# Patient Record
Sex: FEMALE | Race: WHITE | Employment: OTHER | URBAN - METROPOLITAN AREA
[De-identification: names, ages, dates, MRNs, and addresses within clinical notes are randomized per-mention and may not be internally consistent; named-entity substitution may affect disease eponyms.]

---

## 2017-02-03 ENCOUNTER — HOSPITAL ENCOUNTER (OUTPATIENT)
Dept: LAB | Age: 77
Discharge: HOME | End: 2017-02-03
Payer: MEDICARE

## 2017-02-03 DIAGNOSIS — D62 POSTOPERATIVE ANEMIA DUE TO ACUTE BLOOD LOSS: ICD-10-CM

## 2017-02-03 LAB
ALBUMIN SERPL BCP-MCNC: 3.9 G/DL (ref 3.2–4.6)
ALBUMIN/GLOB SERPL: 1 {RATIO} (ref 1.2–3.5)
ALP SERPL-CCNC: 197 U/L (ref 50–136)
ALT SERPL-CCNC: 18 U/L (ref 12–65)
ANION GAP BLD CALC-SCNC: 8 MMOL/L (ref 7–16)
AST SERPL W P-5'-P-CCNC: 9 U/L (ref 15–37)
BASOPHILS # BLD AUTO: 0.1 K/UL (ref 0–0.2)
BASOPHILS # BLD: 0 % (ref 0–2)
BILIRUB SERPL-MCNC: 0.6 MG/DL (ref 0.2–1.1)
BUN SERPL-MCNC: 63 MG/DL (ref 8–23)
CALCIUM SERPL-MCNC: 9.1 MG/DL (ref 8.3–10.4)
CHLORIDE SERPL-SCNC: 100 MMOL/L (ref 98–107)
CO2 SERPL-SCNC: 25 MMOL/L (ref 23–32)
CREAT SERPL-MCNC: 2.86 MG/DL (ref 0.6–1)
DIFFERENTIAL METHOD BLD: ABNORMAL
EOSINOPHIL # BLD: 0 K/UL (ref 0–0.8)
EOSINOPHIL NFR BLD: 0 % (ref 0.5–7.8)
ERYTHROCYTE [DISTWIDTH] IN BLOOD BY AUTOMATED COUNT: 12.9 % (ref 11.9–14.6)
GLOBULIN SER CALC-MCNC: 4.1 G/DL (ref 2.3–3.5)
GLUCOSE SERPL-MCNC: 284 MG/DL (ref 65–100)
HCT VFR BLD AUTO: 35.2 % (ref 35.8–46.3)
HGB BLD-MCNC: 11.9 G/DL (ref 11.7–15.4)
LYMPHOCYTES # BLD AUTO: 10 % (ref 13–44)
LYMPHOCYTES # BLD: 1.8 K/UL (ref 0.5–4.6)
MCH RBC QN AUTO: 30.7 PG (ref 26.1–32.9)
MCHC RBC AUTO-ENTMCNC: 33.8 G/DL (ref 31.4–35)
MCV RBC AUTO: 90.7 FL (ref 79.6–97.8)
MONOCYTES # BLD: 0.9 K/UL (ref 0.1–1.3)
MONOCYTES NFR BLD AUTO: 5 % (ref 4–12)
NEUTS SEG # BLD: 15.2 K/UL (ref 1.7–8.2)
NEUTS SEG NFR BLD AUTO: 85 % (ref 43–78)
NRBC # BLD: 0.01 K/UL (ref 0–0.2)
PLATELET # BLD AUTO: 388 K/UL (ref 150–450)
PMV BLD AUTO: 11.2 FL (ref 10.8–14.1)
POTASSIUM SERPL-SCNC: 4.2 MMOL/L (ref 3.5–5.1)
PROT SERPL-MCNC: 8 G/DL (ref 6.3–8.2)
RBC # BLD AUTO: 3.88 M/UL (ref 4.05–5.25)
SODIUM SERPL-SCNC: 133 MMOL/L (ref 136–145)
WBC # BLD AUTO: 18 K/UL (ref 4.3–11.1)

## 2017-02-03 PROCEDURE — 84165 PROTEIN E-PHORESIS SERUM: CPT | Performed by: INTERNAL MEDICINE

## 2017-02-03 PROCEDURE — 86334 IMMUNOFIX E-PHORESIS SERUM: CPT | Performed by: INTERNAL MEDICINE

## 2017-02-03 PROCEDURE — 80053 COMPREHEN METABOLIC PANEL: CPT | Performed by: INTERNAL MEDICINE

## 2017-02-03 PROCEDURE — 36415 COLL VENOUS BLD VENIPUNCTURE: CPT | Performed by: INTERNAL MEDICINE

## 2017-02-03 PROCEDURE — 85025 COMPLETE CBC W/AUTO DIFF WBC: CPT | Performed by: INTERNAL MEDICINE

## 2017-02-06 LAB
ALBUMIN SERPL ELPH-MCNC: 4.06 G/DL (ref 3.2–5.6)
ALBUMIN/GLOB SERPL: 1.1 {RATIO}
ALPHA1 GLOB SERPL ELPH-MCNC: 0.22 G/DL (ref 0.1–0.4)
ALPHA2 GLOB SERPL ELPH-MCNC: 1.22 G/DL (ref 0.4–1.2)
B-GLOBULIN SERPL QL ELPH: 1.13 G/DL (ref 0.6–1.3)
GAMMA GLOB MFR SERPL ELPH: 1.17 G/DL (ref 0.5–1.6)
IGA SERPL-MCNC: 93 MG/DL (ref 85–499)
IGG SERPL-MCNC: 1126 MG/DL (ref 610–1616)
IGM SERPL-MCNC: 59 MG/DL (ref 35–242)
M PROTEIN SERPL ELPH-MCNC: 0.53 G/DL
PROT PATTERN SERPL ELPH-IMP: ABNORMAL
PROT PATTERN SPEC IFE-IMP: ABNORMAL
PROT SERPL-MCNC: 7.8 G/DL (ref 6.3–8.2)

## 2017-02-07 PROCEDURE — 84156 ASSAY OF PROTEIN URINE: CPT | Performed by: INTERNAL MEDICINE

## 2017-02-07 PROCEDURE — 86335 IMMUNFIX E-PHORSIS/URINE/CSF: CPT | Performed by: INTERNAL MEDICINE

## 2017-02-08 ENCOUNTER — HOSPITAL ENCOUNTER (OUTPATIENT)
Dept: LAB | Age: 77
Discharge: HOME | End: 2017-02-08
Payer: MEDICARE

## 2017-02-08 DIAGNOSIS — D62 POSTOPERATIVE ANEMIA DUE TO ACUTE BLOOD LOSS: ICD-10-CM

## 2017-02-08 DIAGNOSIS — N28.89 RENAL MASS: ICD-10-CM

## 2017-02-10 LAB
ALBUMIN UR ELPH-MCNC: 3.4 MG/DL
ALBUMIN/GLOB SERPL: 0.4 {RATIO}
ALPHA1 GLOB 24H UR ELPH-MCNC: 0.4 MG/DL
ALPHA2 GLOB SERPL ELPH-MCNC: 1.2 MG/DL
B-GLOBULIN UR QL ELPH: 4.1 MG/DL
COLLECT DURATION TIME UR: 24 HR
GAMMA GLOB MFR UR ELPH: 3.9 MG/DL
M PROTEIN UR-MCNC: ABNORMAL MG/DL
PROT 24H UR-MRATE: 153 MG/24HR
PROT PATTERN SPEC IFE-IMP: ABNORMAL
PROT PATTERN UR ELPH-IMP: ABNORMAL
PROT UR-MCNC: 13 MG/DL
SPECIMEN VOL ?TM UR: 1175 ML

## 2017-02-13 ENCOUNTER — HOSPITAL ENCOUNTER (OUTPATIENT)
Dept: GENERAL RADIOLOGY | Age: 77
Discharge: HOME | End: 2017-02-13
Attending: INTERNAL MEDICINE
Payer: MEDICARE

## 2017-02-13 DIAGNOSIS — D62 POSTOPERATIVE ANEMIA DUE TO ACUTE BLOOD LOSS: ICD-10-CM

## 2017-02-13 DIAGNOSIS — N28.89 RENAL MASS: ICD-10-CM

## 2017-02-13 PROCEDURE — 77075 RADEX OSSEOUS SURVEY COMPL: CPT

## 2017-02-23 ENCOUNTER — HOSPITAL ENCOUNTER (OUTPATIENT)
Dept: LAB | Age: 77
Discharge: HOME | End: 2017-02-23
Payer: MEDICARE

## 2017-02-23 DIAGNOSIS — D47.2 MGUS (MONOCLONAL GAMMOPATHY OF UNKNOWN SIGNIFICANCE): ICD-10-CM

## 2017-02-23 LAB
ALBUMIN SERPL BCP-MCNC: 3.5 G/DL (ref 3.2–4.6)
ALBUMIN/GLOB SERPL: 0.9 {RATIO} (ref 1.2–3.5)
ALP SERPL-CCNC: 167 U/L (ref 50–136)
ALT SERPL-CCNC: 25 U/L (ref 12–65)
ANION GAP BLD CALC-SCNC: 12 MMOL/L (ref 7–16)
AST SERPL W P-5'-P-CCNC: 14 U/L (ref 15–37)
BASOPHILS # BLD AUTO: 0.1 K/UL (ref 0–0.2)
BASOPHILS # BLD: 1 % (ref 0–2)
BILIRUB SERPL-MCNC: 0.7 MG/DL (ref 0.2–1.1)
BUN SERPL-MCNC: 54 MG/DL (ref 8–23)
CALCIUM SERPL-MCNC: 9.2 MG/DL (ref 8.3–10.4)
CHLORIDE SERPL-SCNC: 101 MMOL/L (ref 98–107)
CO2 SERPL-SCNC: 24 MMOL/L (ref 23–32)
CREAT SERPL-MCNC: 2.84 MG/DL (ref 0.6–1)
DIFFERENTIAL METHOD BLD: ABNORMAL
EOSINOPHIL # BLD: 0.3 K/UL (ref 0–0.8)
EOSINOPHIL NFR BLD: 2 % (ref 0.5–7.8)
ERYTHROCYTE [DISTWIDTH] IN BLOOD BY AUTOMATED COUNT: 13.2 % (ref 11.9–14.6)
GLOBULIN SER CALC-MCNC: 4.1 G/DL (ref 2.3–3.5)
GLUCOSE SERPL-MCNC: 134 MG/DL (ref 65–100)
HCT VFR BLD AUTO: 37 % (ref 35.8–46.3)
HGB BLD-MCNC: 12.2 G/DL (ref 11.7–15.4)
LYMPHOCYTES # BLD AUTO: 16 % (ref 13–44)
LYMPHOCYTES # BLD: 2.3 K/UL (ref 0.5–4.6)
MCH RBC QN AUTO: 30.6 PG (ref 26.1–32.9)
MCHC RBC AUTO-ENTMCNC: 33 G/DL (ref 31.4–35)
MCV RBC AUTO: 92.7 FL (ref 79.6–97.8)
MONOCYTES # BLD: 0.6 K/UL (ref 0.1–1.3)
MONOCYTES NFR BLD AUTO: 4 % (ref 4–12)
NEUTS SEG # BLD: 10.6 K/UL (ref 1.7–8.2)
NEUTS SEG NFR BLD AUTO: 76 % (ref 43–78)
NRBC # BLD: 0 K/UL (ref 0–0.2)
PLATELET # BLD AUTO: 331 K/UL (ref 150–450)
PMV BLD AUTO: 11.1 FL (ref 10.8–14.1)
POTASSIUM SERPL-SCNC: 4.3 MMOL/L (ref 3.5–5.1)
PROT SERPL-MCNC: 7.6 G/DL (ref 6.3–8.2)
RBC # BLD AUTO: 3.99 M/UL (ref 4.05–5.25)
SODIUM SERPL-SCNC: 137 MMOL/L (ref 136–145)
WBC # BLD AUTO: 13.9 K/UL (ref 4.3–11.1)

## 2017-02-23 PROCEDURE — 36415 COLL VENOUS BLD VENIPUNCTURE: CPT | Performed by: INTERNAL MEDICINE

## 2017-02-23 PROCEDURE — 80053 COMPREHEN METABOLIC PANEL: CPT | Performed by: INTERNAL MEDICINE

## 2017-02-23 PROCEDURE — 85025 COMPLETE CBC W/AUTO DIFF WBC: CPT | Performed by: INTERNAL MEDICINE

## 2017-03-24 ENCOUNTER — PATIENT OUTREACH (OUTPATIENT)
Dept: CASE MANAGEMENT | Age: 77
End: 2017-03-24

## 2017-03-24 NOTE — PROGRESS NOTES
This note will not be viewable in [address] Care Needs: RNCM spoke with pt regarding CCM services, pt is agreeable to services. She and  live with her daughter Diana Oswald. She does not drive, but relies on her daughter Diana Oswald for transportation. Pt believes she will be starting dialysis in the near future. Medications reviewed with pt. Pt has Medicare Part D through [facility] but still pays $191 for Tradjenta. Pt has appt at [facility] Dr. Ken Vigil nephrologist Wednesday 3/29/17. She will learn more about beginning dialysis at this appt. Pt deals with pain that impacts sleep  lyrica helps pain and trembling. Next Steps: Referral to  for assistance with Dana Barker. RNCM will follow for A1c of 8.0. Pt has RNCM contact information for questions or concerns.

## 2017-03-29 ENCOUNTER — PATIENT OUTREACH (OUTPATIENT)
Dept: CASE MANAGEMENT | Age: 77
End: 2017-03-29

## 2017-03-29 NOTE — Clinical Note
FYI-please see last box on template in progress note. I plan to stop in office briefly on Thursday at 11am.  Let me know if this won't work. I emailed Malia Godinez to advise as well. Thank you!

## 2017-03-29 NOTE — ACP (ADVANCE CARE PLANNING)
Pt reports she and her spouse completed an updated will, living will, and POA a few years ago with a . CM encouraged to take to next appt so can scan into chart.

## 2017-03-29 NOTE — PROGRESS NOTES
Ambulatory Care Coordination  Social Work Assessment   Referral from which RN CM: Artice    Previously referred? If so, reason and brief outcome No   Reason for current referral: Tradjenta cost   Income information (if needed): Pt and spouse make jointly around $1,400/month   Sources of Support: Daughter, some neighbors   Medication Cost assistance needed? Yes, Tradjenta   Referral to CLTC/Medicaid needed? No   Referral to Medicare Extra Help/LIS program needed? CM will take this to home visit and share with pt and spouse. Most likely will qualify due to income and limited assets   Small home repair needed? No   MOW referral? Pt declined at this time as has daughter to assist with meals but may change mind if daughter moves or circumstances change   Any other concerns/questions? None other at this time when CM assessed. Next steps: CM will visit pt in her home at 10am on 3/30 to complete Tradjent assistance application and obtain SSI income letters. CM will then go to PCP office to obtain signature and signed rx for Wes Hodge from PCP. Cm will fax from PCP office. CM to f/u with assistance program end of next week to learn if pt is approved. If approved, medications will be shipped to pts home. Pt does have enough samples to last her a couple of weeks. This note will not be viewable in [address]

## 2017-03-30 ENCOUNTER — PATIENT OUTREACH (OUTPATIENT)
Dept: CASE MANAGEMENT | Age: 77
End: 2017-03-30

## 2017-03-30 NOTE — PROGRESS NOTES
Social Work CM completed home visit as planned. CM obtained pt's income letters and her signature on the [address] assistance application. CM advised PCP is out of office until Monday. CM spoke with , Laura Jesus, to make aware of plans to stop by on Monday to obtain PCP signature, signed rx, and fax. Cm will contact pt once application submitted. This note will not be viewable in [address]

## 2017-03-31 ENCOUNTER — PATIENT OUTREACH (OUTPATIENT)
Dept: CASE MANAGEMENT | Age: 77
End: 2017-03-31

## 2017-03-31 NOTE — PROGRESS NOTES
This note will not be viewable in [address] Care Needs: RNCM spoke with pt regarding CCM services. Pt reports she likes 3 points from needing dialysis. She is anticipating receiving a call from surgeon soon regarding fixing the veins to begin dialysis. Pt will then f/u with Dr. Mckay Mccollum again and has chosen dialysis center in [facility] when needed. Pt reports swelling and mild SOB on exertion. Pt had increase in Lasix to 80mg/day now by Dr. Mckay Mccollum. Pt reports she weighs herself daily at the same time, and has been doing this for 2 months. Pt is aware to report weight gain of 3lbs, resp signs and symptoms to physician early to prevent emergency situations. Pt verbalizes understanding. Next Steps:  RNCM scheduled call to f/u DM education in 2wks. Pt has RNCM contact information for questions or concerns.

## 2017-04-03 ENCOUNTER — PATIENT OUTREACH (OUTPATIENT)
Dept: CASE MANAGEMENT | Age: 77
End: 2017-04-03

## 2017-04-03 NOTE — PROGRESS NOTES
Social Work CM took [facility]  assistance application to pt's PCP office this morning for completion. CM obtained signed rx, Dr. Zeeshan Forrester signature on application, faxed in all items, and received a confirmation page. CM called pt to advise will outreach to assistance program in one week to see if they have made a decision. CM also inquired if pt had received her SSI letters in the mail yet and pt confirmed this. Cm to contact pt once learns outcome of Tradjenta assistance decision    This note will not be viewable in [employer].

## 2017-04-10 ENCOUNTER — PATIENT OUTREACH (OUTPATIENT)
Dept: CASE MANAGEMENT | Age: 77
End: 2017-04-10

## 2017-04-10 NOTE — PROGRESS NOTES
Social Work CM outreached to [employer] with [employer], spoke with rep Brown. Rep advised pt's info not showing in system; should be posted within 3-5 business days of the fax being sent, today being day 5. Rep suggested calling back on Wednesday to ensure still not in as they have been receiving a large amount of faxes. CM will also resubmit fax today. CM did receive confirmation page when faxed on 4/3. Rep advised if not faxed from PCP office it will still be processed however they may contact PCP office to verify info. CM will notify PCP RN/MA [place] and RN MARECLLA. CM will contact pt to give status update as well. CM to contact assistance program Wednesday morning. This note will not be viewable in [address]

## 2017-04-12 ENCOUNTER — PATIENT OUTREACH (OUTPATIENT)
Dept: CASE MANAGEMENT | Age: 77
End: 2017-04-12

## 2017-04-12 NOTE — PROGRESS NOTES
Social Work CM contacted  to get status on Tradjenta status. Rep advised still not showing in system. Cm had faxed on 4/3 an d4/10 and got confirmation pages. Verified fax number and rep confirmed. CM mailing in application so not to delay. CM will advise pt and check back next week to ensure received. This note will not be viewable in [address]

## 2017-04-14 ENCOUNTER — PATIENT OUTREACH (OUTPATIENT)
Dept: CASE MANAGEMENT | Age: 77
End: 2017-04-14

## 2017-04-14 NOTE — Clinical Note
Stephanie Sawant, Ms. Monster Castaneda requests a new wheelchair. She wants a regular one, not lightweight.     Thank you, Patricia Elliott, RN, BSN Ambulatory Care Manager [phone]

## 2017-04-14 NOTE — PROGRESS NOTES
This note will not be viewable in [address] Care Needs: RNCM spoke with pt regarding CCM services. Pt reports she went last wk to nephrologist, and is expecting call from surgeon (pt is unaware which surgeon) to evaluate what vein to use for dialysis. If she doesn't hear from surgeon soon, she is going to call nephrologist to alert them. She states she makes notes of things she wants to discuss with nephrologist before she goes. RNCM discussed DM and CKD diagnosis, impact on health and wellbeing. Pt reports she doesn't have any specific complaints but \"just feels bad\". She reports she feels best when her runs on the higher side close to 200. Her BS ranges 100-110 fasting, and 200s during the day. Pt states she gets sleepy when it runs too high, and lightheaded when it is getting low. She denies recent hypoglycemic episodes. RNCM discussed A1c in 1/2017   8.0 vs target A1c of <7.0. Pt again states she feels best when her BS is higher. Pt states she would like to have a new wheelchair. Next Steps: Lurdes Mark will notify PCP office of request for wheelchair. RNCM scheduled call to f/u anticipated dialysis cath placement. Pt has RNCM contact information for questions or concerns.

## 2017-04-20 ENCOUNTER — PATIENT OUTREACH (OUTPATIENT)
Dept: CASE MANAGEMENT | Age: 77
End: 2017-04-20

## 2017-04-20 NOTE — PROGRESS NOTES
Social work CM followed up with [place] assistance [facility] to ensure charity was received after mailed last week. Rep Kiki Hartman advised it had been received but was missing one check box re: type of insurance coverage. CM advised only medicare/medicare supplement, nothing private or Fernando Chavez advised she will update this question and send to processing. Could take up to two weeks for decision of approval.    CM off at exact 2 week camila but will attempt on 5/2 to see if decision made. CM will call pt to update on status. This note will not be viewable in [address]

## 2017-05-02 ENCOUNTER — PATIENT OUTREACH (OUTPATIENT)
Dept: CASE MANAGEMENT | Age: 77
End: 2017-05-02

## 2017-05-02 NOTE — PROGRESS NOTES
Social Work CM followed up with [employer].    CM spoke with rep Zoe Leonard who advised pt is \"temporarily enrolled\" and will be contacted via phone or letter to advise. Pt is only temporarily enrolled, and will receive a 90 day supply, due to pt's eligibility for the low-income subsidy program (aka Extra Help) with Medicare. CM did provide pt an application for this on home visit but unsure if pt has followed through with submission. Will encourage to do so when contact today to advise of temporary approval.    CM will contact pt shortly to update and encourage to contact SW if can assist.    Rep did not need anything from office at this time; said they are waiting for prescription to process and then will mail medication to pt's home. This note will not be viewable in [address]

## 2017-05-22 ENCOUNTER — PATIENT OUTREACH (OUTPATIENT)
Dept: CASE MANAGEMENT | Age: 77
End: 2017-05-22

## 2017-05-22 NOTE — PROGRESS NOTES
[facility] outreached to pt re: receiving Tradjenta and Extra Help application submission. Pt reports she did receive a 90 day supply of the Tradjenta and is thankful for this. Pt shares she has not filled out or submitted the Extra Help application (that would help pay for ALL medications) due to feeling poorly. Pt advised she had a great deal of swelling in her legs and had to be put on an antibiotic after getting an infection from one of the blisters popping. Mrs. Shady Leung reports she is feeling better now. CM encouraged pt to send in that application when feeling up to it and to call if has any questions about completing this. Pt was agreeable and thanked CM for the follow up. CM not scheduling any further outreaches at this time. Updating goal and removing from caseload. This note will not be viewable in [address]

## 2017-05-24 ENCOUNTER — PATIENT OUTREACH (OUTPATIENT)
Dept: CASE MANAGEMENT | Age: 77
End: 2017-05-24

## 2017-05-24 NOTE — PROGRESS NOTES
This note will not be viewable in [address] RNCM attempted to reach pt for f/u CCM services, no answer. RNCM left vm message with contact information requesting call back. RNCM will continue attempts to reach pt.

## 2017-06-02 ENCOUNTER — HOSPITAL ENCOUNTER (OUTPATIENT)
Dept: SURGERY | Age: 77
Discharge: HOME | End: 2017-06-02
Payer: MEDICARE

## 2017-06-02 VITALS
HEART RATE: 62 BPM | SYSTOLIC BLOOD PRESSURE: 121 MMHG | TEMPERATURE: 97.4 F | HEIGHT: 68 IN | BODY MASS INDEX: 37.33 KG/M2 | DIASTOLIC BLOOD PRESSURE: 50 MMHG | WEIGHT: 246.31 LBS | RESPIRATION RATE: 16 BRPM | OXYGEN SATURATION: 95 %

## 2017-06-02 LAB
ANION GAP BLD CALC-SCNC: 10 MMOL/L (ref 7–16)
BUN SERPL-MCNC: 53 MG/DL (ref 8–23)
CALCIUM SERPL-MCNC: 9.1 MG/DL (ref 8.3–10.4)
CHLORIDE SERPL-SCNC: 107 MMOL/L (ref 98–107)
CO2 SERPL-SCNC: 24 MMOL/L (ref 21–32)
CREAT SERPL-MCNC: 2.69 MG/DL (ref 0.6–1)
ERYTHROCYTE [DISTWIDTH] IN BLOOD BY AUTOMATED COUNT: 14.1 % (ref 11.9–14.6)
GLUCOSE BLD STRIP.AUTO-MCNC: 132 MG/DL (ref 65–100)
GLUCOSE SERPL-MCNC: 142 MG/DL (ref 65–100)
HCT VFR BLD AUTO: 34.6 % (ref 35.8–46.3)
HGB BLD-MCNC: 11.3 G/DL (ref 11.7–15.4)
MCH RBC QN AUTO: 29.4 PG (ref 26.1–32.9)
MCHC RBC AUTO-ENTMCNC: 32.7 G/DL (ref 31.4–35)
MCV RBC AUTO: 90.1 FL (ref 79.6–97.8)
PLATELET # BLD AUTO: 293 K/UL (ref 150–450)
PMV BLD AUTO: 11.9 FL (ref 10.8–14.1)
POTASSIUM SERPL-SCNC: 4.5 MMOL/L (ref 3.5–5.1)
RBC # BLD AUTO: 3.84 M/UL (ref 4.05–5.25)
SODIUM SERPL-SCNC: 141 MMOL/L (ref 136–145)
WBC # BLD AUTO: 6.2 K/UL (ref 4.3–11.1)

## 2017-06-02 PROCEDURE — 80048 BASIC METABOLIC PNL TOTAL CA: CPT | Performed by: SURGERY

## 2017-06-02 PROCEDURE — 85027 COMPLETE CBC AUTOMATED: CPT | Performed by: SURGERY

## 2017-06-02 PROCEDURE — 82962 GLUCOSE BLOOD TEST: CPT

## 2017-06-02 RX ORDER — BENAZEPRIL HYDROCHLORIDE 20 MG/1
20 TABLET ORAL
COMMUNITY
End: 2019-06-14 | Stop reason: SDUPTHER

## 2017-06-02 RX ORDER — FUROSEMIDE 40 MG/1
40 TABLET ORAL
COMMUNITY
End: 2018-03-13 | Stop reason: DRUGHIGH

## 2017-06-02 NOTE — PERIOP NOTES
POC glucose 132 . Instructed  Patient that if blood sugar 300 or > , surgery may be cancelled. Advised to take 1/2 normal dose of 70/30 on DOS = 25 units. Patient verbalized understanding.

## 2017-06-02 NOTE — PERIOP NOTES
Patient verified name, , and surgery as listed in [facility]. TYPE  CASE:1b  Orders per surgeon: were Received  Labs per surgeon:CBC,BMP. IStat K+ on DOS  Labs per anesthesia protocol: poc glucoce  EKG  :  EKG 16, Cath 11/10/11, Echo 16. Also added last office note from Primary Internal Medicine Physician. Patient provided with handouts including guide to surgery , transfusions, pain management and hand hygiene for the family and community. Pt verbalizes understanding of all pre-op instructions . Instructed that family must be present in building at all times. Nothing to eat or drink after midnight the night prior to surgery. Hibiclens and instructions given per hospital policy. Instructed patient to continue  previous medications as prescribed prior to surgery and  to take the following medications the day of surgery according to anesthesia guidelines : Aspirin, Atorvastatin, Carvedilol, Uloric, Levothyroxine. Original medication prescription bottles were not visualized during patient appointment. Continue all previous medications unless otherwise directed. Instructed patient to hold  the following medications prior to surgery: none. Patient verbalized understanding of all instructions and provided all medical/health information to the best of their ability.

## 2017-06-06 ENCOUNTER — ANESTHESIA EVENT (OUTPATIENT)
Dept: SURGERY | Age: 77
End: 2017-06-06
Payer: MEDICARE

## 2017-06-07 ENCOUNTER — HOSPITAL ENCOUNTER (OUTPATIENT)
Age: 77
Setting detail: OUTPATIENT SURGERY
Discharge: HOME | End: 2017-06-07
Attending: SURGERY | Admitting: SURGERY
Payer: MEDICARE

## 2017-06-07 ENCOUNTER — ANESTHESIA (OUTPATIENT)
Dept: SURGERY | Age: 77
End: 2017-06-07
Payer: MEDICARE

## 2017-06-07 VITALS
OXYGEN SATURATION: 96 % | HEIGHT: 69 IN | DIASTOLIC BLOOD PRESSURE: 64 MMHG | SYSTOLIC BLOOD PRESSURE: 134 MMHG | TEMPERATURE: 98 F | WEIGHT: 246.13 LBS | RESPIRATION RATE: 14 BRPM | HEART RATE: 58 BPM | BODY MASS INDEX: 36.45 KG/M2

## 2017-06-07 LAB
GLUCOSE BLD STRIP.AUTO-MCNC: 114 MG/DL (ref 65–100)
GLUCOSE BLD STRIP.AUTO-MCNC: 73 MG/DL (ref 65–100)
GLUCOSE BLD STRIP.AUTO-MCNC: 85 MG/DL (ref 65–100)
POTASSIUM BLD-SCNC: 4.4 MMOL/L (ref 3.5–5.1)

## 2017-06-07 PROCEDURE — 77030020143 HC AIRWY LARYN INTUB CGAS -A: Performed by: ANESTHESIOLOGY

## 2017-06-07 PROCEDURE — 77030011640 HC PAD GRND REM COVD -A: Performed by: SURGERY

## 2017-06-07 PROCEDURE — 76210000006 HC OR PH I REC 0.5 TO 1 HR: Performed by: SURGERY

## 2017-06-07 PROCEDURE — 74011250637 HC RX REV CODE- 250/637: Performed by: ANESTHESIOLOGY

## 2017-06-07 PROCEDURE — 76210000020 HC REC RM PH II FIRST 0.5 HR: Performed by: SURGERY

## 2017-06-07 PROCEDURE — 77030034888 HC SUT PROL 2 J&J -B: Performed by: SURGERY

## 2017-06-07 PROCEDURE — 74011250636 HC RX REV CODE- 250/636

## 2017-06-07 PROCEDURE — 74011000250 HC RX REV CODE- 250: Performed by: SURGERY

## 2017-06-07 PROCEDURE — 77030010507 HC ADH SKN DERMBND J&J -B: Performed by: SURGERY

## 2017-06-07 PROCEDURE — 77030010514 HC APPL CLP LIG COVD -B: Performed by: SURGERY

## 2017-06-07 PROCEDURE — 77030014008 HC SPNG HEMSTAT J&J -C: Performed by: SURGERY

## 2017-06-07 PROCEDURE — 76060000034 HC ANESTHESIA 1.5 TO 2 HR: Performed by: SURGERY

## 2017-06-07 PROCEDURE — 76010000162 HC OR TIME 1.5 TO 2 HR INTENSV-TIER 1: Performed by: SURGERY

## 2017-06-07 PROCEDURE — 77030002996 HC SUT SLK J&J -A: Performed by: SURGERY

## 2017-06-07 PROCEDURE — 77030032490 HC SLV COMPR SCD KNE COVD -B: Performed by: SURGERY

## 2017-06-07 PROCEDURE — 77030031139 HC SUT VCRL2 J&J -A: Performed by: SURGERY

## 2017-06-07 PROCEDURE — 77030020782 HC GWN BAIR PAWS FLX 3M -B: Performed by: ANESTHESIOLOGY

## 2017-06-07 PROCEDURE — 74011000250 HC RX REV CODE- 250

## 2017-06-07 PROCEDURE — 84132 ASSAY OF SERUM POTASSIUM: CPT

## 2017-06-07 PROCEDURE — 74011250636 HC RX REV CODE- 250/636: Performed by: SURGERY

## 2017-06-07 PROCEDURE — 82962 GLUCOSE BLOOD TEST: CPT

## 2017-06-07 RX ORDER — FENTANYL CITRATE 50 UG/ML
100 INJECTION, SOLUTION INTRAMUSCULAR; INTRAVENOUS AS NEEDED
Status: DISCONTINUED | OUTPATIENT
Start: 2017-06-07 | End: 2017-06-07 | Stop reason: HOSPADM

## 2017-06-07 RX ORDER — CEFAZOLIN SODIUM IN 0.9 % NACL 2 G/50 ML
2 INTRAVENOUS SOLUTION, PIGGYBACK (ML) INTRAVENOUS ONCE
Status: COMPLETED | OUTPATIENT
Start: 2017-06-07 | End: 2017-06-07

## 2017-06-07 RX ORDER — LIDOCAINE HYDROCHLORIDE 10 MG/ML
0.1 INJECTION INFILTRATION; PERINEURAL AS NEEDED
Status: DISCONTINUED | OUTPATIENT
Start: 2017-06-07 | End: 2017-06-07 | Stop reason: HOSPADM

## 2017-06-07 RX ORDER — SODIUM CHLORIDE, SODIUM LACTATE, POTASSIUM CHLORIDE, CALCIUM CHLORIDE 600; 310; 30; 20 MG/100ML; MG/100ML; MG/100ML; MG/100ML
75 INJECTION, SOLUTION INTRAVENOUS CONTINUOUS
Status: DISCONTINUED | OUTPATIENT
Start: 2017-06-07 | End: 2017-06-07 | Stop reason: HOSPADM

## 2017-06-07 RX ORDER — SODIUM CHLORIDE, SODIUM LACTATE, POTASSIUM CHLORIDE, CALCIUM CHLORIDE 600; 310; 30; 20 MG/100ML; MG/100ML; MG/100ML; MG/100ML
1000 INJECTION, SOLUTION INTRAVENOUS CONTINUOUS
Status: DISCONTINUED | OUTPATIENT
Start: 2017-06-07 | End: 2017-06-07

## 2017-06-07 RX ORDER — ACETAMINOPHEN 500 MG
500 TABLET ORAL ONCE
Status: DISCONTINUED | OUTPATIENT
Start: 2017-06-07 | End: 2017-06-07 | Stop reason: HOSPADM

## 2017-06-07 RX ORDER — FENTANYL CITRATE 50 UG/ML
INJECTION, SOLUTION INTRAMUSCULAR; INTRAVENOUS AS NEEDED
Status: DISCONTINUED | OUTPATIENT
Start: 2017-06-07 | End: 2017-06-07 | Stop reason: HOSPADM

## 2017-06-07 RX ORDER — PROPOFOL 10 MG/ML
INJECTION, EMULSION INTRAVENOUS AS NEEDED
Status: DISCONTINUED | OUTPATIENT
Start: 2017-06-07 | End: 2017-06-07 | Stop reason: HOSPADM

## 2017-06-07 RX ORDER — LIDOCAINE HYDROCHLORIDE 20 MG/ML
INJECTION, SOLUTION EPIDURAL; INFILTRATION; INTRACAUDAL; PERINEURAL AS NEEDED
Status: DISCONTINUED | OUTPATIENT
Start: 2017-06-07 | End: 2017-06-07 | Stop reason: HOSPADM

## 2017-06-07 RX ORDER — SODIUM CHLORIDE 0.9 % (FLUSH) 0.9 %
5-10 SYRINGE (ML) INJECTION EVERY 8 HOURS
Status: DISCONTINUED | OUTPATIENT
Start: 2017-06-07 | End: 2017-06-07 | Stop reason: HOSPADM

## 2017-06-07 RX ORDER — ONDANSETRON 2 MG/ML
INJECTION INTRAMUSCULAR; INTRAVENOUS AS NEEDED
Status: DISCONTINUED | OUTPATIENT
Start: 2017-06-07 | End: 2017-06-07 | Stop reason: HOSPADM

## 2017-06-07 RX ORDER — OXYCODONE HYDROCHLORIDE 5 MG/1
10 TABLET ORAL
Status: DISCONTINUED | OUTPATIENT
Start: 2017-06-07 | End: 2017-06-07 | Stop reason: HOSPADM

## 2017-06-07 RX ORDER — OXYCODONE HYDROCHLORIDE 5 MG/1
5 TABLET ORAL
Status: DISCONTINUED | OUTPATIENT
Start: 2017-06-07 | End: 2017-06-07 | Stop reason: HOSPADM

## 2017-06-07 RX ORDER — ONDANSETRON 2 MG/ML
4 INJECTION INTRAMUSCULAR; INTRAVENOUS ONCE
Status: DISCONTINUED | OUTPATIENT
Start: 2017-06-07 | End: 2017-06-07 | Stop reason: HOSPADM

## 2017-06-07 RX ORDER — SODIUM CHLORIDE 0.9 % (FLUSH) 0.9 %
5-10 SYRINGE (ML) INJECTION AS NEEDED
Status: DISCONTINUED | OUTPATIENT
Start: 2017-06-07 | End: 2017-06-07 | Stop reason: HOSPADM

## 2017-06-07 RX ORDER — LIDOCAINE HYDROCHLORIDE 10 MG/ML
INJECTION INFILTRATION; PERINEURAL AS NEEDED
Status: DISCONTINUED | OUTPATIENT
Start: 2017-06-07 | End: 2017-06-07 | Stop reason: HOSPADM

## 2017-06-07 RX ORDER — HYDROMORPHONE HYDROCHLORIDE 2 MG/ML
0.5 INJECTION, SOLUTION INTRAMUSCULAR; INTRAVENOUS; SUBCUTANEOUS
Status: DISCONTINUED | OUTPATIENT
Start: 2017-06-07 | End: 2017-06-07 | Stop reason: HOSPADM

## 2017-06-07 RX ORDER — BUPIVACAINE HYDROCHLORIDE 2.5 MG/ML
INJECTION, SOLUTION EPIDURAL; INFILTRATION; INTRACAUDAL AS NEEDED
Status: DISCONTINUED | OUTPATIENT
Start: 2017-06-07 | End: 2017-06-07 | Stop reason: HOSPADM

## 2017-06-07 RX ORDER — DIPHENHYDRAMINE HYDROCHLORIDE 50 MG/ML
12.5 INJECTION, SOLUTION INTRAMUSCULAR; INTRAVENOUS ONCE
Status: DISCONTINUED | OUTPATIENT
Start: 2017-06-07 | End: 2017-06-07 | Stop reason: HOSPADM

## 2017-06-07 RX ORDER — HEPARIN SODIUM 1000 [USP'U]/ML
INJECTION, SOLUTION INTRAVENOUS; SUBCUTANEOUS AS NEEDED
Status: DISCONTINUED | OUTPATIENT
Start: 2017-06-07 | End: 2017-06-07 | Stop reason: HOSPADM

## 2017-06-07 RX ORDER — FAMOTIDINE 20 MG/1
20 TABLET, FILM COATED ORAL ONCE
Status: COMPLETED | OUTPATIENT
Start: 2017-06-07 | End: 2017-06-07

## 2017-06-07 RX ORDER — NALOXONE HYDROCHLORIDE 0.4 MG/ML
0.1 INJECTION, SOLUTION INTRAMUSCULAR; INTRAVENOUS; SUBCUTANEOUS AS NEEDED
Status: DISCONTINUED | OUTPATIENT
Start: 2017-06-07 | End: 2017-06-07 | Stop reason: HOSPADM

## 2017-06-07 RX ORDER — SODIUM CHLORIDE 9 MG/ML
INJECTION, SOLUTION INTRAVENOUS
Status: DISCONTINUED | OUTPATIENT
Start: 2017-06-07 | End: 2017-06-07 | Stop reason: HOSPADM

## 2017-06-07 RX ORDER — MIDAZOLAM HYDROCHLORIDE 1 MG/ML
2 INJECTION, SOLUTION INTRAMUSCULAR; INTRAVENOUS
Status: DISCONTINUED | OUTPATIENT
Start: 2017-06-07 | End: 2017-06-07 | Stop reason: HOSPADM

## 2017-06-07 RX ORDER — SODIUM CHLORIDE, SODIUM LACTATE, POTASSIUM CHLORIDE, CALCIUM CHLORIDE 600; 310; 30; 20 MG/100ML; MG/100ML; MG/100ML; MG/100ML
25 INJECTION, SOLUTION INTRAVENOUS CONTINUOUS
Status: DISCONTINUED | OUTPATIENT
Start: 2017-06-07 | End: 2017-06-07 | Stop reason: HOSPADM

## 2017-06-07 RX ORDER — HEPARIN SODIUM 5000 [USP'U]/ML
INJECTION, SOLUTION INTRAVENOUS; SUBCUTANEOUS AS NEEDED
Status: DISCONTINUED | OUTPATIENT
Start: 2017-06-07 | End: 2017-06-07 | Stop reason: HOSPADM

## 2017-06-07 RX ORDER — PROTAMINE SULFATE 10 MG/ML
INJECTION, SOLUTION INTRAVENOUS AS NEEDED
Status: DISCONTINUED | OUTPATIENT
Start: 2017-06-07 | End: 2017-06-07 | Stop reason: HOSPADM

## 2017-06-07 RX ADMIN — PROTAMINE SULFATE 30 MG: 10 INJECTION, SOLUTION INTRAVENOUS at 14:55

## 2017-06-07 RX ADMIN — FAMOTIDINE 20 MG: 20 TABLET, FILM COATED ORAL at 09:38

## 2017-06-07 RX ADMIN — SODIUM CHLORIDE: 9 INJECTION, SOLUTION INTRAVENOUS at 13:41

## 2017-06-07 RX ADMIN — PROPOFOL 170 MG: 10 INJECTION, EMULSION INTRAVENOUS at 13:55

## 2017-06-07 RX ADMIN — FENTANYL CITRATE 25 MCG: 50 INJECTION, SOLUTION INTRAMUSCULAR; INTRAVENOUS at 14:24

## 2017-06-07 RX ADMIN — CEFAZOLIN 2 G: 1 INJECTION, POWDER, FOR SOLUTION INTRAMUSCULAR; INTRAVENOUS; PARENTERAL at 13:46

## 2017-06-07 RX ADMIN — LIDOCAINE HYDROCHLORIDE 100 MG: 20 INJECTION, SOLUTION EPIDURAL; INFILTRATION; INTRACAUDAL; PERINEURAL at 13:55

## 2017-06-07 RX ADMIN — FENTANYL CITRATE 50 MCG: 50 INJECTION, SOLUTION INTRAMUSCULAR; INTRAVENOUS at 13:55

## 2017-06-07 RX ADMIN — ONDANSETRON 4 MG: 2 INJECTION INTRAMUSCULAR; INTRAVENOUS at 15:06

## 2017-06-07 RX ADMIN — HEPARIN SODIUM 5000 UNITS: 1000 INJECTION, SOLUTION INTRAVENOUS; SUBCUTANEOUS at 14:36

## 2017-06-07 RX ADMIN — FENTANYL CITRATE 25 MCG: 50 INJECTION, SOLUTION INTRAMUSCULAR; INTRAVENOUS at 14:45

## 2017-06-07 NOTE — BRIEF OP NOTE
BRIEF OPERATIVE NOTE    Date of Procedure: 6/7/2017   Pre[facility] Diagnosis[facility] [N18.6]  Postoperative Diagnosis: CHRONIC KIDNEY DISEASE    Procedure(s):  LEFT ARM BRACHIOCEPHALIC ARTERIO VENOUS FISTULA/ CREATION  Surgeon(s) and Role:     * Priyanka Cotton MD - Primary       Assistant Staff:   Surgical Staff:  Circ-1: Colin Collier RN  Circ-Relief: Vance Gonzalez RN  Scrub Tech-1: Arlene Urbina  Scrub Tech-2: Fay Dorantes  Scrub Tech-Relief: Guilherme Patel CNA  Event Time In   Incision Start 1410   Incision Close 1515     Anesthesia: General   Estimated Blood Loss: 30 mL  Specimens: * No specimens in log *   Findings: Good quality left cephalic vein, good thrill and completion Doppler signals.    Complications: none  Implants: * No implants in log *

## 2017-06-07 NOTE — IP AVS SNAPSHOT
Current Discharge Medication List  
  
CONTINUE these medications which have CHANGED Dose & Instructions Dispensing Information Comments Morning Noon Evening Bedtime  
 febuxostat 80 mg Tab tablet Commonly known as:  Neal Saucedo What changed:  when to take this Your last dose was: Your next dose is:    
   
   
 Dose:  80 mg Take 1 Tab by mouth daily. Quantity:  30 Tab Refills:  11 CONTINUE these medications which have NOT CHANGED Dose & Instructions Dispensing Information Comments Morning Noon Evening Bedtime  
 aspirin 325 mg tablet Commonly known as:  ASPIRIN Your last dose was: Your next dose is:    
   
   
 Dose:  325 mg Take 325 mg by mouth every morning. Indications: Myocardial Reinfarction Prevention Refills:  0  
     
   
   
   
  
 atorvastatin 40 mg tablet Commonly known as:  LIPITOR Your last dose was: Your next dose is:    
   
   
 Dose:  40 mg Take 40 mg by mouth every morning. Refills:  0  
     
   
   
   
  
 carvedilol 6.25 mg tablet Commonly known as:  Cayla Concepcion Your last dose was: Your next dose is:    
   
   
 take 1 tablet by mouth twice a day Refills:  1  
     
   
   
   
  
 insulin NPH/insulin regular 100 unit/mL (70-30) injection Commonly known as:  HumuLIN 70/30 Your last dose was: Your next dose is:    
   
   
 inject 50 units subcutaneously every morning then 40 units every evening BEFORE MEALS Quantity:  3 Vial  
Refills:  5 LASIX 40 mg tablet Generic drug:  furosemide Your last dose was: Your next dose is:    
   
   
 Dose:  40 mg Take 40 mg by mouth every morning. Refills:  0  
     
   
   
   
  
 levothyroxine 25 mcg tablet Commonly known as:  SYNTHROID Your last dose was: Your next dose is:    
   
   
 Dose:  25 mcg Take 1 Tab by mouth Daily (before breakfast). Quantity:  90 Tab Refills:  3 LOTENSIN 20 mg tablet Generic drug:  benazepril Your last dose was: Your next dose is:    
   
   
 Dose:  20 mg Take 20 mg by mouth every morning. Refills:  0  
     
   
   
   
  
 nitroglycerin 400 mcg/spray spray Commonly known as:  Jose Luis Sermons Your last dose was: Your next dose is:    
   
   
 Dose:  1 Spray 1 Spray by SubLINGual route every five (5) minutes as needed (Pt states \"I've never had to use it\"). Quantity:  1 Bottle Refills:  4  
     
   
   
   
  
 TRADJENTA 5 mg tablet Generic drug:  linagliptin Your last dose was: Your next dose is:    
   
   
 Dose:  5 mg Take 5 mg by mouth every morning. Refills:  0  
     
   
   
   
  
 traMADol 300 mg tablet Commonly known as:  ULTRAM-ER Your last dose was: Your next dose is:    
   
   
 Dose:  300 mg Take 300 mg by mouth nightly. Refills:  0  
     
   
   
   
  
 triamcinolone acetonide 0.1 % topical cream  
Commonly known as:  KENALOG Your last dose was: Your next dose is:    
   
   
  Refills:  0

## 2017-06-07 NOTE — ANESTHESIA POSTPROCEDURE EVALUATION
Post-Anesthesia Evaluation and Assessment    Patient: Alexus Lobato MRN: [id number]  SSN: [id number]    YOB: 1940  Age: 68 y.o. Sex: female       Cardiovascular Function/Vital Signs  Visit Vitals    /64    Pulse (!) 58    Temp 36.7 °C (98 °F)    Resp 14    Ht 5' 8.5\" (1.74 m)    Wt 111.6 kg (246 lb 2 oz)    SpO2 96%    BMI 36.88 kg/m2       Patient is status post general anesthesia for Procedure(s):  LEFT ARM BRACHIOCEPHALIC ARTERIO VENOUS FISTULA/ CREATION. Nausea/Vomiting: None    Postoperative hydration reviewed and adequate. Pain:  Pain Scale 1: Numeric (0 - 10) (06/07/17 1616)  Pain Intensity 1: 0 (06/07/17 1616)   Managed    Neurological Status:   Neuro (WDL): Within Defined Limits (06/07/17 1600)  Neuro  Neurologic State: Drowsy (06/07/17 1523)  Orientation Level: Appropriate for age (06/07/17 1523)  Cognition: Appropriate decision making; Follows commands (06/07/17 1523)  Speech: Clear (06/07/17 1523)  LUE Motor Response: Purposeful (06/07/17 1523)  LLE Motor Response: Purposeful (06/07/17 1523)  RUE Motor Response: Purposeful (06/07/17 1523)  RLE Motor Response: Purposeful (06/07/17 1523)   At baseline    Mental Status and Level of Consciousness: Arousable    Pulmonary Status:   O2 Device: Room air (06/07/17 1616)   Adequate oxygenation and airway patent    Complications related to anesthesia: None    Post-anesthesia assessment completed.  No concerns    Signed By: Layla Copeland MD     June 7, 2017

## 2017-06-07 NOTE — DISCHARGE INSTRUCTIONS
INSTRUCTIONS FOR A-V FISTULA, GRAFT ACCESS, REVISION OR DECLOT    ACTIVITY  · As tolerated and as directed by your doctor. · Keep arm straight and raised above heart level for the next 24 hours. DIET  · Clear liquids until no nausea or vomiting; then light diet for the first day. · Advance to regular diet on second day, unless your doctor orders otherwise. · If nausea and vomiting continues, call your doctor. PAIN  · Take pain medication as directed by your doctor. · Call your doctor if pain is NOT relieved by the medication. · DO NOT take aspirin or blood thinners until directed by your doctor. DRESSING CARE  · Keep your dressing clean and dry. · Leave the dressing on until the dialysis nurse or your doctor takes it off. CARE OF YOUR ACCESS  DO:  · Keep access area clean with soap and water daily after dressing has been removed. · Feel for the thrill daily. (by placing your fingers over the graft you will be able to feel a vibration (thrill) which means blood is flowing and the graft is working.)  DO NOT:  · Wear tight sleeves, watches, belts or bracelets over graft. · Carry heavy bags across the graft. · Sleep on graft side. · Let your blood pressure be taken on graft side. · Let blood be drawn from your graft side. CALL YOUR DOCTOR IF  · Excessive bleeding that does not stop after holding mild pressure over area. · Temperature of 101 degrees F or above. · Redness, excessive swelling or bruising, and/or green or yellow, smelly discharge from incision   · Loss of sensation-cold, white, or blue fingers or toes. AFTER ANESTHESIA  · For the first 24 hours: DO NOT Drive, Drink alcoholic beverages, or Make important decisions. · Be aware of dizziness following anesthesia and while taking pain medication.      DISCHARGE SUMMARY from Nurse    PATIENT INSTRUCTIONS:    After general anesthesia or intravenous sedation, for 24 hours or while taking prescription Narcotics:  · Limit your activities  · Do not drive and operate hazardous machinery  · Do not make important personal or business decisions  · Do  not drink alcoholic beverages  · If you have not urinated within 8 hours after discharge, please contact your surgeon on call. *  Please give a list of your current medications to your Primary Care Provider. *  Please update this list whenever your medications are discontinued, doses are      changed, or new medications (including over-the-counter products) are added. *  Please carry medication information at all times in case of emergency situations. These are general instructions for a healthy lifestyle:    No smoking/ No tobacco products/ Avoid exposure to second hand smoke    Surgeon General's Warning:  Quitting smoking now greatly reduces serious risk to your health. Obesity, smoking, and sedentary lifestyle greatly increases your risk for illness    A healthy diet, regular physical exercise & weight monitoring are important for maintaining a healthy lifestyle    You may be retaining fluid if you have a history of heart failure or if you experience any of the following symptoms:  Weight gain of 3 pounds or more overnight or 5 pounds in a week, increased swelling in our hands or feet or shortness of breath while lying flat in bed. Please call your doctor as soon as you notice any of these symptoms; do not wait until your next office visit. Recognize signs and symptoms of STROKE:    F-face looks uneven    A-arms unable to move or move unevenly    S-speech slurred or non-existent    T-time-call 911 as soon as signs and symptoms begin-DO NOT go       Back to bed or wait to see if you get better-TIME IS BRAIN.

## 2017-06-07 NOTE — OP NOTES
[facility] 65   OPERATIVE REPORT       Name:  Erica Beach   MR#:  [id number]   :  1940   Account #:  [de-identified]   Date of Adm:  2017       DATE OF SURGERY: 2017    PREOPERATIVE DIAGNOSIS: Stage IV chronic kidney disease. POSTOPERATIVE DIAGNOSIS: Stage IV chronic kidney disease. PROCEDURE: Creation of left brachiocephalic arteriovenous   fistula. SURGEON: Milton Barba MD    ANESTHESIA: General with local supplement. ESTIMATED BLOOD LOSS: 30 mL. SPECIMEN: None. STATEMENT OF MEDICAL NECESSITY: The patient is a 71-year-old   woman with chronic kidney disease who is anticipated to require   hemodialysis in the near future. Vein mapping indicates a good   quality left upper arm cephalic vein for a fistula. DESCRIPTION OF PROCEDURE: The patient was taken to the operating   room and general anesthetic was administered. The left upper   extremity was prepped and draped in the usual sterile fashion. The antecubital fossa was anesthetized with 1% Xylocaine and   0.25% Sensorcaine local and a standard transverse incision was   made. The cubital branch of the cephalic vein was dissected out   to the basilic confluence and the basilic vein was noted to be   very small and was divided between ligatures of 2-0 silk. The   cephalic vein, on the other hand, was a good quality vein with a   diameter of about 5 to 6 mm. There were minimal phlebitic wall   changes and the vein was soft and easily compressible. The cephalic vein was dissected out at all of its branching   points and then marked on its anterior surface to ensure proper   orientation for the anastomosis. The aponeurosis was divided and the underlying brachial artery   was dissected out and found to be of good quality, with a   diameter of about 3.5 to 4 mm, and a soft arterial wall with a   strong pulse. The artery was dissected out proximally and   distally and vessel loops were applied.     The vein was divided. The cephalic vein was divided distally and   the distal stump oversewn with 2-0 silk suture ligature. The   vein was then opened up at a common branching point and the   excess venous material was trimmed away. There were no phlebitic   changes or valve segments in the distal end of the cephalic   vein. The cephalic vein was then distended gently with   heparinized saline which flowed easily and without resistance   and the vein dilated up very nicely. A small plastic vascular   bulldog was then placed on the cephalic vein. Heparin 5000 units was given intravenously and then 2 minutes   later the brachial artery was occluded proximally and distally   with [facility] vascular clamps. A longitudinal arteriotomy was made   on the left brachial artery, oriented slightly laterally to   facilitate the arteriovenous anastomosis. The lumen of the   artery was inspected and found to be normal. The left cephalic   vein was then anastomosed to the left brachial artery in an end-  to-side fashion using a single continuous 6-0 Prolene suture. The anastomosis was flushed and backbled and then flow was   restored into the fistula and then to the hand. There was a   strong thrill and strong Doppler signals were present as well,   all along the cephalic vein outflow tract well up into the upper   arm. The perianastomotic area of the brachial artery also had   normal arterial signals proximal and distal to the anastomosis   and there were strong Doppler signals at the left wrist over the   radial and ulnar arteries. Protamine was then given to reverse the remaining heparin. The   incision was thoroughly irrigated. Hemostasis was achieved and a   small amount of topical fibrillar was used as well. Protamine 30   mg was given to reverse the remaining heparin. The skin was   reanesthetized and then the incision was closed in layers with   running 3-0 Vicryl in the subcutaneous fascia.  Care was taken   not to compress or entrap the fistula. The skin was closed with   running 4-0 subcuticular Monocryl. Dermabond was applied to the   skin incision. The patient tolerated the procedure well and went   to recovery in stable condition.         MD MICHELLE Dunham / GEMA   D:  06/07/2017   15:40   T:  06/07/2017   15:58   Job #:  [id number]

## 2017-06-07 NOTE — IP AVS SNAPSHOT
[facility] 
 
 
 [address] [facility] [address] 
[phone] Patient: Aura Goel MRN: [id number] UPR:6/94/6840 You are allergic to the following Allergen Reactions Lipitor (Atorvastatin) Myalgia Recent Documentation Height Weight BMI OB Status Smoking Status 1.74 m 111.6 kg 36.88 kg/m2 Hysterectomy Never Smoker Emergency Contacts Name Discharge Info Relation Home Work Mobile [place] [3] [phone] [address] CAREGIVER [3] Daughter [21] [phone] About your hospitalization You were admitted on:  June 7, 2017 You last received care in the:  [facility] PACU You were discharged on:  June 7, 2017 Unit phone number:  [phone] Why you were hospitalized Your primary diagnosis was:  Not on File Providers Seen During Your Hospitalizations Provider Role Specialty Primary office phone Rex Powell MD Attending Provider Vascular Surgery [phone] Your Primary Care Physician (PCP) Primary Care Physician Office Phone Office Fax Deirdre Casanova [phone] Follow-up Information Follow up With Details Comments Contact Info Rex Powell MD Follow up on 6/28/2017 10:15 am [facility] [address] [facility] [address] 
[phone] Your Appointments Wednesday June 28, 2017 10:15 AM EDT [facility] Op with Rex Powell MD  
[facility] [address] [place] [id number] [phone] Current Discharge Medication List  
  
CONTINUE these medications which have CHANGED Dose & Instructions Dispensing Information Comments Morning Noon Evening Bedtime  
 febuxostat 80 mg Tab tablet Commonly known as:  Nathanael Burton What changed:  when to take this Your last dose was: Your next dose is:    
   
   
 Dose:  80 mg Take 1 Tab by mouth daily. Quantity:  30 Tab Refills:  11 CONTINUE these medications which have NOT CHANGED Dose & Instructions Dispensing Information Comments Morning Noon Evening Bedtime  
 aspirin 325 mg tablet Commonly known as:  ASPIRIN Your last dose was: Your next dose is:    
   
   
 Dose:  325 mg Take 325 mg by mouth every morning. Indications: Myocardial Reinfarction Prevention Refills:  0  
     
   
   
   
  
 atorvastatin 40 mg tablet Commonly known as:  LIPITOR Your last dose was: Your next dose is:    
   
   
 Dose:  40 mg Take 40 mg by mouth every morning. Refills:  0  
     
   
   
   
  
 carvedilol 6.25 mg tablet Commonly known as:  Jeanella Meline Your last dose was: Your next dose is:    
   
   
 take 1 tablet by mouth twice a day Refills:  1  
     
   
   
   
  
 insulin NPH/insulin regular 100 unit/mL (70-30) injection Commonly known as:  HumuLIN 70/30 Your last dose was: Your next dose is:    
   
   
 inject 50 units subcutaneously every morning then 40 units every evening BEFORE MEALS Quantity:  3 Vial  
Refills:  5 LASIX 40 mg tablet Generic drug:  furosemide Your last dose was: Your next dose is:    
   
   
 Dose:  40 mg Take 40 mg by mouth every morning. Refills:  0  
     
   
   
   
  
 levothyroxine 25 mcg tablet Commonly known as:  SYNTHROID Your last dose was: Your next dose is:    
   
   
 Dose:  25 mcg Take 1 Tab by mouth Daily (before breakfast). Quantity:  90 Tab Refills:  3 LOTENSIN 20 mg tablet Generic drug:  benazepril Your last dose was: Your next dose is:    
   
   
 Dose:  20 mg Take 20 mg by mouth every morning. Refills:  0 nitroglycerin 400 mcg/spray spray Commonly known as:  De Paz Rape Your last dose was: Your next dose is:    
   
   
 Dose:  1 Spray 1 Spray by SubLINGual route every five (5) minutes as needed (Pt states \"I've never had to use it\"). Quantity:  1 Bottle Refills:  4  
     
   
   
   
  
 TRADJENTA 5 mg tablet Generic drug:  linagliptin Your last dose was: Your next dose is:    
   
   
 Dose:  5 mg Take 5 mg by mouth every morning. Refills:  0  
     
   
   
   
  
 traMADol 300 mg tablet Commonly known as:  ULTRAM-ER Your last dose was: Your next dose is:    
   
   
 Dose:  300 mg Take 300 mg by mouth nightly. Refills:  0  
     
   
   
   
  
 triamcinolone acetonide 0.1 % topical cream  
Commonly known as:  KENALOG Your last dose was: Your next dose is:    
   
   
  Refills:  0 Discharge Instructions INSTRUCTIONS FOR A-V FISTULA, GRAFT ACCESS, REVISION OR DECLOT 
 
ACTIVITY · As tolerated and as directed by your doctor. · Keep arm straight and raised above heart level for the next 24 hours. DIET · Clear liquids until no nausea or vomiting; then light diet for the first day. · Advance to regular diet on second day, unless your doctor orders otherwise. · If nausea and vomiting continues, call your doctor. PAIN 
· Take pain medication as directed by your doctor. · Call your doctor if pain is NOT relieved by the medication. · DO NOT take aspirin or blood thinners until directed by your doctor. DRESSING CARE 
· Keep your dressing clean and dry. · Leave the dressing on until the dialysis nurse or your doctor takes it off. CARE OF YOUR ACCESS 
DO: 
· Keep access area clean with soap and water daily after dressing has been removed. · Feel for the thrill daily.  (by placing your fingers over the graft you will be able to feel a vibration (thrill) which means blood is flowing and the graft is working.) DO NOT: 
· Wear tight sleeves, watches, belts or bracelets over graft. · Carry heavy bags across the graft. · Sleep on graft side. · Let your blood pressure be taken on graft side. · Let blood be drawn from your graft side. CALL YOUR DOCTOR IF 
· Excessive bleeding that does not stop after holding mild pressure over area. · Temperature of 101 degrees F or above. · Redness, excessive swelling or bruising, and/or green or yellow, smelly discharge from incision · Loss of sensation-cold, white, or blue fingers or toes. AFTER ANESTHESIA · For the first 24 hours: DO NOT Drive, Drink alcoholic beverages, or Make important decisions. · Be aware of dizziness following anesthesia and while taking pain medication. DISCHARGE SUMMARY from Nurse PATIENT INSTRUCTIONS: 
 
After general anesthesia or intravenous sedation, for 24 hours or while taking prescription Narcotics: · Limit your activities · Do not drive and operate hazardous machinery · Do not make important personal or business decisions · Do  not drink alcoholic beverages · If you have not urinated within 8 hours after discharge, please contact your surgeon on call. *  Please give a list of your current medications to your Primary Care Provider. *  Please update this list whenever your medications are discontinued, doses are 
    changed, or new medications (including over-the-counter products) are added. *  Please carry medication information at all times in case of emergency situations. These are general instructions for a healthy lifestyle: No smoking/ No tobacco products/ Avoid exposure to second hand smoke Surgeon General's Warning:  Quitting smoking now greatly reduces serious risk to your health. Obesity, smoking, and sedentary lifestyle greatly increases your risk for illness A healthy diet, regular physical exercise & weight monitoring are important for maintaining a healthy lifestyle You may be retaining fluid if you have a history of heart failure or if you experience any of the following symptoms:  Weight gain of 3 pounds or more overnight or 5 pounds in a week, increased swelling in our hands or feet or shortness of breath while lying flat in bed. Please call your doctor as soon as you notice any of these symptoms; do not wait until your next office visit. Recognize signs and symptoms of STROKE: 
 
F-face looks uneven A-arms unable to move or move unevenly S-speech slurred or non-existent T-time-call 911 as soon as signs and symptoms begin-DO NOT go Back to bed or wait to see if you get better-TIME IS BRAIN. Discharge Orders None ACO Transitions of Care Introducing [employer] [address]es offers a voluntary care coordination program to provide high quality service and care to [facility] fee-for-service beneficiaries. Jimenez Duglas was designed to help you enhance your health and well-being through the following services: ? Transitions of Care  support for individuals who are transitioning from one care setting to another (example: Hospital to home). ? Chronic and Complex Care Coordination  support for individuals and caregivers of those with serious or chronic illnesses or with more than one chronic (ongoing) condition and those who take a number of different medications. If you meet specific medical criteria, a Count includes the [facility] [address] may call you directly to coordinate your care with your primary care physician and your other care providers. For questions about the [facility] programs, please, contact your physicians office. For general questions or additional information about Accountable Care Organizations: Please visit www.medicare.gov/acos. html or call 1-800-MEDICARE (1-746.155.2331) TTY users should call 6-540.685.7001. Introducing [facility] SERVICES! Lio Jackson introduces [employer] patient portal. Now you can access parts of your medical record, email your doctor's office, and request medication refills online. 1. In your internet browser, go to [email or web address] 2. Click on the First Time User? Click Here link in the Sign In box. You will see the New Member Sign Up page. 3. Enter your [employer] Access Code exactly as it appears below. You will not need to use this code after youve completed the sign-up process. If you do not sign up before the expiration date, you must request a new code. · [employer] Access Code: [email or web address] Expires: 8/29/2017  8:56 AM 
 
4. Enter the last four digits of your Social Security Number (xxxx) and Date of Birth (mm/dd/yyyy) as indicated and click Submit. You will be taken to the next sign-up page. 5. Create a Go[employer]Van ID. This will be your Go[employer]Van login ID and cannot be changed, so think of one that is secure and easy to remember. 6. Create a Go[employer] password. You can change your password at any time. 7. Enter your Password Reset Question and Answer. This can be used at a later time if you forget your password. 8. Enter your e-mail address. You will receive e-mail notification when new information is available in [address] 9. Click Sign Up. You can now view and download portions of your medical record. 10. Click the Download Summary menu link to download a portable copy of your medical information. If you have questions, please visit the Frequently Asked Questions section of the [employer] website. Remember, [employer] is NOT to be used for urgent needs. For medical emergencies, dial 911. Now available from your iPhone and Android! General Information Please provide this summary of care documentation to your next provider. Patient Signature:  ____________________________________________________________ Date:  ____________________________________________________________  
  
Neal Cons Provider Signature:  ____________________________________________________________ Date:  ____________________________________________________________

## 2017-06-07 NOTE — ANESTHESIA PREPROCEDURE EVALUATION
Anesthetic History   No history of anesthetic complications            Review of Systems / Medical History  Patient summary reviewed, nursing notes reviewed and pertinent labs reviewed    Pulmonary  Within defined limits                 Neuro/Psych         TIA    Comments: Peripheral neuroathy Cardiovascular    Hypertension: well controlled        Dysrhythmias (h/o A fib)   CAD and CABG (s/p CABG 2011)    Exercise tolerance: <4 METS  Comments: EF 50% at time of CABG   GI/Hepatic/Renal         Renal disease (ARF on CRI (Cr 3.2)): ESRD       Endo/Other    Diabetes: well controlled, type 2, using insulin  Hypothyroidism  Obesity and arthritis     Other Findings              Physical Exam    Airway  Mallampati: II  TM Distance: > 6 cm  Neck ROM: normal range of motion   Mouth opening: Normal     Cardiovascular    Rhythm: regular  Rate: normal         Dental    Dentition: Full lower dentures and Full upper dentures     Pulmonary  Breath sounds clear to auscultation               Abdominal         Other Findings            Anesthetic Plan    ASA: 3  Anesthesia type: general            Anesthetic plan and risks discussed with: Patient      Pt has had multiple SAB for hip fx and prefers SAB. SQ Heparin tid to be held after MN.

## 2017-06-12 ENCOUNTER — PATIENT OUTREACH (OUTPATIENT)
Dept: CASE MANAGEMENT | Age: 77
End: 2017-06-12

## 2017-06-12 NOTE — PROGRESS NOTES
This note will not be viewable in [address] Care Needs: RNCM spoke with pt regarding CCM services. Pt reports she had her dialysis catheter placed. Pt reports her incision in her left arm is open to air, and free of signs and symptoms of infection. RNCM discussed signs and symptoms of infection to observe for. Pt reports mild swelling in ankles and occasional SOB. Pt is aware to report increasing SOB  Pts daughter is assisting with transportation to and from visits. Pt drives only in emergency. Pt reports she twisted her knee getting out of bed yesterday. She is using ice and heat for soreness, and using cane. RNCM reviewed falls precautions. Pt verbalized understanding. Next Steps: Jon Moura will graduate pt at this time however pt has RNCM contact information for future questions or concerns.

## 2017-08-03 ENCOUNTER — HOSPITAL ENCOUNTER (OUTPATIENT)
Dept: SURGERY | Age: 77
Discharge: HOME | End: 2017-08-03
Payer: MEDICARE

## 2017-08-03 VITALS
BODY MASS INDEX: 35.22 KG/M2 | OXYGEN SATURATION: 95 % | DIASTOLIC BLOOD PRESSURE: 86 MMHG | RESPIRATION RATE: 18 BRPM | WEIGHT: 246 LBS | HEIGHT: 70 IN | SYSTOLIC BLOOD PRESSURE: 197 MMHG | HEART RATE: 81 BPM | TEMPERATURE: 97.7 F

## 2017-08-03 LAB
ATRIAL RATE: 61 BPM
CALCULATED P AXIS, ECG09: 77 DEGREES
CALCULATED R AXIS, ECG10: -6 DEGREES
CALCULATED T AXIS, ECG11: 101 DEGREES
CREAT SERPL-MCNC: 2.55 MG/DL (ref 0.6–1)
DIAGNOSIS, 93000: NORMAL
GLUCOSE BLD STRIP.AUTO-MCNC: 108 MG/DL (ref 65–100)
HGB BLD-MCNC: 11.5 G/DL (ref 11.7–15.4)
P-R INTERVAL, ECG05: 176 MS
POTASSIUM SERPL-SCNC: 4.4 MMOL/L (ref 3.5–5.1)
Q-T INTERVAL, ECG07: 412 MS
QRS DURATION, ECG06: 102 MS
QTC CALCULATION (BEZET), ECG08: 414 MS
VENTRICULAR RATE, ECG03: 61 BPM

## 2017-08-03 PROCEDURE — 93005 ELECTROCARDIOGRAM TRACING: CPT | Performed by: EMERGENCY MEDICINE

## 2017-08-03 PROCEDURE — 93005 ELECTROCARDIOGRAM TRACING: CPT | Performed by: ANESTHESIOLOGY

## 2017-08-03 PROCEDURE — 84132 ASSAY OF SERUM POTASSIUM: CPT | Performed by: ANESTHESIOLOGY

## 2017-08-03 PROCEDURE — 85018 HEMOGLOBIN: CPT | Performed by: ANESTHESIOLOGY

## 2017-08-03 PROCEDURE — 82565 ASSAY OF CREATININE: CPT | Performed by: ANESTHESIOLOGY

## 2017-08-03 PROCEDURE — 82962 GLUCOSE BLOOD TEST: CPT

## 2017-08-03 NOTE — PERIOP NOTES
Patient verified name, , and surgery as listed in [facility]. Patient provided medical/health information and PTA medications to the best of their ability. TYPE  CASE:2  Orders per surgeon: yes Received  Labs per surgeon:potassium. Results: -  Labs per anesthesia protocol: hgb,poc glucose,creatinine. Results -  EKG  :  yes    Patient provided with and instructed on education handouts including Guide to Surgery, blood transfusions, pain management, and hand hygiene for the family and community, and [facility] brochure. Palak mist and instructions given per hospital policy. Instructed patient to continue previous medications as prescribed prior to surgery unless otherwise directed and to take the following medications the day of surgery according to anesthesia guidelines : amlodipine,aspirin atorvastatin,coreg,synthroid,tramadol as needed,humulin 70/30 1/2 normal am dose=25 units . Instructed patient to hold  the following medications on the day of surgery: none. Original medication prescription bottles none visualized during patient appointment. Patient teach back successful and patient demonstrates knowledge of instruction.

## 2017-08-09 ENCOUNTER — ANESTHESIA EVENT (OUTPATIENT)
Dept: SURGERY | Age: 77
End: 2017-08-09
Payer: MEDICARE

## 2017-08-10 ENCOUNTER — ANESTHESIA (OUTPATIENT)
Dept: SURGERY | Age: 77
End: 2017-08-10
Payer: MEDICARE

## 2017-08-10 ENCOUNTER — HOSPITAL ENCOUNTER (OUTPATIENT)
Age: 77
Setting detail: OUTPATIENT SURGERY
Discharge: HOME | End: 2017-08-10
Attending: SURGERY | Admitting: SURGERY
Payer: MEDICARE

## 2017-08-10 VITALS
DIASTOLIC BLOOD PRESSURE: 73 MMHG | SYSTOLIC BLOOD PRESSURE: 162 MMHG | RESPIRATION RATE: 16 BRPM | HEART RATE: 71 BPM | TEMPERATURE: 98.2 F | OXYGEN SATURATION: 93 %

## 2017-08-10 LAB
GLUCOSE BLD STRIP.AUTO-MCNC: 136 MG/DL (ref 65–100)
POTASSIUM BLD-SCNC: 3.4 MMOL/L (ref 3.5–5.1)

## 2017-08-10 PROCEDURE — C1733 CATH, EP, OTHR THAN COOL-TIP: HCPCS | Performed by: SURGERY

## 2017-08-10 PROCEDURE — 77030010514 HC APPL CLP LIG COVD -B: Performed by: SURGERY

## 2017-08-10 PROCEDURE — 76210000021 HC REC RM PH II 0.5 TO 1 HR: Performed by: SURGERY

## 2017-08-10 PROCEDURE — 77030002996 HC SUT SLK J&J -A: Performed by: SURGERY

## 2017-08-10 PROCEDURE — 74011250636 HC RX REV CODE- 250/636

## 2017-08-10 PROCEDURE — 76060000034 HC ANESTHESIA 1.5 TO 2 HR: Performed by: SURGERY

## 2017-08-10 PROCEDURE — 77030002916 HC SUT ETHLN J&J -A: Performed by: SURGERY

## 2017-08-10 PROCEDURE — 74011250636 HC RX REV CODE- 250/636: Performed by: SURGERY

## 2017-08-10 PROCEDURE — 74011000250 HC RX REV CODE- 250

## 2017-08-10 PROCEDURE — 76010000162 HC OR TIME 1.5 TO 2 HR INTENSV-TIER 1: Performed by: SURGERY

## 2017-08-10 PROCEDURE — 84132 ASSAY OF SERUM POTASSIUM: CPT

## 2017-08-10 PROCEDURE — 77030010507 HC ADH SKN DERMBND J&J -B: Performed by: SURGERY

## 2017-08-10 PROCEDURE — 77030020143 HC AIRWY LARYN INTUB CGAS -A: Performed by: ANESTHESIOLOGY

## 2017-08-10 PROCEDURE — 77030034888 HC SUT PROL 2 J&J -B: Performed by: SURGERY

## 2017-08-10 PROCEDURE — 77030011640 HC PAD GRND REM COVD -A: Performed by: SURGERY

## 2017-08-10 PROCEDURE — 77030031139 HC SUT VCRL2 J&J -A: Performed by: SURGERY

## 2017-08-10 PROCEDURE — 82962 GLUCOSE BLOOD TEST: CPT

## 2017-08-10 PROCEDURE — 77030002933 HC SUT MCRYL J&J -A: Performed by: SURGERY

## 2017-08-10 PROCEDURE — 74011250637 HC RX REV CODE- 250/637: Performed by: ANESTHESIOLOGY

## 2017-08-10 PROCEDURE — 76210000006 HC OR PH I REC 0.5 TO 1 HR: Performed by: SURGERY

## 2017-08-10 PROCEDURE — 74011250636 HC RX REV CODE- 250/636: Performed by: ANESTHESIOLOGY

## 2017-08-10 PROCEDURE — 77030020782 HC GWN BAIR PAWS FLX 3M -B: Performed by: ANESTHESIOLOGY

## 2017-08-10 PROCEDURE — 74011000250 HC RX REV CODE- 250: Performed by: SURGERY

## 2017-08-10 RX ORDER — CEFAZOLIN SODIUM IN 0.9 % NACL 2 G/50 ML
2 INTRAVENOUS SOLUTION, PIGGYBACK (ML) INTRAVENOUS ONCE
Status: COMPLETED | OUTPATIENT
Start: 2017-08-10 | End: 2017-08-10

## 2017-08-10 RX ORDER — PROPOFOL 10 MG/ML
INJECTION, EMULSION INTRAVENOUS AS NEEDED
Status: DISCONTINUED | OUTPATIENT
Start: 2017-08-10 | End: 2017-08-10 | Stop reason: HOSPADM

## 2017-08-10 RX ORDER — SODIUM CHLORIDE 0.9 % (FLUSH) 0.9 %
5-10 SYRINGE (ML) INJECTION AS NEEDED
Status: DISCONTINUED | OUTPATIENT
Start: 2017-08-10 | End: 2017-08-10 | Stop reason: HOSPADM

## 2017-08-10 RX ORDER — SODIUM CHLORIDE 0.9 % (FLUSH) 0.9 %
5-10 SYRINGE (ML) INJECTION EVERY 8 HOURS
Status: DISCONTINUED | OUTPATIENT
Start: 2017-08-10 | End: 2017-08-10 | Stop reason: HOSPADM

## 2017-08-10 RX ORDER — LIDOCAINE HYDROCHLORIDE 10 MG/ML
INJECTION INFILTRATION; PERINEURAL AS NEEDED
Status: DISCONTINUED | OUTPATIENT
Start: 2017-08-10 | End: 2017-08-10 | Stop reason: HOSPADM

## 2017-08-10 RX ORDER — SODIUM CHLORIDE, SODIUM LACTATE, POTASSIUM CHLORIDE, CALCIUM CHLORIDE 600; 310; 30; 20 MG/100ML; MG/100ML; MG/100ML; MG/100ML
100 INJECTION, SOLUTION INTRAVENOUS CONTINUOUS
Status: DISCONTINUED | OUTPATIENT
Start: 2017-08-10 | End: 2017-08-10 | Stop reason: HOSPADM

## 2017-08-10 RX ORDER — NALOXONE HYDROCHLORIDE 0.4 MG/ML
0.1 INJECTION, SOLUTION INTRAMUSCULAR; INTRAVENOUS; SUBCUTANEOUS AS NEEDED
Status: DISCONTINUED | OUTPATIENT
Start: 2017-08-10 | End: 2017-08-10 | Stop reason: HOSPADM

## 2017-08-10 RX ORDER — OXYCODONE HYDROCHLORIDE 5 MG/1
5 TABLET ORAL
Status: DISCONTINUED | OUTPATIENT
Start: 2017-08-10 | End: 2017-08-10 | Stop reason: HOSPADM

## 2017-08-10 RX ORDER — BUPIVACAINE HYDROCHLORIDE 2.5 MG/ML
INJECTION, SOLUTION EPIDURAL; INFILTRATION; INTRACAUDAL AS NEEDED
Status: DISCONTINUED | OUTPATIENT
Start: 2017-08-10 | End: 2017-08-10 | Stop reason: HOSPADM

## 2017-08-10 RX ORDER — HYDROCODONE BITARTRATE AND ACETAMINOPHEN 7.5; 325 MG/1; MG/1
1 TABLET ORAL
Qty: 15 TAB | Refills: 0 | Status: SHIPPED | OUTPATIENT
Start: 2017-08-10 | End: 2017-11-15 | Stop reason: ALTCHOICE

## 2017-08-10 RX ORDER — HEPARIN SODIUM 5000 [USP'U]/ML
INJECTION, SOLUTION INTRAVENOUS; SUBCUTANEOUS AS NEEDED
Status: DISCONTINUED | OUTPATIENT
Start: 2017-08-10 | End: 2017-08-10 | Stop reason: HOSPADM

## 2017-08-10 RX ORDER — LIDOCAINE HYDROCHLORIDE 20 MG/ML
INJECTION, SOLUTION EPIDURAL; INFILTRATION; INTRACAUDAL; PERINEURAL AS NEEDED
Status: DISCONTINUED | OUTPATIENT
Start: 2017-08-10 | End: 2017-08-10 | Stop reason: HOSPADM

## 2017-08-10 RX ORDER — ONDANSETRON 2 MG/ML
INJECTION INTRAMUSCULAR; INTRAVENOUS AS NEEDED
Status: DISCONTINUED | OUTPATIENT
Start: 2017-08-10 | End: 2017-08-10 | Stop reason: HOSPADM

## 2017-08-10 RX ORDER — MIDAZOLAM HYDROCHLORIDE 1 MG/ML
2 INJECTION, SOLUTION INTRAMUSCULAR; INTRAVENOUS
Status: DISCONTINUED | OUTPATIENT
Start: 2017-08-10 | End: 2017-08-10 | Stop reason: HOSPADM

## 2017-08-10 RX ORDER — HYDROCODONE BITARTRATE AND ACETAMINOPHEN 7.5; 325 MG/1; MG/1
1 TABLET ORAL AS NEEDED
Status: DISCONTINUED | OUTPATIENT
Start: 2017-08-10 | End: 2017-08-10 | Stop reason: HOSPADM

## 2017-08-10 RX ORDER — LIDOCAINE HYDROCHLORIDE 10 MG/ML
0.1 INJECTION INFILTRATION; PERINEURAL AS NEEDED
Status: DISCONTINUED | OUTPATIENT
Start: 2017-08-10 | End: 2017-08-10 | Stop reason: HOSPADM

## 2017-08-10 RX ORDER — FENTANYL CITRATE 50 UG/ML
100 INJECTION, SOLUTION INTRAMUSCULAR; INTRAVENOUS ONCE
Status: DISCONTINUED | OUTPATIENT
Start: 2017-08-10 | End: 2017-08-10 | Stop reason: HOSPADM

## 2017-08-10 RX ORDER — SODIUM CHLORIDE 9 MG/ML
25 INJECTION, SOLUTION INTRAVENOUS CONTINUOUS
Status: DISCONTINUED | OUTPATIENT
Start: 2017-08-10 | End: 2017-08-10 | Stop reason: HOSPADM

## 2017-08-10 RX ORDER — HYDROMORPHONE HYDROCHLORIDE 2 MG/ML
0.5 INJECTION, SOLUTION INTRAMUSCULAR; INTRAVENOUS; SUBCUTANEOUS
Status: DISCONTINUED | OUTPATIENT
Start: 2017-08-10 | End: 2017-08-10 | Stop reason: HOSPADM

## 2017-08-10 RX ORDER — FENTANYL CITRATE 50 UG/ML
INJECTION, SOLUTION INTRAMUSCULAR; INTRAVENOUS AS NEEDED
Status: DISCONTINUED | OUTPATIENT
Start: 2017-08-10 | End: 2017-08-10 | Stop reason: HOSPADM

## 2017-08-10 RX ORDER — FAMOTIDINE 20 MG/1
20 TABLET, FILM COATED ORAL ONCE
Status: COMPLETED | OUTPATIENT
Start: 2017-08-10 | End: 2017-08-10

## 2017-08-10 RX ADMIN — FAMOTIDINE 20 MG: 20 TABLET ORAL at 08:52

## 2017-08-10 RX ADMIN — LIDOCAINE HYDROCHLORIDE 100 MG: 20 INJECTION, SOLUTION EPIDURAL; INFILTRATION; INTRACAUDAL; PERINEURAL at 10:40

## 2017-08-10 RX ADMIN — SODIUM CHLORIDE 25 ML/HR: 900 INJECTION, SOLUTION INTRAVENOUS at 08:50

## 2017-08-10 RX ADMIN — FENTANYL CITRATE 25 MCG: 50 INJECTION, SOLUTION INTRAMUSCULAR; INTRAVENOUS at 11:07

## 2017-08-10 RX ADMIN — FENTANYL CITRATE 25 MCG: 50 INJECTION, SOLUTION INTRAMUSCULAR; INTRAVENOUS at 10:39

## 2017-08-10 RX ADMIN — SODIUM CHLORIDE: 900 INJECTION, SOLUTION INTRAVENOUS at 10:31

## 2017-08-10 RX ADMIN — ONDANSETRON 4 MG: 2 INJECTION INTRAMUSCULAR; INTRAVENOUS at 11:55

## 2017-08-10 RX ADMIN — CEFAZOLIN 2 G: 1 INJECTION, POWDER, FOR SOLUTION INTRAMUSCULAR; INTRAVENOUS; PARENTERAL at 10:36

## 2017-08-10 RX ADMIN — PROPOFOL 160 MG: 10 INJECTION, EMULSION INTRAVENOUS at 10:41

## 2017-08-10 NOTE — ANESTHESIA POSTPROCEDURE EVALUATION
Post-Anesthesia Evaluation and Assessment    Patient: Shawn Meng MRN: [id number]  SSN: [id number]    YOB: 1940  Age: 68 y.o. Sex: female       Cardiovascular Function/Vital Signs  Visit Vitals    /81    Pulse 70    Temp 36.7 °C (98 °F)    Resp 16    SpO2 95%       Patient is status post general anesthesia for Procedure(s):  LEFT UPPER EXTREMITY ARTERIO VENOUS FISTULA ELEVATION . Nausea/Vomiting: None    Postoperative hydration reviewed and adequate. Pain:  Pain Scale 1: Numeric (0 - 10) (08/10/17 1216)  Pain Intensity 1: 0 (08/10/17 1216)   Managed    Neurological Status:   Neuro (WDL): Exceptions to WDL (08/10/17 1216)  Neuro  Neurologic State: Sleeping (08/10/17 1216)   At baseline    Mental Status and Level of Consciousness: Arousable    Pulmonary Status:   O2 Device: Nasal cannula (08/10/17 1216)   Adequate oxygenation and airway patent    Complications related to anesthesia: None    Post-anesthesia assessment completed.  No concerns    Signed By: Drew Boggs MD     August 10, 2017

## 2017-08-10 NOTE — IP AVS SNAPSHOT
Nelsonjaylin Octavio 
 
 
 [facility] [facility] [address] 
[phone] Patient: Dafne Rosado MRN: [id number] JCE:[birth year] You are allergic to the following Allergen Reactions Lipitor (Atorvastatin) Myalgia Recent Documentation OB Status Smoking Status Hysterectomy Never Smoker Emergency Contacts Name Discharge Info Relation Home Work Mobile Darvin Silverman [3] [phone] [address]  CAREGIVER [3] Daughter [21] [phone] About your hospitalization You were admitted on:  August 10, 2017 [facility] You were discharged on:  August 10, 2017 Unit phone number:  [phone] Why you were hospitalized Your primary diagnosis was:  Not on File Providers Seen During Your Hospitalizations Provider Role Specialty Primary office phone Sebastian Rosales MD Attending Provider Vascular Surgery [phone] Your Primary Care Physician (PCP) Primary Care Physician Office Phone Office Fax Lien Zena [phone] Follow-up Information Follow up With Details Comments Contact Info Talisha Villagran MD   [address] [address] 
[phone] Sebastian Rosales MD Follow up on 8/29/2017 at 1:45 p.m. [facility] [address] [facility] [address] 
[phone] Your Appointments Tuesday August 29, 2017  1:45 PM EDT Global Post Op with Sebastian Rosales MD  
[facility] [address] [place] [id number] [phone] Current Discharge Medication List  
  
START taking these medications Dose & Instructions Dispensing Information Comments Morning Noon Evening Bedtime HYDROcodone-acetaminophen 7.5-325 mg per tablet Commonly known as:  Laurel Hammond Your last dose was: Your next dose is:    
   
   
 Dose:  1 Tab Take 1 Tab by mouth every six (6) hours as needed for Pain. Max Daily Amount: 4 Tabs. Indications: Pain Quantity:  15 Tab Refills:  0 CONTINUE these medications which have CHANGED Dose & Instructions Dispensing Information Comments Morning Noon Evening Bedtime  
 febuxostat 80 mg Tab tablet Commonly known as:  Hebert Aldana What changed:  when to take this Your last dose was: Your next dose is:    
   
   
 Dose:  80 mg Take 1 Tab by mouth daily. Quantity:  30 Tab Refills:  11 CONTINUE these medications which have NOT CHANGED Dose & Instructions Dispensing Information Comments Morning Noon Evening Bedtime  
 amLODIPine 5 mg tablet Commonly known as:  Dexter Rings Your last dose was: Your next dose is:    
   
   
 Dose:  5 mg Take 5 mg by mouth every morning. Refills:  0  
     
   
   
   
  
 aspirin 325 mg tablet Commonly known as:  ASPIRIN Your last dose was: Your next dose is:    
   
   
 Dose:  325 mg Take 325 mg by mouth every morning. Indications: Myocardial Reinfarction Prevention Refills:  0  
     
   
   
   
  
 atorvastatin 40 mg tablet Commonly known as:  LIPITOR Your last dose was: Your next dose is:    
   
   
 Dose:  40 mg Take 40 mg by mouth every morning. Refills:  0  
     
   
   
   
  
 carvedilol 6.25 mg tablet Commonly known as:  Carrolyn Peabody Your last dose was: Your next dose is:    
   
   
 take 1 tablet by mouth twice a day Refills:  1  
     
   
   
   
  
 insulin NPH/insulin regular 100 unit/mL (70-30) injection Commonly known as:  HumuLIN 70/30 Your last dose was:     
   
Your next dose is:    
   
   
 inject 50 units subcutaneously every morning then 40 units every evening BEFORE MEALS  
 Quantity:  3 Vial  
Refills:  5 LASIX 40 mg tablet Generic drug:  furosemide Your last dose was: Your next dose is:    
   
   
 Dose:  40 mg Take 40 mg by mouth every morning. Refills:  0  
     
   
   
   
  
 levothyroxine 25 mcg tablet Commonly known as:  SYNTHROID Your last dose was: Your next dose is:    
   
   
 Dose:  25 mcg Take 1 Tab by mouth Daily (before breakfast). Quantity:  90 Tab Refills:  3 LOTENSIN 20 mg tablet Generic drug:  benazepril Your last dose was: Your next dose is:    
   
   
 Dose:  20 mg Take 20 mg by mouth every morning. Refills:  0 LYRICA 75 mg capsule Generic drug:  pregabalin Your last dose was: Your next dose is:    
   
   
 Dose:  75 mg Take 75 mg by mouth daily. Refills:  0  
     
   
   
   
  
 nitroglycerin 400 mcg/spray spray Commonly known as:  Sonal Gama Your last dose was: Your next dose is:    
   
   
 Dose:  1 Spray 1 Spray by SubLINGual route every five (5) minutes as needed (Pt states \"I've never had to use it\"). Quantity:  1 Bottle Refills:  4  
     
   
   
   
  
 TRADJENTA 5 mg tablet Generic drug:  linagliptin Your last dose was: Your next dose is:    
   
   
 Dose:  5 mg Take 5 mg by mouth every morning. Refills:  0  
     
   
   
   
  
 traMADol 300 mg tablet Commonly known as:  ULTRAM-ER Your last dose was: Your next dose is:    
   
   
 Dose:  300 mg Take 300 mg by mouth nightly. Refills:  0  
     
   
   
   
  
 triamcinolone acetonide 0.1 % topical cream  
Commonly known as:  KENALOG Your last dose was: Your next dose is:    
   
   
  Refills:  0 Where to Get Your Medications Information on where to get these meds will be given to you by the nurse or doctor. ! Ask your nurse or doctor about these medications HYDROcodone-acetaminophen 7.5-325 mg per tablet Discharge Instructions Hemodialysis Access: What to Expect at [facility] Your Recovery Hemodialysis is a way to remove wastes from the blood when your kidneys can no longer do the job. It is not a cure, but it can help you live longer and feel better. It is a lifesaving treatment when you have kidney failure. Hemodialysis is often called dialysis. Your doctor created a place (called an access) in your arm for your blood to flow in and out of your body during your dialysis sessions. Your arm will probably be bruised and swollen. It may hurt. The cut (incision) may bleed. The pain and bleeding will get better over several days. You will probably need only over-the-counter pain medicine. You can reduce swelling by propping your arm on 1 or 2 pillows and keeping your elbow straight. You will have stitches. These may dissolve on their own, or your doctor will tell you when to come in to have them removed. You should also be able to return to work in a few days. You may feel some coolness or numbness in your hand. These feelings usually go away in a few weeks. Your doctor may suggest squeezing a soft object. This will strengthen your access and may make hemodialysis faster and easier. You should always be able to feel blood rushing through the fistula or graft. It feels like a slight vibration when you put your fingers on the skin over the fistula or graft. This feeling is called a thrill or pulse. This care sheet gives you a general idea about how long it will take for you to recover. But each person recovers at a different pace. Follow the steps below to get better as quickly as possible. How can you care for yourself at home? Activity · Rest when you feel tired. Getting enough sleep will help you recover. Do not lie on or sleep on the arm with the access. · Avoid activities such as washing windows or gardening that put stress on the arm with the access. · You may use your arm, but do not lift anything that weighs more than about 15 pounds. This may include a child, heavy grocery bags, a heavy briefcase or backpack, cat litter or dog food bags, or a vacuum . · You can shower, but keep the access dry for the first 2 days. Cover the area with a plastic bag to keep it dry. · Do not soak or scrub the incision until it has healed. · Wear an arm guard to protect the area if you play sports or work with your arms. · You may drive when your doctor says it is okay. This is usually in 1 to 2 days. · Most people are able to return to work about 1 or 2 days after surgery. Diet · Follow an eating plan that is good for your kidneys. A registered dietitian can help you make a meal plan that is right for you. You may need to limit protein, salt, fluids, and certain foods. Medicines · Your doctor will tell you if and when you can restart your medicines. He or she will also give you instructions about taking any new medicines. · If you take blood thinners, such as warfarin (Coumadin), clopidogrel (Plavix), or aspirin, be sure to talk to your doctor. He or she will tell you if and when to start taking those medicines again. Make sure that you understand exactly what your doctor wants you to do. · Take pain medicines exactly as directed. ¨ If the doctor gave you a prescription medicine for pain, take it as prescribed. ¨ If you are not taking a prescription pain medicine, ask your doctor if you can take acetaminophen (Tylenol). Do not take ibuprofen (Advil, Motrin) or naproxen (Aleve), or similar medicines, unless your doctor tells you to. They may make chronic kidney disease worse. ¨ Do not take two or more pain medicines at the same time unless the doctor told you to.  Many pain medicines have acetaminophen, which is Tylenol. Too much acetaminophen (Tylenol) can be harmful. · If you think your pain medicine is making you sick to your stomach: 
¨ Take your medicine after meals (unless your doctor has told you not to). ¨ Ask your doctor for a different pain medicine. · If your doctor prescribed antibiotics, take them as directed. Do not stop taking them just because you feel better. You need to take the full course of antibiotics. Incision care · Remove the gauze wrap dressing in 3 days. This can be done at home, or it can be done at dialysis or at our office Monday. · Keep the area dry for 3 days. After 3 days, wash the area with soap and water every day, and always before dialysis. · Do not soak or scrub the incision until it has healed. · If you have a bandage, change it every day or as your doctor recommends. Your doctor will tell you when you can remove it. Exercise · Squeeze a soft ball or other object as your doctor tells you. This will help blood flow through the access and help prevent blood clots. Elevation · Prop up the sore arm on a pillow anytime you sit or lie down during the next 3 days. Try to keep it above the level of your heart. This will help reduce swelling. Other instructions · Every day, check your access for a pulse or thrill in the fistula or graft area. A thrill is a vibration. To feel a pulse or thrill, place the first two fingers of your hand over the access. · Do not bump your arm. · Do not wear tight clothing, jewelry, or anything else that may squeeze the access. · Use your other arm to have blood drawn or blood pressure taken. · Do not put cream or lotion on or near the access. · Make sure all doctors you deal with know you have a vascular access. Follow-up care is a key part of your treatment and safety. Be sure to make and go to all appointments, and call your doctor if you are having problems.  It's also a good idea to know your test results and keep a list of the medicines you take. When should you call for help? Call 911 anytime you think you may need emergency care. For example, call if: 
· You passed out (lost consciousness). · You have severe trouble breathing. · You have sudden chest pain and shortness of breath, or you cough up blood. · You have a rapid pulse or heartbeat. Call your doctor now or seek immediate medical care if: 
· Your hand or arm is cold or dark-colored. · You have sudden bulging around your access. · You have no pulse or thrill in your access, or you hear no sound of blood in your access. · Bleeding after dialysis lasts longer than usual. 
· You have severe pain that does not get better after you take pain medicine. · You have a fever over 100°F. 
· You have loose stitches, or your incision comes open. · You are bleeding from the incision more than you had been. · You have signs of infection, such as: 
¨ Increased pain, swelling, warmth, or redness. ¨ Red streaks leading from the incision. ¨ Pus draining from the incision. ¨ Swollen lymph nodes in your neck, armpits, or groin. ¨ A fever. Watch closely for changes in your health, and be sure to contact your doctor if you have any problems. Where can you learn more? Go to http://mickie-avni.info/. Enter P616 in the search box to learn more about \"Hemodialysis Access: What to Expect at Home. \" Current as of: November 16, 2016 Content Version: 11.3 © 0183-4162 [employer], [employer]. Care instructions adapted under license by [employer] (which disclaims liability or warranty for this information). If you have questions about a medical condition or this instruction, always ask your healthcare professional. [place] [address] any warranty or liability for your use of this information. After general anesthesia or intravenous sedation, for 24 hours or while taking prescription Narcotics: · Limit your activities · Do not drive and operate hazardous machinery · Do not make important personal or business decisions · Do  not drink alcoholic beverages · If you have not urinated within 8 hours after discharge, please contact your surgeon on call. *  Please give a list of your current medications to your Primary Care Provider. *  Please update this list whenever your medications are discontinued, doses are 
    changed, or new medications (including over-the-counter products) are added. *  Please carry medication information at all times in case of emergency situations. These are general instructions for a healthy lifestyle: No smoking/ No tobacco products/ Avoid exposure to second hand smoke Surgeon General's Warning:  Quitting smoking now greatly reduces serious risk to your health. Obesity, smoking, and sedentary lifestyle greatly increases your risk for illness A healthy diet, regular physical exercise & weight monitoring are important for maintaining a healthy lifestyle You may be retaining fluid if you have a history of heart failure or if you experience any of the following symptoms:  Weight gain of 3 pounds or more overnight or 5 pounds in a week, increased swelling in our hands or feet or shortness of breath while lying flat in bed. Please call your doctor as soon as you notice any of these symptoms; do not wait until your next office visit. Recognize signs and symptoms of STROKE: 
 
F-face looks uneven A-arms unable to move or move unevenly S-speech slurred or non-existent T-time-call 911 as soon as signs and symptoms begin-DO NOT go Back to bed or wait to see if you get better-TIME IS BRAIN. Discharge Orders None ACO Transitions of Care Introducing [employer] [address] Carnes offers a voluntary care coordination program to provide high quality service and care to [facility] fee-for-service beneficiaries. Esdras Osborne was designed to help you enhance your health and well-being through the following services: ? Transitions of Care  support for individuals who are transitioning from one care setting to another (example: Hospital to home). ? Chronic and Complex Care Coordination  support for individuals and caregivers of those with serious or chronic illnesses or with more than one chronic (ongoing) condition and those who take a number of different medications. If you meet specific medical criteria, a [address] [place] [address] Rd may call you directly to coordinate your care with your primary care physician and your other care providers. For questions about the [facility] programs, please, contact your physicians office. For general questions or additional information about Accountable Care Organizations: 
Please visit www.medicare.gov/acos. html or call 1-800-MEDICARE (3-170.397.5853) TTY users should call 9-973.127.1948. Introducing hospitals & HEALTH SERVICES! [facility] introduces [employer] patient portal. Now you can access parts of your medical record, email your doctor's office, and request medication refills online. 1. In your internet browser, go to [email or web address] 2. Click on the First Time User? Click Here link in the Sign In box. You will see the New Member Sign Up page. 3. Enter your [employer] Access Code exactly as it appears below. You will not need to use this code after youve completed the sign-up process. If you do not sign up before the expiration date, you must request a new code. · [employer] Access Code: [email or web address] Expires: 8/29/2017  8:56 AM 
 
4. Enter the last four digits of your Social Security Number (xxxx) and Date of Birth (mm/dd/yyyy) as indicated and click Submit. You will be taken to the next sign-up page. 5. Create a [employer] ID.  This will be your [employer] login ID and cannot be changed, so think of one that is secure and easy to remember. 6. Create a "[employer]" password. You can change your password at any time. 7. Enter your Password Reset Question and Answer. This can be used at a later time if you forget your password. 8. Enter your e-mail address. You will receive e-mail notification when new information is available in [address] 9. Click Sign Up. You can now view and download portions of your medical record. 10. Click the Download Summary menu link to download a portable copy of your medical information. If you have questions, please visit the Frequently Asked Questions section of the "[employer]" website. Remember, "[employer]" is NOT to be used for urgent needs. For medical emergencies, dial 911. Now available from your iPhone and Android! General Information Please provide this summary of care documentation to your next provider. Patient Signature:  ____________________________________________________________ Date:  ____________________________________________________________  
  
Fayrene Retort Provider Signature:  ____________________________________________________________ Date:  ____________________________________________________________

## 2017-08-10 NOTE — DISCHARGE INSTRUCTIONS
Hemodialysis Access: What to Expect at [address] [facility]  Hemodialysis is a way to remove wastes from the blood when your kidneys can no longer do the job. It is not a cure, but it can help you live longer and feel better. It is a lifesaving treatment when you have kidney failure. Hemodialysis is often called dialysis. Your doctor created a place (called an access) in your arm for your blood to flow in and out of your body during your dialysis sessions. Your arm will probably be bruised and swollen. It may hurt. The cut (incision) may bleed. The pain and bleeding will get better over several days. You will probably need only over-the-counter pain medicine. You can reduce swelling by propping your arm on 1 or 2 pillows and keeping your elbow straight. You will have stitches. These may dissolve on their own, or your doctor will tell you when to come in to have them removed. You should also be able to return to work in a few days. You may feel some coolness or numbness in your hand. These feelings usually go away in a few weeks. Your doctor may suggest squeezing a soft object. This will strengthen your access and may make hemodialysis faster and easier. You should always be able to feel blood rushing through the fistula or graft. It feels like a slight vibration when you put your fingers on the skin over the fistula or graft. This feeling is called a thrill or pulse. This care sheet gives you a general idea about how long it will take for you to recover. But each person recovers at a different pace. Follow the steps below to get better as quickly as possible. How can you care for yourself at home? Activity  · Rest when you feel tired. Getting enough sleep will help you recover. Do not lie on or sleep on the arm with the access. · Avoid activities such as washing windows or gardening that put stress on the arm with the access.   · You may use your arm, but do not lift anything that weighs more than about 15 pounds. This may include a child, heavy grocery bags, a heavy briefcase or backpack, cat litter or dog food bags, or a vacuum . · You can shower, but keep the access dry for the first 2 days. Cover the area with a plastic bag to keep it dry. · Do not soak or scrub the incision until it has healed. · Wear an arm guard to protect the area if you play sports or work with your arms. · You may drive when your doctor says it is okay. This is usually in 1 to 2 days. · Most people are able to return to work about 1 or 2 days after surgery. Diet  · Follow an eating plan that is good for your kidneys. A registered dietitian can help you make a meal plan that is right for you. You may need to limit protein, salt, fluids, and certain foods. Medicines  · Your doctor will tell you if and when you can restart your medicines. He or she will also give you instructions about taking any new medicines. · If you take blood thinners, such as warfarin (Coumadin), clopidogrel (Plavix), or aspirin, be sure to talk to your doctor. He or she will tell you if and when to start taking those medicines again. Make sure that you understand exactly what your doctor wants you to do. · Take pain medicines exactly as directed. ¨ If the doctor gave you a prescription medicine for pain, take it as prescribed. ¨ If you are not taking a prescription pain medicine, ask your doctor if you can take acetaminophen (Tylenol). Do not take ibuprofen (Advil, Motrin) or naproxen (Aleve), or similar medicines, unless your doctor tells you to. They may make chronic kidney disease worse. ¨ Do not take two or more pain medicines at the same time unless the doctor told you to. Many pain medicines have acetaminophen, which is Tylenol. Too much acetaminophen (Tylenol) can be harmful. · If you think your pain medicine is making you sick to your stomach:  ¨ Take your medicine after meals (unless your doctor has told you not to).   ¨ Ask your doctor for a different pain medicine. · If your doctor prescribed antibiotics, take them as directed. Do not stop taking them just because you feel better. You need to take the full course of antibiotics. Incision care  · Remove the gauze wrap dressing in 3 days. This can be done at home, or it can be done at dialysis or at our office Monday. · Keep the area dry for 3 days. After 3 days, wash the area with soap and water every day, and always before dialysis. · Do not soak or scrub the incision until it has healed. · If you have a bandage, change it every day or as your doctor recommends. Your doctor will tell you when you can remove it. Exercise  · Squeeze a soft ball or other object as your doctor tells you. This will help blood flow through the access and help prevent blood clots. Elevation  · Prop up the sore arm on a pillow anytime you sit or lie down during the next 3 days. Try to keep it above the level of your heart. This will help reduce swelling. Other instructions  · Every day, check your access for a pulse or thrill in the fistula or graft area. A thrill is a vibration. To feel a pulse or thrill, place the first two fingers of your hand over the access. · Do not bump your arm. · Do not wear tight clothing, jewelry, or anything else that may squeeze the access. · Use your other arm to have blood drawn or blood pressure taken. · Do not put cream or lotion on or near the access. · Make sure all doctors you deal with know you have a vascular access. Follow-up care is a key part of your treatment and safety. Be sure to make and go to all appointments, and call your doctor if you are having problems. It's also a good idea to know your test results and keep a list of the medicines you take. When should you call for help? Call 911 anytime you think you may need emergency care. For example, call if:  · You passed out (lost consciousness). · You have severe trouble breathing.   · You have sudden chest pain and shortness of breath, or you cough up blood. · You have a rapid pulse or heartbeat. Call your doctor now or seek immediate medical care if:  · Your hand or arm is cold or dark-colored. · You have sudden bulging around your access. · You have no pulse or thrill in your access, or you hear no sound of blood in your access. · Bleeding after dialysis lasts longer than usual.  · You have severe pain that does not get better after you take pain medicine. · You have a fever over 100°F.  · You have loose stitches, or your incision comes open. · You are bleeding from the incision more than you had been. · You have signs of infection, such as:  ¨ Increased pain, swelling, warmth, or redness. ¨ Red streaks leading from the incision. ¨ Pus draining from the incision. ¨ Swollen lymph nodes in your neck, armpits, or groin. ¨ A fever. Watch closely for changes in your health, and be sure to contact your doctor if you have any problems. Where can you learn more? Go to http://mickie-avni.info/. Enter P616 in the search box to learn more about \"Hemodialysis Access: What to Expect at Home. \"  Current as of: November 16, 2016  Content Version: 11.3  © 0093-3671 [email or web address]. Care instructions adapted under license by [employer] (which disclaims liability or warranty for this information). If you have questions about a medical condition or this instruction, always ask your healthcare professional. [place] [address] any warranty or liability for your use of this information. After general anesthesia or intravenous sedation, for 24 hours or while taking prescription Narcotics:  · Limit your activities  · Do not drive and operate hazardous machinery  · Do not make important personal or business decisions  · Do  not drink alcoholic beverages  · If you have not urinated within 8 hours after discharge, please contact your surgeon on call.     *  Please give a list of your current medications to your Primary Care Provider. *  Please update this list whenever your medications are discontinued, doses are      changed, or new medications (including over-the-counter products) are added. *  Please carry medication information at all times in case of emergency situations. These are general instructions for a healthy lifestyle:  No smoking/ No tobacco products/ Avoid exposure to second hand smoke  Surgeon General's Warning:  Quitting smoking now greatly reduces serious risk to your health. Obesity, smoking, and sedentary lifestyle greatly increases your risk for illness  A healthy diet, regular physical exercise & weight monitoring are important for maintaining a healthy lifestyle    You may be retaining fluid if you have a history of heart failure or if you experience any of the following symptoms:  Weight gain of 3 pounds or more overnight or 5 pounds in a week, increased swelling in our hands or feet or shortness of breath while lying flat in bed. Please call your doctor as soon as you notice any of these symptoms; do not wait until your next office visit. Recognize signs and symptoms of STROKE:    F-face looks uneven    A-arms unable to move or move unevenly    S-speech slurred or non-existent    T-time-call 911 as soon as signs and symptoms begin-DO NOT go       Back to bed or wait to see if you get better-TIME IS BRAIN.

## 2017-08-10 NOTE — BRIEF OP NOTE
BRIEF OPERATIVE NOTE    Date of Procedure: 8/10/2017   Preoperative Diagnosis: Mechanical complication of arteriovenous fistula surgically created, initial encounter ([facility] [address] [T82.590A]  Postoperative Diagnosis: Mechanical complication of arteriovenous fistula surgically created, initial encounter ([facility] [address] Arleene Skinny    Procedure(s):  LEFT UPPER EXTREMITY ARTERIO VENOUS FISTULA ELEVATION   Surgeon(s) and Role:     * Hennie Ganser, MD - Primary         Assistant Staff:    Surgical Staff:  Circ-1: Jeremiah Vega RN  Circ-Relief: Juventino Al RN  Scrub Tech-1: Virginie Ontiveros  Scrub Tech-2: Lenore Dorantes  Scrub Tech-Relief: Nik Hathaway CNA  Event Time In   Incision Start 1108   Incision Close 1158     Anesthesia: General   Estimated Blood Loss: 30 mL  Specimens: * No specimens in log *   Findings: Maturing fistula, no sclerotic segments, strong thrill. Approx 8 mm diameter.   Complications: none  Implants: * No implants in log *

## 2017-08-10 NOTE — ANESTHESIA PREPROCEDURE EVALUATION
Anesthetic History   No history of anesthetic complications            Review of Systems / Medical History  Patient summary reviewed, nursing notes reviewed and pertinent labs reviewed    Pulmonary  Within defined limits                 Neuro/Psych         TIA    Comments: Peripheral neuroathy Cardiovascular    Hypertension: well controlled        Dysrhythmias (h/o A fib)   CAD and CABG (s/p CABG 2011)    Exercise tolerance: <4 METS  Comments: EF 50% at time of CABG   GI/Hepatic/Renal         Renal disease (ARF on CRI (Cr 3.2)): ESRD       Endo/Other    Diabetes: well controlled, type 2, using insulin  Hypothyroidism  Obesity and arthritis     Other Findings              Physical Exam    Airway  Mallampati: II  TM Distance: > 6 cm  Neck ROM: normal range of motion   Mouth opening: Normal     Cardiovascular    Rhythm: regular  Rate: normal         Dental    Dentition: Full lower dentures and Full upper dentures     Pulmonary  Breath sounds clear to auscultation               Abdominal         Other Findings            Anesthetic Plan    ASA: 3  Patient did not consent to regional anesthesiaAnesthesia type: general            Anesthetic plan and risks discussed with: Patient

## 2017-08-10 NOTE — OP NOTES
[address]   OPERATIVE REPORT       Name:  Stacey Duarte   MR#:  [id number]   :  1940   Account #:  [de-identified]   Date of Adm:  08/10/2017       DATE OF PROCEDURE: 08/10/2017    PREOPERATIVE DIAGNOSIS: Mechanical complication of left   brachiocephalic arteriovenous fistula. POSTOPERATIVE DIAGNOSIS: Mechanical complication of left   brachiocephalic arteriovenous fistula. PROCEDURE: Elevation of left brachiocephalic arteriovenous   fistula. SURGEON: Mateus Kim MD.    ANESTHESIA: General with local supplement. ESTIMATED BLOOD LOSS: 30 mL. SPECIMEN: None. STATEMENT OF MEDICAL NECESSITY: The patient is a 49-year-old   woman who has a left brachiocephalic arteriovenous fistula. The   fistula appears to be maturing by ultrasound, but given the size   of her arm, it is deep and requires elevation in order to allow   safe cannulation. DESCRIPTION OF PROCEDURE: The patient was taken to the operating   room and general anesthetic was administered. The left upper   extremity was prepped and draped in the usual sterile fashion. The skin was marked over the initial portion of the fistula just   above the antecubital fossa and the skin there was anesthetized   with 1% Xylocaine and 0.25% Sensorcaine local.     The longitudinal incision was made there to expose this initial   portion of the fistula and then the incision was gradually   lengthened as the overlying skin was anesthetized with local.   This allowed exposure of the fistula from the antecubital fossa   to about the level of the shoulder joint. The fistula appears to   be maturing very nicely with no sclerotic segments and a strong   thrill. The diameter I would estimate to be approximately 8 mm   in most of the fistula. The fistula was carefully dissected away from surrounding soft   tissue and several side branches were divided between ligatures   of 2-0 silk and/or clips.  There was a larger lateral side branch that was divided between double ligatures of 2-0 silk on the   side of the fistula and a 2-0 silk suture ligature on the   opposite side. The fistula was fully mobilized and reached a   superficial location, tension-free. The fistula was kept moist   throughout. Hemostasis was achieved and the wound was thoroughly irrigated. The fistula was then brought into a more superficial location by   reapproximating the subcutaneous fascia with interrupted 3-0   Vicryls deep to the fistula. The overlying skin was then closed   with a combination of running 4-0 subcuticular Monocryl   reinforced with several interrupted simple skin sutures of 4-0   nylon. Dermabond and a sterile dressing were then applied. The patient tolerated the procedure well and went to recovery in   stable condition.         MD Don Blandon / [place]   D:  08/10/2017   12:10   T:  08/10/2017   12:22   Job #:  [id number]

## 2017-09-06 PROBLEM — I77.0 A-V FISTULA (HCC): Status: ACTIVE | Noted: 2017-09-06

## 2017-11-15 PROBLEM — E11.21 WELL CONTROLLED TYPE 2 DIABETES MELLITUS WITH NEPHROPATHY (HCC): Status: ACTIVE | Noted: 2017-11-15

## 2017-12-01 PROBLEM — R25.9 MIXED ACTION AND RESTING TREMOR: Status: ACTIVE | Noted: 2017-12-01

## 2017-12-01 PROBLEM — Z79.899 ENCOUNTER FOR MEDICATION MANAGEMENT: Status: ACTIVE | Noted: 2017-12-01

## 2017-12-01 PROBLEM — G62.9 POLYNEUROPATHY: Status: ACTIVE | Noted: 2017-12-01

## 2017-12-01 PROBLEM — G20 PARKINSONISM (HCC): Status: ACTIVE | Noted: 2017-12-01

## 2017-12-01 PROBLEM — R53.1 WEAKNESS: Status: ACTIVE | Noted: 2017-12-01

## 2017-12-01 PROBLEM — E53.9 BURNING FEET SYNDROME: Status: ACTIVE | Noted: 2017-12-01

## 2017-12-01 PROBLEM — D47.2 MGUS (MONOCLONAL GAMMOPATHY OF UNKNOWN SIGNIFICANCE): Status: ACTIVE | Noted: 2017-12-01

## 2017-12-01 PROBLEM — M79.606 PAIN OF LOWER EXTREMITY: Status: ACTIVE | Noted: 2017-12-01

## 2017-12-01 PROBLEM — R29.3 POSTURAL IMBALANCE: Status: ACTIVE | Noted: 2017-12-01

## 2017-12-15 PROBLEM — E11.21 TYPE 2 DIABETES MELLITUS WITH NEPHROPATHY (HCC): Status: ACTIVE | Noted: 2017-12-15

## 2017-12-27 ENCOUNTER — APPOINTMENT (OUTPATIENT)
Dept: GENERAL RADIOLOGY | Age: 77
DRG: 689 | End: 2017-12-27
Attending: EMERGENCY MEDICINE
Payer: MEDICARE

## 2017-12-27 ENCOUNTER — HOSPITAL ENCOUNTER (INPATIENT)
Age: 77
LOS: 7 days | Discharge: NURSING, REHAB OR LONG-TERM CARE | DRG: 689 | End: 2018-01-03
Attending: EMERGENCY MEDICINE | Admitting: INTERNAL MEDICINE
Payer: MEDICARE

## 2017-12-27 ENCOUNTER — APPOINTMENT (OUTPATIENT)
Dept: CT IMAGING | Age: 77
DRG: 689 | End: 2017-12-27
Attending: EMERGENCY MEDICINE
Payer: MEDICARE

## 2017-12-27 DIAGNOSIS — G20 PARKINSONISM, UNSPECIFIED PARKINSONISM TYPE (HCC): ICD-10-CM

## 2017-12-27 DIAGNOSIS — R53.83 FATIGUE, UNSPECIFIED TYPE: Primary | ICD-10-CM

## 2017-12-27 PROBLEM — N39.0 UTI (URINARY TRACT INFECTION): Status: ACTIVE | Noted: 2017-12-27

## 2017-12-27 PROBLEM — G93.40 ENCEPHALOPATHY ACUTE: Status: ACTIVE | Noted: 2017-12-27

## 2017-12-27 LAB
ALBUMIN SERPL-MCNC: 3.1 G/DL (ref 3.2–4.6)
ALBUMIN/GLOB SERPL: 0.7 {RATIO} (ref 1.2–3.5)
ALP SERPL-CCNC: 118 U/L (ref 50–136)
ALT SERPL-CCNC: 10 U/L (ref 12–65)
ANION GAP SERPL CALC-SCNC: 10 MMOL/L (ref 7–16)
AST SERPL-CCNC: 4 U/L (ref 15–37)
ATRIAL RATE: 258 BPM
BACTERIA URNS QL MICRO: ABNORMAL /HPF
BASOPHILS # BLD: 0.1 K/UL (ref 0–0.2)
BASOPHILS NFR BLD: 1 % (ref 0–2)
BILIRUB SERPL-MCNC: 0.5 MG/DL (ref 0.2–1.1)
BUN SERPL-MCNC: 77 MG/DL (ref 8–23)
CALCIUM SERPL-MCNC: 9 MG/DL (ref 8.3–10.4)
CALCULATED P AXIS, ECG09: 17 DEGREES
CALCULATED R AXIS, ECG10: 35 DEGREES
CALCULATED T AXIS, ECG11: 46 DEGREES
CASTS URNS QL MICRO: ABNORMAL /LPF
CHLORIDE SERPL-SCNC: 112 MMOL/L (ref 98–107)
CO2 SERPL-SCNC: 22 MMOL/L (ref 21–32)
CREAT SERPL-MCNC: 2.98 MG/DL (ref 0.6–1)
DIAGNOSIS, 93000: NORMAL
DIFFERENTIAL METHOD BLD: ABNORMAL
EOSINOPHIL # BLD: 0.3 K/UL (ref 0–0.8)
EOSINOPHIL NFR BLD: 3 % (ref 0.5–7.8)
EPI CELLS #/AREA URNS HPF: ABNORMAL /HPF
ERYTHROCYTE [DISTWIDTH] IN BLOOD BY AUTOMATED COUNT: 14.6 % (ref 11.9–14.6)
GLOBULIN SER CALC-MCNC: 4.7 G/DL (ref 2.3–3.5)
GLUCOSE BLD STRIP.AUTO-MCNC: 113 MG/DL (ref 65–100)
GLUCOSE BLD STRIP.AUTO-MCNC: 119 MG/DL (ref 65–100)
GLUCOSE BLD STRIP.AUTO-MCNC: 55 MG/DL (ref 65–100)
GLUCOSE SERPL-MCNC: 70 MG/DL (ref 65–100)
HCT VFR BLD AUTO: 29.6 % (ref 35.8–46.3)
HGB BLD-MCNC: 9.7 G/DL (ref 11.7–15.4)
IMM GRANULOCYTES # BLD: 0.1 K/UL (ref 0–0.5)
IMM GRANULOCYTES NFR BLD AUTO: 1 % (ref 0–5)
LYMPHOCYTES # BLD: 1.4 K/UL (ref 0.5–4.6)
LYMPHOCYTES NFR BLD: 14 % (ref 13–44)
MCH RBC QN AUTO: 30.3 PG (ref 26.1–32.9)
MCHC RBC AUTO-ENTMCNC: 32.8 G/DL (ref 31.4–35)
MCV RBC AUTO: 92.5 FL (ref 79.6–97.8)
MONOCYTES # BLD: 0.6 K/UL (ref 0.1–1.3)
MONOCYTES NFR BLD: 6 % (ref 4–12)
NEUTS SEG # BLD: 7.3 K/UL (ref 1.7–8.2)
NEUTS SEG NFR BLD: 75 % (ref 43–78)
P-R INTERVAL, ECG05: 172 MS
PLATELET # BLD AUTO: 278 K/UL (ref 150–450)
PMV BLD AUTO: 11.8 FL (ref 10.8–14.1)
POTASSIUM SERPL-SCNC: 4.5 MMOL/L (ref 3.5–5.1)
PROT SERPL-MCNC: 7.8 G/DL (ref 6.3–8.2)
Q-T INTERVAL, ECG07: 402 MS
QRS DURATION, ECG06: 88 MS
QTC CALCULATION (BEZET), ECG08: 418 MS
RBC # BLD AUTO: 3.2 M/UL (ref 4.05–5.25)
RBC #/AREA URNS HPF: ABNORMAL /HPF
SODIUM SERPL-SCNC: 144 MMOL/L (ref 136–145)
TROPONIN I SERPL-MCNC: <0.02 NG/ML (ref 0.02–0.05)
URATE SERPL-MCNC: 4.1 MG/DL (ref 2.6–6)
VENTRICULAR RATE, ECG03: 65 BPM
WBC # BLD AUTO: 9.7 K/UL (ref 4.3–11.1)
WBC URNS QL MICRO: ABNORMAL /HPF

## 2017-12-27 PROCEDURE — 87186 SC STD MICRODIL/AGAR DIL: CPT | Performed by: INTERNAL MEDICINE

## 2017-12-27 PROCEDURE — 87086 URINE CULTURE/COLONY COUNT: CPT | Performed by: INTERNAL MEDICINE

## 2017-12-27 PROCEDURE — 96360 HYDRATION IV INFUSION INIT: CPT | Performed by: EMERGENCY MEDICINE

## 2017-12-27 PROCEDURE — 93005 ELECTROCARDIOGRAM TRACING: CPT | Performed by: EMERGENCY MEDICINE

## 2017-12-27 PROCEDURE — 74011000302 HC RX REV CODE- 302: Performed by: INTERNAL MEDICINE

## 2017-12-27 PROCEDURE — 86580 TB INTRADERMAL TEST: CPT | Performed by: INTERNAL MEDICINE

## 2017-12-27 PROCEDURE — 74011250636 HC RX REV CODE- 250/636: Performed by: INTERNAL MEDICINE

## 2017-12-27 PROCEDURE — 81003 URINALYSIS AUTO W/O SCOPE: CPT | Performed by: EMERGENCY MEDICINE

## 2017-12-27 PROCEDURE — 84550 ASSAY OF BLOOD/URIC ACID: CPT | Performed by: EMERGENCY MEDICINE

## 2017-12-27 PROCEDURE — 81015 MICROSCOPIC EXAM OF URINE: CPT | Performed by: EMERGENCY MEDICINE

## 2017-12-27 PROCEDURE — 87088 URINE BACTERIA CULTURE: CPT | Performed by: INTERNAL MEDICINE

## 2017-12-27 PROCEDURE — 74011250637 HC RX REV CODE- 250/637: Performed by: INTERNAL MEDICINE

## 2017-12-27 PROCEDURE — 73130 X-RAY EXAM OF HAND: CPT

## 2017-12-27 PROCEDURE — 84484 ASSAY OF TROPONIN QUANT: CPT | Performed by: EMERGENCY MEDICINE

## 2017-12-27 PROCEDURE — 85025 COMPLETE CBC W/AUTO DIFF WBC: CPT | Performed by: EMERGENCY MEDICINE

## 2017-12-27 PROCEDURE — 99285 EMERGENCY DEPT VISIT HI MDM: CPT | Performed by: EMERGENCY MEDICINE

## 2017-12-27 PROCEDURE — 82962 GLUCOSE BLOOD TEST: CPT

## 2017-12-27 PROCEDURE — 73080 X-RAY EXAM OF ELBOW: CPT

## 2017-12-27 PROCEDURE — 71020 XR CHEST PA LAT: CPT

## 2017-12-27 PROCEDURE — 70450 CT HEAD/BRAIN W/O DYE: CPT

## 2017-12-27 PROCEDURE — 80053 COMPREHEN METABOLIC PANEL: CPT | Performed by: EMERGENCY MEDICINE

## 2017-12-27 PROCEDURE — 65270000029 HC RM PRIVATE

## 2017-12-27 PROCEDURE — 74011250636 HC RX REV CODE- 250/636: Performed by: EMERGENCY MEDICINE

## 2017-12-27 PROCEDURE — 74011000250 HC RX REV CODE- 250: Performed by: EMERGENCY MEDICINE

## 2017-12-27 RX ORDER — SODIUM CHLORIDE 0.9 % (FLUSH) 0.9 %
5-10 SYRINGE (ML) INJECTION EVERY 8 HOURS
Status: DISCONTINUED | OUTPATIENT
Start: 2017-12-27 | End: 2018-01-03 | Stop reason: HOSPADM

## 2017-12-27 RX ORDER — HEPARIN SODIUM 5000 [USP'U]/ML
5000 INJECTION, SOLUTION INTRAVENOUS; SUBCUTANEOUS EVERY 8 HOURS
Status: DISCONTINUED | OUTPATIENT
Start: 2017-12-27 | End: 2018-01-03 | Stop reason: HOSPADM

## 2017-12-27 RX ORDER — SODIUM CHLORIDE 0.9 % (FLUSH) 0.9 %
5-10 SYRINGE (ML) INJECTION AS NEEDED
Status: DISCONTINUED | OUTPATIENT
Start: 2017-12-27 | End: 2018-01-03 | Stop reason: HOSPADM

## 2017-12-27 RX ORDER — FUROSEMIDE 40 MG/1
40 TABLET ORAL DAILY
Status: DISCONTINUED | OUTPATIENT
Start: 2017-12-28 | End: 2017-12-28

## 2017-12-27 RX ORDER — BENAZEPRIL HYDROCHLORIDE 10 MG/1
20 TABLET ORAL DAILY
Status: DISCONTINUED | OUTPATIENT
Start: 2017-12-28 | End: 2018-01-03 | Stop reason: HOSPADM

## 2017-12-27 RX ORDER — HEPARIN SODIUM 5000 [USP'U]/ML
5000 INJECTION, SOLUTION INTRAVENOUS; SUBCUTANEOUS EVERY 8 HOURS
Status: DISCONTINUED | OUTPATIENT
Start: 2017-12-27 | End: 2017-12-27 | Stop reason: SDUPTHER

## 2017-12-27 RX ORDER — ASPIRIN 325 MG
325 TABLET ORAL DAILY
Status: DISCONTINUED | OUTPATIENT
Start: 2017-12-28 | End: 2018-01-03 | Stop reason: HOSPADM

## 2017-12-27 RX ORDER — CARVEDILOL 6.25 MG/1
6.25 TABLET ORAL 2 TIMES DAILY WITH MEALS
Status: DISCONTINUED | OUTPATIENT
Start: 2017-12-28 | End: 2018-01-03 | Stop reason: HOSPADM

## 2017-12-27 RX ORDER — SODIUM CHLORIDE 9 MG/ML
50 INJECTION, SOLUTION INTRAVENOUS CONTINUOUS
Status: DISCONTINUED | OUTPATIENT
Start: 2017-12-27 | End: 2017-12-29

## 2017-12-27 RX ORDER — TRAMADOL HYDROCHLORIDE 300 MG/1
300 TABLET, FILM COATED, EXTENDED RELEASE ORAL DAILY
Status: DISCONTINUED | OUTPATIENT
Start: 2017-12-28 | End: 2018-01-03 | Stop reason: HOSPADM

## 2017-12-27 RX ORDER — PREGABALIN 75 MG/1
75 CAPSULE ORAL 3 TIMES DAILY
Status: ON HOLD | COMMUNITY
End: 2017-12-29 | Stop reason: SDUPTHER

## 2017-12-27 RX ORDER — CARBIDOPA AND LEVODOPA 10; 100 MG/1; MG/1
1 TABLET ORAL 3 TIMES DAILY
Status: DISCONTINUED | OUTPATIENT
Start: 2017-12-28 | End: 2018-01-03 | Stop reason: HOSPADM

## 2017-12-27 RX ORDER — ATORVASTATIN CALCIUM 40 MG/1
40 TABLET, FILM COATED ORAL DAILY
Status: DISCONTINUED | OUTPATIENT
Start: 2017-12-28 | End: 2018-01-03 | Stop reason: HOSPADM

## 2017-12-27 RX ORDER — ACETAMINOPHEN 325 MG/1
650 TABLET ORAL
Status: DISCONTINUED | OUTPATIENT
Start: 2017-12-27 | End: 2017-12-29

## 2017-12-27 RX ORDER — BACLOFEN 10 MG/1
10 TABLET ORAL 4 TIMES DAILY
Status: DISCONTINUED | OUTPATIENT
Start: 2017-12-28 | End: 2018-01-03 | Stop reason: HOSPADM

## 2017-12-27 RX ORDER — PREGABALIN 75 MG/1
75 CAPSULE ORAL DAILY
Status: DISCONTINUED | OUTPATIENT
Start: 2017-12-28 | End: 2018-01-03 | Stop reason: HOSPADM

## 2017-12-27 RX ORDER — ONDANSETRON 2 MG/ML
4 INJECTION INTRAMUSCULAR; INTRAVENOUS
Status: DISCONTINUED | OUTPATIENT
Start: 2017-12-27 | End: 2018-01-03 | Stop reason: HOSPADM

## 2017-12-27 RX ORDER — HYDROCODONE BITARTRATE AND ACETAMINOPHEN 5; 325 MG/1; MG/1
1 TABLET ORAL
Status: DISCONTINUED | OUTPATIENT
Start: 2017-12-27 | End: 2018-01-03 | Stop reason: HOSPADM

## 2017-12-27 RX ORDER — AMLODIPINE BESYLATE 5 MG/1
5 TABLET ORAL DAILY
Status: DISCONTINUED | OUTPATIENT
Start: 2017-12-28 | End: 2018-01-03 | Stop reason: HOSPADM

## 2017-12-27 RX ORDER — LEVOTHYROXINE SODIUM 50 UG/1
25 TABLET ORAL
Status: DISCONTINUED | OUTPATIENT
Start: 2017-12-28 | End: 2018-01-03 | Stop reason: HOSPADM

## 2017-12-27 RX ADMIN — SODIUM CHLORIDE 1000 ML: 900 INJECTION, SOLUTION INTRAVENOUS at 15:35

## 2017-12-27 RX ADMIN — TUBERCULIN PURIFIED PROTEIN DERIVATIVE 5 UNITS: 5 INJECTION INTRADERMAL at 23:25

## 2017-12-27 RX ADMIN — SODIUM CHLORIDE 50 ML/HR: 900 INJECTION, SOLUTION INTRAVENOUS at 19:06

## 2017-12-27 RX ADMIN — CEFTRIAXONE SODIUM 1 G: 1 INJECTION, POWDER, FOR SOLUTION INTRAMUSCULAR; INTRAVENOUS at 19:06

## 2017-12-27 RX ADMIN — HYDROCODONE BITARTRATE AND ACETAMINOPHEN 1 TABLET: 5; 325 TABLET ORAL at 21:10

## 2017-12-27 RX ADMIN — HEPARIN SODIUM 5000 UNITS: 5000 INJECTION, SOLUTION INTRAVENOUS; SUBCUTANEOUS at 21:59

## 2017-12-27 RX ADMIN — Medication 10 ML: at 23:22

## 2017-12-27 NOTE — ED PROVIDER NOTES
HPI Comments: Patient is a 67 yo female with history of DM, ESRD  Soon to start dialysis who presents with multiple falls and fatigue. Patient states she fell about 1 week ago and states has also had generalized fatigue for the past couple months. Denies any pain other than wrist from fall, specifically no chest pain, no abdominal pain, no nausea or vomiting, no fevers, no abdominal pain, no further complaints    Patient is a 68 y.o. female presenting with fall. The history is provided by the patient. No  was used. Fall   Pertinent negatives include no fever, no abdominal pain, no nausea, no vomiting and no headaches.         Past Medical History:   Diagnosis Date    Adverse effect of anesthesia     difficult awakening    AF (atrial fibrillation) ([address] 11/20/2011    Arthritis 11/18/2011    generalized     CAD (coronary artery disease) 2011    CABG x 4    Cervical disc disease     Steroid injections    Chronic kidney disease     Chronic pain     back    DJD (degenerative joint disease)     Drainage from wound 10/3/2014    Full dentures     Gout     managed with medication     Hemorrhoid 11/5/2013    [place] (hard of hearing)     Hyperlipemia 11/10/2011    managed with medication     Hypertension     controlled with meds    Hypertriglyceridemia     managed with medication     Hypothyroidism     managed with medication     Neuropathy     legs and arms, managed with medication     Obesity (BMI 30-39.9) 10/2/14    BMI 36.3    PAD (peripheral artery disease) ([address]     PCI (pneumatosis cystoides intestinalis) 11/5/2013    Pleural effusion, bilateral 11/21/2011    Postoperative anemia due to acute blood loss 11/21/2011    expected     Rib cage fracture 11/5/2013    x 2     S/P CABG (coronary artery bypass graft) 11/05/2013    CABG x 4    Status post-operative repair of hip fracture 09/15/2014    left side, repair with hardware    Stroke ([address]     left sided weakness residual  Type II or unspecified type diabetes mellitus without mention of complication, uncontrolled ([facility] 12/16/2013    oral and insulin reliant/ Avg / low BS s/s/ @ 70/ last A1c 8.3    Unspecified adverse effect of anesthesia     difficult to arouse after general anesthesia       Past Surgical History:   Procedure Laterality Date    CABG, ARTERY-VEIN, FOUR  Oct. 2011    CLOSE CYSTOSTOMY      exploratory with removal of mass of infection    HX CATARACT REMOVAL Bilateral 11/2012    with IOL implants, bilateral    HX COLONOSCOPY      HX HIP FRACTURE TX Left     repair with hardware    HX HYSTERECTOMY      HX LAP CHOLECYSTECTOMY      VASCULAR SURGERY PROCEDURE UNLIST Left 06/07/2017    AVF creation    VASCULAR SURGERY PROCEDURE UNLIST Left 08/10/2017    AVF elevation         Family History:   Problem Relation Age of Onset    Heart Disease Mother     Cancer Mother      colon    Diabetes Mother     Cancer Father      lung    Cancer Sister      breast    Breast Cancer Sister 28    Cancer Brother      Leukemia    Breast Cancer Maternal Aunt 54       Social History     Social History    Marital status:      Spouse name: N/A    Number of children: N/A    Years of education: N/A     Occupational History    Not on file. Social History Main Topics    Smoking status: Never Smoker    Smokeless tobacco: Never Used    Alcohol use No    Drug use: No    Sexual activity: Not on file     Other Topics Concern    Not on file     Social History Narrative         ALLERGIES: Lipitor [atorvastatin]    Review of Systems   Constitutional: Positive for fatigue. Negative for chills and fever. HENT: Negative for rhinorrhea and sore throat. Eyes: Negative for visual disturbance. Respiratory: Negative for cough and shortness of breath. Cardiovascular: Negative for chest pain and leg swelling. Gastrointestinal: Negative for abdominal pain, diarrhea, nausea and vomiting.    Genitourinary: Negative for dysuria. Musculoskeletal: Negative for back pain and neck pain. Skin: Negative for rash. Neurological: Negative for weakness and headaches. Psychiatric/Behavioral: The patient is not nervous/anxious. Vitals:    12/27/17 1300 12/27/17 1430   BP: 158/69 161/78   Pulse: 67 62   Resp: 16 16   Temp: 97.8 °F (36.6 °C)    SpO2: 100% 97%   Weight: 111.6 kg (246 lb)    Height: 5' 10\" (1.778 m)             Physical Exam   Constitutional: She is oriented to person, place, and time. She appears well-developed and well-nourished. HENT:   Head: Normocephalic. Right Ear: External ear normal.   Left Ear: External ear normal.   Eyes: Conjunctivae and EOM are normal. Pupils are equal, round, and reactive to light. Neck: Normal range of motion. Neck supple. No tracheal deviation present. Cardiovascular: Normal rate, regular rhythm, normal heart sounds and intact distal pulses. No murmur heard. Pulmonary/Chest: Effort normal and breath sounds normal. No respiratory distress. Abdominal: Soft. There is no tenderness. Musculoskeletal: Normal range of motion. Neurological: She is alert and oriented to person, place, and time. No cranial nerve deficit. Cn 2-12 fully intact, strength and sensation 5/5 in all extremities,  no focal deficits appreciated. Skin: No rash noted. Nursing note and vitals reviewed.        MDM  Number of Diagnoses or Management Options     Amount and/or Complexity of Data Reviewed  Clinical lab tests: ordered and reviewed  Tests in the radiology section of CPT®: ordered and reviewed  Tests in the medicine section of CPT®: ordered and reviewed  Review and summarize past medical records: yes    Risk of Complications, Morbidity, and/or Mortality  Presenting problems: high  Diagnostic procedures: high  Management options: high    Patient Progress  Patient progress: stable    ED Course       Procedures     Recent Results (from the past 12 hour(s))   CBC WITH AUTOMATED DIFF Collection Time: 12/27/17  1:07 PM   Result Value Ref Range    WBC 9.7 4.3 - 11.1 K/uL    RBC 3.20 (L) 4.05 - 5.25 M/uL    HGB 9.7 (L) 11.7 - 15.4 g/dL    HCT 29.6 (L) 35.8 - 46.3 %    MCV 92.5 79.6 - 97.8 FL    MCH 30.3 26.1 - 32.9 PG    MCHC 32.8 31.4 - 35.0 g/dL    RDW 14.6 11.9 - 14.6 %    PLATELET 613 794 - 115 K/uL    MPV 11.8 10.8 - 14.1 FL    DF AUTOMATED      NEUTROPHILS 75 43 - 78 %    LYMPHOCYTES 14 13 - 44 %    MONOCYTES 6 4.0 - 12.0 %    EOSINOPHILS 3 0.5 - 7.8 %    BASOPHILS 1 0.0 - 2.0 %    IMMATURE GRANULOCYTES 1 0.0 - 5.0 %    ABS. NEUTROPHILS 7.3 1.7 - 8.2 K/UL    ABS. LYMPHOCYTES 1.4 0.5 - 4.6 K/UL    ABS. MONOCYTES 0.6 0.1 - 1.3 K/UL    ABS. EOSINOPHILS 0.3 0.0 - 0.8 K/UL    ABS. BASOPHILS 0.1 0.0 - 0.2 K/UL    ABS. IMM. GRANS. 0.1 0.0 - 0.5 K/UL   METABOLIC PANEL, COMPREHENSIVE    Collection Time: 12/27/17  1:07 PM   Result Value Ref Range    Sodium 144 136 - 145 mmol/L    Potassium 4.5 3.5 - 5.1 mmol/L    Chloride 112 (H) 98 - 107 mmol/L    CO2 22 21 - 32 mmol/L    Anion gap 10 7 - 16 mmol/L    Glucose 70 65 - 100 mg/dL    BUN 77 (H) 8 - 23 MG/DL    Creatinine 2.98 (H) 0.6 - 1.0 MG/DL    GFR est AA 20 (L) >60 ml/min/1.73m2    GFR est non-AA 16 (L) >60 ml/min/1.73m2    Calcium 9.0 8.3 - 10.4 MG/DL    Bilirubin, total 0.5 0.2 - 1.1 MG/DL    ALT (SGPT) 10 (L) 12 - 65 U/L    AST (SGOT) 4 (L) 15 - 37 U/L    Alk.  phosphatase 118 50 - 136 U/L    Protein, total 7.8 6.3 - 8.2 g/dL    Albumin 3.1 (L) 3.2 - 4.6 g/dL    Globulin 4.7 (H) 2.3 - 3.5 g/dL    A-G Ratio 0.7 (L) 1.2 - 3.5     URIC ACID    Collection Time: 12/27/17  1:07 PM   Result Value Ref Range    Uric acid 4.1 2.6 - 6.0 MG/DL   GLUCOSE, POC    Collection Time: 12/27/17  3:08 PM   Result Value Ref Range    Glucose (POC) 55 (L) 65 - 100 mg/dL   GLUCOSE, POC    Collection Time: 12/27/17  3:22 PM   Result Value Ref Range    Glucose (POC) 55 (L) 65 - 100 mg/dL   EKG, 12 LEAD, INITIAL    Collection Time: 12/27/17  3:33 PM   Result Value Ref Range Ventricular Rate 64 BPM    Atrial Rate 64 BPM    P-R Interval 220 ms    QRS Duration 86 ms    Q-T Interval 398 ms    QTC Calculation (Bezet) 410 ms    Calculated P Axis 35 degrees    Calculated R Axis 36 degrees    Calculated T Axis 34 degrees    Diagnosis       Sinus rhythm with sinus arrhythmia with 1st degree A-V block with occasional   Premature ventricular complexes  Nonspecific ST abnormality  Abnormal ECG  When compared with ECG of 03-AUG-2017 13:47,  Premature ventricular complexes are now Present  MI interval has increased  T wave inversion no longer evident in Lateral leads     GLUCOSE, POC    Collection Time: 12/27/17  3:46 PM   Result Value Ref Range    Glucose (POC) 55 (L) 65 - 100 mg/dL   URINE MICROSCOPIC    Collection Time: 12/27/17  4:29 PM   Result Value Ref Range    WBC  0 /hpf    RBC 3-5 0 /hpf    Epithelial cells 0-3 0 /hpf    Bacteria 4+ (H) 0 /hpf    Casts 0-3 0 /lpf   GLUCOSE, POC    Collection Time: 12/27/17  5:19 PM   Result Value Ref Range    Glucose (POC) 119 (H) 65 - 100 mg/dL     Xr Chest Pa Lat    Result Date: 12/27/2017  CHEST X-RAY, 2 views. HISTORY:  ET and falls. TECHNIQUE: PA and lateral views. COMPARISON: 12 September 2014. FINDINGS: No acute fracture, dislocation or subluxation. IMPRESSION: Negative for acute abnormality. Xr Elbow Lt Min 3 V    Result Date: 12/27/2017  LEFT ELBOW 3 views. HISTORY: Left elbow pain following fall. TECHNIQUE: AP and lateral and oblique views. COMPARISON: None. FINDINGS: Anterior fat pad somewhat prominent. No posterior fat pad. No acute fracture appreciated. Radial head appears intact. Surgical changes in the superficial soft tissues anteriorly. IMPRESSION: Negative for acute fracture. Xr Hand Lt Min 3 V    Result Date: 12/27/2017  LEFT HAND 3 views. HISTORY: Left hand pain following fall. TECHNIQUE: AP and lateral and oblique views. COMPARISON: None. FINDINGS: Diffuse osteopenia.  Osteoarthritis with flexion deformity at the distal interphalangeal joint of the little finger. No acute fractures. IMPRESSION: Negative for acute fracture. 67 yo female with complicated UTI:       Will start rocephin IN ed, admit for IV abx and observation for likelyl complicated UTI vs. Unspecified encephalopathy.

## 2017-12-27 NOTE — ED TRIAGE NOTES
Patient reports falling last Friday night. Pain on left elbow and left hand. Gout on left pointer finger. Several falls within the last month.

## 2017-12-27 NOTE — H&P
Hospitalist H&P Note     Admit Date:  2017  1:27 PM   Name:  Mallory Bal   Age:  68 y.o.  :  1940   MRN:  [id number]   PCP:  Jimmy Barnes MD  Treatment Team: Attending Provider: Marlo Goff MD; Primary Nurse: Дмитрий Villalta    HPI:   Ms. Pamella Duque is a 68 y.o. female with PMH of CAD, history of CABG, history of chronic kidney disease, history of DJD, history of hypertension, history of hyperlipidemia, history of hypothyroidism, history of neuropathy, Parkinsonism. She presents to the ER with generalized weakness, s/p multiple falls last fall 5 days ago and pain to her left wrist. She is a ESRD pt and had left arm fistula prepared but has not had HD yet. Has been more tired and confused lately. In the ED she was found to have UTI and with positive UA. ANTONIO with Cr 2.9 (above baseline 2.2 in 10/2017) and normocytic anemia 9.7 which is worse than her baseline of 11.5    Xrays found no fracture. Pt will be admitted for UTI, deconditioning, ANTONIO and encephalopathy. 10 systems reviewed and negative except as noted in HPI.   Past Medical History:   Diagnosis Date    Adverse effect of anesthesia     difficult awakening    AF (atrial fibrillation) ([address] 2011    Arthritis 2011    generalized     CAD (coronary artery disease)     CABG x 4    Cervical disc disease     Steroid injections    Chronic kidney disease     Chronic pain     back    DJD (degenerative joint disease)     Drainage from wound 10/3/2014    Full dentures     Gout     managed with medication     Hemorrhoid 2013    [place] (hard of hearing)     Hyperlipemia 11/10/2011    managed with medication     Hypertension     controlled with meds    Hypertriglyceridemia     managed with medication     Hypothyroidism     managed with medication     Neuropathy     legs and arms, managed with medication     Obesity (BMI 30-39.9) 10/2/14    BMI 36.3    PAD (peripheral artery disease) ([address]     PCI (pneumatosis cystoides intestinalis) 11/5/2013    Pleural effusion, bilateral 11/21/2011    Postoperative anemia due to acute blood loss 11/21/2011    expected     Rib cage fracture 11/5/2013    x 2     S/P CABG (coronary artery bypass graft) 11/05/2013    CABG x 4    Status post-operative repair of hip fracture 09/15/2014    left side, repair with hardware    Stroke [facility])     left sided weakness residual     Type II or unspecified type diabetes mellitus without mention of complication, uncontrolled ([address] 12/16/2013    oral and insulin reliant/ Avg / low BS s/s/ @ 70/ last A1c 8.3    Unspecified adverse effect of anesthesia     difficult to arouse after general anesthesia      Past Surgical History:   Procedure Laterality Date    CABG, ARTERY-VEIN, FOUR  Oct. 2011    CLOSE CYSTOSTOMY      exploratory with removal of mass of infection    HX CATARACT REMOVAL Bilateral 11/2012    with IOL implants, bilateral    HX COLONOSCOPY      HX HIP FRACTURE TX Left     repair with hardware    HX HYSTERECTOMY      HX LAP CHOLECYSTECTOMY      VASCULAR SURGERY PROCEDURE UNLIST Left 06/07/2017    AVF creation    VASCULAR SURGERY PROCEDURE UNLIST Left 08/10/2017    AVF elevation      Allergies   Allergen Reactions    Lipitor [Atorvastatin] Myalgia      Social History   Substance Use Topics    Smoking status: Never Smoker    Smokeless tobacco: Never Used    Alcohol use No      Family History   Problem Relation Age of Onset    Heart Disease Mother     Cancer Mother      colon    Diabetes Mother     Cancer Father      lung    Cancer Sister      breast    Breast Cancer Sister 28    Cancer Brother      Leukemia    Breast Cancer Maternal Aunt 54      Immunization History   Administered Date(s) Administered    Influenza Vaccine 10/14/2014, 11/02/2015    Influenza Vaccine (Quad) Mdck Pf 10/31/2017    Influenza Vaccine (Quad) PF 10/24/2016    Pneumococcal Conjugate (PCV-13) 06/08/2016    Pneumococcal Polysaccharide (PPSV-23) 12/10/2013    TB Skin Test (PPD) Intradermal 09/12/2014     PTA Medications:  Prior to Admission Medications   Prescriptions Last Dose Informant Patient Reported? Taking? LYRICA 75 mg capsule 12/27/2017 at Unknown time  Yes Yes   Sig: Take 75 mg by mouth daily. amLODIPine (NORVASC) 5 mg tablet 12/27/2017 at Unknown time  Yes Yes   Sig: Take 5 mg by mouth every morning. aspirin (ASPIRIN) 325 mg tablet 12/27/2017 at Unknown time  Yes Yes   Sig: Take 325 mg by mouth every morning. Indications: Myocardial Reinfarction Prevention   atorvastatin (LIPITOR) 40 mg tablet 12/27/2017 at Unknown time  Yes Yes   Sig: Take 40 mg by mouth every morning. baclofen (LIORESAL) 10 mg tablet 12/27/2017 at Unknown time  No Yes   Sig: Take 1 Tab by mouth four (4) times daily for 30 days. benazepril (LOTENSIN) 20 mg tablet 12/27/2017 at Unknown time  Yes Yes   Sig: Take 20 mg by mouth every morning. carbidopa-levodopa (SINEMET)  mg per tablet 12/27/2017 at Unknown time  No Yes   Sig: Take 1 Tab by mouth three (3) times daily for 30 days. Indications: Parkinsonism   carvedilol (COREG) 6.25 mg tablet 12/27/2017 at Unknown time  Yes Yes   Sig: take 1 tablet by mouth twice a day   febuxostat (ULORIC) 80 mg tab tablet 12/27/2017 at Unknown time  No Yes   Sig: Take 1 Tab by mouth daily. Patient taking differently: Take 80 mg by mouth every morning. furosemide (LASIX) 40 mg tablet 12/27/2017 at Unknown time  Yes Yes   Sig: Take 40 mg by mouth every morning. insulin NPH/insulin regular (HUMULIN 70/30) 100 unit/mL (70-30) injection 12/27/2017 at Unknown time  No Yes   Sig: inject 50 units subcutaneously every morning then 40 units every evening BEFORE MEALS   levothyroxine (SYNTHROID) 25 mcg tablet 12/27/2017 at Unknown time  No Yes   Sig: Take 1 Tab by mouth Daily (before breakfast).    linagliptin (TRADJENTA) 5 mg tablet 12/27/2017 at Unknown time  Yes Yes   Sig: Take 5 mg by mouth every morning. nitroglycerin (NITROLINGUAL) 400 mcg/spray spray 2017 at Unknown time  No Yes   Si Spray by SubLINGual route every five (5) minutes as needed (Pt states \"I've never had to use it\"). traMADol (ULTRAM-ER) 300 mg tablet 2017 at Unknown time  Yes Yes   Sig: Take 300 mg by mouth nightly. triamcinolone acetonide (KENALOG) 0.1 % topical cream 2017 at Unknown time  Yes Yes      Facility-Administered Medications: None       Review of Systems:  Gen: No weight loss  Eyes: no vision changes  ENT: no sore throat  Resp: No sob or cough  CV: No cp  Abd:  no abd pain, no vomiting or diarrhea  : No incontinence, no hematuria or dysuria, no flank pain  MSK: no leg swelling  Neuro: No HA or dizziness, no weakness, numbness/tingling    Objective:   Patient Vitals for the past 24 hrs:   Temp Pulse Resp BP SpO2   17 1600 - 60 16 154/78 98 %   17 1430 - 62 16 161/78 97 %   17 1300 97.8 °F (36.6 °C) 67 16 158/69 100 %     Oxygen Therapy  O2 Sat (%): 98 % (17 1600)  Pulse via Oximetry: 59 beats per minute (17 1430)  O2 Device: Room air (17 1430)  No intake or output data in the 24 hours ending 17 1812    Physical Exam:  General:    Well nourished. Obese. Alert. Eyes:   Normal sclera. Extraocular movements intact. ENT:  Normocephalic, atraumatic. Moist mucous membranes  CV:   RRR. No murmur, rub, or gallop. Lungs:  CTAB. No wheezing, rhonchi, or rales. Abdomen: Soft, tenderness (lower abdomen), nondistended. Bowel sounds normal.   Extremities: Swollen, red and tender to touch. Neurologic: CN II-XII grossly intact. Sensation intact. Skin:     No rashes or jaundice. Psych:  Normal mood and affect. I reviewed the labs, imaging, EKGs, telemetry, and other studies done this admission.   Data Review:   Recent Results (from the past 24 hour(s))   CBC WITH AUTOMATED DIFF    Collection Time: 17  1:07 PM   Result Value Ref Range    WBC 9.7 4.3 - 11.1 K/uL    RBC 3.20 (L) 4.05 - 5.25 M/uL    HGB 9.7 (L) 11.7 - 15.4 g/dL    HCT 29.6 (L) 35.8 - 46.3 %    MCV 92.5 79.6 - 97.8 FL    MCH 30.3 26.1 - 32.9 PG    MCHC 32.8 31.4 - 35.0 g/dL    RDW 14.6 11.9 - 14.6 %    PLATELET 400 741 - 308 K/uL    MPV 11.8 10.8 - 14.1 FL    DF AUTOMATED      NEUTROPHILS 75 43 - 78 %    LYMPHOCYTES 14 13 - 44 %    MONOCYTES 6 4.0 - 12.0 %    EOSINOPHILS 3 0.5 - 7.8 %    BASOPHILS 1 0.0 - 2.0 %    IMMATURE GRANULOCYTES 1 0.0 - 5.0 %    ABS. NEUTROPHILS 7.3 1.7 - 8.2 K/UL    ABS. LYMPHOCYTES 1.4 0.5 - 4.6 K/UL    ABS. MONOCYTES 0.6 0.1 - 1.3 K/UL    ABS. EOSINOPHILS 0.3 0.0 - 0.8 K/UL    ABS. BASOPHILS 0.1 0.0 - 0.2 K/UL    ABS. IMM. GRANS. 0.1 0.0 - 0.5 K/UL   METABOLIC PANEL, COMPREHENSIVE    Collection Time: 12/27/17  1:07 PM   Result Value Ref Range    Sodium 144 136 - 145 mmol/L    Potassium 4.5 3.5 - 5.1 mmol/L    Chloride 112 (H) 98 - 107 mmol/L    CO2 22 21 - 32 mmol/L    Anion gap 10 7 - 16 mmol/L    Glucose 70 65 - 100 mg/dL    BUN 77 (H) 8 - 23 MG/DL    Creatinine 2.98 (H) 0.6 - 1.0 MG/DL    GFR est AA 20 (L) >60 ml/min/1.73m2    GFR est non-AA 16 (L) >60 ml/min/1.73m2    Calcium 9.0 8.3 - 10.4 MG/DL    Bilirubin, total 0.5 0.2 - 1.1 MG/DL    ALT (SGPT) 10 (L) 12 - 65 U/L    AST (SGOT) 4 (L) 15 - 37 U/L    Alk.  phosphatase 118 50 - 136 U/L    Protein, total 7.8 6.3 - 8.2 g/dL    Albumin 3.1 (L) 3.2 - 4.6 g/dL    Globulin 4.7 (H) 2.3 - 3.5 g/dL    A-G Ratio 0.7 (L) 1.2 - 3.5     URIC ACID    Collection Time: 12/27/17  1:07 PM   Result Value Ref Range    Uric acid 4.1 2.6 - 6.0 MG/DL   GLUCOSE, POC    Collection Time: 12/27/17  3:08 PM   Result Value Ref Range    Glucose (POC) 55 (L) 65 - 100 mg/dL   GLUCOSE, POC    Collection Time: 12/27/17  3:22 PM   Result Value Ref Range    Glucose (POC) 55 (L) 65 - 100 mg/dL   EKG, 12 LEAD, INITIAL    Collection Time: 12/27/17  3:33 PM   Result Value Ref Range    Ventricular Rate 64 BPM    Atrial Rate 64 BPM    P-R Interval 220 ms QRS Duration 86 ms    Q-T Interval 398 ms    QTC Calculation (Bezet) 410 ms    Calculated P Axis 35 degrees    Calculated R Axis 36 degrees    Calculated T Axis 34 degrees    Diagnosis       Sinus rhythm with sinus arrhythmia with 1st degree A-V block with occasional   Premature ventricular complexes  Nonspecific ST abnormality  Abnormal ECG  When compared with ECG of 03-AUG-2017 13:47,  Premature ventricular complexes are now Present  TX interval has increased  T wave inversion no longer evident in Lateral leads     GLUCOSE, POC    Collection Time: 12/27/17  3:46 PM   Result Value Ref Range    Glucose (POC) 55 (L) 65 - 100 mg/dL   URINE MICROSCOPIC    Collection Time: 12/27/17  4:29 PM   Result Value Ref Range    WBC  0 /hpf    RBC 3-5 0 /hpf    Epithelial cells 0-3 0 /hpf    Bacteria 4+ (H) 0 /hpf    Casts 0-3 0 /lpf   GLUCOSE, POC    Collection Time: 12/27/17  5:19 PM   Result Value Ref Range    Glucose (POC) 119 (H) 65 - 100 mg/dL       All Micro Results     None          Other Studies:  Xr Chest Pa Lat    Result Date: 12/27/2017  CHEST X-RAY, 2 views. HISTORY:  ET and falls. TECHNIQUE: PA and lateral views. COMPARISON: 12 September 2014. FINDINGS: No acute fracture, dislocation or subluxation. IMPRESSION: Negative for acute abnormality. Xr Elbow Lt Min 3 V    Result Date: 12/27/2017  LEFT ELBOW 3 views. HISTORY: Left elbow pain following fall. TECHNIQUE: AP and lateral and oblique views. COMPARISON: None. FINDINGS: Anterior fat pad somewhat prominent. No posterior fat pad. No acute fracture appreciated. Radial head appears intact. Surgical changes in the superficial soft tissues anteriorly. IMPRESSION: Negative for acute fracture. Xr Hand Lt Min 3 V    Result Date: 12/27/2017  LEFT HAND 3 views. HISTORY: Left hand pain following fall. TECHNIQUE: AP and lateral and oblique views. COMPARISON: None. FINDINGS: Diffuse osteopenia.  Osteoarthritis with flexion deformity at the distal interphalangeal joint of the little finger. No acute fractures. IMPRESSION: Negative for acute fracture. Ct Head Wo Cont    Result Date: 12/27/2017  Examination: CT scan of the brain without contrast. History: falls, 68 years Female S/p fall, inability to get up and down Technique: 5 mm axial imaging of the brain from the posterior fossa to the vertex. Radiation dose reduction techniques were used for this study:  Our CT scanners use one or all of the following: Automated exposure control, adjustment of the mA and/or kVp according to patient's size, iterative reconstruction. Comparison:  CT brain May 16, 2016 Findings: The brain parenchyma appears within normal limits for age. The ventricles, sulci are age-appropriate. No intracranial hemorrhage or extra-axial collection is identified. No evidence of acute infarct. No mass effect or midline shift is present. Basal cisterns are intact. The visualized paranasal sinuses and mastoid air cells are clear. The orbits, bones, and soft tissues are normal in appearance. IMPRESSION:  Normal unenhanced CT of the brain for age. No acute intracranial abnormality. Assessment and Plan:     Hospital Problems as of 12/27/2017  Date Reviewed: 12/27/2017          Codes Class Noted - Resolved POA    UTI (urinary tract infection) ICD-10-CM: N39.0  ICD-9-CM: 599.0  12/27/2017 - Present Unknown        Encephalopathy acute ICD-10-CM: G93.40  ICD-9-CM: 348.30  12/27/2017 - Present Unknown             UTI: Ceftriaxone 1 mg daily. Gentle hydration due to LE chronic swelling 50 cc/hr. ANTONIO on CKD/ESRD: Cr 2.9 (above baseline 2.2 in 10/2017). Monitor on IVF. She is a ESRD pt and had left arm fistula prepared but has not had HD yet. Deconditioning: Will likely need rehab on discharge. CM consulted. PPD ordered. Encephalopathy: mild confusion. Due to UTI. Hx of CAD s/p CABG: Cont home medications. DJD    Essential HTN: Cont home medications.      HLD: Cont home statin. Hypothyroidism: Cont home synthroid. Neuropathy, Parkinsonism: Cont home Lyrica and Sinemet. DVT ppx:  SQH  Anticipated DC needs:  Rehab  Code status:  Full  Estimated LOS:  Greater than 2 midnights  Risk:  high    Signed:   Patricia Ray MD

## 2017-12-28 LAB
ANION GAP SERPL CALC-SCNC: 10 MMOL/L (ref 7–16)
BUN SERPL-MCNC: 69 MG/DL (ref 8–23)
CALCIUM SERPL-MCNC: 8.5 MG/DL (ref 8.3–10.4)
CHLORIDE SERPL-SCNC: 114 MMOL/L (ref 98–107)
CO2 SERPL-SCNC: 21 MMOL/L (ref 21–32)
CREAT SERPL-MCNC: 2.72 MG/DL (ref 0.6–1)
ERYTHROCYTE [DISTWIDTH] IN BLOOD BY AUTOMATED COUNT: 14.6 % (ref 11.9–14.6)
GLUCOSE BLD STRIP.AUTO-MCNC: 108 MG/DL (ref 65–100)
GLUCOSE BLD STRIP.AUTO-MCNC: 119 MG/DL (ref 65–100)
GLUCOSE BLD STRIP.AUTO-MCNC: 208 MG/DL (ref 65–100)
GLUCOSE BLD STRIP.AUTO-MCNC: 212 MG/DL (ref 65–100)
GLUCOSE SERPL-MCNC: 127 MG/DL (ref 65–100)
HCT VFR BLD AUTO: 26.9 % (ref 35.8–46.3)
HGB BLD-MCNC: 8.6 G/DL (ref 11.7–15.4)
MCH RBC QN AUTO: 29.8 PG (ref 26.1–32.9)
MCHC RBC AUTO-ENTMCNC: 32 G/DL (ref 31.4–35)
MCV RBC AUTO: 93.1 FL (ref 79.6–97.8)
MM INDURATION POC: 0 MM (ref 0–5)
PLATELET # BLD AUTO: 231 K/UL (ref 150–450)
PMV BLD AUTO: 12.2 FL (ref 10.8–14.1)
POTASSIUM SERPL-SCNC: 4.8 MMOL/L (ref 3.5–5.1)
PPD POC: NEGATIVE NEGATIVE
RBC # BLD AUTO: 2.89 M/UL (ref 4.05–5.25)
SODIUM SERPL-SCNC: 145 MMOL/L (ref 136–145)
WBC # BLD AUTO: 8.3 K/UL (ref 4.3–11.1)

## 2017-12-28 PROCEDURE — 74011000250 HC RX REV CODE- 250: Performed by: INTERNAL MEDICINE

## 2017-12-28 PROCEDURE — 85027 COMPLETE CBC AUTOMATED: CPT | Performed by: INTERNAL MEDICINE

## 2017-12-28 PROCEDURE — 80048 BASIC METABOLIC PNL TOTAL CA: CPT | Performed by: INTERNAL MEDICINE

## 2017-12-28 PROCEDURE — 65270000029 HC RM PRIVATE

## 2017-12-28 PROCEDURE — 82962 GLUCOSE BLOOD TEST: CPT

## 2017-12-28 PROCEDURE — 97162 PT EVAL MOD COMPLEX 30 MIN: CPT

## 2017-12-28 PROCEDURE — 94760 N-INVAS EAR/PLS OXIMETRY 1: CPT

## 2017-12-28 PROCEDURE — 74011250636 HC RX REV CODE- 250/636: Performed by: INTERNAL MEDICINE

## 2017-12-28 PROCEDURE — 36415 COLL VENOUS BLD VENIPUNCTURE: CPT | Performed by: INTERNAL MEDICINE

## 2017-12-28 PROCEDURE — 74011250637 HC RX REV CODE- 250/637: Performed by: INTERNAL MEDICINE

## 2017-12-28 PROCEDURE — 74011636637 HC RX REV CODE- 636/637: Performed by: INTERNAL MEDICINE

## 2017-12-28 PROCEDURE — 97530 THERAPEUTIC ACTIVITIES: CPT

## 2017-12-28 RX ORDER — INSULIN LISPRO 100 [IU]/ML
INJECTION, SOLUTION INTRAVENOUS; SUBCUTANEOUS
Status: DISCONTINUED | OUTPATIENT
Start: 2017-12-28 | End: 2018-01-03 | Stop reason: HOSPADM

## 2017-12-28 RX ADMIN — AMLODIPINE BESYLATE 5 MG: 5 TABLET ORAL at 08:31

## 2017-12-28 RX ADMIN — CARVEDILOL 6.25 MG: 6.25 TABLET, FILM COATED ORAL at 17:15

## 2017-12-28 RX ADMIN — SODIUM CHLORIDE 50 ML/HR: 900 INJECTION, SOLUTION INTRAVENOUS at 23:10

## 2017-12-28 RX ADMIN — HEPARIN SODIUM 5000 UNITS: 5000 INJECTION, SOLUTION INTRAVENOUS; SUBCUTANEOUS at 21:59

## 2017-12-28 RX ADMIN — INSULIN LISPRO 4 UNITS: 100 INJECTION, SOLUTION INTRAVENOUS; SUBCUTANEOUS at 17:18

## 2017-12-28 RX ADMIN — BENAZEPRIL HYDROCHLORIDE 20 MG: 10 TABLET, FILM COATED ORAL at 08:31

## 2017-12-28 RX ADMIN — ATORVASTATIN CALCIUM 40 MG: 40 TABLET, FILM COATED ORAL at 08:31

## 2017-12-28 RX ADMIN — CEFTRIAXONE SODIUM 1 G: 1 INJECTION, POWDER, FOR SOLUTION INTRAMUSCULAR; INTRAVENOUS at 08:30

## 2017-12-28 RX ADMIN — CARBIDOPA AND LEVODOPA 1 TABLET: 10; 100 TABLET ORAL at 08:31

## 2017-12-28 RX ADMIN — CARBIDOPA AND LEVODOPA 1 TABLET: 10; 100 TABLET ORAL at 17:15

## 2017-12-28 RX ADMIN — BACLOFEN 10 MG: 10 TABLET ORAL at 17:17

## 2017-12-28 RX ADMIN — HEPARIN SODIUM 5000 UNITS: 5000 INJECTION, SOLUTION INTRAVENOUS; SUBCUTANEOUS at 06:11

## 2017-12-28 RX ADMIN — BACLOFEN 10 MG: 10 TABLET ORAL at 12:04

## 2017-12-28 RX ADMIN — Medication 10 ML: at 06:11

## 2017-12-28 RX ADMIN — CARBIDOPA AND LEVODOPA 1 TABLET: 10; 100 TABLET ORAL at 21:59

## 2017-12-28 RX ADMIN — Medication 10 ML: at 22:07

## 2017-12-28 RX ADMIN — INSULIN HUMAN 12 UNITS: 100 INJECTION, SOLUTION PARENTERAL at 17:18

## 2017-12-28 RX ADMIN — CARVEDILOL 6.25 MG: 6.25 TABLET, FILM COATED ORAL at 08:31

## 2017-12-28 RX ADMIN — HEPARIN SODIUM 5000 UNITS: 5000 INJECTION, SOLUTION INTRAVENOUS; SUBCUTANEOUS at 14:49

## 2017-12-28 RX ADMIN — ASPIRIN 325 MG ORAL TABLET 325 MG: 325 PILL ORAL at 08:31

## 2017-12-28 RX ADMIN — PREGABALIN 75 MG: 75 CAPSULE ORAL at 08:31

## 2017-12-28 RX ADMIN — INSULIN LISPRO 4 UNITS: 100 INJECTION, SOLUTION INTRAVENOUS; SUBCUTANEOUS at 12:01

## 2017-12-28 RX ADMIN — INSULIN HUMAN 28 UNITS: 100 INJECTION, SUSPENSION SUBCUTANEOUS at 17:19

## 2017-12-28 RX ADMIN — BACLOFEN 10 MG: 10 TABLET ORAL at 08:31

## 2017-12-28 RX ADMIN — LEVOTHYROXINE SODIUM 25 MCG: 50 TABLET ORAL at 06:11

## 2017-12-28 RX ADMIN — BACLOFEN 10 MG: 10 TABLET ORAL at 21:59

## 2017-12-28 RX ADMIN — CARBIDOPA AND LEVODOPA 1 TABLET: 10; 100 TABLET ORAL at 01:21

## 2017-12-28 RX ADMIN — INSULIN HUMAN 35 UNITS: 100 INJECTION, SUSPENSION SUBCUTANEOUS at 06:10

## 2017-12-28 NOTE — ED NOTES
TRANSFER - OUT REPORT:    Verbal report given to WILL Loza (name) on Priscilla Seals  being transferred to   (unit) for routine progression of care       Report consisted of patients Situation, Background, Assessment and   Recommendations(SBAR). Information from the following report(s) SBAR was reviewed with the receiving nurse. Lines:   Peripheral IV 12/27/17 Right Forearm (Active)   Site Assessment Clean, dry, & intact 12/27/2017  3:40 PM   Phlebitis Assessment 0 12/27/2017  3:40 PM   Infiltration Assessment 0 12/27/2017  3:40 PM   Dressing Status Clean, dry, & intact 12/27/2017  3:40 PM        Opportunity for questions and clarification was provided.       Patient transported with:   [employer]

## 2017-12-28 NOTE — PROGRESS NOTES
Care Management Interventions  PCP Verified by CM: Yes  Physical Therapy Consult: Yes  Occupational Therapy Consult: Yes  Current Support Network: Lives with Spouse  Confirm Follow Up Transport: Family  Plan discussed with Pt/Family/Caregiver: Yes  Freedom of Choice Offered: Yes  Discharge Location  Discharge Placement: Unable to determine at this time   Pt is alert and oriented in all spheres. Spouse at bedside. Pt uses a walker to ambulate. Spouse assists with ADLs. No home 02. Pt requested that SW order a wc. Faxed order to [facility]. PT/OT consulted. SW following.

## 2017-12-28 NOTE — PROGRESS NOTES
Iv site leaking  Site is pink  22g removed intact   Unable to restart after 2 attempts by myself and one attempt by Yg Aguilera RN  Vascular access team asked to assist

## 2017-12-28 NOTE — PROGRESS NOTES
Alert  Denies pain  Iv is out  3 attempts have been made by RNs  Vascular access team has been called to assist

## 2017-12-28 NOTE — WOUND CARE
Patient seen for multiple ulcers to bilateral lower legs. Some clear blisters, other dry scabs. Xeroform applied over ulcers. Edema present and noted PAD diagnosis. Wound benefit from light compression but patient stated she can not tolerate this, has been treated with compression in the past. Is willing to try ACE over dressings. Ordered ACE, dressing applied and ACE will be placed when it arrives (by primary nurse, she is aware). Dr Aurea Urbina in room and also aware of treatment plan. Keep legs elevated while in the chair. Wound team will monitor.

## 2017-12-28 NOTE — CONSULTS
MD Rose,   Medical Director  [address] [place] [address], [address] [facility]  Tel: [phone]     Physical Medicine & Rehabilitation Note-consult    Patient: Donnie Lofton MRN: [id number]  SSN: [id number]    YOB: 1940  Age: 68 y.o. Sex: female      Admit Date: 12/27/2017  Admitting Physician: Herbert Fisher MD    Medical Decision Making/Plan/Recommend: Functional deficit, mobility, gait impairment. Patient encephalopathic, noted to be much more confused today than yesterday. Unable to participate in rehab sessions due to confusion, acute illness. Continue acute PT, OT as tolerated, as able to participate. SLT evaluation when able. Will follow progress to determine care plan. Thank you for the opportunity to participate in the care of this patient. Chief Complaint : Gait dysfunction secondary to below. Admit Diagnosis: UTI (urinary tract infection); Encephalopathy acute  Acute metabolic encephalopathy:  UTI  DM2:  CAD/HTN  Parkinsonism:  Neuropathy  BLE edema with chronic ulcers  Wound care following  weakness  CKD (chronic kidney disease) stage 3, GFR 30-59 ml/min (8/10/2016)  Pain  DVT risk  Acute Rehab Dx:  Weakness  confusion  Sensory deficit   Gait dysfunction  Debility    deconditioning  Mobility and ambulation deficits  Self Care/ADL deficits    Medical Dx:  Past Medical History:   Diagnosis Date    Adverse effect of anesthesia     difficult awakening    AF (atrial fibrillation) ([facility] [address] 11/20/2011    Arthritis 11/18/2011    generalized     CAD (coronary artery disease) 2011    CABG x 4    Cervical disc disease     Steroid injections    Chronic kidney disease     Chronic pain     back    DJD (degenerative joint disease)     Drainage from wound 10/3/2014    Full dentures     Gout     managed with medication     Hemorrhoid 11/5/2013    [place] (hard of hearing)     Hyperlipemia 11/10/2011    managed with medication     Hypertension     controlled with meds    Hypertriglyceridemia     managed with medication     Hypothyroidism     managed with medication     Neuropathy     legs and arms, managed with medication     Obesity (BMI 30-39.9) 10/2/14    BMI 36.3    PAD (peripheral artery disease) (HCC)     PCI (pneumatosis cystoides intestinalis) 11/5/2013    Pleural effusion, bilateral 11/21/2011    Postoperative anemia due to acute blood loss 11/21/2011    expected     Rib cage fracture 11/5/2013    x 2     S/P CABG (coronary artery bypass graft) 11/05/2013    CABG x 4    Status post-operative repair of hip fracture 09/15/2014    left side, repair with hardware    Stroke [facility])     left sided weakness residual     Type II or unspecified type diabetes mellitus without mention of complication, uncontrolled ([facility] [address] 12/16/2013    oral and insulin reliant/ Avg / low BS s/s/ @ 70/ last A1c 8.3    Unspecified adverse effect of anesthesia     difficult to arouse after general anesthesia     Subjective:     Date of Evaluation:  December 28, 2017    HPI: Mary Cortes is a 68 y.o. female patient at [address] [place] [address] who was admitted on 12/27/2017  by Theodore Rowe MD with below mentioned medical history ,is being seen for Physical Medicine and Rehabilitation consult. Mary Cortes is a medically complex patient with history of CAD,  CABG,   chronic kidney disease, hypertension, hyperlipidemia, history of hypothyroidism,  Parkinsonism. And ESRD with recent L AV fistula prepared but has not had HD. He presented with recent histpry of generalized weakness,recent multiple falls land pain to her left wrist pain. [facility] is diagnosed with a UTI, ANTONIO and encephalopathy and admitted. She is started on iv antibiotics and  IVF, and condition slowly improving clinically. Mary Cortes is seen and examined today. Medical Records reviewed. We are consulted to assist with rehab needs and placement.   At her baseline,  She is at [facility] level  to set up for bathing, toileting, requires minimal-moderate assistance dressing with ADLs. She ambulates with a RW, rollator. Patient has started to participate in therapies with acute PT, OT and ST. She is presently requiring minimum assist for transfers and ambulation using a walker. Today she is unable to participate in therapies due to confusion, change in mental status. Current Level of Function:   bed mobility - min A, transfers - min/mod A, decreased balance , ambulation  - 6' min A.       Prior Level of Function/Work/Activity:  Patient lives with her spouse in a single story residence with 4 steps to enter. At baseline, patient is SBA to set up with bathing/toileting, requires minimal-moderate assistance dressing with ADLs, ambulates with a RW vs. rollator, and endorses increasing weakness and falls x2-3 weeks. Family History   Problem Relation Age of Onset    Heart Disease Mother     Cancer Mother      colon    Diabetes Mother     Cancer Father      lung    Cancer Sister      breast    Breast Cancer Sister 28    Cancer Brother      Leukemia    Breast Cancer Maternal Aunt 54      Social History   Substance Use Topics    Smoking status: Never Smoker    Smokeless tobacco: Never Used    Alcohol use No     Past Surgical History:   Procedure Laterality Date    CABG, ARTERY-VEIN, FOUR  Oct. 2011    CLOSE CYSTOSTOMY      exploratory with removal of mass of infection    HX CATARACT REMOVAL Bilateral 11/2012    with IOL implants, bilateral    HX COLONOSCOPY      HX HIP FRACTURE TX Left     repair with hardware    HX HYSTERECTOMY      HX LAP CHOLECYSTECTOMY      VASCULAR SURGERY PROCEDURE UNLIST Left 06/07/2017    AVF creation    VASCULAR SURGERY PROCEDURE UNLIST Left 08/10/2017    AVF elevation      Prior to Admission medications    Medication Sig Start Date End Date Taking? Authorizing Provider   pregabalin (LYRICA) 75 mg capsule Take 75 mg by mouth three (3) times daily.  Pt states she also takes HS prn   Yes Historical Provider   baclofen (LIORESAL) 10 mg tablet Take 1 Tab by mouth four (4) times daily for 30 days. 12/1/17 12/31/17 Yes Sally Pace MD   carbidopa-levodopa (SINEMET)  mg per tablet Take 1 Tab by mouth three (3) times daily for 30 days. Indications: Parkinsonism 12/1/17 12/31/17 Yes Sally Pace MD   levothyroxine (SYNTHROID) 25 mcg tablet Take 1 Tab by mouth Daily (before breakfast). 11/15/17  Yes Hernán Martin MD   insulin NPH/insulin regular (HUMULIN 70/30) 100 unit/mL (70-30) injection inject 50 units subcutaneously every morning then 40 units every evening BEFORE MEALS 8/8/17  Yes Hernán Martin MD   amLODIPine (NORVASC) 5 mg tablet Take 5 mg by mouth every morning. Yes Historical Provider   benazepril (LOTENSIN) 20 mg tablet Take 20 mg by mouth every morning. Yes Historical Provider   furosemide (LASIX) 40 mg tablet Take 40 mg by mouth every morning. Yes Historical Provider   febuxostat (ULORIC) 80 mg tab tablet Take 1 Tab by mouth daily. Patient taking differently: Take 80 mg by mouth every morning. 5/15/17  Yes Hernán Martin MD   carvedilol (COREG) 6.25 mg tablet take 1 tablet by mouth twice a day 5/1/17  Yes Historical Provider   linagliptin (TRADJENTA) 5 mg tablet Take 5 mg by mouth every morning. Yes Historical Provider   atorvastatin (LIPITOR) 40 mg tablet Take 40 mg by mouth every morning. 3/14/17  Yes Historical Provider   triamcinolone acetonide (KENALOG) 0.1 % topical cream  6/15/16  Yes Historical Provider   nitroglycerin (NITROLINGUAL) 400 mcg/spray spray 1 Spray by SubLINGual route every five (5) minutes as needed (Pt states \"I've never had to use it\"). 4/11/16  Yes Hernán Martin MD   aspirin (ASPIRIN) 325 mg tablet Take 325 mg by mouth every morning.  Indications: Myocardial Reinfarction Prevention   Yes Historical Provider     Allergies   Allergen Reactions    Lipitor [Atorvastatin] Myalgia Review of Systems: Denies chest pain, shortness of breath, cough, headache, visual problems, abdominal pain, dysurea, calf pain. Pertinent positives are as noted in the medical records and unremarkable otherwise. Objective:     Vitals:  Blood pressure 111/87, pulse 90, temperature 98.3 °F (36.8 °C), resp. rate 17, height 5' 10\" (1.778 m), weight 259 lb (117.5 kg), SpO2 (!) 86 %, not currently breastfeeding. Temp (24hrs), Av.3 °F (36.8 °C), Min:97.8 °F (36.6 °C), Max:98.8 °F (37.1 °C)    BMI (calculated): 37.2 (17 0341)   Intake and Output:   07 -  1900  In: 1897.5 [P.O.:540; I.V.:1357.5]  Out: 500 [Urine:500]    Physical Exam:   General: Disoriented, mumbles phrases. No acute cardio respiratory distress. HEENT: Normocephalic,no scleral icterus  Oral mucosa moist without cyanosis, No bruit, No JVD. Lungs: Clear to auscultation  bilaterally. Respiration even and unlabored   Heart: Regular rate and rhythm, S1, S2   No  murmurs, clicks, rub or gallops   Abdomen: Soft, non-tender, nondistended. Bowel sounds present. No organomegaly. Genitourinary: Benign . Neuromuscular:      PERRL, EOMI  Confused. Unable to follow simple commands consistently. Poor focus, attention. Moves all extremities well against gravity. Sensory - unable  Plantars - down going  DTR Right  B 2, T 2  Knee 2, Ach 1           Left    B 2, T 2 , Knee 2, Ach 1  Tone normal grossly. No atrophy, no fasciculations, no tremors. Skin/extremity: No rashes, no erythema. Calf non tender BLE.                                                                                         Labs/Studies:  Recent Results (from the past 72 hour(s))   CBC WITH AUTOMATED DIFF    Collection Time: 17  1:07 PM   Result Value Ref Range    WBC 9.7 4.3 - 11.1 K/uL    RBC 3.20 (L) 4.05 - 5.25 M/uL    HGB 9.7 (L) 11.7 - 15.4 g/dL    HCT 29.6 (L) 35.8 - 46.3 %    MCV 92.5 79.6 - 97.8 FL    MCH 30.3 26.1 - 32.9 PG    MCHC 32.8 31.4 - 35.0 g/dL    RDW 14.6 11.9 - 14.6 %    PLATELET 698 349 - 466 K/uL    MPV 11.8 10.8 - 14.1 FL    DF AUTOMATED      NEUTROPHILS 75 43 - 78 %    LYMPHOCYTES 14 13 - 44 %    MONOCYTES 6 4.0 - 12.0 %    EOSINOPHILS 3 0.5 - 7.8 %    BASOPHILS 1 0.0 - 2.0 %    IMMATURE GRANULOCYTES 1 0.0 - 5.0 %    ABS. NEUTROPHILS 7.3 1.7 - 8.2 K/UL    ABS. LYMPHOCYTES 1.4 0.5 - 4.6 K/UL    ABS. MONOCYTES 0.6 0.1 - 1.3 K/UL    ABS. EOSINOPHILS 0.3 0.0 - 0.8 K/UL    ABS. BASOPHILS 0.1 0.0 - 0.2 K/UL    ABS. IMM. GRANS. 0.1 0.0 - 0.5 K/UL   METABOLIC PANEL, COMPREHENSIVE    Collection Time: 12/27/17  1:07 PM   Result Value Ref Range    Sodium 144 136 - 145 mmol/L    Potassium 4.5 3.5 - 5.1 mmol/L    Chloride 112 (H) 98 - 107 mmol/L    CO2 22 21 - 32 mmol/L    Anion gap 10 7 - 16 mmol/L    Glucose 70 65 - 100 mg/dL    BUN 77 (H) 8 - 23 MG/DL    Creatinine 2.98 (H) 0.6 - 1.0 MG/DL    GFR est AA 20 (L) >60 ml/min/1.73m2    GFR est non-AA 16 (L) >60 ml/min/1.73m2    Calcium 9.0 8.3 - 10.4 MG/DL    Bilirubin, total 0.5 0.2 - 1.1 MG/DL    ALT (SGPT) 10 (L) 12 - 65 U/L    AST (SGOT) 4 (L) 15 - 37 U/L    Alk.  phosphatase 118 50 - 136 U/L    Protein, total 7.8 6.3 - 8.2 g/dL    Albumin 3.1 (L) 3.2 - 4.6 g/dL    Globulin 4.7 (H) 2.3 - 3.5 g/dL    A-G Ratio 0.7 (L) 1.2 - 3.5     URIC ACID    Collection Time: 12/27/17  1:07 PM   Result Value Ref Range    Uric acid 4.1 2.6 - 6.0 MG/DL   TROPONIN I    Collection Time: 12/27/17  1:20 PM   Result Value Ref Range    Troponin-I, Qt. <0.02 (L) 0.02 - 0.05 NG/ML   GLUCOSE, POC    Collection Time: 12/27/17  3:08 PM   Result Value Ref Range    Glucose (POC) 55 (L) 65 - 100 mg/dL   GLUCOSE, POC    Collection Time: 12/27/17  3:22 PM   Result Value Ref Range    Glucose (POC) 55 (L) 65 - 100 mg/dL   EKG, 12 LEAD, INITIAL    Collection Time: 12/27/17  3:33 PM   Result Value Ref Range    Ventricular Rate 65 BPM    Atrial Rate 258 BPM    P-R Interval 172 ms    QRS Duration 88 ms    Q-T Interval 402 ms    QTC Calculation (Bezet) 418 ms    Calculated P Axis 17 degrees    Calculated R Axis 35 degrees    Calculated T Axis 46 degrees    Diagnosis       Normal sinus rhythm  Nonspecific ST abnormality  Abnormal ECG  When compared with ECG of 27-DEC-2017 15:34,    Confirmed by Kvng Shirley MD (), WILMAN BAKER ([id number]) on 12/27/2017 8:35:35 PM     GLUCOSE, POC    Collection Time: 12/27/17  3:46 PM   Result Value Ref Range    Glucose (POC) 55 (L) 65 - 100 mg/dL   URINE MICROSCOPIC    Collection Time: 12/27/17  4:29 PM   Result Value Ref Range    WBC  0 /hpf    RBC 3-5 0 /hpf    Epithelial cells 0-3 0 /hpf    Bacteria 4+ (H) 0 /hpf    Casts 0-3 0 /lpf   GLUCOSE, POC    Collection Time: 12/27/17  5:19 PM   Result Value Ref Range    Glucose (POC) 119 (H) 65 - 100 mg/dL   GLUCOSE, POC    Collection Time: 12/27/17  8:24 PM   Result Value Ref Range    Glucose (POC) 113 (H) 65 - 100 mg/dL   GLUCOSE, POC    Collection Time: 12/28/17  5:20 AM   Result Value Ref Range    Glucose (POC) 108 (H) 65 - 080 mg/dL   METABOLIC PANEL, BASIC    Collection Time: 12/28/17  7:51 AM   Result Value Ref Range    Sodium 145 136 - 145 mmol/L    Potassium 4.8 3.5 - 5.1 mmol/L    Chloride 114 (H) 98 - 107 mmol/L    CO2 21 21 - 32 mmol/L    Anion gap 10 7 - 16 mmol/L    Glucose 127 (H) 65 - 100 mg/dL    BUN 69 (H) 8 - 23 MG/DL    Creatinine 2.72 (H) 0.6 - 1.0 MG/DL    GFR est AA 22 (L) >60 ml/min/1.73m2    GFR est non-AA 18 (L) >60 ml/min/1.73m2    Calcium 8.5 8.3 - 10.4 MG/DL   CBC W/O DIFF    Collection Time: 12/28/17  7:51 AM   Result Value Ref Range    WBC 8.3 4.3 - 11.1 K/uL    RBC 2.89 (L) 4.05 - 5.25 M/uL    HGB 8.6 (L) 11.7 - 15.4 g/dL    HCT 26.9 (L) 35.8 - 46.3 %    MCV 93.1 79.6 - 97.8 FL    MCH 29.8 26.1 - 32.9 PG    MCHC 32.0 31.4 - 35.0 g/dL    RDW 14.6 11.9 - 14.6 %    PLATELET 189 120 - 065 K/uL    MPV 12.2 10.8 - 14.1 FL   GLUCOSE, POC    Collection Time: 12/28/17 11:48 AM   Result Value Ref Range    Glucose (POC) 208 (H) 65 - 100 mg/dL GLUCOSE, POC    Collection Time: 12/28/17  3:36 PM   Result Value Ref Range    Glucose (POC) 212 (H) 65 - 100 mg/dL   GLUCOSE, POC    Collection Time: 12/28/17  9:16 PM   Result Value Ref Range    Glucose (POC) 119 (H) 65 - 100 mg/dL       Functional Assessment:  Reviewed participation and progress in therapies  Gross Assessment  AROM: Generally decreased, functional (Proximal B UE; and B LE) (12/28/17 1053)  Strength: Generally decreased, functional (L UE and B LE) (12/28/17 1053)  Coordination: Generally decreased, functional (B LE) (12/28/17 1053)  Sensation: Impaired (Absent deep pressure and light touch distal B LE) (12/28/17 1053)   Bed Mobility  Scooting: Contact guard assistance (12/28/17 1053)   Balance  Sitting: Intact (12/28/17 1053)  Standing: Impaired (12/28/17 1053)  Standing - Static: Fair (12/28/17 1053)  Standing - Dynamic : Poor (12/28/17 1053)               Bed/Mat Mobility  Sit to Stand: Minimum assistance; Moderate assistance (12/28/17 1053)  Scooting: Contact guard assistance (12/28/17 1053)     Ambulation:  Gait  Base of Support: Widened;Center of gravity altered (12/28/17 1053)  Speed/Viry: Shuffled; Slow (12/28/17 1053)  Step Length: Right shortened;Left shortened (12/28/17 1053)  Gait Abnormalities: Decreased step clearance;Shuffling gait; Step to gait;Trunk sway increased (12/28/17 1053)  Ambulation - Level of Assistance: Minimal assistance (12/28/17 1053)  Distance (ft): 6 Feet (ft) (12/28/17 1053)  Assistive Device: Walker, rolling (12/28/17 1053)  Interventions: Safety awareness training; Tactile cues; Verbal cues (12/28/17 1053)    Impression/Plan:     Principal Problem:    Encephalopathy acute (12/27/2017)    Active Problems:    DM (diabetes mellitus) type II uncontrolled, periph vascular disorder ([facility] [address] (11/10/2011)      CKD (chronic kidney disease) stage 3, GFR 30-59 ml/min (8/10/2016)      UTI (urinary tract infection) (12/27/2017)        Current Facility-Administered Medications Medication Dose Route Frequency Provider Last Rate Last Dose    insulin NPH (NOVOLIN N, HUMULIN N) injection 35 Units  35 Units SubCUTAneous ACB Kate Bryan MD   35 Units at 12/28/17 0610    And    insulin regular (NOVOLIN R, HUMULIN R) injection 15 Units  15 Units SubCUTAneous ACB Kate Cortes MD        insulin NPH (NOVOLIN N, HUMULIN N) injection 28 Units  28 Units SubCUTAneous PETAR Cortes MD   28 Units at 12/28/17 1719    And    insulin regular (NOVOLIN R, HUMULIN R) injection 12 Units  12 Units SubCUTAneous ACD Kate Bryan MD   12 Units at 12/28/17 1718    insulin lispro (HUMALOG) injection   SubCUTAneous AC&HS Rommie Kussmaul, MD   4 Units at 12/28/17 1718    amLODIPine (NORVASC) tablet 5 mg  5 mg Oral DAILY Kate Cortes MD   5 mg at 12/28/17 0831    aspirin (ASPIRIN) tablet 325 mg  325 mg Oral DAILY Kate Cortes MD   325 mg at 12/28/17 0831    atorvastatin (LIPITOR) tablet 40 mg  40 mg Oral DAILY Kate Cortes MD   40 mg at 12/28/17 0831    baclofen (LIORESAL) tablet 10 mg  10 mg Oral QID Kate Cortes MD   10 mg at 12/28/17 1717    benazepril (LOTENSIN) tablet 20 mg  20 mg Oral DAILY Kate Cortes MD   20 mg at 12/28/17 0831    carbidopa-levodopa (SINEMET)  mg per tablet 1 Tab  1 Tab Oral TID Kate Bryan MD   1 Tab at 12/28/17 1715    carvedilol (COREG) tablet 6.25 mg  6.25 mg Oral BID WITH MEALS Kate Cortes MD   6.25 mg at 12/28/17 1715    levothyroxine (SYNTHROID) tablet 25 mcg  25 mcg Oral ACB Kate Cortes MD   25 mcg at 12/28/17 0611    pregabalin (LYRICA) capsule 75 mg  75 mg Oral DAILY Kate Cortes MD   75 mg at 12/28/17 0831    traMADol TM24 300 mg (Patient Supplied)  300 mg Oral DAILY Kate Cortes MD        sodium chloride (NS) flush 5-10 mL  5-10 mL IntraVENous Eduardo Castro MD   Stopped at 12/28/17 1400    sodium chloride (NS) flush 5-10 mL  5-10 mL IntraVENous PRN Idalmis Ceja MD        tuberculin injection 5 Units  5 Units IntraDERMal ONCE Kate Cortes MD   5 Units at 12/27/17 2325    acetaminophen (TYLENOL) tablet 650 mg  650 mg Oral Q4H PRN Kate Cortes MD        HYDROcodone-acetaminophen (NORCO) 5-325 mg per tablet 1 Tab  1 Tab Oral Q4H PRN Kate Cortes MD   1 Tab at 12/27/17 2110    ondansetron (ZOFRAN) injection 4 mg  4 mg IntraVENous Q4H PRN Kate Cortes MD        cefTRIAXone (ROCEPHIN) 1 g in sterile water (preservative free) 10 mL IV syringe  1 g IntraVENous DAILY Kate Carlson MD   1 g at 12/28/17 0830    0.9% sodium chloride infusion  50 mL/hr IntraVENous CONTINUOUS Kate Cortes MD 50 mL/hr at 12/27/17 1906 50 mL/hr at 12/27/17 1906    heparin (porcine) injection 5,000 Units  5,000 Units SubCUTAneous Melva Paiz MD   5,000 Units at 12/28/17 1449        Recommendations: at this time not a candidate for IRU admission. Continue Acute Rehab Program.  Coordination of rehab/medical care. Counseling of Physical Medicine & Rehab care issues management. Rehabilitation Management: 1. Devices: Adaptive equipments, Walkers, Type: Rolling Walker  2. Consult:Rehab team including PT, OT, SLT  3. Disposition Rehab-discussed with patient/  4. Plexipulse when in bed  5. Thigh-high or knee-high TORY's when out of bed  6. DVT Prophylaxis per primary     Medical Management: Per primary  1. Vital signs per routine  2. Incentive spirometer Q1H while awake  3. Potential for neurogenic bladder. -Check post-void residual every shift; In and Out catheterize if post-void residual is morethan 400ml  4. Potential for neurogenic bowel - adequate bowel program recommmended. keep stool soft. Yani colace bid, suppositories prn.   4. Activity: as tolerated; fall precautions. Follow up with Dr Joanna Garcia  2 weeks after discharge from rehab.    Thank you for the opportunity to participate in the care of this patient.   Signed By: Ayala Desir MD     December 28, 2017

## 2017-12-28 NOTE — PROGRESS NOTES
Alert  No acute distress  Says feels better  1plus lower extremity edema  Left arm access with thrill and bruit

## 2017-12-28 NOTE — PROGRESS NOTES
Hospitalist Progress Note    2017  Admit Date: 2017  1:27 PM   NAME: Karene Rubinstein   :     MRN:  [id number]   Attending: Gali Lea MD  PCP:  Modesta Jones MD    SUBJECTIVE:     Karene Rubinstein is a 10TUC with CAD s/p CABG, CKD (has fistula that has not been used yet), HTN, HLD who was admitted on  with confusion, found to have a UTI. Started on Rocephin and gentle IVF in the ED.    : sitting in chair. Reports that she feels much better, \"less groggy\" than on admission. No n/v, abd pain. Tolerating diet well. No CP or SOB. Review of Systems negative with exception of pertinent positives noted above      PHYSICAL EXAM       Visit Vitals    /77 (BP 1 Location: Right arm, BP Patient Position: At rest)    Pulse 71    Temp 98.5 °F (36.9 °C)    Resp 19    Ht 5' 10\" (1.778 m)    Wt 117.5 kg (259 lb)    SpO2 96%    Breastfeeding No    BMI 37.16 kg/m2      Temp (24hrs), Av.3 °F (36.8 °C), Min:97.8 °F (36.6 °C), Max:98.8 °F (37.1 °C)    Oxygen Therapy  O2 Sat (%): 96 % (17 0739)  Pulse via Oximetry: 59 beats per minute (17 1430)  O2 Device: Room air (17 2323)    Intake/Output Summary (Last 24 hours) at 17 0750  Last data filed at 17 0615   Gross per 24 hour   Intake            557.5 ml   Output              300 ml   Net            257.5 ml        General: No acute distress. Sitting in chair  Head:  Atraumatic Normocephalic. Lungs:  CTA Bilaterally. Normal resp effort on RA  CVS:  RRR, BLE wrapped with 1+ edema  Abdomen: Soft, NTTP  MSK:  Spontaneously moves extremities. Neurologic:  AOx3.  No focal deficits      Recent Results (from the past 24 hour(s))   CBC WITH AUTOMATED DIFF    Collection Time: 17  1:07 PM   Result Value Ref Range    WBC 9.7 4.3 - 11.1 K/uL    RBC 3.20 (L) 4.05 - 5.25 M/uL    HGB 9.7 (L) 11.7 - 15.4 g/dL    HCT 29.6 (L) 35.8 - 46.3 %    MCV 92.5 79.6 - 97.8 FL    MCH 30.3 26.1 - 32.9 PG    MCHC 32.8 31.4 - 35.0 g/dL    RDW 14.6 11.9 - 14.6 %    PLATELET 693 052 - 656 K/uL    MPV 11.8 10.8 - 14.1 FL    DF AUTOMATED      NEUTROPHILS 75 43 - 78 %    LYMPHOCYTES 14 13 - 44 %    MONOCYTES 6 4.0 - 12.0 %    EOSINOPHILS 3 0.5 - 7.8 %    BASOPHILS 1 0.0 - 2.0 %    IMMATURE GRANULOCYTES 1 0.0 - 5.0 %    ABS. NEUTROPHILS 7.3 1.7 - 8.2 K/UL    ABS. LYMPHOCYTES 1.4 0.5 - 4.6 K/UL    ABS. MONOCYTES 0.6 0.1 - 1.3 K/UL    ABS. EOSINOPHILS 0.3 0.0 - 0.8 K/UL    ABS. BASOPHILS 0.1 0.0 - 0.2 K/UL    ABS. IMM. GRANS. 0.1 0.0 - 0.5 K/UL   METABOLIC PANEL, COMPREHENSIVE    Collection Time: 12/27/17  1:07 PM   Result Value Ref Range    Sodium 144 136 - 145 mmol/L    Potassium 4.5 3.5 - 5.1 mmol/L    Chloride 112 (H) 98 - 107 mmol/L    CO2 22 21 - 32 mmol/L    Anion gap 10 7 - 16 mmol/L    Glucose 70 65 - 100 mg/dL    BUN 77 (H) 8 - 23 MG/DL    Creatinine 2.98 (H) 0.6 - 1.0 MG/DL    GFR est AA 20 (L) >60 ml/min/1.73m2    GFR est non-AA 16 (L) >60 ml/min/1.73m2    Calcium 9.0 8.3 - 10.4 MG/DL    Bilirubin, total 0.5 0.2 - 1.1 MG/DL    ALT (SGPT) 10 (L) 12 - 65 U/L    AST (SGOT) 4 (L) 15 - 37 U/L    Alk.  phosphatase 118 50 - 136 U/L    Protein, total 7.8 6.3 - 8.2 g/dL    Albumin 3.1 (L) 3.2 - 4.6 g/dL    Globulin 4.7 (H) 2.3 - 3.5 g/dL    A-G Ratio 0.7 (L) 1.2 - 3.5     URIC ACID    Collection Time: 12/27/17  1:07 PM   Result Value Ref Range    Uric acid 4.1 2.6 - 6.0 MG/DL   TROPONIN I    Collection Time: 12/27/17  1:20 PM   Result Value Ref Range    Troponin-I, Qt. <0.02 (L) 0.02 - 0.05 NG/ML   GLUCOSE, POC    Collection Time: 12/27/17  3:08 PM   Result Value Ref Range    Glucose (POC) 55 (L) 65 - 100 mg/dL   GLUCOSE, POC    Collection Time: 12/27/17  3:22 PM   Result Value Ref Range    Glucose (POC) 55 (L) 65 - 100 mg/dL   EKG, 12 LEAD, INITIAL    Collection Time: 12/27/17  3:33 PM   Result Value Ref Range    Ventricular Rate 65 BPM    Atrial Rate 258 BPM    P-R Interval 172 ms    QRS Duration 88 ms    Q-T Interval 402 ms    QTC Calculation (Bezet) 418 ms    Calculated P Axis 17 degrees    Calculated R Axis 35 degrees    Calculated T Axis 46 degrees    Diagnosis       Normal sinus rhythm  Nonspecific ST abnormality  Abnormal ECG  When compared with ECG of 27-DEC-2017 15:34,    Confirmed by [facility] ELIZABETH PAEZ (), WILMAN BAKER ([id number]) on 12/27/2017 8:35:35 PM     GLUCOSE, POC    Collection Time: 12/27/17  3:46 PM   Result Value Ref Range    Glucose (POC) 55 (L) 65 - 100 mg/dL   URINE MICROSCOPIC    Collection Time: 12/27/17  4:29 PM   Result Value Ref Range    WBC  0 /hpf    RBC 3-5 0 /hpf    Epithelial cells 0-3 0 /hpf    Bacteria 4+ (H) 0 /hpf    Casts 0-3 0 /lpf   GLUCOSE, POC    Collection Time: 12/27/17  5:19 PM   Result Value Ref Range    Glucose (POC) 119 (H) 65 - 100 mg/dL   GLUCOSE, POC    Collection Time: 12/27/17  8:24 PM   Result Value Ref Range    Glucose (POC) 113 (H) 65 - 100 mg/dL   GLUCOSE, POC    Collection Time: 12/28/17  5:20 AM   Result Value Ref Range    Glucose (POC) 108 (H) 65 - 100 mg/dL         Imaging /Procedures /Studies   CT HEAD WO CONT   Final Result   IMPRESSION:  Normal unenhanced CT of the brain for age. No acute intracranial   abnormality. XR CHEST PA LAT   Final Result   IMPRESSION: Negative for acute abnormality. XR ELBOW LT MIN 3 V   Final Result   IMPRESSION: Negative for acute fracture. XR HAND LT MIN 3 V   Final Result   IMPRESSION: Negative for acute fracture.                   ASSESSMENT      Hospital Problems as of 12/28/2017  Date Reviewed: 12/27/2017          Codes Class Noted - Resolved POA    UTI (urinary tract infection) ICD-10-CM: N39.0  ICD-9-CM: 599.0  12/27/2017 - Present Unknown        * (Principal)Encephalopathy acute ICD-10-CM: G93.40  ICD-9-CM: 348.30  12/27/2017 - Present Unknown        CKD (chronic kidney disease) stage 3, GFR 30-59 ml/min ICD-10-CM: N18.3  ICD-9-CM: 585.3  8/10/2016 - Present Yes        DM (diabetes mellitus) type II uncontrolled, periph vascular disorder [facility]) (Chronic) ICD-10-CM: E11.51, E11.65  ICD-9-CM: 250.72, 443.81  11/10/2011 - Present Yes                  Plan:  - Acute metabolic encephalopathy:  Likely 2/2 UTI  Improving, continue to monitor closely    - UTI:  Started on rocephin based on previous cx  Urine cx not done on admission, ordered now, but may be low yield  Continue gentle IVF    - DM2:  Continue home insulin regimin  SSI ACHS    - CAD/HTN:   Continue ASA, coreg, norvasc, benazepril  Holding lasix given infection and increased Cr    - Parkinsonism:  Continue sinemet    - BLE edema with chronic ulcers:  Likely multifactorial from PVD, venous stasis and CKD  Wound care following    DVT Prophylaxis: Hep SQ  Dispo: PT/OT consulted, PPD placed in case needs STR at discharge    Fantasma Santiago MD

## 2017-12-28 NOTE — PROGRESS NOTES
Problem: Mobility Impaired (Adult and Pediatric)  Goal: *Acute Goals and Plan of Care (Insert Text)  STG:  (1.)Ms. Tobin Sy will perform bed mobility with MINIMAL ASSISTANCE within 3 day(s). (2.)Ms. Tobin Sy will transfer from bed to chair and chair to bed with CONTACT GUARD ASSIST using RW within 3 day(s). (3.)Ms. Tobin Sy will ambulate with MINIMAL ASSISTANCE for 50+ feet with RW within 3 day(s). (4.)Ms. Tobin Sy will tolerate 15+ minutes of therapeutic activity/exercise while maintaining stable vitals to improve functional strength and mobility within 3 day(s). LTG:  (1.)Ms. Tobin Sy will perform bed mobility with CONTACT GUARD ASSISTANCE within 7 day(s). (2.)Ms. Tobin Sy will transfer from bed to chair and chair to bed with SBA using RW within 7 day(s). (3.)Ms. Tobin Sy will ambulate with CGA  for 150+ feet with the least restrictive device within 7 day(s). (4.)Ms. Tobin Sy will demonstrate FAIR+ dynamic standing balance utilizing RW within 7 day(s). (5.)Ms. Tobin Sy will tolerate 25+ minutes of therapeutic activity/exercise while maintaining stable vitals to improve functional strength and mobility within 7 day(s). ________________________________________________________________________________________________      PHYSICAL THERAPY: Initial Assessment, Treatment Day: Day of Assessment, AM 12/28/2017  INPATIENT: Hospital Day: 2  [place] / [place] / Product Type: Medicare /      NAME/AGE/GENDER: Wilman Caicedo is a 68 y.o. female   PRIMARY DIAGNOSIS: UTI (urinary tract infection)  Encephalopathy acute Encephalopathy acute Encephalopathy acute        ICD-10: Treatment Diagnosis:   · Generalized Muscle Weakness (M62.81)  · Difficulty in walking, Not elsewhere classified (R26.2)   Precaution/Allergies:  Lipitor [atorvastatin]      ASSESSMENT:     Ms. Tobin Sy is a is a 68year old female admitted with acute encephalopathy/UTI.   Patient seen this AM for initial physical therapy evaluation: presents sitting up in bedside chair, oriented x4, and endorses 4/10 acute on chronic B LE and low back pain. Patient lives with her spouse in a single story residence with 4 steps to enter. At baseline, patient is SBA to set up with bathing/toileting, requires minimal-moderate assistance dressing with ADLs, ambulates with a RW vs. rollator, and endorses increasing weakness and falls x2-3 weeks. Today, L fistula appreciated UE AROM limited proximally,  limited by gout, L UE strength 3-/5, B LE strength functionally 3+/5, B LE ROM limited proximally, and sensation is absent to light touch and deep pressure distal B LE. Wounds and edema appreciated B LE; wound care following; recommend elevating B LE (patient aware). Patient performed transfers with minimal assistance x1 and ambulation x6ft with RW and close CGA-minimal assistance. Demonstrated a slow, shuffled, step-to gait pattern with decreased step length and clearance bilaterally and fair- dynamic balance throughout. Therapeutic activity addressed below. Ms. Serg Todd presents with decreased functional mobility and functional activity tolerance baseline. Recommend continued skilled PT services to address stated deficits. Will follow and progress toward stated goals during acute stay. This section established at most recent assessment   PROBLEM LIST (Impairments causing functional limitations):  1. Decreased Strength  2. Decreased ADL/Functional Activities  3. Decreased Transfer Abilities  4. Decreased Ambulation Ability/Technique  5. Decreased Balance  6. Decreased Activity Tolerance  7. Increased Fatigue  8. Decreased Flexibility/Joint Mobility  9. Decreased Knowledge of Precautions  10. Decreased [place] with Home Exercise Program   INTERVENTIONS PLANNED: (Benefits and precautions of physical therapy have been discussed with the patient.)  1. Balance Exercise  2. Bed Mobility  3. Family Education  4. Gait Training  5.  Home Exercise Program (HEP)  6. Range of Motion (ROM)  7. Therapeutic Activites  8. Therapeutic Exercise/Strengthening  9. Transfer Training  10. Group Therapy     TREATMENT PLAN: Frequency/Duration: 3 times a week for duration of hospital stay  Rehabilitation Potential For Stated Goals: Good     RECOMMENDED REHABILITATION/EQUIPMENT: (at time of discharge pending progress): Due to the probability of continued deficits (see above) this patient will likely need continued skilled physical therapy after discharge. Equipment:    TBD              HISTORY:   History of Present Injury/Illness (Reason for Referral):  Weakness; Difficulty in walking  Past Medical History/Comorbidities:   Ms. Theodore Encarnacion  has a past medical history of Adverse effect of anesthesia; AF (atrial fibrillation) ([facility] [address] (11/20/2011); Arthritis (11/18/2011); CAD (coronary artery disease) (2011); Cervical disc disease; Chronic kidney disease; Chronic pain; DJD (degenerative joint disease); Drainage from wound (10/3/2014); Full dentures; Gout; Hemorrhoid (11/5/2013); [place] (hard of hearing); Hyperlipemia (11/10/2011); Hypertension; Hypertriglyceridemia; Hypothyroidism; Neuropathy; Obesity (BMI 30-39.9) (10/2/14); PAD (peripheral artery disease) ([facility] [address] PCI (pneumatosis cystoides intestinalis) (11/5/2013); Pleural effusion, bilateral (11/21/2011); Postoperative anemia due to acute blood loss (11/21/2011); Rib cage fracture (11/5/2013); S/P CABG (coronary artery bypass graft) (11/05/2013); Status post-operative repair of hip fracture (09/15/2014); Stroke [facility]); Type II or unspecified type diabetes mellitus without mention of complication, uncontrolled ([facility] [address] (12/16/2013); and Unspecified adverse effect of anesthesia. She also has no past medical history of Difficult intubation; Malignant hyperthermia due to anesthesia; Nausea & vomiting; or Pseudocholinesterase deficiency.   Ms. Theodore Encarnacion  has a past surgical history that includes hx colonoscopy; pr cabg, artery-vein, four (Oct. 2011); hx cataract removal (Bilateral, 11/2012); hx hysterectomy; hx lap cholecystectomy; hx hip fracture tx (Left); pr close cystostomy; vascular surgery procedure unlist (Left, 06/07/2017); and vascular surgery procedure unlist (Left, 08/10/2017). Social History/Living Environment:   Home Environment: Private residence  # Steps to Enter: 4  Wheelchair Ramp: No  One/Two Story Residence: Two story, live on 1st floor  # of Interior Steps: 10  Height of Each Step (in): 10 inches  Interior Rails: Both  Lift Chair Available: No  Living Alone: No  Support Systems: Child(dania), Spouse/Significant Other/Partner  Patient Expects to be Discharged to[de-identified]  (STR)  Current DME Used/Available at Home: Glucometer, Walker, rollator, Walker  Tub or Shower Type: Shower  Prior Level of Function/Work/Activity:  Patient lives with her spouse in a single story residence with 4 steps to enter. At baseline, patient is SBA to set up with bathing/toileting, requires minimal-moderate assistance dressing with ADLs, ambulates with a RW vs. rollator, and endorses increasing weakness and falls x2-3 weeks. Number of Personal Factors/Comorbidities that affect the Plan of Care: 1-2: MODERATE COMPLEXITY   EXAMINATION:   Most Recent Physical Functioning:   Gross Assessment:  AROM: Generally decreased, functional (Proximal B UE; and B LE)  Strength: Generally decreased, functional (L UE and B LE)  Coordination: Generally decreased, functional (B LE)  Sensation: Impaired (Absent deep pressure and light touch distal B LE)               Posture:  Posture (WDL): Exceptions to WDL  Posture Assessment: Forward head, Rounded shoulders  Balance:  Sitting: Intact  Standing: Impaired  Standing - Static: Fair  Standing - Dynamic : Poor Bed Mobility:  Scooting: Contact guard assistance  Wheelchair Mobility:     Transfers:  Sit to Stand: Minimum assistance; Moderate assistance  Stand to Sit: Minimum assistance  Gait:     Base of Support: Widened;Center of gravity altered  Speed/Viry: Shuffled; Slow  Step Length: Right shortened;Left shortened  Gait Abnormalities: Decreased step clearance;Shuffling gait; Step to gait;Trunk sway increased  Distance (ft): 6 Feet (ft)  Assistive Device: Walker, rolling  Ambulation - Level of Assistance: Minimal assistance  Interventions: Safety awareness training; Tactile cues; Verbal cues      Body Structures Involved:  1. Metabolic  2. Muscles Body Functions Affected:  1. Movement Related Activities and Participation Affected:  1. Mobility  2. Self Care   Number of elements that affect the Plan of Care: 4+: HIGH COMPLEXITY   CLINICAL PRESENTATION:   Presentation: Evolving clinical presentation with changing clinical characteristics: MODERATE COMPLEXITY   CLINICAL DECISION MAKIN [facility] Inpatient Short Form  How much difficulty does the patient currently have. .. Unable A Lot A Little None   1. Turning over in bed (including adjusting bedclothes, sheets and blankets)? [] 1   [] 2   [x] 3   [] 4   2. Sitting down on and standing up from a chair with arms ( e.g., wheelchair, bedside commode, etc.)   [] 1   [] 2   [x] 3   [] 4   3. Moving from lying on back to sitting on the side of the bed? [] 1   [] 2   [x] 3   [] 4   How much help from another person does the patient currently need. .. Total A Lot A Little None   4. Moving to and from a bed to a chair (including a wheelchair)? [] 1   [] 2   [x] 3   [] 4   5. Need to walk in hospital room? [] 1   [] 2   [x] 3   [] 4   6. Climbing 3-5 steps with a railing? [] 1   [x] 2   [] 3   [] 4   © , Trustees of [address] [place] [id number], under license to [employer]. All rights reserved      Score:  Initial: 17 Most Recent: 17 (Date: 17 )    Interpretation of Tool:  Represents activities that are increasingly more difficult (i.e. Bed mobility, Transfers, Gait).    Score 24 23 22-20 19-15 14-10 9-7 6     Modifier CH CI CJ CK CL CM CN ? Mobility - Walking and Moving Around:     - CURRENT STATUS: CK - 40%-59% impaired, limited or restricted    - GOAL STATUS: CJ - 20%-39% impaired, limited or restricted    - D/C STATUS:  ---------------To be determined---------------  [place] / [place] / Product Type: Medicare /      Medical Necessity:     · Patient demonstrates good rehab potential due to higher previous functional level. Reason for Services/Other Comments:  · Patient continues to require skilled intervention due to decreased functional mobility and balance/gait status from baseline. .   Use of outcome tool(s) and clinical judgement create a POC that gives a: Questionable prediction of patient's progress: MODERATE COMPLEXITY            TREATMENT:   (In addition to Assessment/Re-Assessment sessions the following treatments were rendered)   Pre-treatment Symptoms/Complaints:    Pain: Initial:   Pain Intensity 1: 4  Pain Location 1: Leg, Back  Pain Orientation 1: Left, Right, Lower  Pain Intervention(s) 1: Ambulation/Increased Activity, Emotional support, Position, Repositioned, Rest  Post Session:  Endorses comfort in chair; unchanged 4/10. Initial Evaluation   Therapeutic Activity: (    8 Minutes): Therapeutic activities including bed mobility, transfer training, balance training, safety awareness training, ambulation on level ground x6ft, and patient education on activity pacing, transfer stratigies, need for continued skilled PT services, fall reduction strategies, and assistive device utilization to improve mobility, strength, balance and coordination. Required minimal Safety awareness training; Tactile cues; Verbal cues to promote static and dynamic balance in standing. Braces/Orthotics/Lines/Etc:   · IV  · O2 Device: Room air  Treatment/Session Assessment:    · Response to Treatment:  See above.   · Interdisciplinary Collaboration:   o Physical Therapist  o Registered Nurse  o Certified Nursing Assistant/Patient Care Technician  · After treatment position/precautions:   o Up in chair  o Bed alarm/tab alert on  o Bed/Chair-wheels locked  o Bed in low position  o Call light within reach  o RN notified  o Family at bedside  o Chair alarm applied and activated   · Compliance with Program/Exercises: Will assess as treatment progresses. · Recommendations/Intent for next treatment session: \"Next visit will focus on advancements to more challenging activities and reduction in assistance provided\".   Total Treatment Duration:  PT Patient Time In/Time Out  Time In: 1053  Time Out: 27 Olivia Ruano DPT

## 2017-12-28 NOTE — PROGRESS NOTES
Dual skin assessment done by this RN and Ethan Vargas RN. Pt has open sores on lower legs bilaterally, pt skin is dry and flaky, pt has some scattered bruises on arms. pt skin otherwise clean, dry, and intact with breakdown noted only to lower extremities.

## 2017-12-28 NOTE — PROGRESS NOTES
TRANSFER - IN REPORT:    Verbal report received from WILL Orlando (name) on Nicci Presumsergei  being received from ER (unit) for routine progression of care      Report consisted of patients Situation, Background, Assessment and   Recommendations(SBAR). Information from the following report(s) SBAR, Kardex, ED Summary and MAR was reviewed with the receiving nurse. Opportunity for questions and clarification was provided. Assessment completed upon patients arrival to unit and care assumed.

## 2017-12-29 ENCOUNTER — APPOINTMENT (OUTPATIENT)
Dept: CT IMAGING | Age: 77
DRG: 689 | End: 2017-12-29
Attending: INTERNAL MEDICINE
Payer: MEDICARE

## 2017-12-29 LAB
ANION GAP SERPL CALC-SCNC: 11 MMOL/L (ref 7–16)
ARTERIAL PATENCY WRIST A: POSITIVE
BASE DEFICIT BLDA-SCNC: 5.7 MMOL/L (ref 0–2)
BASOPHILS # BLD: 0 K/UL (ref 0–0.2)
BASOPHILS NFR BLD: 0 % (ref 0–2)
BDY SITE: ABNORMAL
BUN SERPL-MCNC: 65 MG/DL (ref 8–23)
CALCIUM SERPL-MCNC: 8.2 MG/DL (ref 8.3–10.4)
CHLORIDE SERPL-SCNC: 114 MMOL/L (ref 98–107)
CO2 SERPL-SCNC: 19 MMOL/L (ref 21–32)
COHGB MFR BLD: 0.8 % (ref 0.5–1.5)
CREAT SERPL-MCNC: 2.7 MG/DL (ref 0.6–1)
DIFFERENTIAL METHOD BLD: ABNORMAL
DO-HGB BLD-MCNC: 10 % (ref 0–5)
EOSINOPHIL # BLD: 0.2 K/UL (ref 0–0.8)
EOSINOPHIL NFR BLD: 2 % (ref 0.5–7.8)
ERYTHROCYTE [DISTWIDTH] IN BLOOD BY AUTOMATED COUNT: 14.5 % (ref 11.9–14.6)
GAS FLOW.O2 O2 DELIVERY SYS: 2 L/MIN
GLUCOSE BLD STRIP.AUTO-MCNC: 107 MG/DL (ref 65–100)
GLUCOSE BLD STRIP.AUTO-MCNC: 112 MG/DL (ref 65–100)
GLUCOSE BLD STRIP.AUTO-MCNC: 114 MG/DL (ref 65–100)
GLUCOSE BLD STRIP.AUTO-MCNC: 147 MG/DL (ref 65–100)
GLUCOSE BLD STRIP.AUTO-MCNC: 151 MG/DL (ref 65–100)
GLUCOSE BLD STRIP.AUTO-MCNC: 159 MG/DL (ref 65–100)
GLUCOSE BLD STRIP.AUTO-MCNC: 174 MG/DL (ref 65–100)
GLUCOSE BLD STRIP.AUTO-MCNC: 57 MG/DL (ref 65–100)
GLUCOSE BLD STRIP.AUTO-MCNC: 70 MG/DL (ref 65–100)
GLUCOSE SERPL-MCNC: 134 MG/DL (ref 65–100)
HCO3 BLDA-SCNC: 18 MMOL/L (ref 22–26)
HCT VFR BLD AUTO: 26.5 % (ref 35.8–46.3)
HGB BLD-MCNC: 8.5 G/DL (ref 11.7–15.4)
HGB BLDMV-MCNC: 9.1 GM/DL (ref 11.7–15)
IMM GRANULOCYTES # BLD: 0 K/UL (ref 0–0.5)
IMM GRANULOCYTES NFR BLD AUTO: 0 % (ref 0–5)
LYMPHOCYTES # BLD: 1.2 K/UL (ref 0.5–4.6)
LYMPHOCYTES NFR BLD: 14 % (ref 13–44)
MCH RBC QN AUTO: 29.6 PG (ref 26.1–32.9)
MCHC RBC AUTO-ENTMCNC: 32.1 G/DL (ref 31.4–35)
MCV RBC AUTO: 92.3 FL (ref 79.6–97.8)
METHGB MFR BLD: 0.3 % (ref 0–1.5)
MM INDURATION POC: NORMAL MM (ref 0–5)
MONOCYTES # BLD: 0.5 K/UL (ref 0.1–1.3)
MONOCYTES NFR BLD: 6 % (ref 4–12)
NEUTS SEG # BLD: 7.1 K/UL (ref 1.7–8.2)
NEUTS SEG NFR BLD: 78 % (ref 43–78)
OXYHGB MFR BLDA: 88.9 % (ref 94–97)
PCO2 BLDA: 30 MMHG (ref 35–45)
PH BLDA: 7.4 [PH] (ref 7.35–7.45)
PLATELET # BLD AUTO: 224 K/UL (ref 150–450)
PMV BLD AUTO: 11.9 FL (ref 10.8–14.1)
PO2 BLDA: 56 MMHG (ref 80–105)
POTASSIUM SERPL-SCNC: 4.4 MMOL/L (ref 3.5–5.1)
PPD POC: 0 NEGATIVE
RBC # BLD AUTO: 2.87 M/UL (ref 4.05–5.25)
SAO2 % BLD: 90 % (ref 92–98.5)
SODIUM SERPL-SCNC: 144 MMOL/L (ref 136–145)
VENTILATION MODE VENT: ABNORMAL
WBC # BLD AUTO: 9.1 K/UL (ref 4.3–11.1)

## 2017-12-29 PROCEDURE — 74011250637 HC RX REV CODE- 250/637

## 2017-12-29 PROCEDURE — 85025 COMPLETE CBC W/AUTO DIFF WBC: CPT | Performed by: FAMILY MEDICINE

## 2017-12-29 PROCEDURE — 74011000250 HC RX REV CODE- 250: Performed by: INTERNAL MEDICINE

## 2017-12-29 PROCEDURE — 82962 GLUCOSE BLOOD TEST: CPT

## 2017-12-29 PROCEDURE — 74011250636 HC RX REV CODE- 250/636: Performed by: INTERNAL MEDICINE

## 2017-12-29 PROCEDURE — 65270000029 HC RM PRIVATE

## 2017-12-29 PROCEDURE — 80048 BASIC METABOLIC PNL TOTAL CA: CPT | Performed by: INTERNAL MEDICINE

## 2017-12-29 PROCEDURE — 74011636637 HC RX REV CODE- 636/637: Performed by: INTERNAL MEDICINE

## 2017-12-29 PROCEDURE — 82803 BLOOD GASES ANY COMBINATION: CPT

## 2017-12-29 PROCEDURE — 74011250637 HC RX REV CODE- 250/637: Performed by: INTERNAL MEDICINE

## 2017-12-29 PROCEDURE — 36600 WITHDRAWAL OF ARTERIAL BLOOD: CPT

## 2017-12-29 PROCEDURE — 36415 COLL VENOUS BLD VENIPUNCTURE: CPT | Performed by: INTERNAL MEDICINE

## 2017-12-29 PROCEDURE — 70450 CT HEAD/BRAIN W/O DYE: CPT

## 2017-12-29 PROCEDURE — 74011000258 HC RX REV CODE- 258: Performed by: INTERNAL MEDICINE

## 2017-12-29 RX ORDER — DEXTROSE 40 %
1 GEL (GRAM) ORAL AS NEEDED
Status: DISCONTINUED | OUTPATIENT
Start: 2017-12-29 | End: 2018-01-03 | Stop reason: HOSPADM

## 2017-12-29 RX ORDER — ACETAMINOPHEN 650 MG/1
650 SUPPOSITORY RECTAL
Status: DISCONTINUED | OUTPATIENT
Start: 2017-12-29 | End: 2018-01-03 | Stop reason: HOSPADM

## 2017-12-29 RX ORDER — SODIUM CHLORIDE 9 MG/ML
75 INJECTION, SOLUTION INTRAVENOUS CONTINUOUS
Status: DISCONTINUED | OUTPATIENT
Start: 2017-12-29 | End: 2017-12-31

## 2017-12-29 RX ORDER — DEXTROSE 40 %
GEL (GRAM) ORAL
Status: COMPLETED
Start: 2017-12-29 | End: 2017-12-29

## 2017-12-29 RX ADMIN — ASPIRIN 325 MG ORAL TABLET 325 MG: 325 PILL ORAL at 09:37

## 2017-12-29 RX ADMIN — HEPARIN SODIUM 5000 UNITS: 5000 INJECTION, SOLUTION INTRAVENOUS; SUBCUTANEOUS at 21:48

## 2017-12-29 RX ADMIN — SODIUM CHLORIDE 1 G: 900 INJECTION, SOLUTION INTRAVENOUS at 18:14

## 2017-12-29 RX ADMIN — CEFTRIAXONE SODIUM 1 G: 1 INJECTION, POWDER, FOR SOLUTION INTRAMUSCULAR; INTRAVENOUS at 09:36

## 2017-12-29 RX ADMIN — ATORVASTATIN CALCIUM 40 MG: 40 TABLET, FILM COATED ORAL at 09:37

## 2017-12-29 RX ADMIN — Medication 10 ML: at 06:22

## 2017-12-29 RX ADMIN — INSULIN LISPRO 2 UNITS: 100 INJECTION, SOLUTION INTRAVENOUS; SUBCUTANEOUS at 21:48

## 2017-12-29 RX ADMIN — INSULIN HUMAN 35 UNITS: 100 INJECTION, SUSPENSION SUBCUTANEOUS at 06:22

## 2017-12-29 RX ADMIN — Medication 1 TUBE: at 04:20

## 2017-12-29 RX ADMIN — AMLODIPINE BESYLATE 5 MG: 5 TABLET ORAL at 09:37

## 2017-12-29 RX ADMIN — SODIUM CHLORIDE 75 ML/HR: 900 INJECTION, SOLUTION INTRAVENOUS at 18:11

## 2017-12-29 RX ADMIN — Medication 5 ML: at 21:50

## 2017-12-29 RX ADMIN — BENAZEPRIL HYDROCHLORIDE 20 MG: 10 TABLET, FILM COATED ORAL at 09:37

## 2017-12-29 RX ADMIN — ACETAMINOPHEN 650 MG: 325 TABLET ORAL at 03:51

## 2017-12-29 RX ADMIN — HEPARIN SODIUM 5000 UNITS: 5000 INJECTION, SOLUTION INTRAVENOUS; SUBCUTANEOUS at 06:18

## 2017-12-29 RX ADMIN — LEVOTHYROXINE SODIUM 25 MCG: 50 TABLET ORAL at 06:20

## 2017-12-29 RX ADMIN — ACETAMINOPHEN 650 MG: 325 TABLET ORAL at 19:55

## 2017-12-29 RX ADMIN — CARVEDILOL 6.25 MG: 6.25 TABLET, FILM COATED ORAL at 09:37

## 2017-12-29 NOTE — PROGRESS NOTES
Patient is back from CT  Niece at bedside   Asks if doctor can be called concerning iv fluid and antibiotics  Call placed to Dr Keith Drain  Patient opens eyes to loud verbal stimuli but does not offer verbal communication

## 2017-12-29 NOTE — PROGRESS NOTES
Patient is awake but disoriented  Does tell me her name or where she is   Keeps saying \"I dont know. \"  Follows simple commands but delayed  Speech is clear  No facial droop   equal    Oxygen cannula off  Sat 89%  92% on 2l   sqbs 159  Temp 98.6  Call placed to Dr Torey Beyer

## 2017-12-29 NOTE — PROGRESS NOTES
OT NOTE:    Charts reviewed, orders appreciated, evaluation attempted. Pt is not alert enough to safely participate with therapy this AM. She demonstrates limited command following and perseveration is noted during conversation. RN notified. Will re-attempt as schedule allows.     Thanks,    Artur Magallanes, OTR/L

## 2017-12-29 NOTE — PROGRESS NOTES
Staff assisting patient at this time. Several family members present.     Hallie Tran, staff Rene dom[address], [address] [facility]  /   [email or web address]

## 2017-12-29 NOTE — PROGRESS NOTES
Physical Therapy Note:    Attempted to see patient this AM for PT treatment. Patient drowsy, disoriented x4, only verbalizing \"I don't want to go,\" with slurred speech. Unable to follow commands. Lethia Bulls contrast from yesterday's evaluation where patient was alert and oriented x4 and ambulatory x5ft with RW with minimal assistance. Repositioned patient in bed to comfort with upright positioning and rails up x3. RN notified and aware of patient status.      Thank you,  Justin Chambers, PT, DPT  12/29/17 10:30AM

## 2017-12-29 NOTE — PROGRESS NOTES
Bedside report  received from Turner Neff, [facility]. Assessment completed. Bed is in low and locked position with floor free of objects. Patient AxOx3, and resting quietly in bed. No needs noted at present. Respirations even and non labored. Verbalized understanding to call for needs. Call light within reach. Will continue to monitor pt.

## 2017-12-29 NOTE — PROGRESS NOTES
Notified Dr Shady Mnotoya that patient not eating and NPH held   Also notified patient did not take sinemet lyrica and baclofen   Patient lethargic  Does open eyes to verbal stimuli  Keeps saying \"I dont want to go. \"

## 2017-12-29 NOTE — PROGRESS NOTES
Hospitalist Progress Note    2017  Admit Date: 2017  1:27 PM   NAME: Lola Vargas   :     MRN:  [id number]   Attending: Kevin Mejia MD  PCP:  Santos Sepulveda MD    SUBJECTIVE:     Lola Vargas is a 42DIE with CAD s/p CABG, CKD (has fistula that has not been used yet), HTN, HLD who was admitted on  with confusion, found to have a UTI. Started on Rocephin and gentle IVF in the ED.    : more confused this morning. Lethargic, disoriented, intermittently confused. Much different than yesterday. Had a slight fever to 100.9 overnight, and an episode of hypoglycemia overnight. However, labs are stable and BS improved this AM.       Review of Systems negative with exception of pertinent positives noted above      PHYSICAL EXAM       Visit Vitals    /66 (BP 1 Location: Right arm, BP Patient Position: At rest)    Pulse (!) 59    Temp 98.6 °F (37 °C)    Resp 18    Ht 5' 10\" (1.778 m)    Wt 117.5 kg (259 lb)    SpO2 92%    Breastfeeding No    BMI 37.16 kg/m2      Temp (24hrs), Av.8 °F (37.1 °C), Min:97.8 °F (36.6 °C), Max:100.9 °F (38.3 °C)    Oxygen Therapy  O2 Sat (%): 92 % (17 0727)  Pulse via Oximetry: 66 beats per minute (17)  O2 Device: Nasal cannula (17)  O2 Flow Rate (L/min): 2 l/min (17)    Intake/Output Summary (Last 24 hours) at 17 0809  Last data filed at 17 7057   Gross per 24 hour   Intake           2026.5 ml   Output              200 ml   Net           1826.5 ml        General: Lethargic, confused  Head:  Atraumatic Normocephalic. Lungs:  CTA Bilaterally.  Normal resp effort on RA  CVS:  RRR, BLE wrapped with 1+ edema  Abdomen: Soft, NTTP  Neurologic:  AOx1 (self only), intermittently following commands, moving ext equal, no apparent focal deficits      Recent Results (from the past 24 hour(s))   GLUCOSE, POC    Collection Time: 17 11:48 AM   Result Value Ref Range    Glucose (POC) 208 (H) 65 - 100 mg/dL   GLUCOSE, POC    Collection Time: 12/28/17  3:36 PM   Result Value Ref Range    Glucose (POC) 212 (H) 65 - 100 mg/dL   GLUCOSE, POC    Collection Time: 12/28/17  9:16 PM   Result Value Ref Range    Glucose (POC) 119 (H) 65 - 100 mg/dL   PLEASE READ & DOCUMENT PPD TEST IN 24 HRS    Collection Time: 12/28/17 11:16 PM   Result Value Ref Range    PPD negative Negative    mm Induration 0 mm   GLUCOSE, POC    Collection Time: 12/29/17  4:06 AM   Result Value Ref Range    Glucose (POC) 57 (L) 65 - 100 mg/dL   GLUCOSE, POC    Collection Time: 12/29/17  4:40 AM   Result Value Ref Range    Glucose (POC) 70 65 - 100 mg/dL   GLUCOSE, POC    Collection Time: 12/29/17  5:06 AM   Result Value Ref Range    Glucose (POC) 114 (H) 65 - 100 mg/dL   GLUCOSE, POC    Collection Time: 12/29/17  5:29 AM   Result Value Ref Range    Glucose (POC) 107 (H) 65 - 025 mg/dL   METABOLIC PANEL, BASIC    Collection Time: 12/29/17  6:44 AM   Result Value Ref Range    Sodium 144 136 - 145 mmol/L    Potassium 4.4 3.5 - 5.1 mmol/L    Chloride 114 (H) 98 - 107 mmol/L    CO2 19 (L) 21 - 32 mmol/L    Anion gap 11 7 - 16 mmol/L    Glucose 134 (H) 65 - 100 mg/dL    BUN 65 (H) 8 - 23 MG/DL    Creatinine 2.70 (H) 0.6 - 1.0 MG/DL    GFR est AA 22 (L) >60 ml/min/1.73m2    GFR est non-AA 18 (L) >60 ml/min/1.73m2    Calcium 8.2 (L) 8.3 - 10.4 MG/DL   CBC WITH AUTOMATED DIFF    Collection Time: 12/29/17  6:44 AM   Result Value Ref Range    WBC 9.1 4.3 - 11.1 K/uL    RBC 2.87 (L) 4.05 - 5.25 M/uL    HGB 8.5 (L) 11.7 - 15.4 g/dL    HCT 26.5 (L) 35.8 - 46.3 %    MCV 92.3 79.6 - 97.8 FL    MCH 29.6 26.1 - 32.9 PG    MCHC 32.1 31.4 - 35.0 g/dL    RDW 14.5 11.9 - 14.6 %    PLATELET 078 975 - 156 K/uL    MPV 11.9 10.8 - 14.1 FL    DF AUTOMATED      NEUTROPHILS 78 43 - 78 %    LYMPHOCYTES 14 13 - 44 %    MONOCYTES 6 4.0 - 12.0 %    EOSINOPHILS 2 0.5 - 7.8 %    BASOPHILS 0 0.0 - 2.0 %    IMMATURE GRANULOCYTES 0 0.0 - 5.0 %    ABS.  NEUTROPHILS 7.1 1.7 - 8.2 K/UL ABS. LYMPHOCYTES 1.2 0.5 - 4.6 K/UL    ABS. MONOCYTES 0.5 0.1 - 1.3 K/UL    ABS. EOSINOPHILS 0.2 0.0 - 0.8 K/UL    ABS. BASOPHILS 0.0 0.0 - 0.2 K/UL    ABS. IMM. GRANS. 0.0 0.0 - 0.5 K/UL   GLUCOSE, POC    Collection Time: 12/29/17  7:10 AM   Result Value Ref Range    Glucose (POC) 159 (H) 65 - 100 mg/dL         Imaging /Procedures /Studies   CT HEAD WO CONT   Final Result   IMPRESSION:  Normal unenhanced CT of the brain for age. No acute intracranial   abnormality. XR CHEST PA LAT   Final Result   IMPRESSION: Negative for acute abnormality. XR ELBOW LT MIN 3 V   Final Result   IMPRESSION: Negative for acute fracture. XR HAND LT MIN 3 V   Final Result   IMPRESSION: Negative for acute fracture.                   ASSESSMENT      Hospital Problems as of 12/29/2017  Date Reviewed: 12/27/2017          Codes Class Noted - Resolved POA    UTI (urinary tract infection) ICD-10-CM: N39.0  ICD-9-CM: 599.0  12/27/2017 - Present Unknown        * (Principal)Encephalopathy acute ICD-10-CM: G93.40  ICD-9-CM: 348.30  12/27/2017 - Present Unknown        CKD (chronic kidney disease) stage 3, GFR 30-59 ml/min ICD-10-CM: N18.3  ICD-9-CM: 585.3  8/10/2016 - Present Yes        DM (diabetes mellitus) type II uncontrolled, periph vascular disorder (HCC) (Chronic) ICD-10-CM: E11.51, E11.65  ICD-9-CM: 250.72, 443.81  11/10/2011 - Present Yes                  Plan:  - Acute metabolic encephalopathy:  Likely 2/2 UTI  Worse today  No sedating meds given overnight  Will monitor closely today    - UTI:  Started on rocephin based on previous cx  Urine cx not done on admission  Stop IVF    - DM2:  Decrease insulin regimen to NPH 28u BID  SSI ACHS    - CAD/HTN:   Continue ASA, coreg, norvasc, benazepril  Holding lasix given infection and increased Cr    - Parkinsonism:  Continue sinemet    - BLE edema with chronic ulcers:  Likely multifactorial from PVD, venous stasis and CKD  Wound care following    DVT Prophylaxis: Hep SQ  Dispo: PT/OT following, PPD placed; will likely need STR at discharge    Roque Navarro MD    Addendum: Have seen patient multiple times today. Mental status without much improvement. No focal deficits on neuro exam. ABG without hypercarbia. CT head negative. Vitals stable except fever to 100.9 throughout the day. Labs virtually unchanged from yesterday. No sedating meds given. Possible that metabolic encephalopathy is from UTI. Because a urine cx was not obtained on admission, cannot be certain that bacteria is susceptible to rocephin. Will restart IVF since she has had very little po intake today and expand abx to cefepime. Will continue to monitor closely.      6:33 PM

## 2017-12-29 NOTE — PROGRESS NOTES
Pt found to be lethargic Checked BG, pt's blood sugar 57 gave oral glutose. Will give pt more to drink and eat as pt becomes more alert.

## 2017-12-29 NOTE — PROGRESS NOTES
Pt BG up to 114 but pt still very lethargic called Dr. Ellyn Milligan explained that pt 24 hours ago was more alert and that pt had a slight temp of 100.9. order for CBC received.

## 2017-12-29 NOTE — PROGRESS NOTES
Pt can go to [facility] for str when medically stable (Room 403, report line [phone]). Pt can discharge on Saturday or Sunday if medically stable.

## 2017-12-30 LAB
ANION GAP SERPL CALC-SCNC: 12 MMOL/L (ref 7–16)
BASOPHILS # BLD: 0.1 K/UL (ref 0–0.2)
BASOPHILS NFR BLD: 0 % (ref 0–2)
BUN SERPL-MCNC: 62 MG/DL (ref 8–23)
CALCIUM SERPL-MCNC: 9.1 MG/DL (ref 8.3–10.4)
CHLORIDE SERPL-SCNC: 117 MMOL/L (ref 98–107)
CO2 SERPL-SCNC: 18 MMOL/L (ref 21–32)
CREAT SERPL-MCNC: 2.59 MG/DL (ref 0.6–1)
DIFFERENTIAL METHOD BLD: ABNORMAL
EOSINOPHIL # BLD: 0 K/UL (ref 0–0.8)
EOSINOPHIL NFR BLD: 0 % (ref 0.5–7.8)
ERYTHROCYTE [DISTWIDTH] IN BLOOD BY AUTOMATED COUNT: 14.4 % (ref 11.9–14.6)
GLUCOSE BLD STRIP.AUTO-MCNC: 188 MG/DL (ref 65–100)
GLUCOSE BLD STRIP.AUTO-MCNC: 197 MG/DL (ref 65–100)
GLUCOSE BLD STRIP.AUTO-MCNC: 208 MG/DL (ref 65–100)
GLUCOSE BLD STRIP.AUTO-MCNC: 233 MG/DL (ref 65–100)
GLUCOSE BLD STRIP.AUTO-MCNC: 244 MG/DL (ref 65–100)
GLUCOSE SERPL-MCNC: 191 MG/DL (ref 65–100)
HCT VFR BLD AUTO: 30.2 % (ref 35.8–46.3)
HGB BLD-MCNC: 9.7 G/DL (ref 11.7–15.4)
IMM GRANULOCYTES # BLD: 0.1 K/UL (ref 0–0.5)
IMM GRANULOCYTES NFR BLD AUTO: 1 % (ref 0–5)
LYMPHOCYTES # BLD: 0.9 K/UL (ref 0.5–4.6)
LYMPHOCYTES NFR BLD: 7 % (ref 13–44)
MCH RBC QN AUTO: 30.1 PG (ref 26.1–32.9)
MCHC RBC AUTO-ENTMCNC: 32.1 G/DL (ref 31.4–35)
MCV RBC AUTO: 93.8 FL (ref 79.6–97.8)
MONOCYTES # BLD: 0.7 K/UL (ref 0.1–1.3)
MONOCYTES NFR BLD: 6 % (ref 4–12)
NEUTS SEG # BLD: 10.6 K/UL (ref 1.7–8.2)
NEUTS SEG NFR BLD: 86 % (ref 43–78)
PLATELET # BLD AUTO: 286 K/UL (ref 150–450)
PMV BLD AUTO: 12 FL (ref 10.8–14.1)
POTASSIUM SERPL-SCNC: 4.7 MMOL/L (ref 3.5–5.1)
RBC # BLD AUTO: 3.22 M/UL (ref 4.05–5.25)
SODIUM SERPL-SCNC: 147 MMOL/L (ref 136–145)
WBC # BLD AUTO: 12.3 K/UL (ref 4.3–11.1)

## 2017-12-30 PROCEDURE — 74011250636 HC RX REV CODE- 250/636: Performed by: INTERNAL MEDICINE

## 2017-12-30 PROCEDURE — 87040 BLOOD CULTURE FOR BACTERIA: CPT | Performed by: INTERNAL MEDICINE

## 2017-12-30 PROCEDURE — 80048 BASIC METABOLIC PNL TOTAL CA: CPT | Performed by: INTERNAL MEDICINE

## 2017-12-30 PROCEDURE — 74011000258 HC RX REV CODE- 258: Performed by: INTERNAL MEDICINE

## 2017-12-30 PROCEDURE — 0T9B70Z DRAINAGE OF BLADDER WITH DRAINAGE DEVICE, VIA NATURAL OR ARTIFICIAL OPENING: ICD-10-PCS | Performed by: INTERNAL MEDICINE

## 2017-12-30 PROCEDURE — 74011636637 HC RX REV CODE- 636/637: Performed by: INTERNAL MEDICINE

## 2017-12-30 PROCEDURE — 87106 FUNGI IDENTIFICATION YEAST: CPT | Performed by: INTERNAL MEDICINE

## 2017-12-30 PROCEDURE — 65270000029 HC RM PRIVATE

## 2017-12-30 PROCEDURE — 82962 GLUCOSE BLOOD TEST: CPT

## 2017-12-30 PROCEDURE — 36415 COLL VENOUS BLD VENIPUNCTURE: CPT | Performed by: INTERNAL MEDICINE

## 2017-12-30 PROCEDURE — 87086 URINE CULTURE/COLONY COUNT: CPT | Performed by: INTERNAL MEDICINE

## 2017-12-30 PROCEDURE — 85025 COMPLETE CBC W/AUTO DIFF WBC: CPT | Performed by: INTERNAL MEDICINE

## 2017-12-30 PROCEDURE — 74011250637 HC RX REV CODE- 250/637: Performed by: INTERNAL MEDICINE

## 2017-12-30 RX ORDER — VANCOMYCIN/0.9 % SOD CHLORIDE 1.5G/250ML
1500 PLASTIC BAG, INJECTION (ML) INTRAVENOUS SEE ADMIN INSTRUCTIONS
Status: DISCONTINUED | OUTPATIENT
Start: 2017-12-30 | End: 2017-12-31

## 2017-12-30 RX ORDER — VANCOMYCIN 1.75 GRAM/500 ML IN 0.9 % SODIUM CHLORIDE INTRAVENOUS
1750 ONCE
Status: COMPLETED | OUTPATIENT
Start: 2017-12-30 | End: 2017-12-30

## 2017-12-30 RX ADMIN — Medication: at 18:32

## 2017-12-30 RX ADMIN — INSULIN LISPRO 4 UNITS: 100 INJECTION, SOLUTION INTRAVENOUS; SUBCUTANEOUS at 21:47

## 2017-12-30 RX ADMIN — INSULIN LISPRO 2 UNITS: 100 INJECTION, SOLUTION INTRAVENOUS; SUBCUTANEOUS at 16:34

## 2017-12-30 RX ADMIN — INSULIN LISPRO 2 UNITS: 100 INJECTION, SOLUTION INTRAVENOUS; SUBCUTANEOUS at 05:35

## 2017-12-30 RX ADMIN — SODIUM CHLORIDE 1 G: 900 INJECTION, SOLUTION INTRAVENOUS at 16:34

## 2017-12-30 RX ADMIN — HEPARIN SODIUM 5000 UNITS: 5000 INJECTION, SOLUTION INTRAVENOUS; SUBCUTANEOUS at 14:39

## 2017-12-30 RX ADMIN — VANCOMYCIN HYDROCHLORIDE 1750 MG: 10 INJECTION, POWDER, LYOPHILIZED, FOR SOLUTION INTRAVENOUS at 12:30

## 2017-12-30 RX ADMIN — HEPARIN SODIUM 5000 UNITS: 5000 INJECTION, SOLUTION INTRAVENOUS; SUBCUTANEOUS at 21:47

## 2017-12-30 RX ADMIN — Medication 5 ML: at 05:38

## 2017-12-30 RX ADMIN — Medication 5 ML: at 21:46

## 2017-12-30 RX ADMIN — HEPARIN SODIUM 5000 UNITS: 5000 INJECTION, SOLUTION INTRAVENOUS; SUBCUTANEOUS at 05:35

## 2017-12-30 NOTE — PROGRESS NOTES
Pharmacokinetic Consult to Pharmacist    Rai Clemente is a 68 y.o. female being treated for sepsis with vancomycin and cefepime. Height: 5' 10\" (177.8 cm)  Weight:  (pt refused )  Lab Results   Component Value Date/Time    BUN 65 12/29/2017 06:44 AM    Creatinine 2.70 12/29/2017 06:44 AM    WBC 9.1 12/29/2017 06:44 AM    Lactic acid 1.1 05/12/2016 11:24 PM      Estimated Creatinine Clearance: 24.3 mL/min (based on Cr of 2.7). CULTURES:  pending    Day 1 of vancomycin. Goal trough is 15-20. Vancomycin dose initiated at 1750 mg x 1, then will dose intermittently. Will continue to follow patient.       Thank you,  Brittany Camara, PharmD  Clinical Pharmacist  [phone]

## 2017-12-30 NOTE — PROGRESS NOTES
Provided calming presence for patient and family. Prayer. Nurse came as requested.     Parish Villalpando, staff [address], [address] [facility]  /   [email or web address]

## 2017-12-30 NOTE — PROGRESS NOTES
Occupational Therapy Note: pt continues with confusion, fever, and is not appropriate for skilled OT at this time. Not oriented to self or her family. Staring blankly ahead, rocking her head side to side, and keeps crying out \"I want to go to daddy. \" Family in the room, her  and daughter, report she lives with spouse in 1 level home and was mostly independent with self care tasks prior to this past Monday. Will check on pt again tomorrow in hopes her confusion has cleared and complete evaluation as time and schedule allows.   Thank you for this referral.  Roya Gardner MS, OTR/L - [phone]

## 2017-12-30 NOTE — PROGRESS NOTES
Md venegas orders for this pt temperature change Tylenol PO to suppository for this pt if the temperature is equal or above 102.

## 2017-12-30 NOTE — PROGRESS NOTES
Pt resting in bed. Family has went home for the night. No visual distress. Safety measures in place. Call light in reach. Door open to monitor patient.

## 2017-12-30 NOTE — PROGRESS NOTES
Shift assessment complete. Pt resting in bed. IV fluids infusing. Pt confused and disoriented X4. No visual distress. Safety measures in place. Call light in reach.

## 2017-12-30 NOTE — PROGRESS NOTES
Pt resting in bed family at bedside, lethargic able to obey commands, breathing even and unlabored, no visual s/s of pain or distress, call light within reach. Safety measures in place.

## 2017-12-30 NOTE — PROGRESS NOTES
Hospitalist Progress Note    2017  Admit Date: 2017  1:27 PM   NAME: Priscilla Seals   :     MRN:  [id number]   Attending: Ellie Newell MD  PCP:  Viktor Echevarria MD    SUBJECTIVE:     Priscilla Seals is a 41IDX with CAD s/p CABG, CKD (has fistula that has not been used yet), HTN, HLD who was admitted on  with confusion, found to have a UTI. Started on Rocephin and gentle IVF in the ED.    : remains confused today. Continues to say \"I want to go\". Opens her eyes to name. Moving all extremities equally but not to command. Had fever to 101.5 this morning. Review of Systems negative with exception of pertinent positives noted above      PHYSICAL EXAM       Visit Vitals    /75 (BP 1 Location: Right arm, BP Patient Position: At rest)    Pulse 71    Temp 97.5 °F (36.4 °C)    Resp 16    Ht 5' 10\" (1.778 m)    Wt 117.5 kg (259 lb)    SpO2 97%    Breastfeeding No    BMI 37.16 kg/m2      Temp (24hrs), Av.9 °F (37.2 °C), Min:97.5 °F (36.4 °C), Max:101.5 °F (38.6 °C)    Oxygen Therapy  O2 Sat (%): 97 % (17 0800)  Pulse via Oximetry: 66 beats per minute (17)  O2 Device: Nasal cannula (17)  O2 Flow Rate (L/min): 2 l/min (17)    Intake/Output Summary (Last 24 hours) at 17 1056  Last data filed at 17 0620   Gross per 24 hour   Intake             1360 ml   Output                0 ml   Net             1360 ml        General: Lethargic, confused  Head:  Atraumatic Normocephalic. Lungs:  CTA Bilaterally.  Normal resp effort on RA  CVS:  RRR, BLE wrapped with 1+ edema  Abdomen: Soft, NTTP  Neurologic:  AOx1 (self only), intermittently following commands, moving ext equal, no apparent focal deficits      Recent Results (from the past 24 hour(s))   GLUCOSE, POC    Collection Time: 17 11:12 AM   Result Value Ref Range    Glucose (POC) 112 (H) 65 - 100 mg/dL   GLUCOSE, POC    Collection Time: 17  1:50 PM   Result Value Ref Range    Glucose (POC) 147 (H) 65 - 100 mg/dL   BLOOD GAS, ARTERIAL    Collection Time: 12/29/17  2:25 PM   Result Value Ref Range    pH 7.40 7.35 - 7.45      PCO2 30 (L) 35 - 45 mmHg    PO2 56 (L) 80 - 105 mmHg    BICARBONATE 18 (L) 22 - 26 mmol/L    BASE DEFICIT 5.7 (H) 0 - 2 mmol/L    TOTAL HEMOGLOBIN 9.1 (L) 11.7 - 15.0 GM/DL    O2 SAT 90 (L) 92 - 98.5 %    Arterial O2 Hgb 88.9 (L) 94 - 97 %    CARBOXYHEMOGLOBIN 0.8 0.5 - 1.5 %    METHEMOGLOBIN 0.3 0.0 - 1.5 %    DEOXYHEMOGLOBIN 10 (H) 0.0 - 5.0 %    SITE RB     ALLENS TEST POSITIVE      MODE NC     O2 FLOW 2.00 L/min   GLUCOSE, POC    Collection Time: 12/29/17  3:02 PM   Result Value Ref Range    Glucose (POC) 151 (H) 65 - 100 mg/dL   GLUCOSE, POC    Collection Time: 12/29/17  8:39 PM   Result Value Ref Range    Glucose (POC) 174 (H) 65 - 100 mg/dL   GLUCOSE, POC    Collection Time: 12/30/17  3:51 AM   Result Value Ref Range    Glucose (POC) 208 (H) 65 - 100 mg/dL   GLUCOSE, POC    Collection Time: 12/30/17  5:20 AM   Result Value Ref Range    Glucose (POC) 188 (H) 65 - 100 mg/dL         Imaging /Procedures /Studies   CT HEAD WO CONT   Final Result   IMPRESSION:     1. No acute intracranial process evident by noncontrast CT study of the head. CT HEAD WO CONT   Final Result   IMPRESSION:  Normal unenhanced CT of the brain for age. No acute intracranial   abnormality. XR CHEST PA LAT   Final Result   IMPRESSION: Negative for acute abnormality. XR ELBOW LT MIN 3 V   Final Result   IMPRESSION: Negative for acute fracture. XR HAND LT MIN 3 V   Final Result   IMPRESSION: Negative for acute fracture.                   ASSESSMENT      Hospital Problems as of 12/30/2017  Date Reviewed: 12/27/2017          Codes Class Noted - Resolved POA    UTI (urinary tract infection) ICD-10-CM: N39.0  ICD-9-CM: 599.0  12/27/2017 - Present Unknown        * (Principal)Encephalopathy acute ICD-10-CM: G93.40  ICD-9-CM: 348.30 12/27/2017 - Present Unknown        CKD (chronic kidney disease) stage 3, GFR 30-59 ml/min ICD-10-CM: N18.3  ICD-9-CM: 585.3  8/10/2016 - Present Yes        DM (diabetes mellitus) type II uncontrolled, periph vascular disorder (HCC) (Chronic) ICD-10-CM: E11.51, E11.65  ICD-9-CM: 250.72, 443.81  11/10/2011 - Present Yes                  Plan:  - Acute metabolic encephalopathy:  Likely 2/2 UTI  Stable today, but remains lethargic  No sedating meds given   CT head neg  ABG WNL  Recheck labs pending this AM    - UTI:  Started on rocephin based on previous cx  With worsening fever and mental status, will expand to vanc/cefepime  Check blood cx and urine cx    - DM2:  Hold insulin while NPO  SSI ACHS    - CAD/HTN:   Continue ASA, coreg, norvasc, benazepril as she is able to take  Holding lasix given infection and increased Cr    - Parkinsonism:  Continue sinemet    - BLE edema with chronic ulcers:  Likely multifactorial from PVD, venous stasis and CKD  Wound care following    DVT Prophylaxis: Hep SQ  Dispo: PT/OT following, PPD placed; will likely need STR at discharge    Ann Marie Montoya MD

## 2017-12-30 NOTE — PROGRESS NOTES
Md Chuckie Moss orders for this pt temperature: change tylenol PO to suppository for this pt if the temperature is equal or above 102.

## 2017-12-31 LAB
AMMONIA PLAS-SCNC: 14 UMOL/L (ref 11–32)
ANION GAP SERPL CALC-SCNC: 14 MMOL/L (ref 7–16)
BACTERIA SPEC CULT: ABNORMAL
BASOPHILS # BLD: 0 K/UL (ref 0–0.2)
BASOPHILS NFR BLD: 0 % (ref 0–2)
BUN SERPL-MCNC: 64 MG/DL (ref 8–23)
CALCIUM SERPL-MCNC: 9.2 MG/DL (ref 8.3–10.4)
CHLORIDE SERPL-SCNC: 119 MMOL/L (ref 98–107)
CO2 SERPL-SCNC: 16 MMOL/L (ref 21–32)
CREAT SERPL-MCNC: 2.5 MG/DL (ref 0.6–1)
DIFFERENTIAL METHOD BLD: ABNORMAL
EOSINOPHIL # BLD: 0 K/UL (ref 0–0.8)
EOSINOPHIL NFR BLD: 0 % (ref 0.5–7.8)
ERYTHROCYTE [DISTWIDTH] IN BLOOD BY AUTOMATED COUNT: 14.6 % (ref 11.9–14.6)
GLUCOSE BLD STRIP.AUTO-MCNC: 242 MG/DL (ref 65–100)
GLUCOSE BLD STRIP.AUTO-MCNC: 262 MG/DL (ref 65–100)
GLUCOSE BLD STRIP.AUTO-MCNC: 297 MG/DL (ref 65–100)
GLUCOSE BLD STRIP.AUTO-MCNC: 341 MG/DL (ref 65–100)
GLUCOSE SERPL-MCNC: 277 MG/DL (ref 65–100)
HCT VFR BLD AUTO: 27.9 % (ref 35.8–46.3)
HGB BLD-MCNC: 9 G/DL (ref 11.7–15.4)
IMM GRANULOCYTES # BLD: 0.1 K/UL (ref 0–0.5)
IMM GRANULOCYTES NFR BLD AUTO: 1 % (ref 0–5)
LYMPHOCYTES # BLD: 0.6 K/UL (ref 0.5–4.6)
LYMPHOCYTES NFR BLD: 4 % (ref 13–44)
MCH RBC QN AUTO: 30 PG (ref 26.1–32.9)
MCHC RBC AUTO-ENTMCNC: 32.3 G/DL (ref 31.4–35)
MCV RBC AUTO: 93 FL (ref 79.6–97.8)
MM INDURATION POC: NORMAL MM (ref 0–5)
MONOCYTES # BLD: 0.5 K/UL (ref 0.1–1.3)
MONOCYTES NFR BLD: 4 % (ref 4–12)
NEUTS SEG # BLD: 11.6 K/UL (ref 1.7–8.2)
NEUTS SEG NFR BLD: 91 % (ref 43–78)
PLATELET # BLD AUTO: 313 K/UL (ref 150–450)
PMV BLD AUTO: 11.9 FL (ref 10.8–14.1)
POTASSIUM SERPL-SCNC: 4.6 MMOL/L (ref 3.5–5.1)
PPD POC: 0 NEGATIVE
PROCALCITONIN SERPL-MCNC: 0.3 NG/ML
RBC # BLD AUTO: 3 M/UL (ref 4.05–5.25)
SERVICE CMNT-IMP: ABNORMAL
SODIUM SERPL-SCNC: 149 MMOL/L (ref 136–145)
TSH SERPL DL<=0.005 MIU/L-ACNC: 1.84 UIU/ML (ref 0.36–3.74)
WBC # BLD AUTO: 12.8 K/UL (ref 4.3–11.1)

## 2017-12-31 PROCEDURE — 84443 ASSAY THYROID STIM HORMONE: CPT | Performed by: INTERNAL MEDICINE

## 2017-12-31 PROCEDURE — 74011250636 HC RX REV CODE- 250/636: Performed by: INTERNAL MEDICINE

## 2017-12-31 PROCEDURE — 85025 COMPLETE CBC W/AUTO DIFF WBC: CPT | Performed by: INTERNAL MEDICINE

## 2017-12-31 PROCEDURE — 84145 PROCALCITONIN (PCT): CPT | Performed by: INTERNAL MEDICINE

## 2017-12-31 PROCEDURE — 65270000029 HC RM PRIVATE

## 2017-12-31 PROCEDURE — 80048 BASIC METABOLIC PNL TOTAL CA: CPT | Performed by: INTERNAL MEDICINE

## 2017-12-31 PROCEDURE — 74011000258 HC RX REV CODE- 258: Performed by: INTERNAL MEDICINE

## 2017-12-31 PROCEDURE — 74011636637 HC RX REV CODE- 636/637: Performed by: INTERNAL MEDICINE

## 2017-12-31 PROCEDURE — 82962 GLUCOSE BLOOD TEST: CPT

## 2017-12-31 PROCEDURE — 36415 COLL VENOUS BLD VENIPUNCTURE: CPT | Performed by: INTERNAL MEDICINE

## 2017-12-31 PROCEDURE — 74011250637 HC RX REV CODE- 250/637: Performed by: INTERNAL MEDICINE

## 2017-12-31 PROCEDURE — 82140 ASSAY OF AMMONIA: CPT | Performed by: INTERNAL MEDICINE

## 2017-12-31 PROCEDURE — 74011250636 HC RX REV CODE- 250/636: Performed by: HOSPITALIST

## 2017-12-31 RX ORDER — DEXTROSE MONOHYDRATE AND SODIUM CHLORIDE 5; .45 G/100ML; G/100ML
75 INJECTION, SOLUTION INTRAVENOUS CONTINUOUS
Status: DISCONTINUED | OUTPATIENT
Start: 2017-12-31 | End: 2018-01-01

## 2017-12-31 RX ORDER — NYSTATIN 100000 [USP'U]/ML
500000 SUSPENSION ORAL 4 TIMES DAILY
Status: DISCONTINUED | OUTPATIENT
Start: 2017-12-31 | End: 2018-01-03 | Stop reason: HOSPADM

## 2017-12-31 RX ORDER — HYDRALAZINE HYDROCHLORIDE 20 MG/ML
10 INJECTION INTRAMUSCULAR; INTRAVENOUS
Status: DISCONTINUED | OUTPATIENT
Start: 2017-12-31 | End: 2018-01-03 | Stop reason: HOSPADM

## 2017-12-31 RX ORDER — HYDRALAZINE HYDROCHLORIDE 20 MG/ML
10 INJECTION INTRAMUSCULAR; INTRAVENOUS
Status: DISCONTINUED | OUTPATIENT
Start: 2017-12-31 | End: 2017-12-31

## 2017-12-31 RX ADMIN — DEXTROSE MONOHYDRATE AND SODIUM CHLORIDE 75 ML/HR: 5; .45 INJECTION, SOLUTION INTRAVENOUS at 15:02

## 2017-12-31 RX ADMIN — HEPARIN SODIUM 5000 UNITS: 5000 INJECTION, SOLUTION INTRAVENOUS; SUBCUTANEOUS at 05:57

## 2017-12-31 RX ADMIN — Medication 5 ML: at 18:22

## 2017-12-31 RX ADMIN — SODIUM CHLORIDE 75 ML/HR: 900 INJECTION, SOLUTION INTRAVENOUS at 01:45

## 2017-12-31 RX ADMIN — NYSTATIN 500000 UNITS: 100000 SUSPENSION ORAL at 18:20

## 2017-12-31 RX ADMIN — VANCOMYCIN HYDROCHLORIDE 1000 MG: 1 INJECTION, POWDER, LYOPHILIZED, FOR SOLUTION INTRAVENOUS at 23:57

## 2017-12-31 RX ADMIN — HEPARIN SODIUM 5000 UNITS: 5000 INJECTION, SOLUTION INTRAVENOUS; SUBCUTANEOUS at 21:25

## 2017-12-31 RX ADMIN — INSULIN LISPRO 6 UNITS: 100 INJECTION, SOLUTION INTRAVENOUS; SUBCUTANEOUS at 18:20

## 2017-12-31 RX ADMIN — PREGABALIN 75 MG: 75 CAPSULE ORAL at 14:50

## 2017-12-31 RX ADMIN — HEPARIN SODIUM 5000 UNITS: 5000 INJECTION, SOLUTION INTRAVENOUS; SUBCUTANEOUS at 15:03

## 2017-12-31 RX ADMIN — INSULIN LISPRO 8 UNITS: 100 INJECTION, SOLUTION INTRAVENOUS; SUBCUTANEOUS at 21:24

## 2017-12-31 RX ADMIN — INSULIN HUMAN 10 UNITS: 100 INJECTION, SUSPENSION SUBCUTANEOUS at 18:21

## 2017-12-31 RX ADMIN — INSULIN LISPRO 6 UNITS: 100 INJECTION, SOLUTION INTRAVENOUS; SUBCUTANEOUS at 05:57

## 2017-12-31 RX ADMIN — CARVEDILOL 6.25 MG: 6.25 TABLET, FILM COATED ORAL at 18:20

## 2017-12-31 RX ADMIN — SODIUM CHLORIDE 1 G: 900 INJECTION, SOLUTION INTRAVENOUS at 18:20

## 2017-12-31 RX ADMIN — CARBIDOPA AND LEVODOPA 1 TABLET: 10; 100 TABLET ORAL at 14:50

## 2017-12-31 RX ADMIN — Medication 10 ML: at 05:58

## 2017-12-31 RX ADMIN — INSULIN LISPRO 4 UNITS: 100 INJECTION, SOLUTION INTRAVENOUS; SUBCUTANEOUS at 12:58

## 2017-12-31 RX ADMIN — NYSTATIN 500000 UNITS: 100000 SUSPENSION ORAL at 21:51

## 2017-12-31 RX ADMIN — HYDRALAZINE HYDROCHLORIDE 10 MG: 20 INJECTION INTRAMUSCULAR; INTRAVENOUS at 04:00

## 2017-12-31 RX ADMIN — Medication 10 ML: at 21:23

## 2017-12-31 RX ADMIN — BACLOFEN 10 MG: 10 TABLET ORAL at 14:50

## 2017-12-31 NOTE — PROGRESS NOTES
Pt confused and noted lying on the floor on her back to the right of her bed, after physical and skin assessments no injuries noted at this time. After the assessments pt was returned to her bed.

## 2017-12-31 NOTE — PROGRESS NOTES
Pt resting in bed, no visual s/s of pain or distress, breathing even and unlabored, mcbride in place patent and draining yellow urine, safety measures in place call light within reach.

## 2017-12-31 NOTE — PROGRESS NOTES
Hospitalist Progress Note    2017  Admit Date: 2017  1:27 PM   NAME: Yanira Xiong   :  [birth year]   MRN:  [id number]   Attending: Edna Jama MD  PCP:  Hollis Gonzalez MD    SUBJECTIVE:     Yanira Xiong is a 83NRE with CAD s/p CABG, CKD (has fistula that has not been used yet), HTN, HLD who was admitted on  with confusion, found to have a UTI. Started on Rocephin and gentle IVF in the ED.    : a little more awake today. Oriented to self only and still quite confused. Had a fall out of bed overnight, but no obvious injuries. Ne fever overnight. Spoke with family, questions answered. Review of Systems negative with exception of pertinent positives noted above      PHYSICAL EXAM       Visit Vitals    /72 (BP 1 Location: Right arm, BP Patient Position: At rest)    Pulse 73    Temp 98.3 °F (36.8 °C)    Resp 24    Ht 5' 10\" (1.778 m)    Wt 117.5 kg (259 lb)    SpO2 94%    Breastfeeding No    BMI 37.16 kg/m2      Temp (24hrs), Av.9 °F (37.2 °C), Min:98.3 °F (36.8 °C), Max:99.4 °F (37.4 °C)    Oxygen Therapy  O2 Sat (%): 94 % (17 1200)  Pulse via Oximetry: 66 beats per minute (17)  O2 Device: Nasal cannula (17)  O2 Flow Rate (L/min): 2 l/min (17)    Intake/Output Summary (Last 24 hours) at 17 1412  Last data filed at 17 0968   Gross per 24 hour   Intake              900 ml   Output             1000 ml   Net             -100 ml        General: awake, confused, not cooperative with exam  Head:  Atraumatic Normocephalic. Lungs:  CTA Bilaterally.  Normal resp effort on RA  CVS:  RRR, BLE wrapped with 1+ edema  Abdomen: Soft, NTTP  Neurologic:  AOx1 (self only), intermittently following commands, moving ext equal, no apparent focal deficits      Recent Results (from the past 24 hour(s))   GLUCOSE, POC    Collection Time: 17  3:49 PM   Result Value Ref Range    Glucose (POC) 233 (H) 65 - 100 mg/dL   GLUCOSE, POC Collection Time: 12/30/17  8:52 PM   Result Value Ref Range    Glucose (POC) 244 (H) 65 - 100 mg/dL   GLUCOSE, POC    Collection Time: 12/31/17  5:20 AM   Result Value Ref Range    Glucose (POC) 262 (H) 65 - 314 mg/dL   METABOLIC PANEL, BASIC    Collection Time: 12/31/17  6:34 AM   Result Value Ref Range    Sodium 149 (H) 136 - 145 mmol/L    Potassium 4.6 3.5 - 5.1 mmol/L    Chloride 119 (H) 98 - 107 mmol/L    CO2 16 (L) 21 - 32 mmol/L    Anion gap 14 7 - 16 mmol/L    Glucose 277 (H) 65 - 100 mg/dL    BUN 64 (H) 8 - 23 MG/DL    Creatinine 2.50 (H) 0.6 - 1.0 MG/DL    GFR est AA 24 (L) >60 ml/min/1.73m2    GFR est non-AA 20 (L) >60 ml/min/1.73m2    Calcium 9.2 8.3 - 10.4 MG/DL   CBC WITH AUTOMATED DIFF    Collection Time: 12/31/17  6:34 AM   Result Value Ref Range    WBC 12.8 (H) 4.3 - 11.1 K/uL    RBC 3.00 (L) 4.05 - 5.25 M/uL    HGB 9.0 (L) 11.7 - 15.4 g/dL    HCT 27.9 (L) 35.8 - 46.3 %    MCV 93.0 79.6 - 97.8 FL    MCH 30.0 26.1 - 32.9 PG    MCHC 32.3 31.4 - 35.0 g/dL    RDW 14.6 11.9 - 14.6 %    PLATELET 742 550 - 345 K/uL    MPV 11.9 10.8 - 14.1 FL    DF AUTOMATED      NEUTROPHILS 91 (H) 43 - 78 %    LYMPHOCYTES 4 (L) 13 - 44 %    MONOCYTES 4 4.0 - 12.0 %    EOSINOPHILS 0 (L) 0.5 - 7.8 %    BASOPHILS 0 0.0 - 2.0 %    IMMATURE GRANULOCYTES 1 0.0 - 5.0 %    ABS. NEUTROPHILS 11.6 (H) 1.7 - 8.2 K/UL    ABS. LYMPHOCYTES 0.6 0.5 - 4.6 K/UL    ABS. MONOCYTES 0.5 0.1 - 1.3 K/UL    ABS. EOSINOPHILS 0.0 0.0 - 0.8 K/UL    ABS. BASOPHILS 0.0 0.0 - 0.2 K/UL    ABS. IMM.  GRANS. 0.1 0.0 - 0.5 K/UL   PROCALCITONIN    Collection Time: 12/31/17  6:34 AM   Result Value Ref Range    Procalcitonin 0.3 ng/mL   AMMONIA    Collection Time: 12/31/17  6:34 AM   Result Value Ref Range    Ammonia 14 11 - 32 UMOL/L   TSH 3RD GENERATION    Collection Time: 12/31/17  6:34 AM   Result Value Ref Range    TSH 1.840 0.358 - 3.740 uIU/mL   GLUCOSE, POC    Collection Time: 12/31/17 12:06 PM   Result Value Ref Range    Glucose (POC) 242 (H) 65 - 100 mg/dL         Imaging /Procedures /Studies   CT HEAD WO CONT   Final Result   IMPRESSION:     1. No acute intracranial process evident by noncontrast CT study of the head. CT HEAD WO CONT   Final Result   IMPRESSION:  Normal unenhanced CT of the brain for age. No acute intracranial   abnormality. XR CHEST PA LAT   Final Result   IMPRESSION: Negative for acute abnormality. XR ELBOW LT MIN 3 V   Final Result   IMPRESSION: Negative for acute fracture. XR HAND LT MIN 3 V   Final Result   IMPRESSION: Negative for acute fracture.                   ASSESSMENT      Hospital Problems as of 12/31/2017  Date Reviewed: 12/27/2017          Codes Class Noted - Resolved POA    UTI (urinary tract infection) ICD-10-CM: N39.0  ICD-9-CM: 599.0  12/27/2017 - Present Unknown        * (Principal)Encephalopathy acute ICD-10-CM: G93.40  ICD-9-CM: 348.30  12/27/2017 - Present Unknown        CKD (chronic kidney disease) stage 3, GFR 30-59 ml/min ICD-10-CM: N18.3  ICD-9-CM: 585.3  8/10/2016 - Present Yes        DM (diabetes mellitus) type II uncontrolled, periph vascular disorder (HCC) (Chronic) ICD-10-CM: E11.51, E11.65  ICD-9-CM: 250.72, 443.81  11/10/2011 - Present Yes                  Plan:  - Acute metabolic encephalopathy:  Likely 2/2 UTI  Stable today, but remains lethargic  No sedating meds given   CT head neg  ABG WNL  Procal, ammonia WNL  Check TSH  Has missed sinemet, baclofen and lyrica for last 3 days, which is likely worsening her condition  Place NGT (will likely need restraints) to give her meds  Recheck labs pending this AM    - Hypernatremia:  Increasing with poor po intake  Switch IVF to D5 1/2NS  Recheck BMP in AM    - UTI:  Started on rocephin based on previous cx  With worsening fever and mental status, will expand to vanc/cefepime  Blood cx and urine cx neg thus far    - DM2:  BS increasing a little, add NPH 10u BID  SSI ACHS    - CAD/HTN:   Continue ASA, coreg, norvasc, benazepril as she is able to take  Holding lasix given infection and increased Cr    - Parkinsonism:  Restart sinemet, baclofen, lyrica    - BLE edema with chronic ulcers:  Likely multifactorial from PVD, venous stasis and CKD  Wound care following    DVT Prophylaxis: Hep SQ  Dispo: PT/OT following, PPD placed; will likely need STR at discharge    Shen Mckinney MD

## 2017-12-31 NOTE — PROGRESS NOTES
Shift assessment complete. Pt in bed, restless at times, hollering out for \"Daddy\". No visual distress besides confusion noted. MD aware of situation. Family at bedside. Safety measures in place. Call light in reach.

## 2017-12-31 NOTE — PROGRESS NOTES
Pharmacokinetic Consult to Pharmacist    Gerson Yancey is a 68 y.o. female being treated for sepsis with vancomycin and cefepime. Ms. Daniel Espinoza was previously started on ceftriaxone, but antimicrobial therapy was broadened secondary to worsening clinical state. Cultures positive for pan-sensitive E. coli. Height: 5' 10\" (177.8 cm)  Weight:  (pt refused)  Lab Results   Component Value Date/Time    BUN 64 12/31/2017 06:34 AM    Creatinine 2.50 12/31/2017 06:34 AM    WBC 12.8 12/31/2017 06:34 AM    Procalcitonin 0.3 12/31/2017 06:34 AM    Lactic acid 1.1 05/12/2016 11:24 PM      Estimated Creatinine Clearance: 26.2 mL/min (based on Cr of 2.5). CULTURES:  All Micro Results     Procedure Component Value Units Date/Time    CULTURE, URINE [[id number]] Collected:  12/30/17 1235    Order Status:  Completed Specimen:  Urine from Cath Urine Updated:  12/31/17 0855     Special Requests: NO SPECIAL REQUESTS        Culture result: NO GROWTH 1 DAY       CULTURE, URINE [[id number]]  (Abnormal)  (Susceptibility) Collected:  12/27/17 1629    Order Status:  Completed Specimen:  Urine from Clean catch Updated:  12/31/17 0806     Special Requests: NO SPECIAL REQUESTS        Culture result:         >100,000 COLONIES/mL ESCHERICHIA COLI (A)    CULTURE, BLOOD [[id number]] Collected:  12/30/17 1053    Order Status:  Completed Specimen:  Blood from Blood Updated:  12/30/17 1155    CULTURE, BLOOD [[id number]] Collected:  12/30/17 1043    Order Status:  Completed Specimen:  Blood from Blood Updated:  12/30/17 1155          Day 2 of vancomycin. Goal trough is 15-20. CKD. SrCr trending down slightly, but relatively stable over past 24 hours. Calculated half life elimination is 33h based on current CrCl. I will schedule 1000mg q24h and expect some accumulation for calculated trough within the therapeutic range. If renal function worsens, I will consider intermittent dosing moving forward. Pharmacist will continue to follow patient.   Please call with any questions. Thank you,  Isamar Morales.  Randi Kapadia, PharmD  Clinical Pharmacist  [phone]

## 2017-12-31 NOTE — PROGRESS NOTES
Md  [facility] notified about this pt incident, no verbal orders received.

## 2018-01-01 ENCOUNTER — APPOINTMENT (OUTPATIENT)
Dept: GENERAL RADIOLOGY | Age: 78
DRG: 689 | End: 2018-01-01
Attending: INTERNAL MEDICINE
Payer: MEDICARE

## 2018-01-01 LAB
ANION GAP SERPL CALC-SCNC: 11 MMOL/L (ref 7–16)
BASOPHILS # BLD: 0.1 K/UL (ref 0–0.2)
BASOPHILS NFR BLD: 0 % (ref 0–2)
BUN SERPL-MCNC: 67 MG/DL (ref 8–23)
CALCIUM SERPL-MCNC: 8.7 MG/DL (ref 8.3–10.4)
CHLORIDE SERPL-SCNC: 118 MMOL/L (ref 98–107)
CO2 SERPL-SCNC: 19 MMOL/L (ref 21–32)
CREAT SERPL-MCNC: 2.54 MG/DL (ref 0.6–1)
DIFFERENTIAL METHOD BLD: ABNORMAL
EOSINOPHIL # BLD: 0.1 K/UL (ref 0–0.8)
EOSINOPHIL NFR BLD: 1 % (ref 0.5–7.8)
ERYTHROCYTE [DISTWIDTH] IN BLOOD BY AUTOMATED COUNT: 14.8 % (ref 11.9–14.6)
GLUCOSE BLD STRIP.AUTO-MCNC: 291 MG/DL (ref 65–100)
GLUCOSE BLD STRIP.AUTO-MCNC: 328 MG/DL (ref 65–100)
GLUCOSE BLD STRIP.AUTO-MCNC: 355 MG/DL (ref 65–100)
GLUCOSE BLD STRIP.AUTO-MCNC: 362 MG/DL (ref 65–100)
GLUCOSE SERPL-MCNC: 327 MG/DL (ref 65–100)
HCT VFR BLD AUTO: 27.3 % (ref 35.8–46.3)
HGB BLD-MCNC: 8.6 G/DL (ref 11.7–15.4)
IMM GRANULOCYTES # BLD: 0.1 K/UL (ref 0–0.5)
IMM GRANULOCYTES NFR BLD AUTO: 1 % (ref 0–5)
LYMPHOCYTES # BLD: 1.1 K/UL (ref 0.5–4.6)
LYMPHOCYTES NFR BLD: 9 % (ref 13–44)
MCH RBC QN AUTO: 29.6 PG (ref 26.1–32.9)
MCHC RBC AUTO-ENTMCNC: 31.5 G/DL (ref 31.4–35)
MCV RBC AUTO: 93.8 FL (ref 79.6–97.8)
MONOCYTES # BLD: 0.7 K/UL (ref 0.1–1.3)
MONOCYTES NFR BLD: 6 % (ref 4–12)
NEUTS SEG # BLD: 10.1 K/UL (ref 1.7–8.2)
NEUTS SEG NFR BLD: 83 % (ref 43–78)
PLATELET # BLD AUTO: 350 K/UL (ref 150–450)
PMV BLD AUTO: 12 FL (ref 10.8–14.1)
POTASSIUM SERPL-SCNC: 4.3 MMOL/L (ref 3.5–5.1)
RBC # BLD AUTO: 2.91 M/UL (ref 4.05–5.25)
SODIUM SERPL-SCNC: 148 MMOL/L (ref 136–145)
WBC # BLD AUTO: 12 K/UL (ref 4.3–11.1)

## 2018-01-01 PROCEDURE — 74011636637 HC RX REV CODE- 636/637: Performed by: INTERNAL MEDICINE

## 2018-01-01 PROCEDURE — 77010033678 HC OXYGEN DAILY

## 2018-01-01 PROCEDURE — 94760 N-INVAS EAR/PLS OXIMETRY 1: CPT

## 2018-01-01 PROCEDURE — 74011250636 HC RX REV CODE- 250/636: Performed by: INTERNAL MEDICINE

## 2018-01-01 PROCEDURE — 94640 AIRWAY INHALATION TREATMENT: CPT

## 2018-01-01 PROCEDURE — 85025 COMPLETE CBC W/AUTO DIFF WBC: CPT | Performed by: INTERNAL MEDICINE

## 2018-01-01 PROCEDURE — 97530 THERAPEUTIC ACTIVITIES: CPT

## 2018-01-01 PROCEDURE — 82962 GLUCOSE BLOOD TEST: CPT

## 2018-01-01 PROCEDURE — 97166 OT EVAL MOD COMPLEX 45 MIN: CPT

## 2018-01-01 PROCEDURE — 65270000029 HC RM PRIVATE

## 2018-01-01 PROCEDURE — 74011000258 HC RX REV CODE- 258: Performed by: INTERNAL MEDICINE

## 2018-01-01 PROCEDURE — 71045 X-RAY EXAM CHEST 1 VIEW: CPT

## 2018-01-01 PROCEDURE — 74011250637 HC RX REV CODE- 250/637: Performed by: INTERNAL MEDICINE

## 2018-01-01 PROCEDURE — 80048 BASIC METABOLIC PNL TOTAL CA: CPT | Performed by: INTERNAL MEDICINE

## 2018-01-01 PROCEDURE — 74011000250 HC RX REV CODE- 250: Performed by: INTERNAL MEDICINE

## 2018-01-01 PROCEDURE — 36415 COLL VENOUS BLD VENIPUNCTURE: CPT | Performed by: INTERNAL MEDICINE

## 2018-01-01 RX ORDER — IPRATROPIUM BROMIDE AND ALBUTEROL SULFATE 2.5; .5 MG/3ML; MG/3ML
3 SOLUTION RESPIRATORY (INHALATION)
Status: DISCONTINUED | OUTPATIENT
Start: 2018-01-01 | End: 2018-01-03 | Stop reason: HOSPADM

## 2018-01-01 RX ORDER — FUROSEMIDE 10 MG/ML
40 INJECTION INTRAMUSCULAR; INTRAVENOUS ONCE
Status: COMPLETED | OUTPATIENT
Start: 2018-01-01 | End: 2018-01-01

## 2018-01-01 RX ADMIN — CARVEDILOL 6.25 MG: 6.25 TABLET, FILM COATED ORAL at 08:01

## 2018-01-01 RX ADMIN — NYSTATIN 500000 UNITS: 100000 SUSPENSION ORAL at 17:04

## 2018-01-01 RX ADMIN — CARBIDOPA AND LEVODOPA 1 TABLET: 10; 100 TABLET ORAL at 17:06

## 2018-01-01 RX ADMIN — BACLOFEN 10 MG: 10 TABLET ORAL at 17:03

## 2018-01-01 RX ADMIN — DEXTROSE MONOHYDRATE AND SODIUM CHLORIDE 75 ML/HR: 5; .45 INJECTION, SOLUTION INTRAVENOUS at 06:08

## 2018-01-01 RX ADMIN — NYSTATIN 500000 UNITS: 100000 SUSPENSION ORAL at 20:45

## 2018-01-01 RX ADMIN — INSULIN LISPRO 6 UNITS: 100 INJECTION, SOLUTION INTRAVENOUS; SUBCUTANEOUS at 21:30

## 2018-01-01 RX ADMIN — Medication 10 ML: at 13:58

## 2018-01-01 RX ADMIN — CARBIDOPA AND LEVODOPA 1 TABLET: 10; 100 TABLET ORAL at 20:44

## 2018-01-01 RX ADMIN — INSULIN HUMAN 10 UNITS: 100 INJECTION, SUSPENSION SUBCUTANEOUS at 08:09

## 2018-01-01 RX ADMIN — INSULIN LISPRO 10 UNITS: 100 INJECTION, SOLUTION INTRAVENOUS; SUBCUTANEOUS at 16:00

## 2018-01-01 RX ADMIN — AMLODIPINE BESYLATE 5 MG: 5 TABLET ORAL at 08:00

## 2018-01-01 RX ADMIN — Medication 10 ML: at 05:59

## 2018-01-01 RX ADMIN — INSULIN LISPRO 8 UNITS: 100 INJECTION, SOLUTION INTRAVENOUS; SUBCUTANEOUS at 06:00

## 2018-01-01 RX ADMIN — PREGABALIN 75 MG: 75 CAPSULE ORAL at 08:01

## 2018-01-01 RX ADMIN — NYSTATIN 500000 UNITS: 100000 SUSPENSION ORAL at 08:01

## 2018-01-01 RX ADMIN — CARVEDILOL 6.25 MG: 6.25 TABLET, FILM COATED ORAL at 17:03

## 2018-01-01 RX ADMIN — HEPARIN SODIUM 5000 UNITS: 5000 INJECTION, SOLUTION INTRAVENOUS; SUBCUTANEOUS at 13:40

## 2018-01-01 RX ADMIN — BACLOFEN 10 MG: 10 TABLET ORAL at 13:40

## 2018-01-01 RX ADMIN — Medication 10 ML: at 20:45

## 2018-01-01 RX ADMIN — INSULIN HUMAN 20 UNITS: 100 INJECTION, SUSPENSION SUBCUTANEOUS at 16:00

## 2018-01-01 RX ADMIN — LEVOTHYROXINE SODIUM 25 MCG: 50 TABLET ORAL at 06:01

## 2018-01-01 RX ADMIN — BACLOFEN 10 MG: 10 TABLET ORAL at 20:44

## 2018-01-01 RX ADMIN — IPRATROPIUM BROMIDE AND ALBUTEROL SULFATE 3 ML: .5; 3 SOLUTION RESPIRATORY (INHALATION) at 11:09

## 2018-01-01 RX ADMIN — CARBIDOPA AND LEVODOPA 1 TABLET: 10; 100 TABLET ORAL at 08:01

## 2018-01-01 RX ADMIN — ASPIRIN 325 MG ORAL TABLET 325 MG: 325 PILL ORAL at 08:00

## 2018-01-01 RX ADMIN — HEPARIN SODIUM 5000 UNITS: 5000 INJECTION, SOLUTION INTRAVENOUS; SUBCUTANEOUS at 20:46

## 2018-01-01 RX ADMIN — INSULIN HUMAN 10 UNITS: 100 INJECTION, SUSPENSION SUBCUTANEOUS at 09:05

## 2018-01-01 RX ADMIN — FUROSEMIDE 40 MG: 10 INJECTION, SOLUTION INTRAMUSCULAR; INTRAVENOUS at 13:40

## 2018-01-01 RX ADMIN — SODIUM CHLORIDE 1 G: 900 INJECTION, SOLUTION INTRAVENOUS at 17:03

## 2018-01-01 RX ADMIN — HEPARIN SODIUM 5000 UNITS: 5000 INJECTION, SOLUTION INTRAVENOUS; SUBCUTANEOUS at 06:01

## 2018-01-01 RX ADMIN — BENAZEPRIL HYDROCHLORIDE 20 MG: 10 TABLET, FILM COATED ORAL at 08:01

## 2018-01-01 RX ADMIN — INSULIN LISPRO 10 UNITS: 100 INJECTION, SOLUTION INTRAVENOUS; SUBCUTANEOUS at 13:58

## 2018-01-01 RX ADMIN — BACLOFEN 10 MG: 10 TABLET ORAL at 08:00

## 2018-01-01 RX ADMIN — NYSTATIN 500000 UNITS: 100000 SUSPENSION ORAL at 13:40

## 2018-01-01 RX ADMIN — ATORVASTATIN CALCIUM 40 MG: 40 TABLET, FILM COATED ORAL at 08:00

## 2018-01-01 NOTE — PROGRESS NOTES
Pt still tachypneic with expiratory wheezing and crackles noted to left lobes. Fluids stopped and MD dahld.

## 2018-01-01 NOTE — PROGRESS NOTES
Pt is upset and stating \"just let me go see my mama. Just let me go in peace. \" Sat with pt until she calmed down. Pt was able to fall asleep. No s/s of distress currently. Will continue to monitor.

## 2018-01-01 NOTE — PROGRESS NOTES
Problem: Falls - Risk of  Goal: *Absence of Falls  Document King Fall Risk and appropriate interventions in the flowsheet.    Fall Risk Interventions:  Mobility Interventions: Bed/chair exit alarm    Mentation Interventions: Bed/chair exit alarm    Medication Interventions: Bed/chair exit alarm, Evaluate medications/consider consulting pharmacy    Elimination Interventions: Call light in reach    History of Falls Interventions: Door open when patient unattended, Bed/chair exit alarm

## 2018-01-01 NOTE — PROGRESS NOTES
Physical Therapy Note:    Attempted to see patient a second time, RN requested to hold until tomorrow and patient fast asleep. Will attempt tomorrow as patient is able.     Thank you,  NILDA JacobT

## 2018-01-01 NOTE — PROGRESS NOTES
Physical assessment completed, pt confused resting in bed, no visual s/s of pain or distress,  Perez in place patent and draining yellow color urine, IV in place patent and infusing, posey in place and working, call light within reach.

## 2018-01-01 NOTE — PROGRESS NOTES
Physical Therapy Note:    Attempted PT treatment this AM, RN reports patient may have fluid, requested therapy hold this AM. Will attempt later on today or tomorrow as patient is able and schedule allows.     Thank you,  NILDA KellerT

## 2018-01-01 NOTE — PROGRESS NOTES
Hospitalist Progress Note    2018  Admit Date: 2017  1:27 PM   NAME: Katelynn Serra   :     MRN:  [id number]   Attending: Ca Middleton MD  PCP:  Willam Barroso MD    SUBJECTIVE:     Katelynn Serra is a 84KEH with CAD s/p CABG, CKD (has fistula that has not been used yet), HTN, HLD who was admitted on  with confusion, found to have a UTI. Started on Rocephin and gentle IVF in the ED.    : more awake today, able to answer most of the orientation questions. Breathing a little difficult this morning with tachypnea. No fever for last 48hrs. BS elevated, but was started on a D5 gtt yesterday. Review of Systems negative with exception of pertinent positives noted above      PHYSICAL EXAM       Visit Vitals    /69 (BP 1 Location: Right arm, BP Patient Position: At rest)    Pulse 65    Temp 98.5 °F (36.9 °C)    Resp 21    Ht 5' 10\" (1.778 m)    Wt 117.5 kg (259 lb)    SpO2 92%    Breastfeeding No    BMI 37.16 kg/m2      Temp (24hrs), Av.8 °F (37.1 °C), Min:98.3 °F (36.8 °C), Max:99.5 °F (37.5 °C)    Oxygen Therapy  O2 Sat (%): 92 % (18 0736)  Pulse via Oximetry: 66 beats per minute (17)  O2 Device: Nasal cannula (17)  O2 Flow Rate (L/min): 2 l/min (17)    Intake/Output Summary (Last 24 hours) at 18 0842  Last data filed at 18 6157   Gross per 24 hour   Intake             1150 ml   Output             1530 ml   Net             -380 ml        General: awake, much more cooperative  Head:  Atraumatic Normocephalic.   Lungs:  Wheezing, tachypnea  CVS:  RRR, BLE wrapped with 1+ edema  Abdomen: Soft, NTTP  Neurologic:  AOx2 (not year), follows commands x4, no apparent focal deficits      Recent Results (from the past 24 hour(s))   GLUCOSE, POC    Collection Time: 17 12:06 PM   Result Value Ref Range    Glucose (POC) 242 (H) 65 - 100 mg/dL   GLUCOSE, POC    Collection Time: 17  3:57 PM   Result Value Ref Range    Glucose (POC) 297 (H) 65 - 100 mg/dL   GLUCOSE, POC    Collection Time: 12/31/17  8:52 PM   Result Value Ref Range    Glucose (POC) 341 (H) 65 - 100 mg/dL   GLUCOSE, POC    Collection Time: 01/01/18  5:44 AM   Result Value Ref Range    Glucose (POC) 328 (H) 65 - 100 mg/dL         Imaging /Procedures /Studies   CT HEAD WO CONT   Final Result   IMPRESSION:     1. No acute intracranial process evident by noncontrast CT study of the head. CT HEAD WO CONT   Final Result   IMPRESSION:  Normal unenhanced CT of the brain for age. No acute intracranial   abnormality. XR CHEST PA LAT   Final Result   IMPRESSION: Negative for acute abnormality. XR ELBOW LT MIN 3 V   Final Result   IMPRESSION: Negative for acute fracture. XR HAND LT MIN 3 V   Final Result   IMPRESSION: Negative for acute fracture.             XR CHEST SNGL V    (Results Pending)         ASSESSMENT      Hospital Problems as of 1/1/2018  Date Reviewed: 12/27/2017          Codes Class Noted - Resolved POA    UTI (urinary tract infection) ICD-10-CM: N39.0  ICD-9-CM: 599.0  12/27/2017 - Present Unknown        * (Principal)Encephalopathy acute ICD-10-CM: G93.40  ICD-9-CM: 348.30  12/27/2017 - Present Unknown        CKD (chronic kidney disease) stage 3, GFR 30-59 ml/min ICD-10-CM: N18.3  ICD-9-CM: 585.3  8/10/2016 - Present Yes        DM (diabetes mellitus) type II uncontrolled, periph vascular disorder (HCC) (Chronic) ICD-10-CM: E11.51, E11.65  ICD-9-CM: 250.72, 443.81  11/10/2011 - Present Yes                  Plan:  - Acute metabolic encephalopathy:  Likely 2/2 UTI  Improving today  No sedating meds given   CT head neg  ABG WNL  Procal, ammonia WNL  Labs pending this AM  Has missed sinemet, baclofen and lyrica for last 3 days, which likely worsened her condition    - Hypernatremia:  Increasing with poor po intake  Switch IVF to D5 1/2NS; adjust fluid when labs return  Recheck BMP pending this AM    - Tachypnea:  Still with some hypoxia  CXR today  Nebs prn  Wean O2 as able    - UTI:  Started on rocephin based on previous cx  With worsening fever and mental status, expanded to vanc/cefepime  Blood cx and urine cx neg thus far    - DM2:  BS increasing; increase NPH to 20u BID  SSI ACHS    - CAD/HTN:   Continue ASA, coreg, norvasc, benazepril as she is able to take  Holding lasix given infection and increased Cr    - Parkinsonism:  Restarted sinemet, baclofen, lyrica on 12/31    - BLE edema with chronic ulcers:  Likely multifactorial from PVD, venous stasis and CKD  Wound care following    DVT Prophylaxis: Hep SQ  Dispo: PT/OT following, PPD placed; will likely need STR at discharge    Jeanenne Sever, MD

## 2018-01-01 NOTE — PROGRESS NOTES
Problem: Self Care Deficits Care Plan (Adult)  Goal: *Acute Goals and Plan of Care (Insert Text)  1. Patient will complete upper body bathing and dressing with setup and adaptive equipment as needed. 2. Patient will complete toileting with minimal assistance. 3. Patient will tolerate 23 minutes of OT treatment with minimal rest breaks to increase activity tolerance for ADLs. 4. Patient will complete functional transfers with minimal assistance and adaptive equipment as needed. Timeframe: 7 visits     OCCUPATIONAL THERAPY: Initial Assessment, Daily Note and Treatment Day: 1st 1/1/2018  INPATIENT: Hospital Day: 6  [place] / [place] / Product Type: Medicare /      NAME/AGE/GENDER: Karene Rubinstein is a 68 y.o. female   PRIMARY DIAGNOSIS:  UTI (urinary tract infection)  Encephalopathy acute Encephalopathy acute Encephalopathy acute        ICD-10: Treatment Diagnosis:    · Generalized Muscle Weakness (M62.81)  · Dizziness and Giddiness (R42)   Precautions/Allergies:     Lipitor [atorvastatin]      ASSESSMENT:     Ms. Ricky Larson presents supine in bed agreeable to therapy. Pt is oriented to self and time, intermittent confusion and decreased insight into deficits noted. At baseline, pt reports (and confirmed by daughter) she lives with  and daughter and requires occasional assistance with ADLs but is mostly independent. Currently, pt presents with a cough and overall weakness and decreased activity tolerance. Her O2 stat is 92% at rest with 62 HR, with movement, O2 decreases to 88% and HR 56.  Pt is very talkative with decreased attention requiring verbal cueing for breathing techniques to maintain O2 stats. Pt perseverates on her LUE fistula; appears very anxious. LUE strength and ROM are limited, RUE decreased, functional.  Pt requires moderate assistance for bed mobility and sit to stand transfer with encouragement.   Pt returned to bed secondary to increased coughing/wheezing; RN notified. Pt requests orange juice; daughter states she \"seems obsessed with orange juice\" since right before she came into the hospital. Ms. Daniel Espinoza presents with decreased independence for self care, functional mobility, and ADL's. Requires skilled OT to maximize independence with self care tasks and functional transfers. Pt left with family at side and all needs in reach, pt instructed to call for assistance; RN aware. This section established at most recent assessment   PROBLEM LIST (Impairments causing functional limitations):  1. Decreased Strength  2. Decreased ADL/Functional Activities  3. Decreased Transfer Abilities  4. Decreased Ambulation Ability/Technique  5. Decreased Balance  6. Decreased Activity Tolerance  7. Increased Fatigue  8. Increased Shortness of Breath  9. Edema/Girth  10. Decreased Cognition   INTERVENTIONS PLANNED: (Benefits and precautions of occupational therapy have been discussed with the patient.)  1. Activities of daily living training  2. Adaptive equipment training  3. Balance training  4. Cognitive training  5. Donning&doffing training  6. Group therapy  7. Therapeutic activity  8. Therapeutic exercise  9. Energy conservation     TREATMENT PLAN: Frequency/Duration: Follow patient 3x/week to address above goals. Rehabilitation Potential For Stated Goals: Good     RECOMMENDED REHABILITATION/EQUIPMENT: (at time of discharge pending progress): Due to the probability of continued deficits (see above) this patient will likely need continued skilled occupational therapy after discharge. Equipment:    TBD              OCCUPATIONAL PROFILE AND HISTORY:   History of Present Injury/Illness (Reason for Referral):  See H&P  Past Medical History/Comorbidities:   Ms. Daniel Espinoza  has a past medical history of Adverse effect of anesthesia; AF (atrial fibrillation) ([facility] [address] (11/20/2011); Arthritis (11/18/2011); CAD (coronary artery disease) (2011);  Cervical disc disease; Chronic kidney disease; Chronic pain; DJD (degenerative joint disease); Drainage from wound (10/3/2014); Full dentures; Gout; Hemorrhoid (11/5/2013); [place] (hard of hearing); Hyperlipemia (11/10/2011); Hypertension; Hypertriglyceridemia; Hypothyroidism; Neuropathy; Obesity (BMI 30-39.9) (10/2/14); PAD (peripheral artery disease) ([facility] PCI (pneumatosis cystoides intestinalis) (11/5/2013); Pleural effusion, bilateral (11/21/2011); Postoperative anemia due to acute blood loss (11/21/2011); Rib cage fracture (11/5/2013); S/P CABG (coronary artery bypass graft) (11/05/2013); Status post-operative repair of hip fracture (09/15/2014); Stroke [facility]); Type II or unspecified type diabetes mellitus without mention of complication, uncontrolled ([facility] [address] (12/16/2013); and Unspecified adverse effect of anesthesia. She also has no past medical history of Difficult intubation; Malignant hyperthermia due to anesthesia; Nausea & vomiting; or Pseudocholinesterase deficiency. Ms. Magaña Mom  has a past surgical history that includes hx colonoscopy; pr cabg, artery-vein, four (Oct. 2011); hx cataract removal (Bilateral, 11/2012); hx hysterectomy; hx lap cholecystectomy; hx hip fracture tx (Left); pr close cystostomy; vascular surgery procedure unlist (Left, 06/07/2017); and vascular surgery procedure unlist (Left, 08/10/2017).   Social History/Living Environment:   Home Environment: Private residence  # Steps to Enter: 4  Wheelchair Ramp: No  One/Two Story Residence: Two story, live on 1st floor  # of Interior Steps: 10  Height of Each Step (in): 10 inches  Interior Rails: Both  Lift Chair Available: No  Living Alone: No  Support Systems: Child(dania), Spouse/Significant Other/Partner  Patient Expects to be Discharged to[de-identified]  (STR)  Current DME Used/Available at Home: Grab bars, Shower chair  Tub or Shower Type: Shower  Prior Level of Function/Work/Activity:  Some assistance     Number of Personal Factors/Comorbidities that affect the Plan of Care: Extensive review of physical, cognitive, and psychosocial performance (3+):  HIGH COMPLEXITY   ASSESSMENT OF OCCUPATIONAL PERFORMANCE[de-identified]   Activities of Daily Living:           Basic ADLs (From Assessment) Complex ADLs (From Assessment)   Basic ADL  Feeding: Minimum assistance  Oral Facial Hygiene/Grooming: Minimum assistance  Bathing: Maximum assistance  Upper Body Dressing: Minimum assistance  Lower Body Dressing: Maximum assistance  Toileting: Maximum assistance Instrumental ADL  Meal Preparation: Total assistance  Homemaking: Total assistance   Grooming/Bathing/Dressing Activities of Daily Living     Cognitive Retraining  Safety/Judgement: Decreased insight into deficits; Fall prevention     Feeding  Container Management: Moderate assistance  Drink to Mouth: Supervision/set-up                 Bed/Mat Mobility  Rolling: Minimum assistance  Supine to Sit: Moderate assistance  Sit to Supine: Moderate assistance  Sit to Stand: Moderate assistance  Scooting: Minimum assistance       Most Recent Physical Functioning:   Gross Assessment:  AROM:  (LUE limited, some function: RUE decreased, functional)  Strength:  (LUE limited, some function: RUE decreased, functional)  Coordination:  (limited BUE)               Posture:  Posture (WDL): Exceptions to WDL  Posture Assessment: Forward head, Rounded shoulders  Balance:  Sitting: Intact  Standing: Impaired  Standing - Static: Poor  Standing - Dynamic : Poor Bed Mobility:  Rolling: Minimum assistance  Supine to Sit: Moderate assistance  Sit to Supine: Moderate assistance  Scooting: Minimum assistance  Wheelchair Mobility:     Transfers:  Sit to Stand:  Moderate assistance  Stand to Sit: Minimum assistance              Patient Vitals for the past 6 hrs:   BP SpO2 O2 Flow Rate (L/min) Pulse   01/01/18 1109 - 96 % 3 l/min -   01/01/18 1134 143/84 99 % - (!) 59       Mental Status  Neurologic State: Alert, Confused  Orientation Level: Oriented to person, Disoriented to situation, Oriented to time  Cognition: Decreased attention/concentration, Decreased command following  Perception: Appears intact  Perseveration: Perseverates during conversation (\"arm\")  Safety/Judgement: Decreased insight into deficits, Fall prevention                          Physical Skills Involved:  1. Range of Motion  2. Balance  3. Strength  4. Activity Tolerance  5. Gross Motor Control  6. Edema Cognitive Skills Affected (resulting in the inability to perform in a timely and safe manner):  1. Perception  2. Executive Function  3. Sustained Attention  4. Divided Attention  5. Comprehension Psychosocial Skills Affected:  1. Habits/Routines  2. Environmental Adaptation  3. Self-Awareness   Number of elements that affect the Plan of Care: 5+:  HIGH COMPLEXITY   CLINICAL DECISION MAKIN[address] [place] [address] AM-PAC 6 Clicks   Daily Activity Inpatient Short Form  How much help from another person does the patient currently need. .. Total A Lot A Little None   1. Putting on and taking off regular lower body clothing? [x] 1   [] 2   [] 3   [] 4   2. Bathing (including washing, rinsing, drying)? [] 1   [x] 2   [] 3   [] 4   3. Toileting, which includes using toilet, bedpan or urinal?   [] 1   [x] 2   [] 3   [] 4   4. Putting on and taking off regular upper body clothing? [] 1   [x] 2   [] 3   [] 4   5. Taking care of personal grooming such as brushing teeth? [] 1   [x] 2   [] 3   [] 4   6. Eating meals? [] 1   [] 2   [x] 3   [] 4   © , Trustees of [address] [place], [place] [address], under license to [employer]. All rights reserved      Score:  Initial: 12 Most Recent: X (Date: -- )    Interpretation of Tool:  Represents activities that are increasingly more difficult (i.e. Bed mobility, Transfers, Gait). Score 24 23 22-20 19-15 14-10 9-7 6     Modifier CH CI CJ CK CL CM CN      ?  Self Care:     - CURRENT STATUS: CL - 60%-79% impaired, limited or restricted    - GOAL STATUS: CK - 40%-59% impaired, limited or restricted    - D/C STATUS:  ---------------To be determined---------------  [place] / [place] / Product Type: Medicare /      Medical Necessity:     · Patient demonstrates good rehab potential due to higher previous functional level. Reason for Services/Other Comments:  · Patient continues to require skilled intervention due to medical complications and patient unable to attend/participate in therapy as expected. Use of outcome tool(s) and clinical judgement create a POC that gives a: MODERATE COMPLEXITY         TREATMENT:   (In addition to Assessment/Re-Assessment sessions the following treatments were rendered)     Pre-treatment Symptoms/Complaints:  Decreased ability to perform ADLs, self care, and functional mobility; decreased tolerance for activities  Pain: Initial:   Pain Intensity 1: 0  Post Session:  0     Therapeutic Activity: (    10 minutes): Therapeutic activities including Bed transfers and energy conservation techniques, ADL prep to improve mobility, strength, coordination and dynamic movement of arm - bilateral to improve functional reaching and grasping, crossing midline. Required moderate verbal, tactile, and visual cueing   to promote coordination of bilateral, upper extremity(s) and promote motor control of bilateral, upper extremity(s). Assessment    Braces/Orthotics/Lines/Etc:   · IV  · O2 Device: Nasal cannula  Treatment/Session Assessment:    · Response to Treatment:  Agrees to therapy  · Interdisciplinary Collaboration:   o Occupational Therapist  o Registered Nurse  · After treatment position/precautions:   o Bed/Chair-wheels locked  o Call light within reach  o RN notified  o Family at bedside   · Compliance with Program/Exercises: Will assess as treatment progresses. · Recommendations/Intent for next treatment session: \"Next visit will focus on advancements to more challenging activities and reduction in assistance provided\".   Total Treatment Duration:  OT Patient Time In/Time Out  Time In: 1025  Time Out: 67 [facility] THOMAS PurdyR/L

## 2018-01-01 NOTE — PROGRESS NOTES
Assessment completed. HOB elevated. Expiratory wheezing noted and RR slightly elevated. Pleasantly confused and able to state name. Able to take PO meds with apple sauce. D51/2 running at 75 mL/hr. Ana cathter in place and dependent below pt. Posey alarm activated. Bed locked, in low position, call light in reach. Will monitor.

## 2018-01-02 LAB
ANION GAP SERPL CALC-SCNC: 8 MMOL/L (ref 7–16)
BASOPHILS # BLD: 0.1 K/UL (ref 0–0.2)
BASOPHILS NFR BLD: 1 % (ref 0–2)
BUN SERPL-MCNC: 68 MG/DL (ref 8–23)
CALCIUM SERPL-MCNC: 8.5 MG/DL (ref 8.3–10.4)
CHLORIDE SERPL-SCNC: 116 MMOL/L (ref 98–107)
CO2 SERPL-SCNC: 21 MMOL/L (ref 21–32)
CREAT SERPL-MCNC: 2.65 MG/DL (ref 0.6–1)
DIFFERENTIAL METHOD BLD: ABNORMAL
EOSINOPHIL # BLD: 0.5 K/UL (ref 0–0.8)
EOSINOPHIL NFR BLD: 5 % (ref 0.5–7.8)
ERYTHROCYTE [DISTWIDTH] IN BLOOD BY AUTOMATED COUNT: 14.5 % (ref 11.9–14.6)
GLUCOSE BLD STRIP.AUTO-MCNC: 203 MG/DL (ref 65–100)
GLUCOSE BLD STRIP.AUTO-MCNC: 212 MG/DL (ref 65–100)
GLUCOSE BLD STRIP.AUTO-MCNC: 216 MG/DL (ref 65–100)
GLUCOSE BLD STRIP.AUTO-MCNC: 231 MG/DL (ref 65–100)
GLUCOSE SERPL-MCNC: 191 MG/DL (ref 65–100)
HCT VFR BLD AUTO: 28.6 % (ref 35.8–46.3)
HGB BLD-MCNC: 8.8 G/DL (ref 11.7–15.4)
IMM GRANULOCYTES # BLD: 0.1 K/UL (ref 0–0.5)
IMM GRANULOCYTES NFR BLD AUTO: 1 % (ref 0–5)
LYMPHOCYTES # BLD: 1.3 K/UL (ref 0.5–4.6)
LYMPHOCYTES NFR BLD: 13 % (ref 13–44)
MCH RBC QN AUTO: 28.9 PG (ref 26.1–32.9)
MCHC RBC AUTO-ENTMCNC: 30.8 G/DL (ref 31.4–35)
MCV RBC AUTO: 94.1 FL (ref 79.6–97.8)
MONOCYTES # BLD: 0.7 K/UL (ref 0.1–1.3)
MONOCYTES NFR BLD: 7 % (ref 4–12)
NEUTS SEG # BLD: 7.7 K/UL (ref 1.7–8.2)
NEUTS SEG NFR BLD: 73 % (ref 43–78)
PLATELET # BLD AUTO: 322 K/UL (ref 150–450)
PMV BLD AUTO: 12 FL (ref 10.8–14.1)
POTASSIUM SERPL-SCNC: 4.8 MMOL/L (ref 3.5–5.1)
RBC # BLD AUTO: 3.04 M/UL (ref 4.05–5.25)
SODIUM SERPL-SCNC: 145 MMOL/L (ref 136–145)
WBC # BLD AUTO: 10.4 K/UL (ref 4.3–11.1)

## 2018-01-02 PROCEDURE — 74011636637 HC RX REV CODE- 636/637: Performed by: INTERNAL MEDICINE

## 2018-01-02 PROCEDURE — 97530 THERAPEUTIC ACTIVITIES: CPT

## 2018-01-02 PROCEDURE — 74011250637 HC RX REV CODE- 250/637: Performed by: INTERNAL MEDICINE

## 2018-01-02 PROCEDURE — 85025 COMPLETE CBC W/AUTO DIFF WBC: CPT | Performed by: INTERNAL MEDICINE

## 2018-01-02 PROCEDURE — 36415 COLL VENOUS BLD VENIPUNCTURE: CPT | Performed by: INTERNAL MEDICINE

## 2018-01-02 PROCEDURE — 74011250636 HC RX REV CODE- 250/636: Performed by: INTERNAL MEDICINE

## 2018-01-02 PROCEDURE — 74011000250 HC RX REV CODE- 250: Performed by: INTERNAL MEDICINE

## 2018-01-02 PROCEDURE — 80048 BASIC METABOLIC PNL TOTAL CA: CPT | Performed by: INTERNAL MEDICINE

## 2018-01-02 PROCEDURE — 82962 GLUCOSE BLOOD TEST: CPT

## 2018-01-02 PROCEDURE — 65270000029 HC RM PRIVATE

## 2018-01-02 RX ORDER — FUROSEMIDE 40 MG/1
40 TABLET ORAL DAILY
Status: DISCONTINUED | OUTPATIENT
Start: 2018-01-03 | End: 2018-01-03 | Stop reason: HOSPADM

## 2018-01-02 RX ORDER — FUROSEMIDE 10 MG/ML
40 INJECTION INTRAMUSCULAR; INTRAVENOUS ONCE
Status: COMPLETED | OUTPATIENT
Start: 2018-01-02 | End: 2018-01-02

## 2018-01-02 RX ADMIN — CARVEDILOL 6.25 MG: 6.25 TABLET, FILM COATED ORAL at 17:32

## 2018-01-02 RX ADMIN — CARBIDOPA AND LEVODOPA 1 TABLET: 10; 100 TABLET ORAL at 20:32

## 2018-01-02 RX ADMIN — HEPARIN SODIUM 5000 UNITS: 5000 INJECTION, SOLUTION INTRAVENOUS; SUBCUTANEOUS at 20:32

## 2018-01-02 RX ADMIN — Medication 10 ML: at 17:33

## 2018-01-02 RX ADMIN — HYDROCODONE BITARTRATE AND ACETAMINOPHEN 1 TABLET: 5; 325 TABLET ORAL at 08:30

## 2018-01-02 RX ADMIN — ATORVASTATIN CALCIUM 40 MG: 40 TABLET, FILM COATED ORAL at 08:25

## 2018-01-02 RX ADMIN — CEFTRIAXONE SODIUM 1 G: 1 INJECTION, POWDER, FOR SOLUTION INTRAMUSCULAR; INTRAVENOUS at 08:30

## 2018-01-02 RX ADMIN — AMLODIPINE BESYLATE 5 MG: 5 TABLET ORAL at 08:25

## 2018-01-02 RX ADMIN — CARBIDOPA AND LEVODOPA 1 TABLET: 10; 100 TABLET ORAL at 09:00

## 2018-01-02 RX ADMIN — NYSTATIN 500000 UNITS: 100000 SUSPENSION ORAL at 08:25

## 2018-01-02 RX ADMIN — NYSTATIN 500000 UNITS: 100000 SUSPENSION ORAL at 17:31

## 2018-01-02 RX ADMIN — BACLOFEN 10 MG: 10 TABLET ORAL at 18:00

## 2018-01-02 RX ADMIN — INSULIN LISPRO 4 UNITS: 100 INJECTION, SOLUTION INTRAVENOUS; SUBCUTANEOUS at 22:50

## 2018-01-02 RX ADMIN — VANCOMYCIN HYDROCHLORIDE 1000 MG: 1 INJECTION, POWDER, LYOPHILIZED, FOR SOLUTION INTRAVENOUS at 00:00

## 2018-01-02 RX ADMIN — CARVEDILOL 6.25 MG: 6.25 TABLET, FILM COATED ORAL at 08:24

## 2018-01-02 RX ADMIN — LEVOTHYROXINE SODIUM 25 MCG: 50 TABLET ORAL at 06:11

## 2018-01-02 RX ADMIN — INSULIN HUMAN 30 UNITS: 100 INJECTION, SUSPENSION SUBCUTANEOUS at 17:33

## 2018-01-02 RX ADMIN — PREGABALIN 75 MG: 75 CAPSULE ORAL at 08:24

## 2018-01-02 RX ADMIN — HEPARIN SODIUM 5000 UNITS: 5000 INJECTION, SOLUTION INTRAVENOUS; SUBCUTANEOUS at 06:00

## 2018-01-02 RX ADMIN — INSULIN LISPRO 6 UNITS: 100 INJECTION, SOLUTION INTRAVENOUS; SUBCUTANEOUS at 17:34

## 2018-01-02 RX ADMIN — BACLOFEN 10 MG: 10 TABLET ORAL at 08:25

## 2018-01-02 RX ADMIN — INSULIN LISPRO 6 UNITS: 100 INJECTION, SOLUTION INTRAVENOUS; SUBCUTANEOUS at 06:07

## 2018-01-02 RX ADMIN — HEPARIN SODIUM 5000 UNITS: 5000 INJECTION, SOLUTION INTRAVENOUS; SUBCUTANEOUS at 16:00

## 2018-01-02 RX ADMIN — ASPIRIN 325 MG ORAL TABLET 325 MG: 325 PILL ORAL at 08:25

## 2018-01-02 RX ADMIN — BENAZEPRIL HYDROCHLORIDE 20 MG: 10 TABLET, FILM COATED ORAL at 08:24

## 2018-01-02 RX ADMIN — Medication 5 ML: at 20:33

## 2018-01-02 RX ADMIN — INSULIN HUMAN 20 UNITS: 100 INJECTION, SUSPENSION SUBCUTANEOUS at 06:07

## 2018-01-02 RX ADMIN — CARBIDOPA AND LEVODOPA 1 TABLET: 10; 100 TABLET ORAL at 17:32

## 2018-01-02 RX ADMIN — INSULIN LISPRO 4 UNITS: 100 INJECTION, SOLUTION INTRAVENOUS; SUBCUTANEOUS at 11:11

## 2018-01-02 RX ADMIN — FUROSEMIDE 40 MG: 10 INJECTION, SOLUTION INTRAMUSCULAR; INTRAVENOUS at 09:44

## 2018-01-02 RX ADMIN — Medication 10 ML: at 06:09

## 2018-01-02 NOTE — PROGRESS NOTES
Problem: Mobility Impaired (Adult and Pediatric)  Goal: *Acute Goals and Plan of Care (Insert Text)  STG:  (1.)Ms. Natividad Josue will perform bed mobility with MINIMAL ASSISTANCE within 3 day(s). (2.)Ms. Natividad Josue will transfer from bed to chair and chair to bed with CONTACT GUARD ASSIST using RW within 3 day(s). (3.)Ms. Natividad Josue will ambulate with MINIMAL ASSISTANCE for 50+ feet with RW within 3 day(s). (4.)Ms. Natividad Josue will tolerate 15+ minutes of therapeutic activity/exercise while maintaining stable vitals to improve functional strength and mobility within 3 day(s). LTG:  (1.)Ms. Natividad Josue will perform bed mobility with CONTACT GUARD ASSISTANCE within 7 day(s). (2.)Ms. Natividad Josue will transfer from bed to chair and chair to bed with SBA using RW within 7 day(s). (3.)Ms. Natividad Josue will ambulate with CGA  for 150+ feet with the least restrictive device within 7 day(s). (4.)Ms. Natividad Josue will demonstrate FAIR+ dynamic standing balance utilizing RW within 7 day(s). (5.)Ms. Natividad Josue will tolerate 25+ minutes of therapeutic activity/exercise while maintaining stable vitals to improve functional strength and mobility within 7 day(s). ________________________________________________________________________________________________      PHYSICAL THERAPY: Daily Note, Treatment Day: 1st, AM 1/2/2018  INPATIENT: Hospital Day: 7  [place] / [place] / Product Type: Medicare /      NAME/AGE/GENDER: Warren Cedeno is a 68 y.o. female   PRIMARY DIAGNOSIS: UTI (urinary tract infection)  Encephalopathy acute Encephalopathy acute Encephalopathy acute        ICD-10: Treatment Diagnosis:   · Generalized Muscle Weakness (M62.81)  · Difficulty in walking, Not elsewhere classified (R26.2)   Precaution/Allergies:  Lipitor [atorvastatin]      ASSESSMENT:     Ms. Natividad Josue  Presents supine in bed today sleeping but easily aroused and agreeable to have therapy.   She is very talkative today throughout the therapy session, but she remained lethargic as well and tended to drop off to sleep at times during the treatment. She was on 3L NC with O2 sats at 94% in supine and sitting. She participated in [address] [employer] exercises in supine. Supine to sit required moderate assist. Her sitting balance fluctuated today from supervision to CGA secondary to periodic episodes of sleepiness. She sat on the edge of the bed and participated in [address] exercises for ~ 5 minutes before attempting to lie back down stating she felt she was going to fall over if she did not lay down. Standing was not attempted today since sitting up on the edge of the bed was a challenge for her because of her lethargy. She was pleasant and cooperative and talkative during the treatment. A family friend was at the bedside providing care and support. She was able to follow commands with manual assistance and was oriented to her name. She perseverates on how sick she has been and how she is ready to go to [facility] and see her mama. This section established at most recent assessment   PROBLEM LIST (Impairments causing functional limitations):  1. Decreased Strength  2. Decreased ADL/Functional Activities  3. Decreased Transfer Abilities  4. Decreased Ambulation Ability/Technique  5. Decreased Balance  6. Decreased Activity Tolerance  7. Increased Fatigue  8. Decreased Flexibility/Joint Mobility  9. Decreased Knowledge of Precautions  10. Decreased [place] with Home Exercise Program   INTERVENTIONS PLANNED: (Benefits and precautions of physical therapy have been discussed with the patient.)  1. Balance Exercise  2. Bed Mobility  3. Family Education  4. Gait Training  5. Home Exercise Program (HEP)  6. Range of Motion (ROM)  7. Therapeutic Activites  8. Therapeutic Exercise/Strengthening  9. Transfer Training  10.  Group Therapy     TREATMENT PLAN: Frequency/Duration: 3 times a week for duration of hospital stay  Rehabilitation Potential For Stated Goals: Good     RECOMMENDED REHABILITATION/EQUIPMENT: (at time of discharge pending progress): Due to the probability of continued deficits (see above) this patient will likely need continued skilled physical therapy after discharge. Equipment:    TBD              HISTORY:   History of Present Injury/Illness (Reason for Referral):  Weakness; Difficulty in walking  Past Medical History/Comorbidities:   Ms. Roxi Patton  has a past medical history of Adverse effect of anesthesia; AF (atrial fibrillation) ([facility] [address] (11/20/2011); Arthritis (11/18/2011); CAD (coronary artery disease) (2011); Cervical disc disease; Chronic kidney disease; Chronic pain; DJD (degenerative joint disease); Drainage from wound (10/3/2014); Full dentures; Gout; Hemorrhoid (11/5/2013); [place] (hard of hearing); Hyperlipemia (11/10/2011); Hypertension; Hypertriglyceridemia; Hypothyroidism; Neuropathy; Obesity (BMI 30-39.9) (10/2/14); PAD (peripheral artery disease) ([facility] [address] PCI (pneumatosis cystoides intestinalis) (11/5/2013); Pleural effusion, bilateral (11/21/2011); Postoperative anemia due to acute blood loss (11/21/2011); Rib cage fracture (11/5/2013); S/P CABG (coronary artery bypass graft) (11/05/2013); Status post-operative repair of hip fracture (09/15/2014); Stroke [facility]); Type II or unspecified type diabetes mellitus without mention of complication, uncontrolled ([facility] [address] (12/16/2013); and Unspecified adverse effect of anesthesia. She also has no past medical history of Difficult intubation; Malignant hyperthermia due to anesthesia; Nausea & vomiting; or Pseudocholinesterase deficiency. Ms. Roxi Patton  has a past surgical history that includes hx colonoscopy; pr cabg, artery-vein, four (Oct. 2011); hx cataract removal (Bilateral, 11/2012); hx hysterectomy; hx lap cholecystectomy; hx hip fracture tx (Left); pr close cystostomy; vascular surgery procedure unlist (Left, 06/07/2017); and vascular surgery procedure unlist (Left, 08/10/2017).   Social History/Living Environment:   Home Environment: Private residence  # Steps to Enter: 4  Wheelchair Ramp: No  One/Two Story Residence: Two story, live on 1st floor  # of Interior Steps: 10  Height of Each Step (in): 10 inches  Interior Rails: Both  Lift Chair Available: No  Living Alone: No  Support Systems: Child(dania), Spouse/Significant Other/Partner  Patient Expects to be Discharged to[de-identified]  (STR)  Current DME Used/Available at Home: Grab bars, Shower chair  Tub or Shower Type: Shower  Prior Level of Function/Work/Activity:  Patient lives with her spouse in a single story residence with 4 steps to enter. At baseline, patient is SBA to set up with bathing/toileting, requires minimal-moderate assistance dressing with ADLs, ambulates with a RW vs. rollator, and endorses increasing weakness and falls x2-3 weeks. Number of Personal Factors/Comorbidities that affect the Plan of Care: 1-2: MODERATE COMPLEXITY   EXAMINATION:   Most Recent Physical Functioning:   Gross Assessment:                  Posture:     Balance:  Sitting: Intact  Standing: Impaired Bed Mobility:  Rolling: Minimum assistance  Supine to Sit: Moderate assistance  Sit to Supine: Moderate assistance  Scooting: Minimum assistance  Wheelchair Mobility:     Transfers:     Gait:            Body Structures Involved:  1. Metabolic  2. Muscles Body Functions Affected:  1. Movement Related Activities and Participation Affected:  1. Mobility  2. Self Care   Number of elements that affect the Plan of Care: 4+: HIGH COMPLEXITY   CLINICAL PRESENTATION:   Presentation: Evolving clinical presentation with changing clinical characteristics: MODERATE COMPLEXITY   CLINICAL DECISION MAKIN [facility] Mobility Inpatient Short Form  How much difficulty does the patient currently have. .. Unable A Lot A Little None   1. Turning over in bed (including adjusting bedclothes, sheets and blankets)? [] 1   [] 2   [x] 3   [] 4   2.   Sitting down on and standing up from a chair with arms ( e.g., wheelchair, bedside commode, etc.)   [] 1   [] 2   [x] 3   [] 4   3. Moving from lying on back to sitting on the side of the bed? [] 1   [] 2   [x] 3   [] 4   How much help from another person does the patient currently need. .. Total A Lot A Little None   4. Moving to and from a bed to a chair (including a wheelchair)? [] 1   [] 2   [x] 3   [] 4   5. Need to walk in hospital room? [] 1   [] 2   [x] 3   [] 4   6. Climbing 3-5 steps with a railing? [] 1   [x] 2   [] 3   [] 4   © 2007, Trustees of [address] [place] [address] [address] [id number], under license to [employer]. All rights reserved      Score:  Initial: 17 Most Recent: 17 (Date: 12/28/17 )    Interpretation of Tool:  Represents activities that are increasingly more difficult (i.e. Bed mobility, Transfers, Gait). Score 24 23 22-20 19-15 14-10 9-7 6     Modifier CH CI CJ CK CL CM CN      ? Mobility - Walking and Moving Around:     - CURRENT STATUS: CK - 40%-59% impaired, limited or restricted    - GOAL STATUS: CJ - 20%-39% impaired, limited or restricted    - D/C STATUS:  ---------------To be determined---------------  [place] / [place] / Product Type: Medicare /      Medical Necessity:     · Patient demonstrates good rehab potential due to higher previous functional level. Reason for Services/Other Comments:  · Patient continues to require skilled intervention due to decreased functional mobility and balance/gait status from baseline. .   Use of outcome tool(s) and clinical judgement create a POC that gives a: Questionable prediction of patient's progress: MODERATE COMPLEXITY            TREATMENT:   (In addition to Assessment/Re-Assessment sessions the following treatments were rendered)   Pre-treatment Symptoms/Complaints:  Patient had no complaints of pain today just sleepy and tired. Pain: Initial:   Pain Intensity 1: 0  Post Session:         Therapeutic Activity: (   25 Minutes):   Therapeutic activities including bed mobility, sitting tolerance/balance, safety awareness training,  and patient education on activity pacing stratigies, need for continued skilled PT services, fall reduction strategies, and assistive device utilization to improve mobility, strength, balance and coordination. Required minimal  Verbal and manual cues to promote static and dynamic balance in sitting. Patient performed BLE AAROM exercises in supine and sitting. Braces/Orthotics/Lines/Etc:   · IV  · O2 Device: 3L NC with O2 sats at 94% at rest  Treatment/Session Assessment:    · Response to Treatment:  Patient participated with therapy today but was very lethargic and sleepy throughout the treatment. · Interdisciplinary Collaboration:   o Physical Therapist  o Registered Nurse  · After treatment position/precautions:   o Supine in bed  o Bed alarm/tab alert on  o Bed/Chair-wheels locked  o Bed in low position  o Call light within reach  o RN notified  o Family at bedside  · Compliance with Program/Exercises: Will assess as treatment progresses. · Recommendations/Intent for next treatment session: \"Next visit will focus on advancements to more challenging activities and reduction in assistance provided\".   Total Treatment Duration:  PT Patient Time In/Time Out  Time In: 1020  Time Out: [address]

## 2018-01-02 NOTE — PROGRESS NOTES
Problem: Falls - Risk of  Goal: *Absence of Falls  Document King Fall Risk and appropriate interventions in the flowsheet.    Outcome: Progressing Towards Goal  Fall Risk Interventions:  Mobility Interventions: Assess mobility with egress test, Bed/chair exit alarm, Communicate number of staff needed for ambulation/transfer, OT consult for ADLs, Patient to call before getting OOB, PT Consult for mobility concerns, PT Consult for assist device competence, Strengthening exercises (ROM-active/passive), Utilize walker, cane, or other assitive device, Utilize gait belt for transfers/ambulation    Mentation Interventions: Adequate sleep, hydration, pain control, Bed/chair exit alarm, Door open when patient unattended, Evaluate medications/consider consulting pharmacy, Eyeglasses and hearing aids, Increase mobility, More frequent rounding, Reorient patient, Room close to nurse's station, Self-releasing belt, Toileting rounds, Update white board, Familiar objects from home    Medication Interventions: Assess postural VS orthostatic hypotension, Bed/chair exit alarm, Evaluate medications/consider consulting pharmacy, Patient to call before getting OOB, Teach patient to arise slowly, Utilize gait belt for transfers/ambulation    Elimination Interventions: Bed/chair exit alarm, Call light in reach    History of Falls Interventions: Bed/chair exit alarm, Consult care management for discharge planning, Door open when patient unattended, Evaluate medications/consider consulting pharmacy, Investigate reason for fall, Room close to nurse's station, Utilize gait belt for transfer/ambulation

## 2018-01-02 NOTE — PROGRESS NOTES
Assessment completed. More alert today, able to recall game last night and states \"she is disappointed with the outcome\". C/o of some sacral pain. On 4 via NC with some expiratory wheezing. Perez patent and dependent below pt. BLE wrapped in ace bandage and will need to be changed today. Bed locked, in low position, call light in reach. Will monitor.

## 2018-01-02 NOTE — MED STUDENT NOTES
Progress Note    Patient: Ry Ceja MRN: [id number]  SSN: [id number]    YOB: 1940  Age: 68 y.o. Sex: female      Admit Date: 12/27/2017    LOS: 6 days     Subjective:     Hina Bean is a 68year old female admitted for UTI on 12/27/17. The patient subsequently developed metabolic encephalopathy which continued with treatment of the UTI. The patient states that she is SOB and has pain in her abdomen. She says the pain is \"in her kidneys\". She states that she is not feeling well and \"wants to go home to see her momma. \" The patient acknowledges that she is feeling better than she was last week but knows that she is still sick. She urinated 1500ml over the last shift and had no bowel movements. The patient did not require any PRN medication over the last shift. The patient was alone in her room upon exam.     Objective:     Vitals:    01/01/18 2311 01/02/18 0358 01/02/18 0440 01/02/18 0727   BP: 152/75 145/77  153/80   Pulse: 65 63  (!) 58   Resp: 20 20  20   Temp: 97.8 °F (36.6 °C) 98.1 °F (36.7 °C)  98.1 °F (36.7 °C)   SpO2: 94% 95%  97%   Weight:   116.8 kg (257 lb 6.4 oz)    Height:            Intake and Output:  Current Shift:    Last three shifts: 12/31 1901 - 01/02 0700  In: 7522 [P.O.:1291; I.V.:1150]  Out: 1800 [Urine:1800]    Physical Exam:   GENERAL: alert, cooperative, no distress  THROAT & NECK: No cervical lymphadenopathy noted  LUNG: clear to auscultation bilaterally, diffuse crackles are heard in anterior lung fields bilaterally. Patient was unable to sit up for auscultation of posterior lung fields. No wheezing appreciated. HEART: regular rate and rhythm, S1, S2 normal, no murmur, click, rub or gallop  ABDOMEN: Bowel sounds normal. Pain elicited with palpation of RLQ and LLQ. EXTREMITIES:  extremities normal, atraumatic, no cyanosis or edema. Radial pulses 2+ and pedal pulses 2+. Pain elicited with palpation of tibia to check for edema.    SKIN: no rash or abnormalities  NEUROLOGIC: Patient is oriented to person, time, place, and situation. Patient knew the day and the month but had to question the year. Conversation with the patient and further orientation questions did not make me feel the patient wasn't fully oriented. Patient did however continue to repeat \"I want to go home to [facility]. \"     Lab/Data Review: All lab results for the last 24 hours reviewed. Assessment:     Principal Problem:    Encephalopathy acute (12/27/2017)    Active Problems:    DM (diabetes mellitus) type II uncontrolled, periph vascular disorder ([facility] [address] (11/10/2011)      CKD (chronic kidney disease) stage 3, GFR 30-59 ml/min (8/10/2016)      UTI (urinary tract infection) (12/27/2017)        Plan:     Metabolic encephalopathy: Patient has shown progress of symptoms and orientation with treatment of infection. Continued treatment of infection will hopefully improve this issue. UTI: Patient's urine culture showed 50,000-100,000 colonies of yeast. Will add difulcan IV to cover for yeast. Continue Rocephin for multi organism coverage. Hypernatremia: continue IVF to D5 1/2NS for fluid resuscitation. DM: Continue insulin regimen for control of blood sugar. Signed By: Marley Reese     January 2, 2018        *ATTENTION:  This note has been created by a medical student for educational purposes only. Please do not refer to the content of this note for clinical decision-making, billing, or other purposes. Please see attending physicians note to obtain clinical information on this patient. *

## 2018-01-02 NOTE — PROGRESS NOTES
Hospitalist Progress Note    2018  Admit Date: 2017  1:27 PM   NAME: Princess Narayan   :     MRN:  [id number]   Attending: Arun Kc MD  PCP:  Sharita Rucker MD    SUBJECTIVE:     Princess Narayan is a 51UTB with CAD s/p CABG, CKD (has fistula that has not been used yet), HTN, HLD who was admitted on  with confusion, found to have a UTI. Started on Rocephin and gentle IVF in the ED.    : mental status continues to improve. Able to answer all orientation questions but still with some mild confusion. Less SOB today. Complaining of some worsened lower abd pain. BS remains elevated. Review of Systems negative with exception of pertinent positives noted above      PHYSICAL EXAM       Visit Vitals    /80 (BP 1 Location: Right arm, BP Patient Position: At rest)    Pulse (!) 58    Temp 98.1 °F (36.7 °C)    Resp 20    Ht 5' 10\" (1.778 m)    Wt 116.8 kg (257 lb 6.4 oz)    SpO2 97%    Breastfeeding No    BMI 36.93 kg/m2      Temp (24hrs), Av °F (36.7 °C), Min:97.6 °F (36.4 °C), Max:98.3 °F (36.8 °C)    Oxygen Therapy  O2 Sat (%): 97 % (18 07)  Pulse via Oximetry: 66 beats per minute (17)  O2 Device: Nasal cannula (18)  O2 Flow Rate (L/min): 3 l/min (18)    Intake/Output Summary (Last 24 hours) at 18 1109  Last data filed at 18 0618   Gross per 24 hour   Intake             1091 ml   Output             1200 ml   Net             -109 ml        General: awake, much more cooperative  Head:  Atraumatic Normocephalic.   Lungs:  Mild upper lobe wheezing, breathing more comfortable today  CVS:  RRR, BLE wrapped with 1+ edema  Abdomen: Soft, mild lower quadrant tenderness, no rebound or guarding, +NABS  Neurologic:  AOx3, follows commands x4, no apparent focal deficits      Recent Results (from the past 24 hour(s))   GLUCOSE, POC    Collection Time: 18 11:17 AM   Result Value Ref Range    Glucose (POC) 355 (H) 65 - 100 mg/dL   GLUCOSE, POC    Collection Time: 01/01/18  3:20 PM   Result Value Ref Range    Glucose (POC) 362 (H) 65 - 100 mg/dL   GLUCOSE, POC    Collection Time: 01/01/18  8:24 PM   Result Value Ref Range    Glucose (POC) 291 (H) 65 - 100 mg/dL   GLUCOSE, POC    Collection Time: 01/02/18  5:35 AM   Result Value Ref Range    Glucose (POC) 212 (H) 65 - 100 mg/dL   CBC WITH AUTOMATED DIFF    Collection Time: 01/02/18  8:20 AM   Result Value Ref Range    WBC 10.4 4.3 - 11.1 K/uL    RBC 3.04 (L) 4.05 - 5.25 M/uL    HGB 8.8 (L) 11.7 - 15.4 g/dL    HCT 28.6 (L) 35.8 - 46.3 %    MCV 94.1 79.6 - 97.8 FL    MCH 28.9 26.1 - 32.9 PG    MCHC 30.8 (L) 31.4 - 35.0 g/dL    RDW 14.5 11.9 - 14.6 %    PLATELET 983 472 - 118 K/uL    MPV 12.0 10.8 - 14.1 FL    DF AUTOMATED      NEUTROPHILS 73 43 - 78 %    LYMPHOCYTES 13 13 - 44 %    MONOCYTES 7 4.0 - 12.0 %    EOSINOPHILS 5 0.5 - 7.8 %    BASOPHILS 1 0.0 - 2.0 %    IMMATURE GRANULOCYTES 1 0.0 - 5.0 %    ABS. NEUTROPHILS 7.7 1.7 - 8.2 K/UL    ABS. LYMPHOCYTES 1.3 0.5 - 4.6 K/UL    ABS. MONOCYTES 0.7 0.1 - 1.3 K/UL    ABS. EOSINOPHILS 0.5 0.0 - 0.8 K/UL    ABS. BASOPHILS 0.1 0.0 - 0.2 K/UL    ABS. IMM. GRANS. 0.1 0.0 - 0.5 K/UL   METABOLIC PANEL, BASIC    Collection Time: 01/02/18  8:20 AM   Result Value Ref Range    Sodium 145 136 - 145 mmol/L    Potassium 4.8 3.5 - 5.1 mmol/L    Chloride 116 (H) 98 - 107 mmol/L    CO2 21 21 - 32 mmol/L    Anion gap 8 7 - 16 mmol/L    Glucose 191 (H) 65 - 100 mg/dL    BUN 68 (H) 8 - 23 MG/DL    Creatinine 2.65 (H) 0.6 - 1.0 MG/DL    GFR est AA 22 (L) >60 ml/min/1.73m2    GFR est non-AA 19 (L) >60 ml/min/1.73m2    Calcium 8.5 8.3 - 10.4 MG/DL         Imaging /Procedures /Studies   XR CHEST SNGL V   Final Result   IMPRESSION:  CARDIOMEGALY STATUS POST CABG WITH NEW FINDINGS SUGGESTIVE OF   CONGESTIVE HEART FAILURE/FLUID IMBALANCE. SUPERIMPOSED PNEUMONIA IS NOT   EXCLUDED IN LIGHT OF THE LEUKOCYTOSIS.       CT HEAD WO CONT   Final Result   IMPRESSION: 1.  No acute intracranial process evident by noncontrast CT study of the head. CT HEAD WO CONT   Final Result   IMPRESSION:  Normal unenhanced CT of the brain for age. No acute intracranial   abnormality. XR CHEST PA LAT   Final Result   IMPRESSION: Negative for acute abnormality. XR ELBOW LT MIN 3 V   Final Result   IMPRESSION: Negative for acute fracture. XR HAND LT MIN 3 V   Final Result   IMPRESSION: Negative for acute fracture. ASSESSMENT      Hospital Problems as of 1/2/2018  Date Reviewed: 12/27/2017          Codes Class Noted - Resolved POA    UTI (urinary tract infection) ICD-10-CM: N39.0  ICD-9-CM: 599.0  12/27/2017 - Present Unknown        * (Principal)Encephalopathy acute ICD-10-CM: G93.40  ICD-9-CM: 348.30  12/27/2017 - Present Unknown        CKD (chronic kidney disease) stage 3, GFR 30-59 ml/min ICD-10-CM: N18.3  ICD-9-CM: 585.3  8/10/2016 - Present Yes        DM (diabetes mellitus) type II uncontrolled, periph vascular disorder (HCC) (Chronic) ICD-10-CM: E11.51, E11.65  ICD-9-CM: 250.72, 443.81  11/10/2011 - Present Yes                  Plan:  - Acute metabolic encephalopathy:  Likely 2/2 UTI, in the setting of missing her sinemet, baclofen and lyrica for 3 days while inpatiet  Continues to improve  Avoid sedating meds  CT head neg  ABG WNL  Procal, ammonia WNL    - Hypernatremia:  Much better with D5 and better po intake  DC IVF  Should continue to improve as she is eating much better    - Tachypnea:   Improving but still with some hypoxia  CXR 1/1 with vascular congestion  Another dose of IV lasix today, start on home po lasix tomorrow  Nebs prn  Wean O2 as able    - UTI:  Started on rocephin based on previous cx  With worsening fever and mental status, expanded to vanc/cefepime  Blood cx neg, urine cx with pan-sensitive EColi  Will change back to rocephin, day 6 of abx  Can likely stop abx tomorrow  DC mcbride    - DM2:  BS increasing; increase NPH to 30u BID (still much lower than her home dosage)  SSI ACHS    - CAD/HTN:   Continue ASA, coreg, norvasc, benazepril as she is able to take      - Parkinsonism:  Restarted sinemet, baclofen, lyrica on 12/31    - BLE edema with chronic ulcers:  Likely multifactorial from PVD, venous stasis and CKD  Wound care following    DVT Prophylaxis: Hep SQ  Dispo: PT/OT following, PPD placed; plan for STR at discharge.  Bed available when patient is medically stable; likely in next 1-2 days    Vera Daniel MD

## 2018-01-03 ENCOUNTER — HOSPITAL ENCOUNTER (EMERGENCY)
Age: 78
Discharge: OTHER INSTITUTION | End: 2018-01-03
Attending: EMERGENCY MEDICINE
Payer: MEDICARE

## 2018-01-03 ENCOUNTER — APPOINTMENT (OUTPATIENT)
Dept: GENERAL RADIOLOGY | Age: 78
End: 2018-01-03
Attending: EMERGENCY MEDICINE
Payer: MEDICARE

## 2018-01-03 ENCOUNTER — APPOINTMENT (OUTPATIENT)
Dept: CT IMAGING | Age: 78
End: 2018-01-03
Attending: EMERGENCY MEDICINE
Payer: MEDICARE

## 2018-01-03 VITALS
TEMPERATURE: 98.1 F | BODY MASS INDEX: 34.36 KG/M2 | OXYGEN SATURATION: 93 % | HEIGHT: 70 IN | HEART RATE: 58 BPM | WEIGHT: 240 LBS | DIASTOLIC BLOOD PRESSURE: 52 MMHG | SYSTOLIC BLOOD PRESSURE: 110 MMHG | RESPIRATION RATE: 15 BRPM

## 2018-01-03 VITALS
WEIGHT: 246.7 LBS | BODY MASS INDEX: 35.32 KG/M2 | SYSTOLIC BLOOD PRESSURE: 117 MMHG | OXYGEN SATURATION: 96 % | TEMPERATURE: 98.1 F | DIASTOLIC BLOOD PRESSURE: 69 MMHG | HEART RATE: 56 BPM | HEIGHT: 70 IN | RESPIRATION RATE: 18 BRPM

## 2018-01-03 DIAGNOSIS — G62.9 POLYNEUROPATHY: ICD-10-CM

## 2018-01-03 DIAGNOSIS — R53.1 WEAKNESS: Primary | ICD-10-CM

## 2018-01-03 PROBLEM — N39.0 UTI (URINARY TRACT INFECTION): Status: RESOLVED | Noted: 2017-12-27 | Resolved: 2018-01-03

## 2018-01-03 PROBLEM — I87.303 STASIS EDEMA OF BOTH LOWER EXTREMITIES: Chronic | Status: ACTIVE | Noted: 2018-01-03

## 2018-01-03 PROBLEM — I50.43 ACUTE ON CHRONIC COMBINED SYSTOLIC AND DIASTOLIC CONGESTIVE HEART FAILURE (HCC): Status: ACTIVE | Noted: 2018-01-03

## 2018-01-03 PROBLEM — Z79.4 TYPE 2 DIABETES MELLITUS WITH CHRONIC KIDNEY DISEASE, WITH LONG-TERM CURRENT USE OF INSULIN (HCC): Chronic | Status: ACTIVE | Noted: 2017-12-15

## 2018-01-03 PROBLEM — Z79.4 TYPE 2 DIABETES MELLITUS WITH CHRONIC KIDNEY DISEASE, WITH LONG-TERM CURRENT USE OF INSULIN (HCC): Status: ACTIVE | Noted: 2017-12-15

## 2018-01-03 PROBLEM — G20 PARKINSONISM (HCC): Chronic | Status: ACTIVE | Noted: 2017-12-01

## 2018-01-03 PROBLEM — E11.22 TYPE 2 DIABETES MELLITUS WITH CHRONIC KIDNEY DISEASE, WITH LONG-TERM CURRENT USE OF INSULIN (HCC): Status: ACTIVE | Noted: 2017-12-15

## 2018-01-03 PROBLEM — G93.41 ACUTE METABOLIC ENCEPHALOPATHY: Status: ACTIVE | Noted: 2017-12-27

## 2018-01-03 PROBLEM — G20 DEMENTIA DUE TO PARKINSON'S DISEASE WITHOUT BEHAVIORAL DISTURBANCE (HCC): Chronic | Status: ACTIVE | Noted: 2018-01-03

## 2018-01-03 PROBLEM — E11.22 TYPE 2 DIABETES MELLITUS WITH CHRONIC KIDNEY DISEASE, WITH LONG-TERM CURRENT USE OF INSULIN (HCC): Chronic | Status: ACTIVE | Noted: 2017-12-15

## 2018-01-03 PROBLEM — G93.41 ACUTE METABOLIC ENCEPHALOPATHY: Status: RESOLVED | Noted: 2017-12-27 | Resolved: 2018-01-03

## 2018-01-03 PROBLEM — I50.43 ACUTE ON CHRONIC COMBINED SYSTOLIC AND DIASTOLIC CONGESTIVE HEART FAILURE (HCC): Status: RESOLVED | Noted: 2018-01-03 | Resolved: 2018-01-03

## 2018-01-03 PROBLEM — F02.80 DEMENTIA DUE TO PARKINSON'S DISEASE WITHOUT BEHAVIORAL DISTURBANCE (HCC): Chronic | Status: ACTIVE | Noted: 2018-01-03

## 2018-01-03 LAB
ALBUMIN SERPL-MCNC: 2.4 G/DL (ref 3.2–4.6)
ALBUMIN/GLOB SERPL: 0.5 {RATIO} (ref 1.2–3.5)
ALP SERPL-CCNC: 103 U/L (ref 50–136)
ALT SERPL-CCNC: <6 U/L (ref 12–65)
ANION GAP SERPL CALC-SCNC: 8 MMOL/L (ref 7–16)
AST SERPL-CCNC: 7 U/L (ref 15–37)
ATRIAL RATE: 54 BPM
BACTERIA SPEC CULT: ABNORMAL
BASOPHILS # BLD: 0.1 K/UL (ref 0–0.2)
BASOPHILS NFR BLD: 1 % (ref 0–2)
BILIRUB SERPL-MCNC: 0.3 MG/DL (ref 0.2–1.1)
BUN SERPL-MCNC: 74 MG/DL (ref 8–23)
CALCIUM SERPL-MCNC: 8.9 MG/DL (ref 8.3–10.4)
CALCULATED R AXIS, ECG10: 30 DEGREES
CALCULATED T AXIS, ECG11: 46 DEGREES
CHLORIDE SERPL-SCNC: 114 MMOL/L (ref 98–107)
CO2 SERPL-SCNC: 22 MMOL/L (ref 21–32)
CREAT SERPL-MCNC: 2.59 MG/DL (ref 0.6–1)
DIAGNOSIS, 93000: NORMAL
DIFFERENTIAL METHOD BLD: ABNORMAL
EOSINOPHIL # BLD: 0.4 K/UL (ref 0–0.8)
EOSINOPHIL NFR BLD: 5 % (ref 0.5–7.8)
ERYTHROCYTE [DISTWIDTH] IN BLOOD BY AUTOMATED COUNT: 14.3 % (ref 11.9–14.6)
GLOBULIN SER CALC-MCNC: 4.6 G/DL (ref 2.3–3.5)
GLUCOSE BLD STRIP.AUTO-MCNC: 148 MG/DL (ref 65–100)
GLUCOSE SERPL-MCNC: 172 MG/DL (ref 65–100)
HCT VFR BLD AUTO: 28.4 % (ref 35.8–46.3)
HGB BLD-MCNC: 9.1 G/DL (ref 11.7–15.4)
IMM GRANULOCYTES # BLD: 0.1 K/UL (ref 0–0.5)
IMM GRANULOCYTES NFR BLD AUTO: 2 % (ref 0–5)
LYMPHOCYTES # BLD: 1.2 K/UL (ref 0.5–4.6)
LYMPHOCYTES NFR BLD: 14 % (ref 13–44)
MCH RBC QN AUTO: 29.9 PG (ref 26.1–32.9)
MCHC RBC AUTO-ENTMCNC: 32 G/DL (ref 31.4–35)
MCV RBC AUTO: 93.4 FL (ref 79.6–97.8)
MONOCYTES # BLD: 0.4 K/UL (ref 0.1–1.3)
MONOCYTES NFR BLD: 5 % (ref 4–12)
NEUTS SEG # BLD: 6.1 K/UL (ref 1.7–8.2)
NEUTS SEG NFR BLD: 73 % (ref 43–78)
PLATELET # BLD AUTO: 320 K/UL (ref 150–450)
PMV BLD AUTO: 11.6 FL (ref 10.8–14.1)
POTASSIUM SERPL-SCNC: 4.6 MMOL/L (ref 3.5–5.1)
PROT SERPL-MCNC: 7 G/DL (ref 6.3–8.2)
Q-T INTERVAL, ECG07: 430 MS
QRS DURATION, ECG06: 86 MS
QTC CALCULATION (BEZET), ECG08: 414 MS
RBC # BLD AUTO: 3.04 M/UL (ref 4.05–5.25)
SERVICE CMNT-IMP: ABNORMAL
SODIUM SERPL-SCNC: 144 MMOL/L (ref 136–145)
VENTRICULAR RATE, ECG03: 56 BPM
WBC # BLD AUTO: 8.3 K/UL (ref 4.3–11.1)

## 2018-01-03 PROCEDURE — 74011636637 HC RX REV CODE- 636/637: Performed by: INTERNAL MEDICINE

## 2018-01-03 PROCEDURE — 70450 CT HEAD/BRAIN W/O DYE: CPT

## 2018-01-03 PROCEDURE — 74011000250 HC RX REV CODE- 250: Performed by: INTERNAL MEDICINE

## 2018-01-03 PROCEDURE — 80053 COMPREHEN METABOLIC PANEL: CPT | Performed by: EMERGENCY MEDICINE

## 2018-01-03 PROCEDURE — 85025 COMPLETE CBC W/AUTO DIFF WBC: CPT | Performed by: EMERGENCY MEDICINE

## 2018-01-03 PROCEDURE — 74011250636 HC RX REV CODE- 250/636: Performed by: INTERNAL MEDICINE

## 2018-01-03 PROCEDURE — 71046 X-RAY EXAM CHEST 2 VIEWS: CPT

## 2018-01-03 PROCEDURE — 82962 GLUCOSE BLOOD TEST: CPT

## 2018-01-03 PROCEDURE — 74011250637 HC RX REV CODE- 250/637: Performed by: INTERNAL MEDICINE

## 2018-01-03 PROCEDURE — 93005 ELECTROCARDIOGRAM TRACING: CPT | Performed by: EMERGENCY MEDICINE

## 2018-01-03 PROCEDURE — 99285 EMERGENCY DEPT VISIT HI MDM: CPT | Performed by: EMERGENCY MEDICINE

## 2018-01-03 RX ORDER — CARBIDOPA AND LEVODOPA 10; 100 MG/1; MG/1
1 TABLET ORAL 3 TIMES DAILY
Qty: 90 TAB | Refills: 2 | Status: SHIPPED | OUTPATIENT
Start: 2018-01-03 | End: 2018-03-22 | Stop reason: DRUGHIGH

## 2018-01-03 RX ORDER — BACLOFEN 10 MG/1
10 TABLET ORAL
Qty: 30 TAB | Refills: 1 | Status: SHIPPED | OUTPATIENT
Start: 2018-01-03 | End: 2019-03-05

## 2018-01-03 RX ADMIN — BENAZEPRIL HYDROCHLORIDE 20 MG: 10 TABLET, FILM COATED ORAL at 10:03

## 2018-01-03 RX ADMIN — HEPARIN SODIUM 5000 UNITS: 5000 INJECTION, SOLUTION INTRAVENOUS; SUBCUTANEOUS at 05:15

## 2018-01-03 RX ADMIN — HYDROCODONE BITARTRATE AND ACETAMINOPHEN 1 TABLET: 5; 325 TABLET ORAL at 03:47

## 2018-01-03 RX ADMIN — LEVOTHYROXINE SODIUM 25 MCG: 50 TABLET ORAL at 05:16

## 2018-01-03 RX ADMIN — PREGABALIN 75 MG: 75 CAPSULE ORAL at 10:03

## 2018-01-03 RX ADMIN — BACLOFEN 10 MG: 10 TABLET ORAL at 10:03

## 2018-01-03 RX ADMIN — CARBIDOPA AND LEVODOPA 1 TABLET: 10; 100 TABLET ORAL at 10:03

## 2018-01-03 RX ADMIN — CARVEDILOL 6.25 MG: 6.25 TABLET, FILM COATED ORAL at 10:04

## 2018-01-03 RX ADMIN — CEFTRIAXONE SODIUM 1 G: 1 INJECTION, POWDER, FOR SOLUTION INTRAMUSCULAR; INTRAVENOUS at 10:02

## 2018-01-03 RX ADMIN — FUROSEMIDE 40 MG: 40 TABLET ORAL at 10:03

## 2018-01-03 RX ADMIN — ASPIRIN 325 MG ORAL TABLET 325 MG: 325 PILL ORAL at 10:04

## 2018-01-03 RX ADMIN — INSULIN HUMAN 30 UNITS: 100 INJECTION, SUSPENSION SUBCUTANEOUS at 10:04

## 2018-01-03 RX ADMIN — AMLODIPINE BESYLATE 5 MG: 5 TABLET ORAL at 10:04

## 2018-01-03 RX ADMIN — NYSTATIN 500000 UNITS: 100000 SUSPENSION ORAL at 10:02

## 2018-01-03 RX ADMIN — Medication 10 ML: at 05:16

## 2018-01-03 RX ADMIN — ATORVASTATIN CALCIUM 40 MG: 40 TABLET, FILM COATED ORAL at 10:03

## 2018-01-03 NOTE — PROGRESS NOTES
Patient unable to void on her own, c/o lower abdominal pain discomfort. Mcbride catheter placed, 800+ cc of clear yellow urine noted immediately after mcbride insert. Immediate relief felt by patient, will monitor.

## 2018-01-03 NOTE — PROGRESS NOTES
Patient resting comfortably in bed, no s/s distress/discomfort noted. Call bell, lap belt, and posey alarm remain in place for safety. Will monitor.

## 2018-01-03 NOTE — ED TRIAGE NOTES
Pt presents to ED via EMS with AMS. Pt was discharged from hospital this morning, was hospitalized for UTI x 6 days. EMS reports pt was alert and oriented upon discharge, but is confused upon arrival to [place]. At this time, pt drowsy, oriented to name and place, generalized weakness noted.

## 2018-01-03 NOTE — ED NOTES
I have reviewed discharge instructions with the spouse. The spouse verbalized understanding. Patient left ED via Discharge Method: stretcher to Rehab with [facility]. Opportunity for questions and clarification provided. Patient given 0 scripts. To continue your aftercare when you leave the hospital, you may receive an automated call from our care team to check in on how you are doing. This is a free service and part of our promise to provide the best care and service to meet your aftercare needs.  If you have questions, or wish to unsubscribe from this service please call [phone]. Thank you for Choosing our [facility] [employer] [facility] Emergency Department.

## 2018-01-03 NOTE — PROGRESS NOTES
Pt is discharging to [facility] for str at 10:45 am. Report line and room number given to RN. Pt's family made aware of time of transport. SW remains available.

## 2018-01-03 NOTE — DISCHARGE SUMMARY
Hospitalist Discharge Summary     Admit Date:  2017  1:27 PM   Name:  Rachna Lomeli   Age:  68 y.o.  :  1940   MRN:  [id number]   PCP:  Dg Coleman MD  Treatment Team: Attending Provider: Luis Hodges MD; Consulting Provider: Caren Dixon MD; Care Manager: Sada Ya. Marya Marti    Problem List for this Hospitalization:  Hospital Problems as of 1/3/2018  Date Reviewed: 2017          Codes Class Noted - Resolved POA    Type 2 diabetes mellitus with chronic kidney disease, with long-term current use of insulin (HCC) (Chronic) ICD-10-CM: E11.22, Z79.4  ICD-9-CM: 250.40, 585.9, V58.67  12/15/2017 - Present Yes        Parkinsonism ([facility] (Chronic) ICD-10-CM: G20  ICD-9-CM: 332.0  2017 - Present Yes        CKD (chronic kidney disease) stage 3, GFR 30-59 ml/min (Chronic) ICD-10-CM: N18.3  ICD-9-CM: 585.3  8/10/2016 - Present Yes        Uncontrolled type I diabetes mellitus with neuropathy (HCC) (Chronic) ICD-10-CM: E10.40, E10.65  ICD-9-CM: 250.63, 357.2  2016 - Present Yes        HTN (hypertension) (Chronic) ICD-10-CM: I10  ICD-9-CM: 401.9  11/10/2011 - Present Yes        DM (diabetes mellitus) type II uncontrolled, periph vascular disorder (HCC) (Chronic) ICD-10-CM: E11.51, E11.65  ICD-9-CM: 250.72, 443.81  11/10/2011 - Present Yes        RESOLVED: Acute on chronic combined systolic and diastolic congestive heart failure ([facility] ICD-10-CM: I50.43  ICD-9-CM: 428.43, 428.0  1/3/2018 - 1/3/2018 Yes        RESOLVED: UTI (urinary tract infection) ICD-10-CM: N39.0  ICD-9-CM: 599.0  2017 - 1/3/2018 Yes        * (Principal)RESOLVED: Acute metabolic encephalopathy OEO-98-DH: G93.41  ICD-9-CM: 348.31  2017 - 1/3/2018 Yes                Admission HPI from 2017:    \" Ms. Viera is a 68 y. o. female with PMH of CAD, history of CABG, history of chronic kidney disease, history of DJD, history of hypertension, history of hyperlipidemia, history of hypothyroidism, history of neuropathy, Parkinsonism. She presents to the ER with generalized weakness, s/p multiple falls last fall 5 days ago and pain to her left wrist. She is a ESRD pt and had left arm fistula prepared but has not had HD yet. Has been more tired and confused lately.      In the ED she was found to have UTI and with positive UA. ANTONIO with Cr 2.9 (above baseline 2.2 in 10/2017) and normocytic anemia 9.7 which is worse than her baseline of 11.5     Xrays found no fracture.      Pt will be admitted for UTI, deconditioning, ANTONIO and encephalopathy. \"    Hospital Course:  Patient was admitted and started on rocephin. She also had some hypoxia and tachypnea responsive to IV lasix. Encephalopathy was slow to improve which was the primary reason for prolonged stay. Part of this may have been due to her not getting her parkinson's meds. She is currently stable and improved, ready for discharge to SNF. She had some urinary retention last night so mcbride had to be replaced. Voiding trial should be attempted again in a few days. Follow up instructions and discharge meds at bottom of this note. Plan was discussed with pt. All questions answered. Patient was stable at time of discharge. Diagnostic Imaging/Tests:   Xr Chest Pa Lat    Result Date: 12/27/2017  CHEST X-RAY, 2 views. HISTORY:  ET and falls. TECHNIQUE: PA and lateral views. COMPARISON: 12 September 2014. FINDINGS: No acute fracture, dislocation or subluxation. IMPRESSION: Negative for acute abnormality. Xr Elbow Lt Min 3 V    Result Date: 12/27/2017  LEFT ELBOW 3 views. HISTORY: Left elbow pain following fall. TECHNIQUE: AP and lateral and oblique views. COMPARISON: None. FINDINGS: Anterior fat pad somewhat prominent. No posterior fat pad. No acute fracture appreciated. Radial head appears intact. Surgical changes in the superficial soft tissues anteriorly. IMPRESSION: Negative for acute fracture.      Xr Hand Lt Min 3 V    Result Date: 12/27/2017  LEFT HAND 3 views. HISTORY: Left hand pain following fall. TECHNIQUE: AP and lateral and oblique views. COMPARISON: None. FINDINGS: Diffuse osteopenia. Osteoarthritis with flexion deformity at the distal interphalangeal joint of the little finger. No acute fractures. IMPRESSION: Negative for acute fracture. Ct Head Wo Cont    Result Date: 12/27/2017  Examination: CT scan of the brain without contrast. History: falls, 68 years Female S/p fall, inability to get up and down Technique: 5 mm axial imaging of the brain from the posterior fossa to the vertex. Radiation dose reduction techniques were used for this study:  Our CT scanners use one or all of the following: Automated exposure control, adjustment of the mA and/or kVp according to patient's size, iterative reconstruction. Comparison:  CT brain May 16, 2016 Findings: The brain parenchyma appears within normal limits for age. The ventricles, sulci are age-appropriate. No intracranial hemorrhage or extra-axial collection is identified. No evidence of acute infarct. No mass effect or midline shift is present. Basal cisterns are intact. The visualized paranasal sinuses and mastoid air cells are clear. The orbits, bones, and soft tissues are normal in appearance. IMPRESSION:  Normal unenhanced CT of the brain for age. No acute intracranial abnormality. Echocardiogram results:  No results found for this visit on 12/27/17.       All Micro Results     Procedure Component Value Units Date/Time    CULTURE, URINE [[id number]]  (Abnormal) Collected:  12/30/17 1235    Order Status:  Completed Specimen:  Urine from Cath Urine Updated:  01/03/18 0635     Special Requests: NO SPECIAL REQUESTS        Culture result:         50,000-100,000 COLONIES/mL CANDIDA GLABRATA (A)    CULTURE, BLOOD [[id number]] Collected:  12/30/17 1043    Order Status:  Completed Specimen:  Blood from Blood Updated:  01/03/18 0616     Special Requests: --        RIGHT  HAND       Culture result: NO GROWTH 4 DAYS       CULTURE, BLOOD [[id number]] Collected:  12/30/17 1053    Order Status:  Completed Specimen:  Blood from Blood Updated:  01/03/18 0616     Special Requests: --        RIGHT  ARM       Culture result: NO GROWTH 4 DAYS       CULTURE, URINE [[id number]]  (Abnormal)  (Susceptibility) Collected:  12/27/17 1629    Order Status:  Completed Specimen:  Urine from Clean catch Updated:  12/31/17 0806     Special Requests: NO SPECIAL REQUESTS        Culture result:         >100,000 COLONIES/mL ESCHERICHIA COLI (A)        CULTURE, URINE [[id number]] (Order [id number])   Microbiology   Date: 12/28/2017 Department: [facility] S[facility] Released By/Authorizing: Roque Navarro MD (auto-released)   12/31/2017  8:06 AM - Ernie, [facility]   Component Results   Component Value Flag Ref Range Units Status   Special Requests: NO SPECIAL REQUESTS     Final   Culture result:  (A)    Final   >100,000 COLONIES/mL ESCHERICHIA COLI   Culture & Susceptibility   ESCHERICHIA COLI   Antibiotic Sensitivity YOANA Unit Status   Ampicillin ($) Susceptible <=2 ug/mL Final   Method: YOANA   Ampicillin/sulbactam ($) Susceptible 4/2 ug/mL Final   Method: YOANA   Aztreonam ($$$$) Susceptible <=1 ug/mL Final   Method: YOANA   Cefazolin ($) Susceptible <=1 ug/mL Final   Method: YOANA   Cefepime ($$) Susceptible <=0.5 ug/mL Final   Method: YOANA   Cefoxitin Susceptible <=4 ug/mL Final   Method: YOANA   Ceftriaxone ($) Susceptible <=0.5 ug/mL Final   Method: YOANA   Ciprofloxacin ($) Susceptible <=0.5 ug/mL Final   Method: YOANA   Gentamicin ($) Susceptible <=1 ug/mL Final   Method: YOANA   Nitrofurantoin Susceptible <=16 ug/mL Final   Method: YOANA   Piperacillin/Tazobac ($) Susceptible <=2/4 ug/mL Final   Method: YOANA   Tobramycin ($) Susceptible 1 ug/mL Final   Method: YOANA   Trimeth-Sulfamethoxa Susceptible <=0.5/9.5 ug/mL Final   Method: YOANA            Labs: Results:       BMP, Mg, Phos Recent Labs      01/02/18   0820  01/01/18   0825   NA  145  148*   K 4.8  4.3   CL  116*  118*   CO2  21  19*   AGAP  8  11   BUN  68*  67*   CREA  2.65*  2.54*   CA  8.5  8.7   GLU  191*  327*      CBC Recent Labs      01/02/18   0820  01/01/18   0825   WBC  10.4  12.0*   RBC  3.04*  2.91*   HGB  8.8*  8.6*   HCT  28.6*  27.3*   PLT  322  350   GRANS  73  83*   LYMPH  13  9*   EOS  5  1   MONOS  7  6   BASOS  1  0   IG  1  1   ANEU  7.7  10.1*   ABL  1.3  1.1   MARSHALL  0.5  0.1   ABM  0.7  0.7   ABB  0.1  0.1   AIG  0.1  0.1      LFT No results for input(s): SGOT, ALT, TBIL, AP, TP, ALB, GLOB, AGRAT, GPT in the last 72 hours.    Cardiac Testing Lab Results   Component Value Date/Time    CK 91 09/12/2014 05:13 PM    Troponin-I, Qt. <0.02 12/27/2017 01:20 PM      Coagulation Tests Lab Results   Component Value Date/Time    Prothrombin time 10.5 09/12/2014 05:13 PM    Prothrombin time 10.0 11/16/2011 09:40 AM    Prothrombin time 12.0 11/08/2011 03:21 PM    INR 1.0 09/12/2014 05:13 PM    INR 1.0 11/16/2011 09:40 AM    INR 1.0 11/08/2011 03:21 PM    aPTT 26.8 11/16/2011 09:40 AM      A1c Lab Results   Component Value Date/Time    Hemoglobin A1c 8.1 05/16/2016 06:08 AM    Hemoglobin A1c 7.5 11/16/2011 09:40 AM      Lipid Panel Lab Results   Component Value Date/Time    Cholesterol, total 210 10/09/2017 12:03 PM    Cholesterol (POC) 238 12/11/2013 08:22 AM    HDL Cholesterol 51 10/09/2017 12:03 PM    LDL, calculated 118 10/09/2017 12:03 PM    VLDL, calculated 41 10/09/2017 12:03 PM    Triglyceride 207 10/09/2017 12:03 PM    Triglycerides (POC) 105 12/11/2013 08:22 AM    CHOL/HDL Ratio 4.6 05/16/2016 06:06 AM      Thyroid Panel Lab Results   Component Value Date/Time    TSH 1.840 12/31/2017 06:34 AM    TSH 1.260 08/11/2016 09:42 AM        Most Recent UA Lab Results   Component Value Date/Time    Color YELLOW 05/12/2016 09:30 PM    Appearance CLOUDY 05/12/2016 09:30 PM    Specific gravity 1.017 05/12/2016 09:30 PM    pH (UA) 5.0 05/12/2016 09:30 PM    Protein TRACE 05/12/2016 09:30 PM    Glucose 500 05/12/2016 09:30 PM    Ketone NEGATIVE  05/12/2016 09:30 PM    Bilirubin NEGATIVE  05/12/2016 09:30 PM    Blood SMALL 05/12/2016 09:30 PM    Urobilinogen 0.2 05/12/2016 09:30 PM    Nitrites NEGATIVE  05/12/2016 09:30 PM    Leukocyte Esterase MODERATE 05/12/2016 09:30 PM        Allergies   Allergen Reactions    Lipitor [Atorvastatin] Myalgia     Immunization History   Administered Date(s) Administered    Influenza Vaccine 10/14/2014, 11/02/2015    Influenza Vaccine (Quad) Mdck Pf 10/31/2017    Influenza Vaccine (Quad) PF 10/24/2016    Pneumococcal Conjugate (PCV-13) 06/08/2016    Pneumococcal Polysaccharide (PPSV-23) 12/10/2013    TB Skin Test (PPD) Intradermal 09/12/2014, 12/27/2017       All Labs from Last 24 Hrs:  Recent Results (from the past 24 hour(s))   CBC WITH AUTOMATED DIFF    Collection Time: 01/02/18  8:20 AM   Result Value Ref Range    WBC 10.4 4.3 - 11.1 K/uL    RBC 3.04 (L) 4.05 - 5.25 M/uL    HGB 8.8 (L) 11.7 - 15.4 g/dL    HCT 28.6 (L) 35.8 - 46.3 %    MCV 94.1 79.6 - 97.8 FL    MCH 28.9 26.1 - 32.9 PG    MCHC 30.8 (L) 31.4 - 35.0 g/dL    RDW 14.5 11.9 - 14.6 %    PLATELET 597 615 - 452 K/uL    MPV 12.0 10.8 - 14.1 FL    DF AUTOMATED      NEUTROPHILS 73 43 - 78 %    LYMPHOCYTES 13 13 - 44 %    MONOCYTES 7 4.0 - 12.0 %    EOSINOPHILS 5 0.5 - 7.8 %    BASOPHILS 1 0.0 - 2.0 %    IMMATURE GRANULOCYTES 1 0.0 - 5.0 %    ABS. NEUTROPHILS 7.7 1.7 - 8.2 K/UL    ABS. LYMPHOCYTES 1.3 0.5 - 4.6 K/UL    ABS. MONOCYTES 0.7 0.1 - 1.3 K/UL    ABS. EOSINOPHILS 0.5 0.0 - 0.8 K/UL    ABS. BASOPHILS 0.1 0.0 - 0.2 K/UL    ABS. IMM.  GRANS. 0.1 0.0 - 0.5 K/UL   METABOLIC PANEL, BASIC    Collection Time: 01/02/18  8:20 AM   Result Value Ref Range    Sodium 145 136 - 145 mmol/L    Potassium 4.8 3.5 - 5.1 mmol/L    Chloride 116 (H) 98 - 107 mmol/L    CO2 21 21 - 32 mmol/L    Anion gap 8 7 - 16 mmol/L    Glucose 191 (H) 65 - 100 mg/dL    BUN 68 (H) 8 - 23 MG/DL    Creatinine 2.65 (H) 0.6 - 1.0 MG/DL    GFR est AA 22 (L) >60 ml/min/1.73m2    GFR est non-AA 19 (L) >60 ml/min/1.73m2    Calcium 8.5 8.3 - 10.4 MG/DL   GLUCOSE, POC    Collection Time: 01/02/18 11:08 AM   Result Value Ref Range    Glucose (POC) 203 (H) 65 - 100 mg/dL   GLUCOSE, POC    Collection Time: 01/02/18  3:15 PM   Result Value Ref Range    Glucose (POC) 231 (H) 65 - 100 mg/dL   GLUCOSE, POC    Collection Time: 01/02/18  9:02 PM   Result Value Ref Range    Glucose (POC) 216 (H) 65 - 100 mg/dL   GLUCOSE, POC    Collection Time: 01/03/18  5:19 AM   Result Value Ref Range    Glucose (POC) 148 (H) 65 - 100 mg/dL       Discharge Exam:  Patient Vitals for the past 24 hrs:   Temp Pulse Resp BP SpO2   01/03/18 0319 97.9 °F (36.6 °C) (!) 59 18 162/76 97 %   01/03/18 0035 98 °F (36.7 °C) (!) 56 18 133/71 97 %   01/02/18 2112 99 °F (37.2 °C) (!) 54 18 106/58 90 %   01/02/18 1523 97.7 °F (36.5 °C) (!) 56 18 113/70 95 %   01/02/18 1202 97.8 °F (36.6 °C) (!) 54 22 133/64 95 %     Oxygen Therapy  O2 Sat (%): 97 % (01/03/18 0319)  Pulse via Oximetry: 66 beats per minute (12/28/17 2051)  O2 Device: Nasal cannula (01/02/18 0727)  O2 Flow Rate (L/min): 3 l/min (01/02/18 0727)    Intake/Output Summary (Last 24 hours) at 01/03/18 0751  Last data filed at 01/03/18 6277   Gross per 24 hour   Intake              556 ml   Output             1750 ml   Net            -1194 ml       General:    Well nourished. Alert. No distress. Eyes:   Normal sclera. Extraocular movements intact. ENT:  Normocephalic, atraumatic. Moist mucous membranes  CV:   Regular rate and rhythm. No murmur, rub, or gallop. Lungs:  Clear to auscultation bilaterally. No wheezing, rhonchi, or rales. Abdomen: Soft, nontender, nondistended. Bowel sounds normal.   Extremities: Warm and dry. No cyanosis or edema. Neurologic: CN II-XII grossly intact. Sensation intact. Skin:     No rashes or jaundice. Psych:  Normal mood and affect.     Discharge Info:   Current Discharge Medication List      CONTINUE these medications which have CHANGED    Details   baclofen (LIORESAL) 10 mg tablet Take 1 Tab by mouth three (3) times daily as needed. Qty: 30 Tab, Refills: 1      carbidopa-levodopa (SINEMET)  mg per tablet Take 1 Tab by mouth three (3) times daily. Indications: Parkinsonism  Qty: 90 Tab, Refills: 2    Associated Diagnoses: Parkinsonism, unspecified Parkinsonism type ([facility]         CONTINUE these medications which have NOT CHANGED    Details   levothyroxine (SYNTHROID) 25 mcg tablet Take 1 Tab by mouth Daily (before breakfast). Qty: 90 Tab, Refills: 3      insulin NPH/insulin regular (HUMULIN 70/30) 100 unit/mL (70-30) injection inject 50 units subcutaneously every morning then 40 units every evening BEFORE MEALS  Qty: 3 Vial, Refills: 5      amLODIPine (NORVASC) 5 mg tablet Take 5 mg by mouth every morning. benazepril (LOTENSIN) 20 mg tablet Take 20 mg by mouth every morning. furosemide (LASIX) 40 mg tablet Take 40 mg by mouth every morning. febuxostat (ULORIC) 80 mg tab tablet Take 1 Tab by mouth daily. Qty: 30 Tab, Refills: 11      carvedilol (COREG) 6.25 mg tablet take 1 tablet by mouth twice a day  Refills: 1      linagliptin (TRADJENTA) 5 mg tablet Take 5 mg by mouth every morning. atorvastatin (LIPITOR) 40 mg tablet Take 40 mg by mouth every morning. Refills: 0      triamcinolone acetonide (KENALOG) 0.1 % topical cream Refills: 0      nitroglycerin (NITROLINGUAL) 400 mcg/spray spray 1 Spray by SubLINGual route every five (5) minutes as needed (Pt states \"I've never had to use it\"). Qty: 1 Bottle, Refills: 4      aspirin (ASPIRIN) 325 mg tablet Take 325 mg by mouth every morning. Indications: Myocardial Reinfarction Prevention      pregabalin (LYRICA) 75 mg capsule Take 1 Cap by mouth three (3) times daily.  Max Daily Amount: 225 mg.  Qty: 90 Cap, Refills: 0    Associated Diagnoses: Uncontrolled type I diabetes mellitus with neuropathy ([facility]         STOP taking these medications traMADol (ULTRAM-ER) 300 mg tablet Comments:   Reason for Stopping:                 Disposition: SNF    Activity: PT/OT Eval and Treat  Diet: DIET DIABETIC CONSISTENT CARB Regular    Follow-up Appointments   Procedures    FOLLOW UP VISIT Appointment in: One Week PCP for follow up     PCP for follow up     Standing Status:   Standing     Number of Occurrences:   1     Order Specific Question:   Appointment in     Answer: One Week         Follow-up Information     Follow up With Details Comments 1000 Rocky Mabry MD In 1 week follow up [address] [place] [address]  [phone]            Time spent in patient discharge planning and coordination 35 minutes.     Signed:  Kiah Tafoya MD

## 2018-01-03 NOTE — PROGRESS NOTES
Patient received sleeping in bed, awakens briefly with verbal command. Lethargic when awake. No s/s distress/discomfort noted. Shift assessment completed. Call bell and posey alarm in place for safety, will monitor.

## 2018-01-03 NOTE — ED PROVIDER NOTES
HPI Comments: Patient is a 66-year-old female with a history of heart disease, neuropathy, chronic pain, and Parkinson's disease who was recently admitted to the hospital for UTI, weakness, and encephalopathy. She was discharged today and was being transported to a rehabilitation facility. Reportedly she was awake alert and oriented on transfer however on arrival to the rehabilitation facility she was confused and possibly altered so EMS brought her back here to the emergency department. There is no reported seizure. She denies any headache. She denies chest pain or dyspnea. She is a very poor historian. Patient is a 68 y.o. female presenting with altered mental status. The history is provided by the patient and the spouse. Altered mental status    Associated symptoms include confusion and weakness.  Mental status baseline is normal.         Past Medical History:   Diagnosis Date    Adverse effect of anesthesia     difficult awakening    AF (atrial fibrillation) ([address] 11/20/2011    Arthritis 11/18/2011    generalized     CAD (coronary artery disease) 2011    CABG x 4    Cervical disc disease     Steroid injections    Chronic kidney disease     Chronic pain     back    DJD (degenerative joint disease)     Drainage from wound 10/3/2014    Full dentures     Gout     managed with medication     Hemorrhoid 11/5/2013    [place] (hard of hearing)     Hyperlipemia 11/10/2011    managed with medication     Hypertension     controlled with meds    Hypertriglyceridemia     managed with medication     Hypothyroidism     managed with medication     Neuropathy     legs and arms, managed with medication     Obesity (BMI 30-39.9) 10/2/14    BMI 36.3    PAD (peripheral artery disease) ([facility]     PCI (pneumatosis cystoides intestinalis) 11/5/2013    Pleural effusion, bilateral 11/21/2011    Postoperative anemia due to acute blood loss 11/21/2011    expected     Rib cage fracture 11/5/2013    x 2     S/P CABG (coronary artery bypass graft) 11/05/2013    CABG x 4    Status post-operative repair of hip fracture 09/15/2014    left side, repair with hardware    Stroke [facility])     left sided weakness residual     Type II or unspecified type diabetes mellitus without mention of complication, uncontrolled ([facility] 12/16/2013    oral and insulin reliant/ Avg / low BS s/s/ @ 70/ last A1c 8.3    Unspecified adverse effect of anesthesia     difficult to arouse after general anesthesia       Past Surgical History:   Procedure Laterality Date    CABG, ARTERY-VEIN, FOUR  Oct. 2011    CLOSE CYSTOSTOMY      exploratory with removal of mass of infection    HX CATARACT REMOVAL Bilateral 11/2012    with IOL implants, bilateral    HX COLONOSCOPY      HX HIP FRACTURE TX Left     repair with hardware    HX HYSTERECTOMY      HX LAP CHOLECYSTECTOMY      VASCULAR SURGERY PROCEDURE UNLIST Left 06/07/2017    AVF creation    VASCULAR SURGERY PROCEDURE UNLIST Left 08/10/2017    AVF elevation         Family History:   Problem Relation Age of Onset    Heart Disease Mother     Cancer Mother      colon    Diabetes Mother     Cancer Father      lung    Cancer Sister      breast    Breast Cancer Sister 28    Cancer Brother      Leukemia    Breast Cancer Maternal Aunt 54       Social History     Social History    Marital status:      Spouse name: N/A    Number of children: N/A    Years of education: N/A     Occupational History    Not on file. Social History Main Topics    Smoking status: Never Smoker    Smokeless tobacco: Never Used    Alcohol use No    Drug use: No    Sexual activity: Not on file     Other Topics Concern    Not on file     Social History Narrative         ALLERGIES: Lipitor [atorvastatin]    Review of Systems   HENT: Negative. Eyes: Negative. Respiratory: Negative. Cardiovascular: Negative. Gastrointestinal: Negative. Endocrine: Negative. Genitourinary: Negative. Musculoskeletal: Negative. Neurological: Positive for weakness. Psychiatric/Behavioral: Positive for confusion. Vitals:    01/03/18 1321 01/03/18 1330   BP: 130/54    Pulse: (!) 58    Resp: 15    Temp: 98.1 °F (36.7 °C)    SpO2: 97%    Weight:  108.9 kg (240 lb)   Height:  5' 10\" (1.778 m)            Physical Exam   Constitutional: She is oriented to person, place, and time. She appears well-developed and well-nourished. Overweight and chronically ill-appearing   HENT:   Head: Normocephalic and atraumatic. Neck: Normal range of motion. Neck supple. Cardiovascular: Bradycardia present. Pulmonary/Chest: Effort normal and breath sounds normal.   Abdominal: There is no tenderness. Musculoskeletal: Normal range of motion. She exhibits no tenderness or deformity. Neurological: She is alert and oriented to person, place, and time. Skin: No rash noted. No erythema. There is pallor. Nursing note and vitals reviewed. MDM  Number of Diagnoses or Management Options  Diagnosis management comments: Differential diagnosis includes Parkinson's, neuropathy, chronic pain, altered mental status, anemia, infection, pneumonia, stroke    EKG shows sinus bradycardia at a rate of 55       Amount and/or Complexity of Data Reviewed  Clinical lab tests: ordered and reviewed  Tests in the radiology section of CPT®: ordered and reviewed  Review and summarize past medical records: yes  Independent visualization of images, tracings, or specimens: yes    Risk of Complications, Morbidity, and/or Mortality  Presenting problems: moderate  Diagnostic procedures: moderate  Management options: moderate    Patient Progress  Patient progress: stable    ED Course   3:59 PM  Skin of the head is negative. Her anemia and renal insufficiency are all at her baseline. She has remained awake and alert here. I feel this is all chronic weakness and neuropathy.   She is being discharged to the rehabilitation facility which was arranged earlier today. Voice dictation software was used during the making of this note. This software is not perfect and grammatical and other typographical errors may be present. This note has been proofread, but may still contain errors.   Mara Valdovinos MD; 1/3/2018 @4:00 PM   ===================================================================        Procedures

## 2018-01-03 NOTE — PROGRESS NOTES
SW called [facility] and patient can return. Primary nurse should call and do report. Primary and Charge Nurse updated and agreed to call in report.

## 2018-01-04 ENCOUNTER — PATIENT OUTREACH (OUTPATIENT)
Dept: CASE MANAGEMENT | Age: 78
End: 2018-01-04

## 2018-01-04 LAB
BACTERIA SPEC CULT: NORMAL
BACTERIA SPEC CULT: NORMAL
SERVICE CMNT-IMP: NORMAL
SERVICE CMNT-IMP: NORMAL

## 2018-01-04 NOTE — PROGRESS NOTES
This note will not be viewable in [address] Patient discharged to Preferred Provider [facility]). Patient will be included in weekly care coordination calls until discharge. Will forward chart to Chai Wray, [phone] [facility] and close my case.

## 2018-01-18 ENCOUNTER — HOSPITAL ENCOUNTER (OUTPATIENT)
Dept: LAB | Age: 78
Discharge: HOME | End: 2018-01-18

## 2018-01-18 LAB — POTASSIUM SERPL-SCNC: 5.7 MMOL/L (ref 3.5–5.1)

## 2018-01-18 PROCEDURE — 84132 ASSAY OF SERUM POTASSIUM: CPT

## 2018-02-26 ENCOUNTER — HOSPITAL ENCOUNTER (OUTPATIENT)
Dept: LAB | Age: 78
Discharge: HOME | End: 2018-02-26
Payer: MEDICARE

## 2018-02-26 DIAGNOSIS — D47.2 MGUS (MONOCLONAL GAMMOPATHY OF UNKNOWN SIGNIFICANCE): ICD-10-CM

## 2018-02-26 LAB
ALBUMIN SERPL-MCNC: 3.2 G/DL (ref 3.2–4.6)
ALBUMIN/GLOB SERPL: 0.7 {RATIO} (ref 1.2–3.5)
ALP SERPL-CCNC: 134 U/L (ref 50–136)
ALT SERPL-CCNC: <6 U/L (ref 12–65)
ANION GAP SERPL CALC-SCNC: 5 MMOL/L (ref 7–16)
AST SERPL-CCNC: 9 U/L (ref 15–37)
BASOPHILS # BLD: 0.1 K/UL (ref 0–0.2)
BASOPHILS NFR BLD: 1 % (ref 0–2)
BILIRUB SERPL-MCNC: 0.7 MG/DL (ref 0.2–1.1)
BUN SERPL-MCNC: 57 MG/DL (ref 8–23)
CALCIUM SERPL-MCNC: 9.1 MG/DL (ref 8.3–10.4)
CHLORIDE SERPL-SCNC: 103 MMOL/L (ref 98–107)
CO2 SERPL-SCNC: 31 MMOL/L (ref 21–32)
CREAT SERPL-MCNC: 2.58 MG/DL (ref 0.6–1)
DIFFERENTIAL METHOD BLD: ABNORMAL
EOSINOPHIL # BLD: 0.2 K/UL (ref 0–0.8)
EOSINOPHIL NFR BLD: 3 % (ref 0.5–7.8)
ERYTHROCYTE [DISTWIDTH] IN BLOOD BY AUTOMATED COUNT: 16.6 % (ref 11.9–14.6)
GLOBULIN SER CALC-MCNC: 4.3 G/DL (ref 2.3–3.5)
GLUCOSE SERPL-MCNC: 226 MG/DL (ref 65–100)
HCT VFR BLD AUTO: 28.8 % (ref 35.8–46.3)
HGB BLD-MCNC: 9.2 G/DL (ref 11.7–15.4)
KAPPA LC FREE SER-MCNC: 124.74 MG/L (ref 3.3–19.4)
KAPPA LC FREE/LAMBDA FREE SER: 1.04 {RATIO} (ref 0.26–1.65)
LAMBDA LC FREE SERPL-MCNC: 119.67 MG/L (ref 5.71–26.3)
LYMPHOCYTES # BLD: 0.8 K/UL (ref 0.5–4.6)
LYMPHOCYTES NFR BLD: 11 % (ref 13–44)
MCH RBC QN AUTO: 29.8 PG (ref 26.1–32.9)
MCHC RBC AUTO-ENTMCNC: 31.9 G/DL (ref 31.4–35)
MCV RBC AUTO: 93.2 FL (ref 79.6–97.8)
MONOCYTES # BLD: 0.6 K/UL (ref 0.1–1.3)
MONOCYTES NFR BLD: 8 % (ref 4–12)
NEUTS SEG # BLD: 5.2 K/UL (ref 1.7–8.2)
NEUTS SEG NFR BLD: 77 % (ref 43–78)
NRBC # BLD: 0 K/UL (ref 0–0.2)
PLATELET # BLD AUTO: 199 K/UL (ref 150–450)
PMV BLD AUTO: 11.1 FL (ref 10.8–14.1)
POTASSIUM SERPL-SCNC: 4 MMOL/L (ref 3.5–5.1)
PROT SERPL-MCNC: 7.5 G/DL (ref 6.3–8.2)
RBC # BLD AUTO: 3.09 M/UL (ref 4.05–5.25)
SODIUM SERPL-SCNC: 139 MMOL/L (ref 136–145)
WBC # BLD AUTO: 6.8 K/UL (ref 4.3–11.1)

## 2018-02-26 PROCEDURE — 85025 COMPLETE CBC W/AUTO DIFF WBC: CPT | Performed by: INTERNAL MEDICINE

## 2018-02-26 PROCEDURE — 82784 ASSAY IGA/IGD/IGG/IGM EACH: CPT | Performed by: INTERNAL MEDICINE

## 2018-02-26 PROCEDURE — 36415 COLL VENOUS BLD VENIPUNCTURE: CPT | Performed by: INTERNAL MEDICINE

## 2018-02-26 PROCEDURE — 80053 COMPREHEN METABOLIC PANEL: CPT | Performed by: INTERNAL MEDICINE

## 2018-02-26 PROCEDURE — 86334 IMMUNOFIX E-PHORESIS SERUM: CPT | Performed by: INTERNAL MEDICINE

## 2018-02-26 PROCEDURE — 83883 ASSAY NEPHELOMETRY NOT SPEC: CPT | Performed by: INTERNAL MEDICINE

## 2018-02-27 LAB
ALBUMIN SERPL ELPH-MCNC: 3.44 G/DL (ref 3.2–5.6)
ALBUMIN/GLOB SERPL: 1 {RATIO}
ALPHA1 GLOB SERPL ELPH-MCNC: 0.29 G/DL (ref 0.1–0.4)
ALPHA2 GLOB SERPL ELPH-MCNC: 0.85 G/DL (ref 0.4–1.2)
B-GLOBULIN SERPL QL ELPH: 0.9 G/DL (ref 0.6–1.3)
GAMMA GLOB MFR SERPL ELPH: 1.53 G/DL (ref 0.5–1.6)
IGA SERPL-MCNC: 104 MG/DL (ref 85–499)
IGG SERPL-MCNC: 1575 MG/DL (ref 610–1616)
IGM SERPL-MCNC: 62 MG/DL (ref 35–242)
M PROTEIN SERPL ELPH-MCNC: 0.39 G/DL
PROT PATTERN SERPL ELPH-IMP: ABNORMAL
PROT PATTERN SPEC IFE-IMP: ABNORMAL
PROT SERPL-MCNC: 7 G/DL (ref 6.3–8.2)

## 2018-03-06 ENCOUNTER — HOME HEALTH ADMISSION (OUTPATIENT)
Dept: HOME HEALTH SERVICES | Facility: HOME HEALTH | Age: 78
End: 2018-03-06
Payer: MEDICARE

## 2018-03-09 ENCOUNTER — PATIENT OUTREACH (OUTPATIENT)
Dept: CASE MANAGEMENT | Age: 78
End: 2018-03-09

## 2018-03-09 NOTE — PROGRESS NOTES
This note will not be viewable in [address] RNCM spoke w/ pt regarding CCM services, referral from PCP. Pt just discharged from [facility] 2 days ago. Pt is living w/ daughter and son-in-law. Pt reports she is doing well since she got home. She did two loads of laundry this morning, pacing activities and using energy conserving techniques. She thinks she may do well if dialysis is started to remove toxins from body. She returns to nephrologist in 4wks to make decision about HD. She is on O2 2ltrs continuous. She is scheduled to have [address] services begin Monday 3/12/18 for PT. She reports she needs to have some of her prescriptions filled since discharge. RNCM explained that we do not want her to be without her medications and to notify her if she is ever unable to afford them. RNCM scheduled f/u in 1wk. Pt has RNCM contact information for questions or concerns.

## 2018-03-13 ENCOUNTER — HOME CARE VISIT (OUTPATIENT)
Dept: SCHEDULING | Facility: HOME HEALTH | Age: 78
End: 2018-03-13
Payer: MEDICARE

## 2018-03-13 VITALS
OXYGEN SATURATION: 99 % | DIASTOLIC BLOOD PRESSURE: 70 MMHG | HEART RATE: 67 BPM | RESPIRATION RATE: 18 BRPM | SYSTOLIC BLOOD PRESSURE: 121 MMHG | TEMPERATURE: 98.1 F

## 2018-03-13 PROCEDURE — 400013 HH SOC

## 2018-03-13 PROCEDURE — 3331090001 HH PPS REVENUE CREDIT

## 2018-03-13 PROCEDURE — 3331090002 HH PPS REVENUE DEBIT

## 2018-03-13 PROCEDURE — G0299 HHS/HOSPICE OF RN EA 15 MIN: HCPCS

## 2018-03-14 ENCOUNTER — HOME CARE VISIT (OUTPATIENT)
Dept: SCHEDULING | Facility: HOME HEALTH | Age: 78
End: 2018-03-14
Payer: MEDICARE

## 2018-03-14 VITALS
HEART RATE: 72 BPM | RESPIRATION RATE: 20 BRPM | OXYGEN SATURATION: 97 % | TEMPERATURE: 96.2 F | SYSTOLIC BLOOD PRESSURE: 142 MMHG | DIASTOLIC BLOOD PRESSURE: 68 MMHG

## 2018-03-14 PROCEDURE — 3331090001 HH PPS REVENUE CREDIT

## 2018-03-14 PROCEDURE — 3331090002 HH PPS REVENUE DEBIT

## 2018-03-14 PROCEDURE — G0151 HHCP-SERV OF PT,EA 15 MIN: HCPCS

## 2018-03-15 ENCOUNTER — HOME CARE VISIT (OUTPATIENT)
Dept: SCHEDULING | Facility: HOME HEALTH | Age: 78
End: 2018-03-15
Payer: MEDICARE

## 2018-03-15 VITALS
HEART RATE: 76 BPM | SYSTOLIC BLOOD PRESSURE: 122 MMHG | RESPIRATION RATE: 18 BRPM | DIASTOLIC BLOOD PRESSURE: 68 MMHG | TEMPERATURE: 96 F

## 2018-03-15 VITALS
HEART RATE: 68 BPM | TEMPERATURE: 97.6 F | DIASTOLIC BLOOD PRESSURE: 70 MMHG | RESPIRATION RATE: 17 BRPM | SYSTOLIC BLOOD PRESSURE: 134 MMHG

## 2018-03-15 PROCEDURE — G0152 HHCP-SERV OF OT,EA 15 MIN: HCPCS

## 2018-03-15 PROCEDURE — G0156 HHCP-SVS OF AIDE,EA 15 MIN: HCPCS

## 2018-03-15 PROCEDURE — 3331090001 HH PPS REVENUE CREDIT

## 2018-03-15 PROCEDURE — 3331090002 HH PPS REVENUE DEBIT

## 2018-03-16 PROCEDURE — 3331090001 HH PPS REVENUE CREDIT

## 2018-03-16 PROCEDURE — 3331090002 HH PPS REVENUE DEBIT

## 2018-03-17 PROCEDURE — 3331090002 HH PPS REVENUE DEBIT

## 2018-03-17 PROCEDURE — 3331090001 HH PPS REVENUE CREDIT

## 2018-03-18 PROCEDURE — 3331090001 HH PPS REVENUE CREDIT

## 2018-03-18 PROCEDURE — 3331090002 HH PPS REVENUE DEBIT

## 2018-03-19 ENCOUNTER — HOME CARE VISIT (OUTPATIENT)
Dept: SCHEDULING | Facility: HOME HEALTH | Age: 78
End: 2018-03-19
Payer: MEDICARE

## 2018-03-19 VITALS
DIASTOLIC BLOOD PRESSURE: 68 MMHG | RESPIRATION RATE: 20 BRPM | HEART RATE: 66 BPM | SYSTOLIC BLOOD PRESSURE: 130 MMHG | OXYGEN SATURATION: 98 % | TEMPERATURE: 96 F

## 2018-03-19 PROCEDURE — 3331090002 HH PPS REVENUE DEBIT

## 2018-03-19 PROCEDURE — 3331090001 HH PPS REVENUE CREDIT

## 2018-03-19 PROCEDURE — G0158 HHC OT ASSISTANT EA 15: HCPCS

## 2018-03-19 PROCEDURE — G0157 HHC PT ASSISTANT EA 15: HCPCS

## 2018-03-20 VITALS
DIASTOLIC BLOOD PRESSURE: 70 MMHG | HEART RATE: 71 BPM | TEMPERATURE: 97.5 F | SYSTOLIC BLOOD PRESSURE: 122 MMHG | RESPIRATION RATE: 18 BRPM | OXYGEN SATURATION: 98 %

## 2018-03-20 PROCEDURE — 3331090001 HH PPS REVENUE CREDIT

## 2018-03-20 PROCEDURE — 3331090002 HH PPS REVENUE DEBIT

## 2018-03-21 ENCOUNTER — HOME CARE VISIT (OUTPATIENT)
Dept: SCHEDULING | Facility: HOME HEALTH | Age: 78
End: 2018-03-21
Payer: MEDICARE

## 2018-03-21 VITALS
RESPIRATION RATE: 20 BRPM | OXYGEN SATURATION: 99 % | HEART RATE: 88 BPM | DIASTOLIC BLOOD PRESSURE: 70 MMHG | TEMPERATURE: 97.5 F | SYSTOLIC BLOOD PRESSURE: 132 MMHG

## 2018-03-21 VITALS
HEART RATE: 81 BPM | OXYGEN SATURATION: 98 % | DIASTOLIC BLOOD PRESSURE: 64 MMHG | RESPIRATION RATE: 18 BRPM | SYSTOLIC BLOOD PRESSURE: 144 MMHG | TEMPERATURE: 97.7 F

## 2018-03-21 PROCEDURE — G0299 HHS/HOSPICE OF RN EA 15 MIN: HCPCS

## 2018-03-21 PROCEDURE — 3331090002 HH PPS REVENUE DEBIT

## 2018-03-21 PROCEDURE — 3331090001 HH PPS REVENUE CREDIT

## 2018-03-22 ENCOUNTER — HOSPITAL ENCOUNTER (OUTPATIENT)
Dept: ULTRASOUND IMAGING | Age: 78
Discharge: HOME | End: 2018-03-22
Attending: INTERNAL MEDICINE
Payer: MEDICARE

## 2018-03-22 DIAGNOSIS — M79.89 PAIN AND SWELLING OF LOWER LEG, RIGHT: ICD-10-CM

## 2018-03-22 DIAGNOSIS — M79.661 PAIN AND SWELLING OF LOWER LEG, RIGHT: ICD-10-CM

## 2018-03-22 PROBLEM — G20 DEMENTIA DUE TO PARKINSON'S DISEASE WITHOUT BEHAVIORAL DISTURBANCE (HCC): Chronic | Status: RESOLVED | Noted: 2018-01-03 | Resolved: 2018-03-22

## 2018-03-22 PROBLEM — F02.80 DEMENTIA DUE TO PARKINSON'S DISEASE WITHOUT BEHAVIORAL DISTURBANCE (HCC): Chronic | Status: RESOLVED | Noted: 2018-01-03 | Resolved: 2018-03-22

## 2018-03-22 PROCEDURE — 3331090001 HH PPS REVENUE CREDIT

## 2018-03-22 PROCEDURE — 3331090002 HH PPS REVENUE DEBIT

## 2018-03-22 PROCEDURE — 93971 EXTREMITY STUDY: CPT

## 2018-03-23 ENCOUNTER — HOME CARE VISIT (OUTPATIENT)
Dept: SCHEDULING | Facility: HOME HEALTH | Age: 78
End: 2018-03-23
Payer: MEDICARE

## 2018-03-23 VITALS
DIASTOLIC BLOOD PRESSURE: 78 MMHG | RESPIRATION RATE: 18 BRPM | TEMPERATURE: 97.6 F | SYSTOLIC BLOOD PRESSURE: 130 MMHG | OXYGEN SATURATION: 99 % | HEART RATE: 89 BPM

## 2018-03-23 VITALS
OXYGEN SATURATION: 99 % | HEART RATE: 68 BPM | DIASTOLIC BLOOD PRESSURE: 68 MMHG | TEMPERATURE: 97.5 F | SYSTOLIC BLOOD PRESSURE: 110 MMHG | RESPIRATION RATE: 18 BRPM

## 2018-03-23 PROCEDURE — 3331090001 HH PPS REVENUE CREDIT

## 2018-03-23 PROCEDURE — G0157 HHC PT ASSISTANT EA 15: HCPCS

## 2018-03-23 PROCEDURE — G0299 HHS/HOSPICE OF RN EA 15 MIN: HCPCS

## 2018-03-23 PROCEDURE — 3331090002 HH PPS REVENUE DEBIT

## 2018-03-24 PROCEDURE — 3331090001 HH PPS REVENUE CREDIT

## 2018-03-24 PROCEDURE — 3331090002 HH PPS REVENUE DEBIT

## 2018-03-25 PROCEDURE — 3331090002 HH PPS REVENUE DEBIT

## 2018-03-25 PROCEDURE — 3331090001 HH PPS REVENUE CREDIT

## 2018-03-26 ENCOUNTER — HOME CARE VISIT (OUTPATIENT)
Dept: SCHEDULING | Facility: HOME HEALTH | Age: 78
End: 2018-03-26
Payer: MEDICARE

## 2018-03-26 VITALS
RESPIRATION RATE: 20 BRPM | OXYGEN SATURATION: 98 % | DIASTOLIC BLOOD PRESSURE: 78 MMHG | TEMPERATURE: 96 F | HEART RATE: 71 BPM | SYSTOLIC BLOOD PRESSURE: 118 MMHG

## 2018-03-26 PROCEDURE — 3331090001 HH PPS REVENUE CREDIT

## 2018-03-26 PROCEDURE — 3331090002 HH PPS REVENUE DEBIT

## 2018-03-26 PROCEDURE — G0158 HHC OT ASSISTANT EA 15: HCPCS

## 2018-03-27 ENCOUNTER — HOME CARE VISIT (OUTPATIENT)
Dept: SCHEDULING | Facility: HOME HEALTH | Age: 78
End: 2018-03-27
Payer: MEDICARE

## 2018-03-27 VITALS
TEMPERATURE: 97.4 F | SYSTOLIC BLOOD PRESSURE: 150 MMHG | OXYGEN SATURATION: 100 % | RESPIRATION RATE: 16 BRPM | HEART RATE: 97 BPM | DIASTOLIC BLOOD PRESSURE: 82 MMHG

## 2018-03-27 PROCEDURE — G0157 HHC PT ASSISTANT EA 15: HCPCS

## 2018-03-27 PROCEDURE — 3331090001 HH PPS REVENUE CREDIT

## 2018-03-27 PROCEDURE — 3331090002 HH PPS REVENUE DEBIT

## 2018-03-28 PROCEDURE — 3331090002 HH PPS REVENUE DEBIT

## 2018-03-28 PROCEDURE — 3331090001 HH PPS REVENUE CREDIT

## 2018-03-29 PROBLEM — E66.01 SEVERE OBESITY (BMI 35.0-39.9) WITH COMORBIDITY (HCC): Status: ACTIVE | Noted: 2018-03-29

## 2018-03-29 PROBLEM — G47.00 PERSISTENT DISORDER OF INITIATING OR MAINTAINING SLEEP: Status: ACTIVE | Noted: 2018-03-29

## 2018-03-29 PROCEDURE — 3331090001 HH PPS REVENUE CREDIT

## 2018-03-29 PROCEDURE — 3331090002 HH PPS REVENUE DEBIT

## 2018-03-30 ENCOUNTER — PATIENT OUTREACH (OUTPATIENT)
Dept: CASE MANAGEMENT | Age: 78
End: 2018-03-30

## 2018-03-30 ENCOUNTER — HOME CARE VISIT (OUTPATIENT)
Dept: SCHEDULING | Facility: HOME HEALTH | Age: 78
End: 2018-03-30
Payer: MEDICARE

## 2018-03-30 VITALS
DIASTOLIC BLOOD PRESSURE: 90 MMHG | HEART RATE: 64 BPM | RESPIRATION RATE: 18 BRPM | SYSTOLIC BLOOD PRESSURE: 150 MMHG | TEMPERATURE: 98.6 F

## 2018-03-30 VITALS
RESPIRATION RATE: 18 BRPM | HEART RATE: 68 BPM | SYSTOLIC BLOOD PRESSURE: 124 MMHG | DIASTOLIC BLOOD PRESSURE: 74 MMHG | OXYGEN SATURATION: 97 % | TEMPERATURE: 97.3 F

## 2018-03-30 PROCEDURE — G0152 HHCP-SERV OF OT,EA 15 MIN: HCPCS

## 2018-03-30 PROCEDURE — G0157 HHC PT ASSISTANT EA 15: HCPCS

## 2018-03-30 PROCEDURE — 3331090001 HH PPS REVENUE CREDIT

## 2018-03-30 PROCEDURE — 3331090002 HH PPS REVENUE DEBIT

## 2018-03-30 PROCEDURE — G0299 HHS/HOSPICE OF RN EA 15 MIN: HCPCS

## 2018-03-30 NOTE — PROGRESS NOTES
This note will not be viewable in [address] RNCM spoke w/ pt regarding CCM services. Pt reports having a busy wk of appts. Patricia Edge RN coming at 1p, PT at 2p. Pt reports it is her legs, particularly her knee caps that are her problem. She recently received shot to knee which reports has helped. She is having a sleep study in a couple of months. She is using BSC at night. She reports swelling in legs, compliance w/ lasix. She will be seeing nephrologist Dr. Laurent Cueva 4/2 or 4/3/18, Pt had blisters on legs requiring wrapping while in rehab. Decision for HD has not yet been made. RNCM instructed pt to ask about her diet when seeing nephrologist. Osmar Benson reviewed s/s to report to physician such as swelling, SOB. RNCM scheduled f/u in 1wk. Pt has RNCM contact information for questions or concerns.

## 2018-03-31 VITALS
HEART RATE: 57 BPM | SYSTOLIC BLOOD PRESSURE: 120 MMHG | OXYGEN SATURATION: 96 % | TEMPERATURE: 97.5 F | RESPIRATION RATE: 16 BRPM | DIASTOLIC BLOOD PRESSURE: 62 MMHG

## 2018-03-31 PROCEDURE — 3331090002 HH PPS REVENUE DEBIT

## 2018-03-31 PROCEDURE — 3331090001 HH PPS REVENUE CREDIT

## 2018-04-01 PROCEDURE — 3331090001 HH PPS REVENUE CREDIT

## 2018-04-01 PROCEDURE — 3331090002 HH PPS REVENUE DEBIT

## 2018-04-02 ENCOUNTER — HOME CARE VISIT (OUTPATIENT)
Dept: SCHEDULING | Facility: HOME HEALTH | Age: 78
End: 2018-04-02
Payer: MEDICARE

## 2018-04-02 PROCEDURE — G0157 HHC PT ASSISTANT EA 15: HCPCS

## 2018-04-02 PROCEDURE — 3331090002 HH PPS REVENUE DEBIT

## 2018-04-02 PROCEDURE — 3331090001 HH PPS REVENUE CREDIT

## 2018-04-03 ENCOUNTER — HOME CARE VISIT (OUTPATIENT)
Dept: SCHEDULING | Facility: HOME HEALTH | Age: 78
End: 2018-04-03
Payer: MEDICARE

## 2018-04-03 VITALS
OXYGEN SATURATION: 98 % | DIASTOLIC BLOOD PRESSURE: 70 MMHG | SYSTOLIC BLOOD PRESSURE: 138 MMHG | RESPIRATION RATE: 16 BRPM | HEART RATE: 89 BPM | TEMPERATURE: 97.3 F

## 2018-04-03 VITALS
TEMPERATURE: 97.4 F | OXYGEN SATURATION: 94 % | DIASTOLIC BLOOD PRESSURE: 88 MMHG | RESPIRATION RATE: 16 BRPM | SYSTOLIC BLOOD PRESSURE: 130 MMHG | HEART RATE: 86 BPM

## 2018-04-03 PROCEDURE — 3331090001 HH PPS REVENUE CREDIT

## 2018-04-03 PROCEDURE — G0157 HHC PT ASSISTANT EA 15: HCPCS

## 2018-04-03 PROCEDURE — 3331090002 HH PPS REVENUE DEBIT

## 2018-04-03 PROCEDURE — G0299 HHS/HOSPICE OF RN EA 15 MIN: HCPCS

## 2018-04-04 PROCEDURE — 3331090001 HH PPS REVENUE CREDIT

## 2018-04-04 PROCEDURE — 3331090002 HH PPS REVENUE DEBIT

## 2018-04-05 PROCEDURE — 3331090002 HH PPS REVENUE DEBIT

## 2018-04-05 PROCEDURE — 3331090001 HH PPS REVENUE CREDIT

## 2018-04-06 ENCOUNTER — PATIENT OUTREACH (OUTPATIENT)
Dept: CASE MANAGEMENT | Age: 78
End: 2018-04-06

## 2018-04-06 VITALS
HEART RATE: 67 BPM | DIASTOLIC BLOOD PRESSURE: 74 MMHG | RESPIRATION RATE: 18 BRPM | SYSTOLIC BLOOD PRESSURE: 124 MMHG | OXYGEN SATURATION: 98 % | TEMPERATURE: 97.5 F

## 2018-04-06 PROCEDURE — 3331090002 HH PPS REVENUE DEBIT

## 2018-04-06 PROCEDURE — 3331090001 HH PPS REVENUE CREDIT

## 2018-04-06 NOTE — PROGRESS NOTES
This note will not be viewable in [address] RNCM spoke w/ pt regarding CCM services. Pt reports she saw nephrologist yesterday and is not to point to having HD yet. She reports continued issues w/ bladder infection, and antibiotics not working. She will have urine culture done each month to identify UTI. Pt reports she cant tell when she has a UTI. RNCM discussed s/s of UTI such as confusion, for family to observe for and report immediately to physician. Pt verbalized understanding and recalls this happening previously when in hospital.  Pt reports her BP has been good. Pt dcd by Patricia Edge RN and PT will dismiss soon. She says she is doing a little housework. RNCM encouraged her to pace activities. Pt had blisters on legs requiring wrapping while in rehab. Pt reports large blister finally went down and skin broke on its own. She is using antibacterial cream on it. Her daughter sprays legs in antibacterial spray every night too. She uses gauze at night. She is learning to accept that she cant do things that she once loved like working in yard or drive any longer. RNCM confirmed upcoming PCP appt for 4/12 at 11a. RNCM scheduled f/u in 1wk. Pt has RNCM contact information for questions or concerns.

## 2018-04-07 PROCEDURE — 3331090001 HH PPS REVENUE CREDIT

## 2018-04-07 PROCEDURE — 3331090002 HH PPS REVENUE DEBIT

## 2018-04-08 PROCEDURE — 3331090002 HH PPS REVENUE DEBIT

## 2018-04-08 PROCEDURE — 3331090001 HH PPS REVENUE CREDIT

## 2018-04-09 PROCEDURE — 3331090001 HH PPS REVENUE CREDIT

## 2018-04-09 PROCEDURE — 3331090002 HH PPS REVENUE DEBIT

## 2018-04-10 PROCEDURE — 3331090001 HH PPS REVENUE CREDIT

## 2018-04-10 PROCEDURE — 3331090002 HH PPS REVENUE DEBIT

## 2018-04-11 ENCOUNTER — HOME CARE VISIT (OUTPATIENT)
Dept: SCHEDULING | Facility: HOME HEALTH | Age: 78
End: 2018-04-11
Payer: MEDICARE

## 2018-04-11 VITALS
SYSTOLIC BLOOD PRESSURE: 154 MMHG | OXYGEN SATURATION: 99 % | TEMPERATURE: 96.3 F | DIASTOLIC BLOOD PRESSURE: 70 MMHG | HEART RATE: 88 BPM | RESPIRATION RATE: 19 BRPM

## 2018-04-11 PROCEDURE — G0151 HHCP-SERV OF PT,EA 15 MIN: HCPCS

## 2018-04-11 PROCEDURE — 3331090002 HH PPS REVENUE DEBIT

## 2018-04-11 PROCEDURE — 3331090001 HH PPS REVENUE CREDIT

## 2018-04-12 PROCEDURE — 3331090002 HH PPS REVENUE DEBIT

## 2018-04-12 PROCEDURE — 3331090001 HH PPS REVENUE CREDIT

## 2018-04-13 PROCEDURE — 3331090002 HH PPS REVENUE DEBIT

## 2018-04-13 PROCEDURE — 3331090001 HH PPS REVENUE CREDIT

## 2018-04-14 PROCEDURE — 3331090002 HH PPS REVENUE DEBIT

## 2018-04-14 PROCEDURE — 3331090001 HH PPS REVENUE CREDIT

## 2018-04-20 ENCOUNTER — PATIENT OUTREACH (OUTPATIENT)
Dept: CASE MANAGEMENT | Age: 78
End: 2018-04-20

## 2018-04-20 NOTE — PROGRESS NOTES
This note will not be viewable in [address] Pt is getting legs wrapped 2x/wk at Dr. Andres Daley dermatologist  for cellulitis, persistent swelling that blisters won't heal.  Pt having urine cx monthly. Dr. Sanjiv Claudio prescribed antibiotic. [facility] has discharged pt. She She reports it makes it hard to walk when she has bandages on. She is using walker. Pt has f/u w/ nephrologist monthly, next appt in a wk w/ urine cx. RNCM discussed improvement in Hgb A1c from >8.0 to 6.6. Pt reports she hasn't made any changes that would have impacted this number, diet has remained the same and pt reports she is no more active. RNCM discussed risk of infection w/ cellulitis and frequent UTI,  s/s of infection to report to physicians. Pt verbalizes understanding. Discussed re initiation of [place] [id number] services if appropriate. Pt sees dermatologist 4/27/18, and is to f/u w/ nephrologist w/ urine cx in the next wk. RNCM scheduled f/u in 2wks for progress w/ cellulitis/UTI mgmt.

## 2018-05-21 ENCOUNTER — HOSPITAL ENCOUNTER (OUTPATIENT)
Dept: SLEEP MEDICINE | Age: 78
Discharge: HOME | End: 2018-05-21
Payer: MEDICARE

## 2018-05-21 PROCEDURE — 95806 SLEEP STUDY UNATT&RESP EFFT: CPT

## 2018-05-24 PROBLEM — G47.33 OSA (OBSTRUCTIVE SLEEP APNEA): Status: ACTIVE | Noted: 2018-05-24

## 2018-05-25 ENCOUNTER — PATIENT OUTREACH (OUTPATIENT)
Dept: CASE MANAGEMENT | Age: 78
End: 2018-05-25

## 2018-05-25 NOTE — PROGRESS NOTES
This note will not be viewable in [address] RNCM spoke w/ pt regarding CCM services. Pt reports she had her sleep test done, and have increased her oxygen to 3 L/min at night. She reports she was having mid to lower abd pain. Pt called her nephrologist and provided urine culture, but has not received results. She is going to call them today. She is having difficulty walking outside of home, legs get weak. She does what is needed in the home such as laundry, but sits on stool to cook. She is independent w/ bathing but has someone in the home in case she needs to call. She understands neuropathy is progressive. Pt reports she has another blister that has not busted on her leg, the others had healed. She is dressing it herself. Pt is no longer seeing Dr. Randal Richardson for this. RNCM Reviewed w/ pt s/s to report to physicians, pt is able to recall. Pt  Is considering referral to different nephrologist. Pt to discuss at next PCP appt 6/16/18. Pt has retained RNCM contact information for future questions or concerns. RNCM will graduate at this time, please reconsult if needs arise.

## 2018-05-31 ENCOUNTER — HOSPITAL ENCOUNTER (OUTPATIENT)
Dept: ULTRASOUND IMAGING | Age: 78
Discharge: HOME | End: 2018-05-31
Attending: NURSE PRACTITIONER
Payer: MEDICARE

## 2018-05-31 DIAGNOSIS — N18.4 CHRONIC KIDNEY DISEASE (CKD), STAGE IV (SEVERE) (HCC): ICD-10-CM

## 2018-05-31 DIAGNOSIS — R10.9 FLANK PAIN: ICD-10-CM

## 2018-05-31 PROCEDURE — 76770 US EXAM ABDO BACK WALL COMP: CPT

## 2018-06-14 ENCOUNTER — HOSPITAL ENCOUNTER (OUTPATIENT)
Dept: CT IMAGING | Age: 78
Discharge: HOME | End: 2018-06-14
Attending: INTERNAL MEDICINE
Payer: MEDICARE

## 2018-06-14 DIAGNOSIS — R10.9 ABDOMINAL PAIN: ICD-10-CM

## 2018-06-14 PROCEDURE — 74011636320 HC RX REV CODE- 636/320: Performed by: INTERNAL MEDICINE

## 2018-06-14 PROCEDURE — 74176 CT ABD & PELVIS W/O CONTRAST: CPT

## 2018-06-14 RX ADMIN — DIATRIZOATE MEGLUMINE AND DIATRIZOATE SODIUM 15 ML: 660; 100 LIQUID ORAL; RECTAL at 08:36

## 2018-11-30 PROBLEM — N18.5 CKD (CHRONIC KIDNEY DISEASE) STAGE 5, GFR LESS THAN 15 ML/MIN (HCC): Status: ACTIVE | Noted: 2018-11-30

## 2019-02-26 ENCOUNTER — HOSPITAL ENCOUNTER (OUTPATIENT)
Dept: LAB | Age: 79
Discharge: HOME | End: 2019-02-26
Payer: MEDICARE

## 2019-02-26 DIAGNOSIS — D47.2 MGUS (MONOCLONAL GAMMOPATHY OF UNKNOWN SIGNIFICANCE): ICD-10-CM

## 2019-02-26 LAB
ALBUMIN SERPL-MCNC: 3.6 G/DL (ref 3.2–4.6)
ALBUMIN/GLOB SERPL: 0.9 {RATIO} (ref 1.2–3.5)
ALP SERPL-CCNC: 134 U/L (ref 50–136)
ALT SERPL-CCNC: 12 U/L (ref 12–65)
ANION GAP SERPL CALC-SCNC: 4 MMOL/L (ref 7–16)
AST SERPL-CCNC: 11 U/L (ref 15–37)
BASOPHILS # BLD: 0.1 K/UL (ref 0–0.2)
BASOPHILS NFR BLD: 1 % (ref 0–2)
BILIRUB SERPL-MCNC: 0.6 MG/DL (ref 0.2–1.1)
BUN SERPL-MCNC: 41 MG/DL (ref 8–23)
CALCIUM SERPL-MCNC: 9.1 MG/DL (ref 8.3–10.4)
CHLORIDE SERPL-SCNC: 106 MMOL/L (ref 98–107)
CO2 SERPL-SCNC: 28 MMOL/L (ref 21–32)
CREAT SERPL-MCNC: 2.47 MG/DL (ref 0.6–1)
DIFFERENTIAL METHOD BLD: ABNORMAL
EOSINOPHIL # BLD: 0.3 K/UL (ref 0–0.8)
EOSINOPHIL NFR BLD: 4 % (ref 0.5–7.8)
ERYTHROCYTE [DISTWIDTH] IN BLOOD BY AUTOMATED COUNT: 13.3 % (ref 11.9–14.6)
GLOBULIN SER CALC-MCNC: 4 G/DL (ref 2.3–3.5)
GLUCOSE SERPL-MCNC: 157 MG/DL (ref 65–100)
HCT VFR BLD AUTO: 33.4 % (ref 35.8–46.3)
HGB BLD-MCNC: 11.2 G/DL (ref 11.7–15.4)
IMM GRANULOCYTES # BLD AUTO: 0.1 K/UL (ref 0–0.5)
IMM GRANULOCYTES NFR BLD AUTO: 1 % (ref 0–5)
KAPPA LC FREE SER-MCNC: 81.05 MG/L (ref 3.3–19.4)
KAPPA LC FREE/LAMBDA FREE SER: 1.28 {RATIO} (ref 0.26–1.65)
LAMBDA LC FREE SERPL-MCNC: 63.27 MG/L (ref 5.71–26.3)
LYMPHOCYTES # BLD: 1.5 K/UL (ref 0.5–4.6)
LYMPHOCYTES NFR BLD: 19 % (ref 13–44)
MCH RBC QN AUTO: 32.1 PG (ref 26.1–32.9)
MCHC RBC AUTO-ENTMCNC: 33.5 G/DL (ref 31.4–35)
MCV RBC AUTO: 95.7 FL (ref 79.6–97.8)
MONOCYTES # BLD: 0.5 K/UL (ref 0.1–1.3)
MONOCYTES NFR BLD: 6 % (ref 4–12)
NEUTS SEG # BLD: 5.4 K/UL (ref 1.7–8.2)
NEUTS SEG NFR BLD: 69 % (ref 43–78)
NRBC # BLD: 0 K/UL (ref 0–0.2)
PLATELET # BLD AUTO: 251 K/UL (ref 150–450)
PMV BLD AUTO: 11.1 FL (ref 9.4–12.3)
POTASSIUM SERPL-SCNC: 3.7 MMOL/L (ref 3.5–5.1)
PROT SERPL-MCNC: 7.6 G/DL (ref 6.3–8.2)
RBC # BLD AUTO: 3.49 M/UL (ref 4.05–5.25)
SODIUM SERPL-SCNC: 138 MMOL/L (ref 136–145)
WBC # BLD AUTO: 7.8 K/UL (ref 4.3–11.1)

## 2019-02-26 PROCEDURE — 86334 IMMUNOFIX E-PHORESIS SERUM: CPT

## 2019-02-26 PROCEDURE — 84165 PROTEIN E-PHORESIS SERUM: CPT

## 2019-02-26 PROCEDURE — 83883 ASSAY NEPHELOMETRY NOT SPEC: CPT

## 2019-02-26 PROCEDURE — 85025 COMPLETE CBC W/AUTO DIFF WBC: CPT

## 2019-02-26 PROCEDURE — 36415 COLL VENOUS BLD VENIPUNCTURE: CPT

## 2019-02-26 PROCEDURE — 80053 COMPREHEN METABOLIC PANEL: CPT

## 2019-02-27 LAB
ALBUMIN SERPL ELPH-MCNC: 3.55 G/DL (ref 3.2–5.6)
ALBUMIN/GLOB SERPL: 1 {RATIO}
ALPHA1 GLOB SERPL ELPH-MCNC: 0.24 G/DL (ref 0.1–0.4)
ALPHA2 GLOB SERPL ELPH-MCNC: 0.96 G/DL (ref 0.4–1.2)
B-GLOBULIN SERPL QL ELPH: 1.06 G/DL (ref 0.6–1.3)
GAMMA GLOB MFR SERPL ELPH: 1.2 G/DL (ref 0.5–1.6)
IGA SERPL-MCNC: 80 MG/DL (ref 85–499)
IGG SERPL-MCNC: 1138 MG/DL (ref 610–1616)
IGM SERPL-MCNC: 56 MG/DL (ref 35–242)
M PROTEIN SERPL ELPH-MCNC: 0.53 G/DL
PROT PATTERN SERPL ELPH-IMP: ABNORMAL
PROT PATTERN SPEC IFE-IMP: ABNORMAL
PROT SERPL-MCNC: 7 G/DL (ref 6.3–8.2)

## 2019-05-29 ENCOUNTER — HOSPITAL ENCOUNTER (INPATIENT)
Age: 79
LOS: 2 days | Discharge: HOME HEALTH | DRG: 641 | End: 2019-05-31
Attending: EMERGENCY MEDICINE | Admitting: INTERNAL MEDICINE
Payer: MEDICARE

## 2019-05-29 ENCOUNTER — APPOINTMENT (OUTPATIENT)
Dept: GENERAL RADIOLOGY | Age: 79
DRG: 641 | End: 2019-05-29
Attending: EMERGENCY MEDICINE
Payer: MEDICARE

## 2019-05-29 DIAGNOSIS — N17.9 ACUTE KIDNEY INJURY SUPERIMPOSED ON CHRONIC KIDNEY DISEASE (HCC): ICD-10-CM

## 2019-05-29 DIAGNOSIS — E87.6 HYPOKALEMIA: Primary | ICD-10-CM

## 2019-05-29 DIAGNOSIS — N18.9 ACUTE KIDNEY INJURY SUPERIMPOSED ON CHRONIC KIDNEY DISEASE (HCC): ICD-10-CM

## 2019-05-29 DIAGNOSIS — R73.9 HYPERGLYCEMIA: ICD-10-CM

## 2019-05-29 DIAGNOSIS — S39.012A LUMBAR STRAIN, INITIAL ENCOUNTER: ICD-10-CM

## 2019-05-29 DIAGNOSIS — N39.0 URINARY TRACT INFECTION WITHOUT HEMATURIA, SITE UNSPECIFIED: ICD-10-CM

## 2019-05-29 PROBLEM — E87.1 HYPONATREMIA: Status: ACTIVE | Noted: 2019-05-29

## 2019-05-29 PROBLEM — N18.4 STAGE 4 CHRONIC KIDNEY DISEASE (HCC): Status: ACTIVE | Noted: 2019-05-29

## 2019-05-29 LAB
ALBUMIN SERPL-MCNC: 3.6 G/DL (ref 3.2–4.6)
ALBUMIN/GLOB SERPL: 0.9 {RATIO} (ref 1.2–3.5)
ALP SERPL-CCNC: 165 U/L (ref 50–136)
ALT SERPL-CCNC: 11 U/L (ref 12–65)
ANION GAP SERPL CALC-SCNC: 13 MMOL/L (ref 7–16)
AST SERPL-CCNC: 8 U/L (ref 15–37)
ATRIAL RATE: 220 BPM
BACTERIA URNS QL MICRO: ABNORMAL /HPF
BASOPHILS # BLD: 0.1 K/UL (ref 0–0.2)
BASOPHILS NFR BLD: 1 % (ref 0–2)
BILIRUB SERPL-MCNC: 0.7 MG/DL (ref 0.2–1.1)
BUN SERPL-MCNC: 103 MG/DL (ref 8–23)
CALCIUM SERPL-MCNC: 9.2 MG/DL (ref 8.3–10.4)
CALCULATED P AXIS, ECG09: 78 DEGREES
CALCULATED R AXIS, ECG10: -17 DEGREES
CALCULATED T AXIS, ECG11: 112 DEGREES
CASTS URNS QL MICRO: 0 /LPF
CHLORIDE SERPL-SCNC: 82 MMOL/L (ref 98–107)
CO2 SERPL-SCNC: 33 MMOL/L (ref 21–32)
CREAT SERPL-MCNC: 3.32 MG/DL (ref 0.6–1)
DIAGNOSIS, 93000: NORMAL
DIFFERENTIAL METHOD BLD: ABNORMAL
EOSINOPHIL # BLD: 0.2 K/UL (ref 0–0.8)
EOSINOPHIL NFR BLD: 2 % (ref 0.5–7.8)
EPI CELLS #/AREA URNS HPF: 0 /HPF
ERYTHROCYTE [DISTWIDTH] IN BLOOD BY AUTOMATED COUNT: 12.1 % (ref 11.9–14.6)
GLOBULIN SER CALC-MCNC: 4.2 G/DL (ref 2.3–3.5)
GLUCOSE BLD STRIP.AUTO-MCNC: 375 MG/DL (ref 65–100)
GLUCOSE BLD STRIP.AUTO-MCNC: 403 MG/DL (ref 65–100)
GLUCOSE BLD STRIP.AUTO-MCNC: 453 MG/DL (ref 65–100)
GLUCOSE BLD STRIP.AUTO-MCNC: 457 MG/DL (ref 65–100)
GLUCOSE SERPL-MCNC: 437 MG/DL (ref 65–100)
HCT VFR BLD AUTO: 34.5 % (ref 35.8–46.3)
HGB BLD-MCNC: 12.2 G/DL (ref 11.7–15.4)
IMM GRANULOCYTES # BLD AUTO: 0.1 K/UL (ref 0–0.5)
IMM GRANULOCYTES NFR BLD AUTO: 1 % (ref 0–5)
LYMPHOCYTES # BLD: 1.2 K/UL (ref 0.5–4.6)
LYMPHOCYTES NFR BLD: 11 % (ref 13–44)
MAGNESIUM SERPL-MCNC: 2.8 MG/DL (ref 1.8–2.4)
MCH RBC QN AUTO: 31.5 PG (ref 26.1–32.9)
MCHC RBC AUTO-ENTMCNC: 35.4 G/DL (ref 31.4–35)
MCV RBC AUTO: 89.1 FL (ref 79.6–97.8)
MONOCYTES # BLD: 0.8 K/UL (ref 0.1–1.3)
MONOCYTES NFR BLD: 7 % (ref 4–12)
NEUTS SEG # BLD: 8.6 K/UL (ref 1.7–8.2)
NEUTS SEG NFR BLD: 79 % (ref 43–78)
NRBC # BLD: 0 K/UL (ref 0–0.2)
PLATELET # BLD AUTO: 263 K/UL (ref 150–450)
PMV BLD AUTO: 12.7 FL (ref 9.4–12.3)
POTASSIUM SERPL-SCNC: 2.5 MMOL/L (ref 3.5–5.1)
POTASSIUM SERPL-SCNC: 2.5 MMOL/L (ref 3.5–5.1)
PROT SERPL-MCNC: 7.8 G/DL (ref 6.3–8.2)
Q-T INTERVAL, ECG07: 470 MS
QRS DURATION, ECG06: 126 MS
QTC CALCULATION (BEZET), ECG08: 492 MS
RBC # BLD AUTO: 3.87 M/UL (ref 4.05–5.2)
RBC #/AREA URNS HPF: ABNORMAL /HPF
SODIUM SERPL-SCNC: 128 MMOL/L (ref 136–145)
TSH SERPL DL<=0.005 MIU/L-ACNC: 3.53 UIU/ML (ref 0.36–3.74)
VENTRICULAR RATE, ECG03: 66 BPM
WBC # BLD AUTO: 10.9 K/UL (ref 4.3–11.1)
WBC URNS QL MICRO: ABNORMAL /HPF

## 2019-05-29 PROCEDURE — C1769 GUIDE WIRE: HCPCS

## 2019-05-29 PROCEDURE — 74011250636 HC RX REV CODE- 250/636: Performed by: INTERNAL MEDICINE

## 2019-05-29 PROCEDURE — 74011636637 HC RX REV CODE- 636/637: Performed by: FAMILY MEDICINE

## 2019-05-29 PROCEDURE — 81015 MICROSCOPIC EXAM OF URINE: CPT

## 2019-05-29 PROCEDURE — 82962 GLUCOSE BLOOD TEST: CPT

## 2019-05-29 PROCEDURE — 73502 X-RAY EXAM HIP UNI 2-3 VIEWS: CPT

## 2019-05-29 PROCEDURE — 74011250637 HC RX REV CODE- 250/637: Performed by: EMERGENCY MEDICINE

## 2019-05-29 PROCEDURE — 87186 SC STD MICRODIL/AGAR DIL: CPT

## 2019-05-29 PROCEDURE — 65660000000 HC RM CCU STEPDOWN

## 2019-05-29 PROCEDURE — 84443 ASSAY THYROID STIM HORMONE: CPT

## 2019-05-29 PROCEDURE — 99285 EMERGENCY DEPT VISIT HI MDM: CPT | Performed by: EMERGENCY MEDICINE

## 2019-05-29 PROCEDURE — 84132 ASSAY OF SERUM POTASSIUM: CPT

## 2019-05-29 PROCEDURE — 74011636637 HC RX REV CODE- 636/637: Performed by: INTERNAL MEDICINE

## 2019-05-29 PROCEDURE — 87086 URINE CULTURE/COLONY COUNT: CPT

## 2019-05-29 PROCEDURE — 74011250637 HC RX REV CODE- 250/637: Performed by: INTERNAL MEDICINE

## 2019-05-29 PROCEDURE — 81003 URINALYSIS AUTO W/O SCOPE: CPT | Performed by: EMERGENCY MEDICINE

## 2019-05-29 PROCEDURE — 93005 ELECTROCARDIOGRAM TRACING: CPT | Performed by: EMERGENCY MEDICINE

## 2019-05-29 PROCEDURE — 74011250636 HC RX REV CODE- 250/636: Performed by: EMERGENCY MEDICINE

## 2019-05-29 PROCEDURE — 80053 COMPREHEN METABOLIC PANEL: CPT

## 2019-05-29 PROCEDURE — 85025 COMPLETE CBC W/AUTO DIFF WBC: CPT

## 2019-05-29 PROCEDURE — 87088 URINE BACTERIA CULTURE: CPT

## 2019-05-29 PROCEDURE — 74011250637 HC RX REV CODE- 250/637: Performed by: FAMILY MEDICINE

## 2019-05-29 PROCEDURE — 36415 COLL VENOUS BLD VENIPUNCTURE: CPT

## 2019-05-29 PROCEDURE — 86580 TB INTRADERMAL TEST: CPT | Performed by: INTERNAL MEDICINE

## 2019-05-29 PROCEDURE — 83735 ASSAY OF MAGNESIUM: CPT

## 2019-05-29 PROCEDURE — 77030012939 HC DRSG HYDRCOIL BMS -A

## 2019-05-29 PROCEDURE — 74011000302 HC RX REV CODE- 302: Performed by: INTERNAL MEDICINE

## 2019-05-29 PROCEDURE — 77030020263 HC SOL INJ SOD CL0.9% LFCR 1000ML

## 2019-05-29 PROCEDURE — 74011000258 HC RX REV CODE- 258: Performed by: EMERGENCY MEDICINE

## 2019-05-29 PROCEDURE — 96374 THER/PROPH/DIAG INJ IV PUSH: CPT | Performed by: EMERGENCY MEDICINE

## 2019-05-29 PROCEDURE — 72100 X-RAY EXAM L-S SPINE 2/3 VWS: CPT

## 2019-05-29 PROCEDURE — 51701 INSERT BLADDER CATHETER: CPT | Performed by: EMERGENCY MEDICINE

## 2019-05-29 RX ORDER — POTASSIUM CHLORIDE 14.9 MG/ML
10 INJECTION INTRAVENOUS
Status: COMPLETED | OUTPATIENT
Start: 2019-05-29 | End: 2019-05-29

## 2019-05-29 RX ORDER — INSULIN LISPRO 100 [IU]/ML
10 INJECTION, SOLUTION INTRAVENOUS; SUBCUTANEOUS
Status: DISCONTINUED | OUTPATIENT
Start: 2019-05-29 | End: 2019-05-30

## 2019-05-29 RX ORDER — INSULIN LISPRO 100 [IU]/ML
15 INJECTION, SOLUTION INTRAVENOUS; SUBCUTANEOUS ONCE
Status: COMPLETED | OUTPATIENT
Start: 2019-05-29 | End: 2019-05-29

## 2019-05-29 RX ORDER — LEVOTHYROXINE SODIUM 50 UG/1
25 TABLET ORAL
Status: DISCONTINUED | OUTPATIENT
Start: 2019-05-30 | End: 2019-05-31 | Stop reason: HOSPADM

## 2019-05-29 RX ORDER — ACETAMINOPHEN 325 MG/1
650 TABLET ORAL
Status: DISCONTINUED | OUTPATIENT
Start: 2019-05-29 | End: 2019-05-31 | Stop reason: HOSPADM

## 2019-05-29 RX ORDER — EZETIMIBE 10 MG/1
10 TABLET ORAL DAILY
Status: DISCONTINUED | OUTPATIENT
Start: 2019-05-30 | End: 2019-05-31 | Stop reason: HOSPADM

## 2019-05-29 RX ORDER — POTASSIUM CHLORIDE 20 MEQ/1
20 TABLET, EXTENDED RELEASE ORAL
Status: COMPLETED | OUTPATIENT
Start: 2019-05-29 | End: 2019-05-29

## 2019-05-29 RX ORDER — POTASSIUM CHLORIDE 20 MEQ/1
40 TABLET, EXTENDED RELEASE ORAL EVERY 4 HOURS
Status: COMPLETED | OUTPATIENT
Start: 2019-05-29 | End: 2019-05-30

## 2019-05-29 RX ORDER — GUAIFENESIN 100 MG/5ML
81 LIQUID (ML) ORAL DAILY
Status: DISCONTINUED | OUTPATIENT
Start: 2019-05-30 | End: 2019-05-31

## 2019-05-29 RX ORDER — CARBIDOPA AND LEVODOPA 25; 100 MG/1; MG/1
1 TABLET ORAL 3 TIMES DAILY
Status: DISCONTINUED | OUTPATIENT
Start: 2019-05-29 | End: 2019-05-31 | Stop reason: HOSPADM

## 2019-05-29 RX ORDER — HYDRALAZINE HYDROCHLORIDE 20 MG/ML
10 INJECTION INTRAMUSCULAR; INTRAVENOUS
Status: DISCONTINUED | OUTPATIENT
Start: 2019-05-29 | End: 2019-05-31 | Stop reason: HOSPADM

## 2019-05-29 RX ORDER — AMLODIPINE BESYLATE 5 MG/1
5 TABLET ORAL DAILY
Status: DISCONTINUED | OUTPATIENT
Start: 2019-05-29 | End: 2019-05-31 | Stop reason: HOSPADM

## 2019-05-29 RX ORDER — PREDNISOLONE ACETATE 10 MG/ML
1 SUSPENSION/ DROPS OPHTHALMIC 4 TIMES DAILY
Status: DISCONTINUED | OUTPATIENT
Start: 2019-05-29 | End: 2019-05-29

## 2019-05-29 RX ORDER — ONDANSETRON 2 MG/ML
4 INJECTION INTRAMUSCULAR; INTRAVENOUS
Status: COMPLETED | OUTPATIENT
Start: 2019-05-29 | End: 2019-05-29

## 2019-05-29 RX ORDER — SODIUM CHLORIDE 0.9 % (FLUSH) 0.9 %
5-40 SYRINGE (ML) INJECTION AS NEEDED
Status: DISCONTINUED | OUTPATIENT
Start: 2019-05-29 | End: 2019-05-31 | Stop reason: HOSPADM

## 2019-05-29 RX ORDER — SODIUM CHLORIDE 0.9 % (FLUSH) 0.9 %
5-40 SYRINGE (ML) INJECTION EVERY 8 HOURS
Status: DISCONTINUED | OUTPATIENT
Start: 2019-05-29 | End: 2019-05-31 | Stop reason: HOSPADM

## 2019-05-29 RX ORDER — ENOXAPARIN SODIUM 100 MG/ML
30 INJECTION SUBCUTANEOUS EVERY 24 HOURS
Status: DISCONTINUED | OUTPATIENT
Start: 2019-05-29 | End: 2019-05-31

## 2019-05-29 RX ORDER — CARVEDILOL 25 MG/1
25 TABLET ORAL 2 TIMES DAILY WITH MEALS
Status: DISCONTINUED | OUTPATIENT
Start: 2019-05-29 | End: 2019-05-31 | Stop reason: HOSPADM

## 2019-05-29 RX ORDER — INSULIN LISPRO 100 [IU]/ML
INJECTION, SOLUTION INTRAVENOUS; SUBCUTANEOUS
Status: DISCONTINUED | OUTPATIENT
Start: 2019-05-29 | End: 2019-05-31 | Stop reason: HOSPADM

## 2019-05-29 RX ORDER — FEBUXOSTAT 80 MG/1
80 TABLET, FILM COATED ORAL DAILY
Status: DISCONTINUED | OUTPATIENT
Start: 2019-05-30 | End: 2019-05-31 | Stop reason: HOSPADM

## 2019-05-29 RX ORDER — LANOLIN ALCOHOL/MO/W.PET/CERES
324 CREAM (GRAM) TOPICAL DAILY
Status: DISCONTINUED | OUTPATIENT
Start: 2019-05-30 | End: 2019-05-31 | Stop reason: HOSPADM

## 2019-05-29 RX ADMIN — INSULIN LISPRO 10 UNITS: 100 INJECTION, SOLUTION INTRAVENOUS; SUBCUTANEOUS at 16:31

## 2019-05-29 RX ADMIN — Medication 1 AMPULE: at 15:18

## 2019-05-29 RX ADMIN — CARVEDILOL 25 MG: 25 TABLET, FILM COATED ORAL at 17:06

## 2019-05-29 RX ADMIN — INSULIN LISPRO 10 UNITS: 100 INJECTION, SOLUTION INTRAVENOUS; SUBCUTANEOUS at 21:19

## 2019-05-29 RX ADMIN — Medication 10 ML: at 21:20

## 2019-05-29 RX ADMIN — ONDANSETRON 4 MG: 2 INJECTION INTRAMUSCULAR; INTRAVENOUS at 10:20

## 2019-05-29 RX ADMIN — AMLODIPINE BESYLATE 5 MG: 5 TABLET ORAL at 17:06

## 2019-05-29 RX ADMIN — CARBIDOPA AND LEVODOPA 1 TABLET: 25; 100 TABLET ORAL at 17:06

## 2019-05-29 RX ADMIN — INSULIN HUMAN 20 UNITS: 100 INJECTION, SUSPENSION SUBCUTANEOUS at 16:31

## 2019-05-29 RX ADMIN — Medication 5 ML: at 15:18

## 2019-05-29 RX ADMIN — INSULIN LISPRO 10 UNITS: 100 INJECTION, SOLUTION INTRAVENOUS; SUBCUTANEOUS at 16:30

## 2019-05-29 RX ADMIN — SODIUM CHLORIDE 1000 ML: 900 INJECTION, SOLUTION INTRAVENOUS at 16:32

## 2019-05-29 RX ADMIN — POTASSIUM CHLORIDE 40 MEQ: 20 TABLET, EXTENDED RELEASE ORAL at 23:44

## 2019-05-29 RX ADMIN — ENOXAPARIN SODIUM 30 MG: 30 INJECTION SUBCUTANEOUS at 15:18

## 2019-05-29 RX ADMIN — INSULIN LISPRO 15 UNITS: 100 INJECTION, SOLUTION INTRAVENOUS; SUBCUTANEOUS at 22:17

## 2019-05-29 RX ADMIN — Medication 1 AMPULE: at 20:09

## 2019-05-29 RX ADMIN — CARBIDOPA AND LEVODOPA 1 TABLET: 25; 100 TABLET ORAL at 21:20

## 2019-05-29 RX ADMIN — CEFTRIAXONE SODIUM 1 G: 1 INJECTION, POWDER, FOR SOLUTION INTRAMUSCULAR; INTRAVENOUS at 11:32

## 2019-05-29 RX ADMIN — TUBERCULIN PURIFIED PROTEIN DERIVATIVE 5 UNITS: 5 INJECTION, SOLUTION INTRADERMAL at 15:20

## 2019-05-29 RX ADMIN — HYDRALAZINE HYDROCHLORIDE 10 MG: 20 INJECTION INTRAMUSCULAR; INTRAVENOUS at 15:19

## 2019-05-29 RX ADMIN — POTASSIUM CHLORIDE 10 MEQ: 200 INJECTION, SOLUTION INTRAVENOUS at 11:31

## 2019-05-29 RX ADMIN — POTASSIUM CHLORIDE 40 MEQ: 20 TABLET, EXTENDED RELEASE ORAL at 20:09

## 2019-05-29 RX ADMIN — POTASSIUM CHLORIDE 20 MEQ: 20 TABLET, EXTENDED RELEASE ORAL at 11:32

## 2019-05-29 NOTE — ED NOTES
Straight catheter performed by this RN to obtain urine specimen. Pt tolerated procedure. Urine sent to lab.

## 2019-05-29 NOTE — WOUND CARE
Left first, 2nd and 3rd toes with healing abrasions, bandaid if needed other wise leave open to air. Dry skin on lower legs, use lotion daily after bathing. Left hip 3x3cm eschar, unknown etiology, has seen ortho (has adjacent hip scar) and dermatology. Ortho told her not related to hip surgery and hip surgery was healed fine. Dermatology prescribed an ointment daily and told them the eschar would fall of on its own eventually. Trimmed eschar back and applied betadine and DuoDerm (hydrocolloid to assist with autolytic debridement, recommend changing 3 times per week. Will monitor.

## 2019-05-29 NOTE — PROGRESS NOTES
CM chart review. PCP listed as Dr. Joanne Allen [facility] - last office visit noted on 5-21-19).

## 2019-05-29 NOTE — ED TRIAGE NOTES
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 Found with patient laying on back, ems states pt was down ~1.5 hours. C/o severe back pain, hx of chronic back pain but states its worse. BP initially 190/100, 5 mg morphine IM given and 5 mg IV morphine given. 4 mg zofran IV given as well. 300 ccs NS given en route. , states she has not taken her insulin this morning. Wears 2L NC at home chronically. Pain slightly relieved by pain medication given by ems.

## 2019-05-29 NOTE — CONSULTS
Renal Consult Subjective:  
 
Pee Pearson is a 78 y.o.  female who is being seen for  CKD. Shehas a hx of CKD IV and is followed by Dr. Tony Tam at [address] [place] [address] Nephrology. She has an AVF in the left arm that has matured. A couple of weeks ago she had more edema and shortness of breath. Metolazone was added and she had a good response losing >15lb of fluid. Today she fell and couldn't get up. Lab in ER show higher creatinine with estimated GFR of 14ml/min, BUN >318 and some metabolic alkalosis with hypokalemia. Difficult to assess uremic symptoms. Sounds like she has been having some intermittently. No fever, chills, sweats, epistaxis, vision changes, headache, wheezing,  chest pain, palpitations. Occassional nausea/vomitting, No diarrhea or constipation. No dysuria, hematuria. She has known neuropathy but no, seizure history, no arthritis/ arthralgia or skin rashes. Past Medical History:  
Diagnosis Date  Adverse effect of anesthesia   
 difficult awakening  AF (atrial fibrillation) ([address] 11/20/2011  Arthritis 11/18/2011  
 generalized  CAD (coronary artery disease) 2011 CABG x 4  
 Cervical disc disease Steroid injections  Chronic kidney disease  Chronic pain   
 back  DJD (degenerative joint disease)  Drainage from wound 10/3/2014  Full dentures  Gout   
 managed with medication  Hemorrhoid 11/5/2013  
 [place] (hard of hearing)  Hyperlipemia 11/10/2011  
 managed with medication  Hypertension   
 controlled with meds  Hypertriglyceridemia   
 managed with medication  Hypothyroidism   
 managed with medication  Neuropathy   
 legs and arms, managed with medication  Obesity (BMI 30-39.9) 10/2/14 BMI 36.3  
 PAD (peripheral artery disease) ([address]  PCI (pneumatosis cystoides intestinalis) 11/5/2013  Pleural effusion, bilateral 11/21/2011  Postoperative anemia due to acute blood loss 11/21/2011  
 expected  Rib cage fracture 11/5/2013  
 x 2   
 S/P CABG (coronary artery bypass graft) 11/05/2013 CABG x 4  
 Status post-operative repair of hip fracture 09/15/2014  
 left side, repair with hardware  Stroke [facility])   
 left sided weakness residual   
 Type II or unspecified type diabetes mellitus without mention of complication, uncontrolled ([facility] 12/16/2013  
 oral and insulin reliant/ Avg / low BS s/s/ @ 70/ last A1c 8.3  Unspecified adverse effect of anesthesia   
 difficult to arouse after general anesthesia Past Surgical History:  
Procedure Laterality Date  CABG, ARTERY-VEIN, FOUR  Oct. 2011  
 CLOSE CYSTOSTOMY    
 exploratory with removal of mass of infection  HX CATARACT REMOVAL Bilateral 11/2012  
 with IOL implants, bilateral  
 HX COLONOSCOPY    
 HX HIP FRACTURE TX Left   
 repair with hardware  HX HYSTERECTOMY  HX LAP CHOLECYSTECTOMY  VASCULAR SURGERY PROCEDURE UNLIST Left 06/07/2017 AVF creation  VASCULAR SURGERY PROCEDURE UNLIST Left 08/10/2017 AVF elevation Family History Problem Relation Age of Onset  Heart Disease Mother  Cancer Mother   
     colon  Diabetes Mother  Cancer Father   
     lung  Cancer Sister   
     breast  
 Breast Cancer Sister 28  Cancer Brother Leukemia  Breast Cancer Maternal Aunt 54 Social History Tobacco Use  Smoking status: Never Smoker  Smokeless tobacco: Never Used Substance Use Topics  Alcohol use: No  
   
Current Facility-Administered Medications Medication Dose Route Frequency Provider Last Rate Last Dose  [START ON 5/30/2019] cefTRIAXone (ROCEPHIN) 1 g in 0.9% sodium chloride (MBP/ADV) 50 mL  1 g IntraVENous Q24H Blaine Pallas., MD      
 [START ON 5/30/2019] aspirin chewable tablet 81 mg  81 mg Oral DAILY Blaine Pallas., MD      
 carbidopa-levodopa (SINEMET)  mg per tablet 1 Tab  1 Tab Oral TID Blaine Pallas., MD      
  carvedilol (COREG) tablet 25 mg  25 mg Oral BID WITH MEALS Marty Perez MD      
 [START ON 5/30/2019] ezetimibe (ZETIA) tablet 10 mg  10 mg Oral DAILY Marty Perez MD      
 [START ON 5/30/2019] febuxostat (ULORIC) tablet 80 mg (Patient Supplied)  80 mg Oral DAILY Marty Perez MD      
 [START ON 5/30/2019] ferrous sulfate tablet 324 mg  324 mg Oral DAILY Marty Perez MD      
 [START ON 5/30/2019] levothyroxine (SYNTHROID) tablet 25 mcg  25 mcg Oral ACB Marty Perez MD      
 sodium chloride (NS) flush 5-40 mL  5-40 mL IntraVENous Q8H Marty Perez MD   5 mL at 05/29/19 1518  sodium chloride (NS) flush 5-40 mL  5-40 mL IntraVENous PRN Marty Perez MD      
 tuberculin injection 5 Units  5 Units IntraDERMal ONCE Marty Perez MD   5 Units at 05/29/19 1520  acetaminophen (TYLENOL) tablet 650 mg  650 mg Oral Q4H PRN Marty Perez MD      
 enoxaparin (LOVENOX) injection 30 mg  30 mg SubCUTAneous Q24H Marty Perez MD   30 mg at 05/29/19 3208  hydrALAZINE (APRESOLINE) 20 mg/mL injection 10 mg  10 mg IntraVENous Q6H PRN Marty Perez MD   10 mg at 05/29/19 1519  
 insulin NPH (NOVOLIN N, HUMULIN N) injection 20 Units  20 Units SubCUTAneous ACB&D Marty Perez MD      
 insulin lispro (HUMALOG) injection 10 Units  10 Units SubCUTAneous Chris Huerta MD      
 insulin lispro (HUMALOG) injection   SubCUTAneous AC&HS Marty Perez MD      
 alcohol 62% (NOZIN) nasal  1 Ampule  1 Ampule Topical Q12H Marty Perez MD   1 Ampule at 05/29/19 0539 Allergies Allergen Reactions  Baclofen Other (comments) Psychosis and altered mental status  Lipitor [Atorvastatin] Myalgia Review of Systems: 
Negative except was as documented in HPI Objective: Intake and Output:   
No intake/output data recorded. No intake/output data recorded. Physical Exam: General appearance: no distress, appears stated age Head: atraumatic Eyes: conjunctivae/corneas clear. Nose: no discharge Throat: Lips, mucosa, and tongue normal.  
Neck: supple, symmetrical, trachea midline and no JVD Lungs: clear to auscultation bilaterally Heart: regular rate and rhythm, S1, S2, no pericardial friction rub Abdomen: soft, non-tender. Bowel sounds normal.  
Extremities: No edema, AVFleft arm + thrill Skin:  No rashes or lesions Data Review:  
Recent Results (from the past 24 hour(s)) EKG, 12 LEAD, INITIAL Collection Time: 05/29/19  8:50 AM  
Result Value Ref Range Ventricular Rate 66 BPM  
 Atrial Rate 220 BPM  
 QRS Duration 126 ms  
 Q-T Interval 470 ms QTC Calculation (Bezet) 492 ms Calculated P Axis 78 degrees Calculated R Axis -17 degrees Calculated T Axis 112 degrees Diagnosis Undetermined rhythm Left ventricular hypertrophy with QRS widening and repolarization abnormality Abnormal ECG When compared with ECG of 03-JAN-2018 13:35, 
Previous ECG has undetermined rhythm, needs review QRS duration has increased ST now depressed in Lateral leads T wave inversion now evident in Anterolateral leads QT has lengthened Confirmed by Rachna Bethea ([id number]) on 5/29/2019 9:44:37 AM 
  
CBC WITH AUTOMATED DIFF Collection Time: 05/29/19  8:51 AM  
Result Value Ref Range WBC 10.9 4.3 - 11.1 K/uL  
 RBC 3.87 (L) 4.05 - 5.2 M/uL  
 HGB 12.2 11.7 - 15.4 g/dL HCT 34.5 (L) 35.8 - 46.3 % MCV 89.1 79.6 - 97.8 FL  
 MCH 31.5 26.1 - 32.9 PG  
 MCHC 35.4 (H) 31.4 - 35.0 g/dL  
 RDW 12.1 11.9 - 14.6 % PLATELET 866 461 - 760 K/uL MPV 12.7 (H) 9.4 - 12.3 FL ABSOLUTE NRBC 0.00 0.0 - 0.2 K/uL  
 DF AUTOMATED NEUTROPHILS 79 (H) 43 - 78 % LYMPHOCYTES 11 (L) 13 - 44 % MONOCYTES 7 4.0 - 12.0 % EOSINOPHILS 2 0.5 - 7.8 % BASOPHILS 1 0.0 - 2.0 % IMMATURE GRANULOCYTES 1 0.0 - 5.0 %  
 ABS. NEUTROPHILS 8.6 (H) 1.7 - 8.2 K/UL ABS. LYMPHOCYTES 1.2 0.5 - 4.6 K/UL  
 ABS. MONOCYTES 0.8 0.1 - 1.3 K/UL  
 ABS. EOSINOPHILS 0.2 0.0 - 0.8 K/UL  
 ABS. BASOPHILS 0.1 0.0 - 0.2 K/UL  
 ABS. IMM. GRANS. 0.1 0.0 - 0.5 K/UL MAGNESIUM Collection Time: 05/29/19  8:51 AM  
Result Value Ref Range Magnesium 2.8 (H) 1.8 - 2.4 mg/dL METABOLIC PANEL, COMPREHENSIVE Collection Time: 05/29/19  8:51 AM  
Result Value Ref Range Sodium 128 (L) 136 - 145 mmol/L Potassium 2.5 (LL) 3.5 - 5.1 mmol/L Chloride 82 (L) 98 - 107 mmol/L  
 CO2 33 (H) 21 - 32 mmol/L Anion gap 13 7 - 16 mmol/L Glucose 437 (H) 65 - 100 mg/dL  (H) 8 - 23 MG/DL Creatinine 3.32 (H) 0.6 - 1.0 MG/DL  
 GFR est AA 17 (L) >60 ml/min/1.73m2 GFR est non-AA 14 (L) >60 ml/min/1.73m2 Calcium 9.2 8.3 - 10.4 MG/DL Bilirubin, total 0.7 0.2 - 1.1 MG/DL  
 ALT (SGPT) 11 (L) 12 - 65 U/L  
 AST (SGOT) 8 (L) 15 - 37 U/L Alk. phosphatase 165 (H) 50 - 136 U/L Protein, total 7.8 6.3 - 8.2 g/dL Albumin 3.6 3.2 - 4.6 g/dL Globulin 4.2 (H) 2.3 - 3.5 g/dL A-G Ratio 0.9 (L) 1.2 - 3.5 TSH 3RD GENERATION Collection Time: 05/29/19  8:51 AM  
Result Value Ref Range TSH 3.530 0.358 - 3.740 uIU/mL URINE MICROSCOPIC Collection Time: 05/29/19 10:44 AM  
Result Value Ref Range WBC  0 /hpf  
 RBC 0-3 0 /hpf Epithelial cells 0 0 /hpf Bacteria 4+ (H) 0 /hpf Casts 0 0 /lpf Assessment:  
 
Principal Problem: 
  UTI (urinary tract infection) (5/29/2019) Active Problems: Hyponatremia (5/29/2019) Hypokalemia (5/29/2019) ANTONIO (acute kidney injury) ([facility] (5/29/2019) Stage 4 chronic kidney disease ([facility] (5/29/2019) Plan:  
 
CKD IV with ANTONIO or progression. Looks like she has gotten a little dried out with the combination zaroxolyn and loop diuretic. Supported by the higher BUN, hypokalemia, and metabolic alkalosis. I would like to give her a fluid bolus today.   She may improve but if she has persistent uremic symptoms , then we can initiated dialysis. She asked that I call Dr. Mckay Mccollum, her primary nephrologist  to consult with her. I have left her a voice mail. Signed By: Marlena Levi MD   
 May 29, 2019

## 2019-05-29 NOTE — PROGRESS NOTES
05/29/19 1338 Dual Skin Pressure Injury Assessment Dual Skin Pressure Injury Assessment X Second Care Provider (Based on [address] [facility]) [facility] Hip/Trochanter Left

## 2019-05-29 NOTE — H&P
HOSPITALIST H&PNAME:  Narayan Thomas Age:  78 y.o. 
:   1940 MRN:   [id number] PCP: Patrick Lucio MD 
Consulting MD: Treatment Team: Attending Provider: Gilbert Montes MD; Consulting Provider: Tamra Morales MD 
HPI:  
Patient is a 77 yo F with history of CAD s/p CABG, a fib (not on [address]), T2DM on insulin, and CKD stage 4, chronic hypoxic respiratory failure on 3 LPM of oxygen at home, and history of diastolic dysfunction, who presents to the ED after accidentally rolling out of bed this morning when she was getting up. She denies pain at present. States she has been getting weaker for the last week. She was started on zaroxolyn ~12 days ago by nephrology and is on furosemide 80 mg daily. She had a call button that she was able to ring her daughter, who lives upstairs, when she fell. In the ED, she is noted to have low sodium at 128, low potassium at 2.6, and ANTONIO on CKD with Cr of 3.3 (normally 2.3), and UA concerning fo UTI. BP initially elevated significantly, but was 140s/80s during my exam. Hospitalist asked to admit for electrolyte abnormality, weakness, and UTI. Complete ROS done and is as stated in HPI or otherwise negative Past Medical History:  
Diagnosis Date  Adverse effect of anesthesia   
 difficult awakening  AF (atrial fibrillation) ([facility] [address] 2011  Arthritis 2011  
 generalized  CAD (coronary artery disease)  CABG x 4  
 Cervical disc disease Steroid injections  Chronic kidney disease  Chronic pain   
 back  DJD (degenerative joint disease)  Drainage from wound 10/3/2014  Full dentures  Gout   
 managed with medication  Hemorrhoid 2013  
 [place] (hard of hearing)  Hyperlipemia 11/10/2011  
 managed with medication  Hypertension   
 controlled with meds  Hypertriglyceridemia   
 managed with medication  Hypothyroidism   
 managed with medication  Neuropathy legs and arms, managed with medication  Obesity (BMI 30-39.9) 10/2/14 BMI 36.3  
 PAD (peripheral artery disease) ([facility] [address]  PCI (pneumatosis cystoides intestinalis) 11/5/2013  Pleural effusion, bilateral 11/21/2011  Postoperative anemia due to acute blood loss 11/21/2011  
 expected  Rib cage fracture 11/5/2013  
 x 2   
 S/P CABG (coronary artery bypass graft) 11/05/2013 CABG x 4  
 Status post-operative repair of hip fracture 09/15/2014  
 left side, repair with hardware  Stroke [facility])   
 left sided weakness residual   
 Type II or unspecified type diabetes mellitus without mention of complication, uncontrolled ([facility] [address] 12/16/2013  
 oral and insulin reliant/ Avg / low BS s/s/ @ 70/ last A1c 8.3  Unspecified adverse effect of anesthesia   
 difficult to arouse after general anesthesia Past Surgical History:  
Procedure Laterality Date  CABG, ARTERY-VEIN, FOUR  Oct. 2011  
 CLOSE CYSTOSTOMY    
 exploratory with removal of mass of infection  HX CATARACT REMOVAL Bilateral 11/2012  
 with IOL implants, bilateral  
 HX COLONOSCOPY    
 HX HIP FRACTURE TX Left   
 repair with hardware  HX HYSTERECTOMY  HX LAP CHOLECYSTECTOMY  VASCULAR SURGERY PROCEDURE UNLIST Left 06/07/2017 AVF creation  VASCULAR SURGERY PROCEDURE UNLIST Left 08/10/2017 AVF elevation Prior to Admission Medications Prescriptions Last Dose Informant Patient Reported? Taking? HUMULIN 70/30 U-100 INSULIN 100 unit/mL (70-30) injection   No Yes Sig: INJECT 50 UNITS UNDER THE SKIN EVERY MORNING AND 40 UNITS EVERY EVENING BEFORE MEALS Insulin Syringe-Needle U-100 (BD INSULIN SYRINGE ULTRA-FINE) 1 mL 31 gauge x 5/16 syrg   No Yes Sig: DX E 10.65  Inject insulin tid Lactobacillus acidophilus (ACIDOPHILUS) cap   Yes Yes Sig: Take 2 Caps by mouth two (2) times a day. Lancets (ACCU-CHEK FASTCLIX) misc   No Yes Sig: Check bs TID. DX E11.9 OXYGEN-AIR DELIVERY SYSTEMS   Yes Yes Sig: 3 L by Nasal route continuous. aspirin 81 mg chewable tablet   Yes Yes Sig: Take 81 mg by mouth daily. benazepril (LOTENSIN) 20 mg tablet   Yes Yes Sig: Take 20 mg by mouth every morning. carbidopa-levodopa (SINEMET)  mg per tablet   No Yes Sig: TAKE 1 TABLET BY MOUTH THREE TIMES DAILY  
carvedilol (COREG) 25 mg tablet   No No  
Sig: take 1 tablet by mouth twice a day  
cyanocobalamin, vitamin B-12, 1,000 mcg/mL kit   Yes Yes Si,000 mcg by Injection route every month. on the 1st  
ergocalciferol (VITAMIN D2) 50,000 unit capsule   No Yes Sig: Take 1 Cap by mouth every seven (7) days. ezetimibe (ZETIA) 10 mg tablet   No Yes Sig: Take 1 Tab by mouth daily. febuxostat (ULORIC) 80 mg tab tablet   No Yes Sig: Take 1 Tab by mouth daily. ferrous sulfate 325 mg (65 mg iron) tablet   Yes Yes Sig: Take 324 mg by mouth daily. furosemide (LASIX) 40 mg tablet   No Yes Sig: Take 2 Tabs by mouth daily. glucose blood VI test strips (ACCU-CHEK GUIDE) strip   No Yes Sig: Check BS TID. DX E11.9  
insulin NPH/insulin regular (HUMULIN 70/30 U-100 INSULIN) 100 unit/mL (70-30) injection   No No  
Sig: INJECT 50 UNITS SUBCUTANEOUSLY EVERY MORNING THEN 40 UNITS EVERY EVENING BEFORE MEALS  
levothyroxine (SYNTHROID) 25 mcg tablet   No Yes Sig: Take 1 Tab by mouth Daily (before breakfast). metOLazone (ZAROXOLYN) 2.5 mg tablet   Yes Yes Sig: Take  by mouth daily. nitroglycerin (NITROLINGUAL) 400 mcg/spray spray   No Yes Si Spray by SubLINGual route every five (5) minutes as needed (Pt states \"I've never had to use it\"). prednisoLONE acetate (PRED FORTE) 1 % ophthalmic suspension   Yes Yes Sig: Administer 1 Drop to both eyes four (4) times daily. pregabalin (LYRICA) 50 mg capsule   No Yes Sig: Take 1 Cap by mouth two (2) times a day. Max Daily Amount: 100 mg.  
terbinafine HCl (LAMISIL AT) 1 % topical cream   No Yes Sig: Apply  to affected area two (2) times a day. triamcinolone acetonide (KENALOG) 0.1 % topical cream   Yes Yes Facility-Administered Medications: None Allergies Allergen Reactions  Baclofen Other (comments) Psychosis and altered mental status  Lipitor [Atorvastatin] Myalgia Social History Tobacco Use  Smoking status: Never Smoker  Smokeless tobacco: Never Used Substance Use Topics  Alcohol use: No  
  
Family History Problem Relation Age of Onset  Heart Disease Mother  Cancer Mother   
     colon  Diabetes Mother  Cancer Father   
     lung  Cancer Sister   
     breast  
 Breast Cancer Sister 28  Cancer Brother Leukemia  Breast Cancer Maternal Aunt 54 Objective:  
 
Visit Vitals /82 Comment: see mar Pulse 64 Temp 97.5 °F (36.4 °C) Resp 16 Ht 5' 8\" (1.727 m) Wt 113.4 kg (250 lb) SpO2 96% Breastfeeding? No  
BMI 38.01 kg/m² Temp (24hrs), Av °F (36.7 °C), Min:97.5 °F (36.4 °C), Max:98.5 °F (36.9 °C) Patient Vitals for the past 24 hrs: 
 Temp Pulse Resp BP SpO2  
19 1519    198/82   
19 1353 97.5 °F (36.4 °C) 64 16 190/85 96 % 19 1224  65 14 146/63 96 % 19 1134  68 24  98 % 19 1109  65 12 169/73 98 % 19 0853  66  199/86 96 % 19 0836 98.5 °F (36.9 °C) 62 18 (!) 207/87 97 % Oxygen Therapy O2 Sat (%): 96 % (19 1353) Pulse via Oximetry: 65 beats per minute (19 1224) O2 Device: Nasal cannula (19 1224) O2 Flow Rate (L/min): 3 l/min (19 1224) Physical Exam: 
General:    Alert, cooperative, no distress, appears stated age, obese. Head:   Normocephalic, without obvious abnormality, atraumatic. Nose:  Nares normal. No drainage or sinus tenderness. Lungs:   Clear to auscultation bilaterally. No Wheezing or Rhonchi. No rales. Heart:   Regular rate and rhythm,  no murmur, rub or gallop. Abdomen:   Soft, non-tender. Not distended. Bowel sounds normal.  
Extremities: No cyanosis. No edema. Lower legs slightly erythematous (chronic) but no warmth Skin:     Texture, turgor normal. No rashes or lesions. Not Jaundiced Neurologic: Alert and oriented x 3, no focal deficits Data Review:  
Recent Results (from the past 24 hour(s)) EKG, 12 LEAD, INITIAL Collection Time: 05/29/19  8:50 AM  
Result Value Ref Range Ventricular Rate 66 BPM  
 Atrial Rate 220 BPM  
 QRS Duration 126 ms  
 Q-T Interval 470 ms QTC Calculation (Bezet) 492 ms Calculated P Axis 78 degrees Calculated R Axis -17 degrees Calculated T Axis 112 degrees Diagnosis Undetermined rhythm Left ventricular hypertrophy with QRS widening and repolarization abnormality Abnormal ECG When compared with ECG of 03-JAN-2018 13:35, 
Previous ECG has undetermined rhythm, needs review QRS duration has increased ST now depressed in Lateral leads T wave inversion now evident in Anterolateral leads QT has lengthened Confirmed by Chiquis Powell ([id number]) on 5/29/2019 9:44:37 AM 
  
CBC WITH AUTOMATED DIFF Collection Time: 05/29/19  8:51 AM  
Result Value Ref Range WBC 10.9 4.3 - 11.1 K/uL  
 RBC 3.87 (L) 4.05 - 5.2 M/uL  
 HGB 12.2 11.7 - 15.4 g/dL HCT 34.5 (L) 35.8 - 46.3 % MCV 89.1 79.6 - 97.8 FL  
 MCH 31.5 26.1 - 32.9 PG  
 MCHC 35.4 (H) 31.4 - 35.0 g/dL  
 RDW 12.1 11.9 - 14.6 % PLATELET 099 847 - 323 K/uL MPV 12.7 (H) 9.4 - 12.3 FL ABSOLUTE NRBC 0.00 0.0 - 0.2 K/uL  
 DF AUTOMATED NEUTROPHILS 79 (H) 43 - 78 % LYMPHOCYTES 11 (L) 13 - 44 % MONOCYTES 7 4.0 - 12.0 % EOSINOPHILS 2 0.5 - 7.8 % BASOPHILS 1 0.0 - 2.0 % IMMATURE GRANULOCYTES 1 0.0 - 5.0 %  
 ABS. NEUTROPHILS 8.6 (H) 1.7 - 8.2 K/UL  
 ABS. LYMPHOCYTES 1.2 0.5 - 4.6 K/UL  
 ABS. MONOCYTES 0.8 0.1 - 1.3 K/UL  
 ABS. EOSINOPHILS 0.2 0.0 - 0.8 K/UL  
 ABS. BASOPHILS 0.1 0.0 - 0.2 K/UL ABS. IMM. GRANS. 0.1 0.0 - 0.5 K/UL MAGNESIUM Collection Time: 05/29/19  8:51 AM  
Result Value Ref Range Magnesium 2.8 (H) 1.8 - 2.4 mg/dL METABOLIC PANEL, COMPREHENSIVE Collection Time: 05/29/19  8:51 AM  
Result Value Ref Range Sodium 128 (L) 136 - 145 mmol/L Potassium 2.5 (LL) 3.5 - 5.1 mmol/L Chloride 82 (L) 98 - 107 mmol/L  
 CO2 33 (H) 21 - 32 mmol/L Anion gap 13 7 - 16 mmol/L Glucose 437 (H) 65 - 100 mg/dL  (H) 8 - 23 MG/DL Creatinine 3.32 (H) 0.6 - 1.0 MG/DL  
 GFR est AA 17 (L) >60 ml/min/1.73m2 GFR est non-AA 14 (L) >60 ml/min/1.73m2 Calcium 9.2 8.3 - 10.4 MG/DL Bilirubin, total 0.7 0.2 - 1.1 MG/DL  
 ALT (SGPT) 11 (L) 12 - 65 U/L  
 AST (SGOT) 8 (L) 15 - 37 U/L Alk. phosphatase 165 (H) 50 - 136 U/L Protein, total 7.8 6.3 - 8.2 g/dL Albumin 3.6 3.2 - 4.6 g/dL Globulin 4.2 (H) 2.3 - 3.5 g/dL A-G Ratio 0.9 (L) 1.2 - 3.5 TSH 3RD GENERATION Collection Time: 05/29/19  8:51 AM  
Result Value Ref Range TSH 3.530 0.358 - 3.740 uIU/mL URINE MICROSCOPIC Collection Time: 05/29/19 10:44 AM  
Result Value Ref Range WBC  0 /hpf  
 RBC 0-3 0 /hpf Epithelial cells 0 0 /hpf Bacteria 4+ (H) 0 /hpf Casts 0 0 /lpf  
GLUCOSE, POC Collection Time: 05/29/19  4:01 PM  
Result Value Ref Range Glucose (POC) 457 (HH) 65 - 100 mg/dL POTASSIUM Collection Time: 05/29/19  6:22 PM  
Result Value Ref Range Potassium 2.5 (LL) 3.5 - 5.1 mmol/L Imaging [facility] [place] XR SPINE LUMB 2 OR 3 V Final Result Impression:  
  
Osteopenia with chronic compression fracture deformity of L4. CPT code(s) [id number] XR HIP LT W OR WO PELV 2-3 VWS Final Result Impression:  
  
Stable left bipolar hip prosthesis without plain film evidence of loosening. Assessment and Plan: Active Hospital Problems Diagnosis Date Noted  UTI (urinary tract infection) 05/29/2019  Hyponatremia 05/29/2019  Hypokalemia 05/29/2019  ANTONIO (acute kidney injury) ([facility] 05/29/2019  Stage 4 chronic kidney disease ([facility] 05/29/2019 PLAN 
· Admit to remote telemetry due to electrolyte abnormality. · ANTONIO- Hold ACE, lasix, and metolazone. Avoid nephrotoxic agents. · Likley due to over-diuresis. · PRN hydralazine for HTN. · Replete potassium- recheck at 5 PM. · Monitor electrolytes. · Rocephin daily. · Consult nephrology for assistance with volume status and possible need for HD. Admission plan discussed with , Stephani Badillo and daughter, Skip Bah. Code Status: Full Anticipated discharge: >2 midnights Signed By: Nohemi Tuttle MD   
 May 29, 2019

## 2019-05-29 NOTE — ED NOTES
TRANSFER - OUT REPORT: 
 
Verbal report given to WLIL Ji(name) on [address]  being transferred to 7th floor(unit) for routine progression of care Report consisted of patients Situation, Background, Assessment and  
Recommendations(SBAR). Information from the following report(s) ED Summary was reviewed with the receiving nurse. Lines:  
Peripheral IV 05/29/19 Right Hand (Active) Site Assessment Clean, dry, & intact 5/29/2019  8:56 AM  
Phlebitis Assessment 0 5/29/2019  8:56 AM  
Infiltration Assessment 0 5/29/2019  8:56 AM  
Dressing Status Clean, dry, & intact 5/29/2019  8:56 AM  
  
 
Opportunity for questions and clarification was provided. Patient transported with: 
 O2 @ 3 liters, pt chart, pt belongings, family member, IV fluids

## 2019-05-29 NOTE — ED PROVIDER NOTES
66-year-old  female with a history of Parkinson's and chronic back pain presents to the emergency department complaining of severe low back pain after fall at home this morning while attempting to get out of bed. Patient states she fell on her left side,no loss of consciousness. Patient denies any paralysis or no paresthesias, does suffer from chronic neuropathy in bilateral lower extremities. Patient was evaluated by EMS, given morphine 5 mg IM, followed by 5 mg IV once. A line was established in route. Complains of pain of 5/10 at present. Denies a loss of bowel or bladder function. The history is provided by the patient, the spouse, the EMS personnel and a relative. Fall The accident occurred 1 to 2 hours ago. The fall occurred while standing. She fell from a height of ground level. She landed on carpet. There was no blood loss. Point of impact: left side. Pain location: lower back. The pain is at a severity of 5/10. Pertinent negatives include no fever and no abdominal pain. Past Medical History:  
Diagnosis Date  Adverse effect of anesthesia   
 difficult awakening  AF (atrial fibrillation) ([address] 11/20/2011  Arthritis 11/18/2011  
 generalized  CAD (coronary artery disease) 2011 CABG x 4  
 Cervical disc disease Steroid injections  Chronic kidney disease  Chronic pain   
 back  DJD (degenerative joint disease)  Drainage from wound 10/3/2014  Full dentures  Gout   
 managed with medication  Hemorrhoid 11/5/2013  
 [place] (hard of hearing)  Hyperlipemia 11/10/2011  
 managed with medication  Hypertension   
 controlled with meds  Hypertriglyceridemia   
 managed with medication  Hypothyroidism   
 managed with medication  Neuropathy   
 legs and arms, managed with medication  Obesity (BMI 30-39.9) 10/2/14 BMI 36.3  
 PAD (peripheral artery disease) ([address]  PCI (pneumatosis cystoides intestinalis) 11/5/2013  Pleural effusion, bilateral 11/21/2011  Postoperative anemia due to acute blood loss 11/21/2011  
 expected  Rib cage fracture 11/5/2013  
 x 2   
 S/P CABG (coronary artery bypass graft) 11/05/2013 CABG x 4  
 Status post-operative repair of hip fracture 09/15/2014  
 left side, repair with hardware  Stroke [facility])   
 left sided weakness residual   
 Type II or unspecified type diabetes mellitus without mention of complication, uncontrolled ([address] 12/16/2013  
 oral and insulin reliant/ Avg / low BS s/s/ @ 70/ last A1c 8.3  Unspecified adverse effect of anesthesia   
 difficult to arouse after general anesthesia Past Surgical History:  
Procedure Laterality Date  CABG, ARTERY-VEIN, FOUR  Oct. 2011  
 CLOSE CYSTOSTOMY    
 exploratory with removal of mass of infection  HX CATARACT REMOVAL Bilateral 11/2012  
 with IOL implants, bilateral  
 HX COLONOSCOPY    
 HX HIP FRACTURE TX Left   
 repair with hardware  HX HYSTERECTOMY  HX LAP CHOLECYSTECTOMY  VASCULAR SURGERY PROCEDURE UNLIST Left 06/07/2017 AVF creation  VASCULAR SURGERY PROCEDURE UNLIST Left 08/10/2017 AVF elevation Family History:  
Problem Relation Age of Onset  Heart Disease Mother  Cancer Mother   
     colon  Diabetes Mother  Cancer Father   
     lung  Cancer Sister   
     breast  
 Breast Cancer Sister 28  Cancer Brother Leukemia  Breast Cancer Maternal Aunt 54 Social History Socioeconomic History  Marital status:  Spouse name: Not on file  Number of children: Not on file  Years of education: Not on file  Highest education level: Not on file Occupational History  Not on file Social Needs  Financial resource strain: Not on file  Food insecurity:  
  Worry: Not on file Inability: Not on file  Transportation needs:  
  Medical: Not on file Non-medical: Not on file Tobacco Use  
  Smoking status: Never Smoker  Smokeless tobacco: Never Used Substance and Sexual Activity  Alcohol use: No  
 Drug use: No  
  Types: Prescription  Sexual activity: Never Lifestyle  Physical activity:  
  Days per week: Not on file Minutes per session: Not on file  Stress: Not on file Relationships  Social connections:  
  Talks on phone: Not on file Gets together: Not on file Attends Spiritism service: Not on file Active member of club or organization: Not on file Attends meetings of clubs or organizations: Not on file Relationship status: Not on file  Intimate partner violence:  
  Fear of current or ex partner: Not on file Emotionally abused: Not on file Physically abused: Not on file Forced sexual activity: Not on file Other Topics Concern  Not on file Social History Narrative  Not on file ALLERGIES: Baclofen and Lipitor [atorvastatin] Review of Systems Constitutional: Positive for activity change and fatigue. Negative for chills and fever. Gastrointestinal: Negative for abdominal pain. Musculoskeletal: Positive for arthralgias and back pain. Negative for neck pain and neck stiffness. Neurological: Positive for weakness (generalized). All other systems reviewed and are negative. Vitals:  
 05/29/19 [id number] 05/29/19 0660 BP: (!) 207/87 199/86 Pulse: 62 66 Resp: 18 Temp: 98.5 °F (36.9 °C) SpO2: 97% 96% Weight: 113.4 kg (250 lb) Height: 5' 8\" (1.727 m) Physical Exam  
Constitutional: She is oriented to person, place, and time. She appears well-developed and well-nourished. No distress. HENT:  
Head: Normocephalic and atraumatic. Right Ear: External ear normal.  
Left Ear: External ear normal.  
Mouth/Throat: Oropharynx is clear and moist.  
Eyes: Pupils are equal, round, and reactive to light. Conjunctivae and EOM are normal.  
Neck: Normal range of motion. Neck supple. Cardiovascular: Normal rate, regular rhythm, normal heart sounds and intact distal pulses. Pulmonary/Chest: Effort normal and breath sounds normal.  
Abdominal: Soft. Bowel sounds are normal. There is no tenderness. Musculoskeletal: Normal range of motion. She exhibits no edema. Neurological: She is alert and oriented to person, place, and time. Skin: Skin is warm and dry. Capillary refill takes less than 2 seconds. Psychiatric: She has a normal mood and affect. Nursing note and vitals reviewed. MDM Number of Diagnoses or Management Options Acute kidney injury superimposed on chronic kidney disease [facility]): new and requires workup Hyperglycemia: new and requires workup Hypokalemia: new and requires workup Lumbar strain, initial encounter: new and requires workup Urinary tract infection without hematuria, site unspecified: new and requires workup Amount and/or Complexity of Data Reviewed Clinical lab tests: ordered and reviewed Tests in the radiology section of CPT®: ordered and reviewed Obtain history from someone other than the patient: yes Review and summarize past medical records: yes Discuss the patient with other providers: yes Independent visualization of images, tracings, or specimens: yes Risk of Complications, Morbidity, and/or Mortality Presenting problems: high Diagnostic procedures: moderate Management options: high Patient Progress Patient progress: stable Procedures Results Reviewed: 
 
 
Recent Results (from the past 24 hour(s)) EKG, 12 LEAD, INITIAL Collection Time: 05/29/19  8:50 AM  
Result Value Ref Range Ventricular Rate 66 BPM  
 Atrial Rate 220 BPM  
 QRS Duration 126 ms  
 Q-T Interval 470 ms QTC Calculation (Bezet) 492 ms Calculated P Axis 78 degrees Calculated R Axis -17 degrees Calculated T Axis 112 degrees Diagnosis Undetermined rhythm Left ventricular hypertrophy with QRS widening and repolarization abnormality Abnormal ECG When compared with ECG of 03-JAN-2018 13:35, 
Previous ECG has undetermined rhythm, needs review QRS duration has increased ST now depressed in Lateral leads T wave inversion now evident in Anterolateral leads QT has lengthened Confirmed by Taj Garcia ([id number]) on 5/29/2019 9:44:37 AM 
  
CBC WITH AUTOMATED DIFF Collection Time: 05/29/19  8:51 AM  
Result Value Ref Range WBC 10.9 4.3 - 11.1 K/uL  
 RBC 3.87 (L) 4.05 - 5.2 M/uL  
 HGB 12.2 11.7 - 15.4 g/dL HCT 34.5 (L) 35.8 - 46.3 % MCV 89.1 79.6 - 97.8 FL  
 MCH 31.5 26.1 - 32.9 PG  
 MCHC 35.4 (H) 31.4 - 35.0 g/dL  
 RDW 12.1 11.9 - 14.6 % PLATELET 778 456 - 759 K/uL MPV 12.7 (H) 9.4 - 12.3 FL ABSOLUTE NRBC 0.00 0.0 - 0.2 K/uL  
 DF AUTOMATED NEUTROPHILS 79 (H) 43 - 78 % LYMPHOCYTES 11 (L) 13 - 44 % MONOCYTES 7 4.0 - 12.0 % EOSINOPHILS 2 0.5 - 7.8 % BASOPHILS 1 0.0 - 2.0 % IMMATURE GRANULOCYTES 1 0.0 - 5.0 %  
 ABS. NEUTROPHILS 8.6 (H) 1.7 - 8.2 K/UL  
 ABS. LYMPHOCYTES 1.2 0.5 - 4.6 K/UL  
 ABS. MONOCYTES 0.8 0.1 - 1.3 K/UL  
 ABS. EOSINOPHILS 0.2 0.0 - 0.8 K/UL  
 ABS. BASOPHILS 0.1 0.0 - 0.2 K/UL  
 ABS. IMM. GRANS. 0.1 0.0 - 0.5 K/UL MAGNESIUM Collection Time: 05/29/19  8:51 AM  
Result Value Ref Range Magnesium 2.8 (H) 1.8 - 2.4 mg/dL METABOLIC PANEL, COMPREHENSIVE Collection Time: 05/29/19  8:51 AM  
Result Value Ref Range Sodium 128 (L) 136 - 145 mmol/L Potassium 2.5 (LL) 3.5 - 5.1 mmol/L Chloride 82 (L) 98 - 107 mmol/L  
 CO2 33 (H) 21 - 32 mmol/L Anion gap 13 7 - 16 mmol/L Glucose 437 (H) 65 - 100 mg/dL  (H) 8 - 23 MG/DL Creatinine 3.32 (H) 0.6 - 1.0 MG/DL  
 GFR est AA 17 (L) >60 ml/min/1.73m2 GFR est non-AA 14 (L) >60 ml/min/1.73m2 Calcium 9.2 8.3 - 10.4 MG/DL Bilirubin, total 0.7 0.2 - 1.1 MG/DL  
 ALT (SGPT) 11 (L) 12 - 65 U/L  
 AST (SGOT) 8 (L) 15 - 37 U/L Alk.  phosphatase 165 (H) 50 - 136 U/L  
 Protein, total 7.8 6.3 - 8.2 g/dL Albumin 3.6 3.2 - 4.6 g/dL Globulin 4.2 (H) 2.3 - 3.5 g/dL A-G Ratio 0.9 (L) 1.2 - 3.5 TSH 3RD GENERATION Collection Time: 05/29/19  8:51 AM  
Result Value Ref Range TSH 3.530 0.358 - 3.740 uIU/mL URINE MICROSCOPIC Collection Time: 05/29/19 10:44 AM  
Result Value Ref Range WBC  0 /hpf  
 RBC 0-3 0 /hpf Epithelial cells 0 0 /hpf Bacteria 4+ (H) 0 /hpf Casts 0 0 /lpf  
 
 
XR SPINE LUMB 2 OR 3 V Final Result Impression:  
  
Osteopenia with chronic compression fracture deformity of L4. CPT code(s) [id number] XR HIP LT W OR WO PELV 2-3 VWS Final Result Impression:  
  
Stable left bipolar hip prosthesis without plain film evidence of loosening.

## 2019-05-30 LAB
ANION GAP SERPL CALC-SCNC: 12 MMOL/L (ref 7–16)
BUN SERPL-MCNC: 97 MG/DL (ref 8–23)
CALCIUM SERPL-MCNC: 8.8 MG/DL (ref 8.3–10.4)
CHLORIDE SERPL-SCNC: 90 MMOL/L (ref 98–107)
CO2 SERPL-SCNC: 34 MMOL/L (ref 21–32)
CREAT SERPL-MCNC: 3.15 MG/DL (ref 0.6–1)
GLUCOSE BLD STRIP.AUTO-MCNC: 288 MG/DL (ref 65–100)
GLUCOSE BLD STRIP.AUTO-MCNC: 299 MG/DL (ref 65–100)
GLUCOSE BLD STRIP.AUTO-MCNC: 307 MG/DL (ref 65–100)
GLUCOSE BLD STRIP.AUTO-MCNC: 345 MG/DL (ref 65–100)
GLUCOSE BLD STRIP.AUTO-MCNC: 358 MG/DL (ref 65–100)
GLUCOSE SERPL-MCNC: 269 MG/DL (ref 65–100)
MM INDURATION POC: 0 MM (ref 0–5)
POTASSIUM SERPL-SCNC: 3.3 MMOL/L (ref 3.5–5.1)
PPD POC: NEGATIVE NEGATIVE
SODIUM SERPL-SCNC: 136 MMOL/L (ref 136–145)

## 2019-05-30 PROCEDURE — 97530 THERAPEUTIC ACTIVITIES: CPT

## 2019-05-30 PROCEDURE — 74011250637 HC RX REV CODE- 250/637: Performed by: FAMILY MEDICINE

## 2019-05-30 PROCEDURE — 74011636637 HC RX REV CODE- 636/637: Performed by: INTERNAL MEDICINE

## 2019-05-30 PROCEDURE — 94760 N-INVAS EAR/PLS OXIMETRY 1: CPT

## 2019-05-30 PROCEDURE — 82962 GLUCOSE BLOOD TEST: CPT

## 2019-05-30 PROCEDURE — 74011250637 HC RX REV CODE- 250/637: Performed by: INTERNAL MEDICINE

## 2019-05-30 PROCEDURE — 74011250636 HC RX REV CODE- 250/636: Performed by: FAMILY MEDICINE

## 2019-05-30 PROCEDURE — 36415 COLL VENOUS BLD VENIPUNCTURE: CPT

## 2019-05-30 PROCEDURE — 74011250636 HC RX REV CODE- 250/636: Performed by: INTERNAL MEDICINE

## 2019-05-30 PROCEDURE — 65660000000 HC RM CCU STEPDOWN

## 2019-05-30 PROCEDURE — 80048 BASIC METABOLIC PNL TOTAL CA: CPT

## 2019-05-30 PROCEDURE — 77010033678 HC OXYGEN DAILY

## 2019-05-30 PROCEDURE — 74011636637 HC RX REV CODE- 636/637: Performed by: FAMILY MEDICINE

## 2019-05-30 PROCEDURE — 74011000258 HC RX REV CODE- 258: Performed by: INTERNAL MEDICINE

## 2019-05-30 PROCEDURE — 97165 OT EVAL LOW COMPLEX 30 MIN: CPT

## 2019-05-30 PROCEDURE — 97162 PT EVAL MOD COMPLEX 30 MIN: CPT

## 2019-05-30 PROCEDURE — 77030021668 HC NEB PREFIL KT VYRM -A

## 2019-05-30 PROCEDURE — 87340 HEPATITIS B SURFACE AG IA: CPT

## 2019-05-30 RX ORDER — ONDANSETRON 2 MG/ML
INJECTION INTRAMUSCULAR; INTRAVENOUS
Status: ACTIVE
Start: 2019-05-30 | End: 2019-05-30

## 2019-05-30 RX ORDER — INSULIN LISPRO 100 [IU]/ML
10 INJECTION, SOLUTION INTRAVENOUS; SUBCUTANEOUS ONCE
Status: COMPLETED | OUTPATIENT
Start: 2019-05-30 | End: 2019-05-30

## 2019-05-30 RX ORDER — ONDANSETRON 2 MG/ML
4 INJECTION INTRAMUSCULAR; INTRAVENOUS
Status: DISCONTINUED | OUTPATIENT
Start: 2019-05-30 | End: 2019-05-31 | Stop reason: HOSPADM

## 2019-05-30 RX ORDER — INSULIN LISPRO 100 [IU]/ML
15 INJECTION, SOLUTION INTRAVENOUS; SUBCUTANEOUS
Status: DISCONTINUED | OUTPATIENT
Start: 2019-05-30 | End: 2019-05-31 | Stop reason: HOSPADM

## 2019-05-30 RX ADMIN — Medication 1 AMPULE: at 08:08

## 2019-05-30 RX ADMIN — SODIUM CHLORIDE 500 ML: 900 INJECTION, SOLUTION INTRAVENOUS at 11:04

## 2019-05-30 RX ADMIN — CARBIDOPA AND LEVODOPA 1 TABLET: 25; 100 TABLET ORAL at 22:06

## 2019-05-30 RX ADMIN — ASPIRIN 81 MG 81 MG: 81 TABLET ORAL at 08:08

## 2019-05-30 RX ADMIN — ACETAMINOPHEN 650 MG: 325 TABLET, FILM COATED ORAL at 05:04

## 2019-05-30 RX ADMIN — INSULIN LISPRO 8 UNITS: 100 INJECTION, SOLUTION INTRAVENOUS; SUBCUTANEOUS at 11:11

## 2019-05-30 RX ADMIN — Medication 5 ML: at 22:10

## 2019-05-30 RX ADMIN — CARVEDILOL 25 MG: 25 TABLET, FILM COATED ORAL at 17:13

## 2019-05-30 RX ADMIN — Medication 10 ML: at 05:07

## 2019-05-30 RX ADMIN — CEFTRIAXONE SODIUM 1 G: 1 INJECTION, POWDER, FOR SOLUTION INTRAMUSCULAR; INTRAVENOUS at 11:14

## 2019-05-30 RX ADMIN — AMLODIPINE BESYLATE 5 MG: 5 TABLET ORAL at 08:08

## 2019-05-30 RX ADMIN — EZETIMIBE 10 MG: 10 TABLET ORAL at 08:08

## 2019-05-30 RX ADMIN — CARVEDILOL 25 MG: 25 TABLET, FILM COATED ORAL at 08:08

## 2019-05-30 RX ADMIN — INSULIN LISPRO 10 UNITS: 100 INJECTION, SOLUTION INTRAVENOUS; SUBCUTANEOUS at 07:47

## 2019-05-30 RX ADMIN — ONDANSETRON 4 MG: 2 INJECTION INTRAMUSCULAR; INTRAVENOUS at 05:28

## 2019-05-30 RX ADMIN — INSULIN LISPRO 10 UNITS: 100 INJECTION, SOLUTION INTRAVENOUS; SUBCUTANEOUS at 17:11

## 2019-05-30 RX ADMIN — LEVOTHYROXINE SODIUM 25 MCG: 50 TABLET ORAL at 05:06

## 2019-05-30 RX ADMIN — ENOXAPARIN SODIUM 30 MG: 30 INJECTION SUBCUTANEOUS at 13:37

## 2019-05-30 RX ADMIN — INSULIN HUMAN 20 UNITS: 100 INJECTION, SUSPENSION SUBCUTANEOUS at 07:48

## 2019-05-30 RX ADMIN — INSULIN LISPRO 8 UNITS: 100 INJECTION, SOLUTION INTRAVENOUS; SUBCUTANEOUS at 22:06

## 2019-05-30 RX ADMIN — CARBIDOPA AND LEVODOPA 1 TABLET: 25; 100 TABLET ORAL at 08:08

## 2019-05-30 RX ADMIN — Medication 1 AMPULE: at 22:06

## 2019-05-30 RX ADMIN — CARBIDOPA AND LEVODOPA 1 TABLET: 25; 100 TABLET ORAL at 17:13

## 2019-05-30 RX ADMIN — POTASSIUM CHLORIDE 40 MEQ: 20 TABLET, EXTENDED RELEASE ORAL at 05:06

## 2019-05-30 RX ADMIN — POTASSIUM CHLORIDE 40 MEQ: 20 TABLET, EXTENDED RELEASE ORAL at 08:08

## 2019-05-30 RX ADMIN — INSULIN LISPRO 10 UNITS: 100 INJECTION, SOLUTION INTRAVENOUS; SUBCUTANEOUS at 00:18

## 2019-05-30 RX ADMIN — INSULIN HUMAN 25 UNITS: 100 INJECTION, SUSPENSION SUBCUTANEOUS at 17:09

## 2019-05-30 RX ADMIN — INSULIN LISPRO 6 UNITS: 100 INJECTION, SOLUTION INTRAVENOUS; SUBCUTANEOUS at 07:48

## 2019-05-30 RX ADMIN — FERROUS SULFATE TAB 325 MG (65 MG ELEMENTAL FE) 324 MG: 325 (65 FE) TAB at 08:08

## 2019-05-30 RX ADMIN — INSULIN LISPRO 10 UNITS: 100 INJECTION, SOLUTION INTRAVENOUS; SUBCUTANEOUS at 11:11

## 2019-05-30 RX ADMIN — INSULIN LISPRO 15 UNITS: 100 INJECTION, SOLUTION INTRAVENOUS; SUBCUTANEOUS at 17:11

## 2019-05-30 NOTE — PROGRESS NOTES
Problem: Mobility Impaired (Adult and Pediatric)  Goal: *Acute Goals and Plan of Care (Insert Text)  Description  Acute PT Goals:  1. Patient will perform bed mobility with MODIFIED INDEPENDENCE within 7 days. 2. Patient will transfer bed to chair with STAND BY ASSISTANCE within 7 days. 3. Patient will ambulate 50+ using least restrictive assistive device and STAND BY ASSISTANCE within 7 days. 4. Patient will perform wheelchair mobility x150ft with STAND BY ASSISTANCE within 7 days. 5. Patient will tolerate 25+ minutes of therapeutic activity/exercise and/or neuromuscular re-education while maintaining stable vitals to improve functional strength and activity tolerance within 7 days. Outcome: Progressing Towards Goal       PHYSICAL THERAPY: Initial Assessment, Daily Note and PM 5/30/2019  INPATIENT: PT Visit Days : 1  [place] / [place] / Product Type: Medicare /       NAME/AGE/GENDER: Narayan Thomas is a 78 y.o. female   PRIMARY DIAGNOSIS: UTI (urinary tract infection) [N39.0] Hyponatremia   Hyponatremia          ICD-10: Treatment Diagnosis:    · Generalized Muscle Weakness (M62.81)  · Difficulty in walking, Not elsewhere classified (R26.2)   Precaution/Allergies:  Baclofen and Lipitor [atorvastatin]      ASSESSMENT:     Ms. Marleny Garcia is a 78year old female admitted from home s/p fall and found to have UTI. Patient seen this PM for initial physical therapy evaluation: presents supine in bed, oriented x4, and endorses acute on chronic low back pain. Patient lives with spouse and daughter in a single story residence with ramp to enter. At baseline, patient ambulates short household level distances with a RW and primary utilizes a WC for mobility and able to self propel with UE and LEs. Requires assistance with bathing back from daughter. Patient limited secondary to low back pain and neuropathy as well as pervious hip fracture per report.  Today, B UE ROM limited proximally L>R, weakness appreciated in BLEs 4/5, B LE ROM WFL, and sensation is absent to light touch distal B LE. Patient performed bed mobility with minimal assistance, transfers with CGA, and ambulation x4ft with RW from bed to chair with RW and CGA. Demonstrated a slow, step-to gait pattern with fair dynamic standing balance. Ms. Gracy Landers presents with decreased functional strength and activity tolerance secondary to acute illness. Recommend continued skilled PT services to address stated deficits. Will follow and progress toward stated goals during acute stay. This section established at most recent assessment   PROBLEM LIST (Impairments causing functional limitations):  1. Decreased Strength  2. Decreased Transfer Abilities  3. Decreased Balance  4. Decreased Activity Tolerance  5. Increased Fatigue  6. Decreased Flexibility/Joint Mobility  7. Decreased Knowledge of Precautions   INTERVENTIONS PLANNED: (Benefits and precautions of physical therapy have been discussed with the patient.)  1. Balance Exercise  2. Bed Mobility  3. Family Education  4. Gait Training  5. Group Therapy  6. Home Exercise Program (HEP)  7. Neuromuscular Re-education/Strengthening  8. Prosthetic Training  9. Therapeutic Activites  10. Therapeutic Exercise/Strengthening  11. Transfer Training     TREATMENT PLAN: Frequency/Duration: 3 times a week for duration of hospital stay  Rehabilitation Potential For Stated Goals: Good     REHAB RECOMMENDATIONS (at time of discharge pending progress):    Placement: It is my opinion, based on this patient's performance to date, that Ms. Gracy Landers may benefit from [address] after discharge due to the functional deficits listed above that are likely to improve with skilled rehabilitation because he/she has an inability to tolerate transportation to an outpatient setting as evidenced by decreased community level ambulation tolerance as well has history of falling .   Equipment:    Patient has RW, WC, and [place] for home use. HISTORY:   History of Present Injury/Illness (Reason for Referral):  Weakness; Difficulty in walking  Past Medical History/Comorbidities:   Ms. Radha Bhardwaj  has a past medical history of Adverse effect of anesthesia, AF (atrial fibrillation) ([facility] (11/20/2011), Arthritis (11/18/2011), CAD (coronary artery disease) (2011), Cervical disc disease, Chronic kidney disease, Chronic pain, DJD (degenerative joint disease), Drainage from wound (10/3/2014), Full dentures, Gout, Hemorrhoid (11/5/2013), [place] (hard of hearing), Hyperlipemia (11/10/2011), Hypertension, Hypertriglyceridemia, Hypothyroidism, Neuropathy, Obesity (BMI 30-39.9) (10/2/14), PAD (peripheral artery disease) ([facility] PCI (pneumatosis cystoides intestinalis) (11/5/2013), Pleural effusion, bilateral (11/21/2011), Postoperative anemia due to acute blood loss (11/21/2011), Rib cage fracture (11/5/2013), S/P CABG (coronary artery bypass graft) (11/05/2013), Status post-operative repair of hip fracture (09/15/2014), Stroke ([facility] Type II or unspecified type diabetes mellitus without mention of complication, uncontrolled ([facility] (12/16/2013), and Unspecified adverse effect of anesthesia. She also has no past medical history of Difficult intubation, Malignant hyperthermia due to anesthesia, Nausea & vomiting, or Pseudocholinesterase deficiency. Ms. Radha Bhardwaj  has a past surgical history that includes hx colonoscopy; pr cabg, artery-vein, four (Oct. 2011); hx cataract removal (Bilateral, 11/2012); hx hysterectomy; hx lap cholecystectomy; hx hip fracture tx (Left); pr close cystostomy; vascular surgery procedure unlist (Left, 06/07/2017); and vascular surgery procedure unlist (Left, 08/10/2017).   Social History/Living Environment:   Home Environment: Private residence  # Steps to Enter: 0  One/Two Story Residence: One story  Living Alone: No  Support Systems: Spouse/Significant Other/Partner, Family member(s)  Patient Expects to be Discharged [employer] residence  Current DME Used/Available at Home: Rachel Pronto, rolling, Wheelchair  Tub or Shower Type: Shower  Prior Level of Function/Work/Activity:  Patient lives with spouse and daughter in a single story residence with ramp to enter. At baseline, patient ambulates short household level distances with a RW and primary utilizes a WC for mobility and able to self propel with UE and LEs. Patient limited secondary to low back pain and neuropathy as well as pervious hip fracture per report. Number of Personal Factors/Comorbidities that affect the Plan of Care: 1-2: MODERATE COMPLEXITY   EXAMINATION:   Most Recent Physical Functioning:   Gross Assessment:  AROM: Generally decreased, functional(B UE proximally L > R)  Strength: Generally decreased, functional(B LE functionally compared to baseline)  Coordination: Within functional limits  Sensation: Impaired(Patient has neuropathy at baseline, affects distal B LE/UE)               Posture:  Posture (WDL): Exceptions to WDL  Posture Assessment: Forward head, Rounded shoulders  Balance:  Sitting: Intact  Sitting - Static: Good (unsupported)  Sitting - Dynamic: Good (unsupported)  Standing: Impaired  Standing - Static: Fair  Standing - Dynamic : Fair Bed Mobility:  Supine to Sit: Minimum assistance  Scooting: Supervision  Interventions: Safety awareness training; Tactile cues; Verbal cues  Wheelchair Mobility:     Transfers:  Sit to Stand: Contact guard assistance  Stand to Sit: Contact guard assistance  Bed to Chair: Contact guard assistance  Interventions: Safety awareness training; Tactile cues; Verbal cues  Gait:     Base of Support: Widened;Center of gravity altered  Speed/Viry: Shuffled; Slow  Step Length: Left shortened;Right shortened  Gait Abnormalities: Decreased step clearance; Step to gait  Distance (ft): 4 Feet (ft)  Assistive Device: Walker, rolling  Ambulation - Level of Assistance: Contact guard assistance  Interventions: Safety awareness training; Tactile cues;Verbal cues      Body Structures Involved:  1. Metabolic  2. Muscles Body Functions Affected:  1. Respiratory  2. Movement Related Activities and Participation Affected:  1. Mobility  2. Self Care   Number of elements that affect the Plan of Care: 4+: HIGH COMPLEXITY   CLINICAL PRESENTATION:   Presentation: Evolving clinical presentation with changing clinical characteristics: MODERATE COMPLEXITY   CLINICAL DECISION MAKIN [facility] Mobility Inpatient Short Form  How much difficulty does the patient currently have. .. Unable A Lot A Little None   1. Turning over in bed (including adjusting bedclothes, sheets and blankets)? ? 1   ? 2   ? 3   ? 4   2. Sitting down on and standing up from a chair with arms ( e.g., wheelchair, bedside commode, etc.)   ? 1   ? 2   ? 3   ? 4   3. Moving from lying on back to sitting on the side of the bed?   ? 1   ? 2   ? 3   ? 4   How much help from another person does the patient currently need. .. Total A Lot A Little None   4. Moving to and from a bed to a chair (including a wheelchair)? ? 1   ? 2   ? 3   ? 4   5. Need to walk in hospital room? ? 1   ? 2   ? 3   ? 4   6. Climbing 3-5 steps with a railing? ? 1   ? 2   ? 3   ? 4   © 2007, Trustees of [address] [place] [address] [address] [id number], under license to [employer]. All rights reserved      Score:  Initial: 18 Most Recent: 18 (Date: 19 )    Interpretation of Tool:  Represents activities that are increasingly more difficult (i.e. Bed mobility, Transfers, Gait). Medical Necessity:     · Patient demonstrates good  ·  rehab potential due to higher previous functional level. Reason for Services/Other Comments:  · Patient continues to require skilled intervention due to decreased functional strength and activity tolerance   · .    Use of outcome tool(s) and clinical judgement create a POC that gives a: Questionable prediction of patient's progress: MODERATE COMPLEXITY            TREATMENT:   (In addition to Assessment/Re-Assessment sessions the following treatments were rendered)   Pre-treatment Symptoms/Complaints:    Pain: Initial:   Pain Intensity 1: 0  Post Session: 0/10     Initial Evaluation   Therapeutic Activity: (    10 Minutes): Therapeutic activities including bed mobility, transfer training, balance training, safety awareness training, ambulation on level ground x4ft, and patient education  to improve mobility and strength. Required minimal Safety awareness training; Tactile cues; Verbal cues to promote static and dynamic balance in standing. Braces/Orthotics/Lines/Etc:   · IV  · O2 Device: Nasal cannula  Treatment/Session Assessment:    · Response to Treatment:  See above  · Interdisciplinary Collaboration:   o Physical Therapist  o Registered Nurse  · After treatment position/precautions:   o Up in chair  o Bed alarm/tab alert on  o Bed/Chair-wheels locked  o Bed in low position  o Call light within reach  o RN notified  o Chair alarm activated; patient needs within reach; NAD: RN notified of patient status    · Compliance with Program/Exercises: Will assess as treatment progresses  · Recommendations/Intent for next treatment session: \"Next visit will focus on advancements to more challenging activities and reduction in assistance provided\".   Total Treatment Duration:  PT Patient Time In/Time Out  Time In: 1357  Time Out: [facility]

## 2019-05-30 NOTE — PROGRESS NOTES
Renal Progress Note Admission Date: 5/29/2019 Subjective:  
  
Some nausea this AM. Has had it for a couple of days Objective:  
 
Physical Exam:   
Patient Vitals for the past 8 hrs: 
 BP Temp Pulse Resp SpO2  
05/30/19 0739 122/72 98.5 °F (36.9 °C) (!) 111 16 93 % 05/30/19 0435 127/89 98.5 °F (36.9 °C) (!) 111 16 94 % Gen: comfortable , NAD HEENT: Dry membranes CV: S1, S2 
Lungs: Clear bilaterally Extem: no edema Current Facility-Administered Medications Medication Dose Route Frequency  ondansetron (ZOFRAN) injection 4 mg  4 mg IntraVENous Q4H PRN  
 cefTRIAXone (ROCEPHIN) 1 g in 0.9% sodium chloride (MBP/ADV) 50 mL  1 g IntraVENous Q24H  
 aspirin chewable tablet 81 mg  81 mg Oral DAILY  carbidopa-levodopa (SINEMET)  mg per tablet 1 Tab  1 Tab Oral TID  carvedilol (COREG) tablet 25 mg  25 mg Oral BID WITH MEALS  ezetimibe (ZETIA) tablet 10 mg  10 mg Oral DAILY  febuxostat (ULORIC) tablet 80 mg (Patient Supplied)  80 mg Oral DAILY  ferrous sulfate tablet 324 mg  324 mg Oral DAILY  levothyroxine (SYNTHROID) tablet 25 mcg  25 mcg Oral ACB  sodium chloride (NS) flush 5-40 mL  5-40 mL IntraVENous Q8H  
 sodium chloride (NS) flush 5-40 mL  5-40 mL IntraVENous PRN  
 tuberculin injection 5 Units  5 Units IntraDERMal ONCE  
 acetaminophen (TYLENOL) tablet 650 mg  650 mg Oral Q4H PRN  
 enoxaparin (LOVENOX) injection 30 mg  30 mg SubCUTAneous Q24H  hydrALAZINE (APRESOLINE) 20 mg/mL injection 10 mg  10 mg IntraVENous Q6H PRN  
 insulin NPH (NOVOLIN N, HUMULIN N) injection 20 Units  20 Units SubCUTAneous ACB&D  
 insulin lispro (HUMALOG) injection 10 Units  10 Units SubCUTAneous TIDAC  insulin lispro (HUMALOG) injection   SubCUTAneous AC&HS  
 alcohol 62% (NOZIN) nasal  1 Ampule  1 Ampule Topical Q12H  
 amLODIPine (NORVASC) tablet 5 mg  5 mg Oral DAILY Data Review:  
 
LABS:  
Recent Results (from the past 12 hour(s)) GLUCOSE, POC  
 Collection Time: 05/29/19 11:47 PM  
Result Value Ref Range Glucose (POC) 375 (H) 65 - 100 mg/dL METABOLIC PANEL, BASIC Collection Time: 05/30/19  4:35 AM  
Result Value Ref Range Sodium 136 136 - 145 mmol/L Potassium 3.3 (L) 3.5 - 5.1 mmol/L Chloride 90 (L) 98 - 107 mmol/L  
 CO2 34 (H) 21 - 32 mmol/L Anion gap 12 7 - 16 mmol/L Glucose 269 (H) 65 - 100 mg/dL BUN 97 (H) 8 - 23 MG/DL Creatinine 3.15 (H) 0.6 - 1.0 MG/DL  
 GFR est AA 18 (L) >60 ml/min/1.73m2 GFR est non-AA 15 (L) >60 ml/min/1.73m2 Calcium 8.8 8.3 - 10.4 MG/DL  
GLUCOSE, POC Collection Time: 05/30/19  5:02 AM  
Result Value Ref Range Glucose (POC) 288 (H) 65 - 100 mg/dL GLUCOSE, POC Collection Time: 05/30/19  6:55 AM  
Result Value Ref Range Glucose (POC) 299 (H) 65 - 100 mg/dL Plan:  
 
Principal Problem: Hyponatremia (5/29/2019) Active Problems: 
  UTI (urinary tract infection) (5/29/2019) Hypokalemia (5/29/2019) [facility]TON[facility]acute kidney [facility]) [facility] (5/29/2019) Stage 4 chronic kidney disease ([facility] (5/29/2019) CKD IV with ANTONIO or progression. Slightly better with IVF. I would give her a small fluid bolus again today. I suspect BUN/cr will improve. She can work on her cardiorenal with prn on intermittent metolazone If not successful with fluid control, then can initiate dialysis then.    She is frustrated but I am concerned that when she starts HD, she will decline from the post treatment fatigue that inevitably occurs.

## 2019-05-30 NOTE — PROGRESS NOTES
Problem: Self Care Deficits Care Plan (Adult)  Goal: *Acute Goals and Plan of Care (Insert Text)  Description  1. Pt will toilet with SBA   2. Pt will complete functional mobility for ADLs with SBA  3. Pt will complete lower body dressing with SBA using AE as needed  4. Pt will complete UB dressing with set up  5. Pt will demonstrate independence with HEP to promote increased BUE strength and functional use for ADLs  6. Pt will tolerate 23 minutes functional activity with min or fewer rest breaks to promote increased endurance for ADLs  7. Pt will complete bed mobility with SBA in prep for ADLs    Timeframe: 7 days     Outcome: Progressing Towards Goal     OCCUPATIONAL THERAPY: Initial Assessment 5/30/2019  INPATIENT:    [place] / Plan[place] B / Product Type: Medicare /      NAME/AGE/GENDER: Nic Kieta is a 78 y.o. female   PRIMARY DIAGNOSIS:  UTI (urinary tract infection) [N39.0] Hyponatremia   Hyponatremia          ICD-10: Treatment Diagnosis:    Generalized Muscle Weakness (M62.81)  History of falling (Z91.81)   Precautions/Allergies:    falls Baclofen and Lipitor [atorvastatin]      ASSESSMENT:     Ms. Natividad De was admitted with fall, weakness, UTI. Pt lives at home with her  and daughter, requires assistance for bathing to wash her back and occasionally to wash her hair. Pt uses a WC for mobility, is independent with transfers. Pt reports that she normally cooks, has a kitchen stool. This session, pt presented with deficits in strength and endurance impacting ADLs. Pt A&O X4, demonstrated intact cognition. Pt stood and transferred from chair to bed and then back to chair with CGA using RW, donned/ doffed socks with CGA and extra time. BUE strength is generally decreased with < shoulder flexion/ abduction to ~90*, especially at L shoulder d/t dialysis port. Pt endorsed mild fatigue with activity.  Pt appears to be near her functional baseline, would benefit from 1-2 additional sessions to maximize safety and independence with ADLs for safe d/c home. This section established at most recent assessment   PROBLEM LIST (Impairments causing functional limitations):  Decreased Strength  Decreased ADL/Functional Activities  Decreased Transfer Abilities  Decreased Balance  Decreased Activity Tolerance  Increased Fatigue  Decreased Flexibility/Joint Mobility   INTERVENTIONS PLANNED: (Benefits and precautions of occupational therapy have been discussed with the patient.)  Activities of daily living training  Adaptive equipment training  Balance training  Therapeutic activity  Therapeutic exercise     TREATMENT PLAN: Frequency/Duration: Follow patient 2 times/ week to address above goals. Rehabilitation Potential For Stated Goals: Excellent     REHAB RECOMMENDATIONS (at time of discharge pending progress):    Placement: It is my opinion, based on this patient's performance to date, that Ms. Glenetta Sicard may benefit from [address] after discharge due to the functional deficits listed above that are likely to improve with skilled rehabilitation because he/she has multiple medical issues that affect his/her functional mobility in the community.   Equipment:   None at this time              [facility]:   History of Present Injury/Illness (Reason for Referral):  See H&P  Past Medical History/Comorbidities:   Ms. Glenetta Sicard  has a past medical history of Adverse effect of anesthesia, AF (atrial fibrillation) ([facility] (11/20/2011), Arthritis (11/18/2011), CAD (coronary artery disease) (2011), Cervical disc disease, Chronic kidney disease, Chronic pain, DJD (degenerative joint disease), Drainage from wound (10/3/2014), Full dentures, Gout, Hemorrhoid (11/5/2013), [place] (hard of hearing), Hyperlipemia (11/10/2011), Hypertension, Hypertriglyceridemia, Hypothyroidism, Neuropathy, Obesity (BMI 30-39.9) (10/2/14), PAD (peripheral artery disease) ([facility] PCI (pneumatosis cystoides intestinalis) (11/5/2013), Pleural effusion, bilateral (11/21/2011), Postoperative anemia due to acute blood loss (11/21/2011), Rib cage fracture (11/5/2013), S/P CABG (coronary artery bypass graft) (11/05/2013), Status post-operative repair of hip fracture (09/15/2014), Stroke ([facility] Type II or unspecified type diabetes mellitus without mention of complication, uncontrolled ([facility] (12/16/2013), and Unspecified adverse effect of anesthesia. She also has no past medical history of Difficult intubation, Malignant hyperthermia due to anesthesia, Nausea & vomiting, or Pseudocholinesterase deficiency. Ms. Smith Jama  has a past surgical history that includes hx colonoscopy; pr cabg, artery-vein, four (Oct. 2011); hx cataract removal (Bilateral, 11/2012); hx hysterectomy; hx lap cholecystectomy; hx hip fracture tx (Left); pr close cystostomy; vascular surgery procedure unlist (Left, 06/07/2017); and vascular surgery procedure unlist (Left, 08/10/2017). Social History/Living Environment:   Home Environment: Private residence  # Steps to Enter: 0  One/Two Story Residence: One story  Living Alone: No  Support Systems: Spouse/Significant Other/Partner, Child(dania)  Patient Expects to be Discharged to[de-identified] Private residence  Current DME Used/Available at Home: Walker, rolling, Wheelchair, [address] chair, Commode, bedside, Grab bars  Tub or Shower Type: Shower  Prior Level of Function/Work/Activity:  Pt lives at home with her  and daughter, requires assistance for bathing to wash her back and occasionally to wash her hair. Pt uses a WC for mobility, is independent with transfers. Pt reports that she normally cooks, has a kitchen stool.       Number of Personal Factors/Comorbidities that affect the Plan of Care: Brief history (0):  LOW COMPLEXITY   ASSESSMENT OF OCCUPATIONAL PERFORMANCE[de-identified]   Activities of Daily Living:   Basic ADLs (From Assessment) Complex ADLs (From Assessment)   Feeding: Setup  Oral Facial Hygiene/Grooming: Setup  Bathing: Minimum assistance  Upper Body Dressing: Setup  Lower Body Dressing: Minimum assistance  Toileting: Contact guard assistance Instrumental ADL  Meal Preparation: Minimum assistance  Homemaking: Moderate assistance  Medication Management: Setup  Financial Management: Setup   Grooming/Bathing/Dressing Activities of Daily Living     Cognitive Retraining  Safety/Judgement: Awareness of environment; Fall prevention                       Bed/Mat Mobility  Supine to Sit: Minimum assistance  Sit to Stand: Contact guard assistance  Stand to Sit: Contact guard assistance  Bed to Chair: Contact guard assistance  Scooting: Supervision       Most Recent Physical Functioning:   Gross Assessment:  AROM: Generally decreased, functional  Strength: Generally decreased, functional  Coordination: Within functional limits  Sensation: Impaired(neuropathy B hands and feet)               Posture:  Posture (WDL): Exceptions to WDL  Posture Assessment: Forward head, Rounded shoulders  Balance:  Sitting: Intact  Sitting - Static: Good (unsupported)  Sitting - Dynamic: Good (unsupported)  Standing: Impaired  Standing - Static: Fair  Standing - Dynamic : Fair Bed Mobility:  Supine to Sit: Minimum assistance  Scooting: Supervision  Interventions: Safety awareness training; Tactile cues; Verbal cues  Wheelchair Mobility:     Transfers:  Sit to Stand: Contact guard assistance  Stand to Sit: Contact guard assistance  Bed to Chair: Contact guard assistance  Interventions: Safety awareness training; Tactile cues; Verbal cues            Patient Vitals for the past 6 hrs:   BP BP Patient Position SpO2 O2 Flow Rate (L/min) Pulse   05/30/19 1058 -- -- 95 % 3 l/min --   05/30/19 1124 114/84 At rest 94 % -- (!) 113       Mental Status  Neurologic State: Alert  Orientation Level: Oriented X4  Cognition: Follows commands, Appropriate decision making, Appropriate for age attention/concentration, Appropriate safety awareness  Perception: Appears intact  Perseveration: No perseveration noted  Safety/Judgement: Awareness of environment, Fall prevention                          Physical Skills Involved:  Balance  Strength  Activity Tolerance  Pain (Chronic) Cognitive Skills Affected (resulting in the inability to perform in a timely and safe manner):  none  Psychosocial Skills Affected:  Habits/Routines   Number of elements that affect the Plan of Care: 3-5:  MODERATE COMPLEXITY   CLINICAL DECISION MAKIN Olayinka Mace  Ne? ?6 Clicks? Daily Activity Inpatient Short Form  How much help from another person does the patient currently need. .. Total A Lot A Little None   1. Putting on and taking off regular lower body clothing? ? 1   ? 2   ? 3   ? 4   2. Bathing (including washing, rinsing, drying)? ? 1   ? 2   ? 3   ? 4   3. Toileting, which includes using toilet, bedpan or urinal?   ? 1   ? 2   ? 3   ? 4   4. Putting on and taking off regular upper body clothing? ? 1   ? 2   ? 3   ? 4   5. Taking care of personal grooming such as brushing teeth? ? 1   ? 2   ? 3   ? 4   6. Eating meals? ? 1   ? 2   ? 3   ? 4   © , Trustees of [facility] [employer], under license to [employer]. All rights reserved      Score:  Initial: 20 Most Recent: X (Date: -- )    Interpretation of Tool:  Represents activities that are increasingly more difficult (i.e. Bed mobility, Transfers, Gait). Medical Necessity:     Patient demonstrates excellent   rehab potential due to higher previous functional level. Reason for Services/Other Comments:  Patient continues to require skilled intervention due to decreased aDLs and functional performance from baseline   .    Use of outcome tool(s) and clinical judgement create a POC that gives a: LOW COMPLEXITY         TREATMENT:   (In addition to Assessment/Re-Assessment sessions the following treatments were rendered)     Pre-treatment Symptoms/Complaints:    Pain: Initial:   Pain Intensity 1: 6  Pain Location 1: Back, Arm, Leg  Pain Intervention(s) 1: Declines  Post Session:  same     Assessment/Reassessment only, no treatment provided today    Braces/Orthotics/Lines/Etc:   IV  O2 Device: Nasal cannula  Treatment/Session Assessment:    Response to Treatment:  no adverse reaction   Interdisciplinary Collaboration:   Occupational Therapist  Registered Nurse  After treatment position/precautions:   Up in chair  Bed alarm/tab alert on  Bed/Chair-wheels locked  Bed in low position  Call light within reach  RN notified   Compliance with Program/Exercises: Compliant all of the time. Recommendations/Intent for next treatment session: \"Next visit will focus on advancements to more challenging activities and reduction in assistance provided\".   Total Treatment Duration:  OT Patient Time In/Time Out  Time In: 1507  Time Out: 1000 Kayli Jones

## 2019-05-30 NOTE — PROGRESS NOTES
Hospitalist Progress Note 2019 Admit Date: 2019  8:33 AM  
NAME: Morenita Stanley :  1940 MRN:  [id number] Attending: Chelsie Aden MD 
PCP:  Kolby Goldberg MD 
 
SUBJECTIVE:  
Patient 79 yo F admitted with hyponatremia, hypokalemia, and dehydration from over-diuresis. Reports nausea/vomiting overnight. Tolerated breakfast.  Cr down some. Review of Systems negative with exception of pertinent positives noted above PHYSICAL EXAM  
 
Visit Vitals /72 (BP 1 Location: Right arm, BP Patient Position: At rest) Pulse (!) 111 Temp 98.5 °F (36.9 °C) Resp 16 Ht 5' 8\" (1.727 m) Wt 113.4 kg (250 lb) SpO2 93% Breastfeeding? No  
BMI 38.01 kg/m² Temp (24hrs), Av.3 °F (36.8 °C), Min:97.5 °F (36.4 °C), Max:98.5 °F (36.9 °C) Patient Vitals for the past 24 hrs: 
 Temp Pulse Resp BP SpO2  
19 1124 98.6 °F (37 °C) (!) 113 17 114/84 94 % 19 1058     95 % 19 0739 98.5 °F (36.9 °C) (!) 111 16 122/72 93 % 19 0435 98.5 °F (36.9 °C) (!) 111 16 127/89 94 % 19 0053     94 % 198 98.5 °F (36.9 °C) (!) 108 16 127/84 96 % 19  (!) 108     
19 98.3 °F (36.8 °C) (!) 118 16 131/73 95 % Oxygen Therapy O2 Sat (%): 93 % (19 0739) Pulse via Oximetry: 106 beats per minute (19) O2 Device: Nasal cannula (19) O2 Flow Rate (L/min): 3 l/min(3LHR) (19) FIO2 (%): 32 % (193) Intake/Output Summary (Last 24 hours) at 2019 9724 Last data filed at 2019 2622 Gross per 24 hour Intake 1540 ml Output  Net 1540 ml General: No acute distress, obese   
Lungs:  CTA Bilaterally. Heart:  Regular rate and rhythm,  No murmur, rub, or gallop Abdomen: Soft, Non distended, Non tender, Positive bowel sounds Extremities: No cyanosis, clubbing or edema Neurologic:  No focal deficits Recent Results (from the past 24 hour(s)) POTASSIUM Collection Time: 05/29/19  6:22 PM  
Result Value Ref Range Potassium 2.5 (LL) 3.5 - 5.1 mmol/L  
GLUCOSE, POC Collection Time: 05/29/19  8:58 PM  
Result Value Ref Range Glucose (POC) 453 (HH) 65 - 100 mg/dL GLUCOSE, POC Collection Time: 05/29/19 10:01 PM  
Result Value Ref Range Glucose (POC) 403 (H) 65 - 100 mg/dL GLUCOSE, POC Collection Time: 05/29/19 11:47 PM  
Result Value Ref Range Glucose (POC) 375 (H) 65 - 100 mg/dL METABOLIC PANEL, BASIC Collection Time: 05/30/19  4:35 AM  
Result Value Ref Range Sodium 136 136 - 145 mmol/L Potassium 3.3 (L) 3.5 - 5.1 mmol/L Chloride 90 (L) 98 - 107 mmol/L  
 CO2 34 (H) 21 - 32 mmol/L Anion gap 12 7 - 16 mmol/L Glucose 269 (H) 65 - 100 mg/dL BUN 97 (H) 8 - 23 MG/DL Creatinine 3.15 (H) 0.6 - 1.0 MG/DL  
 GFR est AA 18 (L) >60 ml/min/1.73m2 GFR est non-AA 15 (L) >60 ml/min/1.73m2 Calcium 8.8 8.3 - 10.4 MG/DL  
GLUCOSE, POC Collection Time: 05/30/19  5:02 AM  
Result Value Ref Range Glucose (POC) 288 (H) 65 - 100 mg/dL GLUCOSE, POC Collection Time: 05/30/19  6:55 AM  
Result Value Ref Range Glucose (POC) 299 (H) 65 - 100 mg/dL GLUCOSE, POC Collection Time: 05/30/19 11:04 AM  
Result Value Ref Range Glucose (POC) 345 (H) 65 - 100 mg/dL GLUCOSE, POC Collection Time: 05/30/19  4:18 PM  
Result Value Ref Range Glucose (POC) 358 (H) 65 - 100 mg/dL ASSESSMENT Hospital Problems as of 5/30/2019 Date Reviewed: 5/21/2019 Codes Class Noted - Resolved POA  
 UTI (urinary tract infection) ICD-10-CM: N39.0 ICD-9-CM: 599.0  5/29/2019 - Present Yes * (Principal) Hyponatremia ICD-10-CM: E87.1 ICD-9-CM: 276.1  5/29/2019 - Present Yes Hypokalemia ICD-10-CM: E87.6 ICD-9-CM: 276.8  5/29/2019 - Present Yes ANTONIO (acute kidney [facility]) [facility] ICD-10-CM: N17.9 ICD-9-CM: 584.9  5/29/2019 - Present Yes Stage 4 chronic kidney [facility] ICD-10-CM: N18.4 ICD-9-CM: 585.4  5/29/2019 - Present Yes Plan: 
· Hyponatremia/ANTONIO- improved with IVF per nephrology. · Lasix and metolazone on hold. · Hypokalemia- replete · UTI- follow culture · Rocephin · T2DM- increase insulin · HTN- continue present treatment. · Debility- PT/OT 
 
DVT Prophylaxis: Lovenox Signed By: Shayy Mujica MD   
 May 30, 2019

## 2019-05-30 NOTE — PROGRESS NOTES
Nutrition  Reason for assessment: Referral received from nursing admission Malnutrition Screening Tool   Recently Lost Weight Without Trying: Yes  If Yes, How Much Weight Loss: 14 - 23 lbs(15 lbs)  Eating Poorly Due to Decreased Appetite: No  Assessment:   Diet: DIET DIABETIC CONSISTENT CARB Regular  DIET NUTRITIONAL SUPPLEMENTS Breakfast; Glucerna Shake    Food/Nutrition Patient History:  Pt presented after falling out of bed. Past medical history notable for CAD s/p CABG, AFib, T2DM, CKD-4, HTN. Pt lives at home with spouse; pt does own meal preparation at home; typically has cereal, toast at breakfast, sandwich at lunch and meat and 3 for evening meal; eats out ~ 1 X week. Pt states she has been eating well at home and states her weight has been stable. Anthropometrics:Height: 5' 8\" (172.7 cm),  Weight: 113.4 kg (250 lb), Weight source not specified, Body mass index is 38.01 kg/m². BMI class of Obesity Class 1 for Older American Women. Recommended BMI 23-30 for Older American women. Based on weight from EMR, patient has had a 3% weight loss past 3 months. Macronutrient needs:  EER:  4259-7581 kcal /day (23-28 kcal/kg SBW-70 kg (standard body weight)  EPR:  ~56 grams protein/day (0.8 grams/kg SBW) GFR 15 Intake/Comparative Standards: Per RD meal rounds: supper tray just delivered. 1 recorded intake at 25%-breakfast.  Pt reports she ate > 90% lunch meal today. Based on limited data, patient potentially meets 75-80% estimated kcal needs and 90% estimated protein needs. .    Nutrition Diagnosis: Inadequate oral intake r/t decreased ability to consume sufficient oral intake as evidenced by estimates of insufficient intake of energy from diet when compared to requirements. Intervention:  Meals and snacks: Continue current diet. Nutrition Supplement Therapy: Discontinue Glucerna Shake all meals and added at Glucerna Shake at breakfast only. Ordered weight  Discharge nutrition plan:  Too soon to determine.     Radha Valle, MPH, [address],   [phone]

## 2019-05-30 NOTE — PROGRESS NOTES
Notified dr Tk Bear that pt BS was 453 and this nurse gave 10units humalog. Rechecked BS 20 minutes later and it was 403. Received orders for humalog 15 units.

## 2019-05-30 NOTE — PROGRESS NOTES
SW met with patient in room to discuss possible discharge needs. Patient was alert and oriented x4 at time of discussion. SOCIAL:  Patient lives in a 2 level house with  and daughter. Patient stays on ground level of home. Wheelchair ramped entrance. Daughter works full time.  works part time. Patient is at home alone for 5-10 hours during work week. She is not a . Her insurance is confirmed as Medicare with a supplemental policy. Prescription costs are currently affordable. POA is reportedly  with daughter as secondary, but document is not on file. No history of mental health or substance abuse issues reported. Source of income: social security. Spouse works part time and receives social security. Daughter works full time. MOBILITY / HISTORY:  At baseline, patient propels herself in wheelchair, and independently transfers to wheelchair. She manages ADLs with independence but needs supervision from family. She does not drive. DMe - wheelchair, rolling walker, rollator, transport chart, hospital bed, BSC, shower chair, glucometer. Home oxygen 3L per minute. Patient thinks company is [employer] (?)  No dialysis history. No privately paid caregivers, sitters, aids, or CLTC hours. HH history reportedly with [facility]. STR history with [facility] 1 year ago and [facility] 7 years ago (hip fracture). PLAN FOR DISCHARGE:  Unable to determine specific plan for discharge. PT/OT evaluations are pending. Nephrology is following to determine if patient will need OP dialysis services. Care Management Interventions  PCP Verified by CM: Yes  Mode of Transport at Discharge:  Other (see comment)(family)  Transition of Care Consult (CM Consult): Discharge Planning  Discharge Durable Medical Equipment: No  Physical Therapy Consult: Yes  Occupational Therapy Consult: Yes  Speech Therapy Consult: No  Current Support Network: Own Home, Lives with Spouse  Confirm Follow Up Transport: Family  Plan discussed with Pt/Family/Caregiver: Yes  Freedom of Choice Offered: Yes  Discharge Location  Discharge Placement: Unable to determine at this time

## 2019-05-30 NOTE — PROGRESS NOTES
Problem: Urinary Tract Infection - Adult Goal: *Absence of infection signs and symptoms Outcome: Progressing Towards Goal 
  
Problem: Patient Education: Go to Patient Education Activity Goal: Patient/Family Education Outcome: Progressing Towards Goal 
  
Problem: Falls - Risk of 
Goal: *Absence of Falls Description Document Steve Grullon Fall Risk and appropriate interventions in the flowsheet. Outcome: Progressing Towards Goal 
  
Problem: Patient Education: Go to Patient Education Activity Goal: Patient/Family Education Outcome: Progressing Towards Goal

## 2019-05-31 VITALS
WEIGHT: 250 LBS | OXYGEN SATURATION: 96 % | DIASTOLIC BLOOD PRESSURE: 85 MMHG | TEMPERATURE: 97.6 F | SYSTOLIC BLOOD PRESSURE: 146 MMHG | HEART RATE: 111 BPM | BODY MASS INDEX: 37.89 KG/M2 | RESPIRATION RATE: 18 BRPM | HEIGHT: 68 IN

## 2019-05-31 LAB
ANION GAP SERPL CALC-SCNC: 9 MMOL/L (ref 7–16)
BUN SERPL-MCNC: 100 MG/DL (ref 8–23)
CALCIUM SERPL-MCNC: 8.6 MG/DL (ref 8.3–10.4)
CHLORIDE SERPL-SCNC: 97 MMOL/L (ref 98–107)
CO2 SERPL-SCNC: 31 MMOL/L (ref 21–32)
CREAT SERPL-MCNC: 3.13 MG/DL (ref 0.6–1)
GLUCOSE BLD STRIP.AUTO-MCNC: 403 MG/DL (ref 65–100)
GLUCOSE SERPL-MCNC: 259 MG/DL (ref 65–100)
HBV SURFACE AG SERPL QL IA: NEGATIVE
POTASSIUM SERPL-SCNC: 3.4 MMOL/L (ref 3.5–5.1)
SODIUM SERPL-SCNC: 137 MMOL/L (ref 136–145)

## 2019-05-31 PROCEDURE — 94760 N-INVAS EAR/PLS OXIMETRY 1: CPT

## 2019-05-31 PROCEDURE — 97110 THERAPEUTIC EXERCISES: CPT

## 2019-05-31 PROCEDURE — 97530 THERAPEUTIC ACTIVITIES: CPT

## 2019-05-31 PROCEDURE — 36415 COLL VENOUS BLD VENIPUNCTURE: CPT

## 2019-05-31 PROCEDURE — 80048 BASIC METABOLIC PNL TOTAL CA: CPT

## 2019-05-31 PROCEDURE — 77010033678 HC OXYGEN DAILY

## 2019-05-31 PROCEDURE — 82962 GLUCOSE BLOOD TEST: CPT

## 2019-05-31 PROCEDURE — 74011636637 HC RX REV CODE- 636/637: Performed by: INTERNAL MEDICINE

## 2019-05-31 PROCEDURE — 74011250637 HC RX REV CODE- 250/637: Performed by: INTERNAL MEDICINE

## 2019-05-31 RX ORDER — METOLAZONE 2.5 MG/1
2.5 TABLET ORAL DAILY
Status: ON HOLD | COMMUNITY
End: 2019-05-31 | Stop reason: SDUPTHER

## 2019-05-31 RX ORDER — CEFPODOXIME PROXETIL 200 MG/1
100 TABLET, FILM COATED ORAL EVERY 24 HOURS
Status: DISCONTINUED | OUTPATIENT
Start: 2019-05-31 | End: 2019-05-31 | Stop reason: HOSPADM

## 2019-05-31 RX ORDER — METOLAZONE 2.5 MG/1
2.5 TABLET ORAL
Qty: 90 TAB | Refills: 0 | Status: SHIPPED
Start: 2019-05-31 | End: 2019-07-12

## 2019-05-31 RX ORDER — POTASSIUM CHLORIDE 20 MEQ/1
20 TABLET, EXTENDED RELEASE ORAL
Qty: 30 TAB | Refills: 0 | Status: SHIPPED | OUTPATIENT
Start: 2019-05-31

## 2019-05-31 RX ORDER — CEFPODOXIME PROXETIL 100 MG/1
100 TABLET, FILM COATED ORAL EVERY 12 HOURS
Qty: 4 TAB | Refills: 0 | Status: SHIPPED | OUTPATIENT
Start: 2019-05-31 | End: 2019-06-02

## 2019-05-31 RX ADMIN — LEVOTHYROXINE SODIUM 25 MCG: 50 TABLET ORAL at 09:27

## 2019-05-31 RX ADMIN — CARVEDILOL 25 MG: 25 TABLET, FILM COATED ORAL at 09:21

## 2019-05-31 RX ADMIN — Medication 5 ML: at 06:09

## 2019-05-31 RX ADMIN — INSULIN LISPRO 15 UNITS: 100 INJECTION, SOLUTION INTRAVENOUS; SUBCUTANEOUS at 08:18

## 2019-05-31 RX ADMIN — APIXABAN 2.5 MG: 2.5 TABLET, FILM COATED ORAL at 09:19

## 2019-05-31 RX ADMIN — CEFPODOXIME PROXETIL 100 MG: 200 TABLET, FILM COATED ORAL at 09:18

## 2019-05-31 RX ADMIN — AMLODIPINE BESYLATE 5 MG: 5 TABLET ORAL at 09:21

## 2019-05-31 RX ADMIN — FERROUS SULFATE TAB 325 MG (65 MG ELEMENTAL FE) 324 MG: 325 (65 FE) TAB at 09:19

## 2019-05-31 RX ADMIN — EZETIMIBE 10 MG: 10 TABLET ORAL at 09:19

## 2019-05-31 RX ADMIN — CARBIDOPA AND LEVODOPA 1 TABLET: 25; 100 TABLET ORAL at 09:19

## 2019-05-31 RX ADMIN — Medication 1 AMPULE: at 09:24

## 2019-05-31 RX ADMIN — INSULIN LISPRO 10 UNITS: 100 INJECTION, SOLUTION INTRAVENOUS; SUBCUTANEOUS at 08:17

## 2019-05-31 RX ADMIN — INSULIN HUMAN 25 UNITS: 100 INJECTION, SUSPENSION SUBCUTANEOUS at 09:16

## 2019-05-31 NOTE — PROGRESS NOTES
Pt discharged home with family care. Discharge instructions, scripts provided by d/c nurse. Pt assisted to lobby in w/c.

## 2019-05-31 NOTE — PROGRESS NOTES
Problem: Urinary Tract Infection - Adult  Goal: *Absence of infection signs and symptoms  Outcome: Progressing Towards Goal     Problem: Falls - Risk of  Goal: *Absence of Falls  Description  Document Nitza Reagan Fall Risk and appropriate interventions in the flowsheet.   Outcome: Progressing Towards Goal     Problem: Patient Education: Go to Patient Education Activity  Goal: Patient/Family Education  Outcome: Progressing Towards Goal     Problem: Patient Education: Go to Patient Education Activity  Goal: Patient/Family Education  Outcome: Progressing Towards Goal     Problem: Nutrition Deficit  Goal: *Optimize nutritional status  Outcome: Progressing Towards Goal

## 2019-05-31 NOTE — PROGRESS NOTES
Problem: Mobility Impaired (Adult and Pediatric)  Goal: *Acute Goals and Plan of Care (Insert Text)  Description  Acute PT Goals:  1. Patient will perform bed mobility with MODIFIED INDEPENDENCE within 7 days. 2. Patient will transfer bed to chair with STAND BY ASSISTANCE within 7 days. 3. Patient will ambulate 50+ using least restrictive assistive device and STAND BY ASSISTANCE within 7 days. 4. Patient will perform wheelchair mobility x150ft with STAND BY ASSISTANCE within 7 days. 5. Patient will tolerate 25+ minutes of therapeutic activity/exercise and/or neuromuscular re-education while maintaining stable vitals to improve functional strength and activity tolerance within 7 days. Outcome: Progressing Towards Goal       PHYSICAL THERAPY: Daily Note and AM 5/31/2019  INPATIENT: PT Visit Days : 2  [place] / [place] / Product Type: Medicare /       NAME/AGE/GENDER: Shane Harding is a 78 y.o. female   PRIMARY DIAGNOSIS: UTI (urinary tract infection) [N39.0] Hyponatremia   Hyponatremia         ICD-10: Treatment Diagnosis:    · Generalized Muscle Weakness (M62.81)  · Difficulty in walking, Not elsewhere classified (R26.2)   Precaution/Allergies:  Baclofen and Lipitor [atorvastatin]      ASSESSMENT:     Ms. Mariusz Loredo is a 78year old female admitted from home s/p fall and found to have UTI. Patient seen this AM for PT treatment session: presents supine in bed, oriented x4, and endorses 0/10 pain. Ms. Mariusz Loredo performed bed mobility with SBA and additional time, transfers with SBA, and ambulation x5ft with RW and CGA. Patient also participated in seated B UE/LE therapeutic exercises noted below to improve functional strength and mobility. Rest breaks provided throughout as needed. Good progress today with less assistance required for mobility and demonstrating improved activity tolerance. Patient is approaching her baseline. Recommend continued HHPT services at discharge.      This section established at most recent assessment   PROBLEM LIST (Impairments causing functional limitations):  1. Decreased Strength  2. Decreased Transfer Abilities  3. Decreased Balance  4. Decreased Activity Tolerance  5. Increased Fatigue  6. Decreased Flexibility/Joint Mobility  7. Decreased Knowledge of Precautions   INTERVENTIONS PLANNED: (Benefits and precautions of physical therapy have been discussed with the patient.)  1. Balance Exercise  2. Bed Mobility  3. Family Education  4. Gait Training  5. Group Therapy  6. Home Exercise Program (HEP)  7. Neuromuscular Re-education/Strengthening  8. Prosthetic Training  9. Therapeutic Activites  10. Therapeutic Exercise/Strengthening  11. Transfer Training     TREATMENT PLAN: Frequency/Duration: 3 times a week for duration of hospital stay  Rehabilitation Potential For Stated Goals: Good     REHAB RECOMMENDATIONS (at time of discharge pending progress):    Placement: It is my opinion, based on this patient's performance to date, that Ms. Kelley Singh may benefit from [address] after discharge due to the functional deficits listed above that are likely to improve with skilled rehabilitation because he/she has an inability to tolerate transportation to an outpatient setting as evidenced by decreased community level ambulation tolerance as well has history of falling . Equipment:    Patient has RW, WC, and [place] for home use. HISTORY:   History of Present Injury/Illness (Reason for Referral):  Weakness;  Difficulty in walking  Past Medical History/Comorbidities:   Ms. Kelley Singh  has a past medical history of Adverse effect of anesthesia, AF (atrial fibrillation) ([facility] [address] (11/20/2011), Arthritis (11/18/2011), CAD (coronary artery disease) (2011), Cervical disc disease, Chronic kidney disease, Chronic pain, DJD (degenerative joint disease), Drainage from wound (10/3/2014), Full dentures, Gout, Hemorrhoid (11/5/2013), [place] (hard of hearing), Hyperlipemia (11/10/2011), Hypertension, Hypertriglyceridemia, Hypothyroidism, Neuropathy, Obesity (BMI 30-39.9) (10/2/14), PAD (peripheral artery disease) ([facility] [address] PCI (pneumatosis cystoides intestinalis) (11/5/2013), Pleural effusion, bilateral (11/21/2011), Postoperative anemia due to acute blood loss (11/21/2011), Rib cage fracture (11/5/2013), S/P CABG (coronary artery bypass graft) (11/05/2013), Status post-operative repair of hip fracture (09/15/2014), Stroke ([facility] [address] Type II or unspecified type diabetes mellitus without mention of complication, uncontrolled ([facility] [address] (12/16/2013), and Unspecified adverse effect of anesthesia. She also has no past medical history of Difficult intubation, Malignant hyperthermia due to anesthesia, Nausea & vomiting, or Pseudocholinesterase deficiency. Ms. Alex Beebe  has a past surgical history that includes hx colonoscopy; pr cabg, artery-vein, four (Oct. 2011); hx cataract removal (Bilateral, 11/2012); hx hysterectomy; hx lap cholecystectomy; hx hip fracture tx (Left); pr close cystostomy; vascular surgery procedure unlist (Left, 06/07/2017); and vascular surgery procedure unlist (Left, 08/10/2017). Social History/Living Environment:   Home Environment: Private residence  # Steps to Enter: 0  One/Two Story Residence: One story  Living Alone: No  Support Systems: Spouse/Significant Other/Partner, Child(dania)  Patient Expects to be Discharged to[de-identified] Private residence  Current DME Used/Available at Home: Walker, rolling, Wheelchair, [address] chair, Commode, bedside, Grab bars  Tub or Shower Type: Shower  Prior Level of Function/Work/Activity:  Patient lives with spouse and daughter in a single story residence with ramp to enter. At baseline, patient ambulates short household level distances with a RW and primary utilizes a WC for mobility and able to self propel with UE and LEs. Patient limited secondary to low back pain and neuropathy as well as pervious hip fracture per report.     Number of Personal Factors/Comorbidities that affect the Plan of Care: 1-2: MODERATE COMPLEXITY   EXAMINATION:   Most Recent Physical Functioning:   Gross Assessment:  AROM: Generally decreased, functional(B UE proximally L > R)  Strength: Generally decreased, functional(B LE functionally compared to baseline)  Coordination: Within functional limits  Sensation: Impaired(Patient has neuropathy at baseline, affects distal B LE/UE)               Posture:  Posture (WDL): Exceptions to WDL  Posture Assessment: Forward head, Rounded shoulders  Balance:  Sitting: Intact  Sitting - Static: Good (unsupported)  Sitting - Dynamic: Good (unsupported)  Standing: Impaired  Standing - Static: Fair  Standing - Dynamic : Fair Bed Mobility:  Supine to Sit: Stand-by assistance; Additional time  Scooting: Stand-by assistance  Wheelchair Mobility:     Transfers:  Sit to Stand: Stand-by assistance  Stand to Sit: Stand-by assistance  Bed to Chair: Contact guard assistance  Interventions: Verbal cues; Tactile cues; Safety awareness training  Gait:     Base of Support: Widened;Center of gravity altered  Speed/Viry: Shuffled; Slow  Step Length: Left shortened;Right shortened  Gait Abnormalities: Decreased step clearance; Step to gait  Distance (ft): 5 Feet (ft)  Assistive Device: Walker, rolling  Ambulation - Level of Assistance: Contact guard assistance  Interventions: Safety awareness training; Tactile cues; Verbal cues      Body Structures Involved:  1. Metabolic  2. Muscles Body Functions Affected:  1. Respiratory  2. Movement Related Activities and Participation Affected:  1. Mobility  2. Self Care   Number of elements that affect the Plan of Care: 4+: HIGH COMPLEXITY   CLINICAL PRESENTATION:   Presentation: Evolving clinical presentation with changing clinical characteristics: MODERATE COMPLEXITY   CLINICAL DECISION MAKIN [facility] Inpatient Short Form  How much difficulty does the patient currently have. .. Unable A Lot A Little None   1. Turning over in bed (including adjusting bedclothes, sheets and blankets)? ? 1   ? 2   ? 3   ? 4   2. Sitting down on and standing up from a chair with arms ( e.g., wheelchair, bedside commode, etc.)   ? 1   ? 2   ? 3   ? 4   3. Moving from lying on back to sitting on the side of the bed?   ? 1   ? 2   ? 3   ? 4   How much help from another person does the patient currently need. .. Total A Lot A Little None   4. Moving to and from a bed to a chair (including a wheelchair)? ? 1   ? 2   ? 3   ? 4   5. Need to walk in hospital room? ? 1   ? 2   ? 3   ? 4   6. Climbing 3-5 steps with a railing? ? 1   ? 2   ? 3   ? 4   © 2007, Trustees of [facility] [place] [place] [employer], under license to [employer]. All rights reserved      Score:  Initial: 18 Most Recent: 18 (Date: 5/30/19 )    Interpretation of Tool:  Represents activities that are increasingly more difficult (i.e. Bed mobility, Transfers, Gait). Medical Necessity:     · Patient demonstrates good  ·  rehab potential due to higher previous functional level. Reason for Services/Other Comments:  · Patient continues to require skilled intervention due to decreased functional strength and activity tolerance   · . Use of outcome tool(s) and clinical judgement create a POC that gives a: Questionable prediction of patient's progress: MODERATE COMPLEXITY            TREATMENT:   (In addition to Assessment/Re-Assessment sessions the following treatments were rendered)   Pre-treatment Symptoms/Complaints:    Pain: Initial:   Pain Intensity 1: 0  Post Session: 0/10       Therapeutic Activity: (    10 Minutes): Therapeutic activities including bed mobility, transfer training, balance training, safety awareness training, ambulation on level ground x5ft, and patient education  to improve mobility and strength. Required minimal Safety awareness training; Tactile cues; Verbal cues to promote static and dynamic balance in standing.    Therapeutic Exercise: (15 Minutes):  Exercises per grid below to improve mobility and strength. Required minimal visual and verbal cues to promote proper body mechanics and promote proper body breathing techniques. Rest breaks provided throughout as need to recover breathing. Date:  5/31/19 Date:   Date:     Activity/Exercise Parameters Parameters Parameters   Seated hip flexion X20B; reciprocal     Seated hip ab/adduction (clamshells) x20B     Seated LAQ x15B     Seated ankle plantar flexion x25B     Seated ankle dorsiflexion x25B     Shoulder flexion (limited range) x15B     Elbow flexion/extension x15B     UE press/punches x15B       Braces/Orthotics/Lines/Etc:   · IV  · O2 Device: Nasal cannula (3L 02 chronically)  Treatment/Session Assessment:    · Response to Treatment:  See above  · Interdisciplinary Collaboration:   o Physical Therapist  o Registered Nurse  · After treatment position/precautions:   o Up in chair  o Bed alarm/tab alert on  o Bed/Chair-wheels locked  o Bed in low position  o Call light within reach  o RN notified  o Chair alarm activated; patient needs within reach; NAD: RN notified of patient status    · Compliance with Program/Exercises: Will assess as treatment progresses  · Recommendations/Intent for next treatment session: \"Next visit will focus on advancements to more challenging activities and reduction in assistance provided\".     Total Treatment Duration:  PT Patient Time In/Time Out  Time In: 0945  Time Out: [address] DPT

## 2019-05-31 NOTE — DISCHARGE INSTRUCTIONS
Take 80 mg of lasix twice a day. Weigh yourself in the nude every morning after using bathroom and before eating. If you gain more than 2 pounds in a 24 hour period or more than 5 pounds over a week, take metolazone once a day in the morning before your first dose of lasix. Once your weight is back to your dry weight, stop the metolazone. Take Potassium supplement only on the days you take the metolazone. Patient Education        Hyponatremia: Care Instructions  Your Care Instructions    Hyponatremia (say \"gg-rk-mzn-TREE-dave-uh\") means that you don't have enough sodium in your blood. It can cause nausea, vomiting, and headaches. Or you may not feel hungry. In serious cases, it can cause seizures, a coma, or even death. Hyponatremia is not a disease. It is a problem caused by something else, such as medicines or exercising for a long time in hot weather. You can get hyponatremia if you lose a lot of fluids and then you drink a lot of water or other liquids that don't have much sodium. You can also get it if you have kidney, liver, heart, or other health problems. Treatment is focused on getting your sodium levels back to normal.  Follow-up care is a key part of your treatment and safety. Be sure to make and go to all appointments, and call your doctor if you are having problems. It's also a good idea to know your test results and keep a list of the medicines you take. How can you care for yourself at home? · If your doctor recommends it, drink fluids that have sodium. Sports drinks are a good choice. Or you can eat salty foods. · If your doctor recommends it, limit the amount of water you drink. And limit fluids that are mostly water. These include tea, coffee, and juice. · Take your medicines exactly as prescribed. Call your doctor if you have any problems with your medicine. · Get your sodium levels tested when your doctor tells you to. When should you call for help?   Call 911 anytime you think you may need emergency care. For example, call if:    · You have a seizure.     · You passed out (lost consciousness).    Call your doctor now or seek immediate medical care if:    · You are confused or it is hard to focus.     · You have little or no appetite.     · You feel sick to your stomach or you vomit.     · You have a headache.     · You have mood changes.     · You feel more tired than usual.    Watch closely for changes in your health, and be sure to contact your doctor if:    · You do not get better as expected. Where can you learn more? Go to http://mickie-avni.info/. Enter Y864 in the search box to learn more about \"Hyponatremia: Care Instructions. \"  Current as of: June 25, 2018  Content Version: 11.9  © 4086-5308 [employer]. Care instructions adapted under license by [employer] (which disclaims liability or warranty for this information). If you have questions about a medical condition or this instruction, always ask your healthcare professional. [place] [address] any warranty or liability for your use of this information. DISCHARGE SUMMARY from Nurse    PATIENT INSTRUCTIONS:    After general anesthesia or intravenous sedation, for 24 hours or while taking prescription Narcotics:  · Limit your activities  · Do not drive and operate hazardous machinery  · Do not make important personal or business decisions  · Do  not drink alcoholic beverages  · If you have not urinated within 8 hours after discharge, please contact your surgeon on call.     Report the following to your surgeon:  · Excessive pain, swelling, redness or odor of or around the surgical area  · Temperature over 100.5  · Nausea and vomiting lasting longer than 4 hours or if unable to take medications  · Any signs of decreased circulation or nerve impairment to extremity: change in color, persistent  numbness, tingling, coldness or increase pain  · Any questions    What to do at Home:  Recommended activity: Activity as tolerated, per MD instructions    If you experience any of the following symptoms fever > 100.5, nausea, vomiting, pain to MD, chest pain and/or shortness of breath or increased weakness or confusion to ER please follow up with MD.    *  Please give a list of your current medications to your Primary Care Provider. *  Please update this list whenever your medications are discontinued, doses are      changed, or new medications (including over-the-counter products) are added. *  Please carry medication information at all times in case of emergency situations. These are general instructions for a healthy lifestyle:    No smoking/ No tobacco products/ Avoid exposure to second hand smoke  Surgeon General's Warning:  Quitting smoking now greatly reduces serious risk to your health. Obesity, smoking, and sedentary lifestyle greatly increases your risk for illness    A healthy diet, regular physical exercise & weight monitoring are important for maintaining a healthy lifestyle    You may be retaining fluid if you have a history of heart failure or if you experience any of the following symptoms:  Weight gain of 3 pounds or more overnight or 5 pounds in a week, increased swelling in our hands or feet or shortness of breath while lying flat in bed. Please call your doctor as soon as you notice any of these symptoms; do not wait until your next office visit. Recognize signs and symptoms of STROKE:    F-face looks uneven    A-arms unable to move or move unevenly    S-speech slurred or non-existent    T-time-call 911 as soon as signs and symptoms begin-DO NOT go       Back to bed or wait to see if you get better-TIME IS BRAIN. Warning Signs of HEART ATTACK     Call 911 if you have these symptoms:   Chest discomfort.  Most heart attacks involve discomfort in the center of the chest that lasts more than a few minutes, or that goes away and comes back. It can feel like uncomfortable pressure, squeezing, fullness, or pain.  Discomfort in other areas of the upper body. Symptoms can include pain or discomfort in one or both arms, the back, neck, jaw, or stomach.  Shortness of breath with or without chest discomfort.  Other signs may include breaking out in a cold sweat, nausea, or lightheadedness. Don't wait more than five minutes to call 911 - MINUTES MATTER! Fast action can save your life. Calling 911 is almost always the fastest way to get lifesaving treatment. Emergency Medical Services staff can begin treatment when they arrive -- up to an hour sooner than if someone gets to the hospital by car. The discharge information has been reviewed with the patient. The patient verbalized understanding. Discharge medications reviewed with the patient and appropriate educational materials and side effects teaching were provided.   ___________________________________________________________________________________________________________________________________

## 2019-05-31 NOTE — PROGRESS NOTES
Patient is discharging to home with [facility] today. Patient is agreeable to referral for [facility] PT/OT, and SW submitted referral via fax. Provided patient with 30 day free trial coupon for Eloquis. No other needs identified during this admission. Care Management Interventions  PCP Verified by CM: Yes  Mode of Transport at Discharge:  Other (see comment)(family)  Transition of Care Consult (CM Consult): Discharge Planning  Discharge Durable Medical Equipment: No  Physical Therapy Consult: Yes  Occupational Therapy Consult: Yes  Speech Therapy Consult: No  Current Support Network: Own Home, Lives with Spouse  Confirm Follow Up Transport: Family  Plan discussed with Pt/Family/Caregiver: Yes  Freedom of Choice Offered: Yes  Discharge Location  Discharge Placement: Home with home health

## 2019-05-31 NOTE — DISCHARGE SUMMARY
Hospitalist Discharge Summary     Patient ID:  Denae Palm  [id number]  11 y.o.  1940  Admit date: 5/29/2019  8:33 AM  Discharge date and time: 5/31/2019  Attending: Jesus Madrid MD  PCP:  Corinne Cavazos MD  Treatment Team: Attending Provider: Jesus Madrid MD; Consulting Provider: Cuauhtemoc Culver MD; Utilization Review: Analilia Torres RN; Care Manager: Darnell Myers Charge Nurse: Micki Delcid RN; Physical Therapist: Kandi Rivera DPT    Principal Diagnosis Hyponatremia   Hospital Problems as of 5/31/2019 Date Reviewed: 5/21/2019          Codes Class Noted - Resolved POA    UTI (urinary tract infection) ICD-10-CM: N39.0  ICD-9-CM: 599.0  5/29/2019 - Present Yes        * (Principal) Hyponatremia ICD-10-CM: E87.1  ICD-9-CM: 276.1  5/29/2019 - Present Yes        Hypokalemia ICD-10-CM: E87.6  ICD-9-CM: 276.8  5/29/2019 - Present Yes        ANTONIO (acute kidney injury) ([facility] [address] ICD-10-CM: N17.9  ICD-9-CM: 584.9  5/29/2019 - Present Yes        Stage 4 chronic kidney disease ([facility] [address] ICD-10-CM: N18.4  ICD-9-CM: 585.4  5/29/2019 - Present Yes                 HPI:  'Patient is a 77 yo F with history of CAD s/p CABG, a fib (not on [address] [place] [address] Road), T2DM on insulin, and CKD stage 4, chronic hypoxic respiratory failure on 3 LPM of oxygen at home, and history of diastolic dysfunction, who presents to the ED after accidentally rolling out of bed this morning when she was getting up. She denies pain at present. States she has been getting weaker for the last week. She was started on zaroxolyn ~12 days ago by nephrology and is on furosemide 80 mg daily. She had a call button that she was able to ring her daughter, who lives upstairs, when she fell. In the ED, she is noted to have low sodium at 128, low potassium at 2.6, and ANTONIO on CKD with Cr of 3.3 (normally 2.3), and UA concerning fo UTI.   BP initially elevated significantly, but was 140s/80s during my exam.   Hospitalist asked to admit for electrolyte abnormality, weakness, and UTI.'      Hospital Course:  Please refer to the admission H&P for details of presentation. In summary, the patient is a 77 yo F with stage 4 CKD admitted with ANTONIO on CKD, hyponatremia, and dehydration from increased diuretic use. She was given IVF per nephrology guidance, along with potassium replacement, and her sodium and potassium improved. Her Cr improved slightly as well. Patient was concerned she needs dialysis, but nephrology determined she does not need dialysis yet and is concerned that dialysis will cause her extreme fatigue and significantly worse quality of life. Case was discussed with Dr. Abelardo Shah (Nephrology), and she will resume her lasix BID and only use metolazone as needed for fluid gain. Her UA on admission had 4 + bacteria and  WBC; urine culture pending but no growth to date. She received rocephin while here and will be given 2 more days of vantin on discharge. She was also started on Eliquis due to [id number] score of 6. Aspirin is discontinued. Her blood sugars were significantly elevated while here. Insulin adjusted. She states her blood sugars range from 200-600 at home. Her novolin 70/30 will be increased from 50 to 65 units qAM and 40 to 55 units pPM.  She will need to follow with her PCP for further titration.     Significant Diagnostic Studies:       Labs: Results:       Chemistry Recent Labs     05/31/19  0414 05/30/19  0435 05/29/19  1822 05/29/19  0851   * 269*  --  437*    136  --  128*   K 3.4* 3.3* 2.5* 2.5*   CL 97* 90*  --  82*   CO2 31 34*  --  33*   * 97*  --  103*   CREA 3.13* 3.15*  --  3.32*   CA 8.6 8.8  --  9.2   AGAP 9 12  --  13   AP  --   --   --  165*   TP  --   --   --  7.8   ALB  --   --   --  3.6   GLOB  --   --   --  4.2*   AGRAT  --   --   --  0.9*      CBC w/Diff Recent Labs     05/29/19  0851   WBC 10.9   RBC 3.87*   HGB 12.2   HCT 34.5*      GRANS 79*   LYMPH 11*   EOS 2 Cardiac Enzymes No results for input(s): CPK, CKND1, CURTIS in the last 72 hours. No lab exists for component: CKRMB, TROIP   Coagulation No results for input(s): PTP, INR, APTT in the last 72 hours. No lab exists for component: INREXT    Lipid Panel Lab Results   Component Value Date/Time    Cholesterol, total 278 (H) 08/28/2018 12:00 PM    Cholesterol (POC) 238 12/11/2013 08:22 AM    HDL Cholesterol 47 08/28/2018 12:00 PM    LDL, calculated 186 (H) 08/28/2018 12:00 PM    VLDL, calculated 45 (H) 08/28/2018 12:00 PM    Triglyceride 225 (H) 08/28/2018 12:00 PM    Triglycerides (POC) 105 12/11/2013 08:22 AM    CHOL/HDL Ratio 4.6 05/16/2016 06:06 AM      BNP No results for input(s): BNPP in the last 72 hours. Liver Enzymes Recent Labs     05/29/19  0851   TP 7.8   ALB 3.6   *   SGOT 8*      Thyroid Studies Lab Results   Component Value Date/Time    TSH 3.530 05/29/2019 08:51 AM            Discharge Exam:  Visit Vitals  /73 (BP 1 Location: Right arm, BP Patient Position: At rest)   Pulse 68   Temp 97.8 °F (36.6 °C)   Resp 20   Ht 5' 8\" (1.727 m)   Wt 113.4 kg (250 lb)   SpO2 93%   Breastfeeding? No   BMI 38.01 kg/m²     General appearance: alert, cooperative, no distress, appears stated age, obese  Lungs: clear to auscultation bilaterally  Heart: regular rate and rhythm, S1, S2 normal, no murmur, click, rub or gallop  Abdomen: soft, non-tender. Bowel sounds normal. No masses,  no organomegaly  Extremities: no cyanosis or edema  Neurologic: Grossly normal    Disposition: home  Discharge Condition: stable  Patient Instructions:   Current Discharge Medication List      START taking these medications    Details   apixaban (ELIQUIS) 2.5 mg tablet Take 1 Tab by mouth two (2) times a day. Indications: Treatment to Prevent Blood Clots in Chronic Atrial Fibrillation  Qty: 60 Tab, Refills: 0      cefpodoxime (VANTIN) 100 mg tablet Take 1 Tab by mouth every twelve (12) hours for 2 days.  Indications: UTI  Qty: 4 Tab, Refills: 0      potassium chloride (K-DUR, KLOR-CON) 20 mEq tablet Take 1 Tab by mouth daily as needed (Only on days you take metolazone). Indications: prevention of low potassium in the blood  Qty: 30 Tab, Refills: 0         CONTINUE these medications which have CHANGED    Details   insulin NPH/insulin regular (HUMULIN 70/30 U-100 INSULIN) 100 unit/mL (70-30) injection 65 units before breakfast, 55 units before supper  Qty: 60 mL, Refills: 0      metOLazone (ZAROXOLYN) 2.5 mg tablet Take 1 Tab by mouth daily as needed (Fluid gain). If weight increases by 2 pounds in 24 hour period or 5 pounds in a week, take daily until back down to dry weight  Indications: Edema with Defective Kidney Function  Qty: 90 Tab, Refills: 0         CONTINUE these medications which have NOT CHANGED    Details   pregabalin (LYRICA) 50 mg capsule Take 1 Cap by mouth two (2) times a day. Max Daily Amount: 100 mg. 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 Qty: 12 Cap, Refills: 3    Associated Diagnoses: Malaise and fatigue      prednisoLONE acetate (PRED FORTE) 1 % ophthalmic suspension Administer 1 Drop to both eyes four (4) times daily. carbidopa-levodopa (SINEMET)  mg per tablet TAKE 1 TABLET BY MOUTH THREE TIMES DAILY  Qty: 810 Tab, Refills: 3    Comments: **Patient requests 90 days supply**      furosemide (LASIX) 40 mg tablet Take 2 Tabs by mouth daily. Qty: 180 Tab, Refills: 3      levothyroxine (SYNTHROID) 25 mcg tablet Take 1 Tab by mouth Daily (before breakfast). Qty: 90 Tab, Refills: 3      febuxostat (ULORIC) 80 mg tab tablet Take 1 Tab by mouth daily. Qty: 90 Tab, Refills: 3      ezetimibe (ZETIA) 10 mg tablet Take 1 Tab by mouth daily.   Qty: 90 Tab, Refills: 3      terbinafine HCl (LAMISIL AT) 1 % topical cream Apply  to affected area two (2) times a day.  Qty: 30 g, Refills: 2      Lactobacillus acidophilus (ACIDOPHILUS) cap Take 2 Caps by mouth two (2) times a day. Insulin Syringe-Needle U-100 (BD INSULIN SYRINGE ULTRA-FINE) 1 mL 31 gauge x 5/16 syrg DX E 10.65  Inject insulin tid  Qty: 300 Syringe, Refills: 1      OXYGEN-AIR DELIVERY SYSTEMS 3 L by Nasal route continuous. glucose blood VI test strips (ACCU-CHEK GUIDE) strip Check BS TID. DX E11.9  Qty: 300 Strip, Refills: 3      Lancets (ACCU-CHEK FASTCLIX) misc Check bs TID. DX E11.9  Qty: 300 Each, Refills: 3      cyanocobalamin, vitamin B-12, 1,000 mcg/mL kit 1,000 mcg by Injection route every month. on the 1st      ferrous sulfate 325 mg (65 mg iron) tablet Take 324 mg by mouth daily. benazepril (LOTENSIN) 20 mg tablet Take 20 mg by mouth every morning. triamcinolone acetonide (KENALOG) 0.1 % topical cream Apply  to affected area two (2) times a day. Refills: 0      nitroglycerin (NITROLINGUAL) 400 mcg/spray spray 1 Spray by SubLINGual route every five (5) minutes as needed (Pt states \"I've never had to use it\"). Qty: 1 Bottle, Refills: 4      carvedilol (COREG) 25 mg tablet take 1 tablet by mouth twice a day  Qty: 60 Tab, Refills: 11    Associated Diagnoses: Essential hypertension         STOP taking these medications       aspirin 81 mg chewable tablet Comments:   Reason for Stopping:               Activity: PT/OT per Home Health  Diet: Diabetic Diet    Other instructions: Take 80 mg of lasix twice a day. Weigh yourself in the nude every morning after using bathroom and before eating. If you gain more than 2 pounds in a 24 hour period or more than 5 pounds over a week, take metolazone once a day in the morning before your first dose of lasix. Once your weight is back to your dry weight, stop the metolazone. Take Potassium supplement only on the days you take the metolazone.       Follow-up      Follow-up Appointments   Procedures    FOLLOW UP VISIT Appointment in: Other (Specify) 1) Dr. Sonal Barrios (PCP) within 1 week for hospital follow up 2) Dr. Kelly [facility]) in 7-10 days for hospital follow up. 1) Dr. Sonal Barrios (PCP) within 1 week for hospital follow up  2) Dr. Huntvelt [facility]) in 7-10 days for hospital follow up. Standing Status:   Standing     Number of Occurrences:   1     Order Specific Question:   Appointment in     Answer: Other (Specify)   ·   ·   Time spent to discharge patient 33 minutes  Signed:  Therese Centeno.  Lynda David MD  5/31/2019  8:47 AM post-nasal drip

## 2019-06-01 LAB
BACTERIA SPEC CULT: ABNORMAL
BACTERIA SPEC CULT: ABNORMAL
SERVICE CMNT-IMP: ABNORMAL

## 2019-06-03 ENCOUNTER — PATIENT OUTREACH (OUTPATIENT)
Dept: CASE MANAGEMENT | Age: 79
End: 2019-06-03

## 2019-06-03 NOTE — PROGRESS NOTES
This note will not be viewable in [address] 1st Attempt to contact patient for MAGAN call, no answer left VM for returned call.  Will attempt to contact patient again within 24 hours

## 2019-06-04 ENCOUNTER — PATIENT OUTREACH (OUTPATIENT)
Dept: CASE MANAGEMENT | Age: 79
End: 2019-06-04

## 2019-06-04 NOTE — PROGRESS NOTES
This note will not be viewable in [address] Date/Time of Call: 06/04/19 1024am   What was the patient hospitalized for? Hyponatremia   Consent for JUAN JOSÉ AREVALO Call       Does the patient understand his/her diagnosis and/or treatment and what happened during the hospitalization? Called and spoke with patient. She agrees to call. She states that she is doing okay     Yes     Did the patient receive discharge instructions? Yes   CM Assessed Risk for Readmission:     Patient stated Risk for Readmission:  Patient is a moderate risk for readmission      No concerns voiced at this time     Review any discharge instructions (see discharge instructions/AVS in [address] [facility]). Ask patient if they understand these. Do they have any questions? reviewed DC instructions   Were home services ordered (nursing, PT, OT, ST, etc.)? Yes    If so, has the first visit occurred? If not, why? (Assist with coordination of services if necessary.)   06/02/19initial assessmentno further visits scheduled at this time as patient had PCP appointment yesterday    Was any DME ordered? No    If so, has it been received? If not, why?  (Assist patient in obtaining DME orders &/or equipment if necessary.) Suzieent has a WC, Oxygen, RW, Walker, SC, BSC and Hospital Bed in the home    Complete a review of all medications (new, continued and discontinued meds per the D/C instructions and medication tab in [address] [facility]). Medications reviewed    START taking:  apixaban 2.5 mg tablet (ELIQUIS)  cefpodoxime 100 mg tablet (VANTIN)  potassium chloride 20 mEq tablet (K-DUR, KLOR-CON)  CHANGE how you take:  insulin NPH/insulin regular 100 unit/mL (70-30) injection (HumuLIN 70/30 U-100 Insulin)  metOLazone 2.5 mg tablet (ZAROXOLYN)  STOP taking:  aspirin 81 mg chewable tablet   Were all new prescriptions filled?   If not, why?  (Assist patient in obtaining medications if necessary  escalate for CCM &/or SW if ongoing issues are verbalized by pt or anticipated) Yespatient states that she has a problem with affording medications. She states that she was given a coupon for the Eliquis    Does the patient understand the purpose and dosing instructions for all medications? (If patient has questions, provide explanation and education.)   Patient verbalizes understanding of medications    Does the patient have any problems in performing ADLs? (If patient is unable to perform ADLs  what is the limiting factor(s)? Do they have a support system that can assist? If no support system is present, discuss possible assistance that they may be able to obtain. Escalate for CCM/SW if ongoing issues are verbalized by pt or anticipated)   Patient is somewhat independent with ADLs; family assists. Patient uses a WC. Does the patient have all follow-up appointments scheduled? 7 day f/up with PCP?   (f/up with PCP may be w/in 14 days if patient has a f/up with their specialist w/in 7 days)    7-14 day f/up with specialist?   (or per discharge instructions)    If f/up has not been made  what actions has the care coordinator made to accomplish this? Has transportation been arranged? yes       06/03/19 at 2pm        Nephrology 06/07/19 at 345pm, Neurology 06/19/19 at [address] appointment information with patient       Patient states that her daughter will take her to her appointment on Friday, but that her  usually takes her and his health is declining so transportation to appointments is becoming an issue as daughter also works. Any other questions or concerns expressed by the patient? Patient has questions and concerns about medication affordability and transportation. In agreement with referral to SW for assistance. Schedule next appointment with JUAN JOSÉ Farfan or refer to RN Case Manager/ per the workflow guidelines. When is care coordinators next follow-up call scheduled?     If referred for CCM  what RN care manager was the referral assigned? Referral to CCM SW for medication affordability issues and transportation issues.          SATURNINO Vega, CCM SW   MAGAN Call Completed By: Pina Bhakta, [address] [place] [address] Coordinator

## 2019-06-05 ENCOUNTER — PATIENT OUTREACH (OUTPATIENT)
Dept: CASE MANAGEMENT | Age: 79
End: 2019-06-05

## 2019-06-05 NOTE — PROGRESS NOTES
Ambulatory Care Coordination  Social Work Assessment   Date of referral?    Referral from which RN CM/other source? 19    GIANCARLO Tavares Coordinator   Previously referred? If so, reason and brief outcome Yes -   Graduated from program   Reason for current referral: Medication and transportation assistance    Living situation: Pt and her spouse live with their 62 yo dtr. Insurance type? Prescription drug plan? Affordable meds? Obtained where? Medicare and supplement; pt states that 4 of her medications are expensive. She has been able to afford them thus far but [facility] Eliquis was added to her medications this hospitalization. She was given a one month free card but is not able to afford it long term. SW suggested that she speak to her PCP and/or cardiologist about getting Eliquis samples. Income information (if needed):    Food stamps? $1400/month (she and spouse combined)    No food stamps   Transportation ? Pt's spouse and dtr provide transportation but spouse is having health issues so is not able to assist pt much. dtr provides transportation but she works. Pt is also concerned about transportation in the future if she has to go to dialysis. YENIFER explained the transportation services available through the [facility] and instructed the pt that she would have to call to request the assessment as they do not take referrals from social workers/. She verbalized understanding. Housing situation? Housing assistance needed? Stable  - no assistance needed. Sources of Support: Family? Dtr is the pt and spouse's main, if not only, support. No family and many of her friends are . Home Health involved? [facility] aides/pt  pay aides? [facility] - PT/OTC    No   MÉRY? Walker, wheelchair, BSC, SC, O2, bed   Ambulatory? ADLs  assistance required? Assistive device needed for ambulation?  Pt is able to ambulate very short distances with a walker; mainly uses wheelchair. Able to do most ADL's independently. Candance Madison Referral to [facility]/Medicaid needed? SW discussed applying for medicaid with the pt but she was not interested at this time. Referral to Medicare Extra Help/LIS program needed? Yes - YENIFER in 2017 had begun the application process with the pt but the pt did not follow through to complete it. Small home repair needed? No   MOW referral?  Adequate nutrition? Nutritional resources are adequate to meet her needs. Falls Risk Assessment? completed 6/3/19 at PCP appt; score=2   Depression screening? completed 6/3/19 at PCP appt; score=0   ACP set up? Needs information? Pt has all needed ACP documents in place. Any other concerns/questions? Main concerns are medication affordability and transportation. Next steps: -SW to mail information to the pt about applying for Medicare Extra Help program.  -SW to mail information to the pt about the [employer] transportation services. -SW will explore patient assistance programs for the pt's more expensive medications. -SW will follow up with the pt within 2 weeks to discuss applying for the medicare extra help program and [employer] transportation program.             This note will not be viewable in [address]

## 2019-06-19 PROBLEM — M79.2 NEUROPATHIC PAIN: Status: ACTIVE | Noted: 2019-06-19

## 2019-06-19 PROBLEM — G20 PARKINSON DISEASE (HCC): Status: ACTIVE | Noted: 2019-06-19

## 2019-06-25 ENCOUNTER — PATIENT OUTREACH (OUTPATIENT)
Dept: CASE MANAGEMENT | Age: 79
End: 2019-06-25

## 2019-06-29 ENCOUNTER — HOSPITAL ENCOUNTER (INPATIENT)
Age: 79
LOS: 13 days | Discharge: NURSING, REHAB OR LONG-TERM CARE | DRG: 872 | End: 2019-07-12
Attending: EMERGENCY MEDICINE | Admitting: FAMILY MEDICINE
Payer: MEDICARE

## 2019-06-29 ENCOUNTER — APPOINTMENT (OUTPATIENT)
Dept: CT IMAGING | Age: 79
DRG: 872 | End: 2019-06-29
Attending: EMERGENCY MEDICINE
Payer: MEDICARE

## 2019-06-29 ENCOUNTER — APPOINTMENT (OUTPATIENT)
Dept: GENERAL RADIOLOGY | Age: 79
DRG: 872 | End: 2019-06-29
Attending: EMERGENCY MEDICINE
Payer: MEDICARE

## 2019-06-29 DIAGNOSIS — N30.01 ACUTE CYSTITIS WITH HEMATURIA: ICD-10-CM

## 2019-06-29 DIAGNOSIS — N18.5 CKD (CHRONIC KIDNEY DISEASE) STAGE 5, GFR LESS THAN 15 ML/MIN (HCC): Chronic | ICD-10-CM

## 2019-06-29 DIAGNOSIS — R19.7 DIARRHEA, UNSPECIFIED TYPE: Primary | ICD-10-CM

## 2019-06-29 DIAGNOSIS — N39.0 URINARY TRACT INFECTION WITHOUT HEMATURIA, SITE UNSPECIFIED: ICD-10-CM

## 2019-06-29 DIAGNOSIS — A04.72 C. DIFFICILE DIARRHEA: ICD-10-CM

## 2019-06-29 PROBLEM — E87.1 HYPONATREMIA: Status: RESOLVED | Noted: 2019-05-29 | Resolved: 2019-06-29

## 2019-06-29 PROBLEM — N18.4 STAGE 4 CHRONIC KIDNEY DISEASE (HCC): Status: RESOLVED | Noted: 2019-05-29 | Resolved: 2019-06-29

## 2019-06-29 PROBLEM — E53.9 BURNING FEET SYNDROME: Status: RESOLVED | Noted: 2017-12-01 | Resolved: 2019-06-29

## 2019-06-29 PROBLEM — G47.00 PERSISTENT DISORDER OF INITIATING OR MAINTAINING SLEEP: Status: RESOLVED | Noted: 2018-03-29 | Resolved: 2019-06-29

## 2019-06-29 PROBLEM — M79.606 PAIN OF LOWER EXTREMITY: Status: RESOLVED | Noted: 2017-12-01 | Resolved: 2019-06-29

## 2019-06-29 PROBLEM — N17.9 AKI (ACUTE KIDNEY INJURY) (HCC): Status: RESOLVED | Noted: 2019-05-29 | Resolved: 2019-06-29

## 2019-06-29 PROBLEM — M79.2 NEUROPATHIC PAIN: Status: RESOLVED | Noted: 2019-06-19 | Resolved: 2019-06-29

## 2019-06-29 PROBLEM — I87.303 STASIS EDEMA OF BOTH LOWER EXTREMITIES: Chronic | Status: RESOLVED | Noted: 2018-01-03 | Resolved: 2019-06-29

## 2019-06-29 PROBLEM — D47.2 MGUS (MONOCLONAL GAMMOPATHY OF UNKNOWN SIGNIFICANCE): Status: RESOLVED | Noted: 2017-12-01 | Resolved: 2019-06-29

## 2019-06-29 PROBLEM — G47.33 OSA (OBSTRUCTIVE SLEEP APNEA): Chronic | Status: ACTIVE | Noted: 2018-05-24

## 2019-06-29 PROBLEM — I77.0 A-V FISTULA (HCC): Chronic | Status: ACTIVE | Noted: 2017-09-06

## 2019-06-29 PROBLEM — Z79.899 ENCOUNTER FOR MEDICATION MANAGEMENT: Status: RESOLVED | Noted: 2017-12-01 | Resolved: 2019-06-29

## 2019-06-29 PROBLEM — N17.9 ACUTE ON CHRONIC RENAL FAILURE (HCC): Status: ACTIVE | Noted: 2019-06-29

## 2019-06-29 PROBLEM — E66.01 MORBID OBESITY (HCC): Chronic | Status: ACTIVE | Noted: 2019-06-29

## 2019-06-29 PROBLEM — N18.9 ACUTE ON CHRONIC RENAL FAILURE (HCC): Status: ACTIVE | Noted: 2019-06-29

## 2019-06-29 PROBLEM — R25.9 MIXED ACTION AND RESTING TREMOR: Status: RESOLVED | Noted: 2017-12-01 | Resolved: 2019-06-29

## 2019-06-29 PROBLEM — R29.3 POSTURAL IMBALANCE: Status: RESOLVED | Noted: 2017-12-01 | Resolved: 2019-06-29

## 2019-06-29 PROBLEM — E87.6 HYPOKALEMIA: Status: RESOLVED | Noted: 2019-05-29 | Resolved: 2019-06-29

## 2019-06-29 PROBLEM — E87.6 HYPOKALEMIA: Status: ACTIVE | Noted: 2019-06-29

## 2019-06-29 PROBLEM — E11.21 WELL CONTROLLED TYPE 2 DIABETES MELLITUS WITH NEPHROPATHY (HCC): Chronic | Status: ACTIVE | Noted: 2017-11-15

## 2019-06-29 PROBLEM — E66.01 SEVERE OBESITY (BMI 35.0-39.9) WITH COMORBIDITY (HCC): Chronic | Status: ACTIVE | Noted: 2018-03-29

## 2019-06-29 PROBLEM — G62.9 POLYNEUROPATHY: Status: RESOLVED | Noted: 2017-12-01 | Resolved: 2019-06-29

## 2019-06-29 PROBLEM — R53.1 WEAKNESS: Status: RESOLVED | Noted: 2017-12-01 | Resolved: 2019-06-29

## 2019-06-29 PROBLEM — G20 PARKINSON DISEASE (HCC): Chronic | Status: ACTIVE | Noted: 2019-06-19

## 2019-06-29 LAB
ALBUMIN SERPL-MCNC: 3.6 G/DL (ref 3.2–4.6)
ALBUMIN/GLOB SERPL: 0.8 {RATIO} (ref 1.2–3.5)
ALP SERPL-CCNC: 140 U/L (ref 50–136)
ALT SERPL-CCNC: 9 U/L (ref 12–65)
ANION GAP SERPL CALC-SCNC: 17 MMOL/L (ref 7–16)
APPEARANCE UR: ABNORMAL
AST SERPL-CCNC: 24 U/L (ref 15–37)
BACTERIA URNS QL MICRO: ABNORMAL /HPF
BASOPHILS # BLD: 0.1 K/UL (ref 0–0.2)
BASOPHILS NFR BLD: 0 % (ref 0–2)
BILIRUB SERPL-MCNC: 0.5 MG/DL (ref 0.2–1.1)
BILIRUB UR QL: ABNORMAL
BUN SERPL-MCNC: 169 MG/DL (ref 8–23)
CALCIUM SERPL-MCNC: 8 MG/DL (ref 8.3–10.4)
CHLORIDE SERPL-SCNC: 98 MMOL/L (ref 98–107)
CO2 SERPL-SCNC: 19 MMOL/L (ref 21–32)
COLOR UR: ABNORMAL
CREAT SERPL-MCNC: 9.29 MG/DL (ref 0.6–1)
DIFFERENTIAL METHOD BLD: ABNORMAL
EOSINOPHIL # BLD: 0.1 K/UL (ref 0–0.8)
EOSINOPHIL NFR BLD: 0 % (ref 0.5–7.8)
EPI CELLS #/AREA URNS HPF: ABNORMAL /HPF
ERYTHROCYTE [DISTWIDTH] IN BLOOD BY AUTOMATED COUNT: 13.8 % (ref 11.9–14.6)
GLOBULIN SER CALC-MCNC: 4.4 G/DL (ref 2.3–3.5)
GLUCOSE SERPL-MCNC: 69 MG/DL (ref 65–100)
GLUCOSE UR STRIP.AUTO-MCNC: NEGATIVE MG/DL
HCT VFR BLD AUTO: 33.4 % (ref 35.8–46.3)
HGB BLD-MCNC: 11.3 G/DL (ref 11.7–15.4)
HGB UR QL STRIP: ABNORMAL
IMM GRANULOCYTES # BLD AUTO: 0.3 K/UL (ref 0–0.5)
IMM GRANULOCYTES NFR BLD AUTO: 2 % (ref 0–5)
KETONES UR QL STRIP.AUTO: ABNORMAL MG/DL
LACTATE BLD-SCNC: 1.03 MMOL/L (ref 0.5–1.9)
LEUKOCYTE ESTERASE UR QL STRIP.AUTO: ABNORMAL
LYMPHOCYTES # BLD: 2.3 K/UL (ref 0.5–4.6)
LYMPHOCYTES NFR BLD: 12 % (ref 13–44)
MAGNESIUM SERPL-MCNC: 2.3 MG/DL (ref 1.8–2.4)
MCH RBC QN AUTO: 31 PG (ref 26.1–32.9)
MCHC RBC AUTO-ENTMCNC: 33.8 G/DL (ref 31.4–35)
MCV RBC AUTO: 91.5 FL (ref 79.6–97.8)
MONOCYTES # BLD: 0.8 K/UL (ref 0.1–1.3)
MONOCYTES NFR BLD: 4 % (ref 4–12)
NEUTS SEG # BLD: 14.7 K/UL (ref 1.7–8.2)
NEUTS SEG NFR BLD: 81 % (ref 43–78)
NITRITE UR QL STRIP.AUTO: POSITIVE
NRBC # BLD: 0 K/UL (ref 0–0.2)
PH UR STRIP: 6 [PH] (ref 5–9)
PLATELET # BLD AUTO: 332 K/UL (ref 150–450)
PMV BLD AUTO: 12.4 FL (ref 9.4–12.3)
POTASSIUM SERPL-SCNC: 2.9 MMOL/L (ref 3.5–5.1)
PROT SERPL-MCNC: 8 G/DL (ref 6.3–8.2)
PROT UR STRIP-MCNC: 300 MG/DL
RBC # BLD AUTO: 3.65 M/UL (ref 4.05–5.2)
RBC #/AREA URNS HPF: ABNORMAL /HPF
SODIUM SERPL-SCNC: 134 MMOL/L (ref 136–145)
SP GR UR REFRACTOMETRY: 1.02 (ref 1–1.02)
TROPONIN I BLD-MCNC: 0.04 NG/ML (ref 0.02–0.05)
UROBILINOGEN UR QL STRIP.AUTO: 1 EU/DL (ref 0.2–1)
WBC # BLD AUTO: 18.2 K/UL (ref 4.3–11.1)
WBC URNS QL MICRO: >100 /HPF

## 2019-06-29 PROCEDURE — 85025 COMPLETE CBC W/AUTO DIFF WBC: CPT

## 2019-06-29 PROCEDURE — 83735 ASSAY OF MAGNESIUM: CPT

## 2019-06-29 PROCEDURE — 87186 SC STD MICRODIL/AGAR DIL: CPT

## 2019-06-29 PROCEDURE — 93005 ELECTROCARDIOGRAM TRACING: CPT | Performed by: EMERGENCY MEDICINE

## 2019-06-29 PROCEDURE — 83605 ASSAY OF LACTIC ACID: CPT

## 2019-06-29 PROCEDURE — 99285 EMERGENCY DEPT VISIT HI MDM: CPT | Performed by: EMERGENCY MEDICINE

## 2019-06-29 PROCEDURE — 87088 URINE BACTERIA CULTURE: CPT

## 2019-06-29 PROCEDURE — 84484 ASSAY OF TROPONIN QUANT: CPT

## 2019-06-29 PROCEDURE — 80053 COMPREHEN METABOLIC PANEL: CPT

## 2019-06-29 PROCEDURE — 65270000029 HC RM PRIVATE

## 2019-06-29 PROCEDURE — 87045 FECES CULTURE AEROBIC BACT: CPT

## 2019-06-29 PROCEDURE — 87493 C DIFF AMPLIFIED PROBE: CPT

## 2019-06-29 PROCEDURE — 81001 URINALYSIS AUTO W/SCOPE: CPT

## 2019-06-29 PROCEDURE — 74022 RADEX COMPL AQT ABD SERIES: CPT

## 2019-06-29 PROCEDURE — 74011250636 HC RX REV CODE- 250/636: Performed by: EMERGENCY MEDICINE

## 2019-06-29 PROCEDURE — 70450 CT HEAD/BRAIN W/O DYE: CPT

## 2019-06-29 PROCEDURE — 96374 THER/PROPH/DIAG INJ IV PUSH: CPT | Performed by: EMERGENCY MEDICINE

## 2019-06-29 PROCEDURE — 87086 URINE CULTURE/COLONY COUNT: CPT

## 2019-06-29 RX ORDER — POTASSIUM CHLORIDE 20 MEQ/1
40 TABLET, EXTENDED RELEASE ORAL
Status: COMPLETED | OUTPATIENT
Start: 2019-06-29 | End: 2019-06-30

## 2019-06-29 RX ORDER — HYOSCYAMINE SULFATE 0.12 MG/1
0.12 TABLET SUBLINGUAL
Status: COMPLETED | OUTPATIENT
Start: 2019-06-29 | End: 2019-06-30

## 2019-06-29 RX ORDER — ONDANSETRON 2 MG/ML
8 INJECTION INTRAMUSCULAR; INTRAVENOUS ONCE
Status: COMPLETED | OUTPATIENT
Start: 2019-06-29 | End: 2019-06-29

## 2019-06-29 RX ORDER — SODIUM CHLORIDE, SODIUM LACTATE, POTASSIUM CHLORIDE, CALCIUM CHLORIDE 600; 310; 30; 20 MG/100ML; MG/100ML; MG/100ML; MG/100ML
50 INJECTION, SOLUTION INTRAVENOUS CONTINUOUS
Status: DISCONTINUED | OUTPATIENT
Start: 2019-06-29 | End: 2019-07-06

## 2019-06-29 RX ADMIN — SODIUM CHLORIDE 1000 ML: 900 INJECTION, SOLUTION INTRAVENOUS at 22:42

## 2019-06-29 RX ADMIN — ONDANSETRON 8 MG: 2 INJECTION INTRAMUSCULAR; INTRAVENOUS at 22:43

## 2019-06-30 PROBLEM — A04.72 C. DIFFICILE DIARRHEA: Status: ACTIVE | Noted: 2019-06-30

## 2019-06-30 LAB
ANION GAP SERPL CALC-SCNC: 17 MMOL/L (ref 7–16)
ATRIAL RATE: 119 BPM
BACTERIA SPEC CULT: ABNORMAL
BACTERIA SPEC CULT: ABNORMAL
BASOPHILS # BLD: 0.1 K/UL (ref 0–0.2)
BASOPHILS NFR BLD: 1 % (ref 0–2)
BUN SERPL-MCNC: 161 MG/DL (ref 8–23)
CALCIUM SERPL-MCNC: 7.3 MG/DL (ref 8.3–10.4)
CALCULATED P AXIS, ECG09: 75 DEGREES
CALCULATED R AXIS, ECG10: -7 DEGREES
CALCULATED T AXIS, ECG11: 129 DEGREES
CHLORIDE SERPL-SCNC: 103 MMOL/L (ref 98–107)
CO2 SERPL-SCNC: 16 MMOL/L (ref 21–32)
CREAT SERPL-MCNC: 9.08 MG/DL (ref 0.6–1)
DIAGNOSIS, 93000: NORMAL
DIFFERENTIAL METHOD BLD: ABNORMAL
EOSINOPHIL # BLD: 0 K/UL (ref 0–0.8)
EOSINOPHIL NFR BLD: 0 % (ref 0.5–7.8)
ERYTHROCYTE [DISTWIDTH] IN BLOOD BY AUTOMATED COUNT: 14 % (ref 11.9–14.6)
GLUCOSE BLD STRIP.AUTO-MCNC: 112 MG/DL (ref 65–100)
GLUCOSE BLD STRIP.AUTO-MCNC: 185 MG/DL (ref 65–100)
GLUCOSE BLD STRIP.AUTO-MCNC: 62 MG/DL (ref 65–100)
GLUCOSE BLD STRIP.AUTO-MCNC: 64 MG/DL (ref 65–100)
GLUCOSE BLD STRIP.AUTO-MCNC: 71 MG/DL (ref 65–100)
GLUCOSE BLD STRIP.AUTO-MCNC: 78 MG/DL (ref 65–100)
GLUCOSE BLD STRIP.AUTO-MCNC: 81 MG/DL (ref 65–100)
GLUCOSE SERPL-MCNC: 72 MG/DL (ref 65–100)
HCT VFR BLD AUTO: 32.1 % (ref 35.8–46.3)
HGB BLD-MCNC: 10.8 G/DL (ref 11.7–15.4)
IMM GRANULOCYTES # BLD AUTO: 0.5 K/UL (ref 0–0.5)
IMM GRANULOCYTES NFR BLD AUTO: 3 % (ref 0–5)
LYMPHOCYTES # BLD: 1.9 K/UL (ref 0.5–4.6)
LYMPHOCYTES NFR BLD: 12 % (ref 13–44)
MCH RBC QN AUTO: 31.1 PG (ref 26.1–32.9)
MCHC RBC AUTO-ENTMCNC: 33.6 G/DL (ref 31.4–35)
MCV RBC AUTO: 92.5 FL (ref 79.6–97.8)
MONOCYTES # BLD: 0.8 K/UL (ref 0.1–1.3)
MONOCYTES NFR BLD: 5 % (ref 4–12)
NEUTS SEG # BLD: 12.8 K/UL (ref 1.7–8.2)
NEUTS SEG NFR BLD: 79 % (ref 43–78)
NRBC # BLD: 0 K/UL (ref 0–0.2)
P-R INTERVAL, ECG05: 184 MS
PLATELET # BLD AUTO: 238 K/UL (ref 150–450)
PMV BLD AUTO: 12.6 FL (ref 9.4–12.3)
POTASSIUM SERPL-SCNC: 3.5 MMOL/L (ref 3.5–5.1)
Q-T INTERVAL, ECG07: 394 MS
QRS DURATION, ECG06: 130 MS
QTC CALCULATION (BEZET), ECG08: 457 MS
RBC # BLD AUTO: 3.47 M/UL (ref 4.05–5.2)
SERVICE CMNT-IMP: ABNORMAL
SODIUM SERPL-SCNC: 136 MMOL/L (ref 136–145)
VENTRICULAR RATE, ECG03: 81 BPM
WBC # BLD AUTO: 16.1 K/UL (ref 4.3–11.1)

## 2019-06-30 PROCEDURE — 77030020256 HC SOL INJ NACL 0.9%  500ML

## 2019-06-30 PROCEDURE — 74011250637 HC RX REV CODE- 250/637: Performed by: EMERGENCY MEDICINE

## 2019-06-30 PROCEDURE — 74011000302 HC RX REV CODE- 302: Performed by: INTERNAL MEDICINE

## 2019-06-30 PROCEDURE — 74011250637 HC RX REV CODE- 250/637: Performed by: FAMILY MEDICINE

## 2019-06-30 PROCEDURE — 74011250636 HC RX REV CODE- 250/636: Performed by: INTERNAL MEDICINE

## 2019-06-30 PROCEDURE — 74011636637 HC RX REV CODE- 636/637: Performed by: INTERNAL MEDICINE

## 2019-06-30 PROCEDURE — 82962 GLUCOSE BLOOD TEST: CPT

## 2019-06-30 PROCEDURE — 74011250636 HC RX REV CODE- 250/636: Performed by: EMERGENCY MEDICINE

## 2019-06-30 PROCEDURE — 74011250637 HC RX REV CODE- 250/637: Performed by: INTERNAL MEDICINE

## 2019-06-30 PROCEDURE — 36415 COLL VENOUS BLD VENIPUNCTURE: CPT

## 2019-06-30 PROCEDURE — 74011000258 HC RX REV CODE- 258: Performed by: FAMILY MEDICINE

## 2019-06-30 PROCEDURE — 87040 BLOOD CULTURE FOR BACTERIA: CPT

## 2019-06-30 PROCEDURE — 85025 COMPLETE CBC W/AUTO DIFF WBC: CPT

## 2019-06-30 PROCEDURE — 74011250636 HC RX REV CODE- 250/636: Performed by: FAMILY MEDICINE

## 2019-06-30 PROCEDURE — 65270000029 HC RM PRIVATE

## 2019-06-30 PROCEDURE — 86580 TB INTRADERMAL TEST: CPT | Performed by: INTERNAL MEDICINE

## 2019-06-30 PROCEDURE — 80048 BASIC METABOLIC PNL TOTAL CA: CPT

## 2019-06-30 PROCEDURE — 74011000258 HC RX REV CODE- 258: Performed by: EMERGENCY MEDICINE

## 2019-06-30 PROCEDURE — 84520 ASSAY OF UREA NITROGEN: CPT

## 2019-06-30 RX ORDER — CARVEDILOL 12.5 MG/1
25 TABLET ORAL 2 TIMES DAILY WITH MEALS
Status: DISCONTINUED | OUTPATIENT
Start: 2019-06-30 | End: 2019-06-30

## 2019-06-30 RX ORDER — METRONIDAZOLE 500 MG/1
500 TABLET ORAL EVERY 8 HOURS
Status: DISCONTINUED | OUTPATIENT
Start: 2019-06-30 | End: 2019-06-30

## 2019-06-30 RX ORDER — LEVOTHYROXINE SODIUM 50 UG/1
25 TABLET ORAL
Status: DISCONTINUED | OUTPATIENT
Start: 2019-06-30 | End: 2019-07-12 | Stop reason: HOSPADM

## 2019-06-30 RX ORDER — INSULIN LISPRO 100 [IU]/ML
12 INJECTION, SOLUTION INTRAVENOUS; SUBCUTANEOUS
Status: DISCONTINUED | OUTPATIENT
Start: 2019-06-30 | End: 2019-06-30

## 2019-06-30 RX ORDER — POTASSIUM CHLORIDE 20 MEQ/1
20 TABLET, EXTENDED RELEASE ORAL
Status: DISCONTINUED | OUTPATIENT
Start: 2019-06-30 | End: 2019-07-01

## 2019-06-30 RX ORDER — FEBUXOSTAT 80 MG/1
80 TABLET, FILM COATED ORAL DAILY
Status: DISCONTINUED | OUTPATIENT
Start: 2019-06-30 | End: 2019-06-30

## 2019-06-30 RX ORDER — INSULIN LISPRO 100 [IU]/ML
INJECTION, SOLUTION INTRAVENOUS; SUBCUTANEOUS
Status: DISCONTINUED | OUTPATIENT
Start: 2019-06-30 | End: 2019-07-12 | Stop reason: HOSPADM

## 2019-06-30 RX ORDER — CARBIDOPA AND LEVODOPA 25; 100 MG/1; MG/1
1 TABLET ORAL 3 TIMES DAILY
Status: DISCONTINUED | OUTPATIENT
Start: 2019-06-30 | End: 2019-07-12 | Stop reason: HOSPADM

## 2019-06-30 RX ORDER — METRONIDAZOLE 500 MG/100ML
500 INJECTION, SOLUTION INTRAVENOUS EVERY 8 HOURS
Status: DISCONTINUED | OUTPATIENT
Start: 2019-06-30 | End: 2019-07-09

## 2019-06-30 RX ORDER — LISINOPRIL 5 MG/1
10 TABLET ORAL DAILY
Status: DISCONTINUED | OUTPATIENT
Start: 2019-06-30 | End: 2019-06-30

## 2019-06-30 RX ORDER — BISACODYL 5 MG
5 TABLET, DELAYED RELEASE (ENTERIC COATED) ORAL DAILY PRN
Status: DISCONTINUED | OUTPATIENT
Start: 2019-06-30 | End: 2019-07-12 | Stop reason: HOSPADM

## 2019-06-30 RX ORDER — EZETIMIBE 10 MG/1
10 TABLET ORAL DAILY
Status: DISCONTINUED | OUTPATIENT
Start: 2019-06-30 | End: 2019-06-30

## 2019-06-30 RX ORDER — POTASSIUM CHLORIDE 20 MEQ/1
20 TABLET, EXTENDED RELEASE ORAL DAILY
Status: DISCONTINUED | OUTPATIENT
Start: 2019-06-30 | End: 2019-06-30

## 2019-06-30 RX ORDER — SODIUM CHLORIDE 0.9 % (FLUSH) 0.9 %
5-40 SYRINGE (ML) INJECTION AS NEEDED
Status: DISCONTINUED | OUTPATIENT
Start: 2019-06-30 | End: 2019-07-12 | Stop reason: HOSPADM

## 2019-06-30 RX ORDER — ACETAMINOPHEN 325 MG/1
650 TABLET ORAL
Status: DISCONTINUED | OUTPATIENT
Start: 2019-06-30 | End: 2019-07-12 | Stop reason: HOSPADM

## 2019-06-30 RX ORDER — INSULIN GLARGINE 100 [IU]/ML
67 INJECTION, SOLUTION SUBCUTANEOUS
Status: DISCONTINUED | OUTPATIENT
Start: 2019-06-30 | End: 2019-06-30

## 2019-06-30 RX ORDER — SODIUM CHLORIDE 0.9 % (FLUSH) 0.9 %
5-40 SYRINGE (ML) INJECTION EVERY 8 HOURS
Status: DISCONTINUED | OUTPATIENT
Start: 2019-06-30 | End: 2019-07-12 | Stop reason: HOSPADM

## 2019-06-30 RX ORDER — PREGABALIN 50 MG/1
50 CAPSULE ORAL 2 TIMES DAILY
Status: DISCONTINUED | OUTPATIENT
Start: 2019-06-30 | End: 2019-06-30

## 2019-06-30 RX ADMIN — METRONIDAZOLE 500 MG: 500 TABLET, FILM COATED ORAL at 02:48

## 2019-06-30 RX ADMIN — PIPERACILLIN SODIUM,TAZOBACTAM SODIUM 2.25 G: 2; .25 INJECTION, POWDER, FOR SOLUTION INTRAVENOUS at 00:44

## 2019-06-30 RX ADMIN — Medication 10 ML: at 05:09

## 2019-06-30 RX ADMIN — CARBIDOPA AND LEVODOPA 1 TABLET: 25; 100 TABLET ORAL at 21:23

## 2019-06-30 RX ADMIN — METRONIDAZOLE 500 MG: 500 INJECTION, SOLUTION INTRAVENOUS at 22:54

## 2019-06-30 RX ADMIN — VANCOMYCIN HYDROCHLORIDE 125 MG: 1 INJECTION, POWDER, LYOPHILIZED, FOR SOLUTION INTRAVENOUS at 11:28

## 2019-06-30 RX ADMIN — VANCOMYCIN HYDROCHLORIDE 125 MG: 1 INJECTION, POWDER, LYOPHILIZED, FOR SOLUTION INTRAVENOUS at 17:12

## 2019-06-30 RX ADMIN — Medication 10 ML: at 21:40

## 2019-06-30 RX ADMIN — METRONIDAZOLE 500 MG: 500 TABLET, FILM COATED ORAL at 05:11

## 2019-06-30 RX ADMIN — METRONIDAZOLE 500 MG: 500 INJECTION, SOLUTION INTRAVENOUS at 15:55

## 2019-06-30 RX ADMIN — POTASSIUM CHLORIDE 40 MEQ: 20 TABLET, EXTENDED RELEASE ORAL at 00:05

## 2019-06-30 RX ADMIN — CEFTRIAXONE 1 G: 1 INJECTION, POWDER, FOR SOLUTION INTRAMUSCULAR; INTRAVENOUS at 02:48

## 2019-06-30 RX ADMIN — HYOSCYAMINE SULFATE 0.12 MG: 0.12 TABLET ORAL at 00:05

## 2019-06-30 RX ADMIN — INSULIN LISPRO 2 UNITS: 100 INJECTION, SOLUTION INTRAVENOUS; SUBCUTANEOUS at 21:23

## 2019-06-30 RX ADMIN — SODIUM CHLORIDE, SODIUM LACTATE, POTASSIUM CHLORIDE, AND CALCIUM CHLORIDE 150 ML/HR: 600; 310; 30; 20 INJECTION, SOLUTION INTRAVENOUS at 02:49

## 2019-06-30 RX ADMIN — LEVOTHYROXINE SODIUM 25 MCG: 50 TABLET ORAL at 09:26

## 2019-06-30 RX ADMIN — TUBERCULIN PURIFIED PROTEIN DERIVATIVE 5 UNITS: 5 INJECTION, SOLUTION INTRADERMAL at 11:35

## 2019-06-30 RX ADMIN — Medication 10 ML: at 14:00

## 2019-06-30 RX ADMIN — ACETAMINOPHEN 650 MG: 325 TABLET, FILM COATED ORAL at 11:28

## 2019-06-30 RX ADMIN — APIXABAN 2.5 MG: 2.5 TABLET, FILM COATED ORAL at 17:12

## 2019-06-30 RX ADMIN — SODIUM CHLORIDE, SODIUM LACTATE, POTASSIUM CHLORIDE, AND CALCIUM CHLORIDE 125 ML/HR: 600; 310; 30; 20 INJECTION, SOLUTION INTRAVENOUS at 17:17

## 2019-06-30 RX ADMIN — SODIUM CHLORIDE 500 ML: 900 INJECTION, SOLUTION INTRAVENOUS at 11:28

## 2019-06-30 RX ADMIN — CARBIDOPA AND LEVODOPA 1 TABLET: 25; 100 TABLET ORAL at 09:28

## 2019-06-30 RX ADMIN — APIXABAN 2.5 MG: 2.5 TABLET, FILM COATED ORAL at 09:27

## 2019-06-30 RX ADMIN — Medication 10 ML: at 02:49

## 2019-06-30 RX ADMIN — CARBIDOPA AND LEVODOPA 1 TABLET: 25; 100 TABLET ORAL at 17:12

## 2019-06-30 NOTE — CONSULTS
[facility] CONSULT NOTE Admission Date: 
6/29/2019 Admission Diagnosis: 
Acute on chronic renal failure (HCC) [N17.9, N18.9] Consulting physician:  Waldemar Agustin Reason for consult:  ANTONIO on CKD V Subjective:  
History of Present Illness: Ms. Rollo Bamberger is a 77 yo F with a PMH of CKD V not on dialysis, well known to me after seeing her in clinic approximately 2 weeks ago. Multiple admissions for both volume overload and dehydration. Patient has had N/V and diarrhea for the past week. Brought in by EMS and found to be be hypotensive with an ANTONIO (BUN/Cr  169/9.29). Of note, patient weighed 260lbs in clinic two weeks ago and was 250lbs in th ER last night. Found to have c diff and started Flagyl. This morning, she is feeling much better, still has some abdominal pain, but was tolerating breakfast.   
 
Past Medical History:  
Diagnosis Date  Adverse effect of anesthesia   
 difficult awakening  AF (atrial fibrillation) ([facility] [address] 11/20/2011  Arthritis 11/18/2011  
 generalized  CAD (coronary artery disease) 2011 CABG x 4  
 Cervical disc disease Steroid injections  Chronic kidney disease  Chronic pain   
 back  DJD (degenerative joint disease)  Drainage from wound 10/3/2014  Full dentures  Gout   
 managed with medication  Hemorrhoid 11/5/2013  
 [place] (hard of hearing)  Hyperlipemia 11/10/2011  
 managed with medication  Hypertension   
 controlled with meds  Hypertriglyceridemia   
 managed with medication  Hypothyroidism   
 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   
 legs and arms, managed with medication  Obesity (BMI 30-39.9) 10/2/14 BMI 36.3  
 PAD (peripheral artery disease) ([facility] [address]  PCI (pneumatosis cystoides intestinalis) 11/5/2013  Pleural effusion, bilateral 11/21/2011  Postoperative anemia due to acute blood loss 11/21/2011  
 expected  Renal mass 5/14/2016  Rib cage fracture 11/5/2013  
 x 2   
 S/P CABG (coronary artery bypass graft) 11/05/2013 CABG x 4  
 S/P CABG x 3 11/18/2011  Stasis edema of both lower extremities 1/3/2018  Status post-operative repair of hip fracture 09/15/2014  
 left side, repair with hardware  Stroke [facility])   
 left sided weakness residual   
 Type II or unspecified type diabetes mellitus without mention of complication, uncontrolled ([address] 12/16/2013  
 oral and insulin reliant/ Avg / low BS s/s/ @ 70/ last A1c 8.3  Unspecified adverse effect of anesthesia   
 difficult to arouse after general anesthesia Past Surgical History:  
Procedure Laterality Date  CABG, ARTERY-VEIN, FOUR  Oct. 2011  
 CLOSE CYSTOSTOMY    
 exploratory with removal of mass of infection  HX CATARACT REMOVAL Bilateral 11/2012  
 with IOL implants, bilateral  
 HX COLONOSCOPY    
 HX HIP FRACTURE TX Left   
 repair with hardware  HX HYSTERECTOMY  HX LAP CHOLECYSTECTOMY  VASCULAR SURGERY PROCEDURE UNLIST Left 06/07/2017 AVF creation  VASCULAR SURGERY PROCEDURE UNLIST Left 08/10/2017 AVF elevation Current Facility-Administered Medications Medication Dose Route Frequency  apixaban (ELIQUIS) tablet 2.5 mg  2.5 mg Oral BID  lisinopril (PRINIVIL, ZESTRIL) tablet 10 mg  10 mg Oral DAILY  carbidopa-levodopa (SINEMET)  mg per tablet 1 Tab  1 Tab Oral TID  carvedilol (COREG) tablet 25 mg  25 mg Oral BID WITH MEALS  ezetimibe (ZETIA) tablet 10 mg  10 mg Oral DAILY  febuxostat (ULORIC) tablet 80 mg (Patient Supplied)  80 mg Oral DAILY  levothyroxine (SYNTHROID) tablet 25 mcg  25 mcg Oral ACB  potassium chloride (K-DUR, KLOR-CON) SR tablet 20 mEq  20 mEq Oral DAILY PRN  pregabalin (LYRICA) capsule 50 mg  50 mg Oral BID  sodium chloride (NS) flush 5-40 mL  5-40 mL IntraVENous Q8H  
  sodium chloride (NS) flush 5-40 mL  5-40 mL IntraVENous PRN  
 cefTRIAXone (ROCEPHIN) 1 g in 0.9% sodium chloride (MBP/ADV) 50 mL  1 g IntraVENous Q24H  
 acetaminophen (TYLENOL) tablet 650 mg  650 mg Oral Q4H PRN  
 bisacodyl (DULCOLAX) tablet 5 mg  5 mg Oral DAILY PRN  potassium chloride (K-DUR, KLOR-CON) SR tablet 20 mEq  20 mEq Oral DAILY  metroNIDAZOLE (FLAGYL) tablet 500 mg  500 mg Oral Q8H  
 insulin glargine (LANTUS) injection 67 Units  67 Units SubCUTAneous QHS  insulin lispro (HUMALOG) injection 12 Units  12 Units SubCUTAneous TIDAC  lactated Ringers infusion  150 mL/hr IntraVENous CONTINUOUS Allergies Allergen Reactions  Baclofen Other (comments) Psychosis and altered mental status  Lipitor [Atorvastatin] Myalgia Social History Tobacco Use  Smoking status: Never Smoker  Smokeless tobacco: Never Used Substance Use Topics  Alcohol use: No  
  
Family History Problem Relation Age of Onset  Heart Disease Mother  Cancer Mother   
     colon  Diabetes Mother  Cancer Father   
     lung  Cancer Sister   
     breast  
 Breast Cancer Sister 28  Cancer Brother Leukemia  Breast Cancer Maternal Aunt 54 Review of Systems 12pt ROS otherwise negative Objective:  
Vitals:  
 06/30/19 1706 06/30/19 0202 06/30/19 1109 06/30/19 9554 BP: 129/58 (!) 81/51 (!) 89/55 96/59 Pulse: 65 70 76 63 Resp: 18 18 18 18 Temp: 97.5 °F (36.4 °C) 97.5 °F (36.4 °C) 97 °F (36.1 °C) 97.2 °F (36.2 °C) SpO2: 93% 93% 94% 90% Weight:      
Height:      
 
No intake or output data in the 24 hours ending 06/30/19 0903 Physical Exam 
GEN :ill appearing, but alert and oriented HEENT: anicteric sclerae, eomi. Oropharynx without lesions. Neck - supple without JVD, no thyromegaly. No lymphadenopathy. CV - regular rate and rhythm, no murmur, no rub Lung - clear bilaterally, lungs expand symmetrically Abd - soft, mildly tender, bowel sounds present, no hepatosplenomegaly Ext - no clubbing, no cyanosis, no edema Neurologic - nonfocal 
Skin - no rashes, no purpura, no ecchymoses Psychiatric: Normal mood and affect. Data Review:  
Recent Labs  
  06/29/19 2202 WBC 18.2* HGB 11.3* HCT 33.4*  
 Recent Labs  
  06/29/19 2202 * K 2.9*  
CL 98  
CO2 19* * CREA 9.29* GLU 69  
CA 8.0*  
MG 2.3 No results for input(s): PH, PCO2, PO2, PCO2 in the last 72 hours. Problem List:  
 
Patient Active Problem List  
 Diagnosis Date Noted  C. 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  
 HTN (hypertension) 11/10/2011 Assessment and Plan: 
 
1) ANTONIO on CKD V-  Nearing dialysis, but will trial IV fluids first.  Repeat BUN/Cr now to ensure it is downtrending. Holding THOMAS inhibitor. Dialysis initiation if not improving with IV fluids. 2) HTN/Volume- 10lbs down since clinic visit 2 weeks ago. Agree with fluid repletion. 3) Hypokalemia- repelete cautiously. 4) c diff- on flagyl. Feeling better already.  
 
 
Lena Arreaga MD

## 2019-06-30 NOTE — ED TRIAGE NOTES
[facility] with  and daughter form home with c/o vomiting and diarrhea x 1 week. During triage, pt states she fell out of bed about a week ago and has been leaning to the right since, pt was treated at this facility for this issue. Pt c/o HA and neck pain. Pt states she has since been having NVD, cannot keep anything down. Pt states she is end stage renal failure.

## 2019-06-30 NOTE — PROGRESS NOTES
Hospitalist Note Admit Date:  2019  9:55 PM  
Name:  James Silva Age:  78 y.o. 
:  1940 MRN:  [id number] PCP:  Kevon Shirley MD 
Treatment Team: Attending Provider: Nicol Pretty MD; Primary Nurse: Roc Harper; Consulting Provider: Brendon Paris MD; Occupational Therapist: Marya Bear, OTR/L; Utilization Review: Mynor Kelly RN 
 
HPI/Subjective:  
 
Ms. Lawrence To is a 79 yo female with PMH of CKD V no yet on dialysis, AFIB, CAD s/p CABG, HTN, DM2, chronic hypoxic respiratory failure on home O2, sCHF (EF 45-50%)  admitted with diarrhea and ANTONIO on CKD. Stool studies are  cdiff positive. She is receiving hydration and nephrology following. She is receiving oral flagyl. ABD series negative. She additionally has UTI, receiving rocephin. CT head negative. Discharge plans pending 19 some hypoglycemia now resolving, nursing notes hypotension and lethargy, patient has left hip pain with prior fractures, some anorexia, no dyspnea, states loose BM decreased,  
 
 
Objective:  
 
Patient Vitals for the past 24 hrs: 
 Temp Pulse Resp BP SpO2  
19 1106 97.5 °F (36.4 °C) 63 18 99/65 92 % 19 0707 97.2 °F (36.2 °C) 63 18 96/59 90 % 19 0431 97 °F (36.1 °C) 76 18 (!) 89/55 94 % 19 0202 97.5 °F (36.4 °C) 70 18 (!) 81/51 93 % 19 0048 97.5 °F (36.4 °C) 65 18 129/58 93 % 19 2220  68  97/55 96 %  
19 2157 98.1 °F (36.7 °C) 78 20 93/51 92 % Oxygen Therapy O2 Sat (%): 92 % (19 1106) Pulse via Oximetry: 68 beats per minute (19 2220) O2 Device: Room air (19 0048) Intake/Output Summary (Last 24 hours) at 2019 1411 Last data filed at 2019 7257 Gross per 24 hour Intake 360 ml Output  Net 360 ml  
   
*Note that automatically entered I/Os may not be accurate; dependent on patient compliance with collection and accurate  by techs. General:    Elderly, lethargic, no distress, verbal 
CV:   RRR. No murmur, rub, or gallop. No edema Lungs:   CTAB. No wheezing, rhonchi, or rales. Abdomen:   Soft, nontender, nondistended. Present BS Extremities: Warm and dry Skin:     No rashes or jaundice. Neuro:  No gross focal deficits Data Review: 
I have reviewed all labs, meds, and studies from the last 24 hours: 
 
Recent Results (from the past 24 hour(s)) CBC WITH AUTOMATED DIFF Collection Time: 06/29/19 10:02 PM  
Result Value Ref Range WBC 18.2 (H) 4.3 - 11.1 K/uL  
 RBC 3.65 (L) 4.05 - 5.2 M/uL  
 HGB 11.3 (L) 11.7 - 15.4 g/dL HCT 33.4 (L) 35.8 - 46.3 % MCV 91.5 79.6 - 97.8 FL  
 MCH 31.0 26.1 - 32.9 PG  
 MCHC 33.8 31.4 - 35.0 g/dL  
 RDW 13.8 11.9 - 14.6 % PLATELET 609 447 - 412 K/uL MPV 12.4 (H) 9.4 - 12.3 FL ABSOLUTE NRBC 0.00 0.0 - 0.2 K/uL  
 DF AUTOMATED NEUTROPHILS 81 (H) 43 - 78 % LYMPHOCYTES 12 (L) 13 - 44 % MONOCYTES 4 4.0 - 12.0 % EOSINOPHILS 0 (L) 0.5 - 7.8 % BASOPHILS 0 0.0 - 2.0 % IMMATURE GRANULOCYTES 2 0.0 - 5.0 %  
 ABS. NEUTROPHILS 14.7 (H) 1.7 - 8.2 K/UL  
 ABS. LYMPHOCYTES 2.3 0.5 - 4.6 K/UL  
 ABS. MONOCYTES 0.8 0.1 - 1.3 K/UL  
 ABS. EOSINOPHILS 0.1 0.0 - 0.8 K/UL  
 ABS. BASOPHILS 0.1 0.0 - 0.2 K/UL  
 ABS. IMM. GRANS. 0.3 0.0 - 0.5 K/UL METABOLIC PANEL, COMPREHENSIVE Collection Time: 06/29/19 10:02 PM  
Result Value Ref Range Sodium 134 (L) 136 - 145 mmol/L Potassium 2.9 (LL) 3.5 - 5.1 mmol/L Chloride 98 98 - 107 mmol/L  
 CO2 19 (L) 21 - 32 mmol/L Anion gap 17 (H) 7 - 16 mmol/L Glucose 69 65 - 100 mg/dL  (H) 8 - 23 MG/DL Creatinine 9.29 (H) 0.6 - 1.0 MG/DL  
 GFR est AA 5 (L) >60 ml/min/1.73m2 GFR est non-AA 4 (L) >60 ml/min/1.73m2 Calcium 8.0 (L) 8.3 - 10.4 MG/DL Bilirubin, total 0.5 0.2 - 1.1 MG/DL  
 ALT (SGPT) 9 (L) 12 - 65 U/L  
 AST (SGOT) 24 15 - 37 U/L Alk.  phosphatase 140 (H) 50 - 136 U/L  
 Protein, total 8.0 6.3 - 8.2 g/dL Albumin 3.6 3.2 - 4.6 g/dL Globulin 4.4 (H) 2.3 - 3.5 g/dL A-G Ratio 0.8 (L) 1.2 - 3.5 MAGNESIUM Collection Time: 06/29/19 10:02 PM  
Result Value Ref Range Magnesium 2.3 1.8 - 2.4 mg/dL POC TROPONIN-I Collection Time: 06/29/19 10:05 PM  
Result Value Ref Range Troponin-I (POC) 0.04 0.02 - 0.05 ng/ml POC LACTIC ACID Collection Time: 06/29/19 10:08 PM  
Result Value Ref Range Lactic Acid (POC) 1.03 0.5 - 1.9 mmol/L  
EKG, 12 LEAD, INITIAL Collection Time: 06/29/19 10:23 PM  
Result Value Ref Range Ventricular Rate 81 BPM  
 Atrial Rate 119 BPM  
 P-R Interval 184 ms QRS Duration 130 ms  
 Q-T Interval 394 ms QTC Calculation (Bezet) 457 ms Calculated P Axis 75 degrees Calculated R Axis -7 degrees Calculated T Axis 129 degrees Diagnosis    
  !! AGE AND GENDER SPECIFIC ECG ANALYSIS !! Normal sinus rhythm with grouped PAC's Left ventricular hypertrophy with QRS widening and repolarization abnormality TWI's high lateral, consider ischemia Confirmed by Kostas Ramirez MD (), Matty Bal ([id number]) on 6/30/2019 9:33:43 AM 
  
CULTURE, STOOL Collection Time: 06/29/19 10:59 PM  
Result Value Ref Range Special Requests: NO SPECIAL REQUESTS Culture result:     
  NO GROWTH AFTER SHORT PERIOD OF INCUBATION. FURTHER RESULTS TO FOLLOW AFTER OVERNIGHT INCUBATION. C. DIFFICILE/EPI PCR Collection Time: 06/29/19 11:06 PM  
Result Value Ref Range Special Requests: NO SPECIAL REQUESTS Culture result: Toxigenic C Diff POS/027-NAP1-BI PRESUMPTIVE POS (A) Culture result: RESULTS VERIFIED, PHONED TO AND READ BACK BY 
DR. Epi Bertrand @ 0018 ON [id number] BY [facility] URINALYSIS W/ RFLX MICROSCOPIC Collection Time: 06/29/19 11:08 PM  
Result Value Ref Range Color RED Appearance TURBID Specific gravity 1.017 1.001 - 1.023    
 pH (UA) 6.0 5.0 - 9.0 Protein 300 (A) NEG mg/dL Glucose NEGATIVE  mg/dL Ketone TRACE (A) NEG mg/dL Bilirubin SMALL (A) NEG Blood LARGE (A) NEG Urobilinogen 1.0 0.2 - 1.0 EU/dL Nitrites POSITIVE (A) NEG Leukocyte Esterase LARGE (A) NEG    
 WBC >100 0 /hpf  
 RBC 10-20 0 /hpf Epithelial cells 20-50 0 /hpf Bacteria 4+ (H) 0 /hpf CULTURE, URINE Collection Time: 06/29/19 11:08 PM  
Result Value Ref Range Special Requests: NO SPECIAL REQUESTS Culture result:     
  NO GROWTH AFTER SHORT PERIOD OF INCUBATION. FURTHER RESULTS TO FOLLOW AFTER OVERNIGHT INCUBATION. GLUCOSE, POC Collection Time: 06/30/19  7:05 AM  
Result Value Ref Range Glucose (POC) 62 (L) 65 - 100 mg/dL GLUCOSE, POC Collection Time: 06/30/19  8:55 AM  
Result Value Ref Range Glucose (POC) 64 (L) 65 - 100 mg/dL GLUCOSE, POC Collection Time: 06/30/19  9:29 AM  
Result Value Ref Range Glucose (POC) 71 65 - 100 mg/dL CBC WITH AUTOMATED DIFF Collection Time: 06/30/19  9:43 AM  
Result Value Ref Range WBC 16.1 (H) 4.3 - 11.1 K/uL  
 RBC 3.47 (L) 4.05 - 5.2 M/uL  
 HGB 10.8 (L) 11.7 - 15.4 g/dL HCT 32.1 (L) 35.8 - 46.3 % MCV 92.5 79.6 - 97.8 FL  
 MCH 31.1 26.1 - 32.9 PG  
 MCHC 33.6 31.4 - 35.0 g/dL  
 RDW 14.0 11.9 - 14.6 % PLATELET 064 517 - 976 K/uL MPV 12.6 (H) 9.4 - 12.3 FL ABSOLUTE NRBC 0.00 0.0 - 0.2 K/uL  
 DF AUTOMATED NEUTROPHILS 79 (H) 43 - 78 % LYMPHOCYTES 12 (L) 13 - 44 % MONOCYTES 5 4.0 - 12.0 % EOSINOPHILS 0 (L) 0.5 - 7.8 % BASOPHILS 1 0.0 - 2.0 % IMMATURE GRANULOCYTES 3 0.0 - 5.0 %  
 ABS. NEUTROPHILS 12.8 (H) 1.7 - 8.2 K/UL  
 ABS. LYMPHOCYTES 1.9 0.5 - 4.6 K/UL  
 ABS. MONOCYTES 0.8 0.1 - 1.3 K/UL  
 ABS. EOSINOPHILS 0.0 0.0 - 0.8 K/UL  
 ABS. BASOPHILS 0.1 0.0 - 0.2 K/UL  
 ABS. IMM. GRANS. 0.5 0.0 - 0.5 K/UL METABOLIC PANEL, BASIC Collection Time: 06/30/19  9:43 AM  
Result Value Ref Range Sodium 136 136 - 145 mmol/L Potassium 3.5 3.5 - 5.1 mmol/L  Chloride 103 98 - 107 mmol/L  
 CO2 16 (L) 21 - 32 mmol/L Anion gap 17 (H) 7 - 16 mmol/L Glucose 72 65 - 100 mg/dL Creatinine 9.08 (H) 0.6 - 1.0 MG/DL  
 GFR est AA 5 (L) >60 ml/min/1.73m2 GFR est non-AA 4 (L) >60 ml/min/1.73m2 Calcium 7.3 (L) 8.3 - 10.4 MG/DL  
GLUCOSE, POC Collection Time: 06/30/19  9:59 AM  
Result Value Ref Range Glucose (POC) 81 65 - 100 mg/dL GLUCOSE, POC Collection Time: 06/30/19 11:09 AM  
Result Value Ref Range Glucose (POC) 78 65 - 100 mg/dL BUN Collection Time: 06/30/19 11:27 AM  
Result Value Ref Range  (H) 8 - 23 MG/DL All Micro Results Procedure Component Value Units Date/Time CULTURE, BLOOD [[id number]] Collected:  06/30/19 0840 Order Status:  Completed Specimen:  Blood Updated:  06/30/19 1048 CULTURE, URINE [[id number]] Collected:  06/29/19 2308 Order Status:  Completed Specimen:  Cath Urine Updated:  06/30/19 0831 Special Requests: NO SPECIAL REQUESTS Culture result:    
  NO GROWTH AFTER SHORT PERIOD OF INCUBATION. FURTHER RESULTS TO FOLLOW AFTER OVERNIGHT INCUBATION. Ermelinda Moss [[id number]] Collected:  06/29/19 2259 Order Status:  Completed Specimen:  Stool Updated:  06/30/19 3555 Special Requests: NO SPECIAL REQUESTS Culture result:    
  NO GROWTH AFTER SHORT PERIOD OF INCUBATION. FURTHER RESULTS TO FOLLOW AFTER OVERNIGHT INCUBATION. CULTURE, BLOOD [[id number]] Collected:  06/30/19 0041 Order Status:  Completed Specimen:  Whole Blood Updated:  06/30/19 0107 C. DIFFICILE/EPI PCR [[id number]]  (Abnormal) Collected:  06/29/19 2306 Order Status:  Completed Specimen:  Stool Updated:  06/30/19 0021 Special Requests: NO SPECIAL REQUESTS Culture result: Toxigenic C Diff POS/027-NAP1-BI PRESUMPTIVE POS RESULTS VERIFIED, PHONED TO AND READ BACK BY 
DR. Tamra Castellano @ 0018 ON [id number] BY [facility] No results found for this visit on 06/29/19. Current Meds: Current Facility-Administered Medications Medication Dose Route Frequency  apixaban (ELIQUIS) tablet 2.5 mg  2.5 mg Oral BID  carbidopa-levodopa (SINEMET)  mg per tablet 1 Tab  1 Tab Oral TID  levothyroxine (SYNTHROID) tablet 25 mcg  25 mcg Oral ACB  potassium chloride (K-DUR, KLOR-CON) SR tablet 20 mEq  20 mEq Oral DAILY PRN  
 sodium chloride (NS) flush 5-40 mL  5-40 mL IntraVENous Q8H  
 sodium chloride (NS) flush 5-40 mL  5-40 mL IntraVENous PRN  
 cefTRIAXone (ROCEPHIN) 1 g in 0.9% sodium chloride (MBP/ADV) 50 mL  1 g IntraVENous Q24H  
 acetaminophen (TYLENOL) tablet 650 mg  650 mg Oral Q4H PRN  
 bisacodyl (DULCOLAX) tablet 5 mg  5 mg Oral DAILY PRN  
 insulin lispro (HUMALOG) injection   SubCUTAneous AC&HS  vancomycin 50 mg/mL oral solution (compounded) 125 mg  125 mg Oral Q6H  
 tuberculin injection 5 Units  5 Units IntraDERMal ONCE  
 metroNIDAZOLE (FLAGYL) IVPB premix 500 mg  500 mg IntraVENous Q8H  
 lactated Ringers infusion  125 mL/hr IntraVENous CONTINUOUS Other Studies (last 24 hours): Xr Abd Acute W 1 V Chest 
 
Result Date: 6/29/2019 EXAM: Acute abdomen series. INDICATION: Vomiting. COMPARISON: Prior chest x-ray on January 3, 2018. TECHNIQUE: Supine and upright views of the abdomen were supplemented with a frontal view of the chest. FINDINGS: The lungs are clear. The heart is mildly enlarged and there has been a prior CABG. No pneumothorax, vascular congestion or pleural effusion is seen. The abdominal bowel gas pattern is within normal limits. No bowel obstruction or free air is identified. Note is made of cholecystectomy clips in the right upper quadrant and a prior left hip arthroplasty. IMPRESSION: No acute process in the chest or abdomen. Ct Head Wo Cont Result Date: 6/29/2019 EXAM: Noncontrast CT head. INDICATION: Headache, recent fall injury. COMPARISON: Prior MRI brain on May 16, 2016.  TECHNIQUE: Noncontrast CT images of the head were obtained. Radiation dose reduction techniques were used for this study. Our CT scanners use one or all of the following:  Automated exposure control, adjustment of the mA or kV according to patient size, iterative reconstruction. FINDINGS: Brain volume is appropriate for age. No acute infarct or hemorrhage is identified. An approximate 2 cm mass along the anterior skull base in the midline is unchanged, probably a meningioma. There is no mass effect, midline shift or depressed fracture. The visualized paranasal sinuses and mastoid air cells are clear. IMPRESSION: No acute process. Assessment and Plan:  
 
Hospital Problems as of 6/30/2019 Date Reviewed: 6/19/2019 Codes Class Noted - Resolved POA  
 C. difficile diarrhea ICD-10-CM: A04.72 
ICD-9-CM: 008.45  6/30/2019 - Present Yes Acute on chronic renal failure (HCC) ICD-10-CM: N17.9, N18.9 ICD-9-CM: 584.9, 585.9  6/29/2019 - Present Yes * (Principal) Acute cystitis with hematuria ICD-10-CM: N30.01 
ICD-9-CM: 595.0  6/29/2019 - Present Yes Morbid obesity ([facility] [address] (Chronic) ICD-10-CM: E66.01 
ICD-9-CM: 278.01  6/29/2019 - Present Yes Hypokalemia ICD-10-CM: E87.6 ICD-9-CM: 276.8  6/29/2019 - Present Yes Parkinson disease ([facility] [address] (Chronic) ICD-10-CM: G20 
ICD-9-CM: 332.0  6/19/2019 - Present Yes  
   
 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 
ICD-9-CM: 250.40, 583.81  11/15/2017 - Present Yes  
   
 AF (atrial fibrillation) (HCC) (Chronic) ICD-10-CM: I48.91 
ICD-9-CM: 427.31  11/20/2011 - Present Yes HTN (hypertension) (Chronic) ICD-10-CM: I10 
ICD-9-CM: 401.9  11/10/2011 - Present Yes Plan: 
· Sepsis due to cdiff colitis: change oral flagyl to  to oral vancomycin and IV flagyl given hypotension/ sepsis, had negative ABD series 6-29 · ANTONIO on CKD V: appreciate nephrology, continue hydration and followup labs · UTI: on rocephin day 1, followup urine cx · DM2: hold lantus and premeal insulin, add SSI · Hypokalemia: cautious prn replacement · AFIB: continued eliquis · parkinsons disease: continue sinemet DC planning/Dispo:  Pending, PPD Diet:  DIET DIABETIC CONSISTENT CARB 
DVT ppx:  eliquis Signed: Roberto Diallo MD

## 2019-06-30 NOTE — ED PROVIDER NOTES
51-year-old female presenting for weakness, headache, diarrhea and some vomiting. Going on for a week and then progressively worsening. She was seen last week after she fell out of bed and struck her head. She has had watery, mucousy, copious amounts of diarrhea every day. Nothing she can do makes it better. She's had abdominal cramping. She's not been able to relate fluids down. I think she's had fever or shortness of breath but she also admits that she's been out of it quite a bit she's not entirely sure. Currently she feels like she has to have another bowel movement. Family at bedside states that she gets easily confused as well. The history is provided by the patient and a relative. Vomiting This is a new problem. The current episode started more than 1 week ago. The problem occurs more than 10 times per day. The problem has been gradually worsening. The emesis has an appearance of stomach contents. There has been no fever. Associated symptoms include abdominal pain, diarrhea, headaches, arthralgias, myalgias and headaches. Pertinent negatives include no chills, no fever, no sweats, no cough and no URI. The patient is not pregnant. Risk factors include ill contacts. Her pertinent negatives include no irritable bowel syndrome, no inflammatory bowel disease, no short gut syndrome, no bowel resection, no recent abdominal surgery, no malabsorption, no gastric bypass and no DM. Diarrhea This is a new problem. The current episode started more than 1 week ago. The problem occurs constantly. The problem has not changed since onset. The pain is associated with an unknown factor. The pain is located in the generalized abdominal region. The quality of the pain is aching. The pain is at a severity of 3/10. The pain is moderate. Associated symptoms include diarrhea, vomiting, headaches, arthralgias and myalgias. Pertinent negatives include no fever. Nothing worsens the pain.  The pain is relieved by nothing. Her past medical history does not include irritable bowel syndrome or DM. Past Medical History:  
Diagnosis Date  Adverse effect of anesthesia   
 difficult awakening  AF (atrial fibrillation) ([address] 11/20/2011  Arthritis 11/18/2011  
 generalized  CAD (coronary artery disease) 2011 CABG x 4  
 Cervical disc disease Steroid injections  Chronic kidney disease  Chronic pain   
 back  DJD (degenerative joint disease)  Drainage from wound 10/3/2014  Full dentures  Gout   
 managed with medication  Hemorrhoid 11/5/2013  
 [place] (hard of hearing)  Hyperlipemia 11/10/2011  
 managed with medication  Hypertension   
 controlled with meds  Hypertriglyceridemia   
 managed with medication  Hypothyroidism   
 managed with medication  Neuropathy   
 legs and arms, managed with medication  Obesity (BMI 30-39.9) 10/2/14 BMI 36.3  
 PAD (peripheral artery disease) [address]  PCI (pneumatosis cystoides intestinalis) 11/5/2013  Pleural effusion, bilateral 11/21/2011  Postoperative anemia due to acute blood loss 11/21/2011  
 expected  Rib cage fracture 11/5/2013  
 x 2   
 S/P CABG (coronary artery bypass graft) 11/05/2013 CABG x 4  
 Status post-operative repair of hip fracture 09/15/2014  
 left side, repair with hardware  Stroke [facility])   
 left sided weakness residual   
 Type II or unspecified type diabetes mellitus without mention of complication, uncontrolled ([address] 12/16/2013  
 oral and insulin reliant/ Avg / low BS s/s/ @ 70/ last A1c 8.3  Unspecified adverse effect of anesthesia   
 difficult to arouse after general anesthesia Past Surgical History:  
Procedure Laterality Date  CABG, ARTERY-VEIN, FOUR  Oct. 2011  
 CLOSE CYSTOSTOMY    
 exploratory with removal of mass of infection  HX CATARACT REMOVAL Bilateral 11/2012  
 with IOL implants, bilateral  
 HX COLONOSCOPY  HX HIP FRACTURE TX Left   
 repair with hardware  HX HYSTERECTOMY  HX LAP CHOLECYSTECTOMY  VASCULAR SURGERY PROCEDURE UNLIST Left 06/07/2017 AVF creation  VASCULAR SURGERY PROCEDURE UNLIST Left 08/10/2017 AVF elevation Family History:  
Problem Relation Age of Onset  Heart Disease Mother  Cancer Mother   
     colon  Diabetes Mother  Cancer Father   
     lung  Cancer Sister   
     breast  
 Breast Cancer Sister 28  Cancer Brother Leukemia  Breast Cancer Maternal Aunt 54 Social History Socioeconomic History  Marital status:  Spouse name: Not on file  Number of children: Not on file  Years of education: Not on file  Highest education level: Not on file Occupational History  Not on file Social Needs  Financial resource strain: Not on file  Food insecurity:  
  Worry: Not on file Inability: Not on file  Transportation needs:  
  Medical: Not on file Non-medical: Not on file Tobacco Use  Smoking status: Never Smoker  Smokeless tobacco: Never Used Substance and Sexual Activity  Alcohol use: No  
 Drug use: No  
  Types: Prescription  Sexual activity: Never Lifestyle  Physical activity:  
  Days per week: Not on file Minutes per session: Not on file  Stress: Not on file Relationships  Social connections:  
  Talks on phone: Not on file Gets together: Not on file Attends Nondenominational service: Not on file Active member of club or organization: Not on file Attends meetings of clubs or organizations: Not on file Relationship status: Not on file  Intimate partner violence:  
  Fear of current or ex partner: Not on file Emotionally abused: Not on file Physically abused: Not on file Forced sexual activity: Not on file Other Topics Concern  Not on file Social History Narrative  Not on file ALLERGIES: Baclofen and Lipitor [atorvastatin] Review of Systems Constitutional: Negative for chills and fever. Respiratory: Negative for cough. Gastrointestinal: Positive for abdominal pain, diarrhea and vomiting. Musculoskeletal: Positive for arthralgias and myalgias. Neurological: Positive for headaches. All other systems reviewed and are negative. Vitals:  
 06/29/19 2157 BP: 93/51 Pulse: 78 Resp: 20 Temp: 98.1 °F (36.7 °C) SpO2: 92% Weight: 113.4 kg (250 lb) Height: 5' 8\" (1.727 m) Physical Exam  
Constitutional: She is oriented to person, place, and time. She appears well-developed and well-nourished. HENT:  
Head: Normocephalic and atraumatic. Dry mucous membranes Eyes: Pupils are equal, round, and reactive to light. Conjunctivae are normal.  
Neck: Neck supple. Cardiovascular: Normal rate and regular rhythm. Pulmonary/Chest: Effort normal and breath sounds normal.  
Abdominal: Soft. Bowel sounds are normal.  
Hyperactive bowel sounds Musculoskeletal: Normal range of motion. Neurological: She is alert and oriented to person, place, and time. Skin: Skin is warm and dry. Nursing note and vitals reviewed. MDM Number of Diagnoses or Management Options Diagnosis management comments: 51-year-old female presenting for headache, nausea vomiting diarrhea. This is in the setting of recent fall. Concern for intracranial process, sepsis, viral process, dehydration, C. Difficile. Establish an IV, starting fluids, abdominal pain labs, urinalysis, head CT, chest x-ray and abdominal film and a stool sample. ED Course as of Jun 30 9656 Sat Jun 29, 2019  
[id number] I will almost certainly be signing this patient over the next physician to take over care. [JS] ED Course User Index [JS] Cruz Dewitt MD  
 
 
Procedures

## 2019-06-30 NOTE — ROUTINE PROCESS
TRANSFER - OUT REPORT: 
 
Verbal report given to WILL Willis on [employer]  being transferred to [facility] for routine progression of care Report consisted of patients Situation, Background, Assessment and  
Recommendations(SBAR). Information from the following report(s) SBAR, ED Summary, [facility] and Recent Results was reviewed with the receiving nurse. Lines:  
Peripheral IV 06/29/19 Right Antecubital (Active) Site Assessment Clean, dry, & intact 6/29/2019 10:09 PM  
Phlebitis Assessment 0 6/29/2019 10:09 PM  
Infiltration Assessment 0 6/29/2019 10:09 PM  
Dressing Status Clean, dry, & intact 6/29/2019 10:09 PM  
Dressing Type Transparent 6/29/2019 10:09 PM  
Hub Color/Line Status Pink 6/29/2019 10:09 PM  
   
Peripheral IV 06/30/19 Right External jugular (Active) Site Assessment Clean, dry, & intact 6/30/2019 12:48 AM  
Phlebitis Assessment 0 6/30/2019 12:48 AM  
Infiltration Assessment 0 6/30/2019 12:48 AM  
Dressing Status Clean, dry, & intact 6/30/2019 12:48 AM  
  
 
Opportunity for questions and clarification was provided. Patient transported with:  
 This RN

## 2019-06-30 NOTE — PROGRESS NOTES
TRANSFER - IN REPORT: 
 
Verbal report received from [address] [place] [address] RN(name) on [address]  being received from ER(unit) for routine progression of care Report consisted of patients Situation, Background, Assessment and  
Recommendations(SBAR). Information from the following report(s) SBAR and Kardex was reviewed with the receiving nurse. Opportunity for questions and clarification was provided. Assessment completed upon patients arrival to unit and care assumed.

## 2019-06-30 NOTE — H&P
HOSPITALIST H&PNAME:  Bree Vega Age:  78 y.o. 
:   1940 MRN:   [id number] PCP: Anali Farias MD 
Treatment Team: Attending Provider: Vasquez Greco MD; Primary Nurse: Clark Perez RN; Primary Nurse: Tennis Heaps Prior CC: Reason for admission is: ANTONIO/CKD; UTI 
 
HPI:  
Patient history was obtained from the ER provider prior to seeing the patient. Patient is a 78 y.o. female who presents to the ER for multiple complaints. She reports weakness, headache, n/v, copious diarrhea for about a week, getting progressively worse. She has tried some OTC medications without relief. Denies fever/chills, chest pain and cough. Has had some abdominal cramping, and some off and on mild SOB. She has known CKD5 and has fistula, but has not had to start dialysis yet. ER work up shows her to have worsening renal failure, and a significant UTI. 
 
ROS: 
All systems have been reviewed and are negative except as stated in HPI or elsewhere. Past Medical History:  
Diagnosis Date  Adverse effect of anesthesia   
 difficult awakening  AF (atrial fibrillation) ([facility] [address] 2011  Arthritis 2011  
 generalized  CAD (coronary artery disease)  CABG x 4  
 Cervical disc disease Steroid injections  Chronic kidney disease  Chronic pain   
 back  DJD (degenerative joint disease)  Drainage from wound 10/3/2014  Full dentures  Gout   
 managed with medication  Hemorrhoid 2013  
 [place] (hard of hearing)  Hyperlipemia 11/10/2011  
 managed with medication  Hypertension   
 controlled with meds  Hypertriglyceridemia   
 managed with medication  Hypothyroidism   
 managed with medication  MGUS (monoclonal gammopathy of unknown significance) 2017  Mixed action and resting tremor 2017  Neuropathy   
 legs and arms, managed with medication  Obesity (BMI 30-39.9) 10/2/14 BMI 36.3  PAD (peripheral artery disease) ([facility] [address]  PCI (pneumatosis cystoides intestinalis) 11/5/2013  Pleural effusion, bilateral 11/21/2011  Postoperative anemia due to acute blood loss 11/21/2011  
 expected  Renal mass 5/14/2016  Rib cage fracture 11/5/2013  
 x 2   
 S/P CABG (coronary artery bypass graft) 11/05/2013 CABG x 4  
 S/P CABG x 3 11/18/2011  Stasis edema of both lower extremities 1/3/2018  Status post-operative repair of hip fracture 09/15/2014  
 left side, repair with hardware  Stroke [facility])   
 left sided weakness residual   
 Type II or unspecified type diabetes mellitus without mention of complication, uncontrolled ([facility] [address] 12/16/2013  
 oral and insulin reliant/ Avg / low BS s/s/ @ 70/ last A1c 8.3  Unspecified adverse effect of anesthesia   
 difficult to arouse after general anesthesia Past Surgical History:  
Procedure Laterality Date  CABG, ARTERY-VEIN, FOUR  Oct. 2011  
 CLOSE CYSTOSTOMY    
 exploratory with removal of mass of infection  HX CATARACT REMOVAL Bilateral 11/2012  
 with IOL implants, bilateral  
 HX COLONOSCOPY    
 HX HIP FRACTURE TX Left   
 repair with hardware  HX HYSTERECTOMY  HX LAP CHOLECYSTECTOMY  VASCULAR SURGERY PROCEDURE UNLIST Left 06/07/2017 AVF creation  VASCULAR SURGERY PROCEDURE UNLIST Left 08/10/2017 AVF elevation Social History Tobacco Use  Smoking status: Never Smoker  Smokeless tobacco: Never Used Substance Use Topics  Alcohol use: No  
  
Family History Problem Relation Age of Onset  Heart Disease Mother  Cancer Mother   
     colon  Diabetes Mother  Cancer Father   
     lung  Cancer Sister   
     breast  
 Breast Cancer Sister 28  Cancer Brother Leukemia  Breast Cancer Maternal Aunt 54 FH Reviewed and non-contributory to admitting diagnosis Allergies Allergen Reactions  Baclofen Other (comments) Psychosis and altered mental status  Lipitor [Atorvastatin] Myalgia Prior to Admission Medications Prescriptions Last Dose Informant Patient Reported? Taking? Insulin Syringe-Needle U-100 (BD INSULIN SYRINGE ULTRA-FINE) 1 mL 31 gauge x 5/16 syrg   No No  
Sig: DX E 10.65  Inject insulin tid Lactobacillus acidophilus (ACIDOPHILUS) cap   Yes No  
Sig: Take 2 Caps by mouth two (2) times a day. Lancets (ACCU-CHEK FASTCLIX) misc   No No  
Sig: Check bs TID. DX E11.9 OXYGEN-AIR DELIVERY SYSTEMS   Yes No  
Sig: 3 L by Nasal route continuous. apixaban (ELIQUIS) 2.5 mg tablet   No No  
Sig: Take 1 Tab by mouth two (2) times a day. Indications: Treatment to Prevent Blood Clots in Chronic Atrial Fibrillation  
benazepril (LOTENSIN) 20 mg tablet   No No  
Sig: Take 1 Tab by mouth every morning. carbidopa-levodopa (SINEMET)  mg per tablet   No No  
Sig: TAKE 1 TABLET BY MOUTH THREE TIMES DAILY  
carvedilol (COREG) 25 mg tablet   No No  
Sig: take 1 tablet by mouth twice a day  
cyanocobalamin, vitamin B-12, 1,000 mcg/mL kit   Yes No  
Si,000 mcg by Injection route every month. on the 1st  
ergocalciferol (VITAMIN D2) 50,000 unit capsule   No No  
Sig: Take 1 Cap by mouth every seven (7) days. ezetimibe (ZETIA) 10 mg tablet   No No  
Sig: Take 1 Tab by mouth daily. febuxostat (ULORIC) 80 mg tab tablet   No No  
Sig: Take 1 Tab by mouth daily. ferrous sulfate 325 mg (65 mg iron) tablet   Yes No  
Sig: Take 324 mg by mouth daily. furosemide (LASIX) 40 mg tablet   No No  
Sig: Take 2 Tabs by mouth daily. Patient taking differently: Take 80 mg by mouth two (2) times daily (with meals). glucose blood VI test strips (ACCU-CHEK GUIDE) strip   No No  
Sig: Check BS TID.   DX E11.9  
insulin NPH/insulin regular (HUMULIN 70/30 U-100 INSULIN) 100 unit/mL (70-30) injection   No No  
Si units before breakfast, 55 units before supper  
levothyroxine (SYNTHROID) 25 mcg tablet   No No  
 Sig: Take 1 Tab by mouth Daily (before breakfast). metOLazone (ZAROXOLYN) 2.5 mg tablet   No No  
Sig: Take 1 Tab by mouth daily as needed (Fluid gain). If weight increases by 2 pounds in 24 hour period or 5 pounds in a week, take daily until back down to dry weight  Indications: Edema with Defective Kidney Function  
nitroglycerin (NITROLINGUAL) 400 mcg/spray spray   No No  
Si Spray by SubLINGual route every five (5) minutes as needed (Pt states \"I've never had to use it\"). ondansetron hcl (ZOFRAN) 8 mg tablet   No No  
Sig: Take 1 Tab by mouth every eight (8) hours as needed for Nausea. potassium chloride (K-DUR, KLOR-CON) 20 mEq tablet   No No  
Sig: Take 1 Tab by mouth daily as needed (Only on days you take metolazone). Indications: prevention of low potassium in the blood  
prednisoLONE acetate (PRED FORTE) 1 % ophthalmic suspension   Yes No  
Sig: Administer 1 Drop to both eyes four (4) times daily. pregabalin (LYRICA) 50 mg capsule   No No  
Sig: Take 1 Cap by mouth two (2) times a day. Max Daily Amount: 100 mg.  
terbinafine HCl (LAMISIL AT) 1 % topical cream   No No  
Sig: Apply  to affected area two (2) times a day. triamcinolone acetonide (KENALOG) 0.1 % topical cream   Yes No  
Sig: Apply  to affected area two (2) times a day. Facility-Administered Medications: None Objective: No intake or output data in the 24 hours ending 19 0041 Temp (24hrs), Av.1 °F (36.7 °C), Min:98.1 °F (36.7 °C), Max:98.1 °F (36.7 °C) Oxygen Therapy O2 Sat (%): 92 % (19) O2 Device: Room air (19) Body mass index is 38.01 kg/m². Patient Vitals for the past 24 hrs: 
 Temp Pulse Resp BP SpO2  
19 98.1 °F (36.7 °C) 78 20 93/51 92 % Physical Exam: 
 
General:    WD and WN, No apparent distress. Chronic ill appearing Head:   Normocephalic, without obvious abnormality, atraumatic. Eyes:  PERRL; EOMI; sclera normal/non-icteric ENT:  Hearing is normal.  Oropharynx is clear with tacky mucous membranes Resp:    Clear to auscultation bilaterally. No Wheezing or Rhonchi. Resp are even and unlabored Heart[de-identified]  Regular rate and rhythm,  no murmur,   No LE edema Abdomen:   Soft, non-tender. Not distended. Bowel sounds normal.  hepato-splenomegaly cannot be assess due to obesity Musc/SK: Muscle strength is good and tone normal; No cyanosis. No clubbing Skin:     Texture, turgor normal. No significant rashes or lesions. Capillary refill < 2 sec Neurologic: CN II - XII are grossly intact - Eye exam as noted above Psych: Alert and oriented x 4;  Judgement and insight are normal 
  
Data Review:  
Recent Results (from the past 24 hour(s)) CBC WITH AUTOMATED DIFF Collection Time: 06/29/19 10:02 PM  
Result Value Ref Range WBC 18.2 (H) 4.3 - 11.1 K/uL  
 RBC 3.65 (L) 4.05 - 5.2 M/uL  
 HGB 11.3 (L) 11.7 - 15.4 g/dL HCT 33.4 (L) 35.8 - 46.3 % MCV 91.5 79.6 - 97.8 FL  
 MCH 31.0 26.1 - 32.9 PG  
 MCHC 33.8 31.4 - 35.0 g/dL  
 RDW 13.8 11.9 - 14.6 % PLATELET 099 264 - 001 K/uL MPV 12.4 (H) 9.4 - 12.3 FL ABSOLUTE NRBC 0.00 0.0 - 0.2 K/uL  
 DF AUTOMATED NEUTROPHILS 81 (H) 43 - 78 % LYMPHOCYTES 12 (L) 13 - 44 % MONOCYTES 4 4.0 - 12.0 % EOSINOPHILS 0 (L) 0.5 - 7.8 % BASOPHILS 0 0.0 - 2.0 % IMMATURE GRANULOCYTES 2 0.0 - 5.0 %  
 ABS. NEUTROPHILS 14.7 (H) 1.7 - 8.2 K/UL  
 ABS. LYMPHOCYTES 2.3 0.5 - 4.6 K/UL  
 ABS. MONOCYTES 0.8 0.1 - 1.3 K/UL  
 ABS. EOSINOPHILS 0.1 0.0 - 0.8 K/UL  
 ABS. BASOPHILS 0.1 0.0 - 0.2 K/UL  
 ABS. IMM. GRANS. 0.3 0.0 - 0.5 K/UL METABOLIC PANEL, COMPREHENSIVE Collection Time: 06/29/19 10:02 PM  
Result Value Ref Range Sodium 134 (L) 136 - 145 mmol/L Potassium 2.9 (LL) 3.5 - 5.1 mmol/L Chloride 98 98 - 107 mmol/L  
 CO2 19 (L) 21 - 32 mmol/L Anion gap 17 (H) 7 - 16 mmol/L Glucose 69 65 - 100 mg/dL   (H) 8 - 23 MG/DL  
 Creatinine 9.29 (H) 0.6 - 1.0 MG/DL  
 GFR est AA 5 (L) >60 ml/min/1.73m2 GFR est non-AA 4 (L) >60 ml/min/1.73m2 Calcium 8.0 (L) 8.3 - 10.4 MG/DL Bilirubin, total 0.5 0.2 - 1.1 MG/DL  
 ALT (SGPT) 9 (L) 12 - 65 U/L  
 AST (SGOT) 24 15 - 37 U/L Alk. phosphatase 140 (H) 50 - 136 U/L Protein, total 8.0 6.3 - 8.2 g/dL Albumin 3.6 3.2 - 4.6 g/dL Globulin 4.4 (H) 2.3 - 3.5 g/dL A-G Ratio 0.8 (L) 1.2 - 3.5 MAGNESIUM Collection Time: 06/29/19 10:02 PM  
Result Value Ref Range Magnesium 2.3 1.8 - 2.4 mg/dL POC TROPONIN-I Collection Time: 06/29/19 10:05 PM  
Result Value Ref Range Troponin-I (POC) 0.04 0.02 - 0.05 ng/ml POC LACTIC ACID Collection Time: 06/29/19 10:08 PM  
Result Value Ref Range Lactic Acid (POC) 1.03 0.5 - 1.9 mmol/L  
C. DIFFICILE/EPI PCR Collection Time: 06/29/19 11:06 PM  
Result Value Ref Range Special Requests: NO SPECIAL REQUESTS Culture result: Toxigenic C Diff POS/[id number] PRESUMPTIVE POS (A) Culture result: RESULTS VERIFIED, PHONED TO AND READ BACK BY 
DR. Desire Triana @ 1609 ON [id number] BY [facility] URINALYSIS W/ RFLX MICROSCOPIC Collection Time: 06/29/19 11:08 PM  
Result Value Ref Range Color RED Appearance TURBID Specific gravity 1.017 1.001 - 1.023    
 pH (UA) 6.0 5.0 - 9.0 Protein 300 (A) NEG mg/dL Glucose NEGATIVE  mg/dL Ketone TRACE (A) NEG mg/dL Bilirubin SMALL (A) NEG Blood LARGE (A) NEG Urobilinogen 1.0 0.2 - 1.0 EU/dL Nitrites POSITIVE (A) NEG Leukocyte Esterase LARGE (A) NEG    
 WBC >100 0 /hpf  
 RBC 10-20 0 /hpf Epithelial cells 20-50 0 /hpf Bacteria 4+ (H) 0 /hpf CXR Results  (Last 48 hours) 06/29/19 2338  XR ABD ACUTE W 1 V CHEST Final result Impression:  IMPRESSION: No acute process in the chest or abdomen. Narrative:  EXAM: Acute abdomen series. INDICATION: Vomiting. COMPARISON: Prior chest x-ray on January 3, 2018. TECHNIQUE: Supine and upright views of the abdomen were supplemented with a  
frontal view of the chest.  
   
FINDINGS: The lungs are clear. The heart is mildly enlarged and there has been a  
prior CABG. No pneumothorax, vascular congestion or pleural effusion is seen. The abdominal bowel gas pattern is within normal limits. No bowel obstruction or  
free air is identified. Note is made of cholecystectomy clips in the right upper  
quadrant and a prior left hip arthroplasty. CT Results  (Last 48 hours) 06/29/19 2325  CT HEAD WO CONT Final result Impression:  IMPRESSION: No acute process. Narrative:  EXAM: Noncontrast CT head. INDICATION: Headache, recent fall injury. COMPARISON: Prior MRI brain on May 16, 2016. TECHNIQUE: Noncontrast CT images of the head were obtained. Radiation dose  
reduction techniques were used for this study. Our CT scanners use one or all  
of the following:  Automated exposure control, adjustment of the mA or kV  
according to patient size, iterative reconstruction. FINDINGS: Brain volume is appropriate for age. No acute infarct or hemorrhage is  
identified. An approximate 2 cm mass along the anterior skull base in the  
midline is unchanged, probably a meningioma. There is no mass effect, midline  
shift or depressed fracture. The visualized paranasal sinuses and mastoid air  
cells are clear. Assessment and Plan: Active Hospital Problems Diagnosis Date Noted  Acute on chronic renal failure ([address] 06/29/2019  Acute cystitis with hematuria 06/29/2019  Morbid obesity ([address] 06/29/2019  Hypokalemia 06/29/2019  Parkinson disease ([address] 06/19/2019  CKD (chronic kidney disease) stage 5, GFR less than 15 ml/min (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 
  Acute cystitis with hematuria (6/29/2019) IV rocephin; IVF Active Problems: 
  HTN (hypertension) (11/10/2011) Continue home meds and add prn hydralazine, if needed. AF (atrial fibrillation) ([facility] (11/20/2011) Chronic condition is stable, but may affect hospital stay; continue home medications with the following changes, if any:    Will continue to monitor and adjust treatment as needed. 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 
 
  CKD (chronic kidney disease) stage 5, GFR less than 15 ml/min ([facility]) (11/30/2018) Parkinson disease ([facility] (6/19/2019) Chronic condition is stable, but may affect hospital stay; continue home medications with the following changes, if any:    Will continue to monitor and adjust treatment as needed. Acute on chronic renal failure ([facility] (6/29/2019) IVF; consult Nephrology Morbid obesity ([facility] (6/29/2019)  pt; increases morbidity and mortality; can impede treatment and recovery Hypokalemia (6/29/2019) Replace slowly due to CKD After admission: test returned Positive for C.diff as well - start Flagyl, hydration · PLAN General 
· PT/OT to help prevent deconditioning · Cont appropriate home meds (see MAR) · Control symptoms (pain, n/v, fever, etc) · Monitor appropriate labs · DVT prophylaxis:  heparin · Code status: Full;  HCPOA:  
· Risk: high · Anticipated DC needs: · Estimated LOS:  Greater than 2 midnights · Plans discussed with patient and/or caregiver; questions answered. Med records reviewed if applicable; findings:  
 
Critical care time if applicable: Signed By: Marina Mcqueen MD   
 June 30, 2019

## 2019-06-30 NOTE — PROGRESS NOTES
END OF SHIFT NOTE: Pt more alert and talkative. Ate all of supper. Had small smear of soft brown stool. Voided 50cc of urine but brief wet, changed and mike care given with barrier cream applied to sacrum. Hourly rounds completed, all needs met this shift. Will continue to monitor until night shift nurse takes over. INTAKE/OUTPUT No intake/output data recorded. Voiding: YES Catheter: NO 
Color: dark yellow Drain: DIET Diabetic Flatus: Patient does have flatus present. Stool:  1 occurrences. Characteristics: 
Stool Assessment Stool Color: Scales Ariana Stool Appearance: Soft, Formed Stool Amount: Small(very small) Stool Source/Status: Rectum Ambulating No 
Emesis: 0 occurrences. Characteristics: VITAL SIGNS Patient Vitals for the past 12 hrs: 
 Temp Pulse Resp BP SpO2  
06/30/19 1552 97.4 °F (36.3 °C) 66 16 108/53 94 % 06/30/19 1106 97.5 °F (36.4 °C) 63 18 99/65 92 % 06/30/19 0707 97.2 °F (36.2 °C) 63 18 96/59 90 % Pain Assessment Pain Intensity 1: 0 (06/30/19 1215) Pain Location 1: Head 
Pain Intervention(s) 1: Medication (see MAR) Patient Stated Pain Goal: 0 Ilana Lopez RN

## 2019-07-01 LAB
ALBUMIN SERPL-MCNC: 2.8 G/DL (ref 3.2–4.6)
ALBUMIN/GLOB SERPL: 0.8 {RATIO} (ref 1.2–3.5)
ALP SERPL-CCNC: 126 U/L (ref 50–136)
ALT SERPL-CCNC: <6 U/L (ref 12–65)
ANION GAP SERPL CALC-SCNC: 17 MMOL/L (ref 7–16)
AST SERPL-CCNC: 18 U/L (ref 15–37)
BASOPHILS # BLD: 0.1 K/UL (ref 0–0.2)
BASOPHILS NFR BLD: 1 % (ref 0–2)
BILIRUB SERPL-MCNC: 0.2 MG/DL (ref 0.2–1.1)
BUN SERPL-MCNC: 153 MG/DL (ref 8–23)
CALCIUM SERPL-MCNC: 7 MG/DL (ref 8.3–10.4)
CHLORIDE SERPL-SCNC: 104 MMOL/L (ref 98–107)
CO2 SERPL-SCNC: 16 MMOL/L (ref 21–32)
CREAT SERPL-MCNC: 8.7 MG/DL (ref 0.6–1)
DIFFERENTIAL METHOD BLD: ABNORMAL
EOSINOPHIL # BLD: 0.2 K/UL (ref 0–0.8)
EOSINOPHIL NFR BLD: 2 % (ref 0.5–7.8)
ERYTHROCYTE [DISTWIDTH] IN BLOOD BY AUTOMATED COUNT: 14 % (ref 11.9–14.6)
GLOBULIN SER CALC-MCNC: 3.5 G/DL (ref 2.3–3.5)
GLUCOSE BLD STRIP.AUTO-MCNC: 187 MG/DL (ref 65–100)
GLUCOSE BLD STRIP.AUTO-MCNC: 209 MG/DL (ref 65–100)
GLUCOSE BLD STRIP.AUTO-MCNC: 216 MG/DL (ref 65–100)
GLUCOSE BLD STRIP.AUTO-MCNC: 218 MG/DL (ref 65–100)
GLUCOSE SERPL-MCNC: 189 MG/DL (ref 65–100)
HCT VFR BLD AUTO: 29.6 % (ref 35.8–46.3)
HGB BLD-MCNC: 9.7 G/DL (ref 11.7–15.4)
IMM GRANULOCYTES # BLD AUTO: 0.3 K/UL (ref 0–0.5)
IMM GRANULOCYTES NFR BLD AUTO: 3 % (ref 0–5)
LYMPHOCYTES # BLD: 1.3 K/UL (ref 0.5–4.6)
LYMPHOCYTES NFR BLD: 11 % (ref 13–44)
MCH RBC QN AUTO: 30.7 PG (ref 26.1–32.9)
MCHC RBC AUTO-ENTMCNC: 32.8 G/DL (ref 31.4–35)
MCV RBC AUTO: 93.7 FL (ref 79.6–97.8)
MM INDURATION POC: NORMAL 0MM (ref 0–5)
MONOCYTES # BLD: 0.5 K/UL (ref 0.1–1.3)
MONOCYTES NFR BLD: 4 % (ref 4–12)
NEUTS SEG # BLD: 9.3 K/UL (ref 1.7–8.2)
NEUTS SEG NFR BLD: 80 % (ref 43–78)
NRBC # BLD: 0.02 K/UL (ref 0–0.2)
PLATELET # BLD AUTO: 245 K/UL (ref 150–450)
PMV BLD AUTO: 12.4 FL (ref 9.4–12.3)
POTASSIUM SERPL-SCNC: 3.3 MMOL/L (ref 3.5–5.1)
PPD POC: NORMAL NEGATIVE
PROT SERPL-MCNC: 6.3 G/DL (ref 6.3–8.2)
RBC # BLD AUTO: 3.16 M/UL (ref 4.05–5.2)
SODIUM SERPL-SCNC: 137 MMOL/L (ref 136–145)
WBC # BLD AUTO: 11.7 K/UL (ref 4.3–11.1)

## 2019-07-01 PROCEDURE — 74011000258 HC RX REV CODE- 258: Performed by: FAMILY MEDICINE

## 2019-07-01 PROCEDURE — 94760 N-INVAS EAR/PLS OXIMETRY 1: CPT

## 2019-07-01 PROCEDURE — 97530 THERAPEUTIC ACTIVITIES: CPT

## 2019-07-01 PROCEDURE — 74011250636 HC RX REV CODE- 250/636: Performed by: INTERNAL MEDICINE

## 2019-07-01 PROCEDURE — 80053 COMPREHEN METABOLIC PANEL: CPT

## 2019-07-01 PROCEDURE — 77010033678 HC OXYGEN DAILY

## 2019-07-01 PROCEDURE — 85025 COMPLETE CBC W/AUTO DIFF WBC: CPT

## 2019-07-01 PROCEDURE — 74011250636 HC RX REV CODE- 250/636: Performed by: FAMILY MEDICINE

## 2019-07-01 PROCEDURE — 74011636637 HC RX REV CODE- 636/637: Performed by: INTERNAL MEDICINE

## 2019-07-01 PROCEDURE — 36415 COLL VENOUS BLD VENIPUNCTURE: CPT

## 2019-07-01 PROCEDURE — 65270000029 HC RM PRIVATE

## 2019-07-01 PROCEDURE — 74011250637 HC RX REV CODE- 250/637: Performed by: FAMILY MEDICINE

## 2019-07-01 PROCEDURE — 97166 OT EVAL MOD COMPLEX 45 MIN: CPT

## 2019-07-01 PROCEDURE — 97161 PT EVAL LOW COMPLEX 20 MIN: CPT

## 2019-07-01 PROCEDURE — 82962 GLUCOSE BLOOD TEST: CPT

## 2019-07-01 PROCEDURE — 77030020255 HC SOL INJ LR 1000ML BG

## 2019-07-01 PROCEDURE — 74011250637 HC RX REV CODE- 250/637: Performed by: INTERNAL MEDICINE

## 2019-07-01 RX ADMIN — INSULIN LISPRO 4 UNITS: 100 INJECTION, SOLUTION INTRAVENOUS; SUBCUTANEOUS at 21:50

## 2019-07-01 RX ADMIN — APIXABAN 2.5 MG: 2.5 TABLET, FILM COATED ORAL at 08:25

## 2019-07-01 RX ADMIN — CARBIDOPA AND LEVODOPA 1 TABLET: 25; 100 TABLET ORAL at 08:25

## 2019-07-01 RX ADMIN — VANCOMYCIN HYDROCHLORIDE 125 MG: 1 INJECTION, POWDER, LYOPHILIZED, FOR SOLUTION INTRAVENOUS at 17:16

## 2019-07-01 RX ADMIN — METRONIDAZOLE 500 MG: 500 INJECTION, SOLUTION INTRAVENOUS at 06:25

## 2019-07-01 RX ADMIN — CARBIDOPA AND LEVODOPA 1 TABLET: 25; 100 TABLET ORAL at 21:50

## 2019-07-01 RX ADMIN — ACETAMINOPHEN 650 MG: 325 TABLET, FILM COATED ORAL at 12:18

## 2019-07-01 RX ADMIN — METRONIDAZOLE 500 MG: 500 INJECTION, SOLUTION INTRAVENOUS at 22:39

## 2019-07-01 RX ADMIN — APIXABAN 2.5 MG: 2.5 TABLET, FILM COATED ORAL at 17:16

## 2019-07-01 RX ADMIN — Medication 10 ML: at 05:21

## 2019-07-01 RX ADMIN — INSULIN LISPRO 4 UNITS: 100 INJECTION, SOLUTION INTRAVENOUS; SUBCUTANEOUS at 12:19

## 2019-07-01 RX ADMIN — CARBIDOPA AND LEVODOPA 1 TABLET: 25; 100 TABLET ORAL at 17:16

## 2019-07-01 RX ADMIN — SODIUM CHLORIDE, SODIUM LACTATE, POTASSIUM CHLORIDE, AND CALCIUM CHLORIDE 125 ML/HR: 600; 310; 30; 20 INJECTION, SOLUTION INTRAVENOUS at 12:24

## 2019-07-01 RX ADMIN — LEVOTHYROXINE SODIUM 25 MCG: 50 TABLET ORAL at 05:22

## 2019-07-01 RX ADMIN — INSULIN LISPRO 4 UNITS: 100 INJECTION, SOLUTION INTRAVENOUS; SUBCUTANEOUS at 08:31

## 2019-07-01 RX ADMIN — VANCOMYCIN HYDROCHLORIDE 125 MG: 1 INJECTION, POWDER, LYOPHILIZED, FOR SOLUTION INTRAVENOUS at 23:59

## 2019-07-01 RX ADMIN — CEFTRIAXONE 1 G: 1 INJECTION, POWDER, FOR SOLUTION INTRAMUSCULAR; INTRAVENOUS at 01:40

## 2019-07-01 RX ADMIN — Medication 10 ML: at 21:51

## 2019-07-01 RX ADMIN — Medication 10 ML: at 14:00

## 2019-07-01 RX ADMIN — ACETAMINOPHEN 650 MG: 325 TABLET, FILM COATED ORAL at 05:31

## 2019-07-01 RX ADMIN — VANCOMYCIN HYDROCHLORIDE 125 MG: 1 INJECTION, POWDER, LYOPHILIZED, FOR SOLUTION INTRAVENOUS at 05:21

## 2019-07-01 RX ADMIN — VANCOMYCIN HYDROCHLORIDE 125 MG: 1 INJECTION, POWDER, LYOPHILIZED, FOR SOLUTION INTRAVENOUS at 12:19

## 2019-07-01 RX ADMIN — INSULIN LISPRO 2 UNITS: 100 INJECTION, SOLUTION INTRAVENOUS; SUBCUTANEOUS at 17:17

## 2019-07-01 RX ADMIN — METRONIDAZOLE 500 MG: 500 INJECTION, SOLUTION INTRAVENOUS at 17:16

## 2019-07-01 NOTE — PROGRESS NOTES
Chart screened. Awaiting pt/ot eval for possible discharge needs. CM will continue to follow for discharge needs. Care Management Interventions PCP Verified by CM: Yes Mode of Transport at Discharge: Other (see comment) Transition of Care Consult (CM Consult): Discharge Planning Discharge Durable Medical Equipment: No 
Physical Therapy Consult: Yes Occupational Therapy Consult: Yes Current Support Network: Own Home, Lives with Spouse Confirm Follow Up Transport: Family Plan discussed with Pt/Family/Caregiver: Yes Freedom of Choice Offered: Yes Discharge Location Discharge Placement: Unable to determine at this time

## 2019-07-01 NOTE — PROGRESS NOTES
patient is resting peacefully No family present Room is dark Patient has multiple medical challenges Will follow up to assess needs Corina Gomes, staff [address], [address] [facility]  /   [email or web address]

## 2019-07-01 NOTE — PROGRESS NOTES
END OF SHIFT NOTE: Up to chair x 1 this shift with PT. Appetite poor, no emesis. Voices no complaints. Voided x 1 in brief. Hourly rounds completed, all needs met this shift. Will continue to monitor until night shift nurse takes over. Bed in low position and call bell in reach. INTAKE/OUTPUT 
06/30 0701 - 07/01 0700 In: 1480 [P.O.:600; I.V.:880] Out: -  
Voiding: YES Catheter: NO 
Color: teddy Drain: DIET Diabetic Flatus: Patient does have flatus present. Stool:  0 occurrences. Characteristics: 
Stool Assessment Stool Color: Guido Pandcarey Stool Appearance: Soft, Formed Stool Amount: Small(very small) Stool Source/Status: Rectum Ambulating No 
Emesis: 0 occurrences. Characteristics: VITAL SIGNS Patient Vitals for the past 12 hrs: 
 Temp Pulse Resp BP SpO2  
07/01/19 1630 97.5 °F (36.4 °C) 62 18 106/61 97 % 07/01/19 1131 97.5 °F (36.4 °C) 67 18 102/53 94 % 07/01/19 0800 97.6 °F (36.4 °C) 64 18 99/58 96 % Pain Assessment Pain Intensity 1: 0 (07/01/19 1338) Pain Location 1: Back, Shoulder Pain Intervention(s) 1: Ambulation/Increased Activity, Repositioned Patient Stated Pain Goal: 0 Mauricio Gutiérrez RN

## 2019-07-01 NOTE — PROGRESS NOTES
Physical Therapy Note: 
 
Physical therapy evaluation orders received and chart reviewed. Attempted to see patient this AM to initiate assessment. Patient currently very fatigued, kindly requesting to rest and wait until this afternoon to participate in PT evaluation. Will follow and re-attempt at a later time/date as schedule permits/patient available. Thank you, Bob Marques, PT, DPT

## 2019-07-01 NOTE — PROGRESS NOTES
Problem: Mobility Impaired (Adult and Pediatric) Goal: *Acute Goals and Plan of Care Description STG: 
(1.)Ms. Kasia Byrd will move from supine to sit and sit to supine , scoot up and down and roll side to side with MODERATE ASSIST within 3 treatment day(s). (2.)Ms. Kasia Byrd will transfer from bed to chair and chair to bed with CONTACT GUARD ASSIST using the least restrictive device within 3 treatment day(s). (3.)Ms. Kasia Byrd will ambulate with CONTACT GUARD ASSIST for 10 feet with the least restrictive device within 3 treatment day(s). LTG: 
(1.)Ms. Kasia Byrd will move from supine to sit and sit to supine , scoot up and down and roll side to side in bed with CONTACT GUARD ASSIST within 7 treatment day(s). (2.)Ms. Kasia Byrd will transfer from bed to chair and chair to bed with MODIFIED INDEPENDENCE using the least restrictive device within 7 treatment day(s). (3.)Ms. Kasia Byrd will ambulate with CONTACT GUARD ASSIST for 25+ feet with the least restrictive device within 7 treatment day(s). (4.)Ms. Kasia Byrd will perform standing static and dynamic balance activities x 23 minutes with CONTACT GUARD ASSIST to improve safety within 7 treatment day(s). ________________________________________________________________________________________________ PHYSICAL THERAPY: Initial Assessment, Daily Note and PM 7/1/2019 INPATIENT: PT Visit Days : 1 [place] / [place] / Product Type: Medicare /   
  
NAME/AGE/GENDER: Karla Majano is a 78 y.o. female PRIMARY DIAGNOSIS: Acute on chronic renal failure (HCC) [N17.9, N18.9] Acute cystitis with hematuria Acute cystitis with hematuria ICD-10: Treatment Diagnosis: 
 Generalized Muscle Weakness (M62.81) Other lack of cordination (R27.8) Difficulty in walking, Not elsewhere classified (R26.2) Other abnormalities of gait and mobility (R26.89) History of falling (Z91.81) Precaution/Allergies: 
Baclofen and Lipitor [atorvastatin] ASSESSMENT:  
 
Ms. Anjelica Rodriguez is a 78year old F who presents with cystitis with hematuria, weakness, n/v/d. Prior to hospital admission pt lives with her  and her daughter in a two story home (pt lives on first floor) with no step(s) to enter, has a ramp to enter. Pt endorses several falls in past 6 months. Prior to admission Ms. Anjelica Rodriguez uses a wheelchair with use of walker for in -home distances and transfers for mobility. Upon entering, pt sitting on EOB with OT, agreeable to PT evaluation. Pt wears oxygen at baseline (3L per pt report). she reports no pain at rest. BLE assessment indicates sensation to light touch diminished BLE lower leg (neuropathy), AROM WFL, and strength diminished. Pt performed supine > sit with Max Ax2, sitting EOB with fair sitting balance control. Sit > stand with Min A using RW , cues for safety and sequencing of task. Gait x 5 ft side steps EOB with CGA/Jayla with RW, cues for safety. Treatment initiated to include sit <> stand transfer training and stand pivot transfer from EOB to bedside chair Min A with cues for progression of walker and safety. Pt with decreased step length and at times difficulty initiating movement. At end of session pt sitting in bedside chair with all needs within reach, alarm activated for safety, RN notified. Pt presents as functioning below her baseline, with deficits in mobility including transfers, gait, balance, and activity tolerance. Pt will benefit from skilled therapy services to address stated deficits to promote return to highest level of function, independence, and safety. Will continue to follow. This section established at most recent assessment PROBLEM LIST (Impairments causing functional limitations): 
Decreased Strength Decreased Transfer Abilities Decreased Ambulation Ability/Technique Decreased Balance Decreased Activity Tolerance Increased Fatigue Increased Shortness of Breath INTERVENTIONS PLANNED: (Benefits and precautions of physical therapy have been discussed with the patient.) Balance Exercise Bed Mobility Family Education Gait Training Home Exercise Program (HEP) Neuromuscular Re-education/Strengthening Range of Motion (ROM) Therapeutic Activites Therapeutic Exercise/Strengthening Transfer Training TREATMENT PLAN: Frequency/Duration: 3 times a week for duration of hospital stay Rehabilitation Potential For Stated Goals: Good REHAB RECOMMENDATIONS (at time of discharge pending progress):   
Placement: It is my opinion, based on this patient's performance to date, that Ms. Rollo Bamberger may benefit from intensive therapy at a [address] [place] [address] after discharge due to the functional deficits listed above that are likely to improve with skilled rehabilitation and concerns that he/she may be unsafe to be unsupervised at home due to medical complications and mobility deficits which put her at risk for functional decline and/or falling . Equipment:  
None at this time HISTORY:  
History of Present Injury/Illness (Reason for Referral): 
See H&P. Past Medical History/Comorbidities: Ms. 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 Type II or unspecified type diabetes mellitus without mention of complication, uncontrolled ([facility] (12/16/2013), and Unspecified adverse effect of anesthesia. She also has no past medical history of Difficult intubation, Malignant hyperthermia due to anesthesia, Nausea & vomiting, or Pseudocholinesterase deficiency. Ms. Rollo Bamberger  has a past surgical history that includes hx colonoscopy; pr cabg, artery-vein, four (Oct. 2011); hx cataract removal (Bilateral, 11/2012); hx hysterectomy; hx lap cholecystectomy; hx hip fracture tx (Left); pr close cystostomy; vascular surgery procedure unlist (Left, 06/07/2017); and vascular surgery procedure unlist (Left, 08/10/2017). Social History/Living Environment:  
Home Environment: Private residence # Steps to Enter: 0 Wheelchair Ramp: Yes One/Two Story Residence: Two story, live on 1st floor Living Alone: No 
Support Systems: Spouse/Significant Other/Partner, Child(dania) Patient Expects to be Discharged to[de-identified] Rehabilitation facility Current DME Used/Available at Home: Wheelchair, Coletta Brownie, rollator Tub or Shower Type: Shower Prior Level of Function/Work/Activity: Pt poor historian, somewhat unclear. Uses walker for transfers and short distances, otherwise [place]. On O2 at baseline. Number of Personal Factors/Comorbidities that affect the Plan of Care: 1-2: MODERATE COMPLEXITY EXAMINATION:  
Most Recent Physical Functioning:  
Gross Assessment: 
AROM: Generally decreased, functional 
Strength: Generally decreased, functional 
Sensation: Impaired(neuropathy BLE lower leg) Posture: 
  
Balance: 
Sitting: Impaired Sitting - Static: Good (unsupported) Sitting - Dynamic: Fair (occasional) Standing: Impaired Standing - Static: Fair Standing - Dynamic : Fair Bed Mobility: 
Supine to Sit: Maximum assistance;Assist x2 Scooting: Maximum assistance;Assist x2 Wheelchair Mobility: 
  
Transfers: 
Sit to Stand: Contact guard assistance;Minimum assistance Stand to Sit: Contact guard assistance;Minimum assistance Bed to Chair: Contact guard assistance Interventions: Safety awareness training;Verbal cues; Tactile cues Gait: 
  
Base of Support: Center of gravity altered Speed/Viry: Pace decreased (<100 feet/min); Shuffled Step Length: Left shortened;Right shortened Gait Abnormalities: Antalgic;Decreased step clearance; Path deviations;Trunk sway increased Distance (ft): 5 Feet (ft) Assistive Device: Walker, rolling Ambulation - Level of Assistance: Minimal assistance Body Structures Involved: 
Nerves Lungs Bones Joints Muscles Body Functions Affected: 
Sensory/Pain Neuromusculoskeletal 
Movement Related Activities and Participation Affected: 
General Tasks and Demands Mobility Interpersonal Interactions and Relationships Community, Social and [place] Number of elements that affect the Plan of Care: 3: MODERATE COMPLEXITY CLINICAL PRESENTATION:  
Presentation: Stable and uncomplicated: LOW COMPLEXITY CLINICAL DECISION MAKIN[address] [place] [address] [address] [id number] AM-PAC? ?6 Clicks? Basic Mobility Inpatient Short Form How much difficulty does the patient currently have. .. Unable A Lot A Little None 1. Turning over in bed (including adjusting bedclothes, sheets and blankets)? ? 1   ? 2   ? 3   ? 4  
2. Sitting down on and standing up from a chair with arms ( e.g., wheelchair, bedside commode, etc.)   ? 1   ? 2   ? 3   ? 4  
3. Moving from lying on back to sitting on the side of the bed?   ? 1   ? 2   ? 3   ? 4 How much help from another person does the patient currently need. .. Total A Lot A Little None 4. Moving to and from a bed to a chair (including a wheelchair)? ? 1   ? 2   ? 3   ? 4  
5. Need to walk in hospital room? ? 1   ? 2   ? 3   ? 4  
6. Climbing 3-5 steps with a railing? ? 1   ? 2   ? 3   ? 4  
© 2007, Trustees of [address] [place] [address] [address] [id number], under license to [employer]. All rights reserved Score:  Initial: 17 Most Recent: X (Date: -- ) Interpretation of Tool:  Represents activities that are increasingly more difficult (i.e. Bed mobility, Transfers, Gait). Medical Necessity:    
Patient is expected to demonstrate progress in strength, range of motion, balance, coordination and functional technique 
 to increase independence with all mobility. . 
Reason for Services/Other Comments: 
Patient continues to require skilled intervention due to medical complications and mobility deficits which impact her level of function, independence, and safety as indicated above. .  
Use of outcome tool(s) and clinical judgement create a POC that gives a: Clear prediction of patient's progress: LOW COMPLEXITY  
  
 
TREATMENT:  
(In addition to Assessment/Re-Assessment sessions the following treatments were rendered) Pre-treatment Symptoms/Complaints:  No complaints, agreeable to PT with encouragement. Pain: Initial:  
Pain Intensity 1: 0  Post Session:  Unchanged Therapeutic Activity: (   10 min): Therapeutic activities including Bed transfers, Chair transfers, Ambulation on level ground and standing balance control activities with instruction in safety awareness  to improve mobility, strength, balance and coordination. Required moderate cues and instruction   to promote static and dynamic balance in standing and promote coordination of bilateral, lower extremity(s). Braces/Orthotics/Lines/Etc:  
IV 
O2 Device: Nasal cannula Treatment/Session Assessment:   
Response to Treatment:  See above Interdisciplinary Collaboration:  
Physical Therapist 
Occupational Therapist 
Registered Nurse After treatment position/precautions:  
Up in chair Bed alarm/tab alert on Bed/Chair-wheels locked Bed in low position Call light within reach RN notified Compliance with Program/Exercises: Will assess as treatment progresses Recommendations/Intent for next treatment session: \"Next visit will focus on advancements to more challenging activities and reduction in assistance provided\". Total Treatment Duration: PT Patient Time In/Time Out Time In: 8319 Time Out: 1401 Yordy Shaw, PT, DPT

## 2019-07-01 NOTE — PROGRESS NOTES
[facility] Subjective: A on CKD   Still with multiple complaints. Review of Systems -  
General ROS: negative for - fever, chills Respiratory ROS: no SOB, cough, WARREN Cardiovascular ROS: no CP, palpitations Gastrointestinal ROS: no N&V, abdominal pain, diarrhea Genito-Urinary ROS: no difficulty voiding, dysuria Neurological ROS: no seizures, focal weekness Objective: 
 
Vitals:  
 06/30/19 2331 07/01/19 0450 07/01/19 0800 07/01/19 1131 BP: 111/47 136/62 99/58 102/53 Pulse: 67 77 64 67 Resp: 18 18 18 18 Temp: 97.7 °F (36.5 °C) 98.1 °F (36.7 °C) 97.6 °F (36.4 °C) 97.5 °F (36.4 °C) SpO2: 96% 92% 96% 94% Weight:      
Height:      
 
 
PE 
Gen: in no acute distress CV:reg rate Chest:clear Abd: soft Ext/Access: no edema Marissa Victor M LAB Recent Labs  
  07/01/19 
0733 06/30/19 
1615 06/29/19 
2202 WBC 11.7* 16.1* 18.2* HGB 9.7* 10.8* 11.3* HCT 29.6* 32.1* 33.4*  
 238 332 Recent Labs  
  07/01/19 
0733 06/30/19 
1127 06/30/19 
1955 06/29/19 
2202   --  136 134* K 3.3*  --  3.5 2.9*  
  --  103 98 CO2 16*  --  16* 19* *  --  72 69 * 161*  --  169* CREA 8.70*  --  9.08* 9.29* MG  --   --   --  2.3 CA 7.0*  --  7.3* 8.0* ALB 2.8*  --   --  3.6 TBILI 0.2  --   --  0.5 ALT <6*  --   --  9* SGOT 18  --   --  24  
 
 
 
 
Radiology A/P:  
Patient Active Problem List  
Diagnosis Code  
 HTN (hypertension) I10  
 AF (atrial fibrillation) ([facility]) I48.91  
 Diabetic neuropathy ([facility] E11.40  A-V fistula ([facility]) I77.0  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 ([facility] E11.21  
 SARAH (obstructive sleep apnea) G47.33  
 CKD (chronic kidney disease) stage 5, GFR less than 15 ml/min ([facility]) N18.5  Parkinson disease ([facility] G20  
 Acute on chronic renal failure ([facility]) N17.9, N18.9  Acute cystitis with hematuria N30.01  
 Morbid obesity ([facility]) E66.01  
 Hypokalemia E87.6  
 C. difficile diarrhea A04.72  
 A on CKD - some improvement in labs with IV fluids. I am not sure if we willl be able to [facility] dialysis, but will give her another 24 hrs with IV fluids. HTN/Volume - getting LR at 1125/ hr 
 
[place] C Brittny Sepulveda MD

## 2019-07-01 NOTE — PROGRESS NOTES
Hospitalist Note Admit Date:  2019  9:55 PM  
Name:  Brigitte Black Age:  78 y.o. 
:  1940 MRN:  [id number] PCP:  Jaimie Dorado MD 
Treatment Team: Attending Provider: Anayeli Dill MD; Primary Nurse: Orestes Larson; Consulting Provider: Delfino Schwab MD; Utilization Review: Lindsay Barone RN; Charge Nurse: Minerva Gillespie; Physical Therapist: Nai Ignacio PT; Care Manager: Hayde Castro RN 
 
HPI/Subjective:  
 
Ms. Radha Reza is a 77 yo female with PMH of CKD V not yet on dialysis, AFIB, CAD s/p CABG, HTN, DM2, chronic hypoxic respiratory failure on home O2, sCHF (EF 45-50%)  admitted with diarrhea and ANTONIO on CKD. Stool studies are  cdiff positive. She is receiving hydration and nephrology following. She is receiving oral vancomycin/ IV flagyl. ABD series negative. She additionally has UTI, receiving rocephin x 3 days. Urine cx GNR. CT head negative. Discharge plans pending 19 weak, has anorexia, no further diarrhea, no abd pain, no dyspnea, making less urine,  
 
 
 
 
Objective:  
 
Patient Vitals for the past 24 hrs: 
 Temp Pulse Resp BP SpO2  
19 1630 97.5 °F (36.4 °C) 62 18 106/61 97 % 19 1131 97.5 °F (36.4 °C) 67 18 102/53 94 % 19 0800 97.6 °F (36.4 °C) 64 18 99/58 96 % 19 0450 98.1 °F (36.7 °C) 77 18 136/62 92 % 19 2331 97.7 °F (36.5 °C) 67 18 111/47 96 % 19 2013 97.8 °F (36.6 °C) 69 18 112/50 98 % Oxygen Therapy O2 Sat (%): 97 % (19 1630) Pulse via Oximetry: 68 beats per minute (19 2220) O2 Device: Nasal cannula (19 1630) O2 Flow Rate (L/min): 1 l/min (19 1630) Intake/Output Summary (Last 24 hours) at 2019 1732 Last data filed at 2019 1843 Gross per 24 hour Intake 240 ml Output  Net 240 ml  
   
*Note that automatically entered I/Os may not be accurate; dependent on patient compliance with collection and accurate  by techs. General:    Elderly, lethargic, no distress, verbal 
CV:   RRR. No murmur, rub, or gallop. No edema Lungs:   CTAB. No wheezing, rhonchi, or rales. Anterior exam 
Abdomen:   Soft, nontender, nondistended. Present BS Extremities: Warm and dry Skin:     No rashes or jaundice. Neuro:  No gross focal deficits Data Review: 
I have reviewed all labs, meds, and studies from the last 24 hours: 
 
Recent Results (from the past 24 hour(s)) GLUCOSE, POC Collection Time: 06/30/19  8:57 PM  
Result Value Ref Range Glucose (POC) 185 (H) 65 - 100 mg/dL METABOLIC PANEL, COMPREHENSIVE Collection Time: 07/01/19  7:33 AM  
Result Value Ref Range Sodium 137 136 - 145 mmol/L Potassium 3.3 (L) 3.5 - 5.1 mmol/L Chloride 104 98 - 107 mmol/L  
 CO2 16 (L) 21 - 32 mmol/L Anion gap 17 (H) 7 - 16 mmol/L Glucose 189 (H) 65 - 100 mg/dL  (H) 8 - 23 MG/DL Creatinine 8.70 (H) 0.6 - 1.0 MG/DL  
 GFR est AA 6 (L) >60 ml/min/1.73m2 GFR est non-AA 5 (L) >60 ml/min/1.73m2 Calcium 7.0 (L) 8.3 - 10.4 MG/DL Bilirubin, total 0.2 0.2 - 1.1 MG/DL  
 ALT (SGPT) <6 (L) 12 - 65 U/L  
 AST (SGOT) 18 15 - 37 U/L Alk. phosphatase 126 50 - 136 U/L Protein, total 6.3 6.3 - 8.2 g/dL Albumin 2.8 (L) 3.2 - 4.6 g/dL Globulin 3.5 2.3 - 3.5 g/dL A-G Ratio 0.8 (L) 1.2 - 3.5    
CBC WITH AUTOMATED DIFF Collection Time: 07/01/19  7:33 AM  
Result Value Ref Range WBC 11.7 (H) 4.3 - 11.1 K/uL  
 RBC 3.16 (L) 4.05 - 5.2 M/uL HGB 9.7 (L) 11.7 - 15.4 g/dL HCT 29.6 (L) 35.8 - 46.3 % MCV 93.7 79.6 - 97.8 FL  
 MCH 30.7 26.1 - 32.9 PG  
 MCHC 32.8 31.4 - 35.0 g/dL  
 RDW 14.0 11.9 - 14.6 % PLATELET 281 160 - 431 K/uL MPV 12.4 (H) 9.4 - 12.3 FL ABSOLUTE NRBC 0.02 0.0 - 0.2 K/uL  
 DF AUTOMATED NEUTROPHILS 80 (H) 43 - 78 % LYMPHOCYTES 11 (L) 13 - 44 % MONOCYTES 4 4.0 - 12.0 % EOSINOPHILS 2 0.5 - 7.8 % BASOPHILS 1 0.0 - 2.0 % IMMATURE GRANULOCYTES 3 0.0 - 5.0 % ABS. NEUTROPHILS 9.3 (H) 1.7 - 8.2 K/UL  
 ABS. LYMPHOCYTES 1.3 0.5 - 4.6 K/UL  
 ABS. MONOCYTES 0.5 0.1 - 1.3 K/UL  
 ABS. EOSINOPHILS 0.2 0.0 - 0.8 K/UL  
 ABS. BASOPHILS 0.1 0.0 - 0.2 K/UL  
 ABS. IMM. GRANS. 0.3 0.0 - 0.5 K/UL GLUCOSE, POC Collection Time: 07/01/19  7:56 AM  
Result Value Ref Range Glucose (POC) 209 (H) 65 - 100 mg/dL GLUCOSE, POC Collection Time: 07/01/19 11:22 AM  
Result Value Ref Range Glucose (POC) 216 (H) 65 - 100 mg/dL PLEASE READ & DOCUMENT PPD TEST IN 24 HRS Collection Time: 07/01/19 11:30 AM  
Result Value Ref Range PPD  Negative  
 mm Induration  0 - 5 0mm GLUCOSE, POC Collection Time: 07/01/19  4:27 PM  
Result Value Ref Range Glucose (POC) 187 (H) 65 - 100 mg/dL All Micro Results Procedure Component Value Units Date/Time Luc Amaro [[id number]] Collected:  06/29/19 2259 Order Status:  Completed Specimen:  Stool Updated:  07/01/19 0840 Special Requests: NO SPECIAL REQUESTS Culture result:    
  NO GROWTH OF SALMONELLA OR SHIGELLA IS EVIDENT ON FIRST READING, FINAL REPORT TO FOLLOW AFTER FURTHER INCUBATION OF CULTURE  
     
 CULTURE, URINE [[id number]]  (Abnormal) Collected:  06/29/19 2308 Order Status:  Completed Specimen:  Cath Urine Updated:  07/01/19 7301 Special Requests: NO SPECIAL REQUESTS Culture result:    
  >100,000 COLONIES/mL GRAM NEGATIVE RODS IDENTIFICATION AND SUSCEPTIBILITY TO FOLLOW CULTURE, BLOOD [[id number]] Collected:  06/30/19 0041 Order Status:  Completed Specimen:  Whole Blood Updated:  07/01/19 4900 Special Requests: --     
  RIGHT JUGULAR VEIN Culture result: NO GROWTH 1 DAY     
 CULTURE, BLOOD [[id number]] Collected:  06/30/19 0371 Order Status:  Completed Specimen:  Blood Updated:  07/01/19 0881 Special Requests: --     
  RIGHT 
HAND   Culture result: NO GROWTH AFTER 20 HOURS     
 C. DIFFICILE/EPI PCR [[id number]]  (Abnormal) Collected:  06/29/19 5668 Order Status:  Completed Specimen:  Stool Updated:  06/30/19 0021 Special Requests: NO SPECIAL REQUESTS Culture result: Toxigenic C Diff POS/027-NAP1-BI PRESUMPTIVE POS RESULTS VERIFIED, PHONED TO AND READ BACK BY 
DR. Mayda Sepulveda @ 0018 ON [id number] BY TVO No results found for this visit on 06/29/19. Current Meds: 
Current Facility-Administered Medications Medication Dose Route Frequency  apixaban (ELIQUIS) tablet 2.5 mg  2.5 mg Oral BID  carbidopa-levodopa (SINEMET)  mg per tablet 1 Tab  1 Tab Oral TID  levothyroxine (SYNTHROID) tablet 25 mcg  25 mcg Oral ACB  potassium chloride (K-DUR, KLOR-CON) SR tablet 20 mEq  20 mEq Oral DAILY PRN  
 sodium chloride (NS) flush 5-40 mL  5-40 mL IntraVENous Q8H  
 sodium chloride (NS) flush 5-40 mL  5-40 mL IntraVENous PRN  
 cefTRIAXone (ROCEPHIN) 1 g in 0.9% sodium chloride (MBP/ADV) 50 mL  1 g IntraVENous Q24H  
 acetaminophen (TYLENOL) tablet 650 mg  650 mg Oral Q4H PRN  
 bisacodyl (DULCOLAX) tablet 5 mg  5 mg Oral DAILY PRN  
 insulin lispro (HUMALOG) injection   SubCUTAneous AC&HS  vancomycin 50 mg/mL oral solution (compounded) 125 mg  125 mg Oral Q6H  
 metroNIDAZOLE (FLAGYL) IVPB premix 500 mg  500 mg IntraVENous Q8H  
 lactated Ringers infusion  125 mL/hr IntraVENous CONTINUOUS Other Studies (last 24 hours): No results found. Assessment and Plan:  
 
Hospital Problems as of 7/1/2019 Date Reviewed: 6/19/2019 Codes Class Noted - Resolved POA  
 C. difficile diarrhea ICD-10-CM: A04.72 
ICD-9-CM: 008.45  6/30/2019 - Present Yes Acute on chronic renal failure (HCC) ICD-10-CM: N17.9, N18.9 ICD-9-CM: 584.9, 585.9  6/29/2019 - Present Yes * (Principal) Acute cystitis with hematuria ICD-10-CM: N30.01 
ICD-9-CM: 595.0  6/29/2019 - Present Yes  Morbid obesity (HCC) (Chronic) ICD-10-CM: E66.01 
 ICD-9-CM: 278.01  6/29/2019 - Present Yes Hypokalemia ICD-10-CM: E87.6 ICD-9-CM: 276.8  6/29/2019 - Present Yes Parkinson disease ([facility] (Chronic) ICD-10-CM: G20 
ICD-9-CM: 332.0  6/19/2019 - Present Yes  
   
 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 
ICD-9-CM: 250.40, 583.81  11/15/2017 - Present Yes  
   
 AF (atrial fibrillation) (HCC) (Chronic) ICD-10-CM: I48.91 
ICD-9-CM: 427.31  11/20/2011 - Present Yes HTN (hypertension) (Chronic) ICD-10-CM: I10 
ICD-9-CM: 401.9  11/10/2011 - Present Yes Plan: 
· Sepsis due to cdiff colitis: continue oral vancomycin and IV flagyl given hypotension/ sepsis, had negative ABD series 6-29 · ANTONIO on CKD V: appreciate nephrology, continue hydration and followup labs · UTI: on rocephin day 2/3, followup urine cx · DM2: holding lantus and premeal insulin, added SSI · Hypokalemia: cautious prn replacement · AFIB: continued eliquis · parkinsons disease: continue sinemet · Anemia: some dilutional, followup CBC 
 
DC planning/Dispo:  Pending, PPD , PT/OT as tolerant Diet:  DIET DIABETIC CONSISTENT CARB 
DVT ppx:  eliquis Signed: Karoline Shook MD

## 2019-07-01 NOTE — PROGRESS NOTES
Problem: Self Care Deficits Care Plan (Adult) Goal: *Acute Goals and Plan of Care (Insert Text) Description 1. Patient will complete total body bathing and dressing with minimal assistance and adaptive equipment as needed. 2. Patient will complete toileting with contact guard assistance and adaptive equipment as needed. 3. Patient will tolerate 20 minutes of OT treatment with up to 3 rest breaks to increase activity tolerance for ADLs. 4. Patient will complete functional transfers with stand by assistance and adaptive equipment as needed. 5. Patient will demonstrate modified independence with therapeutic exercise HEP to increase strength in BUEs for increased safety and independence with functional transfers. 6. Patient will complete functional mobility for ADLs with stand by assistance and adaptive equipment as needed. 7. Patient will complete bed mobility in preparation for functional transfers with minimal assistance and adaptive equipment as needed. Timeframe: 7 visits Outcome: Progressing Towards Goal 
  
OCCUPATIONAL THERAPY: Initial Assessment, Daily Note and PM 7/1/2019 INPATIENT: OT Visit Days: 1 [place] / [place] / Product Type: Medicare /  
  
NAME/AGE/GENDER: Damion Wallace is a 78 y.o. female PRIMARY DIAGNOSIS:  Acute on chronic renal failure (HCC) [N17.9, N18.9] Acute cystitis with hematuria Acute cystitis with hematuria ICD-10: Treatment Diagnosis:  
 Generalized Muscle Weakness (M62.81) Other lack of cordination (R27.8) History of falling (Z91.81) Precautions/Allergies: 
  Fall precautions  Baclofen and Lipitor [atorvastatin] ASSESSMENT:  
Ms. Jason Harper is a 78 y.o. female admitted with cystitis with hematuria, weakness, n/v/d. At baseline pt lives with spouse and son and reports independence to modified independence with ADLs, mobility with w/c, transfers and ambulation of short distances with walker.  Pt demonstrates confusion throughout session though oriented x3, so pt likely poor historian. Utilizes 3L supplemental O2. Upon arrival pt alert and agreeable to OT evaluation and treatment. BUE assessment revealed AROM and strength decreased in BUEs, sensation impaired to light touch: tingling due to neuropathy. Pt completed bed mobility with MaxA x2/cueing/additional time. Treatment initiated to include sitting unsupported with MaxA and L sided trunk flexion progressing to SBA. Pt practiced changing gowns with Jayla-SBA and donning socks with TA, functional transfers with Jayla-CGA/cueing. Pt left with PT for assessment. Pt presents with deficits in strength, activity tolerance, balance, functional mobility and functional transfers, all of which negatively impact safety and independence with ADLs. Alexus Spine is currently functioning below baseline and would benefit from continued OT to increase safety and independence with ADLs. Will follow. This section established at most recent assessment PROBLEM LIST (Impairments causing functional limitations): 
Decreased Strength Decreased ADL/Functional Activities Decreased Transfer Abilities Decreased Ambulation Ability/Technique Decreased Balance Increased Pain Decreased Activity Tolerance Increased Fatigue Increased Shortness of Breath Decreased Flexibility/Joint Mobility Edema/Girth Decreased Knowledge of Precautions Decreased [place] with Home Exercise Program 
Decreased Cognition INTERVENTIONS PLANNED: (Benefits and precautions of occupational therapy have been discussed with the patient.) Activities of daily living training Adaptive equipment training Balance training Clothing management Cognitive training Donning&agatha training Hygiene training Neuromuscular re-eduation Re-evaluation Therapeutic activity Therapeutic exercise TREATMENT PLAN: Frequency/Duration: Follow patient 3x/week to address above goals. Rehabilitation Potential For Stated Goals: Good REHAB RECOMMENDATIONS (at time of discharge pending progress):   
Placement: It is my opinion, based on this patient's performance to date, that Ms. Lawrence To may benefit from intensive therapy at a [address] [place] [address] after discharge due to the functional deficits listed above that are likely to improve with skilled rehabilitation and concerns that he/she may be unsafe to be unsupervised at home due to decreased balance, activity tolerance, bed mobility, functional transfers impacting ADLs and increasing risk for falls . Equipment: TBD OCCUPATIONAL PROFILE AND HISTORY:  
History of Present Injury/Illness (Reason for Referral): 
See H&P. Past Medical History/Comorbidities: Ms. 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), Neuropathy, Obesity (BMI 30-39.9) (10/2/14), PAD (peripheral artery disease) ([address] PCI (pneumatosis cystoides intestinalis) (11/5/2013), Pleural effusion, bilateral (11/21/2011), Postoperative anemia due to acute blood loss (11/21/2011), Renal mass (5/14/2016), Rib cage fracture (11/5/2013), S/P CABG (coronary artery bypass graft) (11/05/2013), S/P CABG x 3 (11/18/2011), Stasis edema of both lower extremities (1/3/2018), Status post-operative repair of hip fracture (09/15/2014), Stroke ([address] Type II or unspecified type diabetes mellitus without mention of complication, uncontrolled ([address] (12/16/2013), and Unspecified adverse effect of anesthesia. She also has no past medical history of Difficult intubation, Malignant hyperthermia due to anesthesia, Nausea & vomiting, or Pseudocholinesterase deficiency. Ms. Saman Sparks  has a past surgical history that includes hx colonoscopy; pr cabg, artery-vein, four (Oct. 2011); hx cataract removal (Bilateral, 11/2012); hx hysterectomy; hx lap cholecystectomy; hx hip fracture tx (Left); pr close cystostomy; vascular surgery procedure unlist (Left, 06/07/2017); and vascular surgery procedure unlist (Left, 08/10/2017). Social History/Living Environment:  
Home Environment: Private residence # Steps to Enter: 0 Wheelchair Ramp: Yes One/Two Story Residence: Two story, live on 1st floor Living Alone: No 
Support Systems: Child(dania), Spouse/Significant Other/Partner Patient Expects to be Discharged to[de-identified] Rehabilitation facility Current DME Used/Available at Home: Wheelchair, Walker, rollator, Commode, bedside Tub or Shower Type: Shower Prior Level of Function/Work/Activity: At baseline pt lives with spouse and son and reports independence to modified independence with ADLs, mobility with w/c, transfers and ambulation of short distances with walker. Pt demonstrates confusion throughout session though oriented x3, so pt likely poor historian. Utilizes 3L supplemental O2. Dominant Side:  
      RIGHT Personal Factors:   
      Sex:  female Age:  78 y.o. Other factors that influence how disability is experienced by the patient:  Multiple co-morbidities, falls Number of Personal Factors/Comorbidities that affect the Plan of Care: Expanded review of therapy/medical records (1-2):  MODERATE COMPLEXITY ASSESSMENT OF OCCUPATIONAL PERFORMANCE[de-identified]  
Activities of Daily Living:  
Basic ADLs (From Assessment) Complex ADLs (From Assessment) Feeding: Independent Oral Facial Hygiene/Grooming: Contact guard assistance Bathing: Moderate assistance Upper Body Dressing: Setup Lower Body Dressing: Total assistance Toileting: Moderate assistance Instrumental ADL Meal Preparation: Total assistance Homemaking: Total assistance Medication Management: Total assistance Financial Management: Total assistance Grooming/Bathing/Dressing Activities of Daily Living Cognitive Retraining Safety/Judgement: Decreased awareness of need for assistance;Decreased awareness of need for safety;Decreased insight into deficits; Fall prevention Bed/Mat Mobility Supine to Sit: Maximum assistance;Assist x2 Sit to Stand: Minimum assistance;Contact guard assistance Stand to Sit: Minimum assistance;Contact guard assistance Scooting: Total assistance Most Recent Physical Functioning:  
Gross Assessment: 
AROM: Generally decreased, functional(BUEs) Strength: Generally decreased, functional(BUEs) Sensation: Impaired(tingling in BUEs d/t neuropathy) Posture: 
  
Balance: 
Sitting: Impaired Sitting - Static: Good (unsupported) Sitting - Dynamic: Fair (occasional); Good (unsupported) Standing: Impaired Standing - Static: Fair;Constant support Bed Mobility: 
Supine to Sit: Maximum assistance;Assist x2 Scooting: Total assistance Wheelchair Mobility: 
  
Transfers: 
Sit to Stand: Minimum assistance;Contact guard assistance Stand to Sit: Minimum assistance;Contact guard assistance Patient Vitals for the past 6 hrs: 
 BP BP Patient Position SpO2 O2 Flow Rate (L/min) Pulse 07/01/19 1131 102/53 At rest 94 % 1 l/min 67 Mental Status Neurologic State: Alert Orientation Level: Oriented to person, Oriented to place, Oriented to situation Cognition: Follows commands, Memory loss Perception: Appears intact Perseveration: No perseveration noted Safety/Judgement: Decreased awareness of need for assistance, Decreased awareness of need for safety, Decreased insight into deficits, Fall prevention Physical Skills Involved: 
Range of Motion Balance Strength Activity Tolerance Sensation Pain (Chronic) Cognitive Skills Affected (resulting in the inability to perform in a timely and safe manner): Executive Function Short Term Recall Long Term Memory Sustained Attention Divided Attention Comprehension Psychosocial Skills Affected: 
Habits/Routines Social Interaction Emotional Regulation Self-Awareness Awareness of Others Social Roles Number of elements that affect the Plan of Care: 5+:  HIGH COMPLEXITY CLINICAL DECISION MAKIN[address] [place] [address] AM-PAC? ?6 Clicks? Daily Activity Inpatient Short Form How much help from another person does the patient currently need. .. Total A Lot A Little None 1. Putting on and taking off regular lower body clothing? ? 1   ? 2   ? 3   ? 4  
2. Bathing (including washing, rinsing, drying)? ? 1   ? 2   ? 3   ? 4  
3. Toileting, which includes using toilet, bedpan or urinal?   ? 1   ? 2   ? 3   ? 4  
4. Putting on and taking off regular upper body clothing? ? 1   ? 2   ? 3   ? 4  
5. Taking care of personal grooming such as brushing teeth? ? 1   ? 2   ? 3   ? 4  
6. Eating meals? ? 1   ? 2   ? 3   ? 4  
© , Trustees of [address] [place] [address], under license to [employer]. All rights reserved Score:  Initial: 12 19 Most Recent: X (Date: -- ) Interpretation of Tool:  Represents activities that are increasingly more difficult (i.e. Bed mobility, Transfers, Gait). Medical Necessity:    
Patient demonstrates good 
 rehab potential due to higher previous functional level. Reason for Services/Other Comments: 
Patient continues to require skilled intervention due to inability to complete ADLs at prior level of independence Demetrius Lobo Use of outcome tool(s) and clinical judgement create a POC that gives a: MODERATE COMPLEXITY  
 
 
 
TREATMENT:  
(In addition to Assessment/Re-Assessment sessions the following treatments were rendered) Pre-treatment Symptoms/Complaints:   
Pain: Initial:  
Pain Intensity 1: 0 Pain Location 1: Back, Shoulder Pain Intervention(s) 1: Ambulation/Increased Activity, Repositioned Pt quantifies pain as 0/10 when prompted, then c/o pain in back and shoulder with bed mobility Post Session:  no complaint of pain at rest  
 
Therapeutic Activity: (    8 minutes): Therapeutic activities including Chair transfers and unsupported sitting  to improve mobility, strength, balance, coordination and activity tolerance . Required minimal   to promote static and dynamic balance in standing. Treatment initiated to include sitting unsupported with MaxA and L sided trunk flexion progressing to SBA. Pt practiced changing gowns with Jayla-SBA and donning socks with TA, functional transfers with Jayla-CGA/cueing. Braces/Orthotics/Lines/Etc:  
IV 
O2 Device: Nasal cannula Treatment/Session Assessment:   
Response to Treatment:  Tolerated well Interdisciplinary Collaboration:  
Physical Therapist 
Occupational Therapist 
Registered Nurse Certified Nursing Assistant/Patient Care Technician After treatment position/precautions:  
Bed/Chair-wheels locked Bed in low position Call light within reach With PT Compliance with Program/Exercises: Compliant all of the time, Will assess as treatment progresses. Recommendations/Intent for next treatment session: \"Next visit will focus on advancements to more challenging activities and reduction in assistance provided\". Total Treatment Duration: OT Patient Time In/Time Out Time In: 9707 Time Out: 9818 Noemi De Oliveira OTR/L

## 2019-07-01 NOTE — PROGRESS NOTES
Occupational Therapy Note: 
OT orders received, chart reviewed for evaluation. Per PT, patient requesting to rest this morning and postpone therapy evaluation till later in the day. Will check back as schedule permits and patient is available to initiate occupational therapy evaluation.   
Thank you for this consult,  
Noemi De Oliveira, OTR/L

## 2019-07-01 NOTE — PROGRESS NOTES
Cm met with patient to discuss discharge plan. Patient states she would be interested in possible IRC. Will send referral when medically stable.

## 2019-07-01 NOTE — PROGRESS NOTES
Interdisciplinary Rounds completed 07/01/19. Nursing, Case Management, Physician and PT present. Plan of care reviewed and updated. Nephrology following.

## 2019-07-02 LAB
ANION GAP SERPL CALC-SCNC: 16 MMOL/L (ref 7–16)
BACTERIA SPEC CULT: ABNORMAL
BASOPHILS # BLD: 0.1 K/UL (ref 0–0.2)
BASOPHILS NFR BLD: 1 % (ref 0–2)
BUN SERPL-MCNC: 154 MG/DL (ref 8–23)
CALCIUM SERPL-MCNC: 7.3 MG/DL (ref 8.3–10.4)
CHLORIDE SERPL-SCNC: 108 MMOL/L (ref 98–107)
CO2 SERPL-SCNC: 16 MMOL/L (ref 21–32)
CREAT SERPL-MCNC: 8.28 MG/DL (ref 0.6–1)
DIFFERENTIAL METHOD BLD: ABNORMAL
EOSINOPHIL # BLD: 0.3 K/UL (ref 0–0.8)
EOSINOPHIL NFR BLD: 3 % (ref 0.5–7.8)
ERYTHROCYTE [DISTWIDTH] IN BLOOD BY AUTOMATED COUNT: 13.8 % (ref 11.9–14.6)
GLUCOSE BLD STRIP.AUTO-MCNC: 186 MG/DL (ref 65–100)
GLUCOSE BLD STRIP.AUTO-MCNC: 217 MG/DL (ref 65–100)
GLUCOSE BLD STRIP.AUTO-MCNC: 222 MG/DL (ref 65–100)
GLUCOSE BLD STRIP.AUTO-MCNC: 228 MG/DL (ref 65–100)
GLUCOSE SERPL-MCNC: 157 MG/DL (ref 65–100)
HCT VFR BLD AUTO: 28 % (ref 35.8–46.3)
HGB BLD-MCNC: 9.4 G/DL (ref 11.7–15.4)
IMM GRANULOCYTES # BLD AUTO: 0.3 K/UL (ref 0–0.5)
IMM GRANULOCYTES NFR BLD AUTO: 3 % (ref 0–5)
LYMPHOCYTES # BLD: 1.4 K/UL (ref 0.5–4.6)
LYMPHOCYTES NFR BLD: 14 % (ref 13–44)
MCH RBC QN AUTO: 31 PG (ref 26.1–32.9)
MCHC RBC AUTO-ENTMCNC: 33.6 G/DL (ref 31.4–35)
MCV RBC AUTO: 92.4 FL (ref 79.6–97.8)
MM INDURATION POC: NORMAL 0MM (ref 0–5)
MONOCYTES # BLD: 0.5 K/UL (ref 0.1–1.3)
MONOCYTES NFR BLD: 5 % (ref 4–12)
NEUTS SEG # BLD: 7.5 K/UL (ref 1.7–8.2)
NEUTS SEG NFR BLD: 74 % (ref 43–78)
NRBC # BLD: 0 K/UL (ref 0–0.2)
PLATELET # BLD AUTO: 248 K/UL (ref 150–450)
PMV BLD AUTO: 12.6 FL (ref 9.4–12.3)
POTASSIUM SERPL-SCNC: 3.3 MMOL/L (ref 3.5–5.1)
PPD POC: NORMAL NEGATIVE
RBC # BLD AUTO: 3.03 M/UL (ref 4.05–5.2)
SERVICE CMNT-IMP: ABNORMAL
SODIUM SERPL-SCNC: 140 MMOL/L (ref 136–145)
WBC # BLD AUTO: 10.1 K/UL (ref 4.3–11.1)

## 2019-07-02 PROCEDURE — 74011000258 HC RX REV CODE- 258: Performed by: FAMILY MEDICINE

## 2019-07-02 PROCEDURE — 74011250636 HC RX REV CODE- 250/636: Performed by: FAMILY MEDICINE

## 2019-07-02 PROCEDURE — 74011250636 HC RX REV CODE- 250/636: Performed by: INTERNAL MEDICINE

## 2019-07-02 PROCEDURE — 74011250637 HC RX REV CODE- 250/637: Performed by: INTERNAL MEDICINE

## 2019-07-02 PROCEDURE — 74011250637 HC RX REV CODE- 250/637: Performed by: FAMILY MEDICINE

## 2019-07-02 PROCEDURE — 85025 COMPLETE CBC W/AUTO DIFF WBC: CPT

## 2019-07-02 PROCEDURE — 97530 THERAPEUTIC ACTIVITIES: CPT

## 2019-07-02 PROCEDURE — 74011636637 HC RX REV CODE- 636/637: Performed by: INTERNAL MEDICINE

## 2019-07-02 PROCEDURE — 80048 BASIC METABOLIC PNL TOTAL CA: CPT

## 2019-07-02 PROCEDURE — 65270000029 HC RM PRIVATE

## 2019-07-02 PROCEDURE — 36415 COLL VENOUS BLD VENIPUNCTURE: CPT

## 2019-07-02 PROCEDURE — 82962 GLUCOSE BLOOD TEST: CPT

## 2019-07-02 RX ORDER — LORAZEPAM 2 MG/ML
0.25 INJECTION INTRAMUSCULAR
Status: DISCONTINUED | OUTPATIENT
Start: 2019-07-02 | End: 2019-07-12 | Stop reason: HOSPADM

## 2019-07-02 RX ADMIN — INSULIN LISPRO 2 UNITS: 100 INJECTION, SOLUTION INTRAVENOUS; SUBCUTANEOUS at 09:05

## 2019-07-02 RX ADMIN — ACETAMINOPHEN 650 MG: 325 TABLET, FILM COATED ORAL at 10:39

## 2019-07-02 RX ADMIN — SODIUM CHLORIDE, SODIUM LACTATE, POTASSIUM CHLORIDE, AND CALCIUM CHLORIDE 125 ML/HR: 600; 310; 30; 20 INJECTION, SOLUTION INTRAVENOUS at 11:00

## 2019-07-02 RX ADMIN — CARBIDOPA AND LEVODOPA 1 TABLET: 25; 100 TABLET ORAL at 09:04

## 2019-07-02 RX ADMIN — CARBIDOPA AND LEVODOPA 1 TABLET: 25; 100 TABLET ORAL at 17:07

## 2019-07-02 RX ADMIN — APIXABAN 2.5 MG: 2.5 TABLET, FILM COATED ORAL at 17:07

## 2019-07-02 RX ADMIN — CEFTRIAXONE 1 G: 1 INJECTION, POWDER, FOR SOLUTION INTRAMUSCULAR; INTRAVENOUS at 02:22

## 2019-07-02 RX ADMIN — LEVOTHYROXINE SODIUM 25 MCG: 50 TABLET ORAL at 05:25

## 2019-07-02 RX ADMIN — VANCOMYCIN HYDROCHLORIDE 125 MG: 1 INJECTION, POWDER, LYOPHILIZED, FOR SOLUTION INTRAVENOUS at 17:07

## 2019-07-02 RX ADMIN — INSULIN LISPRO 4 UNITS: 100 INJECTION, SOLUTION INTRAVENOUS; SUBCUTANEOUS at 23:44

## 2019-07-02 RX ADMIN — METRONIDAZOLE 500 MG: 500 INJECTION, SOLUTION INTRAVENOUS at 06:36

## 2019-07-02 RX ADMIN — VANCOMYCIN HYDROCHLORIDE 125 MG: 1 INJECTION, POWDER, LYOPHILIZED, FOR SOLUTION INTRAVENOUS at 05:25

## 2019-07-02 RX ADMIN — METRONIDAZOLE 500 MG: 500 INJECTION, SOLUTION INTRAVENOUS at 23:44

## 2019-07-02 RX ADMIN — APIXABAN 2.5 MG: 2.5 TABLET, FILM COATED ORAL at 09:04

## 2019-07-02 RX ADMIN — VANCOMYCIN HYDROCHLORIDE 125 MG: 1 INJECTION, POWDER, LYOPHILIZED, FOR SOLUTION INTRAVENOUS at 12:40

## 2019-07-02 RX ADMIN — INSULIN LISPRO 4 UNITS: 100 INJECTION, SOLUTION INTRAVENOUS; SUBCUTANEOUS at 17:07

## 2019-07-02 RX ADMIN — INSULIN LISPRO 4 UNITS: 100 INJECTION, SOLUTION INTRAVENOUS; SUBCUTANEOUS at 12:41

## 2019-07-02 RX ADMIN — CARBIDOPA AND LEVODOPA 1 TABLET: 25; 100 TABLET ORAL at 23:44

## 2019-07-02 RX ADMIN — Medication 10 ML: at 05:31

## 2019-07-02 RX ADMIN — METRONIDAZOLE 500 MG: 500 INJECTION, SOLUTION INTRAVENOUS at 16:55

## 2019-07-02 RX ADMIN — VANCOMYCIN HYDROCHLORIDE 125 MG: 1 INJECTION, POWDER, LYOPHILIZED, FOR SOLUTION INTRAVENOUS at 23:44

## 2019-07-02 NOTE — PROGRESS NOTES
Hospitalist Progress Note Admit Date:  2019  9:55 PM  
Name:  Lazara Vo Age:  78 y.o. 
:  1940 MRN:  [id number] PCP:  Abgiail Campa MD 
Treatment Team: Attending Provider: Angela Anders MD; Primary Nurse: Fitz Watkins Consulting Provider: [place] MD; Utilization Review: Doyne Heimlich, RN; Care Manager: Rosmery Mcdaniel RN; Physical Therapist: Maureen Fraser PT Subjective:  
2019- seen - crying and tearful at the thought of hd; declined therapy today. No diarrhea Objective:  
 
Patient Vitals for the past 24 hrs: 
 Temp Pulse Resp BP SpO2  
19 1114 97.7 °F (36.5 °C) 65 18 91/60 97 % 19 0657 98.3 °F (36.8 °C) 65 18 104/56 95 % 19 0524 98.6 °F (37 °C) 70 18 113/48 93 % 19 2243 98 °F (36.7 °C) 65 18 116/66 94 % 19 2116 97.8 °F (36.6 °C) 62 18 112/64 95 % 19 1630 97.5 °F (36.4 °C) 62 18 106/61 97 % Oxygen Therapy O2 Sat (%): 97 % (19 1114) Pulse via Oximetry: 68 beats per minute (19 2220) O2 Device: Nasal cannula (19 1630) O2 Flow Rate (L/min): 1 l/min (19 1630) Intake/Output Summary (Last 24 hours) at 2019 1508 Last data filed at 2019 6683 Gross per 24 hour Intake 240 ml Output  Net 240 ml General:    Well nourished. Alert. CV:   RRR. No murmur, rub, or gallop. Lungs:   CTAB. No wheezing, rhonchi, or rales. Abdomen:   Soft, nontender, nondistended. Extremities: Warm and dry. No cyanosis or edema. Skin:     No rashes or jaundice. Data Review: 
I have reviewed all labs, meds, telemetry events, and studies from the last 24 hours. Recent Results (from the past 24 hour(s)) GLUCOSE, POC Collection Time: 19  4:27 PM  
Result Value Ref Range Glucose (POC) 187 (H) 65 - 100 mg/dL GLUCOSE, POC Collection Time: 19  8:28 PM  
Result Value Ref Range Glucose (POC) 218 (H) 65 - 100 mg/dL CBC WITH AUTOMATED DIFF Collection Time: 07/02/19  4:00 AM  
Result Value Ref Range WBC 10.1 4.3 - 11.1 K/uL  
 RBC 3.03 (L) 4.05 - 5.2 M/uL HGB 9.4 (L) 11.7 - 15.4 g/dL HCT 28.0 (L) 35.8 - 46.3 % MCV 92.4 79.6 - 97.8 FL  
 MCH 31.0 26.1 - 32.9 PG  
 MCHC 33.6 31.4 - 35.0 g/dL  
 RDW 13.8 11.9 - 14.6 % PLATELET 291 196 - 409 K/uL MPV 12.6 (H) 9.4 - 12.3 FL ABSOLUTE NRBC 0.00 0.0 - 0.2 K/uL  
 DF AUTOMATED NEUTROPHILS 74 43 - 78 % LYMPHOCYTES 14 13 - 44 % MONOCYTES 5 4.0 - 12.0 % EOSINOPHILS 3 0.5 - 7.8 % BASOPHILS 1 0.0 - 2.0 % IMMATURE GRANULOCYTES 3 0.0 - 5.0 %  
 ABS. NEUTROPHILS 7.5 1.7 - 8.2 K/UL  
 ABS. LYMPHOCYTES 1.4 0.5 - 4.6 K/UL  
 ABS. MONOCYTES 0.5 0.1 - 1.3 K/UL  
 ABS. EOSINOPHILS 0.3 0.0 - 0.8 K/UL  
 ABS. BASOPHILS 0.1 0.0 - 0.2 K/UL  
 ABS. IMM. GRANS. 0.3 0.0 - 0.5 K/UL METABOLIC PANEL, BASIC Collection Time: 07/02/19  4:00 AM  
Result Value Ref Range Sodium 140 136 - 145 mmol/L Potassium 3.3 (L) 3.5 - 5.1 mmol/L Chloride 108 (H) 98 - 107 mmol/L  
 CO2 16 (L) 21 - 32 mmol/L Anion gap 16 7 - 16 mmol/L Glucose 157 (H) 65 - 100 mg/dL  (H) 8 - 23 MG/DL Creatinine 8.28 (H) 0.6 - 1.0 MG/DL  
 GFR est AA 6 (L) >60 ml/min/1.73m2 GFR est non-AA 5 (L) >60 ml/min/1.73m2 Calcium 7.3 (L) 8.3 - 10.4 MG/DL  
GLUCOSE, POC Collection Time: 07/02/19  7:00 AM  
Result Value Ref Range Glucose (POC) 186 (H) 65 - 100 mg/dL GLUCOSE, POC Collection Time: 07/02/19 11:18 AM  
Result Value Ref Range Glucose (POC) 222 (H) 65 - 100 mg/dL PLEASE READ & DOCUMENT PPD TEST IN 48 HRS Collection Time: 07/02/19 11:35 AM  
Result Value Ref Range PPD  Negative  
 mm Induration  0 - 5 0mm GLUCOSE, POC Collection Time: 07/02/19  2:52 PM  
Result Value Ref Range Glucose (POC) 217 (H) 65 - 100 mg/dL All Micro Results Procedure Component Value Units Date/Time CULTURE, BLOOD [[id number]] Collected:  06/30/19 1946 Order Status:  Completed Specimen:  Blood Updated:  07/02/19 2735 Special Requests: --     
  RIGHT 
HAND Culture result: NO GROWTH 2 DAYS     
 CULTURE, BLOOD [[id number]] Collected:  06/30/19 0041 Order Status:  Completed Specimen:  Whole Blood Updated:  07/02/19 0850 Special Requests: --     
  RIGHT JUGULAR VEIN Culture result: NO GROWTH 2 DAYS     
 CULTURE, STOOL [[id number]] Collected:  06/29/19 2259 Order Status:  Completed Specimen:  Stool Updated:  07/02/19 1693 Special Requests: NO SPECIAL REQUESTS Culture result:    
  No Salmonella, Shigella, or Ecoli 0157 isolated. CULTURE, URINE [[id number]]  (Abnormal)  (Susceptibility) Collected:  06/29/19 2308 Order Status:  Completed Specimen:  Cath Urine Updated:  07/02/19 2031 Special Requests: NO SPECIAL REQUESTS Culture result:    
  >100,000 COLONIES/mL CITROBACTER FREUNDII  
     
 C. DIFFICILE/EPI PCR [[id number]]  (Abnormal) Collected:  06/29/19 2306 Order Status:  Completed Specimen:  Stool Updated:  06/30/19 0021 Special Requests: NO SPECIAL REQUESTS Culture result: Toxigenic C Diff POS/027-NAP1-BI PRESUMPTIVE POS RESULTS VERIFIED, PHONED TO AND READ BACK BY 
DR. Chowdary Pi @ 0018 ON [id number] BY TVO Current Meds: 
Current Facility-Administered Medications Medication Dose Route Frequency  LORazepam (ATIVAN) injection 0.26 mg  0.26 mg IntraVENous Q6H PRN  
 apixaban (ELIQUIS) tablet 2.5 mg  2.5 mg Oral BID  carbidopa-levodopa (SINEMET)  mg per tablet 1 Tab  1 Tab Oral TID  levothyroxine (SYNTHROID) tablet 25 mcg  25 mcg Oral ACB  sodium chloride (NS) flush 5-40 mL  5-40 mL IntraVENous Q8H  
 sodium chloride (NS) flush 5-40 mL  5-40 mL IntraVENous PRN  
 acetaminophen (TYLENOL) tablet 650 mg  650 mg Oral Q4H PRN  
 bisacodyl (DULCOLAX) tablet 5 mg  5 mg Oral DAILY PRN  
 insulin lispro (HUMALOG) injection   SubCUTAneous AC&HS  
  vancomycin 50 mg/mL oral solution (compounded) 125 mg  125 mg Oral Q6H  
 metroNIDAZOLE (FLAGYL) IVPB premix 500 mg  500 mg IntraVENous Q8H  
 lactated Ringers infusion  125 mL/hr IntraVENous CONTINUOUS Other Studies (last 24 hours): No results found. Assessment and Plan:  
 
Hospital Problems as of 7/2/2019 Date Reviewed: 6/19/2019 Codes Class Noted - Resolved POA  
 C. difficile diarrhea ICD-10-CM: A04.72 
ICD-9-CM: 008.45  6/30/2019 - Present Yes Acute on chronic renal failure (HCC) ICD-10-CM: N17.9, N18.9 ICD-9-CM: 584.9, 585.9  6/29/2019 - Present Yes * (Principal) Acute cystitis with hematuria ICD-10-CM: N30.01 
ICD-9-CM: 595.0  6/29/2019 - Present Yes Morbid obesity ([facility] (Chronic) ICD-10-CM: E66.01 
ICD-9-CM: 278.01  6/29/2019 - Present Yes Hypokalemia ICD-10-CM: E87.6 ICD-9-CM: 276.8  6/29/2019 - Present Yes Parkinson disease ([facility] (Chronic) ICD-10-CM: G20 
ICD-9-CM: 332.0  6/19/2019 - Present Yes  
   
 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 
ICD-9-CM: 250.40, 583.81  11/15/2017 - Present Yes  
   
 AF (atrial fibrillation) (HCC) (Chronic) ICD-10-CM: I48.91 
ICD-9-CM: 427.31  11/20/2011 - Present Yes HTN (hypertension) (Chronic) ICD-10-CM: I10 
ICD-9-CM: 401.9  11/10/2011 - Present Yes PLAN:   
· Sepsis due to cdiif - resolved · cdiff - continue current meds · disha on ckd stage v - per renal - may need hd  
· uti- continue rocephin  
· afib - on eliquis · PD- sinemet · Anemia- monitor DC planning/Dispo:  unclear DVT ppx:  elifaina Signed: 
Joe Moeller MD

## 2019-07-02 NOTE — PROGRESS NOTES
END OF SHIFT NOTE:Pt had 1 very wet brief this shift. Yani care given with barrier cream applied to sacrum. Up to chair x 2 this shift with assist of 2. Gait unsteady. Resting quietly in in bed. Bed in low position with call bell in reach. Hourly rounds completed, all needs met this shift. Will continue to monitor until night shift nurse takes over. INTAKE/OUTPUT No intake/output data recorded. Voiding: YES Catheter: NO 
Color: clear Drain: DIET Diabetic Flatus: Patient does have flatus present. Stool:  1 occurrences. Characteristics: 
Stool Assessment Stool Color: Littie Leep Stool Appearance: Soft, Formed Stool Amount: Small(very small) Stool Source/Status: Rectum Ambulating No 
Emesis: 0 occurrences. Characteristics: VITAL SIGNS Patient Vitals for the past 12 hrs: 
 Temp Pulse Resp BP SpO2  
07/02/19 1619 97.8 °F (36.6 °C) 61 18 105/64 94 % 07/02/19 1114 97.7 °F (36.5 °C) 65 18 91/60 97 % 07/02/19 0657 98.3 °F (36.8 °C) 65 18 104/56 95 % Pain Assessment Pain Intensity 1: 0 (07/02/19 1541) Pain Location 1: Back, Shoulder Pain Intervention(s) 1: Medication (see MAR) Patient Stated Pain Goal: 0 Marla Camacho RN

## 2019-07-02 NOTE — PROGRESS NOTES
[facility] Subjective: A on CKD   . No acute events overnight. Feels somewhat better but still weak, tired and with decreased appetite. Review of Systems -  
General ROS: negative for - fever, chills Respiratory ROS: no SOB, cough, WARREN Cardiovascular ROS: no CP, palpitations Gastrointestinal ROS: no N&V, abdominal pain, diarrhea Genito-Urinary ROS: no difficulty voiding, dysuria Neurological ROS: no seizures, focal weekness Objective: 
 
Vitals:  
 07/01/19 2116 07/01/19 2243 07/02/19 5783 07/02/19 4224 BP: 112/64 116/66 113/48 104/56 Pulse: 62 65 70 65 Resp: 18 18 18 18 Temp: 97.8 °F (36.6 °C) 98 °F (36.7 °C) 98.6 °F (37 °C) 98.3 °F (36.8 °C) SpO2: 95% 94% 93% 95% Weight:      
Height:      
 
 
PE 
Gen: in no acute distress CV:reg rate Chest:clear Abd: soft Ext/Access: no edema [facility] Recent Labs  
  07/02/19 
0400 07/01/19 
0733 06/30/19 
0201 WBC 10.1 11.7* 16.1* HGB 9.4* 9.7* 10.8* HCT 28.0* 29.6* 32.1*  
 245 238 Recent Labs  
  07/02/19 
0400 07/01/19 
6400 06/30/19 
1127 06/30/19 
3539 06/29/19 
2202  137  --  136 134* K 3.3* 3.3*  --  3.5 2.9*  
* 104  --  103 98 CO2 16* 16*  --  16* 19* * 189*  --  72 69 * 153* 161*  --  169* CREA 8.28* 8.70*  --  9.08* 9.29* MG  --   --   --   --  2.3 CA 7.3* 7.0*  --  7.3* 8.0* ALB  --  2.8*  --   --  3.6 TBILI  --  0.2  --   --  0.5 ALT  --  <6*  --   --  9* SGOT  --  18  --   --  24  
 
 
 
 
Radiology A/P:  
Patient Active Problem List  
Diagnosis Code  
 HTN (hypertension) I10  
 AF (atrial fibrillation) ([facility]) I48.91  
 Diabetic neuropathy ([facility] E11.40  A-V fistula ([facility]) I77.0  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 ([facility] E11.21  
 SARAH (obstructive sleep apnea) G47.33  
 CKD (chronic kidney disease) stage 5, GFR less than 15 ml/min ([facility]) N18.5  Parkinson disease ([facility] Altaf Chou  
  Acute on chronic renal failure (HCC) N17.9, N18.9  Acute cystitis with hematuria N30.01  
 Morbid obesity (HCC) E66.01  
 Hypokalemia E87.6  
 C. difficile diarrhea A04.72 A on CKD - Labs with mild improvement overnight. She has some mild uremia, but determining those symptoms vs other chronic issues is difficulty. She has no absolute indication for dialysis initiation so I will continue to monitor her labs. I think once she starts dialysis she will be ESRD. -Continue IVF 
 
HTN/Volume - getting LR at 125/ hr  
 
Low K C Diff Pito Chávez MD

## 2019-07-02 NOTE — PROGRESS NOTES
Problem: Mobility Impaired (Adult and Pediatric) Goal: *Acute Goals and Plan of Care Description STG: 
(1.)Ms. Toney Carrion will move from supine to sit and sit to supine , scoot up and down and roll side to side with MODERATE ASSIST within 3 treatment day(s). (2.)Ms. Toney Carrion will transfer from bed to chair and chair to bed with CONTACT GUARD ASSIST using the least restrictive device within 3 treatment day(s). (3.)Ms. Toney Carrion will ambulate with CONTACT GUARD ASSIST for 10 feet with the least restrictive device within 3 treatment day(s). LTG: 
(1.)Ms. Toney Carrion will move from supine to sit and sit to supine , scoot up and down and roll side to side in bed with CONTACT GUARD ASSIST within 7 treatment day(s). (2.)Ms. Toney Carrion will transfer from bed to chair and chair to bed with MODIFIED INDEPENDENCE using the least restrictive device within 7 treatment day(s). (3.)Ms. Toney Carrion will ambulate with CONTACT GUARD ASSIST for 25+ feet with the least restrictive device within 7 treatment day(s). (4.)Ms. Toney Carrion will perform standing static and dynamic balance activities x 23 minutes with CONTACT GUARD ASSIST to improve safety within 7 treatment day(s). ________________________________________________________________________________________________ PHYSICAL THERAPY: Daily Note and PM 7/2/2019 INPATIENT: PT Visit Days : 2 [place] / [place] B / Product Type: Medicare /   
  
NAME/AGE/GENDER: Karolina Hale is a 78 y.o. female PRIMARY DIAGNOSIS: Acute on chronic renal failure (HCC) [N17.9, N18.9] Acute cystitis with hematuria Acute cystitis with hematuria ICD-10: Treatment Diagnosis: 
 · Generalized Muscle Weakness (M62.81) · Other lack of cordination (R27.8) · Difficulty in walking, Not elsewhere classified (R26.2) · Other abnormalities of gait and mobility (R26.89) · History of falling (Z91.81) Precaution/Allergies: 
Baclofen and Lipitor [atorvastatin] ASSESSMENT:  
 Ms. Saman Sparks is a 78year old F who presents with cystitis with hematuria, weakness, n/v/d. Prior to hospital admission pt lives with her  and her daughter in a two story home (pt lives on first floor) with no step(s) to enter, has a ramp to enter. Pt endorses several falls in past 6 months. Prior to admission Ms. Saman Sparks uses a wheelchair with use of walker for in -home distances and transfers for mobility. Pt wears oxygen at baseline (3L per pt report). 7/2: Upon entering, pt supine in bed, somewhat emotional as she needed some assistance getting cleaned up and her brief changed. PT instructed pt in bed mobility, rolling side to side, bridging and scooting several trials Min A to get cleaned up. Once changed, Pt performed supine > sit with Min A, much improved performance compared to yesterday's session, sitting EOB with fair sitting balance control. Sit > stand with Min A using RW several trials, cues for safety and sequencing of task. Gait x 8 ft side steps EOB with CGA/Jayla with RW, cues for safety. Pt then performed pivot transfer from EOB to bedside chair Min A with cues for progression of walker and safety. Pt with decreased step length and at times difficulty initiating movement with moments of unsteadiness/shakiness which require assistance from PT to maintain balance control. At end of session pt sitting in bedside chair with all needs within reach, alarm activated for safety, RN notified. Pt made good progress with bed mobility this session. She continues to present functioning below her baseline, with deficits in mobility including transfers, gait, balance, and activity tolerance. Pt will benefit from skilled therapy services to address stated deficits to promote return to highest level of function, independence, and safety. Will continue to follow. This section established at most recent assessment PROBLEM LIST (Impairments causing functional limitations): 1. Decreased Strength 2. Decreased Transfer Abilities 3. Decreased Ambulation Ability/Technique 4. Decreased Balance 5. Decreased Activity Tolerance 6. Increased Fatigue 7. Increased Shortness of Breath INTERVENTIONS PLANNED: (Benefits and precautions of physical therapy have been discussed with the patient.) 1. Balance Exercise 2. Bed Mobility 3. Family Education 4. Gait Training 5. Home Exercise Program (HEP) 6. Neuromuscular Re-education/Strengthening 7. Range of Motion (ROM) 8. Therapeutic Activites 9. Therapeutic Exercise/Strengthening 10. Transfer Training TREATMENT PLAN: Frequency/Duration: 3 times a week for duration of hospital stay Rehabilitation Potential For Stated Goals: Good REHAB RECOMMENDATIONS (at time of discharge pending progress):   
Placement: It is my opinion, based on this patient's performance to date, that Ms. Mello Boyd may benefit from intensive therapy at a [address] [place] [address] after discharge due to the functional deficits listed above that are likely to improve with skilled rehabilitation and concerns that he/she may be unsafe to be unsupervised at home due to medical complications and mobility deficits which put her at risk for functional decline and/or falling . Equipment:  
? None at this time HISTORY:  
History of Present Injury/Illness (Reason for Referral): 
See H&P. Past Medical History/Comorbidities: Ms. 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/2017), Neuropathy, Obesity (BMI 30-39.9) (10/2/14), PAD (peripheral artery disease) ([facility] PCI (pneumatosis cystoides intestinalis) (11/5/2013), Pleural effusion, bilateral (11/21/2011), Postoperative anemia due to acute blood loss (11/21/2011), Renal mass (5/14/2016), Rib cage fracture (11/5/2013), S/P CABG (coronary artery bypass graft) (11/05/2013), S/P CABG x 3 (11/18/2011), Stasis edema of both lower extremities (1/3/2018), Status post-operative repair of hip fracture (09/15/2014), Stroke ([facility] Type II or unspecified type diabetes mellitus without mention of complication, uncontrolled ([facility] (12/16/2013), and Unspecified adverse effect of anesthesia. She also has no past medical history of Difficult intubation, Malignant hyperthermia due to anesthesia, Nausea & vomiting, or Pseudocholinesterase deficiency. Ms. Maria Teresa De  has a past surgical history that includes hx colonoscopy; pr cabg, artery-vein, four (Oct. 2011); hx cataract removal (Bilateral, 11/2012); hx hysterectomy; hx lap cholecystectomy; hx hip fracture tx (Left); pr close cystostomy; vascular surgery procedure unlist (Left, 06/07/2017); and vascular surgery procedure unlist (Left, 08/10/2017). Social History/Living Environment:  
Home Environment: Private residence # Steps to Enter: 0 Wheelchair Ramp: Yes One/Two Story Residence: Two story, live on 1st floor Living Alone: No 
Support Systems: Spouse/Significant Other/Partner, Child(dania) Patient Expects to be Discharged to[de-identified] Rehabilitation facility Current DME Used/Available at Home: Wheelchair, [address], rollator Tub or Shower Type: Shower Prior Level of Function/Work/Activity: Pt poor historian, somewhat unclear. Uses walker for transfers and short distances, otherwise [place]. On O2 at baseline. Number of Personal Factors/Comorbidities that affect the Plan of Care: 1-2: MODERATE COMPLEXITY EXAMINATION:  
Most Recent Physical Functioning:  
Gross Assessment: AROM: Generally decreased, functional 
Strength: Generally decreased, functional 
Sensation: Impaired(neuropathy BLE lower leg) Posture: 
  
Balance: 
Sitting: Impaired Sitting - Static: Good (unsupported) Sitting - Dynamic: Fair (occasional)(+) Standing: Impaired Standing - Static: Fair Standing - Dynamic : Fair Bed Mobility: 
Rolling: Minimum assistance Supine to Sit: Minimum assistance Sit to Supine: Minimum assistance Scooting: Moderate assistance Interventions: Safety awareness training;Verbal cues; Tactile cues Wheelchair Mobility: 
  
Transfers: 
Sit to Stand: Minimum assistance Stand to Sit: Minimum assistance Bed to Chair: Minimum assistance Interventions: Safety awareness training;Verbal cues; Tactile cues Gait: 
  
Base of Support: Center of gravity altered Speed/Viry: Pace decreased (<100 feet/min); Shuffled Step Length: Left shortened;Right shortened Gait Abnormalities: Antalgic;Decreased step clearance; Path deviations;Trunk sway increased Distance (ft): 8 Feet (ft) Assistive Device: Walker, rolling Ambulation - Level of Assistance: Minimal assistance Body Structures Involved: 1. Nerves 2. Lungs 3. Bones 4. Joints 5. Muscles Body Functions Affected: 1. Sensory/Pain 2. Neuromusculoskeletal 
3. Movement Related Activities and Participation Affected: 1. General Tasks and Demands 2. Mobility 3. Interpersonal Interactions and Relationships 4. Community, Social and [place] Number of elements that affect the Plan of Care: 3: MODERATE COMPLEXITY CLINICAL PRESENTATION:  
Presentation: Stable and uncomplicated: LOW COMPLEXITY CLINICAL DECISION MAKIN [facility] [address] [id number] AM-PAC 6 Clicks Basic Mobility Inpatient Short Form How much difficulty does the patient currently have. .. Unable A Lot A Little None 1. Turning over in bed (including adjusting bedclothes, sheets and blankets)?    ? 1   ? 2   ? 3   ? 4  
 2.  Sitting down on and standing up from a chair with arms ( e.g., wheelchair, bedside commode, etc.)   ? 1   ? 2   ? 3   ? 4  
3. Moving from lying on back to sitting on the side of the bed?   ? 1   ? 2   ? 3   ? 4 How much help from another person does the patient currently need. .. Total A Lot A Little None 4. Moving to and from a bed to a chair (including a wheelchair)? ? 1   ? 2   ? 3   ? 4  
5. Need to walk in hospital room? ? 1   ? 2   ? 3   ? 4  
6. Climbing 3-5 steps with a railing? ? 1   ? 2   ? 3   ? 4  
© 2007, Trustees of [address] [place] [address] [address] [id number], under license to [employer]. All rights reserved Score:  Initial: 17 Most Recent: X (Date: -- ) Interpretation of Tool:  Represents activities that are increasingly more difficult (i.e. Bed mobility, Transfers, Gait). Medical Necessity:    
· Patient is expected to demonstrate progress in strength, range of motion, balance, coordination and functional technique ·  to increase independence with all mobility. · . Reason for Services/Other Comments: 
· Patient continues to require skilled intervention due to medical complications and mobility deficits which impact her level of function, independence, and safety as indicated above. · . Use of outcome tool(s) and clinical judgement create a POC that gives a: Clear prediction of patient's progress: LOW COMPLEXITY  
  
 
TREATMENT:  
(In addition to Assessment/Re-Assessment sessions the following treatments were rendered) Pre-treatment Symptoms/Complaints:  \"I am just so upset. Can you please help me\" Pain: Initial:  
Pain Intensity 1: 0  Post Session:  Unchanged Therapeutic Activity: (    26 min): Therapeutic activities including bed mobility, bed transfers, Chair transfers, Ambulation on level ground and standing balance control activities with instruction in safety awareness  to improve mobility, strength, balance and coordination.   Required moderate cues and instruction   to promote static and dynamic balance in standing and promote coordination of bilateral, lower extremity(s). Braces/Orthotics/Lines/Etc:  
· IV 
· O2 Device: Nasal cannula Treatment/Session Assessment:   
· Response to Treatment:  See above. Good progress towards stated goals. · Interdisciplinary Collaboration:  
o Physical Therapist 
o Registered Nurse 
o Certified Nursing Assistant/Patient Care Technician · After treatment position/precautions:  
o Up in chair 
o Bed alarm/tab alert on 
o Bed/Chair-wheels locked 
o Bed in low position 
o Call light within reach 
o RN notified · Compliance with Program/Exercises: Will assess as treatment progresses · Recommendations/Intent for next treatment session: \"Next visit will focus on advancements to more challenging activities and reduction in assistance provided\". Total Treatment Duration: PT Patient Time In/Time Out Time In: 7856 Time Out: 5019 Flor Oliveira, PT, DPT

## 2019-07-03 ENCOUNTER — APPOINTMENT (OUTPATIENT)
Dept: GENERAL RADIOLOGY | Age: 79
DRG: 872 | End: 2019-07-03
Attending: INTERNAL MEDICINE
Payer: MEDICARE

## 2019-07-03 ENCOUNTER — PATIENT OUTREACH (OUTPATIENT)
Dept: CASE MANAGEMENT | Age: 79
End: 2019-07-03

## 2019-07-03 LAB
ANION GAP SERPL CALC-SCNC: 13 MMOL/L (ref 7–16)
BASOPHILS # BLD: 0.1 K/UL (ref 0–0.2)
BASOPHILS NFR BLD: 1 % (ref 0–2)
BUN SERPL-MCNC: 142 MG/DL (ref 8–23)
CALCIUM SERPL-MCNC: 7.7 MG/DL (ref 8.3–10.4)
CHLORIDE SERPL-SCNC: 111 MMOL/L (ref 98–107)
CO2 SERPL-SCNC: 18 MMOL/L (ref 21–32)
CREAT SERPL-MCNC: 6.56 MG/DL (ref 0.6–1)
DIFFERENTIAL METHOD BLD: ABNORMAL
EOSINOPHIL # BLD: 0.2 K/UL (ref 0–0.8)
EOSINOPHIL NFR BLD: 3 % (ref 0.5–7.8)
ERYTHROCYTE [DISTWIDTH] IN BLOOD BY AUTOMATED COUNT: 13.5 % (ref 11.9–14.6)
FERRITIN SERPL-MCNC: 331 NG/ML (ref 8–388)
GLUCOSE BLD STRIP.AUTO-MCNC: 186 MG/DL (ref 65–100)
GLUCOSE BLD STRIP.AUTO-MCNC: 197 MG/DL (ref 65–100)
GLUCOSE BLD STRIP.AUTO-MCNC: 208 MG/DL (ref 65–100)
GLUCOSE BLD STRIP.AUTO-MCNC: 214 MG/DL (ref 65–100)
GLUCOSE SERPL-MCNC: 168 MG/DL (ref 65–100)
HCT VFR BLD AUTO: 26.3 % (ref 35.8–46.3)
HGB BLD-MCNC: 8.9 G/DL (ref 11.7–15.4)
IMM GRANULOCYTES # BLD AUTO: 0.2 K/UL (ref 0–0.5)
IMM GRANULOCYTES NFR BLD AUTO: 2 % (ref 0–5)
IRON SATN MFR SERPL: 20 %
IRON SERPL-MCNC: 46 UG/DL (ref 35–150)
LYMPHOCYTES # BLD: 1 K/UL (ref 0.5–4.6)
LYMPHOCYTES NFR BLD: 12 % (ref 13–44)
MCH RBC QN AUTO: 30.8 PG (ref 26.1–32.9)
MCHC RBC AUTO-ENTMCNC: 33.8 G/DL (ref 31.4–35)
MCV RBC AUTO: 91 FL (ref 79.6–97.8)
MM INDURATION POC: 0 MM (ref 0–5)
MONOCYTES # BLD: 0.5 K/UL (ref 0.1–1.3)
MONOCYTES NFR BLD: 6 % (ref 4–12)
NEUTS SEG # BLD: 6.6 K/UL (ref 1.7–8.2)
NEUTS SEG NFR BLD: 77 % (ref 43–78)
NRBC # BLD: 0 K/UL (ref 0–0.2)
PLATELET # BLD AUTO: 247 K/UL (ref 150–450)
PMV BLD AUTO: 12.1 FL (ref 9.4–12.3)
POTASSIUM SERPL-SCNC: 3.2 MMOL/L (ref 3.5–5.1)
PPD POC: NEGATIVE NEGATIVE
RBC # BLD AUTO: 2.89 M/UL (ref 4.05–5.2)
SODIUM SERPL-SCNC: 142 MMOL/L (ref 136–145)
TIBC SERPL-MCNC: 231 UG/DL (ref 250–450)
TRANSFERRIN SERPL-MCNC: 178 MG/DL (ref 202–364)
WBC # BLD AUTO: 8.6 K/UL (ref 4.3–11.1)

## 2019-07-03 PROCEDURE — 74011250637 HC RX REV CODE- 250/637: Performed by: FAMILY MEDICINE

## 2019-07-03 PROCEDURE — 65270000029 HC RM PRIVATE

## 2019-07-03 PROCEDURE — 77030020255 HC SOL INJ LR 1000ML BG

## 2019-07-03 PROCEDURE — 74011250637 HC RX REV CODE- 250/637: Performed by: NURSE PRACTITIONER

## 2019-07-03 PROCEDURE — 74011250636 HC RX REV CODE- 250/636: Performed by: INTERNAL MEDICINE

## 2019-07-03 PROCEDURE — 36415 COLL VENOUS BLD VENIPUNCTURE: CPT

## 2019-07-03 PROCEDURE — 74011250637 HC RX REV CODE- 250/637: Performed by: INTERNAL MEDICINE

## 2019-07-03 PROCEDURE — 82728 ASSAY OF FERRITIN: CPT

## 2019-07-03 PROCEDURE — 71045 X-RAY EXAM CHEST 1 VIEW: CPT

## 2019-07-03 PROCEDURE — 85025 COMPLETE CBC W/AUTO DIFF WBC: CPT

## 2019-07-03 PROCEDURE — 80048 BASIC METABOLIC PNL TOTAL CA: CPT

## 2019-07-03 PROCEDURE — 74011636637 HC RX REV CODE- 636/637: Performed by: INTERNAL MEDICINE

## 2019-07-03 PROCEDURE — 83540 ASSAY OF IRON: CPT

## 2019-07-03 PROCEDURE — 82962 GLUCOSE BLOOD TEST: CPT

## 2019-07-03 PROCEDURE — 99222 1ST HOSP IP/OBS MODERATE 55: CPT | Performed by: PHYSICAL MEDICINE & REHABILITATION

## 2019-07-03 RX ORDER — POTASSIUM CHLORIDE 20 MEQ/1
20 TABLET, EXTENDED RELEASE ORAL
Status: COMPLETED | OUTPATIENT
Start: 2019-07-03 | End: 2019-07-03

## 2019-07-03 RX ADMIN — VANCOMYCIN HYDROCHLORIDE 125 MG: 1 INJECTION, POWDER, LYOPHILIZED, FOR SOLUTION INTRAVENOUS at 11:46

## 2019-07-03 RX ADMIN — METRONIDAZOLE 500 MG: 500 INJECTION, SOLUTION INTRAVENOUS at 15:02

## 2019-07-03 RX ADMIN — INSULIN LISPRO 4 UNITS: 100 INJECTION, SOLUTION INTRAVENOUS; SUBCUTANEOUS at 07:54

## 2019-07-03 RX ADMIN — Medication 10 ML: at 13:05

## 2019-07-03 RX ADMIN — SODIUM CHLORIDE, SODIUM LACTATE, POTASSIUM CHLORIDE, AND CALCIUM CHLORIDE 125 ML/HR: 600; 310; 30; 20 INJECTION, SOLUTION INTRAVENOUS at 21:00

## 2019-07-03 RX ADMIN — APIXABAN 2.5 MG: 2.5 TABLET, FILM COATED ORAL at 08:00

## 2019-07-03 RX ADMIN — METRONIDAZOLE 500 MG: 500 INJECTION, SOLUTION INTRAVENOUS at 06:35

## 2019-07-03 RX ADMIN — METRONIDAZOLE 500 MG: 500 INJECTION, SOLUTION INTRAVENOUS at 23:04

## 2019-07-03 RX ADMIN — LEVOTHYROXINE SODIUM 25 MCG: 50 TABLET ORAL at 06:35

## 2019-07-03 RX ADMIN — INSULIN LISPRO 4 UNITS: 100 INJECTION, SOLUTION INTRAVENOUS; SUBCUTANEOUS at 23:04

## 2019-07-03 RX ADMIN — INSULIN LISPRO 4 UNITS: 100 INJECTION, SOLUTION INTRAVENOUS; SUBCUTANEOUS at 16:27

## 2019-07-03 RX ADMIN — VANCOMYCIN HYDROCHLORIDE 125 MG: 1 INJECTION, POWDER, LYOPHILIZED, FOR SOLUTION INTRAVENOUS at 06:35

## 2019-07-03 RX ADMIN — ACETAMINOPHEN 650 MG: 325 TABLET, FILM COATED ORAL at 16:27

## 2019-07-03 RX ADMIN — VANCOMYCIN HYDROCHLORIDE 125 MG: 1 INJECTION, POWDER, LYOPHILIZED, FOR SOLUTION INTRAVENOUS at 23:04

## 2019-07-03 RX ADMIN — POTASSIUM CHLORIDE 20 MEQ: 20 TABLET, EXTENDED RELEASE ORAL at 11:46

## 2019-07-03 RX ADMIN — CARBIDOPA AND LEVODOPA 1 TABLET: 25; 100 TABLET ORAL at 08:00

## 2019-07-03 RX ADMIN — CARBIDOPA AND LEVODOPA 1 TABLET: 25; 100 TABLET ORAL at 15:02

## 2019-07-03 RX ADMIN — INSULIN LISPRO 2 UNITS: 100 INJECTION, SOLUTION INTRAVENOUS; SUBCUTANEOUS at 11:50

## 2019-07-03 RX ADMIN — APIXABAN 2.5 MG: 2.5 TABLET, FILM COATED ORAL at 17:17

## 2019-07-03 RX ADMIN — CARBIDOPA AND LEVODOPA 1 TABLET: 25; 100 TABLET ORAL at 21:01

## 2019-07-03 NOTE — PROGRESS NOTES
[facility] Subjective: A on CKD Patient seen and examined on rounds she is drowsy, reports still feels weak with diarrhea, UOP improving. Review of Systems -  
General ROS: negative for - fever, chills, + weak and fatigue Respiratory ROS: no SOB, cough, WARREN Cardiovascular ROS: no CP, palpitations Gastrointestinal ROS: no N&V, abdominal pain, + diarrhea Genito-Urinary ROS: no difficulty voiding, dysuria Neurological ROS: no seizures Objective: 
 
Vitals:  
 07/02/19 2050 07/03/19 0667 07/03/19 0740 07/03/19 5376 BP: 112/70 116/69 141/73 Pulse: 66 60 68 Resp: 18 18 18 Temp: 98 °F (36.7 °C) 98.1 °F (36.7 °C) 97.5 °F (36.4 °C) SpO2: 95% 95% 96% 93% Weight:      
Height:      
 
 
PE 
Gen: in no acute distress CV:reg rate, S1, S2, No Rub Chest:clear bilaterally Abd: soft, non tender, + BS Ext/Access: no edema Jaxon Shaw LAB Recent Labs  
  07/03/19 
0525 07/02/19 
0400 07/01/19 
9687 WBC 8.6 10.1 11.7* HGB 8.9* 9.4* 9.7* HCT 26.3* 28.0* 29.6*  248 245 Recent Labs  
  07/03/19 
0525 07/02/19 
0400 07/01/19 
0749  140 137  
K 3.2* 3.3* 3.3*  
* 108* 104 CO2 18* 16* 16* * 157* 189* * 154* 153* CREA 6.56* 8.28* 8.70* CA 7.7* 7.3* 7.0* ALB  --   --  2.8* TBILI  --   --  0.2 ALT  --   --  <6* SGOT  --   --  18 Radiology A/P:  
Patient Active Problem List  
Diagnosis Code  
 HTN (hypertension) I10  
 AF (atrial fibrillation) ([facility]) I48.91  
 Diabetic neuropathy ([facility] E11.40  A-V fistula ([facility]) I77.0  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 ([address] E11.21  
 SARAH (obstructive sleep apnea) G47.33  
 CKD (chronic kidney disease) stage 5, GFR less than 15 ml/min ([facility]) N18.5  Parkinson disease ([facility] G20  
 Acute on chronic renal failure (HCC) N17.9, N18.9  Acute cystitis with hematuria N30.01  
 Morbid obesity ([facility]) E66.01  
 Hypokalemia E87.6  C. difficile diarrhea A04.72 A on CKD - creatinine continues to improve 6.56 today- non oliguric ? Recovery amount. 
-Continue IVF monitor renal function and UOP closely HTN/Volume - continue LR at 125/ hr  
 
Low K- replete C Diff on Flagyl and PO Vanc Anemia- check Fe studies Jevon Iraheta, NP

## 2019-07-03 NOTE — CONSULTS
Physical Medicine & Rehabilitation Note-consult Patient: Flory Tracey MRN: [id number]  SSN: [id number] YOB: 1940  Age: 78 y.o. Sex: female Admit Date: 6/29/2019 Admitting Physician: Jade Mauro MD 
 
Medical Decision Making/Plan/Recommend: Functional deficit, mobility, gait impairment. Improving medically. Patient denies any more diarrhea. Feels stronger but still feels not able to return home. Continue acute PT, OT. Continue gait training, transfer training, balance activities, exercises to improve strength and balance to maximize ADLs and functional mobility. Patient's gait impairment is in part largely due to chronic severe peripheral neuropathy. Recommend continued rehab via short term sub acute program at [facility]. Discussed plans with pt and . They are agreeable. Thank you for the opportunity to participate in the care of this patient. Chief Complaint : Gait dysfunction secondary to below. Admit Diagnosis: Acute on chronic renal failure (HCC) [N17.9, N18.9] Acute cystitis with hematuria (6/29/2019) HTN (hypertension) (11/10/2011) AF (atrial fibrillation) ([address] (11/20/2011) 
type 2 diabetes mellitus with nephropathy CKD (chronic kidney disease) stage 5, GFR less than 15 ml/min ([facility]) (11/30/2018) Parkinson disease ([address] (6/19/2019) Acute on chronic renal failure ([address] (6/29/2019) Morbid obesity ([address] (6/29/2019) 
hypokalemia (6/29/2019) C. difficile diarrhea (6/30/2019) Peripheral neuropathy 
weakness Pain DVT risk Acute Rehab Dx: 
Weakness Sensory deficit- chronic Debility Mobility and ambulation deficits Self Care/ADL deficits Medical Dx: 
Past Medical History:  
Diagnosis Date  Adverse effect of anesthesia   
 difficult awakening  AF (atrial fibrillation) ([address] 11/20/2011  Arthritis 11/18/2011  
 generalized  CAD (coronary artery disease) 2011 CABG x 4  
 Cervical disc disease Steroid injections  Chronic kidney disease  Chronic pain   
 back  DJD (degenerative joint disease)  Drainage from wound 10/3/2014  Full dentures  Gout   
 managed with medication  Hemorrhoid 11/5/2013  
 [place] (hard of hearing)  Hyperlipemia 11/10/2011  
 managed with medication  Hypertension   
 controlled with meds  Hypertriglyceridemia   
 managed with medication  Hypothyroidism   
 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   
 legs and arms, managed with medication  Obesity (BMI 30-39.9) 10/2/14 BMI 36.3  
 PAD [facility]peripheral artery disease) [facility]  PCI (pneumatosis cystoides intestinalis) 11/5/2013  Pleural effusion, bilateral 11/21/2011  Postoperative anemia due to acute blood loss 11/21/2011  
 expected  Renal mass 5/14/2016  Rib cage fracture 11/5/2013  
 x 2   
 S/P CABG (coronary artery bypass graft) 11/05/2013 CABG x 4  
 S/P CABG x 3 11/18/2011  Stasis edema of both lower extremities 1/3/2018  Status post-operative repair of hip fracture 09/15/2014  
 left side, repair with hardware  Stroke [facility])   
 left sided weakness residual   
 Type II or unspecified type diabetes mellitus without mention of complication, uncontrolled ([facility] 12/16/2013  
 oral and insulin reliant/ Avg / low BS s/s/ @ 70/ last A1c 8.3  Unspecified adverse effect of anesthesia   
 difficult to arouse after general anesthesia Subjective:  
 
Date of Evaluation:  July 4, 2019 HPI: Dalton Bledsoe is a 78 y.o. female patient at [address] [place] [address] who was admitted on 6/29/2019  by Hanh Gu MD with below mentioned medical history ,is being seen for Physical Medicine and Rehabilitation consult. Melva Sherman who has extensive medical history of DM2, CAD, hx of CABG x 3a. fib, obesity, hypertension, PAD, chronic renal failure, peripheral neuropathy presented with recent history of weakness, headache, nausea, vomitting, diarrhea for one week. Initial evaluation showed worsening renal function, UTI, and c.diff colitis. Patient was admitted for medical management. Patient has started to participate in therapies with acute PT, OT. Patient shows generalized weakness, impaired balance, functional deficits below her usual baseline. Pradeep Burgos is seen and examined today. Medical Records reviewed. On exam, patient demonstrates geenralized weakness. Patient also has severe sensory deficit BLE>BUE due to peripheral neuropathy. She is essentially insensate below her knees. This likely is primary cause of her chronic fall isk, impaired gait, balance. Patient lives with her . At her baseline, patient ambulates in the house with a WC and transfers using a RW. She has had falls occasionally. Current Level of Function:   bed mobility - min A, transfers - min A, decreased balance , ambulation - 8' with RW and min A.  
 
Prior Level of Function/Work/Activity: Uses walker for transfers and short distances, otherwise [place]. On O2 at baseline. Family History Problem Relation Age of Onset  Heart Disease Mother  Cancer Mother   
     colon  Diabetes Mother  Cancer Father   
     lung  Cancer Sister   
     breast  
 Breast Cancer Sister 28  Cancer Brother Leukemia  Breast Cancer Maternal Aunt 54 Social History Tobacco Use  Smoking status: Never Smoker  Smokeless tobacco: Never Used Substance Use Topics  Alcohol use: No  
 
Past Surgical History:  
Procedure Laterality Date  CABG, ARTERY-VEIN, FOUR  Oct. 2011  
 CLOSE CYSTOSTOMY    
 exploratory with removal of mass of infection  HX CATARACT REMOVAL Bilateral 11/2012  
 with IOL implants, bilateral  
 HX COLONOSCOPY    
 HX HIP FRACTURE TX Left   
 repair with hardware  HX HYSTERECTOMY  HX LAP CHOLECYSTECTOMY  VASCULAR SURGERY PROCEDURE UNLIST Left 06/07/2017 AVF creation  VASCULAR SURGERY PROCEDURE UNLIST Left 08/10/2017 AVF elevation Prior to Admission medications Medication Sig Start Date End Date Taking? Authorizing Provider  
ondansetron hcl (ZOFRAN) 8 mg tablet Take 1 Tab by mouth every eight (8) hours as needed for Nausea. 6/27/19   Obed Smith MD  
ezetimibe (ZETIA) 10 mg tablet Take 1 Tab by mouth daily. 6/14/19   Obed Smith MD  
benazepril (LOTENSIN) 20 mg tablet Take 1 Tab by mouth every morning. 6/14/19   Oebd Smith MD  
insulin NPH/insulin regular (HUMULIN 70/30 U-100 INSULIN) 100 unit/mL (70-30) injection 65 units before breakfast, 55 units before supper 6/3/19   Obed Smith MD  
apixaban (ELIQUIS) 2.5 mg tablet Take 1 Tab by mouth two (2) times a day. Indications: Treatment to Prevent Blood Clots in Chronic Atrial Fibrillation 6/3/19   Obed Smith MD  
metOLazone (ZAROXOLYN) 2.5 mg tablet Take 1 Tab by mouth daily as needed (Fluid gain). If weight increases by 2 pounds in 24 hour period or 5 pounds in a week, take daily until back down to dry weight  Indications: Edema with Defective Kidney Function 5/31/19   Scott Marcus MD  
potassium chloride (K-DUR, KLOR-CON) 20 mEq tablet Take 1 Tab by mouth daily as needed (Only on days you take metolazone). Indications: prevention of low potassium in the blood 5/31/19   Scott Marcus MD  
pregabalin (LYRICA) 50 mg capsule Take 1 Cap by mouth two (2) times a day. Max Daily Amount: 100 mg. 5/21/19   Obed Smith MD  
ergocalciferol (VITAMIN D2) 50,000 unit capsule Take 1 Cap by mouth every seven (7) days. 4/19/19   Donald Herrera MD  
carvedilol (COREG) 25 mg tablet take 1 tablet by mouth twice a day 3/18/19   Yulisa Salvador MD  
prednisoLONE acetate (PRED FORTE) 1 % ophthalmic suspension Administer 1 Drop to both eyes four (4) times daily.     Provider, Historical  
 carbidopa-levodopa (SINEMET)  mg per tablet TAKE 1 TABLET BY MOUTH THREE TIMES DAILY 2/4/19   Kevon Shirley MD  
furosemide (LASIX) 40 mg tablet Take 2 Tabs by mouth daily. Patient taking differently: Take 80 mg by mouth two (2) times daily (with meals). 2/1/19   Kevon Shirley MD  
levothyroxine (SYNTHROID) 25 mcg tablet Take 1 Tab by mouth Daily (before breakfast). 2/1/19   Kevon Shirley MD  
febuxostat (ULORIC) 80 mg tab tablet Take 1 Tab by mouth daily. 1/14/19   Kevon Shirley MD  
terbinafine HCl (LAMISIL AT) 1 % topical cream Apply  to affected area two (2) times a day. 6/25/18   Kevon Shirley MD  
Lactobacillus acidophilus (ACIDOPHILUS) cap Take 2 Caps by mouth two (2) times a day. Provider, Historical  
Insulin Syringe-Needle U-100 (BD INSULIN SYRINGE ULTRA-FINE) 1 mL 31 gauge x 5/16 syrg DX E 10.65  Inject insulin tid 5/3/18   Kevon Shirley MD  
OXYGEN-AIR DELIVERY SYSTEMS 3 L by Nasal route continuous. Provider, Historical  
glucose blood VI test strips (ACCU-CHEK GUIDE) strip Check BS TID. DX E11.9 3/22/18   Kevon Shirley MD  
Lancets (ACCU-CHEK FASTCLIX) misc Check bs TID. DX E11.9 3/22/18   Kevon Shirley MD  
cyanocobalamin, vitamin B-12, 1,000 mcg/mL kit 1,000 mcg by Injection route every month. on the 1st    Provider, Historical  
ferrous sulfate 325 mg (65 mg iron) tablet Take 324 mg by mouth daily. Provider, Historical  
triamcinolone acetonide (KENALOG) 0.1 % topical cream Apply  to affected area two (2) times a day. 6/15/16   Provider, Historical  
nitroglycerin (NITROLINGUAL) 400 mcg/spray spray 1 Spray by SubLINGual route every five (5) minutes as needed (Pt states \"I've never had to use it\"). 4/11/16   Kevon Shirley MD  
 
Allergies Allergen Reactions  Baclofen Other (comments) Psychosis and altered mental status  Lipitor [Atorvastatin] Myalgia Review of Systems: Denies chest pain, shortness of breath, cough, headache, visual problems, abdominal pain, dysurea, calf pain. Pertinent positives are as noted in the medical records and unremarkable otherwise. Objective:  
 
Vitals: 
Blood pressure 162/75, pulse 86, temperature 98.2 °F (36.8 °C), resp. rate 16, height 5' 8\" (1.727 m), weight 246 lb (111.6 kg), SpO2 97 %, not currently breastfeeding. Temp (24hrs), Av °F (36.7 °C), Min:97.5 °F (36.4 °C), Max:98.7 °F (37.1 °C) BMI (calculated): 37.4 (19 0948) Intake and Output: 
 1901 -  0700 In: -  
Out: 8978 [[id number]] Physical Exam:  
General: Alert and age appropriately oriented. Obese. No acute cardio respiratory distress. HEENT: Normocephalic,  No bruit, No JVD. Lungs: Clear to auscultation  bilaterally. Respiration even and unlabored Heart: Regular rate and rhythm, S1, S2 No  murmurs, clicks, rub or gallops Abdomen: Soft, non-tender, nondistended. Bowel sounds present. No organomegaly. Genitourinary: defer Neuromuscular: PERRL, EOMI Follows simple commands consistently. Able to identify, recall repeat. Mood appropriate. Insight, judgement intact. LUE     Shoulder abduction  5- /5 Elbow flexion:  5- /5 Wrist extension:  5- /5 Finger flexion;  5- /5 
RUE    Shoulder abduction: 5- /5 Elbow flexion:  5- /5 Wrist extension:5-  /5 Finger flexion:   5-/5 LLE     Hip flexion:  4 /5 Knee extension:  4 /5 Ankle dorsiflexion:   3/5 Ankle plantarflexion:  4 /5       
RLE     Hip flexion: 4/5 Knee extension:  4 /5 Ankle dorsiflexion:   3/5 Ankle plantarflexion:  3 /5 Sensory - stocking/ glove distribution deficit soft touch, pin prick, proprioception. Pt unable to feel below her knees. No cerebellar signs. no fasciculations, no tremors. Skin/extremity: No rashes, no erythema. Calf non tender BLE. Labs/Studies: 
Recent Results (from the past 72 hour(s)) GLUCOSE, POC Collection Time: 07/01/19 11:22 AM  
Result Value Ref Range Glucose (POC) 216 (H) 65 - 100 mg/dL PLEASE READ & DOCUMENT PPD TEST IN 24 HRS Collection Time: 07/01/19 11:30 AM  
Result Value Ref Range PPD  Negative  
 mm Induration  0 - 5 0mm GLUCOSE, POC Collection Time: 07/01/19  4:27 PM  
Result Value Ref Range Glucose (POC) 187 (H) 65 - 100 mg/dL GLUCOSE, POC Collection Time: 07/01/19  8:28 PM  
Result Value Ref Range Glucose (POC) 218 (H) 65 - 100 mg/dL CBC WITH AUTOMATED DIFF Collection Time: 07/02/19  4:00 AM  
Result Value Ref Range WBC 10.1 4.3 - 11.1 K/uL  
 RBC 3.03 (L) 4.05 - 5.2 M/uL HGB 9.4 (L) 11.7 - 15.4 g/dL HCT 28.0 (L) 35.8 - 46.3 % MCV 92.4 79.6 - 97.8 FL  
 MCH 31.0 26.1 - 32.9 PG  
 MCHC 33.6 31.4 - 35.0 g/dL  
 RDW 13.8 11.9 - 14.6 % PLATELET 548 543 - 528 K/uL MPV 12.6 (H) 9.4 - 12.3 FL ABSOLUTE NRBC 0.00 0.0 - 0.2 K/uL  
 DF AUTOMATED NEUTROPHILS 74 43 - 78 % LYMPHOCYTES 14 13 - 44 % MONOCYTES 5 4.0 - 12.0 % EOSINOPHILS 3 0.5 - 7.8 % BASOPHILS 1 0.0 - 2.0 % IMMATURE GRANULOCYTES 3 0.0 - 5.0 %  
 ABS. NEUTROPHILS 7.5 1.7 - 8.2 K/UL  
 ABS. LYMPHOCYTES 1.4 0.5 - 4.6 K/UL  
 ABS. MONOCYTES 0.5 0.1 - 1.3 K/UL  
 ABS. EOSINOPHILS 0.3 0.0 - 0.8 K/UL  
 ABS. BASOPHILS 0.1 0.0 - 0.2 K/UL  
 ABS. IMM. GRANS. 0.3 0.0 - 0.5 K/UL METABOLIC PANEL, BASIC Collection Time: 07/02/19  4:00 AM  
Result Value Ref Range Sodium 140 136 - 145 mmol/L Potassium 3.3 (L) 3.5 - 5.1 mmol/L Chloride 108 (H) 98 - 107 mmol/L  
 CO2 16 (L) 21 - 32 mmol/L Anion gap 16 7 - 16 mmol/L Glucose 157 (H) 65 - 100 mg/dL  (H) 8 - 23 MG/DL Creatinine 8.28 (H) 0.6 - 1.0 MG/DL  
 GFR est AA 6 (L) >60 ml/min/1.73m2 GFR est non-AA 5 (L) >60 ml/min/1.73m2 Calcium 7.3 (L) 8.3 - 10.4 MG/DL  
GLUCOSE, POC Collection Time: 07/02/19  7:00 AM  
Result Value Ref Range Glucose (POC) 186 (H) 65 - 100 mg/dL GLUCOSE, POC Collection Time: 07/02/19 11:18 AM  
Result Value Ref Range Glucose (POC) 222 (H) 65 - 100 mg/dL PLEASE READ & DOCUMENT PPD TEST IN 48 HRS Collection Time: 07/02/19 11:35 AM  
Result Value Ref Range PPD  Negative  
 mm Induration  0 - 5 0mm GLUCOSE, POC Collection Time: 07/02/19  2:52 PM  
Result Value Ref Range Glucose (POC) 217 (H) 65 - 100 mg/dL GLUCOSE, POC Collection Time: 07/02/19  9:37 PM  
Result Value Ref Range Glucose (POC) 228 (H) 65 - 100 mg/dL CBC WITH AUTOMATED DIFF Collection Time: 07/03/19  5:25 AM  
Result Value Ref Range WBC 8.6 4.3 - 11.1 K/uL  
 RBC 2.89 (L) 4.05 - 5.2 M/uL HGB 8.9 (L) 11.7 - 15.4 g/dL HCT 26.3 (L) 35.8 - 46.3 % MCV 91.0 79.6 - 97.8 FL  
 MCH 30.8 26.1 - 32.9 PG  
 MCHC 33.8 31.4 - 35.0 g/dL  
 RDW 13.5 11.9 - 14.6 % PLATELET 365 172 - 497 K/uL MPV 12.1 9.4 - 12.3 FL ABSOLUTE NRBC 0.00 0.0 - 0.2 K/uL  
 DF AUTOMATED NEUTROPHILS 77 43 - 78 % LYMPHOCYTES 12 (L) 13 - 44 % MONOCYTES 6 4.0 - 12.0 % EOSINOPHILS 3 0.5 - 7.8 % BASOPHILS 1 0.0 - 2.0 % IMMATURE GRANULOCYTES 2 0.0 - 5.0 %  
 ABS. NEUTROPHILS 6.6 1.7 - 8.2 K/UL  
 ABS. LYMPHOCYTES 1.0 0.5 - 4.6 K/UL  
 ABS. MONOCYTES 0.5 0.1 - 1.3 K/UL  
 ABS. EOSINOPHILS 0.2 0.0 - 0.8 K/UL  
 ABS. BASOPHILS 0.1 0.0 - 0.2 K/UL  
 ABS. IMM. GRANS. 0.2 0.0 - 0.5 K/UL METABOLIC PANEL, BASIC Collection Time: 07/03/19  5:25 AM  
Result Value Ref Range Sodium 142 136 - 145 mmol/L Potassium 3.2 (L) 3.5 - 5.1 mmol/L Chloride 111 (H) 98 - 107 mmol/L  
 CO2 18 (L) 21 - 32 mmol/L Anion gap 13 7 - 16 mmol/L Glucose 168 (H) 65 - 100 mg/dL  (H) 8 - 23 MG/DL Creatinine 6.56 (H) 0.6 - 1.0 MG/DL  
 GFR est AA 8 (L) >60 ml/min/1.73m2 GFR est non-AA 6 (L) >60 ml/min/1.73m2 Calcium 7.7 (L) 8.3 - 10.4 MG/DL  
TRANSFERRIN SATURATION Collection Time: 07/03/19  5:25 AM  
Result Value Ref Range Iron 46 35 - 150 ug/dL TIBC 231 (L) 250 - 450 ug/dL Transferrin Saturation 20 (L) >20 % FERRITIN Collection Time: 07/03/19  5:25 AM  
Result Value Ref Range Ferritin 331 8 - 388 NG/ML  
TRANSFERRIN Collection Time: 07/03/19  5:25 AM  
Result Value Ref Range Transferrin 178 (L) 202 - 364 mg/dL GLUCOSE, POC Collection Time: 07/03/19  7:28 AM  
Result Value Ref Range Glucose (POC) 214 (H) 65 - 100 mg/dL GLUCOSE, POC Collection Time: 07/03/19 11:29 AM  
Result Value Ref Range Glucose (POC) 186 (H) 65 - 100 mg/dL GLUCOSE, POC Collection Time: 07/03/19  4:27 PM  
Result Value Ref Range Glucose (POC) 208 (H) 65 - 100 mg/dL GLUCOSE, POC Collection Time: 07/03/19  9:27 PM  
Result Value Ref Range Glucose (POC) 197 (H) 65 - 100 mg/dL CBC WITH AUTOMATED DIFF Collection Time: 07/04/19  6:47 AM  
Result Value Ref Range WBC 8.8 4.3 - 11.1 K/uL  
 RBC 2.92 (L) 4.05 - 5.2 M/uL HGB 9.2 (L) 11.7 - 15.4 g/dL HCT 26.8 (L) 35.8 - 46.3 % MCV 91.8 79.6 - 97.8 FL  
 MCH 31.5 26.1 - 32.9 PG  
 MCHC 34.3 31.4 - 35.0 g/dL  
 RDW 13.8 11.9 - 14.6 % PLATELET 137 036 - 322 K/uL MPV 11.9 9.4 - 12.3 FL ABSOLUTE NRBC 0.00 0.0 - 0.2 K/uL  
 DF AUTOMATED NEUTROPHILS 81 (H) 43 - 78 % LYMPHOCYTES 9 (L) 13 - 44 % MONOCYTES 6 4.0 - 12.0 % EOSINOPHILS 2 0.5 - 7.8 % BASOPHILS 1 0.0 - 2.0 % IMMATURE GRANULOCYTES 2 0.0 - 5.0 %  
 ABS. NEUTROPHILS 7.1 1.7 - 8.2 K/UL  
 ABS. LYMPHOCYTES 0.8 0.5 - 4.6 K/UL  
 ABS. MONOCYTES 0.5 0.1 - 1.3 K/UL  
 ABS. EOSINOPHILS 0.2 0.0 - 0.8 K/UL  
 ABS. BASOPHILS 0.1 0.0 - 0.2 K/UL  
 ABS. IMM. GRANS. 0.2 0.0 - 0.5 K/UL METABOLIC PANEL, BASIC Collection Time: 07/04/19  6:47 AM  
Result Value Ref Range Sodium 146 (H) 136 - 145 mmol/L  Potassium 3.3 (L) 3.5 - 5.1 mmol/L  
 Chloride 114 (H) 98 - 107 mmol/L  
 CO2 18 (L) 21 - 32 mmol/L Anion gap 14 7 - 16 mmol/L Glucose 191 (H) 65 - 100 mg/dL  (H) 8 - 23 MG/DL Creatinine 4.79 (H) 0.6 - 1.0 MG/DL  
 GFR est AA 11 (L) >60 ml/min/1.73m2 GFR est non-AA 9 (L) >60 ml/min/1.73m2 Calcium 8.2 (L) 8.3 - 10.4 MG/DL  
GLUCOSE, POC Collection Time: 07/04/19  7:21 AM  
Result Value Ref Range Glucose (POC) 208 (H) 65 - 100 mg/dL Functional Assessment: 
Reviewed participation and progress in therapies Gross Assessment AROM: Generally decreased, functional (07/01/19 1400) Strength: Generally decreased, functional (07/01/19 1400) Sensation: Impaired(neuropathy BLE lower leg) (07/01/19 1400) Bed Mobility Rolling: Minimum assistance (07/02/19 1600) Supine to Sit: Minimum assistance (07/04/19 1100) Sit to Supine: Modified independent (07/04/19 1100) Scooting: Modified independent (07/04/19 1100) Balance Sitting: Impaired (07/02/19 1600) Sitting - Static: Good (unsupported) (07/04/19 1100) Sitting - Dynamic: Fair (occasional)(+) (07/02/19 1600) Standing: Impaired (07/02/19 1600) Standing - Static: Fair (07/02/19 1600) Standing - Dynamic : Fair (07/02/19 1600) Bed/Mat Mobility Rolling: Minimum assistance (07/02/19 1600) Supine to Sit: Minimum assistance (07/04/19 1100) Sit to Supine: Modified independent (07/04/19 1100) Sit to Stand: Minimum assistance (07/02/19 1600) Stand to Sit: Minimum assistance (07/02/19 1600) Bed to Chair: Minimum assistance (07/02/19 1600) Scooting: Modified independent (07/04/19 1100) Ambulation: 
Gait Base of Support: Center of gravity altered (07/02/19 1600) Speed/Viry: Pace decreased (<100 feet/min); Shuffled (07/02/19 1600) Step Length: Left shortened;Right shortened (07/02/19 1600) Gait Abnormalities: Antalgic;Decreased step clearance; Path deviations;Trunk sway increased (07/02/19 1600) Ambulation - Level of Assistance: Minimal assistance (07/02/19 1600) Distance (ft): 8 Feet (ft) (07/02/19 1600) Assistive Device: Walker, rolling (07/02/19 1600) Impression/Plan:  
 
Principal Problem: 
  Acute cystitis with hematuria (6/29/2019) Active Problems: 
  HTN (hypertension) (11/10/2011) AF (atrial fibrillation) ([facility] (11/20/2011) 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 ([facility] (11/15/2017) CKD (chronic kidney disease) stage 5, GFR less than 15 ml/min ([facility]) (11/30/2018) Parkinson disease ([facility] (6/19/2019) Acute on chronic renal failure ([facility] (6/29/2019) Morbid obesity ([facility] (6/29/2019) Hypokalemia (6/29/2019) C. difficile diarrhea (6/30/2019) Current Facility-Administered Medications Medication Dose Route Frequency Provider Last Rate Last Dose  LORazepam (ATIVAN) injection 0.26 mg  0.26 mg IntraVENous Q6H PRN Radha Aguirre MD      
 [facility]) tablet 2.5 mg  2.5 mg Oral BID Tania Ku MD   2.5 mg at 07/04/19 0900  carbidopa-levodopa (SINEMET)  mg per tablet 1 Tab  1 Tab Oral TID Tania Ku MD   1 Tab at 07/04/19 0900  levothyroxine (SYNTHROID) tablet 25 mcg  25 mcg Oral ACB Tania Ku MD   25 mcg at 07/04/19 0549  
 sodium chloride (NS) flush 5-40 mL  5-40 mL IntraVENous Bonny Nagel MD   10 mL at 07/04/19 6533  sodium chloride (NS) flush 5-40 mL  5-40 mL IntraVENous PRN Bonny Obrien MD      
 acetaminophen (TYLENOL) tablet 650 mg  650 mg Oral Q4H PRN Tania Ku MD   650 mg at 07/03/19 1627  
 bisacodyl (DULCOLAX) tablet 5 mg  5 mg Oral DAILY PRN Tania Ku MD      
 insulin lispro (HUMALOG) injection   SubCUTAneous AC&HS Angela Anders MD   4 Units at 07/04/19 0063  vancomycin 50 mg/mL oral solution (compounded) 125 mg  125 mg Oral Q6H Yessenia Rae MD   125 mg at 07/04/19 0506  metroNIDAZOLE (FLAGYL) IVPB premix 500 mg  500 mg IntraVENous Q8H Sanaz Amador  mL/hr at 07/04/19 0550 500 mg at 07/04/19 0550  lactated Ringers infusion  50 mL/hr IntraVENous CONTINUOUS Refugio Josue  mL/hr at 07/04/19 0553 125 mL/hr at 07/04/19 2015 Signed By: Jaz Garcia MD   
 July 4, 2019

## 2019-07-03 NOTE — PROGRESS NOTES
Case conference with DERIAN Katz/Inpatient SW. Discussed [facility]'s involvement with pt prior to hospitalization. Pt has been referred to [facility] for acute inpatient rehab. [facility] will continue to follow and assist as needed and will follow up with pt when dc to home. This note will not be viewable in [address]

## 2019-07-03 NOTE — PROGRESS NOTES
Hospitalist Progress Note Admit Date:  2019  9:55 PM  
Name:  Radha Garcia Age:  78 y.o. 
:  1940 MRN:  [id number] PCP:  Scott Guillen MD 
Treatment Team: Attending Provider: Neyda Garcia MD; Primary Nurse: Beau Puri Consulting Provider: Elva Ho MD; Utilization Review: Darlyn Espinoza RN; Care Manager: Vicente Epley, RN; Physical Therapist: Sobeida Ernst PT; Consulting Provider: Mary Nguyen MD 
 
Subjective:  
2019- seen - crying and tearful at the thought of hd; declined therapy today. No diarrhea 
 
 
2019 - pt seen - renal function improving - so no hd for now; sounds wet Objective:  
 
Patient Vitals for the past 24 hrs: 
 Temp Pulse Resp BP SpO2  
19 1631 97.8 °F (36.6 °C) 69 18 151/79 95 % 19 1133 97.5 °F (36.4 °C) 62 18 128/51 97 % 19 0918     93 % 19 0740 97.5 °F (36.4 °C) 68 18 141/73 96 % 19 0052 98.1 °F (36.7 °C) 60 18 116/69 95 % 19 2050 98 °F (36.7 °C) 66 18 112/70 95 % Oxygen Therapy O2 Sat (%): 95 % (19 163) Pulse via Oximetry: 68 beats per minute (19 2220) O2 Device: Nasal cannula (19 163) O2 Flow Rate (L/min): 3 l/min (19 1631) Intake/Output Summary (Last 24 hours) at 7/3/2019 1636 Last data filed at 7/3/2019 1432 Gross per 24 hour Intake  Output 250 ml Net -250 ml General:    Well nourished. Alert. CV:   RRR. No murmur, rub, or gallop. Lungs:   A/e = bl- ? creps. No wheezing, rhonchi, or rales. Abdomen:   Soft, nontender, nondistended. Extremities: Warm and dry. No cyanosis or edema. Skin:     No rashes or jaundice. Data Review: 
I have reviewed all labs, meds, telemetry events, and studies from the last 24 hours. Recent Results (from the past 24 hour(s)) GLUCOSE, POC Collection Time: 19  9:37 PM  
Result Value Ref Range Glucose (POC) 228 (H) 65 - 100 mg/dL CBC WITH AUTOMATED DIFF Collection Time: 07/03/19  5:25 AM  
Result Value Ref Range WBC 8.6 4.3 - 11.1 K/uL  
 RBC 2.89 (L) 4.05 - 5.2 M/uL HGB 8.9 (L) 11.7 - 15.4 g/dL HCT 26.3 (L) 35.8 - 46.3 % MCV 91.0 79.6 - 97.8 FL  
 MCH 30.8 26.1 - 32.9 PG  
 MCHC 33.8 31.4 - 35.0 g/dL  
 RDW 13.5 11.9 - 14.6 % PLATELET 001 144 - 060 K/uL MPV 12.1 9.4 - 12.3 FL ABSOLUTE NRBC 0.00 0.0 - 0.2 K/uL  
 DF AUTOMATED NEUTROPHILS 77 43 - 78 % LYMPHOCYTES 12 (L) 13 - 44 % MONOCYTES 6 4.0 - 12.0 % EOSINOPHILS 3 0.5 - 7.8 % BASOPHILS 1 0.0 - 2.0 % IMMATURE GRANULOCYTES 2 0.0 - 5.0 %  
 ABS. NEUTROPHILS 6.6 1.7 - 8.2 K/UL  
 ABS. LYMPHOCYTES 1.0 0.5 - 4.6 K/UL  
 ABS. MONOCYTES 0.5 0.1 - 1.3 K/UL  
 ABS. EOSINOPHILS 0.2 0.0 - 0.8 K/UL  
 ABS. BASOPHILS 0.1 0.0 - 0.2 K/UL  
 ABS. IMM. GRANS. 0.2 0.0 - 0.5 K/UL METABOLIC PANEL, BASIC Collection Time: 07/03/19  5:25 AM  
Result Value Ref Range Sodium 142 136 - 145 mmol/L Potassium 3.2 (L) 3.5 - 5.1 mmol/L Chloride 111 (H) 98 - 107 mmol/L  
 CO2 18 (L) 21 - 32 mmol/L Anion gap 13 7 - 16 mmol/L Glucose 168 (H) 65 - 100 mg/dL  (H) 8 - 23 MG/DL Creatinine 6.56 (H) 0.6 - 1.0 MG/DL  
 GFR est AA 8 (L) >60 ml/min/1.73m2 GFR est non-AA 6 (L) >60 ml/min/1.73m2 Calcium 7.7 (L) 8.3 - 10.4 MG/DL  
TRANSFERRIN SATURATION Collection Time: 07/03/19  5:25 AM  
Result Value Ref Range Iron 46 35 - 150 ug/dL TIBC 231 (L) 250 - 450 ug/dL Transferrin Saturation 20 (L) >20 % FERRITIN Collection Time: 07/03/19  5:25 AM  
Result Value Ref Range Ferritin 331 8 - 388 NG/ML  
TRANSFERRIN Collection Time: 07/03/19  5:25 AM  
Result Value Ref Range Transferrin 178 (L) 202 - 364 mg/dL GLUCOSE, POC Collection Time: 07/03/19  7:28 AM  
Result Value Ref Range Glucose (POC) 214 (H) 65 - 100 mg/dL GLUCOSE, POC Collection Time: 07/03/19 11:29 AM  
Result Value Ref Range Glucose (POC) 186 (H) 65 - 100 mg/dL All Micro Results Procedure Component Value Units Date/Time CULTURE, BLOOD [[id number]] Collected:  06/30/19 0041 Order Status:  Completed Specimen:  Whole Blood Updated:  07/03/19 5743 Special Requests: --     
  RIGHT JUGULAR VEIN Culture result: NO GROWTH 3 DAYS     
 CULTURE, BLOOD [[id number]] Collected:  06/30/19 9872 Order Status:  Completed Specimen:  Blood Updated:  07/03/19 0879 Special Requests: --     
  RIGHT 
HAND Culture result: NO GROWTH 3 DAYS     
 CULTURE, STOOL [[id number]] Collected:  06/29/19 2259 Order Status:  Completed Specimen:  Stool Updated:  07/02/19 5802 Special Requests: NO SPECIAL REQUESTS Culture result:    
  No Salmonella, Shigella, or Ecoli 0157 isolated. CULTURE, URINE [[id number]]  (Abnormal)  (Susceptibility) Collected:  06/29/19 2308 Order Status:  Completed Specimen:  Cath Urine Updated:  07/02/19 1325 Special Requests: NO SPECIAL REQUESTS Culture result:    
  >100,000 COLONIES/mL CITROBACTER FREUNDII  
     
 C. DIFFICILE/EPI PCR [[id number]]  (Abnormal) Collected:  06/29/19 2306 Order Status:  Completed Specimen:  Stool Updated:  06/30/19 0021 Special Requests: NO SPECIAL REQUESTS Culture result: Toxigenic C Diff POS/027-NAP1-BI PRESUMPTIVE POS RESULTS VERIFIED, PHONED TO AND READ BACK BY 
DR. Spring Simmons @ 001 ON [id number] BY TVO Current Meds: 
Current Facility-Administered Medications Medication Dose Route Frequency  LORazepam (ATIVAN) injection 0.26 mg  0.26 mg IntraVENous Q6H PRN  
 apixaban (ELIQUIS) tablet 2.5 mg  2.5 mg Oral BID  carbidopa-levodopa (SINEMET)  mg per tablet 1 Tab  1 Tab Oral TID  levothyroxine (SYNTHROID) tablet 25 mcg  25 mcg Oral ACB  sodium chloride (NS) flush 5-40 mL  5-40 mL IntraVENous Q8H  
 sodium chloride (NS) flush 5-40 mL  5-40 mL IntraVENous PRN  
  acetaminophen (TYLENOL) tablet 650 mg  650 mg Oral Q4H PRN  
 bisacodyl (DULCOLAX) tablet 5 mg  5 mg Oral DAILY PRN  
 insulin lispro (HUMALOG) injection   SubCUTAneous AC&HS  vancomycin 50 mg/mL oral solution (compounded) 125 mg  125 mg Oral Q6H  
 metroNIDAZOLE (FLAGYL) IVPB premix 500 mg  500 mg IntraVENous Q8H  
 lactated Ringers infusion  125 mL/hr IntraVENous CONTINUOUS Other Studies (last 24 hours): No results found. Assessment and Plan:  
 
Hospital Problems as of 7/3/2019 Date Reviewed: 6/19/2019 Codes Class Noted - Resolved POA  
 C. difficile diarrhea ICD-10-CM: A04.72 
ICD-9-CM: 008.45  6/30/2019 - Present Yes Acute on chronic renal failure (HCC) ICD-10-CM: N17.9, N18.9 ICD-9-CM: 584.9, 585.9  6/29/2019 - Present Yes * (Principal) Acute cystitis with hematuria ICD-10-CM: N30.01 
ICD-9-CM: 595.0  6/29/2019 - Present Yes Morbid obesity ([facility] (Chronic) ICD-10-CM: E66.01 
ICD-9-CM: 278.01  6/29/2019 - Present Yes Hypokalemia ICD-10-CM: E87.6 ICD-9-CM: 276.8  6/29/2019 - Present Yes Parkinson disease ([facility] (Chronic) ICD-10-CM: G20 
ICD-9-CM: 332.0  6/19/2019 - Present Yes  
   
 CKD (chronic kidney disease) stage 5, GFR less than 15 ml/min (HCC) (Chronic) ICD-10-CM: N18.5 ICD-9-CM: 585.5  11/30/2018 - Present Yes 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 ([facility] (Chronic) ICD-10-CM: E11.21 
ICD-9-CM: 250.40, 583.81  11/15/2017 - Present Yes  
   
 AF (atrial fibrillation) (HCC) (Chronic) ICD-10-CM: I48.91 
ICD-9-CM: 427.31  11/20/2011 - Present Yes HTN (hypertension) (Chronic) ICD-10-CM: I10 
ICD-9-CM: 401.9  11/10/2011 - Present Yes PLAN:   
· Sepsis due to cdiif - resolved · cdiff - continue vanc- no diarrhea for last 2-3 days per pt 
· disha on ckd stage v - per renal - resolving may need hd - continue ivfls · ? pulm edema- get a cxr  
· uti- CITROBACTER FREUNDII-  S/p  rocephin  
· afib - on eliquis · PD- sinemet · Anemia- monitor DC planning/Dispo:  unclear DVT ppx:  elifaina Signed: 
Jose Turner MD

## 2019-07-03 NOTE — PROGRESS NOTES
Hourly rounds completed. All needs met. Pt alert and oriented x 3. No complaints stated. Pt had one brief during the shift. Gave report to the oncoming day shift nurse.

## 2019-07-03 NOTE — PROGRESS NOTES
Interdisciplinary Rounds completed 07/03/19. Nursing, Case Management, Physician and PT present. Plan of care reviewed and updated. Creatinine improving. Wants IRC when ready for discharge.

## 2019-07-04 LAB
ANION GAP SERPL CALC-SCNC: 14 MMOL/L (ref 7–16)
BASOPHILS # BLD: 0.1 K/UL (ref 0–0.2)
BASOPHILS NFR BLD: 1 % (ref 0–2)
BUN SERPL-MCNC: 120 MG/DL (ref 8–23)
CALCIUM SERPL-MCNC: 8.2 MG/DL (ref 8.3–10.4)
CHLORIDE SERPL-SCNC: 114 MMOL/L (ref 98–107)
CO2 SERPL-SCNC: 18 MMOL/L (ref 21–32)
CREAT SERPL-MCNC: 4.79 MG/DL (ref 0.6–1)
DIFFERENTIAL METHOD BLD: ABNORMAL
EOSINOPHIL # BLD: 0.2 K/UL (ref 0–0.8)
EOSINOPHIL NFR BLD: 2 % (ref 0.5–7.8)
ERYTHROCYTE [DISTWIDTH] IN BLOOD BY AUTOMATED COUNT: 13.8 % (ref 11.9–14.6)
GLUCOSE BLD STRIP.AUTO-MCNC: 203 MG/DL (ref 65–100)
GLUCOSE BLD STRIP.AUTO-MCNC: 208 MG/DL (ref 65–100)
GLUCOSE BLD STRIP.AUTO-MCNC: 230 MG/DL (ref 65–100)
GLUCOSE BLD STRIP.AUTO-MCNC: 297 MG/DL (ref 65–100)
GLUCOSE SERPL-MCNC: 191 MG/DL (ref 65–100)
HCT VFR BLD AUTO: 26.8 % (ref 35.8–46.3)
HGB BLD-MCNC: 9.2 G/DL (ref 11.7–15.4)
IMM GRANULOCYTES # BLD AUTO: 0.2 K/UL (ref 0–0.5)
IMM GRANULOCYTES NFR BLD AUTO: 2 % (ref 0–5)
LYMPHOCYTES # BLD: 0.8 K/UL (ref 0.5–4.6)
LYMPHOCYTES NFR BLD: 9 % (ref 13–44)
MCH RBC QN AUTO: 31.5 PG (ref 26.1–32.9)
MCHC RBC AUTO-ENTMCNC: 34.3 G/DL (ref 31.4–35)
MCV RBC AUTO: 91.8 FL (ref 79.6–97.8)
MONOCYTES # BLD: 0.5 K/UL (ref 0.1–1.3)
MONOCYTES NFR BLD: 6 % (ref 4–12)
NEUTS SEG # BLD: 7.1 K/UL (ref 1.7–8.2)
NEUTS SEG NFR BLD: 81 % (ref 43–78)
NRBC # BLD: 0 K/UL (ref 0–0.2)
PLATELET # BLD AUTO: 258 K/UL (ref 150–450)
PMV BLD AUTO: 11.9 FL (ref 9.4–12.3)
POTASSIUM SERPL-SCNC: 3.3 MMOL/L (ref 3.5–5.1)
RBC # BLD AUTO: 2.92 M/UL (ref 4.05–5.2)
SODIUM SERPL-SCNC: 146 MMOL/L (ref 136–145)
WBC # BLD AUTO: 8.8 K/UL (ref 4.3–11.1)

## 2019-07-04 PROCEDURE — 74011250636 HC RX REV CODE- 250/636: Performed by: INTERNAL MEDICINE

## 2019-07-04 PROCEDURE — 65270000029 HC RM PRIVATE

## 2019-07-04 PROCEDURE — 85025 COMPLETE CBC W/AUTO DIFF WBC: CPT

## 2019-07-04 PROCEDURE — 80048 BASIC METABOLIC PNL TOTAL CA: CPT

## 2019-07-04 PROCEDURE — 74011636637 HC RX REV CODE- 636/637: Performed by: INTERNAL MEDICINE

## 2019-07-04 PROCEDURE — 82962 GLUCOSE BLOOD TEST: CPT

## 2019-07-04 PROCEDURE — 77030020255 HC SOL INJ LR 1000ML BG

## 2019-07-04 PROCEDURE — 97530 THERAPEUTIC ACTIVITIES: CPT

## 2019-07-04 PROCEDURE — 36415 COLL VENOUS BLD VENIPUNCTURE: CPT

## 2019-07-04 PROCEDURE — 74011250637 HC RX REV CODE- 250/637: Performed by: FAMILY MEDICINE

## 2019-07-04 PROCEDURE — 74011250637 HC RX REV CODE- 250/637: Performed by: INTERNAL MEDICINE

## 2019-07-04 RX ORDER — ONDANSETRON 2 MG/ML
4 INJECTION INTRAMUSCULAR; INTRAVENOUS
Status: DISCONTINUED | OUTPATIENT
Start: 2019-07-04 | End: 2019-07-12 | Stop reason: HOSPADM

## 2019-07-04 RX ADMIN — VANCOMYCIN HYDROCHLORIDE 125 MG: 1 INJECTION, POWDER, LYOPHILIZED, FOR SOLUTION INTRAVENOUS at 17:29

## 2019-07-04 RX ADMIN — INSULIN LISPRO 4 UNITS: 100 INJECTION, SOLUTION INTRAVENOUS; SUBCUTANEOUS at 17:28

## 2019-07-04 RX ADMIN — SODIUM CHLORIDE, SODIUM LACTATE, POTASSIUM CHLORIDE, AND CALCIUM CHLORIDE 125 ML/HR: 600; 310; 30; 20 INJECTION, SOLUTION INTRAVENOUS at 05:53

## 2019-07-04 RX ADMIN — CARBIDOPA AND LEVODOPA 1 TABLET: 25; 100 TABLET ORAL at 17:28

## 2019-07-04 RX ADMIN — INSULIN LISPRO 4 UNITS: 100 INJECTION, SOLUTION INTRAVENOUS; SUBCUTANEOUS at 09:01

## 2019-07-04 RX ADMIN — METRONIDAZOLE 500 MG: 500 INJECTION, SOLUTION INTRAVENOUS at 14:27

## 2019-07-04 RX ADMIN — VANCOMYCIN HYDROCHLORIDE 125 MG: 1 INJECTION, POWDER, LYOPHILIZED, FOR SOLUTION INTRAVENOUS at 05:50

## 2019-07-04 RX ADMIN — Medication 10 ML: at 14:27

## 2019-07-04 RX ADMIN — APIXABAN 2.5 MG: 2.5 TABLET, FILM COATED ORAL at 09:00

## 2019-07-04 RX ADMIN — INSULIN LISPRO 6 UNITS: 100 INJECTION, SOLUTION INTRAVENOUS; SUBCUTANEOUS at 11:37

## 2019-07-04 RX ADMIN — LEVOTHYROXINE SODIUM 25 MCG: 50 TABLET ORAL at 05:49

## 2019-07-04 RX ADMIN — VANCOMYCIN HYDROCHLORIDE 125 MG: 1 INJECTION, POWDER, LYOPHILIZED, FOR SOLUTION INTRAVENOUS at 22:41

## 2019-07-04 RX ADMIN — CARBIDOPA AND LEVODOPA 1 TABLET: 25; 100 TABLET ORAL at 22:40

## 2019-07-04 RX ADMIN — VANCOMYCIN HYDROCHLORIDE 125 MG: 1 INJECTION, POWDER, LYOPHILIZED, FOR SOLUTION INTRAVENOUS at 11:42

## 2019-07-04 RX ADMIN — CARBIDOPA AND LEVODOPA 1 TABLET: 25; 100 TABLET ORAL at 09:00

## 2019-07-04 RX ADMIN — METRONIDAZOLE 500 MG: 500 INJECTION, SOLUTION INTRAVENOUS at 05:50

## 2019-07-04 RX ADMIN — APIXABAN 2.5 MG: 2.5 TABLET, FILM COATED ORAL at 17:28

## 2019-07-04 RX ADMIN — Medication 10 ML: at 03:38

## 2019-07-04 RX ADMIN — ONDANSETRON 4 MG: 2 INJECTION INTRAMUSCULAR; INTRAVENOUS at 11:37

## 2019-07-04 RX ADMIN — SODIUM CHLORIDE, SODIUM LACTATE, POTASSIUM CHLORIDE, AND CALCIUM CHLORIDE 50 ML/HR: 600; 310; 30; 20 INJECTION, SOLUTION INTRAVENOUS at 22:29

## 2019-07-04 RX ADMIN — INSULIN LISPRO 4 UNITS: 100 INJECTION, SOLUTION INTRAVENOUS; SUBCUTANEOUS at 22:40

## 2019-07-04 NOTE — PROGRESS NOTES
[facility] Subjective: A on CKD   Some N&V this am. No SOB. Review of Systems -  
General ROS: negative for - fever, chills Respiratory ROS: no SOB, cough, WARREN Cardiovascular ROS: no CP, palpitations Gastrointestinal ROS: no N&V, abdominal pain, diarrhea Genito-Urinary ROS: no difficulty voiding, dysuria Neurological ROS: no seizures, focal weekness Objective: 
 
Vitals:  
 07/04/19 0011 07/04/19 0413 07/04/19 0725 07/04/19 9965 BP: 144/76 138/68 162/75 Pulse: 73 66 86 Resp: 16 16 16 Temp: 98.7 °F (37.1 °C) 97.8 °F (36.6 °C) 98.2 °F (36.8 °C) SpO2: 99% 98% 97% Weight:    111.6 kg (246 lb) Height:      
 
 
PE 
Gen: in no acute distress CV:reg rate Chest:clear Abd: soft Ext/Access: no edema [facility] Recent Labs  
  07/04/19 
0647 07/03/19 
0525 07/02/19 
0400 WBC 8.8 8.6 10.1 HGB 9.2* 8.9* 9.4* HCT 26.8* 26.3* 28.0*  
 247 248 Recent Labs  
  07/04/19 
0647 07/03/19 
0525 07/02/19 
0400 * 142 140  
K 3.3* 3.2* 3.3*  
* 111* 108* CO2 18* 18* 16* * 168* 157* * 142* 154* CREA 4.79* 6.56* 8.28* CA 8.2* 7.7* 7.3* Radiology A/P:  
Patient Active Problem List  
Diagnosis Code  
 HTN (hypertension) I10  
 AF (atrial fibrillation) ([facility]) I48.91  
 Diabetic neuropathy ([facility] E11.40  A-V fistula ([facility]) I77.0  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 ([facility] E11.21  
 SARAH (obstructive sleep apnea) G47.33  
 CKD (chronic kidney disease) stage 5, GFR less than 15 ml/min ([facility]) N18.5  Parkinson disease ([facility] G20  
 Acute on chronic renal failure (HCC) N17.9, N18.9  Acute cystitis with hematuria N30.01  
 Morbid obesity (HCC) E66.01  
 Hypokalemia E87.6  
 C. difficile diarrhea A04.72 A on CKD -Renal function is improving with IV hydration. CXR shows some congestion yesterday. HTN/Volume -will decrease LR to 50 from 125/hr Low K C Diff Jonas Pérez MD

## 2019-07-04 NOTE — PROGRESS NOTES
Hourly rounds performed, all needs met. Pt had N/V throughout the shift. Zofran ordered/given. Will continue to monitor pt and give report to oncoming nurse.

## 2019-07-04 NOTE — PROGRESS NOTES
Hourly rounds completed. All needs met. Pt alert and oriented x 3. No complaints stated during the shift. Pt rested well. Pt is lying in bed in a low, locked position with the side rails up x 2, call light within reach. Gave report to the oncoming day shift nurse.

## 2019-07-04 NOTE — PROGRESS NOTES
Hospitalist Progress Note Admit Date:  2019  9:55 PM  
Name:  Alexus Cabrera Age:  78 y.o. 
:  1940 MRN:  [id number] PCP:  Zackery Copeland MD 
Treatment Team: Attending Provider: Shu Watkins MD; Primary Nurse: Nette Meyers; Consulting Provider: Jeaneth Machado MD; Utilization Review: Stefania Garcia RN; Care Manager: Raquel Robb RN; Consulting Provider: Jorgito Zambrano MD; Physical Therapy Assistant: Latoya Sharif PTA Subjective:  
2019- seen - crying and tearful at the thought of hd; declined therapy today. No diarrhea 
 
 
2019 - pt seen - renal function improving - so no hd for now; sounds wet  
 
 
2019- pt seen - notes ear feels funny and hearing not as good Objective:  
 
Patient Vitals for the past 24 hrs: 
 Temp Pulse Resp BP SpO2  
19 98.2 °F (36.8 °C) 86 16 162/75 97 % 19 0413 97.8 °F (36.6 °C) 66 16 138/68 98 % 19 0011 98.7 °F (37.1 °C) 73 16 144/76 99 % 19 1631 97.8 °F (36.6 °C) 69 18 151/79 95 % 19 1133 97.5 °F (36.4 °C) 62 18 128/51 97 % Oxygen Therapy O2 Sat (%): 97 % (19) Pulse via Oximetry: 68 beats per minute (19 2220) O2 Device: Nasal cannula (19) O2 Flow Rate (L/min): 3 l/min (19) Intake/Output Summary (Last 24 hours) at 2019 Last data filed at 2019 5985 Gross per 24 hour Intake  Output 1200 ml Net -1200 ml General:    Well nourished. Alert. CV:   RRR. No murmur, rub, or gallop. Lungs:   A/e = bl- ? creps. No wheezing, rhonchi, or rales. Abdomen:   Soft, nontender, nondistended. Extremities: Warm and dry. No cyanosis or edema. Skin:     No rashes or jaundice. Data Review: 
I have reviewed all labs, meds, telemetry events, and studies from the last 24 hours. Recent Results (from the past 24 hour(s)) GLUCOSE, POC Collection Time: 19 11:29 AM  
Result Value Ref Range Glucose (POC) 186 (H) 65 - 100 mg/dL GLUCOSE, POC Collection Time: 07/03/19  4:27 PM  
Result Value Ref Range Glucose (POC) 208 (H) 65 - 100 mg/dL GLUCOSE, POC Collection Time: 07/03/19  9:27 PM  
Result Value Ref Range Glucose (POC) 197 (H) 65 - 100 mg/dL CBC WITH AUTOMATED DIFF Collection Time: 07/04/19  6:47 AM  
Result Value Ref Range WBC 8.8 4.3 - 11.1 K/uL  
 RBC 2.92 (L) 4.05 - 5.2 M/uL HGB 9.2 (L) 11.7 - 15.4 g/dL HCT 26.8 (L) 35.8 - 46.3 % MCV 91.8 79.6 - 97.8 FL  
 MCH 31.5 26.1 - 32.9 PG  
 MCHC 34.3 31.4 - 35.0 g/dL  
 RDW 13.8 11.9 - 14.6 % PLATELET 503 231 - 798 K/uL MPV 11.9 9.4 - 12.3 FL ABSOLUTE NRBC 0.00 0.0 - 0.2 K/uL  
 DF AUTOMATED NEUTROPHILS 81 (H) 43 - 78 % LYMPHOCYTES 9 (L) 13 - 44 % MONOCYTES 6 4.0 - 12.0 % EOSINOPHILS 2 0.5 - 7.8 % BASOPHILS 1 0.0 - 2.0 % IMMATURE GRANULOCYTES 2 0.0 - 5.0 %  
 ABS. NEUTROPHILS 7.1 1.7 - 8.2 K/UL  
 ABS. LYMPHOCYTES 0.8 0.5 - 4.6 K/UL  
 ABS. MONOCYTES 0.5 0.1 - 1.3 K/UL  
 ABS. EOSINOPHILS 0.2 0.0 - 0.8 K/UL  
 ABS. BASOPHILS 0.1 0.0 - 0.2 K/UL  
 ABS. IMM. GRANS. 0.2 0.0 - 0.5 K/UL METABOLIC PANEL, BASIC Collection Time: 07/04/19  6:47 AM  
Result Value Ref Range Sodium 146 (H) 136 - 145 mmol/L Potassium 3.3 (L) 3.5 - 5.1 mmol/L Chloride 114 (H) 98 - 107 mmol/L  
 CO2 18 (L) 21 - 32 mmol/L Anion gap 14 7 - 16 mmol/L Glucose 191 (H) 65 - 100 mg/dL  (H) 8 - 23 MG/DL Creatinine 4.79 (H) 0.6 - 1.0 MG/DL  
 GFR est AA 11 (L) >60 ml/min/1.73m2 GFR est non-AA 9 (L) >60 ml/min/1.73m2 Calcium 8.2 (L) 8.3 - 10.4 MG/DL  
GLUCOSE, POC Collection Time: 07/04/19  7:21 AM  
Result Value Ref Range Glucose (POC) 208 (H) 65 - 100 mg/dL All Micro Results Procedure Component Value Units Date/Time CULTURE, BLOOD [[id number]] Collected:  06/30/19 0041 Order Status:  Completed Specimen:  Whole Blood Updated:  07/04/19 4986   Special Requests: --     
 RIGHT JUGULAR VEIN Culture result: NO GROWTH 4 DAYS     
 CULTURE, BLOOD [[id number]] Collected:  06/30/19 3667 Order Status:  Completed Specimen:  Blood Updated:  07/04/19 6022 Special Requests: --     
  RIGHT 
HAND Culture result: NO GROWTH 4 DAYS     
 CULTURE, STOOL [[id number]] Collected:  06/29/19 2259 Order Status:  Completed Specimen:  Stool Updated:  07/02/19 2423 Special Requests: NO SPECIAL REQUESTS Culture result:    
  No Salmonella, Shigella, or Ecoli 0157 isolated. CULTURE, URINE [[id number]]  (Abnormal)  (Susceptibility) Collected:  06/29/19 2308 Order Status:  Completed Specimen:  Cath Urine Updated:  07/02/19 7930 Special Requests: NO SPECIAL REQUESTS Culture result:    
  >100,000 COLONIES/mL CITROBACTER FREUNDII  
     
 C. DIFFICILE/EPI PCR [[id number]]  (Abnormal) Collected:  06/29/19 2306 Order Status:  Completed Specimen:  Stool Updated:  06/30/19 0021 Special Requests: NO SPECIAL REQUESTS Culture result: Toxigenic C Diff POS/027-NAP1-BI PRESUMPTIVE POS RESULTS VERIFIED, PHONED TO AND READ BACK BY 
DR. Shani Duke @ 0018 ON [id number] BY TVO Current Meds: 
Current Facility-Administered Medications Medication Dose Route Frequency  LORazepam (ATIVAN) injection 0.26 mg  0.26 mg IntraVENous Q6H PRN  
 apixaban (ELIQUIS) tablet 2.5 mg  2.5 mg Oral BID  carbidopa-levodopa (SINEMET)  mg per tablet 1 Tab  1 Tab Oral TID  levothyroxine (SYNTHROID) tablet 25 mcg  25 mcg Oral ACB  sodium chloride (NS) flush 5-40 mL  5-40 mL IntraVENous Q8H  
 sodium chloride (NS) flush 5-40 mL  5-40 mL IntraVENous PRN  
 acetaminophen (TYLENOL) tablet 650 mg  650 mg Oral Q4H PRN  
 bisacodyl (DULCOLAX) tablet 5 mg  5 mg Oral DAILY PRN  
 insulin lispro (HUMALOG) injection   SubCUTAneous AC&HS  vancomycin 50 mg/mL oral solution (compounded) 125 mg  125 mg Oral Q6H  
  metroNIDAZOLE (FLAGYL) IVPB premix 500 mg  500 mg IntraVENous Q8H  
 lactated Ringers infusion  125 mL/hr IntraVENous CONTINUOUS Other Studies (last 24 hours): Xr Chest Sngl V Result Date: 7/3/2019 CHEST X-RAY, single portable view  7/3/2019 History: Pulmonary edema. Technique: Single frontal view of the chest. Comparison: Chest x-ray 6/29/2019 Findings: Stable post surgical changes overlie the mediastinal silhouette. Moderate cardiomegaly is seen which is not felt to be significant changed. Slight increased central pulmonary vascular prominence is seen. Additional early central interstitial infiltrates are suggested. No evolving consolidation or significant pleural effusion is currently seen. IMPRESSION: 1. Increasing central pulmonary vascular prominence and early central interstitial infiltrates which can be very early indicators for heart failure. Assessment and Plan:  
 
Hospital Problems as of 7/4/2019 Date Reviewed: 6/19/2019 Codes Class Noted - Resolved POA  
 C. difficile diarrhea ICD-10-CM: A04.72 
ICD-9-CM: 008.45  6/30/2019 - Present Yes Acute on chronic renal failure (HCC) ICD-10-CM: N17.9, N18.9 ICD-9-CM: 584.9, 585.9  6/29/2019 - Present Yes * (Principal) Acute cystitis with hematuria ICD-10-CM: N30.01 
ICD-9-CM: 595.0  6/29/2019 - Present Yes Morbid obesity ([facility] (Chronic) ICD-10-CM: E66.01 
ICD-9-CM: 278.01  6/29/2019 - Present Yes Hypokalemia ICD-10-CM: E87.6 ICD-9-CM: 276.8  6/29/2019 - Present Yes Parkinson disease ([facility] (Chronic) ICD-10-CM: G20 
ICD-9-CM: 332.0  6/19/2019 - Present Yes  
   
 CKD (chronic kidney disease) stage 5, GFR less than 15 ml/min (HCC) (Chronic) ICD-10-CM: N18.5 ICD-9-CM: 585.5  11/30/2018 - Present Yes 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 ([facility] (Chronic) ICD-10-CM: E11.21 
ICD-9-CM: 250.40, 583.81  11/15/2017 - Present Yes AF (atrial fibrillation) (HCC) (Chronic) ICD-10-CM: I48.91 
ICD-9-CM: 427.31  11/20/2011 - Present Yes HTN (hypertension) (Chronic) ICD-10-CM: I10 
ICD-9-CM: 401.9  11/10/2011 - Present Yes PLAN:   
· Sepsis due to cdiif - resolved · cdiff - continue vanc- no diarrhea for last 2-3 days per pt 
· disha on ckd stage v - per renal - resolving may need hd - continue ivfls · ? pulm edema- not sig change from prior study onn my review. Will monitor her closely- low threshold for lasix · uti- CITROBACTER FREUNDII-  S/p  rocephin  
· afib - on eliquis · PD- sinemet · Anemia- monitor · Ear exam - shows wax - will order cerumonolytic DC planning/Dispo:  unclear DVT ppx:  eliquis Signed: 
Rigo Hartman MD

## 2019-07-04 NOTE — PROGRESS NOTES
Problem: Mobility Impaired (Adult and Pediatric) Goal: *Acute Goals and Plan of Care Description STG: 
(1.)Ms. Jacqui Roblero will move from supine to sit and sit to supine , scoot up and down and roll side to side with MODERATE ASSIST within 3 treatment day(s). (2.)Ms. Jacqui Roblero will transfer from bed to chair and chair to bed with CONTACT GUARD ASSIST using the least restrictive device within 3 treatment day(s). (3.)Ms. Jacqui Roblero will ambulate with CONTACT GUARD ASSIST for 10 feet with the least restrictive device within 3 treatment day(s). LTG: 
(1.)Ms. Jacqui Roblero will move from supine to sit and sit to supine , scoot up and down and roll side to side in bed with CONTACT GUARD ASSIST within 7 treatment day(s). (2.)Ms. Jacqui Roblero will transfer from bed to chair and chair to bed with MODIFIED INDEPENDENCE using the least restrictive device within 7 treatment day(s). (3.)Ms. Jacqui Roblero will ambulate with CONTACT GUARD ASSIST for 25+ feet with the least restrictive device within 7 treatment day(s). (4.)Ms. Jacqui Roblero will perform standing static and dynamic balance activities x 23 minutes with CONTACT GUARD ASSIST to improve safety within 7 treatment day(s). ________________________________________________________________________________________________ PHYSICAL THERAPY: Daily Note and AM 7/4/2019 INPATIENT: PT Visit Days : 3 Pay[place] / Plan[place] PART A AND B / Product Type: Medicare /   
  
NAME/AGE/GENDER: Sue Le is a 78 y.o. female PRIMARY DIAGNOSIS: Acute on chronic renal failure (HCC) [N17.9, N18.9] Acute cystitis with hematuria Acute cystitis with hematuria ICD-10: Treatment Diagnosis: 
 · Generalized Muscle Weakness (M62.81) · Other lack of cordination (R27.8) · Difficulty in walking, Not elsewhere classified (R26.2) · Other abnormalities of gait and mobility (R26.89) · History of falling (Z91.81) Precaution/Allergies: 
Baclofen and Lipitor [atorvastatin] ASSESSMENT:  
 Ms. Sony Short is a 78year old F who presents with cystitis with hematuria, weakness, n/v/d. Prior to hospital admission pt lives with her  and her daughter in a two story home (pt lives on first floor) with no step(s) to enter, has a ramp to enter. Pt endorses several falls in past 6 months. Prior to admission Ms. Sony Short uses a wheelchair with use of walker for in -home distances and transfers for mobility. Pt wears oxygen at baseline (3L per pt report). Patient was supine and wanting to try and get up. She sat up with minimal assist but immediately became nauseated and spent some time dry heaving and throwing up. She was about to try and stand but did not feel well enough and layed back down. Unfortunately she was not able to participate well today although really wanted to. RN made aware. Will continue efforts. This section established at most recent assessment PROBLEM LIST (Impairments causing functional limitations): 1. Decreased Strength 2. Decreased Transfer Abilities 3. Decreased Ambulation Ability/Technique 4. Decreased Balance 5. Decreased Activity Tolerance 6. Increased Fatigue 7. Increased Shortness of Breath INTERVENTIONS PLANNED: (Benefits and precautions of physical therapy have been discussed with the patient.) 1. Balance Exercise 2. Bed Mobility 3. Family Education 4. Gait Training 5. Home Exercise Program (HEP) 6. Neuromuscular Re-education/Strengthening 7. Range of Motion (ROM) 8. Therapeutic Activites 9. Therapeutic Exercise/Strengthening 10. Transfer Training TREATMENT PLAN: Frequency/Duration: 3 times a week for duration of hospital stay Rehabilitation Potential For Stated Goals: Good REHAB RECOMMENDATIONS (at time of discharge pending progress):   
Placement: It is my opinion, based on this patient's performance to date, that Ms. Sony Short may benefit from intensive therapy at a [address] [place] [address] after discharge due to the functional deficits listed above that are likely to improve with skilled rehabilitation and concerns that he/she may be unsafe to be unsupervised at home due to medical complications and mobility deficits which put her at risk for functional decline and/or falling . Equipment:  
? None at this time HISTORY:  
History of Present Injury/Illness (Reason for Referral): 
See H&P. Past Medical History/Comorbidities: Ms. 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/2017), Neuropathy, Obesity (BMI 30-39.9) (10/2/14), PAD (peripheral artery disease) ([address] PCI (pneumatosis cystoides intestinalis) (11/5/2013), Pleural effusion, bilateral (11/21/2011), Postoperative anemia due to acute blood loss (11/21/2011), Renal mass (5/14/2016), Rib cage fracture (11/5/2013), S/P CABG (coronary artery bypass graft) (11/05/2013), S/P CABG x 3 (11/18/2011), Stasis edema of both lower extremities (1/3/2018), Status post-operative repair of hip fracture (09/15/2014), Stroke ([address] Type II or unspecified type diabetes mellitus without mention of complication, uncontrolled ([address] (12/16/2013), and Unspecified adverse effect of anesthesia. She also has no past medical history of Difficult intubation, Malignant hyperthermia due to anesthesia, Nausea & vomiting, or Pseudocholinesterase deficiency.   Ms. Chester Engle  has a past surgical history that includes hx colonoscopy; pr cabg, artery-vein, four (Oct. 2011); hx cataract removal (Bilateral, 2012); hx hysterectomy; hx lap cholecystectomy; hx hip fracture tx (Left); pr close cystostomy; vascular surgery procedure unlist (Left, 2017); and vascular surgery procedure unlist (Left, 08/10/2017). Social History/Living Environment:  
Home Environment: Private residence # Steps to Enter: 0 Wheelchair Ramp: Yes One/Two Story Residence: Two story, live on 1st floor Living Alone: No 
Support Systems: Spouse/Significant Other/Partner, Child(dnaia) Patient Expects to be Discharged to[de-identified] Rehabilitation facility Current DME Used/Available at Home: Wheelchair, Nawaf Older, rollator Tub or Shower Type: Shower Prior Level of Function/Work/Activity: Pt poor historian, somewhat unclear. Uses walker for transfers and short distances, otherwise [place]. On O2 at baseline. Number of Personal Factors/Comorbidities that affect the Plan of Care: 1-2: MODERATE COMPLEXITY EXAMINATION:  
Most Recent Physical Functioning:  
Gross Assessment: 
  
         
  
Posture: 
  
Balance: 
Sitting - Static: Good (unsupported) Bed Mobility: 
Supine to Sit: Minimum assistance Sit to Supine: Modified independent Scooting: Modified independent Wheelchair Mobility: 
  
Transfers: 
  
Gait: 
  
   
  
Body Structures Involved: 1. Nerves 2. Lungs 3. Bones 4. Joints 5. Muscles Body Functions Affected: 1. Sensory/Pain 2. Neuromusculoskeletal 
3. Movement Related Activities and Participation Affected: 1. General Tasks and Demands 2. Mobility 3. Interpersonal Interactions and Relationships 4. Community, Social and [place] Number of elements that affect the Plan of Care: 3: MODERATE COMPLEXITY CLINICAL PRESENTATION:  
Presentation: Stable and uncomplicated: LOW COMPLEXITY CLINICAL DECISION MAKIN [facility] [address] [id number] AM-PAC 6 Clicks Basic Mobility Inpatient Short Form How much difficulty does the patient currently have. .. Unable A Lot A Little None 1.  Turning over in bed (including adjusting bedclothes, sheets and blankets)? ? 1   ? 2   ? 3   ? 4  
2. Sitting down on and standing up from a chair with arms ( e.g., wheelchair, bedside commode, etc.)   ? 1   ? 2   ? 3   ? 4  
3. Moving from lying on back to sitting on the side of the bed?   ? 1   ? 2   ? 3   ? 4 How much help from another person does the patient currently need. .. Total A Lot A Little None 4. Moving to and from a bed to a chair (including a wheelchair)? ? 1   ? 2   ? 3   ? 4  
5. Need to walk in hospital room? ? 1   ? 2   ? 3   ? 4  
6. Climbing 3-5 steps with a railing? ? 1   ? 2   ? 3   ? 4  
© 2007, Trustees of [facility], under license to [employer]. All rights reserved Score:  Initial: 17 Most Recent: X (Date: -- ) Interpretation of Tool:  Represents activities that are increasingly more difficult (i.e. Bed mobility, Transfers, Gait). Medical Necessity:    
· Patient is expected to demonstrate progress in strength, range of motion, balance, coordination and functional technique ·  to increase independence with all mobility. · . Reason for Services/Other Comments: 
· Patient continues to require skilled intervention due to medical complications and mobility deficits which impact her level of function, independence, and safety as indicated above. · . Use of outcome tool(s) and clinical judgement create a POC that gives a: Clear prediction of patient's progress: LOW COMPLEXITY  
  
 
TREATMENT:  
(In addition to Assessment/Re-Assessment sessions the following treatments were rendered) Pre-treatment Symptoms/Complaints:  \"I am just so upset. Can you please help me\" Pain: Initial:  
Pain Intensity 1: 0  Post Session:  Unchanged Therapeutic Activity: (    15 min): Therapeutic activities including bed mobility, bed transfers, and sitting balance and scooting  to improve mobility, strength, balance and coordination.   Required minimal assist to sit up but no assist after that. Braces/Orthotics/Lines/Etc:  
· IV 
· O2 Device: Nasal cannula Treatment/Session Assessment:   
· Response to Treatment:  See above. · Interdisciplinary Collaboration:  
o Physical Therapy Assistant 
o Registered Nurse · After treatment position/precautions:  
o Supine in bed 
o Bed/Chair-wheels locked 
o Bed in low position 
o Call light within reach 
o RN notified · Compliance with Program/Exercises: Compliant all of the time · Recommendations/Intent for next treatment session: \"Next visit will focus on advancements to more challenging activities and reduction in assistance provided\". Total Treatment Duration: PT Patient Time In/Time Out Time In: 1030 Time Out: 1045 Jeremias Carmichael PTA, DPT

## 2019-07-05 LAB
ALBUMIN SERPL-MCNC: 2.8 G/DL (ref 3.2–4.6)
ANION GAP SERPL CALC-SCNC: 15 MMOL/L (ref 7–16)
BACTERIA SPEC CULT: NORMAL
BACTERIA SPEC CULT: NORMAL
BASOPHILS # BLD: 0.1 K/UL (ref 0–0.2)
BASOPHILS NFR BLD: 1 % (ref 0–2)
BUN SERPL-MCNC: 104 MG/DL (ref 8–23)
CALCIUM SERPL-MCNC: 8.4 MG/DL (ref 8.3–10.4)
CHLORIDE SERPL-SCNC: 114 MMOL/L (ref 98–107)
CO2 SERPL-SCNC: 18 MMOL/L (ref 21–32)
CREAT SERPL-MCNC: 3.77 MG/DL (ref 0.6–1)
DIFFERENTIAL METHOD BLD: ABNORMAL
EOSINOPHIL # BLD: 0 K/UL (ref 0–0.8)
EOSINOPHIL NFR BLD: 0 % (ref 0.5–7.8)
ERYTHROCYTE [DISTWIDTH] IN BLOOD BY AUTOMATED COUNT: 14.1 % (ref 11.9–14.6)
GLUCOSE BLD STRIP.AUTO-MCNC: 261 MG/DL (ref 65–100)
GLUCOSE BLD STRIP.AUTO-MCNC: 263 MG/DL (ref 65–100)
GLUCOSE BLD STRIP.AUTO-MCNC: 264 MG/DL (ref 65–100)
GLUCOSE BLD STRIP.AUTO-MCNC: 320 MG/DL (ref 65–100)
GLUCOSE SERPL-MCNC: 241 MG/DL (ref 65–100)
HCT VFR BLD AUTO: 29.3 % (ref 35.8–46.3)
HGB BLD-MCNC: 9.8 G/DL (ref 11.7–15.4)
IMM GRANULOCYTES # BLD AUTO: 0.2 K/UL (ref 0–0.5)
IMM GRANULOCYTES NFR BLD AUTO: 1 % (ref 0–5)
LYMPHOCYTES # BLD: 0.8 K/UL (ref 0.5–4.6)
LYMPHOCYTES NFR BLD: 5 % (ref 13–44)
MCH RBC QN AUTO: 30.8 PG (ref 26.1–32.9)
MCHC RBC AUTO-ENTMCNC: 33.4 G/DL (ref 31.4–35)
MCV RBC AUTO: 92.1 FL (ref 79.6–97.8)
MONOCYTES # BLD: 0.8 K/UL (ref 0.1–1.3)
MONOCYTES NFR BLD: 6 % (ref 4–12)
NEUTS SEG # BLD: 12.7 K/UL (ref 1.7–8.2)
NEUTS SEG NFR BLD: 87 % (ref 43–78)
NRBC # BLD: 0 K/UL (ref 0–0.2)
PHOSPHATE SERPL-MCNC: 3.8 MG/DL (ref 2.3–3.7)
PLATELET # BLD AUTO: 271 K/UL (ref 150–450)
PMV BLD AUTO: 11.7 FL (ref 9.4–12.3)
POTASSIUM SERPL-SCNC: 3.5 MMOL/L (ref 3.5–5.1)
RBC # BLD AUTO: 3.18 M/UL (ref 4.05–5.2)
SERVICE CMNT-IMP: NORMAL
SERVICE CMNT-IMP: NORMAL
SODIUM SERPL-SCNC: 147 MMOL/L (ref 136–145)
WBC # BLD AUTO: 14.5 K/UL (ref 4.3–11.1)

## 2019-07-05 PROCEDURE — 82962 GLUCOSE BLOOD TEST: CPT

## 2019-07-05 PROCEDURE — 65270000029 HC RM PRIVATE

## 2019-07-05 PROCEDURE — 74011250637 HC RX REV CODE- 250/637: Performed by: INTERNAL MEDICINE

## 2019-07-05 PROCEDURE — 74011250636 HC RX REV CODE- 250/636: Performed by: INTERNAL MEDICINE

## 2019-07-05 PROCEDURE — 36415 COLL VENOUS BLD VENIPUNCTURE: CPT

## 2019-07-05 PROCEDURE — 77030020255 HC SOL INJ LR 1000ML BG

## 2019-07-05 PROCEDURE — 80069 RENAL FUNCTION PANEL: CPT

## 2019-07-05 PROCEDURE — 74011636637 HC RX REV CODE- 636/637: Performed by: INTERNAL MEDICINE

## 2019-07-05 PROCEDURE — 85025 COMPLETE CBC W/AUTO DIFF WBC: CPT

## 2019-07-05 PROCEDURE — 74011250637 HC RX REV CODE- 250/637: Performed by: FAMILY MEDICINE

## 2019-07-05 PROCEDURE — 97530 THERAPEUTIC ACTIVITIES: CPT

## 2019-07-05 RX ADMIN — INSULIN LISPRO 6 UNITS: 100 INJECTION, SOLUTION INTRAVENOUS; SUBCUTANEOUS at 07:30

## 2019-07-05 RX ADMIN — SODIUM CHLORIDE, SODIUM LACTATE, POTASSIUM CHLORIDE, AND CALCIUM CHLORIDE 50 ML/HR: 600; 310; 30; 20 INJECTION, SOLUTION INTRAVENOUS at 21:38

## 2019-07-05 RX ADMIN — CARBIDOPA AND LEVODOPA 1 TABLET: 25; 100 TABLET ORAL at 17:09

## 2019-07-05 RX ADMIN — METRONIDAZOLE 500 MG: 500 INJECTION, SOLUTION INTRAVENOUS at 05:47

## 2019-07-05 RX ADMIN — APIXABAN 2.5 MG: 2.5 TABLET, FILM COATED ORAL at 17:09

## 2019-07-05 RX ADMIN — INSULIN LISPRO 6 UNITS: 100 INJECTION, SOLUTION INTRAVENOUS; SUBCUTANEOUS at 21:33

## 2019-07-05 RX ADMIN — Medication 10 ML: at 14:22

## 2019-07-05 RX ADMIN — METRONIDAZOLE 500 MG: 500 INJECTION, SOLUTION INTRAVENOUS at 01:17

## 2019-07-05 RX ADMIN — METRONIDAZOLE 500 MG: 500 INJECTION, SOLUTION INTRAVENOUS at 14:22

## 2019-07-05 RX ADMIN — CARBIDOPA AND LEVODOPA 1 TABLET: 25; 100 TABLET ORAL at 09:00

## 2019-07-05 RX ADMIN — VANCOMYCIN HYDROCHLORIDE 125 MG: 1 INJECTION, POWDER, LYOPHILIZED, FOR SOLUTION INTRAVENOUS at 05:48

## 2019-07-05 RX ADMIN — ONDANSETRON 4 MG: 2 INJECTION INTRAMUSCULAR; INTRAVENOUS at 11:43

## 2019-07-05 RX ADMIN — METRONIDAZOLE 500 MG: 500 INJECTION, SOLUTION INTRAVENOUS at 23:45

## 2019-07-05 RX ADMIN — VANCOMYCIN HYDROCHLORIDE 125 MG: 1 INJECTION, POWDER, LYOPHILIZED, FOR SOLUTION INTRAVENOUS at 11:43

## 2019-07-05 RX ADMIN — CARBAMIDE PEROXIDE 6.5% 5 DROP: 6.5 LIQUID AURICULAR (OTIC) at 12:49

## 2019-07-05 RX ADMIN — INSULIN LISPRO 6 UNITS: 100 INJECTION, SOLUTION INTRAVENOUS; SUBCUTANEOUS at 17:08

## 2019-07-05 RX ADMIN — VANCOMYCIN HYDROCHLORIDE 125 MG: 1 INJECTION, POWDER, LYOPHILIZED, FOR SOLUTION INTRAVENOUS at 17:09

## 2019-07-05 RX ADMIN — Medication 10 ML: at 01:16

## 2019-07-05 RX ADMIN — APIXABAN 2.5 MG: 2.5 TABLET, FILM COATED ORAL at 09:00

## 2019-07-05 RX ADMIN — CARBAMIDE PEROXIDE 6.5% 5 DROP: 6.5 LIQUID AURICULAR (OTIC) at 17:09

## 2019-07-05 RX ADMIN — CARBIDOPA AND LEVODOPA 1 TABLET: 25; 100 TABLET ORAL at 21:33

## 2019-07-05 RX ADMIN — LEVOTHYROXINE SODIUM 25 MCG: 50 TABLET ORAL at 05:46

## 2019-07-05 RX ADMIN — INSULIN LISPRO 8 UNITS: 100 INJECTION, SOLUTION INTRAVENOUS; SUBCUTANEOUS at 11:37

## 2019-07-05 RX ADMIN — Medication 10 ML: at 05:47

## 2019-07-05 RX ADMIN — Medication 10 ML: at 21:41

## 2019-07-05 NOTE — PROGRESS NOTES
Nutrition Reason for assessment: Length of stay Assessment:  
Food/Nutrition Patient History:  Patient states that she has not been eating well for 2-3 weeks prior to admission. She states that everything she has tried to eat makes her nauseous. Observed am and noon trays and 0% eaten. RN confirms that patient has been declining meals due to nausea. Patient is on nausea medications PRN. PMH: significant for CKD, CAD, CVA, CABG, DM2, obesity Labs: significant for elevated BG (157-241), elevated BUN and creatinine. DIET DIABETIC CONSISTENT CARB Regular Anthropometrics:Height: 5' 8\" (172.7 cm),  Weight: 110.2 kg (243 lb),  , Body mass index is 36.95 kg/m². BMI class of obesity class II. Macronutrient needs: 63.6 kg IBW 
EER:  8590-4964 kcal /day (25-35 kcal/kg) EPR:  51-64 grams protein/day (0.8-1grams/kg) Intake/Comparative Standards: Recorded meal(s): 100%. Consistent CHO diet This potentially meets 0% of kcal and 0% of protein needs Nutrition Diagnosis: Inadequate protein/energy intake related to nausea as evidenced by patient self reported barrier to PO intake, declining meals, meeting 0% nutrient needs. Intervention: 
Meals and snacks: Continue current diet. Nutrition Supplement Therapy: Patient declines all ONS. Consider alternate means of nutrition if patient continues with inability to take PO. Coordination of Nutrition Care: Melba SOLIS Discharge plan: Too soon to determine. [address] [facility] [address] [place] [address], [address], [address]

## 2019-07-05 NOTE — PROGRESS NOTES
Hourly rounds completed. All needs met. Pt alert and oriented x 3. No complaints stated during the shift. Pt had one bm at the end of the shift. Pt is lying in bed in a low, locked position with the side rails up x 2, call light within reach. Gave report to the oncoming day shift nurse.

## 2019-07-05 NOTE — PROGRESS NOTES
Pt denied to 9th floor. Cm spoke with pt and spouse about other STR choices. Per pt request, referral sent to [facility]. Awaiting response. [facility] is pt's first choice.

## 2019-07-05 NOTE — PROGRESS NOTES
[facility] Subjective: A on CKD   Feeling better this am 
 
Review of Systems -  
General ROS: negative for - fever, chills Respiratory ROS: no SOB, cough, WARREN Cardiovascular ROS: no CP, palpitations Gastrointestinal ROS: no N&V, abdominal pain, diarrhea Genito-Urinary ROS: no difficulty voiding, dysuria Neurological ROS: no seizures, focal weekness Objective: 
 
Vitals:  
 07/04/19 2324 07/05/19 6839 07/05/19 6446 07/05/19 9962 BP: 150/48 147/77  133/58 Pulse: 87 (!) 106  (!) 110 Resp: 18 18  20 Temp: 98 °F (36.7 °C) 97.2 °F (36.2 °C)  98.3 °F (36.8 °C) SpO2: 94% 91%  97% Weight:   110.2 kg (243 lb) Height:      
 
 
PE 
Gen: in no acute distress CV:reg rate Chest:clear Abd: soft Ext/Access: no edema Therese Garth LAB Recent Labs  
  07/05/19 
2081 07/04/19 
2714 07/03/19 
3038 WBC 14.5* 8.8 8.6 HGB 9.8* 9.2* 8.9* HCT 29.3* 26.8* 26.3*  
 258 247 Recent Labs  
  07/05/19 
4136 07/04/19 
1668 07/03/19 
8567 * 146* 142  
K 3.5 3.3* 3.2*  
* 114* 111* CO2 18* 18* 18* * 191* 168* * 120* 142* CREA 3.77* 4.79* 6.56* PHOS 3.8*  --   --   
CA 8.4 8.2* 7.7* ALB 2.8*  --   --   
 
 
 
 
Radiology A/P:  
Patient Active Problem List  
Diagnosis Code  
 HTN (hypertension) I10  
 AF (atrial fibrillation) ([facility]) I48.91  
 Diabetic neuropathy ([facility] E11.40  A-V fistula ([facility]) I77.0  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 ([facility] E11.21  
 SARAH (obstructive sleep apnea) G47.33  
 CKD (chronic kidney disease) stage 5, GFR less than 15 ml/min ([facility]) N18.5  Parkinson disease ([address] G20  
 Acute on chronic renal failure (HCC) N17.9, N18.9  Acute cystitis with hematuria N30.01  
 Morbid obesity (HCC) E66.01  
 Hypokalemia E87.6  
 C. difficile diarrhea A04.72 A on CKD -Renal function is improving Following. Therese Liang HTN/Volume -will decrease LR to 50 from 125/hr Low K C Diff Maryfrances Carrel, MD

## 2019-07-05 NOTE — PROGRESS NOTES
Hospitalist Progress Note Admit Date:  2019  9:55 PM  
Name:  Lily Mcnamara Age:  78 y.o. 
:  1940 MRN:  [id number] PCP:  Shiela Stringer MD 
Treatment Team: Attending Provider: Sanaz Amador MD; Primary Nurse: Vernal Prom Consulting Provider: Corinne Lyon, MD; Utilization Review: Yusuf Simmons RN; Care Manager: Emanuel Solano RN; Physical Therapist: Windy Sánchez DPT Subjective:  
2019- seen - crying and tearful at the thought of hd; declined therapy today. No diarrhea 
 
 
2019 - pt seen - renal function improving - so no hd for now; sounds wet  
 
2019- pt seen - notes ear feels funny and hearing not as good 2019 - pt seen -  at bedside- updated- pt feels ok - ready to go home but wbc high and hr up Objective:  
 
Patient Vitals for the past 24 hrs: 
 Temp Pulse Resp BP SpO2  
19 0841 98.3 °F (36.8 °C) (!) 110 20 133/58 97 % 19 0556 97.2 °F (36.2 °C) (!) 106 18 147/77 91 % 19 2324 98 °F (36.7 °C) 87 18 150/48 94 % 19 2037 99.1 °F (37.3 °C) 76 18 123/63 97 % 19 1621 98.3 °F (36.8 °C) 78 18 163/51 94 % Oxygen Therapy O2 Sat (%): 97 % (19 0841) Pulse via Oximetry: 68 beats per minute (19) O2 Device: Nasal cannula (19) O2 Flow Rate (L/min): 3 l/min (19) Intake/Output Summary (Last 24 hours) at 2019 1151 Last data filed at 2019 5997 Gross per 24 hour Intake 5099 ml Output 1750 ml Net 3349 ml  
   
 
General:    Well nourished. Alert. CV:   RRR. No murmur, rub, or gallop. Lungs:   A/e =  No wheezing, rhonchi, or rales. Abdomen:   Soft, nontender, nondistended. Extremities: Warm and dry. No cyanosis or edema. Skin:     No rashes or jaundice. Data Review: 
I have reviewed all labs, meds, telemetry events, and studies from the last 24 hours. Recent Results (from the past 24 hour(s)) GLUCOSE, POC  
 Collection Time: 07/04/19  4:17 PM  
Result Value Ref Range Glucose (POC) 230 (H) 65 - 100 mg/dL GLUCOSE, POC Collection Time: 07/04/19  8:55 PM  
Result Value Ref Range Glucose (POC) 203 (H) 65 - 100 mg/dL CBC WITH AUTOMATED DIFF Collection Time: 07/05/19  6:16 AM  
Result Value Ref Range WBC 14.5 (H) 4.3 - 11.1 K/uL  
 RBC 3.18 (L) 4.05 - 5.2 M/uL HGB 9.8 (L) 11.7 - 15.4 g/dL HCT 29.3 (L) 35.8 - 46.3 % MCV 92.1 79.6 - 97.8 FL  
 MCH 30.8 26.1 - 32.9 PG  
 MCHC 33.4 31.4 - 35.0 g/dL  
 RDW 14.1 11.9 - 14.6 % PLATELET 577 519 - 029 K/uL MPV 11.7 9.4 - 12.3 FL ABSOLUTE NRBC 0.00 0.0 - 0.2 K/uL  
 DF AUTOMATED NEUTROPHILS 87 (H) 43 - 78 % LYMPHOCYTES 5 (L) 13 - 44 % MONOCYTES 6 4.0 - 12.0 % EOSINOPHILS 0 (L) 0.5 - 7.8 % BASOPHILS 1 0.0 - 2.0 % IMMATURE GRANULOCYTES 1 0.0 - 5.0 %  
 ABS. NEUTROPHILS 12.7 (H) 1.7 - 8.2 K/UL  
 ABS. LYMPHOCYTES 0.8 0.5 - 4.6 K/UL  
 ABS. MONOCYTES 0.8 0.1 - 1.3 K/UL  
 ABS. EOSINOPHILS 0.0 0.0 - 0.8 K/UL  
 ABS. BASOPHILS 0.1 0.0 - 0.2 K/UL  
 ABS. IMM. GRANS. 0.2 0.0 - 0.5 K/UL RENAL FUNCTION PANEL Collection Time: 07/05/19  6:16 AM  
Result Value Ref Range Sodium 147 (H) 136 - 145 mmol/L Potassium 3.5 3.5 - 5.1 mmol/L Chloride 114 (H) 98 - 107 mmol/L  
 CO2 18 (L) 21 - 32 mmol/L Anion gap 15 7 - 16 mmol/L Glucose 241 (H) 65 - 100 mg/dL  (H) 8 - 23 MG/DL Creatinine 3.77 (H) 0.6 - 1.0 MG/DL  
 GFR est AA 15 (L) >60 ml/min/1.73m2 GFR est non-AA 12 (L) >60 ml/min/1.73m2 Calcium 8.4 8.3 - 10.4 MG/DL Phosphorus 3.8 (H) 2.3 - 3.7 MG/DL Albumin 2.8 (L) 3.2 - 4.6 g/dL GLUCOSE, POC Collection Time: 07/05/19  7:22 AM  
Result Value Ref Range Glucose (POC) 263 (H) 65 - 100 mg/dL GLUCOSE, POC Collection Time: 07/05/19 11:20 AM  
Result Value Ref Range Glucose (POC) 320 (H) 65 - 100 mg/dL All Micro Results Procedure Component Value Units Date/Time CULTURE, BLOOD [[id number]] Collected:  06/30/19 0041 Order Status:  Completed Specimen:  Whole Blood Updated:  07/05/19 0617 Special Requests: --     
  RIGHT JUGULAR VEIN Culture result: NO GROWTH 5 DAYS     
 CULTURE, BLOOD [[id number]] Collected:  06/30/19 3599 Order Status:  Completed Specimen:  Blood Updated:  07/05/19 5304 Special Requests: --     
  RIGHT 
HAND Culture result: NO GROWTH 5 DAYS     
 CULTURE, STOOL [[id number]] Collected:  06/29/19 2259 Order Status:  Completed Specimen:  Stool Updated:  07/02/19 0590 Special Requests: NO SPECIAL REQUESTS Culture result:    
  No Salmonella, Shigella, or Ecoli 0157 isolated. CULTURE, URINE [[id number]]  (Abnormal)  (Susceptibility) Collected:  06/29/19 2308 Order Status:  Completed Specimen:  Cath Urine Updated:  07/02/19 2971 Special Requests: NO SPECIAL REQUESTS Culture result:    
  >100,000 COLONIES/mL CITROBACTER FREUNDII  
     
 C. DIFFICILE/EPI PCR [[id number]]  (Abnormal) Collected:  06/29/19 2306 Order Status:  Completed Specimen:  Stool Updated:  06/30/19 0021 Special Requests: NO SPECIAL REQUESTS Culture result: Toxigenic C Diff POS/027-NAP1-BI PRESUMPTIVE POS RESULTS VERIFIED, PHONED TO AND READ BACK BY 
DR. Toni Hammer @ 0010 ON [id number] BY TVO Current Meds: 
Current Facility-Administered Medications Medication Dose Route Frequency  carbamide peroxide (DEBROX) 6.5 % otic solution 5 Drop  5 Drop Both Ears BID  ondansetron (ZOFRAN) injection 4 mg  4 mg IntraVENous Q6H PRN  
 LORazepam (ATIVAN) injection 0.26 mg  0.26 mg IntraVENous Q6H PRN  
 apixaban (ELIQUIS) tablet 2.5 mg  2.5 mg Oral BID  carbidopa-levodopa (SINEMET)  mg per tablet 1 Tab  1 Tab Oral TID  levothyroxine (SYNTHROID) tablet 25 mcg  25 mcg Oral ACB  sodium chloride (NS) flush 5-40 mL  5-40 mL IntraVENous Q8H  
  sodium chloride (NS) flush 5-40 mL  5-40 mL IntraVENous PRN  
 acetaminophen (TYLENOL) tablet 650 mg  650 mg Oral Q4H PRN  
 bisacodyl (DULCOLAX) tablet 5 mg  5 mg Oral DAILY PRN  
 insulin lispro (HUMALOG) injection   SubCUTAneous AC&HS  vancomycin 50 mg/mL oral solution (compounded) 125 mg  125 mg Oral Q6H  
 metroNIDAZOLE (FLAGYL) IVPB premix 500 mg  500 mg IntraVENous Q8H  
 lactated Ringers infusion  50 mL/hr IntraVENous CONTINUOUS Other Studies (last 24 hours): No results found. Assessment and Plan:  
 
Hospital Problems as of 7/5/2019 Date Reviewed: 6/19/2019 Codes Class Noted - Resolved POA  
 C. difficile diarrhea ICD-10-CM: A04.72 
ICD-9-CM: 008.45  6/30/2019 - Present Yes Acute on chronic renal failure (HCC) ICD-10-CM: N17.9, N18.9 ICD-9-CM: 584.9, 585.9  6/29/2019 - Present Yes * (Principal) Acute cystitis with hematuria ICD-10-CM: N30.01 
ICD-9-CM: 595.0  6/29/2019 - Present Yes Morbid obesity ([facility] (Chronic) ICD-10-CM: E66.01 
ICD-9-CM: 278.01  6/29/2019 - Present Yes Hypokalemia ICD-10-CM: E87.6 ICD-9-CM: 276.8  6/29/2019 - Present Yes Parkinson disease ([facility] (Chronic) ICD-10-CM: G20 
ICD-9-CM: 332.0  6/19/2019 - Present Yes  
   
 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 
ICD-9-CM: 250.40, 583.81  11/15/2017 - Present Yes  
   
 AF (atrial fibrillation) (HCC) (Chronic) ICD-10-CM: I48.91 
ICD-9-CM: 427.31  11/20/2011 - Present Yes HTN (hypertension) (Chronic) ICD-10-CM: I10 
ICD-9-CM: 401.9  11/10/2011 - Present Yes PLAN:   
· Sepsis due to cdiif - resolved · cdiff - continue vanc- no diarrhea for last 2-3 days per pt 
· disha on ckd stage v - per renal - resolving may need hd - continue ivfls- decreased to 50cc/hr per renal  
· ? pulm edema- not sig change from prior study on my review. Will monitor her closely- low threshold for lasix · Elevated wbc and tachycardia- unclear etiology- monitor for any infection · uti- CITROBACTER FREUNDII-  S/p  rocephin  
· afib - on eliquis · PD- sinemet · Anemia- monitor · Ear wax- start cerumonolytic DC planning/Dispo:  unclear DVT ppx:  eliquis Signed: 
Xiomara Saucedo MD

## 2019-07-05 NOTE — PROGRESS NOTES
Problem: Mobility Impaired (Adult and Pediatric) Goal: *Acute Goals and Plan of Care Description STG: 
(1.)Ms. Danish Nunes will move from supine to sit and sit to supine , scoot up and down and roll side to side with MODERATE ASSIST within 3 treatment day(s). (2.)Ms. Danish Nunes will transfer from bed to chair and chair to bed with CONTACT GUARD ASSIST using the least restrictive device within 3 treatment day(s). (3.)Ms. Danish Nunes will ambulate with CONTACT GUARD ASSIST for 10 feet with the least restrictive device within 3 treatment day(s). LTG: 
(1.)Ms. Danish Nunes will move from supine to sit and sit to supine , scoot up and down and roll side to side in bed with CONTACT GUARD ASSIST within 7 treatment day(s). (2.)Ms. Danish Nunes will transfer from bed to chair and chair to bed with MODIFIED INDEPENDENCE using the least restrictive device within 7 treatment day(s). (3.)Ms. Danish Nunes will ambulate with CONTACT GUARD ASSIST for 25+ feet with the least restrictive device within 7 treatment day(s). (4.)Ms. Danish Nunes will perform standing static and dynamic balance activities x 23 minutes with CONTACT GUARD ASSIST to improve safety within 7 treatment day(s). ________________________________________________________________________________________________ PHYSICAL THERAPY: Daily Note and PM 7/5/2019 INPATIENT: PT Visit Days : 4 [place] / [place] / Product Type: Medicare /   
  
NAME/AGE/GENDER: Talisha Guo is a 78 y.o. female PRIMARY DIAGNOSIS: Acute on chronic renal failure (HCC) [N17.9, N18.9] Acute cystitis with hematuria Acute cystitis with hematuria ICD-10: Treatment Diagnosis: 
 · Generalized Muscle Weakness (M62.81) · Other lack of cordination (R27.8) · Difficulty in walking, Not elsewhere classified (R26.2) · Other abnormalities of gait and mobility (R26.89) · History of falling (Z91.81) Precaution/Allergies: 
Baclofen and Lipitor [atorvastatin] ASSESSMENT:  
 Ms. Shelley Gamble  Prior to admission Ms. Shelley Gamble uses a wheelchair with use of walker for in -home distances and transfers for mobility. Pt wears oxygen at baseline (3L per pt report). Patient was supine and needing encouragement. She sat up with minimal assist but immediately became nauseated and spent some time dry heaving. She was about to try and stand but would have several distraction on the trnf. Pt repeated this circular pattern x4 before completing trnf to stand. Same occurred again needing 4 trials of standing attempts, then failing due to getting distracted by nausea, HAs, refusing to stand due to fear of falling. During 2nd stand pt took 2 side steps and insisted on sitting. Pt is declining in overall effort and not progressing towards goals. Main limitation is self limiting by  Distractions and arguing about technique. Will continue efforts. This section established at most recent assessment PROBLEM LIST (Impairments causing functional limitations): 1. Decreased Strength 2. Decreased Transfer Abilities 3. Decreased Ambulation Ability/Technique 4. Decreased Balance 5. Decreased Activity Tolerance 6. Increased Fatigue 7. Increased Shortness of Breath INTERVENTIONS PLANNED: (Benefits and precautions of physical therapy have been discussed with the patient.) 1. Balance Exercise 2. Bed Mobility 3. Family Education 4. Gait Training 5. Home Exercise Program (HEP) 6. Neuromuscular Re-education/Strengthening 7. Range of Motion (ROM) 8. Therapeutic Activites 9. Therapeutic Exercise/Strengthening 10. Transfer Training TREATMENT PLAN: Frequency/Duration: 3 times a week for duration of hospital stay Rehabilitation Potential For Stated Goals: Good REHAB RECOMMENDATIONS (at time of discharge pending progress):   
Placement: It is my opinion, based on this patient's performance to date, that Ms. Shelley Gamble may benefit from intensive therapy at [address] after discharge due to the functional deficits listed above that are likely to improve with skilled rehabilitation and concerns that he/she may be unsafe to be unsupervised at home due to medical complications and mobility deficits which put her at risk for functional decline and/or falling . Equipment:  
? None at this time HISTORY:  
History of Present Injury/Illness (Reason for Referral): 
See H&P. Past Medical History/Comorbidities: Ms. 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/2017), Neuropathy, Obesity (BMI 30-39.9) (10/2/14), PAD (peripheral artery disease) ([address] PCI (pneumatosis cystoides intestinalis) (11/5/2013), Pleural effusion, bilateral (11/21/2011), Postoperative anemia due to acute blood loss (11/21/2011), Renal mass (5/14/2016), Rib cage fracture (11/5/2013), S/P CABG (coronary artery bypass graft) (11/05/2013), S/P CABG x 3 (11/18/2011), Stasis edema of both lower extremities (1/3/2018), Status post-operative repair of hip fracture (09/15/2014), Stroke ([address] Type II or unspecified type diabetes mellitus without mention of complication, uncontrolled ([address] (12/16/2013), and Unspecified adverse effect of anesthesia. She also has no past medical history of Difficult intubation, Malignant hyperthermia due to anesthesia, Nausea & vomiting, or Pseudocholinesterase deficiency.   Ms. Jyothi Zimmerman  has a past surgical history that includes hx colonoscopy; pr cabg, artery-vein, four (Oct. 2011); hx cataract removal (Bilateral, 11/2012); hx hysterectomy; hx lap cholecystectomy; hx hip fracture tx (Left); pr close cystostomy; vascular surgery procedure unlist (Left, 06/07/2017); and vascular surgery procedure unlist (Left, 08/10/2017). Social History/Living Environment:  
Home Environment: Private residence # Steps to Enter: 0 Wheelchair Ramp: Yes One/Two Story Residence: Two story, live on 1st floor Living Alone: No 
Support Systems: Spouse/Significant Other/Partner, Child(dania) Patient Expects to be Discharged to[de-identified] Rehabilitation facility Current DME Used/Available at Home: Wheelchair, Barney Robert, rollator Tub or Shower Type: Shower Prior Level of Function/Work/Activity: Pt poor historian, somewhat unclear. Uses walker for transfers and short distances, otherwise [place]. On O2 at baseline. Number of Personal Factors/Comorbidities that affect the Plan of Care: 1-2: MODERATE COMPLEXITY EXAMINATION:  
Most Recent Physical Functioning:  
Gross Assessment: 
  
         
  
Posture: 
  
Balance: 
Sitting: Impaired Sitting - Static: Fair (occasional); Good (unsupported) Sitting - Dynamic: Fair (occasional); Good (unsupported) Standing: Impaired Standing - Static: Fair Standing - Dynamic : Fair Bed Mobility: 
Rolling: Contact guard assistance Supine to Sit: Contact guard assistance Scooting: Moderate assistance Wheelchair Mobility: 
  
Transfers: 
Sit to Stand: Minimum assistance Stand to Sit: Minimum assistance Gait: 
  
Distance (ft): 2 Feet (ft) Assistive Device: Walker, rolling Ambulation - Level of Assistance: Moderate assistance Body Structures Involved: 1. Nerves 2. Lungs 3. Bones 4. Joints 5. Muscles Body Functions Affected: 1. Sensory/Pain 2. Neuromusculoskeletal 
3. Movement Related Activities and Participation Affected: 1. General Tasks and Demands 2. Mobility 3. Interpersonal Interactions and Relationships 4. Community, Social and [place] Number of elements that affect the Plan of Care: 3: MODERATE COMPLEXITY CLINICAL PRESENTATION:  
Presentation: Stable and uncomplicated: LOW COMPLEXITY CLINICAL DECISION MAKIN[address] [place] [address] AM-PAC 6 Clicks Basic Mobility Inpatient Short Form How much difficulty does the patient currently have. .. Unable A Lot A Little None 1. Turning over in bed (including adjusting bedclothes, sheets and blankets)? ? 1   ? 2   ? 3   ? 4  
2. Sitting down on and standing up from a chair with arms ( e.g., wheelchair, bedside commode, etc.)   ? 1   ? 2   ? 3   ? 4  
3. Moving from lying on back to sitting on the side of the bed?   ? 1   ? 2   ? 3   ? 4 How much help from another person does the patient currently need. .. Total A Lot A Little None 4. Moving to and from a bed to a chair (including a wheelchair)? ? 1   ? 2   ? 3   ? 4  
5. Need to walk in hospital room? ? 1   ? 2   ? 3   ? 4  
6. Climbing 3-5 steps with a railing? ? 1   ? 2   ? 3   ? 4  
© , Trustees of [address] [place] [address], under license to [employer]. All rights reserved Score:  Initial: 17 Most Recent: X (Date: -- ) Interpretation of Tool:  Represents activities that are increasingly more difficult (i.e. Bed mobility, Transfers, Gait). Medical Necessity:    
· Patient is expected to demonstrate progress in strength, range of motion, balance, coordination and functional technique ·  to increase independence with all mobility. · . Reason for Services/Other Comments: 
· Patient continues to require skilled intervention due to medical complications and mobility deficits which impact her level of function, independence, and safety as indicated above. · . Use of outcome tool(s) and clinical judgement create a POC that gives a: Clear prediction of patient's progress: LOW COMPLEXITY  
  
 
TREATMENT:  
(In addition to Assessment/Re-Assessment sessions the following treatments were rendered) Pre-treatment Symptoms/Complaints:  none Pain: Initial:  
Pain Intensity 1: 0  Post Session:  Unchanged Therapeutic Activity: (    25 min): Therapeutic activities including bed mobility, bed transfers, and sitting balance and scooting  to improve mobility, strength, balance and coordination. Required minimal assist to sit up but no assist after that. Braces/Orthotics/Lines/Etc:  
· IV 
· O2 Device: Nasal cannula Treatment/Session Assessment:   
· Response to Treatment:  See above. · Interdisciplinary Collaboration:  
o Physical Therapist 
o Registered Nurse · After treatment position/precautions:  
o Supine in bed 
o Bed/Chair-wheels locked 
o Bed in low position 
o Call light within reach 
o RN notified · Compliance with Program/Exercises: Compliant all of the time · Recommendations/Intent for next treatment session: \"Next visit will focus on advancements to more challenging activities and reduction in assistance provided\". Total Treatment Duration: PT Patient Time In/Time Out Time In: 1320 Time Out: 1400 Radha Serrano DPT, DPT

## 2019-07-06 LAB
ALBUMIN SERPL-MCNC: 2.7 G/DL (ref 3.2–4.6)
ANION GAP SERPL CALC-SCNC: 15 MMOL/L (ref 7–16)
BASOPHILS # BLD: 0.1 K/UL (ref 0–0.2)
BASOPHILS NFR BLD: 1 % (ref 0–2)
BUN SERPL-MCNC: 92 MG/DL (ref 8–23)
CALCIUM SERPL-MCNC: 8.5 MG/DL (ref 8.3–10.4)
CHLORIDE SERPL-SCNC: 112 MMOL/L (ref 98–107)
CO2 SERPL-SCNC: 18 MMOL/L (ref 21–32)
CREAT SERPL-MCNC: 3.33 MG/DL (ref 0.6–1)
DIFFERENTIAL METHOD BLD: ABNORMAL
EOSINOPHIL # BLD: 0 K/UL (ref 0–0.8)
EOSINOPHIL NFR BLD: 0 % (ref 0.5–7.8)
ERYTHROCYTE [DISTWIDTH] IN BLOOD BY AUTOMATED COUNT: 14.2 % (ref 11.9–14.6)
GLUCOSE BLD STRIP.AUTO-MCNC: 270 MG/DL (ref 65–100)
GLUCOSE BLD STRIP.AUTO-MCNC: 286 MG/DL (ref 65–100)
GLUCOSE BLD STRIP.AUTO-MCNC: 294 MG/DL (ref 65–100)
GLUCOSE BLD STRIP.AUTO-MCNC: 315 MG/DL (ref 65–100)
GLUCOSE SERPL-MCNC: 237 MG/DL (ref 65–100)
HCT VFR BLD AUTO: 26.4 % (ref 35.8–46.3)
HGB BLD-MCNC: 8.9 G/DL (ref 11.7–15.4)
IMM GRANULOCYTES # BLD AUTO: 0.2 K/UL (ref 0–0.5)
IMM GRANULOCYTES NFR BLD AUTO: 1 % (ref 0–5)
LYMPHOCYTES # BLD: 0.8 K/UL (ref 0.5–4.6)
LYMPHOCYTES NFR BLD: 6 % (ref 13–44)
MCH RBC QN AUTO: 31.2 PG (ref 26.1–32.9)
MCHC RBC AUTO-ENTMCNC: 33.7 G/DL (ref 31.4–35)
MCV RBC AUTO: 92.6 FL (ref 79.6–97.8)
MONOCYTES # BLD: 0.9 K/UL (ref 0.1–1.3)
MONOCYTES NFR BLD: 7 % (ref 4–12)
NEUTS SEG # BLD: 10.8 K/UL (ref 1.7–8.2)
NEUTS SEG NFR BLD: 84 % (ref 43–78)
NRBC # BLD: 0 K/UL (ref 0–0.2)
PHOSPHATE SERPL-MCNC: 3.1 MG/DL (ref 2.3–3.7)
PLATELET # BLD AUTO: 242 K/UL (ref 150–450)
PMV BLD AUTO: 11.7 FL (ref 9.4–12.3)
POTASSIUM SERPL-SCNC: 3.1 MMOL/L (ref 3.5–5.1)
RBC # BLD AUTO: 2.85 M/UL (ref 4.05–5.2)
SODIUM SERPL-SCNC: 145 MMOL/L (ref 136–145)
WBC # BLD AUTO: 12.8 K/UL (ref 4.3–11.1)

## 2019-07-06 PROCEDURE — 74011636637 HC RX REV CODE- 636/637: Performed by: INTERNAL MEDICINE

## 2019-07-06 PROCEDURE — 36415 COLL VENOUS BLD VENIPUNCTURE: CPT

## 2019-07-06 PROCEDURE — 74011250637 HC RX REV CODE- 250/637: Performed by: INTERNAL MEDICINE

## 2019-07-06 PROCEDURE — 85025 COMPLETE CBC W/AUTO DIFF WBC: CPT

## 2019-07-06 PROCEDURE — 74011250636 HC RX REV CODE- 250/636: Performed by: INTERNAL MEDICINE

## 2019-07-06 PROCEDURE — 94760 N-INVAS EAR/PLS OXIMETRY 1: CPT

## 2019-07-06 PROCEDURE — 77010033678 HC OXYGEN DAILY

## 2019-07-06 PROCEDURE — 80069 RENAL FUNCTION PANEL: CPT

## 2019-07-06 PROCEDURE — 65270000029 HC RM PRIVATE

## 2019-07-06 PROCEDURE — 74011250637 HC RX REV CODE- 250/637: Performed by: FAMILY MEDICINE

## 2019-07-06 PROCEDURE — 82962 GLUCOSE BLOOD TEST: CPT

## 2019-07-06 RX ORDER — POTASSIUM CHLORIDE 20 MEQ/1
20 TABLET, EXTENDED RELEASE ORAL 2 TIMES DAILY
Status: COMPLETED | OUTPATIENT
Start: 2019-07-06 | End: 2019-07-06

## 2019-07-06 RX ORDER — FUROSEMIDE 10 MG/ML
40 INJECTION INTRAMUSCULAR; INTRAVENOUS ONCE
Status: COMPLETED | OUTPATIENT
Start: 2019-07-06 | End: 2019-07-06

## 2019-07-06 RX ADMIN — Medication 10 ML: at 06:15

## 2019-07-06 RX ADMIN — VANCOMYCIN HYDROCHLORIDE 125 MG: 1 INJECTION, POWDER, LYOPHILIZED, FOR SOLUTION INTRAVENOUS at 17:02

## 2019-07-06 RX ADMIN — VANCOMYCIN HYDROCHLORIDE 125 MG: 1 INJECTION, POWDER, LYOPHILIZED, FOR SOLUTION INTRAVENOUS at 00:40

## 2019-07-06 RX ADMIN — VANCOMYCIN HYDROCHLORIDE 125 MG: 1 INJECTION, POWDER, LYOPHILIZED, FOR SOLUTION INTRAVENOUS at 06:14

## 2019-07-06 RX ADMIN — ACETAMINOPHEN 650 MG: 325 TABLET, FILM COATED ORAL at 15:34

## 2019-07-06 RX ADMIN — POTASSIUM CHLORIDE 20 MEQ: 20 TABLET, EXTENDED RELEASE ORAL at 13:04

## 2019-07-06 RX ADMIN — APIXABAN 2.5 MG: 2.5 TABLET, FILM COATED ORAL at 08:24

## 2019-07-06 RX ADMIN — ONDANSETRON 4 MG: 2 INJECTION INTRAMUSCULAR; INTRAVENOUS at 13:03

## 2019-07-06 RX ADMIN — INSULIN LISPRO 8 UNITS: 100 INJECTION, SOLUTION INTRAVENOUS; SUBCUTANEOUS at 21:52

## 2019-07-06 RX ADMIN — CARBAMIDE PEROXIDE 6.5% 5 DROP: 6.5 LIQUID AURICULAR (OTIC) at 08:24

## 2019-07-06 RX ADMIN — Medication 10 ML: at 13:08

## 2019-07-06 RX ADMIN — METRONIDAZOLE 500 MG: 500 INJECTION, SOLUTION INTRAVENOUS at 22:41

## 2019-07-06 RX ADMIN — INSULIN LISPRO 6 UNITS: 100 INJECTION, SOLUTION INTRAVENOUS; SUBCUTANEOUS at 17:01

## 2019-07-06 RX ADMIN — INSULIN LISPRO 6 UNITS: 100 INJECTION, SOLUTION INTRAVENOUS; SUBCUTANEOUS at 12:19

## 2019-07-06 RX ADMIN — FUROSEMIDE 40 MG: 10 INJECTION, SOLUTION INTRAMUSCULAR; INTRAVENOUS at 12:18

## 2019-07-06 RX ADMIN — APIXABAN 2.5 MG: 2.5 TABLET, FILM COATED ORAL at 17:02

## 2019-07-06 RX ADMIN — POTASSIUM CHLORIDE 20 MEQ: 20 TABLET, EXTENDED RELEASE ORAL at 17:02

## 2019-07-06 RX ADMIN — CARBIDOPA AND LEVODOPA 1 TABLET: 25; 100 TABLET ORAL at 17:02

## 2019-07-06 RX ADMIN — METRONIDAZOLE 500 MG: 500 INJECTION, SOLUTION INTRAVENOUS at 14:47

## 2019-07-06 RX ADMIN — VANCOMYCIN HYDROCHLORIDE 125 MG: 1 INJECTION, POWDER, LYOPHILIZED, FOR SOLUTION INTRAVENOUS at 12:19

## 2019-07-06 RX ADMIN — Medication 10 ML: at 21:53

## 2019-07-06 RX ADMIN — CARBAMIDE PEROXIDE 6.5% 5 DROP: 6.5 LIQUID AURICULAR (OTIC) at 17:05

## 2019-07-06 RX ADMIN — LEVOTHYROXINE SODIUM 25 MCG: 50 TABLET ORAL at 06:15

## 2019-07-06 RX ADMIN — METRONIDAZOLE 500 MG: 500 INJECTION, SOLUTION INTRAVENOUS at 06:14

## 2019-07-06 RX ADMIN — CARBIDOPA AND LEVODOPA 1 TABLET: 25; 100 TABLET ORAL at 21:52

## 2019-07-06 RX ADMIN — CARBIDOPA AND LEVODOPA 1 TABLET: 25; 100 TABLET ORAL at 08:24

## 2019-07-06 RX ADMIN — INSULIN LISPRO 6 UNITS: 100 INJECTION, SOLUTION INTRAVENOUS; SUBCUTANEOUS at 08:23

## 2019-07-06 RX ADMIN — VANCOMYCIN HYDROCHLORIDE 125 MG: 1 INJECTION, POWDER, LYOPHILIZED, FOR SOLUTION INTRAVENOUS at 23:49

## 2019-07-06 NOTE — PROGRESS NOTES
END OF SHIFT NOTE: 
 
INTAKE/OUTPUT 
07/05 0701 - 07/06 0700 In: 1900 [I.V.:1900] Out: 1250 [[id number]] Voiding: YES Catheter: NO 
Drain:   
 
 
 
 
 
Flatus: Patient does have flatus present. Stool:  0 occurrences. Characteristics: 
Stool Assessment Stool Color: Elder Adjutant Stool Appearance: Loose, Watery Stool Amount: Small Stool Source/Status: Rectum Emesis: 0 occurrences. Characteristics: VITAL SIGNS Patient Vitals for the past 12 hrs: 
 Temp Pulse Resp BP SpO2  
07/06/19 0530 99.1 °F (37.3 °C) 81 18 143/64 92 % 07/06/19 0002 98.7 °F (37.1 °C) 83 18 144/62 94 % 07/05/19 2106 98.4 °F (36.9 °C) 91 18 144/84 94 % Pain Assessment Pain Intensity 1: 0 (07/06/19 0200) Pain Location 1: Back, Shoulder Pain Intervention(s) 1: Medication (see MAR) Patient Stated Pain Goal: 0 Ambulating No 
 
Shift report given to oncoming nurse at the bedside. Kev Gene

## 2019-07-06 NOTE — PROGRESS NOTES
[facility] Subjective: A on CKD   Feeling better this am 
 
Review of Systems -  
General ROS: negative for - fever, chills Respiratory ROS: no SOB, cough, WARREN Cardiovascular ROS: no CP, palpitations Gastrointestinal ROS: no N&V, abdominal pain, diarrhea Genito-Urinary ROS: no difficulty voiding, dysuria Neurological ROS: no seizures, focal weekness Objective: 
 
Vitals:  
 07/06/19 0002 07/06/19 0530 07/06/19 4281 07/06/19 5050 BP: 144/62 143/64  162/66 Pulse: 83 81  77 Resp: 18 18  18 Temp: 98.7 °F (37.1 °C) 99.1 °F (37.3 °C)  98.4 °F (36.9 °C) SpO2: 94% 92% 95% 92% Weight:      
Height:      
 
 
PE 
Gen: in no acute distress CV:reg rate Chest:clear Abd: soft Ext/Access: no edema Taco Ricksley LAB Recent Labs  
  07/06/19 
0601 07/05/19 
0914 07/04/19 
9008 WBC 12.8* 14.5* 8.8 HGB 8.9* 9.8* 9.2* HCT 26.4* 29.3* 26.8*  
 271 258 Recent Labs  
  07/06/19 
0601 07/05/19 
1606 07/04/19 
2778  147* 146*  
K 3.1* 3.5 3.3*  
* 114* 114* CO2 18* 18* 18* * 241* 191* BUN 92* 104* 120* CREA 3.33* 3.77* 4.79* PHOS 3.1 3.8*  --   
CA 8.5 8.4 8.2* ALB 2.7* 2.8*  --   
 
 
 
 
Radiology A/P:  
Patient Active Problem List  
Diagnosis Code  
 HTN (hypertension) I10  
 AF (atrial fibrillation) ([facility]) I48.91  
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  
 SARAH (obstructive sleep apnea) G47.33  
 CKD (chronic kidney disease) stage 5, GFR less than 15 ml/min ([facility]) N18.5  Parkinson disease ([address] G20  
 Acute on chronic renal failure (HCC) N17.9, N18.9  Acute cystitis with hematuria N30.01  
 Morbid obesity (HCC) E66.01  
 Hypokalemia E87.6  
 C. difficile diarrhea A04.72 A on CKD -Renal function is improving - will schedule outpatient f/uin the next week or so. HTN/Volume -d/c fluids Low K - replete C Diff Brendon Mccullough MD

## 2019-07-06 NOTE — PROGRESS NOTES
Problem: Falls - Risk of 
Goal: *Absence of Falls Description Document Cherylle Mail Fall Risk and appropriate interventions in the flowsheet.  
Outcome: Progressing Towards Goal

## 2019-07-06 NOTE — PROGRESS NOTES
Hospitalist Progress Note Admit Date:  2019  9:55 PM  
Name:  Kiran Clement Age:  78 y.o. 
:  1940 MRN:  [id number] PCP:  Aaron Apodaca MD 
Treatment Team: Attending Provider: Tori Reddy MD; Primary Nurse: Gurdeep Vernon Consulting Provider: Beatrice Pena MD; Utilization Review: Jessica Nash RN; Care Manager: Carly Silveira RN Subjective:  
2019- seen - crying and tearful at the thought of hd; declined therapy today. No diarrhea 
 
 
2019 - pt seen - renal function improving - so no hd for now; sounds wet  
 
2019- pt seen - notes ear feels funny and hearing not as good 2019 - pt seen -  at bedside- updated- pt feels ok - ready to go home but wbc high and hr up  
 
 
2019- pt seen - dsypnic when lying flat- still notes not hearing well per the daughter family hx of ear wax requiring manual removal-  
 
Objective:  
 
Patient Vitals for the past 24 hrs: 
 Temp Pulse Resp BP SpO2  
19 0817 98.4 °F (36.9 °C) 77 18 162/66 92 % 19 0759     95 % 19 0530 99.1 °F (37.3 °C) 81 18 143/64 92 % 19 0002 98.7 °F (37.1 °C) 83 18 144/62 94 % 19 2106 98.4 °F (36.9 °C) 91 18 144/84 94 % 19 1709 98.1 °F (36.7 °C) (!) 101 18 132/64 98 % 19 1244 98.2 °F (36.8 °C) (!) 101 18 124/59 97 % 19 0841 98.3 °F (36.8 °C) (!) 110 20 133/58 97 % Oxygen Therapy O2 Sat (%): 92 % (19) Pulse via Oximetry: 62 beats per minute (19) O2 Device: Nasal cannula (19) O2 Flow Rate (L/min): 2 l/min(3 decreased to 2) (19 7029) Intake/Output Summary (Last 24 hours) at 2019 4924 Last data filed at 2019 1670 Gross per 24 hour Intake 1900 ml Output 1250 ml Net 650 ml General:    Well nourished. Alert. CV:   RRR. No murmur, rub, or gallop. Lungs:   A/e =  No wheezing, rhonchi, or rales. Abdomen:   Soft, nontender, nondistended. Extremities: Warm and dry. No cyanosis or edema. Skin:     No rashes or jaundice. Data Review: 
I have reviewed all labs, meds, telemetry events, and studies from the last 24 hours. Recent Results (from the past 24 hour(s)) GLUCOSE, POC Collection Time: 07/05/19 11:20 AM  
Result Value Ref Range Glucose (POC) 320 (H) 65 - 100 mg/dL GLUCOSE, POC Collection Time: 07/05/19  3:41 PM  
Result Value Ref Range Glucose (POC) 261 (H) 65 - 100 mg/dL GLUCOSE, POC Collection Time: 07/05/19  9:09 PM  
Result Value Ref Range Glucose (POC) 264 (H) 65 - 100 mg/dL CBC WITH AUTOMATED DIFF Collection Time: 07/06/19  6:01 AM  
Result Value Ref Range WBC 12.8 (H) 4.3 - 11.1 K/uL  
 RBC 2.85 (L) 4.05 - 5.2 M/uL HGB 8.9 (L) 11.7 - 15.4 g/dL HCT 26.4 (L) 35.8 - 46.3 % MCV 92.6 79.6 - 97.8 FL  
 MCH 31.2 26.1 - 32.9 PG  
 MCHC 33.7 31.4 - 35.0 g/dL  
 RDW 14.2 11.9 - 14.6 % PLATELET 346 623 - 773 K/uL MPV 11.7 9.4 - 12.3 FL ABSOLUTE NRBC 0.00 0.0 - 0.2 K/uL  
 DF AUTOMATED NEUTROPHILS 84 (H) 43 - 78 % LYMPHOCYTES 6 (L) 13 - 44 % MONOCYTES 7 4.0 - 12.0 % EOSINOPHILS 0 (L) 0.5 - 7.8 % BASOPHILS 1 0.0 - 2.0 % IMMATURE GRANULOCYTES 1 0.0 - 5.0 %  
 ABS. NEUTROPHILS 10.8 (H) 1.7 - 8.2 K/UL  
 ABS. LYMPHOCYTES 0.8 0.5 - 4.6 K/UL  
 ABS. MONOCYTES 0.9 0.1 - 1.3 K/UL  
 ABS. EOSINOPHILS 0.0 0.0 - 0.8 K/UL  
 ABS. BASOPHILS 0.1 0.0 - 0.2 K/UL  
 ABS. IMM. GRANS. 0.2 0.0 - 0.5 K/UL RENAL FUNCTION PANEL Collection Time: 07/06/19  6:01 AM  
Result Value Ref Range Sodium 145 136 - 145 mmol/L Potassium 3.1 (L) 3.5 - 5.1 mmol/L Chloride 112 (H) 98 - 107 mmol/L  
 CO2 18 (L) 21 - 32 mmol/L Anion gap 15 7 - 16 mmol/L Glucose 237 (H) 65 - 100 mg/dL BUN 92 (H) 8 - 23 MG/DL Creatinine 3.33 (H) 0.6 - 1.0 MG/DL  
 GFR est AA 17 (L) >60 ml/min/1.73m2 GFR est non-AA 14 (L) >60 ml/min/1.73m2 Calcium 8.5 8.3 - 10.4 MG/DL Phosphorus 3.1 2.3 - 3.7 MG/DL Albumin 2.7 (L) 3.2 - 4.6 g/dL GLUCOSE, POC Collection Time: 07/06/19  7:14 AM  
Result Value Ref Range Glucose (POC) 270 (H) 65 - 100 mg/dL All Micro Results Procedure Component Value Units Date/Time CULTURE, BLOOD [[id number]] Collected:  06/30/19 0041 Order Status:  Completed Specimen:  Whole Blood Updated:  07/05/19 0617 Special Requests: --     
  RIGHT JUGULAR VEIN Culture result: NO GROWTH 5 DAYS     
 CULTURE, BLOOD [[id number]] Collected:  06/30/19 1323 Order Status:  Completed Specimen:  Blood Updated:  07/05/19 6123 Special Requests: --     
  RIGHT 
HAND Culture result: NO GROWTH 5 DAYS     
 CULTURE, STOOL [[id number]] Collected:  06/29/19 2259 Order Status:  Completed Specimen:  Stool Updated:  07/02/19 2224 Special Requests: NO SPECIAL REQUESTS Culture result:    
  No Salmonella, Shigella, or Ecoli 0157 isolated. CULTURE, URINE [[id number]]  (Abnormal)  (Susceptibility) Collected:  06/29/19 2308 Order Status:  Completed Specimen:  Cath Urine Updated:  07/02/19 4616 Special Requests: NO SPECIAL REQUESTS Culture result:    
  >100,000 COLONIES/mL CITROBACTER FREUNDII  
     
 C. DIFFICILE/EPI PCR [[id number]]  (Abnormal) Collected:  06/29/19 2306 Order Status:  Completed Specimen:  Stool Updated:  06/30/19 0021 Special Requests: NO SPECIAL REQUESTS Culture result: Toxigenic C Diff POS/027-NAP1-BI PRESUMPTIVE POS RESULTS VERIFIED, PHONED TO AND READ BACK BY 
DR. Prosper Moya @ 0018 ON [id number] BY TVO Current Meds: 
Current Facility-Administered Medications Medication Dose Route Frequency  carbamide peroxide (DEBROX) 6.5 % otic solution 5 Drop  5 Drop Both Ears BID  ondansetron (ZOFRAN) injection 4 mg  4 mg IntraVENous Q6H PRN  
 LORazepam (ATIVAN) injection 0.26 mg  0.26 mg IntraVENous Q6H PRN  
  apixaban (ELIQUIS) tablet 2.5 mg  2.5 mg Oral BID  carbidopa-levodopa (SINEMET)  mg per tablet 1 Tab  1 Tab Oral TID  levothyroxine (SYNTHROID) tablet 25 mcg  25 mcg Oral ACB  sodium chloride (NS) flush 5-40 mL  5-40 mL IntraVENous Q8H  
 sodium chloride (NS) flush 5-40 mL  5-40 mL IntraVENous PRN  
 acetaminophen (TYLENOL) tablet 650 mg  650 mg Oral Q4H PRN  
 bisacodyl (DULCOLAX) tablet 5 mg  5 mg Oral DAILY PRN  
 insulin lispro (HUMALOG) injection   SubCUTAneous AC&HS  vancomycin 50 mg/mL oral solution (compounded) 125 mg  125 mg Oral Q6H  
 metroNIDAZOLE (FLAGYL) IVPB premix 500 mg  500 mg IntraVENous Q8H  
 lactated Ringers infusion  50 mL/hr IntraVENous CONTINUOUS Other Studies (last 24 hours): No results found. Assessment and Plan:  
 
Hospital Problems as of 7/6/2019 Date Reviewed: 6/19/2019 Codes Class Noted - Resolved POA  
 C. difficile diarrhea ICD-10-CM: A04.72 
ICD-9-CM: 008.45  6/30/2019 - Present Yes Acute on chronic renal failure (HCC) ICD-10-CM: N17.9, N18.9 ICD-9-CM: 584.9, 585.9  6/29/2019 - Present Yes * (Principal) Acute cystitis with hematuria ICD-10-CM: N30.01 
ICD-9-CM: 595.0  6/29/2019 - Present Yes Morbid obesity ([facility] (Chronic) ICD-10-CM: E66.01 
ICD-9-CM: 278.01  6/29/2019 - Present Yes Hypokalemia ICD-10-CM: E87.6 ICD-9-CM: 276.8  6/29/2019 - Present Yes Parkinson disease ([facility] (Chronic) ICD-10-CM: G20 
ICD-9-CM: 332.0  6/19/2019 - Present Yes  
   
 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 
ICD-9-CM: 250.40, 583.81  11/15/2017 - Present Yes  
   
 AF (atrial fibrillation) (HCC) (Chronic) ICD-10-CM: I48.91 
ICD-9-CM: 427.31  11/20/2011 - Present Yes  HTN (hypertension) (Chronic) ICD-10-CM: I10 
 ICD-9-CM: 401.9  11/10/2011 - Present Yes PLAN:   
· Sepsis due to cdiif - resolved · cdiff - continue vanc- no diarrhea for last 2-3 days per pt · ANTONIO on ckd stage v - per renal - holding lr - will give a dose of lasix · Sob with lying flat- will give some lasix · Elevated wbc and tachycardia- unclear etiology- monitor for any infection - wbc trending down · uti- CITROBACTER FREUNDII-  S/p  rocephin  
· afib - on eliquis · PD- sinemet · Anemia- monitor · Ear wax- continue ceruminolytic defer to Dr. Gunjan Hrae on DC whom patients  says removes the wax for them DC planning/Dispo:  unclear DVT ppx:  eliquis Signed: 
Virginia Thacker MD

## 2019-07-07 ENCOUNTER — APPOINTMENT (OUTPATIENT)
Dept: GENERAL RADIOLOGY | Age: 79
DRG: 872 | End: 2019-07-07
Attending: INTERNAL MEDICINE
Payer: MEDICARE

## 2019-07-07 PROBLEM — I48.0 PAF (PAROXYSMAL ATRIAL FIBRILLATION) (HCC): Status: ACTIVE | Noted: 2019-07-07

## 2019-07-07 PROBLEM — R07.9 CHEST PAIN: Status: ACTIVE | Noted: 2019-07-07

## 2019-07-07 PROBLEM — I25.10 CAD (CORONARY ARTERY DISEASE): Status: ACTIVE | Noted: 2019-07-07

## 2019-07-07 PROBLEM — R77.8 ELEVATED TROPONIN: Status: ACTIVE | Noted: 2019-07-07

## 2019-07-07 LAB
ANION GAP SERPL CALC-SCNC: 15 MMOL/L (ref 7–16)
ATRIAL RATE: 75 BPM
ATRIAL RATE: 78 BPM
BASOPHILS # BLD: 0.1 K/UL (ref 0–0.2)
BASOPHILS NFR BLD: 1 % (ref 0–2)
BNP SERPL-MCNC: 3464 PG/ML
BUN SERPL-MCNC: 87 MG/DL (ref 8–23)
CALCIUM SERPL-MCNC: 8.5 MG/DL (ref 8.3–10.4)
CALCULATED P AXIS, ECG09: 80 DEGREES
CALCULATED P AXIS, ECG09: 83 DEGREES
CALCULATED R AXIS, ECG10: -11 DEGREES
CALCULATED R AXIS, ECG10: -12 DEGREES
CALCULATED T AXIS, ECG11: 84 DEGREES
CALCULATED T AXIS, ECG11: 88 DEGREES
CHLORIDE SERPL-SCNC: 109 MMOL/L (ref 98–107)
CO2 SERPL-SCNC: 19 MMOL/L (ref 21–32)
CREAT SERPL-MCNC: 3.15 MG/DL (ref 0.6–1)
DIAGNOSIS, 93000: NORMAL
DIAGNOSIS, 93000: NORMAL
DIFFERENTIAL METHOD BLD: ABNORMAL
EOSINOPHIL # BLD: 0.1 K/UL (ref 0–0.8)
EOSINOPHIL NFR BLD: 0 % (ref 0.5–7.8)
ERYTHROCYTE [DISTWIDTH] IN BLOOD BY AUTOMATED COUNT: 14.6 % (ref 11.9–14.6)
GLUCOSE BLD STRIP.AUTO-MCNC: 294 MG/DL (ref 65–100)
GLUCOSE BLD STRIP.AUTO-MCNC: 295 MG/DL (ref 65–100)
GLUCOSE BLD STRIP.AUTO-MCNC: 315 MG/DL (ref 65–100)
GLUCOSE BLD STRIP.AUTO-MCNC: 366 MG/DL (ref 65–100)
GLUCOSE SERPL-MCNC: 276 MG/DL (ref 65–100)
HCT VFR BLD AUTO: 29.4 % (ref 35.8–46.3)
HGB BLD-MCNC: 9.6 G/DL (ref 11.7–15.4)
IMM GRANULOCYTES # BLD AUTO: 0.3 K/UL (ref 0–0.5)
IMM GRANULOCYTES NFR BLD AUTO: 3 % (ref 0–5)
LYMPHOCYTES # BLD: 1 K/UL (ref 0.5–4.6)
LYMPHOCYTES NFR BLD: 7 % (ref 13–44)
MCH RBC QN AUTO: 31.4 PG (ref 26.1–32.9)
MCHC RBC AUTO-ENTMCNC: 32.7 G/DL (ref 31.4–35)
MCV RBC AUTO: 96.1 FL (ref 79.6–97.8)
MONOCYTES # BLD: 1 K/UL (ref 0.1–1.3)
MONOCYTES NFR BLD: 7 % (ref 4–12)
NEUTS SEG # BLD: 11.1 K/UL (ref 1.7–8.2)
NEUTS SEG NFR BLD: 82 % (ref 43–78)
NRBC # BLD: 0 K/UL (ref 0–0.2)
P-R INTERVAL, ECG05: 144 MS
P-R INTERVAL, ECG05: 152 MS
PLATELET # BLD AUTO: 230 K/UL (ref 150–450)
PMV BLD AUTO: 11.6 FL (ref 9.4–12.3)
POTASSIUM SERPL-SCNC: 3.8 MMOL/L (ref 3.5–5.1)
Q-T INTERVAL, ECG07: 404 MS
Q-T INTERVAL, ECG07: 412 MS
QRS DURATION, ECG06: 106 MS
QRS DURATION, ECG06: 108 MS
QTC CALCULATION (BEZET), ECG08: 460 MS
QTC CALCULATION (BEZET), ECG08: 460 MS
RBC # BLD AUTO: 3.06 M/UL (ref 4.05–5.2)
SODIUM SERPL-SCNC: 143 MMOL/L (ref 136–145)
TROPONIN I SERPL-MCNC: 1.25 NG/ML (ref 0.02–0.05)
TROPONIN I SERPL-MCNC: 1.32 NG/ML (ref 0.02–0.05)
TROPONIN I SERPL-MCNC: 1.46 NG/ML (ref 0.02–0.05)
VENTRICULAR RATE, ECG03: 75 BPM
VENTRICULAR RATE, ECG03: 78 BPM
WBC # BLD AUTO: 13.5 K/UL (ref 4.3–11.1)

## 2019-07-07 PROCEDURE — 74011250636 HC RX REV CODE- 250/636: Performed by: INTERNAL MEDICINE

## 2019-07-07 PROCEDURE — 74011250637 HC RX REV CODE- 250/637: Performed by: PHYSICIAN ASSISTANT

## 2019-07-07 PROCEDURE — 93005 ELECTROCARDIOGRAM TRACING: CPT | Performed by: INTERNAL MEDICINE

## 2019-07-07 PROCEDURE — 74011250637 HC RX REV CODE- 250/637: Performed by: INTERNAL MEDICINE

## 2019-07-07 PROCEDURE — 74011250637 HC RX REV CODE- 250/637: Performed by: FAMILY MEDICINE

## 2019-07-07 PROCEDURE — 80048 BASIC METABOLIC PNL TOTAL CA: CPT

## 2019-07-07 PROCEDURE — 65660000000 HC RM CCU STEPDOWN

## 2019-07-07 PROCEDURE — 71045 X-RAY EXAM CHEST 1 VIEW: CPT

## 2019-07-07 PROCEDURE — P9047 ALBUMIN (HUMAN), 25%, 50ML: HCPCS | Performed by: INTERNAL MEDICINE

## 2019-07-07 PROCEDURE — 83880 ASSAY OF NATRIURETIC PEPTIDE: CPT

## 2019-07-07 PROCEDURE — 74011636637 HC RX REV CODE- 636/637: Performed by: INTERNAL MEDICINE

## 2019-07-07 PROCEDURE — 74011250637 HC RX REV CODE- 250/637

## 2019-07-07 PROCEDURE — 82962 GLUCOSE BLOOD TEST: CPT

## 2019-07-07 PROCEDURE — 74011000250 HC RX REV CODE- 250: Performed by: INTERNAL MEDICINE

## 2019-07-07 PROCEDURE — 85025 COMPLETE CBC W/AUTO DIFF WBC: CPT

## 2019-07-07 PROCEDURE — 84484 ASSAY OF TROPONIN QUANT: CPT

## 2019-07-07 PROCEDURE — 36415 COLL VENOUS BLD VENIPUNCTURE: CPT

## 2019-07-07 RX ORDER — ALBUMIN HUMAN 250 G/1000ML
12.5 SOLUTION INTRAVENOUS ONCE
Status: COMPLETED | OUTPATIENT
Start: 2019-07-07 | End: 2019-07-07

## 2019-07-07 RX ORDER — MAG HYDROX/ALUMINUM HYD/SIMETH 200-200-20
30 SUSPENSION, ORAL (FINAL DOSE FORM) ORAL
Status: DISCONTINUED | OUTPATIENT
Start: 2019-07-07 | End: 2019-07-12 | Stop reason: HOSPADM

## 2019-07-07 RX ORDER — ALBUMIN HUMAN 250 G/1000ML
12.5 SOLUTION INTRAVENOUS
Status: COMPLETED | OUTPATIENT
Start: 2019-07-07 | End: 2019-07-07

## 2019-07-07 RX ORDER — METOPROLOL TARTRATE 25 MG/1
25 TABLET, FILM COATED ORAL EVERY 12 HOURS
Status: DISCONTINUED | OUTPATIENT
Start: 2019-07-07 | End: 2019-07-07

## 2019-07-07 RX ORDER — METOPROLOL TARTRATE 5 MG/5ML
5 INJECTION INTRAVENOUS
Status: DISCONTINUED | OUTPATIENT
Start: 2019-07-07 | End: 2019-07-10

## 2019-07-07 RX ORDER — ASPIRIN 325 MG
325 TABLET ORAL DAILY
Status: DISCONTINUED | OUTPATIENT
Start: 2019-07-07 | End: 2019-07-08

## 2019-07-07 RX ORDER — FUROSEMIDE 10 MG/ML
40 INJECTION INTRAMUSCULAR; INTRAVENOUS ONCE
Status: COMPLETED | OUTPATIENT
Start: 2019-07-07 | End: 2019-07-07

## 2019-07-07 RX ORDER — MORPHINE SULFATE 2 MG/ML
1 INJECTION, SOLUTION INTRAMUSCULAR; INTRAVENOUS ONCE
Status: COMPLETED | OUTPATIENT
Start: 2019-07-07 | End: 2019-07-07

## 2019-07-07 RX ORDER — NITROGLYCERIN 0.4 MG/1
0.4 TABLET SUBLINGUAL AS NEEDED
Status: DISCONTINUED | OUTPATIENT
Start: 2019-07-07 | End: 2019-07-12 | Stop reason: HOSPADM

## 2019-07-07 RX ORDER — NITROGLYCERIN 0.4 MG/1
0.4 TABLET SUBLINGUAL
Status: COMPLETED | OUTPATIENT
Start: 2019-07-07 | End: 2019-07-07

## 2019-07-07 RX ORDER — METOPROLOL TARTRATE 50 MG/1
50 TABLET ORAL 2 TIMES DAILY
Status: DISCONTINUED | OUTPATIENT
Start: 2019-07-07 | End: 2019-07-09

## 2019-07-07 RX ORDER — NITROGLYCERIN 0.4 MG/1
TABLET SUBLINGUAL
Status: COMPLETED
Start: 2019-07-07 | End: 2019-07-07

## 2019-07-07 RX ORDER — METOPROLOL TARTRATE 5 MG/5ML
5 INJECTION INTRAVENOUS ONCE
Status: COMPLETED | OUTPATIENT
Start: 2019-07-07 | End: 2019-07-07

## 2019-07-07 RX ORDER — MORPHINE SULFATE 2 MG/ML
2 INJECTION, SOLUTION INTRAMUSCULAR; INTRAVENOUS
Status: COMPLETED | OUTPATIENT
Start: 2019-07-07 | End: 2019-07-07

## 2019-07-07 RX ORDER — ASPIRIN 325 MG
325 TABLET ORAL DAILY
Status: DISCONTINUED | OUTPATIENT
Start: 2019-07-08 | End: 2019-07-07

## 2019-07-07 RX ADMIN — METOPROLOL TARTRATE 5 MG: 5 INJECTION INTRAVENOUS at 20:10

## 2019-07-07 RX ADMIN — ALBUMIN (HUMAN) 12.5 G: 0.25 INJECTION, SOLUTION INTRAVENOUS at 09:23

## 2019-07-07 RX ADMIN — METOPROLOL TARTRATE 50 MG: 50 TABLET, FILM COATED ORAL at 13:00

## 2019-07-07 RX ADMIN — Medication 10 ML: at 22:00

## 2019-07-07 RX ADMIN — VANCOMYCIN HYDROCHLORIDE 125 MG: 1 INJECTION, POWDER, LYOPHILIZED, FOR SOLUTION INTRAVENOUS at 12:00

## 2019-07-07 RX ADMIN — Medication 10 ML: at 05:18

## 2019-07-07 RX ADMIN — APIXABAN 2.5 MG: 2.5 TABLET, FILM COATED ORAL at 08:29

## 2019-07-07 RX ADMIN — INSULIN LISPRO 8 UNITS: 100 INJECTION, SOLUTION INTRAVENOUS; SUBCUTANEOUS at 09:24

## 2019-07-07 RX ADMIN — INSULIN LISPRO 6 UNITS: 100 INJECTION, SOLUTION INTRAVENOUS; SUBCUTANEOUS at 21:56

## 2019-07-07 RX ADMIN — CARBIDOPA AND LEVODOPA 1 TABLET: 25; 100 TABLET ORAL at 21:56

## 2019-07-07 RX ADMIN — LEVOTHYROXINE SODIUM 25 MCG: 50 TABLET ORAL at 05:16

## 2019-07-07 RX ADMIN — VANCOMYCIN HYDROCHLORIDE 125 MG: 1 INJECTION, POWDER, LYOPHILIZED, FOR SOLUTION INTRAVENOUS at 18:00

## 2019-07-07 RX ADMIN — SODIUM CHLORIDE 250 ML: 900 INJECTION, SOLUTION INTRAVENOUS at 23:44

## 2019-07-07 RX ADMIN — METOPROLOL TARTRATE 5 MG: 5 INJECTION INTRAVENOUS at 23:41

## 2019-07-07 RX ADMIN — INSULIN LISPRO 6 UNITS: 100 INJECTION, SOLUTION INTRAVENOUS; SUBCUTANEOUS at 16:30

## 2019-07-07 RX ADMIN — ASPIRIN 325 MG ORAL TABLET 325 MG: 325 PILL ORAL at 13:36

## 2019-07-07 RX ADMIN — CARBAMIDE PEROXIDE 6.5% 5 DROP: 6.5 LIQUID AURICULAR (OTIC) at 09:00

## 2019-07-07 RX ADMIN — METRONIDAZOLE 500 MG: 500 INJECTION, SOLUTION INTRAVENOUS at 17:32

## 2019-07-07 RX ADMIN — VANCOMYCIN HYDROCHLORIDE 125 MG: 1 INJECTION, POWDER, LYOPHILIZED, FOR SOLUTION INTRAVENOUS at 05:22

## 2019-07-07 RX ADMIN — CARBAMIDE PEROXIDE 6.5% 5 DROP: 6.5 LIQUID AURICULAR (OTIC) at 18:00

## 2019-07-07 RX ADMIN — NITROGLYCERIN 1 INCH: 20 OINTMENT TOPICAL at 13:36

## 2019-07-07 RX ADMIN — Medication 10 ML: at 14:00

## 2019-07-07 RX ADMIN — METOPROLOL TARTRATE 50 MG: 50 TABLET, FILM COATED ORAL at 17:33

## 2019-07-07 RX ADMIN — CARBIDOPA AND LEVODOPA 1 TABLET: 25; 100 TABLET ORAL at 17:32

## 2019-07-07 RX ADMIN — METRONIDAZOLE 500 MG: 500 INJECTION, SOLUTION INTRAVENOUS at 23:13

## 2019-07-07 RX ADMIN — INSULIN LISPRO 8 UNITS: 100 INJECTION, SOLUTION INTRAVENOUS; SUBCUTANEOUS at 11:30

## 2019-07-07 RX ADMIN — MORPHINE SULFATE 2 MG: 2 INJECTION, SOLUTION INTRAMUSCULAR; INTRAVENOUS at 22:58

## 2019-07-07 RX ADMIN — MORPHINE SULFATE 1 MG: 2 INJECTION, SOLUTION INTRAMUSCULAR; INTRAVENOUS at 11:15

## 2019-07-07 RX ADMIN — NITROGLYCERIN: 0.4 TABLET, ORALLY DISINTEGRATING SUBLINGUAL at 08:10

## 2019-07-07 RX ADMIN — NITROGLYCERIN 0.4 MG: 0.4 TABLET, ORALLY DISINTEGRATING SUBLINGUAL at 21:45

## 2019-07-07 RX ADMIN — NITROGLYCERIN 1 INCH: 20 OINTMENT TOPICAL at 17:31

## 2019-07-07 RX ADMIN — ALBUMIN (HUMAN) 12.5 G: 0.25 INJECTION, SOLUTION INTRAVENOUS at 22:59

## 2019-07-07 RX ADMIN — FUROSEMIDE 40 MG: 10 INJECTION, SOLUTION INTRAMUSCULAR; INTRAVENOUS at 11:14

## 2019-07-07 RX ADMIN — CARBIDOPA AND LEVODOPA 1 TABLET: 25; 100 TABLET ORAL at 08:29

## 2019-07-07 RX ADMIN — METRONIDAZOLE 500 MG: 500 INJECTION, SOLUTION INTRAVENOUS at 06:18

## 2019-07-07 NOTE — PROGRESS NOTES
Hospitalist Progress Note Admit Date:  2019  9:55 PM  
Name:  Starr Miranda Age:  78 y.o. 
:  1940 MRN:  [id number] PCP:  Cecilia Rios MD 
Treatment Team: Attending Provider: Zheng Clarke MD; Primary Nurse: Magdalene Moyer Consulting Provider: Pedrito Moreno MD; Utilization Review: Joselin Duarte RN; Care Manager: Yoko Epps RN; Charge Nurse: Wilmer Vincent; Occupational Therapy Assistant: Kellen Dorado Subjective:  
2019- seen - crying and tearful at the thought of hd; declined therapy today. No diarrhea 
 
 
2019 - pt seen - renal function improving - so no hd for now; sounds wet  
 
2019- pt seen - notes ear feels funny and hearing not as good 2019 - pt seen -  at bedside- updated- pt feels ok - ready to go home but wbc high and hr up  
 
 
2019- pt seen - dsypnic when lying flat- still notes not hearing well per the daughter family hx of ear wax requiring manual removal-  
 
2019- pt seen - called emergently by nursing for patient c/o chest pain that started at night. Also vomiting. The patient states she thought it was indigestion and it would go away but it hasn't. She takes nitro at home- although she doesn't like to. Taco Tatum Objective:  
 
Patient Vitals for the past 24 hrs: 
 Temp Pulse Resp BP SpO2  
19 0735 98.4 °F (36.9 °C) 76 26 (!) 154/92 93 % 19 0524 98.1 °F (36.7 °C) 76 18 159/85 93 % 19 0027 98.2 °F (36.8 °C) 76 18 149/68 93 % 19 2052 97.8 °F (36.6 °C) 78 19 158/84 93 % 19 1619 97.4 °F (36.3 °C) 81 20 145/75 91 % 19 1232 98 °F (36.7 °C) 93 18 178/75 90 % 19 0817 98.4 °F (36.9 °C) 77 18 162/66 92 % Oxygen Therapy O2 Sat (%): 93 % (19) Pulse via Oximetry: 62 beats per minute (19) O2 Device: Nasal cannula (19) O2 Flow Rate (L/min): 2 l/min(3 decreased to 2) (19) Intake/Output Summary (Last 24 hours) at 7/7/2019 0800 Last data filed at 7/7/2019 2759 Gross per 24 hour Intake 960 ml Output 2150 ml Net -1190 ml General:    Well nourished. Alert. CV:   RRR. No murmur, rub, or gallop. Lungs:   A/e =  No wheezing, rhonchi, or rales. Abdomen:   Soft, nontender, nondistended. Extremities: Warm and dry. No cyanosis or edema. Skin:     No rashes or jaundice. Data Review: 
I have reviewed all labs, meds, telemetry events, and studies from the last 24 hours. Recent Results (from the past 24 hour(s)) GLUCOSE, POC Collection Time: 07/06/19 11:11 AM  
Result Value Ref Range Glucose (POC) 286 (H) 65 - 100 mg/dL GLUCOSE, POC Collection Time: 07/06/19  4:18 PM  
Result Value Ref Range Glucose (POC) 294 (H) 65 - 100 mg/dL GLUCOSE, POC Collection Time: 07/06/19  8:51 PM  
Result Value Ref Range Glucose (POC) 315 (H) 65 - 100 mg/dL METABOLIC PANEL, BASIC Collection Time: 07/07/19  4:55 AM  
Result Value Ref Range Sodium 143 136 - 145 mmol/L Potassium 3.8 3.5 - 5.1 mmol/L Chloride 109 (H) 98 - 107 mmol/L  
 CO2 19 (L) 21 - 32 mmol/L Anion gap 15 7 - 16 mmol/L Glucose 276 (H) 65 - 100 mg/dL BUN 87 (H) 8 - 23 MG/DL Creatinine 3.15 (H) 0.6 - 1.0 MG/DL  
 GFR est AA 18 (L) >60 ml/min/1.73m2 GFR est non-AA 15 (L) >60 ml/min/1.73m2 Calcium 8.5 8.3 - 10.4 MG/DL  
CBC WITH AUTOMATED DIFF Collection Time: 07/07/19  4:55 AM  
Result Value Ref Range WBC 13.5 (H) 4.3 - 11.1 K/uL  
 RBC 3.06 (L) 4.05 - 5.2 M/uL HGB 9.6 (L) 11.7 - 15.4 g/dL HCT 29.4 (L) 35.8 - 46.3 % MCV 96.1 79.6 - 97.8 FL  
 MCH 31.4 26.1 - 32.9 PG  
 MCHC 32.7 31.4 - 35.0 g/dL  
 RDW 14.6 11.9 - 14.6 % PLATELET 833 000 - 506 K/uL MPV 11.6 9.4 - 12.3 FL ABSOLUTE NRBC 0.00 0.0 - 0.2 K/uL  
 DF AUTOMATED NEUTROPHILS 82 (H) 43 - 78 % LYMPHOCYTES 7 (L) 13 - 44 % MONOCYTES 7 4.0 - 12.0 % EOSINOPHILS 0 (L) 0.5 - 7.8 % BASOPHILS 1 0.0 - 2.0 % IMMATURE GRANULOCYTES 3 0.0 - 5.0 %  
 ABS. NEUTROPHILS 11.1 (H) 1.7 - 8.2 K/UL  
 ABS. LYMPHOCYTES 1.0 0.5 - 4.6 K/UL  
 ABS. MONOCYTES 1.0 0.1 - 1.3 K/UL  
 ABS. EOSINOPHILS 0.1 0.0 - 0.8 K/UL  
 ABS. BASOPHILS 0.1 0.0 - 0.2 K/UL  
 ABS. IMM. GRANS. 0.3 0.0 - 0.5 K/UL GLUCOSE, POC Collection Time: 07/07/19  7:33 AM  
Result Value Ref Range Glucose (POC) 315 (H) 65 - 100 mg/dL All Micro Results Procedure Component Value Units Date/Time CULTURE, BLOOD [[id number]] Collected:  06/30/19 0041 Order Status:  Completed Specimen:  Whole Blood Updated:  07/05/19 0617 Special Requests: --     
  RIGHT JUGULAR VEIN Culture result: NO GROWTH 5 DAYS     
 CULTURE, BLOOD [[id number]] Collected:  06/30/19 9174 Order Status:  Completed Specimen:  Blood Updated:  07/05/19 7068 Special Requests: --     
  RIGHT 
HAND Culture result: NO GROWTH 5 DAYS     
 CULTURE, STOOL [[id number]] Collected:  06/29/19 5489 Order Status:  Completed Specimen:  Stool Updated:  07/02/19 6093 Special Requests: NO SPECIAL REQUESTS Culture result:    
  No Salmonella, Shigella, or Ecoli 0157 isolated. CULTURE, URINE [[id number]]  (Abnormal)  (Susceptibility) Collected:  06/29/19 2308 Order Status:  Completed Specimen:  Cath Urine Updated:  07/02/19 4133 Special Requests: NO SPECIAL REQUESTS Culture result:    
  >100,000 COLONIES/mL CITROBACTER FREUNDII  
     
 C. DIFFICILE/EPI PCR [[id number]]  (Abnormal) Collected:  06/29/19 2306 Order Status:  Completed Specimen:  Stool Updated:  06/30/19 0021 Special Requests: NO SPECIAL REQUESTS Culture result: Toxigenic C Diff POS/027-NAP1-BI PRESUMPTIVE POS RESULTS VERIFIED, PHONED TO AND READ BACK BY 
DR. Hiwot Arzola @ 0010 ON [id number] BY TVO Current Meds: 
Current Facility-Administered Medications Medication Dose Route Frequency  nitroglycerin (NITROSTAT) 0.4 mg tablet  alum-mag hydroxide-simeth (MYLANTA) oral suspension 30 mL  30 mL Oral Q4H PRN  
 carbamide peroxide (DEBROX) 6.5 % otic solution 5 Drop  5 Drop Both Ears BID  ondansetron (ZOFRAN) injection 4 mg  4 mg IntraVENous Q6H PRN  
 LORazepam (ATIVAN) injection 0.26 mg  0.26 mg IntraVENous Q6H PRN  
 apixaban (ELIQUIS) tablet 2.5 mg  2.5 mg Oral BID  carbidopa-levodopa (SINEMET)  mg per tablet 1 Tab  1 Tab Oral TID  levothyroxine (SYNTHROID) tablet 25 mcg  25 mcg Oral ACB  sodium chloride (NS) flush 5-40 mL  5-40 mL IntraVENous Q8H  
 sodium chloride (NS) flush 5-40 mL  5-40 mL IntraVENous PRN  
 acetaminophen (TYLENOL) tablet 650 mg  650 mg Oral Q4H PRN  
 bisacodyl (DULCOLAX) tablet 5 mg  5 mg Oral DAILY PRN  
 insulin lispro (HUMALOG) injection   SubCUTAneous AC&HS  vancomycin 50 mg/mL oral solution (compounded) 125 mg  125 mg Oral Q6H  
 metroNIDAZOLE (FLAGYL) IVPB premix 500 mg  500 mg IntraVENous Q8H Other Studies (last 24 hours): No results found. Assessment and Plan:  
 
Hospital Problems as of 7/7/2019 Date Reviewed: 6/19/2019 Codes Class Noted - Resolved POA  
 C. difficile diarrhea ICD-10-CM: A04.72 
ICD-9-CM: 008.45  6/30/2019 - Present Yes Acute on chronic renal failure (HCC) ICD-10-CM: N17.9, N18.9 ICD-9-CM: 584.9, 585.9  6/29/2019 - Present Yes * (Principal) Acute cystitis with hematuria ICD-10-CM: N30.01 
ICD-9-CM: 595.0  6/29/2019 - Present Yes Morbid obesity ([address] (Chronic) ICD-10-CM: E66.01 
ICD-9-CM: 278.01  6/29/2019 - Present Yes Hypokalemia ICD-10-CM: E87.6 ICD-9-CM: 276.8  6/29/2019 - Present Yes Parkinson disease ([address] (Chronic) ICD-10-CM: G20 
ICD-9-CM: 332.0  6/19/2019 - Present Yes  
   
 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 [address] (Chronic) ICD-10-CM: E11.21 
ICD-9-CM: 250.40, 583.81  11/15/2017 - Present Yes  
   
 AF (atrial fibrillation) (HCC) (Chronic) ICD-10-CM: I48.91 
ICD-9-CM: 427.31  11/20/2011 - Present Yes HTN (hypertension) (Chronic) ICD-10-CM: I10 
ICD-9-CM: 401.9  11/10/2011 - Present Yes PLAN:   
· Sepsis due to cdiif - resolved · cdiff - continue vanc- no diarrhea for last 2-3 days per pt · ANTONIO on ckd stage v - per renal - holding lr -  
· Sob with lying flat- got lasix yesterday without much output · Elevated wbc and tachycardia- unclear etiology- monitor for any infection - wbc trending down · uti- CITROBACTER FREUNDII-  S/p  rocephin  
· afib - on eliquis · PD- sinemet · Anemia- monitor · Ear wax- continue ceruminolytic defer to Dr. Johnny Candelario on DC whom patients  says removes the wax for them · Chest pain - nitro, ekg, troponins, cxr,, NPO - further workup and mgt based on her clinical course DC planning/Dispo:  unclear DVT ppx:  eliquis Signed: 
Tez Alfaro MD  
Addendum Cxr shows some blunting of the left costophrenic angle which is new- will give lasix and albumin Further workup and mgt based on her clinical course

## 2019-07-07 NOTE — PROGRESS NOTES
Patient with Afib and RVR with HR in the 115's to 120's. SBP is 140. Asymptomatic. Will order IV lopressor prn Q6h. Nurse will give her 1 dose STAT. Will monitor.

## 2019-07-07 NOTE — PROGRESS NOTES
During morning rounds with PM nurse Lazaro, RN, pt stated she was having chest pains. Pt first stated \"it just started. \"  After questioning, Pt stated \"Well it was hurting a little last night. \"  Lazaro asked pt why she did not call to report this, pt stated \"It was not that bad. \"  Asked pt if she takes Nitroglycerin or has heart problems. She stated \"I have it at home but I try not to take it. \"  When asked why she said \"I dont know. \"  New orders from MD.   Pt given Zofran for nausea. Will continue to monitor/assess.

## 2019-07-07 NOTE — PROGRESS NOTES
Saranya Delcid .......................... END OF SHIFT NOTE: 
 
INTAKE/OUTPUT 
07/06 0701 - 07/07 0700 In: 960 [P.O.:960] Out: 1775 [GPBCK:[id number]] Voiding: YES Catheter: NO 
Color: clear Drain: DIET 
diabetic Flatus: Patient does have flatus present. Stool:  0 occurrences. Characteristics: 
Stool Assessment Stool Color: [place] Stool Appearance: Loose Stool Amount: Small Stool Source/Status: Rectum Ambulating No 
Emesis: 0 occurrences. Characteristics: VITAL SIGNS Patient Vitals for the past 12 hrs: 
 Temp Pulse Resp BP SpO2  
07/07/19 0027 98.2 °F (36.8 °C) 76 18 149/68 93 % 07/06/19 2052 97.8 °F (36.6 °C) 78 19 158/84 93 % Pain Assessment Pain Intensity 1: 0 (07/07/19 0159) Pain Location 1: Back, Shoulder Pain Intervention(s) 1: Medication (see MAR) Patient Stated Pain Goal: 0 [place] Citron

## 2019-07-07 NOTE — PROGRESS NOTES
Problem: Falls - Risk of 
Goal: *Absence of Falls Description Document Loreli Hunger Fall Risk and appropriate interventions in the flowsheet. Outcome: Progressing Towards Goal 
  
Problem: Patient Education: Go to Patient Education Activity Goal: Patient/Family Education Outcome: Progressing Towards Goal 
  
Problem: Risk for Spread of Infection Goal: Prevent transmission of infectious organism to others Description Prevent the transmission of infectious organisms to other patients, staff members, and visitors. Outcome: Progressing Towards Goal 
  
Problem: Patient Education:  Go to Education Activity Goal: Patient/Family Education Outcome: Progressing Towards Goal 
  
Problem: Pressure Injury - Risk of 
Goal: *Prevention of pressure injury Description Document Devin Scale and appropriate interventions in the flowsheet. Outcome: Progressing Towards Goal 
  
Problem: Patient Education: Go to Patient Education Activity Goal: Patient/Family Education Outcome: Progressing Towards Goal 
  
Problem: Patient Education: Go to Patient Education Activity Goal: Patient/Family Education Outcome: Progressing Towards Goal 
  
Problem: Patient Education: Go to Patient Education Activity Goal: Patient/Family Education Outcome: Progressing Towards Goal 
  
Problem: Nutrition Deficit Goal: *Optimize nutritional status Outcome: Progressing Towards Goal

## 2019-07-07 NOTE — PROGRESS NOTES
Spoke with Dr Stacey Pop in regards of pt's elevated Troponin level. Orders received to get another EKG and put pt on remote tele.

## 2019-07-07 NOTE — CONSULTS
[address] Cardiology Consult Date of  Admission: 6/29/2019  9:55 PM  
 
Primary Care Physician: Dr Josie Johnson Primary Cardiologist: Dr Travis Garcia Referring Physician: Dr Daphne Vázquez Consulting Physician: Dr Milan Hodgson 
 
CC/Reason for consult: chest pain Goodhue Shantel is a 78 y.o. female admitted for Acute on chronic renal failure (HCC) [N17.9, N18.9]. She has a h/o CAD s/p CABG in 2011, pAF (per PCP, on eliquis 2.5 mg BID- I don't see any a fib on prior EKG in computer), DM, htn, parkinsons, and CKD w fistula in place. She was admitted to [facility] 6-29 w N/V/D, started on antibiotics for cystitis, and found to have c diff and started on flagyl. Cr was worse and improved with hydration, did not require dialysis. She has a h/o chest pain with nuke 3-2019 w fixed anterior defect, echo 3-2019 w EF 45-50% with grade 2 DD, mild-mod NJ. She continues to have the same type CP, occurring at rest, to the left of her sternum, not radiating, not worse w exertion, without diaphoresis but with dyspnea. She has been nauseated this entire hospitalization with profuse vomiting, not worse w CP today. Pain is squeezing pain 7/10. It normally resolves after 5-10 minutes but today has lasted an hour and continues. EKG today shows NSR w rate 78 w PAC and PVCs. No dizziness, syncope. Trop 1.46, CXR no acute process, hgb 9.6, K 3.8, cr 3.15, /92. She was given nitro w no pain relief. Patient Active Problem List  
Diagnosis Code  
 HTN (hypertension) I10  
 AF (atrial fibrillation) ([facility]) I48.91  
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  
 SARAH (obstructive sleep apnea) G47.33  
 CKD (chronic kidney disease) stage 5, GFR less than 15 ml/min ([facility]) N18.5  Parkinson disease ([address] G20  
 Acute on chronic renal failure (HCC) N17.9, N18.9  Acute cystitis with hematuria N30.01  
 Morbid obesity (HCC) E66.01  
  Hypokalemia E87.6  
 C. difficile diarrhea A04.72 Past Medical History:  
Diagnosis Date  Adverse effect of anesthesia   
 difficult awakening  AF (atrial fibrillation) ([facility] 11/20/2011  Arthritis 11/18/2011  
 generalized  CAD (coronary artery disease) 2011 CABG x 4  
 Cervical disc disease Steroid injections  Chronic kidney disease  Chronic pain   
 back  DJD (degenerative joint disease)  Drainage from wound 10/3/2014  Full dentures  Gout   
 managed with medication  Hemorrhoid 11/5/2013  
 [place] (hard of hearing)  Hyperlipemia 11/10/2011  
 managed with medication  Hypertension   
 controlled with meds  Hypertriglyceridemia   
 managed with medication  Hypothyroidism   
 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   
 legs and arms, managed with medication  Obesity (BMI 30-39.9) 10/2/14 BMI 36.3  
 PAD (peripheral artery disease) ([facility] [address]  PCI (pneumatosis cystoides intestinalis) 11/5/2013  Pleural effusion, bilateral 11/21/2011  Postoperative anemia due to acute blood loss 11/21/2011  
 expected  Renal mass 5/14/2016  Rib cage fracture 11/5/2013  
 x 2   
 S/P CABG (coronary artery bypass graft) 11/05/2013 CABG x 4  
 S/P CABG x 3 11/18/2011  Stasis edema of both lower extremities 1/3/2018  Status post-operative repair of hip fracture 09/15/2014  
 left side, repair with hardware  Stroke [facility])   
 left sided weakness residual   
 Type II or unspecified type diabetes mellitus without mention of complication, uncontrolled ([address] 12/16/2013  
 oral and insulin reliant/ Avg / low BS s/s/ @ 70/ last A1c 8.3  Unspecified adverse effect of anesthesia   
 difficult to arouse after general anesthesia Past Surgical History:  
Procedure Laterality Date  CABG, ARTERY-VEIN, FOUR  Oct. 2011  CLOSE CYSTOSTOMY    
 exploratory with removal of mass of infection  HX CATARACT REMOVAL Bilateral 11/2012  
 with IOL implants, bilateral  
 HX COLONOSCOPY    
 HX HIP FRACTURE TX Left   
 repair with hardware  HX HYSTERECTOMY  HX LAP CHOLECYSTECTOMY  VASCULAR SURGERY PROCEDURE UNLIST Left 06/07/2017 AVF creation  VASCULAR SURGERY PROCEDURE UNLIST Left 08/10/2017 AVF elevation Allergies Allergen Reactions  Baclofen Other (comments) Psychosis and altered mental status  Lipitor [Atorvastatin] Myalgia Family History Problem Relation Age of Onset  Heart Disease Mother  Cancer Mother   
     colon  Diabetes Mother  Cancer Father   
     lung  Cancer Sister   
     breast  
 Breast Cancer Sister 28  Cancer Brother Leukemia  Breast Cancer Maternal Aunt 54 Social History Tobacco Use  Smoking status: Never Smoker  Smokeless tobacco: Never Used Substance Use Topics  Alcohol use: No  
  
 
Current Facility-Administered Medications Medication Dose Route Frequency  alum-mag hydroxide-simeth (MYLANTA) oral suspension 30 mL  30 mL Oral Q4H PRN  
 metoprolol tartrate (LOPRESSOR) tablet 25 mg  25 mg Oral Q12H  
 [START ON 7/8/2019] aspirin tablet 325 mg  325 mg Oral DAILY  carbamide peroxide (DEBROX) 6.5 % otic solution 5 Drop  5 Drop Both Ears BID  ondansetron (ZOFRAN) injection 4 mg  4 mg IntraVENous Q6H PRN  
 LORazepam (ATIVAN) injection 0.26 mg  0.26 mg IntraVENous Q6H PRN  
 apixaban (ELIQUIS) tablet 2.5 mg  2.5 mg Oral BID  carbidopa-levodopa (SINEMET)  mg per tablet 1 Tab  1 Tab Oral TID  levothyroxine (SYNTHROID) tablet 25 mcg  25 mcg Oral ACB  sodium chloride (NS) flush 5-40 mL  5-40 mL IntraVENous Q8H  
 sodium chloride (NS) flush 5-40 mL  5-40 mL IntraVENous PRN  
 acetaminophen (TYLENOL) tablet 650 mg  650 mg Oral Q4H PRN  
 bisacodyl (DULCOLAX) tablet 5 mg  5 mg Oral DAILY PRN  
  insulin lispro (HUMALOG) injection   SubCUTAneous AC&HS  vancomycin 50 mg/mL oral solution (compounded) 125 mg  125 mg Oral Q6H  
 metroNIDAZOLE (FLAGYL) IVPB premix 500 mg  500 mg IntraVENous Q8H Review of Symptoms: 
General: no weight change,  + weakness, fever or chills Skin: no rashes, lumps, or other skin changes HEENT: no headache, dizziness, lightheadedness, vision changes, hearing changes, tinnitus, vertigo, sinus pressure/pain, bleeding gums, sore throat, or hoarseness Neck: no swollen glands, goiter, pain or stiffness Respiratory: no cough, sputum, hemoptysis, + dyspnea, wheezing Cardiovascular: + as per HPI Gastrointestinal: + N/V/D Urinary: + cystitis Peripheral Vascular: no claudication, leg cramps, prior DVTs, swelling of calves, legs, or feet, color change, or swelling with redness or tenderness Musculoskeletal: no muscle or joint pain/stiffness, joint swelling, erythema of joints, or back pain Psychiatric: no depression or excessive stress Neurological: no sensory or motor loss, seizures, syncope, tremors, numbness, no dementia Hematologic: no anemia, easy bruising or bleeding Endocrine: no thyroid problems, heat or cold intolerance, excessive sweating, polyuria, polydipsia, +  diabetes. Physical Exam 
Vitals:  
 07/07/19 7996 07/07/19 8530 07/07/19 8374 07/07/19 6046 BP: 149/68 159/85  (!) 154/92 Pulse: 76 76  76 Resp: 18 18  26 Temp: 98.2 °F (36.8 °C) 98.1 °F (36.7 °C)  98.4 °F (36.9 °C) SpO2: 93% 93%  93% Weight:   116.3 kg (256 lb 4.8 oz) Height:      
 
 
Physical Exam: 
General: Well Developed, Well Nourished, No Acute Distress HEENT: pupils equal and round, no abnormalities noted Neck: supple, no JVD, no carotid bruits Heart: S1S2 with RRR, tender w palpation left of sternum Lungs: Clear throughout auscultation bilaterally without adventitious sounds Abd: soft, nontender, nondistended Ext: warm, no edema Skin: warm and dry Psychiatric: Normal mood and affect Neurologic: Alert and oriented X 3 Labs:  
Recent Labs  
  07/07/19 
0455 07/06/19 
0601  145  
K 3.8 3.1*  
BUN 87* 92* CREA 3.15* 3.33* * 237* WBC 13.5* 12.8* HGB 9.6* 8.9* HCT 29.4* 26.4*  
 242 Assessment/Plan: 
 
 Assessment:  
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 difficile diarrhea (6/30/2019)- flagyl PAF (paroxysmal atrial fibrillation) ([facility] [address] (7/7/2019)- NSR this admission, eliquis not indicated with renal failure, will hold at this time as in NSR, cont lopressor Chest pain (7/7/2019)- Pt w chronic chest pain, worse today, without acute ischemic changes on EKG, fixed anterior defect on nuke 3-2019- not good cath candidate with ESRD but has not started dialysis yet. Will start ASA, increase lopressor, add nitro paste, cont to monitor Elevated troponin (7/7/2019)- as above CAD (coronary artery disease) (7/7/2019)- s/p CABG in 2011, cont ASA< nitro, intolerant of statin, cont BB, no ACE/ARB w ARF Thank you very much for this referral. We appreciate the opportunity to participate in this patient's care. We will follow along with above stated plan. Zuhair Rivas PA-C Consulting MD: John Bingham

## 2019-07-07 NOTE — PROGRESS NOTES
[facility] Subjective: A on CKD   Some Chest Pain this am. 
 
Review of Systems -  
General ROS: negative for - fever, chills Respiratory ROS: no SOB, cough, WARREN Cardiovascular ROS: no CP, palpitations Gastrointestinal ROS: no N&V, abdominal pain, diarrhea Genito-Urinary ROS: no difficulty voiding, dysuria Neurological ROS: no seizures, focal weekness Objective: 
 
Vitals:  
 07/07/19 7572 07/07/19 5350 07/07/19 1134 07/07/19 7953 BP: 149/68 159/85  (!) 154/92 Pulse: 76 76  76 Resp: 18 18 26 Temp: 98.2 °F (36.8 °C) 98.1 °F (36.7 °C)  98.4 °F (36.9 °C) SpO2: 93% 93%  93% Weight:   116.3 kg (256 lb 4.8 oz) Height:      
 
 
PE 
Gen: in no acute distress CV:reg rate Chest:clear Abd: soft Ext/Access: no edema García Challenger LAB Recent Labs  
  07/07/19 
0455 07/06/19 
0601 07/05/19 
0043 WBC 13.5* 12.8* 14.5* HGB 9.6* 8.9* 9.8* HCT 29.4* 26.4* 29.3*  
 242 271 Recent Labs  
  07/07/19 
0455 07/06/19 
0601 07/05/19 
2361  145 147* K 3.8 3.1* 3.5 * 112* 114* CO2 19* 18* 18* * 237* 241* BUN 87* 92* 104* CREA 3.15* 3.33* 3.77* PHOS  --  3.1 3.8*  
CA 8.5 8.5 8.4 ALB  --  2.7* 2.8* Radiology A/P:  
Patient Active Problem List  
Diagnosis Code  
 HTN (hypertension) I10  
 AF (atrial fibrillation) ([facility]) I48.91  
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  
 SARAH (obstructive sleep apnea) G47.33  
 CKD (chronic kidney disease) stage 5, GFR less than 15 ml/min ([facility]) N18.5  Parkinson disease ([facility] [address] G20  
 Acute on chronic renal failure (HCC) N17.9, N18.9  Acute cystitis with hematuria N30.01  
 Morbid obesity (HCC) E66.01  
 Hypokalemia E87.6  
 C. difficile diarrhea A04.72 A on CKD -Renal function is improving Following. Ricky Challenger Chest Pain - being evaluated by primary HTN/Volume -will decrease LR to 50 from 125/hr Low K - resolved C Diff Bee Vyas MD

## 2019-07-08 PROBLEM — E87.70 VOLUME OVERLOAD: Status: ACTIVE | Noted: 2019-07-08

## 2019-07-08 PROBLEM — I24.8 DEMAND ISCHEMIA (HCC): Status: ACTIVE | Noted: 2019-07-07

## 2019-07-08 LAB
ANION GAP SERPL CALC-SCNC: 16 MMOL/L (ref 7–16)
ATRIAL RATE: 147 BPM
ATRIAL RATE: 64 BPM
BACTERIA SPEC CULT: NORMAL
BASOPHILS # BLD: 0.1 K/UL (ref 0–0.2)
BASOPHILS NFR BLD: 1 % (ref 0–2)
BUN SERPL-MCNC: 91 MG/DL (ref 8–23)
CALCIUM SERPL-MCNC: 8.4 MG/DL (ref 8.3–10.4)
CALCULATED P AXIS, ECG09: 67 DEGREES
CALCULATED R AXIS, ECG10: -19 DEGREES
CALCULATED R AXIS, ECG10: -24 DEGREES
CALCULATED T AXIS, ECG11: 146 DEGREES
CALCULATED T AXIS, ECG11: 87 DEGREES
CHLORIDE SERPL-SCNC: 108 MMOL/L (ref 98–107)
CO2 SERPL-SCNC: 19 MMOL/L (ref 21–32)
CREAT SERPL-MCNC: 3.02 MG/DL (ref 0.6–1)
DIAGNOSIS, 93000: NORMAL
DIAGNOSIS, 93000: NORMAL
DIFFERENTIAL METHOD BLD: ABNORMAL
EOSINOPHIL # BLD: 0.1 K/UL (ref 0–0.8)
EOSINOPHIL NFR BLD: 1 % (ref 0.5–7.8)
ERYTHROCYTE [DISTWIDTH] IN BLOOD BY AUTOMATED COUNT: 14.4 % (ref 11.9–14.6)
GLUCOSE BLD STRIP.AUTO-MCNC: 299 MG/DL (ref 65–100)
GLUCOSE BLD STRIP.AUTO-MCNC: 304 MG/DL (ref 65–100)
GLUCOSE BLD STRIP.AUTO-MCNC: 333 MG/DL (ref 65–100)
GLUCOSE BLD STRIP.AUTO-MCNC: 336 MG/DL (ref 65–100)
GLUCOSE SERPL-MCNC: 272 MG/DL (ref 65–100)
HCT VFR BLD AUTO: 27.6 % (ref 35.8–46.3)
HGB BLD-MCNC: 9 G/DL (ref 11.7–15.4)
IMM GRANULOCYTES # BLD AUTO: 0.3 K/UL (ref 0–0.5)
IMM GRANULOCYTES NFR BLD AUTO: 2 % (ref 0–5)
LYMPHOCYTES # BLD: 0.9 K/UL (ref 0.5–4.6)
LYMPHOCYTES NFR BLD: 7 % (ref 13–44)
MCH RBC QN AUTO: 30.6 PG (ref 26.1–32.9)
MCHC RBC AUTO-ENTMCNC: 32.6 G/DL (ref 31.4–35)
MCV RBC AUTO: 93.9 FL (ref 79.6–97.8)
MONOCYTES # BLD: 0.8 K/UL (ref 0.1–1.3)
MONOCYTES NFR BLD: 6 % (ref 4–12)
NEUTS SEG # BLD: 11 K/UL (ref 1.7–8.2)
NEUTS SEG NFR BLD: 84 % (ref 43–78)
NRBC # BLD: 0 K/UL (ref 0–0.2)
P-R INTERVAL, ECG05: 152 MS
PLATELET # BLD AUTO: 244 K/UL (ref 150–450)
PMV BLD AUTO: 12.1 FL (ref 9.4–12.3)
POTASSIUM SERPL-SCNC: 3.2 MMOL/L (ref 3.5–5.1)
Q-T INTERVAL, ECG07: 366 MS
Q-T INTERVAL, ECG07: 442 MS
QRS DURATION, ECG06: 108 MS
QRS DURATION, ECG06: 110 MS
QTC CALCULATION (BEZET), ECG08: 455 MS
QTC CALCULATION (BEZET), ECG08: 519 MS
RBC # BLD AUTO: 2.94 M/UL (ref 4.05–5.2)
SERVICE CMNT-IMP: NORMAL
SODIUM SERPL-SCNC: 143 MMOL/L (ref 136–145)
TROPONIN I SERPL-MCNC: 0.85 NG/ML (ref 0.02–0.05)
VENTRICULAR RATE, ECG03: 121 BPM
VENTRICULAR RATE, ECG03: 64 BPM
WBC # BLD AUTO: 13.1 K/UL (ref 4.3–11.1)

## 2019-07-08 PROCEDURE — 74011250636 HC RX REV CODE- 250/636: Performed by: INTERNAL MEDICINE

## 2019-07-08 PROCEDURE — 84484 ASSAY OF TROPONIN QUANT: CPT

## 2019-07-08 PROCEDURE — 85025 COMPLETE CBC W/AUTO DIFF WBC: CPT

## 2019-07-08 PROCEDURE — 94760 N-INVAS EAR/PLS OXIMETRY 1: CPT

## 2019-07-08 PROCEDURE — 82962 GLUCOSE BLOOD TEST: CPT

## 2019-07-08 PROCEDURE — 65660000000 HC RM CCU STEPDOWN

## 2019-07-08 PROCEDURE — 74011250637 HC RX REV CODE- 250/637: Performed by: INTERNAL MEDICINE

## 2019-07-08 PROCEDURE — 93005 ELECTROCARDIOGRAM TRACING: CPT | Performed by: INTERNAL MEDICINE

## 2019-07-08 PROCEDURE — 36415 COLL VENOUS BLD VENIPUNCTURE: CPT

## 2019-07-08 PROCEDURE — 74011250637 HC RX REV CODE- 250/637: Performed by: PHYSICIAN ASSISTANT

## 2019-07-08 PROCEDURE — 77010033678 HC OXYGEN DAILY

## 2019-07-08 PROCEDURE — 74011636637 HC RX REV CODE- 636/637: Performed by: INTERNAL MEDICINE

## 2019-07-08 PROCEDURE — C8929 TTE W OR WO FOL WCON,DOPPLER: HCPCS

## 2019-07-08 PROCEDURE — 80048 BASIC METABOLIC PNL TOTAL CA: CPT

## 2019-07-08 PROCEDURE — 74011250637 HC RX REV CODE- 250/637: Performed by: FAMILY MEDICINE

## 2019-07-08 RX ORDER — POTASSIUM CHLORIDE 20 MEQ/1
20 TABLET, EXTENDED RELEASE ORAL
Status: COMPLETED | OUTPATIENT
Start: 2019-07-08 | End: 2019-07-08

## 2019-07-08 RX ORDER — GUAIFENESIN 100 MG/5ML
81 LIQUID (ML) ORAL DAILY
Status: DISCONTINUED | OUTPATIENT
Start: 2019-07-09 | End: 2019-07-10

## 2019-07-08 RX ORDER — HEPARIN SODIUM 5000 [USP'U]/100ML
12-25 INJECTION, SOLUTION INTRAVENOUS
Status: DISCONTINUED | OUTPATIENT
Start: 2019-07-08 | End: 2019-07-08

## 2019-07-08 RX ORDER — AMIODARONE HYDROCHLORIDE 200 MG/1
200 TABLET ORAL 2 TIMES DAILY
Status: DISCONTINUED | OUTPATIENT
Start: 2019-07-08 | End: 2019-07-09

## 2019-07-08 RX ORDER — POTASSIUM CHLORIDE 20 MEQ/1
40 TABLET, EXTENDED RELEASE ORAL ONCE
Status: COMPLETED | OUTPATIENT
Start: 2019-07-08 | End: 2019-07-08

## 2019-07-08 RX ORDER — HEPARIN SODIUM 5000 [USP'U]/ML
4000 INJECTION, SOLUTION INTRAVENOUS; SUBCUTANEOUS ONCE
Status: COMPLETED | OUTPATIENT
Start: 2019-07-08 | End: 2019-07-08

## 2019-07-08 RX ADMIN — VANCOMYCIN HYDROCHLORIDE 125 MG: 1 INJECTION, POWDER, LYOPHILIZED, FOR SOLUTION INTRAVENOUS at 12:42

## 2019-07-08 RX ADMIN — PERFLUTREN 1 ML: 6.52 INJECTION, SUSPENSION INTRAVENOUS at 10:53

## 2019-07-08 RX ADMIN — VANCOMYCIN HYDROCHLORIDE 125 MG: 1 INJECTION, POWDER, LYOPHILIZED, FOR SOLUTION INTRAVENOUS at 05:15

## 2019-07-08 RX ADMIN — ONDANSETRON 4 MG: 2 INJECTION INTRAMUSCULAR; INTRAVENOUS at 12:31

## 2019-07-08 RX ADMIN — Medication 10 ML: at 17:17

## 2019-07-08 RX ADMIN — Medication 10 ML: at 23:40

## 2019-07-08 RX ADMIN — METRONIDAZOLE 500 MG: 500 INJECTION, SOLUTION INTRAVENOUS at 17:16

## 2019-07-08 RX ADMIN — METOPROLOL TARTRATE 50 MG: 50 TABLET, FILM COATED ORAL at 17:14

## 2019-07-08 RX ADMIN — METOPROLOL TARTRATE 50 MG: 50 TABLET, FILM COATED ORAL at 08:09

## 2019-07-08 RX ADMIN — METRONIDAZOLE 500 MG: 500 INJECTION, SOLUTION INTRAVENOUS at 23:40

## 2019-07-08 RX ADMIN — CARBIDOPA AND LEVODOPA 1 TABLET: 25; 100 TABLET ORAL at 23:39

## 2019-07-08 RX ADMIN — CARBAMIDE PEROXIDE 6.5% 5 DROP: 6.5 LIQUID AURICULAR (OTIC) at 18:00

## 2019-07-08 RX ADMIN — NITROGLYCERIN 1 INCH: 20 OINTMENT TOPICAL at 08:10

## 2019-07-08 RX ADMIN — APIXABAN 5 MG: 5 TABLET, FILM COATED ORAL at 13:43

## 2019-07-08 RX ADMIN — ASPIRIN 325 MG ORAL TABLET 325 MG: 325 PILL ORAL at 08:09

## 2019-07-08 RX ADMIN — ALUMINUM HYDROXIDE, MAGNESIUM HYDROXIDE, AND SIMETHICONE 30 ML: 200; 200; 20 SUSPENSION ORAL at 13:39

## 2019-07-08 RX ADMIN — LEVOTHYROXINE SODIUM 25 MCG: 50 TABLET ORAL at 05:14

## 2019-07-08 RX ADMIN — HEPARIN SODIUM 4000 UNITS: 5000 INJECTION INTRAVENOUS; SUBCUTANEOUS at 11:39

## 2019-07-08 RX ADMIN — APIXABAN 5 MG: 5 TABLET, FILM COATED ORAL at 17:14

## 2019-07-08 RX ADMIN — VANCOMYCIN HYDROCHLORIDE 125 MG: 1 INJECTION, POWDER, LYOPHILIZED, FOR SOLUTION INTRAVENOUS at 23:41

## 2019-07-08 RX ADMIN — POTASSIUM CHLORIDE 20 MEQ: 20 TABLET, EXTENDED RELEASE ORAL at 13:00

## 2019-07-08 RX ADMIN — INSULIN LISPRO 12 UNITS: 100 INJECTION, SOLUTION INTRAVENOUS; SUBCUTANEOUS at 16:30

## 2019-07-08 RX ADMIN — INSULIN LISPRO 6 UNITS: 100 INJECTION, SOLUTION INTRAVENOUS; SUBCUTANEOUS at 07:30

## 2019-07-08 RX ADMIN — METRONIDAZOLE 500 MG: 500 INJECTION, SOLUTION INTRAVENOUS at 06:54

## 2019-07-08 RX ADMIN — Medication 10 ML: at 12:42

## 2019-07-08 RX ADMIN — NITROGLYCERIN 1 INCH: 20 OINTMENT TOPICAL at 17:16

## 2019-07-08 RX ADMIN — VANCOMYCIN HYDROCHLORIDE 125 MG: 1 INJECTION, POWDER, LYOPHILIZED, FOR SOLUTION INTRAVENOUS at 17:17

## 2019-07-08 RX ADMIN — CARBIDOPA AND LEVODOPA 1 TABLET: 25; 100 TABLET ORAL at 17:15

## 2019-07-08 RX ADMIN — POTASSIUM CHLORIDE 40 MEQ: 20 TABLET, EXTENDED RELEASE ORAL at 12:36

## 2019-07-08 RX ADMIN — INSULIN LISPRO 6 UNITS: 100 INJECTION, SOLUTION INTRAVENOUS; SUBCUTANEOUS at 23:39

## 2019-07-08 RX ADMIN — VANCOMYCIN HYDROCHLORIDE 125 MG: 1 INJECTION, POWDER, LYOPHILIZED, FOR SOLUTION INTRAVENOUS at 00:08

## 2019-07-08 RX ADMIN — Medication 10 ML: at 05:15

## 2019-07-08 RX ADMIN — AMIODARONE HYDROCHLORIDE 200 MG: 200 TABLET ORAL at 17:15

## 2019-07-08 RX ADMIN — AMIODARONE HYDROCHLORIDE 200 MG: 200 TABLET ORAL at 12:37

## 2019-07-08 RX ADMIN — CARBIDOPA AND LEVODOPA 1 TABLET: 25; 100 TABLET ORAL at 08:10

## 2019-07-08 RX ADMIN — CARBAMIDE PEROXIDE 6.5% 5 DROP: 6.5 LIQUID AURICULAR (OTIC) at 09:00

## 2019-07-08 RX ADMIN — INSULIN LISPRO 8 UNITS: 100 INJECTION, SOLUTION INTRAVENOUS; SUBCUTANEOUS at 11:30

## 2019-07-08 NOTE — PROGRESS NOTES
No C/O chest pain. Nauseated X 1. VS: 123/68 /    / 95% RA Denies any needs at this time Bed in lower position and call light/ personal items within reach. Will continue to monitor and give bed side shift report to on coming day shift nurse.

## 2019-07-08 NOTE — PROGRESS NOTES
PT note: 
 
RN requested no PT on this date due to having \"heart issues\". Will follow up with pt later.   
Maureen Dubon, PTA

## 2019-07-08 NOTE — PROGRESS NOTES
Pt sleeping well in bed. Pt states her stomach is not hurting any longer. Pt received cardiac medications for Afib. Pt has occasional ST.  MD aware. Pt has experienced nausea today. NO emitus noted. Pt is satting well at 3L O2. Pt informed to call if chest hurts again.

## 2019-07-08 NOTE — PROGRESS NOTES
Hospitalist Progress Note Admit Date:  2019  9:55 PM  
Name:  Paul Domínguez Age:  78 y.o. 
:  1940 MRN:  [id number] PCP:  Joel Landers MD 
Treatment Team: Attending Provider: Fransisco Peterson MD; Primary Nurse: Do Flores; Consulting Provider: Dave Rosales MD; Utilization Review: Jeffery Tim RN; Care Manager: Sherry Blount RN; Charge Nurse: Lulú Kumar; Consulting Provider: Bisi Domínguez MD 
 
Subjective:  
2019- seen - crying and tearful at the thought of hd; declined therapy today. No diarrhea 
 
 
2019 - pt seen - renal function improving - so no hd for now; sounds wet  
 
2019- pt seen - notes ear feels funny and hearing not as good 2019 - pt seen -  at bedside- updated- pt feels ok - ready to go home but wbc high and hr up  
 
 
2019- pt seen - dsypnic when lying flat- still notes not hearing well per the daughter family hx of ear wax requiring manual removal-  
 
2019- pt seen - called emergently by nursing for patient c/o chest pain that started at night. Also vomiting. The patient states she thought it was indigestion and it would go away but it hasn't. She takes nitro at home- although she doesn't like to. Jodie Lamb 2019- pt seen - She had an episode of chest pain last night and vomiting x 2 was found to be in afib with rvr. Now lying in bed c/o of chest pain again this am  at bedside distraught; also c/o nausea. Objective:  
 
Patient Vitals for the past 24 hrs: 
 Temp Pulse Resp BP SpO2  
19 0456 98.4 °F (36.9 °C) (!) 119 22 131/72 94 % 19 0002 98.4 °F (36.9 °C) (!) 114 21 123/68 95 % 19 2221 98.4 °F (36.9 °C) (!) 113 21 145/65 94 % 19 2116 98.4 °F (36.9 °C) (!) 127 21 127/75 94 % 19 2000 98 °F (36.7 °C) (!) 120 20 143/70 93 % 19 1649 98 °F (36.7 °C) 89 20 141/67 93 % 19 1229 97.7 °F (36.5 °C) 85 22 148/69 94 % 07/07/19 0735 98.4 °F (36.9 °C) 76 26 (!) 154/92 93 % Oxygen Therapy O2 Sat (%): 94 % (07/08/19 0456) Pulse via Oximetry: 62 beats per minute (07/06/19 0759) O2 Device: Nasal cannula (07/06/19 0759) O2 Flow Rate (L/min): 2 l/min(3 decreased to 2) (07/06/19 0759) Intake/Output Summary (Last 24 hours) at 7/8/2019 3877 Last data filed at 7/8/2019 0676 Gross per 24 hour Intake 0 ml Output 1125 ml Net -1125 ml General:    Well nourished. Alert. CV:   RRR. No murmur, rub, or gallop. Lungs:   A/e =  No wheezing, rhonchi, or rales. Abdomen:   Soft, nontender, nondistended. Extremities: Warm and dry. No cyanosis or edema. Skin:     No rashes or jaundice. Data Review: 
I have reviewed all labs, meds, telemetry events, and studies from the last 24 hours. Recent Results (from the past 24 hour(s)) GLUCOSE, POC Collection Time: 07/07/19  7:33 AM  
Result Value Ref Range Glucose (POC) 315 (H) 65 - 100 mg/dL EKG, 12 LEAD, INITIAL Collection Time: 07/07/19  7:56 AM  
Result Value Ref Range Ventricular Rate 75 BPM  
 Atrial Rate 75 BPM  
 P-R Interval 144 ms QRS Duration 106 ms  
 Q-T Interval 412 ms QTC Calculation (Bezet) 460 ms Calculated P Axis 80 degrees Calculated R Axis -11 degrees Calculated T Axis 84 degrees Diagnosis Sinus rhythm with sinus arrhythmia with occasional Premature ventricular  
complexes Incomplete left bundle branch block Nonspecific ST and T wave abnormality Abnormal ECG When compared with ECG of 29-JUN-2019 22:23, 
Premature ventricular complexes are now Present Incomplete left bundle branch block is now Present Confirmed by Mady Patton ([id number]) on 7/7/2019 8:50:59 AM 
  
TROPONIN I Collection Time: 07/07/19  9:29 AM  
Result Value Ref Range Troponin-I, Qt. 1.46 (HH) 0.02 - 0.05 NG/ML  
EKG, 12 LEAD, INITIAL Collection Time: 07/07/19 11:02 AM  
Result Value Ref Range  Ventricular Rate 78 BPM  
 Atrial Rate 78 BPM  
 P-R Interval 152 ms QRS Duration 108 ms Q-T Interval 404 ms QTC Calculation (Bezet) 460 ms Calculated P Axis 83 degrees Calculated R Axis -12 degrees Calculated T Axis 88 degrees Diagnosis Sinus rhythm with Premature supraventricular complexes and with occasional  
Premature ventricular complexes Nonspecific ST and T wave abnormality Abnormal ECG When compared with ECG of 07-JUL-2019 07:56, 
Premature supraventricular complexes are now Present Confirmed by Stephanie Farr MD (), BK RUFFIN ([id number]) on 7/7/2019 11:37:19 AM 
  
GLUCOSE, POC Collection Time: 07/07/19 11:16 AM  
Result Value Ref Range Glucose (POC) 366 (H) 65 - 100 mg/dL TROPONIN I Collection Time: 07/07/19  3:02 PM  
Result Value Ref Range Troponin-I, Qt. 1.32 (HH) 0.02 - 0.05 NG/ML  
GLUCOSE, POC Collection Time: 07/07/19  4:46 PM  
Result Value Ref Range Glucose (POC) 294 (H) 65 - 100 mg/dL TROPONIN I Collection Time: 07/07/19  8:04 PM  
Result Value Ref Range Troponin-I, Qt. 1.25 (HH) 0.02 - 0.05 NG/ML  
GLUCOSE, POC Collection Time: 07/07/19  9:18 PM  
Result Value Ref Range Glucose (POC) 295 (H) 65 - 100 mg/dL METABOLIC PANEL, BASIC Collection Time: 07/08/19  4:00 AM  
Result Value Ref Range Sodium 143 136 - 145 mmol/L Potassium 3.2 (L) 3.5 - 5.1 mmol/L Chloride 108 (H) 98 - 107 mmol/L  
 CO2 19 (L) 21 - 32 mmol/L Anion gap 16 7 - 16 mmol/L Glucose 272 (H) 65 - 100 mg/dL BUN 91 (H) 8 - 23 MG/DL Creatinine 3.02 (H) 0.6 - 1.0 MG/DL  
 GFR est AA 19 (L) >60 ml/min/1.73m2 GFR est non-AA 16 (L) >60 ml/min/1.73m2 Calcium 8.4 8.3 - 10.4 MG/DL  
CBC WITH AUTOMATED DIFF Collection Time: 07/08/19  4:00 AM  
Result Value Ref Range WBC 13.1 (H) 4.3 - 11.1 K/uL  
 RBC 2.94 (L) 4.05 - 5.2 M/uL HGB 9.0 (L) 11.7 - 15.4 g/dL HCT 27.6 (L) 35.8 - 46.3 % MCV 93.9 79.6 - 97.8 FL  
 MCH 30.6 26.1 - 32.9 PG  
 MCHC 32.6 31.4 - 35.0 g/dL RDW 14.4 11.9 - 14.6 % PLATELET 136 773 - 787 K/uL MPV 12.1 9.4 - 12.3 FL ABSOLUTE NRBC 0.00 0.0 - 0.2 K/uL  
 DF AUTOMATED NEUTROPHILS 84 (H) 43 - 78 % LYMPHOCYTES 7 (L) 13 - 44 % MONOCYTES 6 4.0 - 12.0 % EOSINOPHILS 1 0.5 - 7.8 % BASOPHILS 1 0.0 - 2.0 % IMMATURE GRANULOCYTES 2 0.0 - 5.0 %  
 ABS. NEUTROPHILS 11.0 (H) 1.7 - 8.2 K/UL  
 ABS. LYMPHOCYTES 0.9 0.5 - 4.6 K/UL  
 ABS. MONOCYTES 0.8 0.1 - 1.3 K/UL  
 ABS. EOSINOPHILS 0.1 0.0 - 0.8 K/UL  
 ABS. BASOPHILS 0.1 0.0 - 0.2 K/UL  
 ABS. IMM. GRANS. 0.3 0.0 - 0.5 K/UL All Micro Results Procedure Component Value Units Date/Time CULTURE, BLOOD [[id number]] Collected:  06/30/19 0041 Order Status:  Completed Specimen:  Whole Blood Updated:  07/05/19 0617 Special Requests: --     
  RIGHT JUGULAR VEIN Culture result: NO GROWTH 5 DAYS     
 CULTURE, BLOOD [[id number]] Collected:  06/30/19 5808 Order Status:  Completed Specimen:  Blood Updated:  07/05/19 6450 Special Requests: --     
  RIGHT 
HAND Culture result: NO GROWTH 5 DAYS     
 CULTURE, STOOL [[id number]] Collected:  06/29/19 2259 Order Status:  Completed Specimen:  Stool Updated:  07/02/19 6821 Special Requests: NO SPECIAL REQUESTS Culture result:    
  No Salmonella, Shigella, or Ecoli 0157 isolated. CULTURE, URINE [[id number]]  (Abnormal)  (Susceptibility) Collected:  06/29/19 2308 Order Status:  Completed Specimen:  Cath Urine Updated:  07/02/19 0389 Special Requests: NO SPECIAL REQUESTS Culture result:    
  >100,000 COLONIES/mL CITROBACTER FREUNDII  
     
 C. DIFFICILE/EPI PCR [[id number]]  (Abnormal) Collected:  06/29/19 2306 Order Status:  Completed Specimen:  Stool Updated:  06/30/19 0021 Special Requests: NO SPECIAL REQUESTS Culture result: Toxigenic C Diff POS/027-NAP1-BI PRESUMPTIVE POS   RESULTS VERIFIED, PHONED TO AND READ BACK BY 
 DR. Barney Pinon @ [address] [place] [address] [phone] BY TVO Current Meds: 
Current Facility-Administered Medications Medication Dose Route Frequency  alum-mag hydroxide-simeth (MYLANTA) oral suspension 30 mL  30 mL Oral Q4H PRN  
 aspirin tablet 325 mg  325 mg Oral DAILY  nitroglycerin (NITROBID) 2 % ointment 1 Inch  1 Inch Topical BID  metoprolol tartrate (LOPRESSOR) tablet 50 mg  50 mg Oral BID  nitroglycerin (NITROSTAT) tablet 0.4 mg  0.4 mg SubLINGual PRN  
 metoprolol (LOPRESSOR) injection 5 mg  5 mg IntraVENous Q6H PRN  
 carbamide peroxide (DEBROX) 6.5 % otic solution 5 Drop  5 Drop Both Ears BID  ondansetron (ZOFRAN) injection 4 mg  4 mg IntraVENous Q6H PRN  
 LORazepam (ATIVAN) injection 0.26 mg  0.26 mg IntraVENous Q6H PRN  
 carbidopa-levodopa (SINEMET)  mg per tablet 1 Tab  1 Tab Oral TID  levothyroxine (SYNTHROID) tablet 25 mcg  25 mcg Oral ACB  sodium chloride (NS) flush 5-40 mL  5-40 mL IntraVENous Q8H  
 sodium chloride (NS) flush 5-40 mL  5-40 mL IntraVENous PRN  
 acetaminophen (TYLENOL) tablet 650 mg  650 mg Oral Q4H PRN  
 bisacodyl (DULCOLAX) tablet 5 mg  5 mg Oral DAILY PRN  
 insulin lispro (HUMALOG) injection   SubCUTAneous AC&HS  vancomycin 50 mg/mL oral solution (compounded) 125 mg  125 mg Oral Q6H  
 metroNIDAZOLE (FLAGYL) IVPB premix 500 mg  500 mg IntraVENous Q8H Other Studies (last 24 hours): Xr Chest Sngl V Result Date: 7/7/2019 Portable AP upright chest dated 7/7/2019 at 0802 hours Comparison exam 7/3/2019 CLINICAL INFORMATION: Chest pain Metallic sternal wires are present. Heart is enlarged. Mediastinum unremarkable. Vascularity does not appear congested. No pulmonary infiltrate or pleural effusion. IMPRESSION: No acute abnormality Assessment and Plan:  
 
Hospital Problems as of 7/8/2019 Date Reviewed: 6/19/2019 Codes Class Noted - Resolved POA  
 PAF (paroxysmal atrial fibrillation) (HCC) ICD-10-CM: I48.0 ICD-9-CM: 427.31  7/7/2019 - Present Unknown Elevated troponin ICD-10-CM: R74.8 ICD-9-CM: 790.6  7/7/2019 - Present Unknown Chest pain ICD-10-CM: R07.9 ICD-9-CM: 786.50  7/7/2019 - Present Unknown CAD (coronary artery disease) ICD-10-CM: I25.10 ICD-9-CM: 414.00  7/7/2019 - Present Unknown C. difficile diarrhea ICD-10-CM: A04.72 
ICD-9-CM: 008.45  6/30/2019 - Present Yes Acute on chronic renal failure (HCC) ICD-10-CM: N17.9, N18.9 ICD-9-CM: 584.9, 585.9  6/29/2019 - Present Yes * (Principal) Acute cystitis with hematuria ICD-10-CM: N30.01 
ICD-9-CM: 595.0  6/29/2019 - Present Yes Morbid obesity ([facility] (Chronic) ICD-10-CM: E66.01 
ICD-9-CM: 278.01  6/29/2019 - Present Yes Hypokalemia ICD-10-CM: E87.6 ICD-9-CM: 276.8  6/29/2019 - Present Yes Parkinson disease ([facility] (Chronic) ICD-10-CM: G20 
ICD-9-CM: 332.0  6/19/2019 - Present Yes  
   
 CKD (chronic kidney disease) stage 5, GFR less than 15 ml/min ([facility]) (Chronic) ICD-10-CM: N18.5 ICD-9-CM: 585.5  11/30/2018 - Present Yes 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 ([facility] (Chronic) ICD-10-CM: E11.21 
ICD-9-CM: 250.40, 583.81  11/15/2017 - Present Yes  
   
 AF (atrial fibrillation) (HCC) (Chronic) ICD-10-CM: I48.91 
ICD-9-CM: 427.31  11/20/2011 - Present HTN (hypertension) (Chronic) ICD-10-CM: I10 
ICD-9-CM: 401.9  11/10/2011 - Present Yes PLAN:   
· Sepsis due to cdiif - resolved · CDIFF - continue vanc- no diarrhea for last 2-3 days per pt · ANTONIO on ckd stage v - per renal - holding lr -  
· Sob with lying flat- got lasix yesterday without much output · Elevated wbc and tachycardia- unclear etiology- monitor for any infection - wbc trending down · uti- CITROBACTER FREUNDII-  S/p  rocephin  
· afib with rvr- uncontrolled last night - given iv lopressor- will start heparin as cards stopped eliquis. · PD- sinemet · Anemia- monitor · Ear wax- continue ceruminolytic defer to Dr. Marquita Andrews on DC whom patients  says removes the wax for them · Chest pain and elevated trops - ? Demand ischemia vs Nstemi - cardiology following - do not feel its an Nstemi - will get repeat ekg, trops, have cards follow up emergently - follow up echo · Low threshold for transfer to telemetry · Volume overload- continue lasix- 
DC planning/Dispo:  unclear DVT ppx:  elifaina Signed: 
Saulo Reddy MD  
Addendum 
- per cards chest pain not cardiac- meds adjusted for afib - will follow up echo Addendum- f/up pt currently chest pain free but nauseous- states she has been throwing up all day - but retching per nursing - wants to eat - will start clears Echo shows decreased ef of 30-35% c/w study in march of this year

## 2019-07-08 NOTE — PROGRESS NOTES
Nutrition Follow Up Assessment:  
Food/Nutrition Patient History: DIET NPO Except Meds The patient was actively vomiting during RD rounds.  was at bedside and unsure on if he should let the RN know about the vomiting or not. RD stressed importance to let RN know when the patient is vomiting. The patient states that she is hungry despite her nausea and vomiting. She is requesting soup and believes it will help settle her stomach. RD discussed current NPO diet order and that unfortunately soup cannot be provided to patient at this time. Labs are remarkable for AM Glucose 272, BUN 91 and Creatinine of 3.02. Anthropometrics:Height: 5' 8\" (172.7 cm),  Weight: 116.5 kg (256 lb 12.8 oz), Weight Source: Bed. Macronutrient needs: 63.6 kg IBW 
EER:  7215-1508 kcal /day (25-35 kcal/kg) EPR:  51-64 grams protein/day (0.8-1grams/kg) Intake/Comparative Standards: Current NPO diet order does not meet estimated needs. Intervention: 
Meals and snacks: Advance diet as medically appropriate. Nutrition Supplement Therapy: Patient declines all ONS. Consider alternate means of nutrition if patient continues with inability to take PO. Discharge plan: Too soon to determine. Margy Lopez [address], [address],   
[phone]

## 2019-07-08 NOTE — PROGRESS NOTES
[facility] CARDIOLOGY PROGRESS NOTE 
      
 
7/8/2019 11:58 AM 
 
Admit Date: 6/29/2019 Subjective:  
 
Had some AF with RVR overnight. Back in SR. States ongoing CP for the last couple days; reproducible. On 3L NC at home. Multiple co-morbidities and not very ambulatory at baseline per  ROS: 
Cardiovascular:  As noted above Objective:  
  
Vitals:  
 07/08/19 6482 07/08/19 0526 07/08/19 0750 07/08/19 1156 BP: 131/72  134/82 155/70 Pulse: (!) 119  (!) 143 (!) 56 Resp: 22 22 16 Temp: 98.4 °F (36.9 °C)  97.7 °F (36.5 °C) 98.3 °F (36.8 °C) SpO2: 94%  95% 94% Weight:  116.5 kg (256 lb 12.8 oz) Height:      
 
 
Physical Exam: 
General-No Acute Distress Neck- supple, no JVD 
CV- regular rate and rhythm no MRG Lung- clear bilaterally Abd- soft, nontender, nondistended Ext- tr edema bilaterally. Skin- warm and dry Data Review:  
Recent Labs  
  07/08/19 
0400 07/07/19 
2004 07/07/19 
1502  07/07/19 
0455   --   --   --  143  
K 3.2*  --   --   --  3.8 BUN 91*  --   --   --  87* CREA 3.02*  --   --   --  3.15* *  --   --   --  276* WBC 13.1*  --   --   --  13.5* HGB 9.0*  --   --   --  9.6* HCT 27.6*  --   --   --  29.4*  
  --   --   --  230 TROIQ  --  1.25* 1.32*   < >  --   
 < > = values in this interval not displayed. Assessment/Plan:  
 
Principal Problem: 
  Acute cystitis with hematuria (6/29/2019) Active Problems: 
  HTN (hypertension) (11/10/2011) 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 ([facility] (11/15/2017) CKD (chronic kidney disease) stage 5, GFR less than 15 ml/min (HCC) (11/30/2018) Parkinson disease ([address] (6/19/2019) Acute on chronic renal failure ([facility] (6/29/2019) Morbid obesity ([facility] (6/29/2019) Hypokalemia (6/29/2019) C. difficile diarrhea (6/30/2019) PAF (paroxysmal atrial fibrillation) ([facility] (7/7/2019) Elevated troponin (7/7/2019) Chest pain (7/7/2019) CAD (coronary artery disease) (7/7/2019) Elevated troponin (~pk at 1.4 and downtrending) likely multifactorial in the setting of ARF/hypotension/infection. CP appears non cardiac and reproducible. Has cardiolyte from 3/19 with no sig reversibility. Poor cath candidate nonetheless and would not . Start po amiodarone with noted PAF overnight; restart eliquis. Also replete K Improving renal function; long term will need to reassess NOAC use as CKD progresses. Extensive discussion with pt and  Smooth Roman MD 
7/8/2019 11:58 AM

## 2019-07-08 NOTE — PROGRESS NOTES
Interdisciplinary Rounds completed 07/08/19. Nursing, Case Management, Physician and PT present. Plan of care reviewed and updated. Continue remote tele, chest pain today. Echo Thursday.   NPO for now.;

## 2019-07-08 NOTE — PROGRESS NOTES
Problem: Falls - Risk of 
Goal: *Absence of Falls Description Document Carrie Cousin Fall Risk and appropriate interventions in the flowsheet. Outcome: Progressing Towards Goal 
  
Problem: Patient Education: Go to Patient Education Activity Goal: Patient/Family Education Outcome: Progressing Towards Goal 
  
Problem: Risk for Spread of Infection Goal: Prevent transmission of infectious organism to others Description Prevent the transmission of infectious organisms to other patients, staff members, and visitors. Outcome: Progressing Towards Goal 
  
Problem: Patient Education:  Go to Education Activity Goal: Patient/Family Education Outcome: Progressing Towards Goal 
  
Problem: Pressure Injury - Risk of 
Goal: *Prevention of pressure injury Description Document Devin Scale and appropriate interventions in the flowsheet. Outcome: Progressing Towards Goal 
  
Problem: Patient Education: Go to Patient Education Activity Goal: Patient/Family Education Outcome: Progressing Towards Goal 
  
Problem: Patient Education: Go to Patient Education Activity Goal: Patient/Family Education Outcome: Progressing Towards Goal 
  
Problem: Patient Education: Go to Patient Education Activity Goal: Patient/Family Education Outcome: Progressing Towards Goal 
  
Problem: Nutrition Deficit Goal: *Optimize nutritional status Outcome: Progressing Towards Goal

## 2019-07-08 NOTE — PROGRESS NOTES
Patient with persistent Aflutter with HR in the 110-120's. I have seen an EKG at this time which is showing A flutter with  and no evidence of acute ischemia. Troponin level stable at 1.25. Chest pain improved after SL nitro but still present. She is thirsty. Will order a small bolus of NS + albumin, 2 mg IV of morphine and another dose of IV lopressor. If after that she still has chest discomfort and elevated HR [facility].

## 2019-07-08 NOTE — PROGRESS NOTES
[facility] Subjective: A on CKD   Some Chest Pain this am. 
 
Review of Systems -  
General ROS: negative for - fever, chills Respiratory ROS: no SOB, cough, WRAREN Cardiovascular ROS: no CP, palpitations Gastrointestinal ROS: no N&V, abdominal pain, diarrhea Genito-Urinary ROS: no difficulty voiding, dysuria Neurological ROS: no seizures, focal weekness Objective: 
 
Vitals:  
 07/08/19 0526 07/08/19 0750 07/08/19 1156 07/08/19 1203 BP:  134/82 155/70 Pulse:  (!) 143 (!) 56 Resp:  22 16 Temp:  97.7 °F (36.5 °C) 98.3 °F (36.8 °C) SpO2:  95% 94% 97% Weight: 116.5 kg (256 lb 12.8 oz) Height:      
 
 
PE 
Gen: in no acute distress CV:reg rate Chest:clear Abd: soft Ext/Access: no edema Allayne Sarah LAB Recent Labs  
  07/08/19 
0400 07/07/19 
0455 07/06/19 
0601 WBC 13.1* 13.5* 12.8* HGB 9.0* 9.6* 8.9* HCT 27.6* 29.4* 26.4*  
 230 242 Recent Labs  
  07/08/19 
0400 07/07/19 2004 07/07/19 
1502 07/07/19 
0984 07/07/19 
0455 07/06/19 
1349   --   --   --  143 145  
K 3.2*  --   --   --  3.8 3.1*  
*  --   --   --  109* 112* CO2 19*  --   --   --  19* 18* *  --   --   --  276* 237* BUN 91*  --   --   --  87* 92* CREA 3.02*  --   --   --  3.15* 3.33* PHOS  --   --   --   --   --  3.1 CA 8.4  --   --   --  8.5 8.5  
TROIQ  --  1.25* 1.32* 1.46*  --   --   
ALB  --   --   --   --   --  2.7* Radiology A/P:  
Patient Active Problem List  
Diagnosis Code  
 HTN (hypertension) I10  
 AF (atrial fibrillation) ([facility]) I48.91  
 Diabetic neuropathy ([facility] E11.40  A-V fistula ([facility]) I77.0  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 [facility] E11.21  
 SARAH (obstructive sleep apnea) G47.33  
 CKD (chronic kidney disease) stage 5, GFR less than 15 ml/min ([facility]) N18.5  Parkinson disease ([facility] G20  
 Acute on chronic renal failure ([facility]) N17.9, N18.9  Acute cystitis with hematuria N30.01  
  Morbid obesity (HCC) E66.01  
 Hypokalemia E87.6  
 C. difficile diarrhea A04.72  
 PAF (paroxysmal atrial fibrillation) (HCC) I48.0  Elevated troponin R74.8  Chest pain R07.9  CAD (coronary artery disease) I25.10 A on CKD -Renal function is improving Following. Judithe [place] Chest Pain - being evaluated by primary HTN    
 
MARYELLEN Garcia MD

## 2019-07-09 LAB
ANION GAP SERPL CALC-SCNC: 12 MMOL/L (ref 7–16)
BASOPHILS # BLD: 0.1 K/UL (ref 0–0.2)
BASOPHILS NFR BLD: 1 % (ref 0–2)
BNP SERPL-MCNC: 2115 PG/ML
BUN SERPL-MCNC: 90 MG/DL (ref 8–23)
CALCIUM SERPL-MCNC: 8.3 MG/DL (ref 8.3–10.4)
CHLORIDE SERPL-SCNC: 105 MMOL/L (ref 98–107)
CO2 SERPL-SCNC: 22 MMOL/L (ref 21–32)
CREAT SERPL-MCNC: 3.03 MG/DL (ref 0.6–1)
DIFFERENTIAL METHOD BLD: ABNORMAL
EOSINOPHIL # BLD: 0.2 K/UL (ref 0–0.8)
EOSINOPHIL NFR BLD: 2 % (ref 0.5–7.8)
ERYTHROCYTE [DISTWIDTH] IN BLOOD BY AUTOMATED COUNT: 14.4 % (ref 11.9–14.6)
GLUCOSE BLD STRIP.AUTO-MCNC: 283 MG/DL (ref 65–100)
GLUCOSE BLD STRIP.AUTO-MCNC: 299 MG/DL (ref 65–100)
GLUCOSE BLD STRIP.AUTO-MCNC: 312 MG/DL (ref 65–100)
GLUCOSE BLD STRIP.AUTO-MCNC: 375 MG/DL (ref 65–100)
GLUCOSE SERPL-MCNC: 355 MG/DL (ref 65–100)
HCT VFR BLD AUTO: 28.1 % (ref 35.8–46.3)
HGB BLD-MCNC: 9.2 G/DL (ref 11.7–15.4)
IMM GRANULOCYTES # BLD AUTO: 0.3 K/UL (ref 0–0.5)
IMM GRANULOCYTES NFR BLD AUTO: 2 % (ref 0–5)
LYMPHOCYTES # BLD: 0.7 K/UL (ref 0.5–4.6)
LYMPHOCYTES NFR BLD: 6 % (ref 13–44)
MCH RBC QN AUTO: 30.9 PG (ref 26.1–32.9)
MCHC RBC AUTO-ENTMCNC: 32.7 G/DL (ref 31.4–35)
MCV RBC AUTO: 94.3 FL (ref 79.6–97.8)
MONOCYTES # BLD: 0.7 K/UL (ref 0.1–1.3)
MONOCYTES NFR BLD: 5 % (ref 4–12)
NEUTS SEG # BLD: 11.2 K/UL (ref 1.7–8.2)
NEUTS SEG NFR BLD: 85 % (ref 43–78)
NRBC # BLD: 0.02 K/UL (ref 0–0.2)
PLATELET # BLD AUTO: 268 K/UL (ref 150–450)
PMV BLD AUTO: 12.1 FL (ref 9.4–12.3)
POTASSIUM SERPL-SCNC: 3.5 MMOL/L (ref 3.5–5.1)
RBC # BLD AUTO: 2.98 M/UL (ref 4.05–5.2)
SODIUM SERPL-SCNC: 139 MMOL/L (ref 136–145)
WBC # BLD AUTO: 13.1 K/UL (ref 4.3–11.1)

## 2019-07-09 PROCEDURE — 74011636637 HC RX REV CODE- 636/637: Performed by: INTERNAL MEDICINE

## 2019-07-09 PROCEDURE — 77030011256 HC DRSG MEPILEX <16IN NO BORD MOLN -A

## 2019-07-09 PROCEDURE — 36415 COLL VENOUS BLD VENIPUNCTURE: CPT

## 2019-07-09 PROCEDURE — P9047 ALBUMIN (HUMAN), 25%, 50ML: HCPCS | Performed by: INTERNAL MEDICINE

## 2019-07-09 PROCEDURE — 85025 COMPLETE CBC W/AUTO DIFF WBC: CPT

## 2019-07-09 PROCEDURE — 82962 GLUCOSE BLOOD TEST: CPT

## 2019-07-09 PROCEDURE — 74011250636 HC RX REV CODE- 250/636: Performed by: INTERNAL MEDICINE

## 2019-07-09 PROCEDURE — 74011250637 HC RX REV CODE- 250/637: Performed by: INTERNAL MEDICINE

## 2019-07-09 PROCEDURE — 74011250637 HC RX REV CODE- 250/637: Performed by: PHYSICIAN ASSISTANT

## 2019-07-09 PROCEDURE — 65660000000 HC RM CCU STEPDOWN

## 2019-07-09 PROCEDURE — 80048 BASIC METABOLIC PNL TOTAL CA: CPT

## 2019-07-09 PROCEDURE — 74011250637 HC RX REV CODE- 250/637: Performed by: FAMILY MEDICINE

## 2019-07-09 PROCEDURE — 97530 THERAPEUTIC ACTIVITIES: CPT

## 2019-07-09 PROCEDURE — 83880 ASSAY OF NATRIURETIC PEPTIDE: CPT

## 2019-07-09 RX ORDER — ALBUMIN HUMAN 250 G/1000ML
12.5 SOLUTION INTRAVENOUS ONCE
Status: COMPLETED | OUTPATIENT
Start: 2019-07-09 | End: 2019-07-09

## 2019-07-09 RX ORDER — AMIODARONE HYDROCHLORIDE 200 MG/1
200 TABLET ORAL 2 TIMES DAILY
Status: DISCONTINUED | OUTPATIENT
Start: 2019-07-10 | End: 2019-07-12 | Stop reason: HOSPADM

## 2019-07-09 RX ORDER — FUROSEMIDE 10 MG/ML
40 INJECTION INTRAMUSCULAR; INTRAVENOUS ONCE
Status: COMPLETED | OUTPATIENT
Start: 2019-07-09 | End: 2019-07-09

## 2019-07-09 RX ORDER — METOPROLOL SUCCINATE 50 MG/1
50 TABLET, EXTENDED RELEASE ORAL DAILY
Status: DISCONTINUED | OUTPATIENT
Start: 2019-07-10 | End: 2019-07-10

## 2019-07-09 RX ORDER — AMIODARONE HYDROCHLORIDE 200 MG/1
100 TABLET ORAL 2 TIMES DAILY
Status: DISCONTINUED | OUTPATIENT
Start: 2019-07-09 | End: 2019-07-09

## 2019-07-09 RX ORDER — ISOSORBIDE DINITRATE 20 MG/1
20 TABLET ORAL 3 TIMES DAILY
Status: DISCONTINUED | OUTPATIENT
Start: 2019-07-09 | End: 2019-07-12 | Stop reason: HOSPADM

## 2019-07-09 RX ORDER — AMIODARONE HYDROCHLORIDE 200 MG/1
200 TABLET ORAL DAILY
Status: DISCONTINUED | OUTPATIENT
Start: 2019-07-22 | End: 2019-07-12 | Stop reason: HOSPADM

## 2019-07-09 RX ADMIN — ONDANSETRON 4 MG: 2 INJECTION INTRAMUSCULAR; INTRAVENOUS at 12:08

## 2019-07-09 RX ADMIN — CARBAMIDE PEROXIDE 6.5% 5 DROP: 6.5 LIQUID AURICULAR (OTIC) at 17:01

## 2019-07-09 RX ADMIN — CARBIDOPA AND LEVODOPA 1 TABLET: 25; 100 TABLET ORAL at 22:18

## 2019-07-09 RX ADMIN — LEVOTHYROXINE SODIUM 25 MCG: 50 TABLET ORAL at 07:03

## 2019-07-09 RX ADMIN — INSULIN LISPRO 8 UNITS: 100 INJECTION, SOLUTION INTRAVENOUS; SUBCUTANEOUS at 16:46

## 2019-07-09 RX ADMIN — Medication 10 ML: at 07:04

## 2019-07-09 RX ADMIN — AMIODARONE HYDROCHLORIDE 100 MG: 200 TABLET ORAL at 17:00

## 2019-07-09 RX ADMIN — INSULIN LISPRO 6 UNITS: 100 INJECTION, SOLUTION INTRAVENOUS; SUBCUTANEOUS at 22:17

## 2019-07-09 RX ADMIN — CARBAMIDE PEROXIDE 6.5% 5 DROP: 6.5 LIQUID AURICULAR (OTIC) at 08:32

## 2019-07-09 RX ADMIN — METOPROLOL TARTRATE 50 MG: 50 TABLET, FILM COATED ORAL at 08:31

## 2019-07-09 RX ADMIN — INSULIN LISPRO 14 UNITS: 100 INJECTION, SOLUTION INTRAVENOUS; SUBCUTANEOUS at 11:49

## 2019-07-09 RX ADMIN — NITROGLYCERIN 1 INCH: 20 OINTMENT TOPICAL at 08:30

## 2019-07-09 RX ADMIN — ALBUMIN (HUMAN) 12.5 G: 0.25 INJECTION, SOLUTION INTRAVENOUS at 08:29

## 2019-07-09 RX ADMIN — FUROSEMIDE 40 MG: 10 INJECTION, SOLUTION INTRAMUSCULAR; INTRAVENOUS at 10:02

## 2019-07-09 RX ADMIN — CARBIDOPA AND LEVODOPA 1 TABLET: 25; 100 TABLET ORAL at 16:51

## 2019-07-09 RX ADMIN — METRONIDAZOLE 500 MG: 500 INJECTION, SOLUTION INTRAVENOUS at 14:56

## 2019-07-09 RX ADMIN — Medication 10 ML: at 22:40

## 2019-07-09 RX ADMIN — INSULIN LISPRO 6 UNITS: 100 INJECTION, SOLUTION INTRAVENOUS; SUBCUTANEOUS at 08:30

## 2019-07-09 RX ADMIN — VANCOMYCIN HYDROCHLORIDE 125 MG: 1 INJECTION, POWDER, LYOPHILIZED, FOR SOLUTION INTRAVENOUS at 17:01

## 2019-07-09 RX ADMIN — ASPIRIN 81 MG 81 MG: 81 TABLET ORAL at 08:30

## 2019-07-09 RX ADMIN — METRONIDAZOLE 500 MG: 500 INJECTION, SOLUTION INTRAVENOUS at 07:02

## 2019-07-09 RX ADMIN — Medication 10 ML: at 14:56

## 2019-07-09 RX ADMIN — VANCOMYCIN HYDROCHLORIDE 125 MG: 1 INJECTION, POWDER, LYOPHILIZED, FOR SOLUTION INTRAVENOUS at 12:09

## 2019-07-09 RX ADMIN — ONDANSETRON 4 MG: 2 INJECTION INTRAMUSCULAR; INTRAVENOUS at 18:08

## 2019-07-09 RX ADMIN — APIXABAN 5 MG: 5 TABLET, FILM COATED ORAL at 08:31

## 2019-07-09 RX ADMIN — ONDANSETRON 4 MG: 2 INJECTION INTRAMUSCULAR; INTRAVENOUS at 22:43

## 2019-07-09 RX ADMIN — APIXABAN 5 MG: 5 TABLET, FILM COATED ORAL at 17:00

## 2019-07-09 RX ADMIN — CARBIDOPA AND LEVODOPA 1 TABLET: 25; 100 TABLET ORAL at 08:31

## 2019-07-09 RX ADMIN — AMIODARONE HYDROCHLORIDE 200 MG: 200 TABLET ORAL at 08:31

## 2019-07-09 NOTE — PROGRESS NOTES
Interdisciplinary Rounds completed 07/09/19. Nursing, Case Management, Physician and PT present. Plan of care reviewed and updated. Probable d/c to rehab on Thursday.

## 2019-07-09 NOTE — CONSULTS
[facility] Consult Note Primary GI Physician: Dr. Berenice Mancera Referring Provider:  Dr. Raquel Malcolm Consult Date:  7/9/2019 Admit Date:  6/29/2019 Chief Complaint:  Nausea, vomiting Subjective:  
 
History of Present Illness:  Patient is a 78 y.o. female with PMH of CAD s/p CABG in 2011, CKD, DM, ? PAF on Eliquis, HTN, Parkinson's who is seen in consultation at the request of Dr. Raquel Malcolm for nausea and vomiting. She was admitted 6/29/19 with sepsis secondary to c. Diff--on po vanco with last dose tomorrow, and IV flagyl. She was evaluated by cardiology on 7/7/19 secondary to CP and troponin 1.46. Had nuke 3/2019 showing fixed anterior defect and ECHO 3/2019 revealing EF 45-50% with grade 2 DD and mild to mod ME. EKG this admission with NO ST changes. Aspirin and nitrates added and lopressor increased. She was felt to be a difficult cath patient. For the last month, patient has had fairly constant nausea with daily episodes of dry heaving and vomiting with bilious emesis. This occurs even when she has been fasting. The only reported change in medications preceding the onset of her n/v was the addition of eliquis about one month ago. However, she states that this medication was stopped for a few days while inpatient and this did not improve her symptoms. Denies prior problems with her stomach. No prior EGD. No NSAIDs use. No h/o PUD. No reflux, dyspahgia, abdominal pain. Says she has normal BMs at home but has not been having regular BMs here lately because she hasn't been eating very much. Reports weight loss of a few pounds since she has had n/v. No hematochezia or melena. Hgb stable in the 9 range. BUN 90 with creatinine 3.30. BNP over 2,000. Has a long history of diabetes and reports her BS are typically in the 200-300 range. Denies early satiety and has not had regurgitation of old food. Of note, she also reports having had a fall and hitting her head about a month ago. CT head on 6/29 with no acute changes. However, she reports having had vertigo since the fall. Nausea and vomiting is worse with head movement, but she still has the n/v even when she is not experiencing the vertigo. [place] 2014 by Dr. Teddy Zhu revealed TA polyp. PMH: 
Past Medical History:  
Diagnosis Date  Adverse effect of anesthesia   
 difficult awakening  AF (atrial fibrillation) ([facility] [place] [address] 11/20/2011  Arthritis 11/18/2011  
 generalized  CAD (coronary artery disease) 2011 CABG x 4  
 Cervical disc disease Steroid injections  Chronic kidney disease  Chronic pain   
 back  DJD (degenerative joint disease)  Drainage from wound 10/3/2014  Full dentures  Gout   
 managed with medication  Hemorrhoid 11/5/2013  
 [place] (hard of hearing)  Hyperlipemia 11/10/2011  
 managed with medication  Hypertension   
 controlled with meds  Hypertriglyceridemia   
 managed with medication  Hypothyroidism   
 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   
 legs and arms, managed with medication  Obesity (BMI 30-39.9) 10/2/14 BMI 36.3  
 PAD (peripheral artery disease) ([facility] [place] [address]  PCI (pneumatosis cystoides intestinalis) 11/5/2013  Pleural effusion, bilateral 11/21/2011  Postoperative anemia due to acute blood loss 11/21/2011  
 expected  Renal mass 5/14/2016  Rib cage fracture 11/5/2013  
 x 2   
 S/P CABG (coronary artery bypass graft) 11/05/2013 CABG x 4  
 S/P CABG x 3 11/18/2011  Stasis edema of both lower extremities 1/3/2018  Status post-operative repair of hip fracture 09/15/2014  
 left side, repair with hardware  Stroke [facility])   
 left sided weakness residual   
 Type II or unspecified type diabetes mellitus without mention of complication, uncontrolled ([facility] [address] 12/16/2013  
 oral and insulin reliant/ Avg / low BS s/s/ @ 70/ last A1c 8.3  Unspecified adverse effect of anesthesia   
 difficult to arouse after general anesthesia PSH: 
Past Surgical History:  
Procedure Laterality Date  CABG, ARTERY-VEIN, FOUR  Oct. 2011  
 CLOSE CYSTOSTOMY    
 exploratory with removal of mass of infection  HX CATARACT REMOVAL Bilateral 11/2012  
 with IOL implants, bilateral  
 HX COLONOSCOPY    
 HX HIP FRACTURE TX Left   
 repair with hardware  HX HYSTERECTOMY  HX LAP CHOLECYSTECTOMY  VASCULAR SURGERY PROCEDURE UNLIST Left 06/07/2017 AVF creation  VASCULAR SURGERY PROCEDURE UNLIST Left 08/10/2017 AVF elevation Allergies: Allergies Allergen Reactions  Baclofen Other (comments) Psychosis and altered mental status  Lipitor [Atorvastatin] Myalgia Home Medications: 
Prior to Admission medications Medication Sig Start Date End Date Taking? Authorizing Provider  
ondansetron hcl (ZOFRAN) 8 mg tablet Take 1 Tab by mouth every eight (8) hours as needed for Nausea. 6/27/19   Jaimie Dorado MD  
ezetimibe (ZETIA) 10 mg tablet Take 1 Tab by mouth daily. 6/14/19   Jaimie Dorado MD  
benazepril (LOTENSIN) 20 mg tablet Take 1 Tab by mouth every morning. 6/14/19   Jaimie Dorado MD  
insulin NPH/insulin regular (HUMULIN 70/30 U-100 INSULIN) 100 unit/mL (70-30) injection 65 units before breakfast, 55 units before supper 6/3/19   Jaimie Dorado MD  
apixaban (ELIQUIS) 2.5 mg tablet Take 1 Tab by mouth two (2) times a day. Indications: Treatment to Prevent Blood Clots in Chronic Atrial Fibrillation 6/3/19   Jaimie Dorado MD  
metOLazone (ZAROXOLYN) 2.5 mg tablet Take 1 Tab by mouth daily as needed (Fluid gain).  If weight increases by 2 pounds in 24 hour period or 5 pounds in a week, take daily until back down to dry weight  Indications: Edema with Defective Kidney Function 5/31/19   Dann Mansfield MD  
potassium chloride (K-DUR, KLOR-CON) 20 mEq tablet Take 1 Tab by mouth daily as needed (Only on days you take metolazone). Indications: prevention of low potassium in the blood 5/31/19   Dann Mansfield MD  
pregabalin (LYRICA) 50 mg capsule Take 1 Cap by mouth two (2) times a day. Max Daily Amount: 100 mg. 5/21/19   Author Jean MD  
ergocalciferol (VITAMIN D2) 50,000 unit capsule Take 1 Cap by mouth every seven (7) days. 4/19/19   Vernelle Hashimoto, MD  
carvedilol (COREG) 25 mg tablet take 1 tablet by mouth twice a day 3/18/19   Winifred Monahan MD  
prednisoLONE acetate (PRED FORTE) 1 % ophthalmic suspension Administer 1 Drop to both eyes four (4) times daily. Provider, Historical  
carbidopa-levodopa (SINEMET)  mg per tablet TAKE 1 TABLET BY MOUTH THREE TIMES DAILY 2/4/19   Author Jean MD  
furosemide (LASIX) 40 mg tablet Take 2 Tabs by mouth daily. Patient taking differently: Take 80 mg by mouth two (2) times daily (with meals). 2/1/19   Author Jean MD  
levothyroxine (SYNTHROID) 25 mcg tablet Take 1 Tab by mouth Daily (before breakfast). 2/1/19   Author Jean MD  
febuxostat (ULORIC) 80 mg tab tablet Take 1 Tab by mouth daily. 1/14/19   Author Jean MD  
terbinafine HCl (LAMISIL AT) 1 % topical cream Apply  to affected area two (2) times a day. 6/25/18   Author Jean MD  
Lactobacillus acidophilus (ACIDOPHILUS) cap Take 2 Caps by mouth two (2) times a day. Provider, Historical  
Insulin Syringe-Needle U-100 (BD INSULIN SYRINGE ULTRA-FINE) 1 mL 31 gauge x 5/16 syrg DX E 10.65  Inject insulin tid 5/3/18   Author Jean MD  
OXYGEN-AIR DELIVERY SYSTEMS 3 L by Nasal route continuous. Provider, Historical  
glucose blood VI test strips (ACCU-CHEK GUIDE) strip Check BS TID.   DX E11.9 3/22/18   Author Jean MD  
 Lancets (ACCU-CHEK FASTCLIX) misc Check bs TID. DX E11.9 3/22/18   Ai Uribe MD  
cyanocobalamin, vitamin B-12, 1,000 mcg/mL kit 1,000 mcg by Injection route every month. on the 1st    Provider, Historical  
ferrous sulfate 325 mg (65 mg iron) tablet Take 324 mg by mouth daily. Provider, Historical  
triamcinolone acetonide (KENALOG) 0.1 % topical cream Apply  to affected area two (2) times a day. 6/15/16   Provider, Historical  
nitroglycerin (NITROLINGUAL) 400 mcg/spray spray 1 Spray by SubLINGual route every five (5) minutes as needed (Pt states \"I've never had to use it\"). 4/11/16   Ai Uribe MD  
 
 
[facility] Medications: 
Current Facility-Administered Medications Medication Dose Route Frequency  amiodarone (CORDARONE) tablet 100 mg  100 mg Oral BID  aspirin chewable tablet 81 mg  81 mg Oral DAILY  apixaban (ELIQUIS) tablet 5 mg  5 mg Oral BID  alum-mag hydroxide-simeth (MYLANTA) oral suspension 30 mL  30 mL Oral Q4H PRN  
 nitroglycerin (NITROBID) 2 % ointment 1 Inch  1 Inch Topical BID  metoprolol tartrate (LOPRESSOR) tablet 50 mg  50 mg Oral BID  nitroglycerin (NITROSTAT) tablet 0.4 mg  0.4 mg SubLINGual PRN  
 metoprolol (LOPRESSOR) injection 5 mg  5 mg IntraVENous Q6H PRN  
 carbamide peroxide (DEBROX) 6.5 % otic solution 5 Drop  5 Drop Both Ears BID  ondansetron (ZOFRAN) injection 4 mg  4 mg IntraVENous Q6H PRN  
 LORazepam (ATIVAN) injection 0.26 mg  0.26 mg IntraVENous Q6H PRN  
 carbidopa-levodopa (SINEMET)  mg per tablet 1 Tab  1 Tab Oral TID  levothyroxine (SYNTHROID) tablet 25 mcg  25 mcg Oral ACB  sodium chloride (NS) flush 5-40 mL  5-40 mL IntraVENous Q8H  
 sodium chloride (NS) flush 5-40 mL  5-40 mL IntraVENous PRN  
 acetaminophen (TYLENOL) tablet 650 mg  650 mg Oral Q4H PRN  
 bisacodyl (DULCOLAX) tablet 5 mg  5 mg Oral DAILY PRN  
 insulin lispro (HUMALOG) injection   SubCUTAneous AC&HS  
  vancomycin 50 mg/mL oral solution (compounded) 125 mg  125 mg Oral Q6H  
 metroNIDAZOLE (FLAGYL) IVPB premix 500 mg  500 mg IntraVENous Q8H Social History: 
Social History Tobacco Use  Smoking status: Never Smoker  Smokeless tobacco: Never Used Substance Use Topics  Alcohol use: No  
 
 
Pt denies any history of drug use, blood transfusions, or tattoos. Family History: 
Family History Problem Relation Age of Onset  Heart Disease Mother  Cancer Mother   
     colon  Diabetes Mother  Cancer Father   
     lung  Cancer Sister   
     breast  
 Breast Cancer Sister 28  Cancer Brother Leukemia  Breast Cancer Maternal Aunt 54 Review of Systems: A detailed 10 system ROS is obtained, with pertinent positives as listed above and in admission H&P. All others are negative. Diet:  Gi soft Objective:  
 
Physical Exam: 
Vitals: 
Visit Vitals /74 (BP 1 Location: Right arm, BP Patient Position: At rest) Pulse 69 Temp 97.9 °F (36.6 °C) Resp 18 Ht 5' 8\" (1.727 m) Wt 115.6 kg (254 lb 12.8 oz) SpO2 98% Breastfeeding? No  
BMI 38.74 kg/m² Gen:  Pt is alert, cooperative, no acute distress. Lying in bed--dry heaving periodically into emesis bag 
Skin:  Extremities and face reveal no rashes. HEENT: Sclerae anicteric. Extra-occular muscles are intact. No oral ulcers. No abnormal pigmentation of the lips. The neck is supple. Cardiovascular: Regular rate and rhythm. No murmurs, gallops, or rubs. Respiratory:  Comfortable breathing with no accessory muscle use. Clear breath sounds anteriorly with no wheezes, rales, or rhonchi. GI:  Abdomen obese nondistended, soft, and nontender. Normal active bowel sounds. No enlargement of the liver or spleen. No masses palpable. Rectal:  Deferred Musculoskeletal:  No pitting edema of the lower legs. Neurological:  Gross memory appears intact. Patient is alert and oriented. Psychiatric:  Mood appears appropriate with judgement intact. Lymphatic:  No cervical or supraclavicular adenopathy. Laboratory:   
Recent Labs  
  07/09/19 
0945 07/08/19 
0400 07/07/19 
0455 WBC 13.1* 13.1* 13.5* HGB 9.2* 9.0* 9.6* HCT 28.1* 27.6* 29.4*  
 244 230 MCV 94.3 93.9 96.1  143 143  
K 3.5 3.2* 3.8  108* 109* CO2 22 19* 19* BUN 90* 91* 87* CREA 3.03* 3.02* 3.15* CA 8.3 8.4 8.5 * 272* 276* Assessment:  
 
Principal Problem: 
  Acute cystitis with hematuria (6/29/2019) Active Problems: 
  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 difficile diarrhea (6/30/2019) PAF (paroxysmal atrial fibrillation) ([address] (7/7/2019) Elevated troponin (7/7/2019) Chest pain (7/7/2019) CAD (coronary artery disease) (7/7/2019) Volume overload (7/8/2019) Demand ischemia ([address] (7/7/2019) 77 y/o female with CKD, CAD s/p CABG in 2011, ? PAF on Eliquis, DM, Parkinson's, HTN, admitted with sepsis secondary to c. Diff and UTI, and seen by GI for 1 month history of nausea with vomiting and dry heaving. DDx is broad and includes medication side effect, recent infection, uncontrolled diabetes, CHF, renal failure, neurologic or inner ear source (patient reports symptoms started after she fell and hit her head and she has also been experiencing vertigo since that time), gastroparesis, PUD, partial GOO, large HH, neoplasia, h. Pylori or other. LFTs this admission normal. 
 
Plan:  
1) consider UGIS.   She is increased risk for EGD secondary to cormobidities and anticoagulation but this may be considered as well 2) can likely discontinue IV flagyl 3) consider empiric acid suppressant medication Patient is seen and examined in collaboration with Dr. Blanchard. Assessment and plan as per Dr. Laci Garnett. LESTER Travis Patient seen and examined. Agree with above with following additions. She is [place] and a poor historian. She reports an EGD about 5 years ago which was negative but cannot recall where and exactly when. Started on GI Soft but still mostly eating Jello. Discussed assessment and plans with patient. Discussed limitations of UGI series and likely need to proceed with EGD with positive or negative results if symptoms persist. 
Will consider diagnostic EGD on blood thinners if symptoms refractory to conservative measures. Consider UGI series, GES etc if too high risk for anesthesia. She is hoping to improve as she is recovering from her diarrheal illness and may be able to stop antibiotic therapy. Will defer PPI therapy with recent C diff. May consider H2 antagonists. Will follow Robinson Garnett MD

## 2019-07-09 NOTE — PROGRESS NOTES
Problem: Mobility Impaired (Adult and Pediatric) Goal: *Acute Goals and Plan of Care Description STG: 
(1.)Ms. Lawrence To will move from supine to sit and sit to supine , scoot up and down and roll side to side with MODERATE ASSIST within 3 treatment day(s). (2.)Ms. Lawrence To will transfer from bed to chair and chair to bed with CONTACT GUARD ASSIST using the least restrictive device within 3 treatment day(s). (3.)Ms. Lawrence To will ambulate with CONTACT GUARD ASSIST for 10 feet with the least restrictive device within 3 treatment day(s). LTG: 
(1.)Ms. Lawrence To will move from supine to sit and sit to supine , scoot up and down and roll side to side in bed with CONTACT GUARD ASSIST within 7 treatment day(s). (2.)Ms. Lawrence To will transfer from bed to chair and chair to bed with MODIFIED INDEPENDENCE using the least restrictive device within 7 treatment day(s). (3.)Ms. Lawrence To will ambulate with CONTACT GUARD ASSIST for 25+ feet with the least restrictive device within 7 treatment day(s). (4.)Ms. Lawrence To will perform standing static and dynamic balance activities x 23 minutes with CONTACT GUARD ASSIST to improve safety within 7 treatment day(s). ________________________________________________________________________________________________ PHYSICAL THERAPY: Daily Note and PM 7/9/2019 INPATIENT: PT Visit Days : 5 [place] / Product Type: Medicare /   
  
NAME/AGE/GENDER: James Silva is a 78 y.o. female PRIMARY DIAGNOSIS: Acute on chronic renal failure (HCC) [N17.9, N18.9] Acute cystitis with hematuria Acute cystitis with hematuria ICD-10: Treatment Diagnosis: 
 · Generalized Muscle Weakness (M62.81) · Other lack of cordination (R27.8) · Difficulty in walking, Not elsewhere classified (R26.2) · Other abnormalities of gait and mobility (R26.89) · History of falling (Z91.81) Precaution/Allergies: 
Baclofen and Lipitor [atorvastatin] ASSESSMENT:  
 Ms. Evelyne Monroe  Prior to admission Ms. Evelyne Monroe uses a wheelchair with use of walker for in -home distances and transfers for mobility. Pt wears oxygen at baseline (3L per pt report). 7/9- pt presents in supine, needs heavy encouragement to participate with therapy. Performs bed mobility with mod-max assist and instruction/cueing for technique. Pt with intact seated balance at edge of bed; forward flexed posture and forward head position. Requires several attempts to even attempt a sit-stand transfer. Min-mod assist with 3 trials, able to clear bottom then gives up and sits back down, stating \"I just can't do it\". She is then unwilling to try again despite MAX encouragement and reassurance from therapist. Refusing to try to stand or stand/pivot to chair. Pt states \"I'll just be lazy, I don't want to try anymore\". Returned to supine with min assist. Positioned comfortably with needs in reach. Will continue with efforts. Pt is self limiting with little motivation. This section established at most recent assessment PROBLEM LIST (Impairments causing functional limitations): 1. Decreased Strength 2. Decreased Transfer Abilities 3. Decreased Ambulation Ability/Technique 4. Decreased Balance 5. Decreased Activity Tolerance 6. Increased Fatigue 7. Increased Shortness of Breath INTERVENTIONS PLANNED: (Benefits and precautions of physical therapy have been discussed with the patient.) 1. Balance Exercise 2. Bed Mobility 3. Family Education 4. Gait Training 5. Home Exercise Program (HEP) 6. Neuromuscular Re-education/Strengthening 7. Range of Motion (ROM) 8. Therapeutic Activites 9. Therapeutic Exercise/Strengthening 10. Transfer Training TREATMENT PLAN: Frequency/Duration: 3 times a week for duration of hospital stay Rehabilitation Potential For Stated Goals: Good REHAB RECOMMENDATIONS (at time of discharge pending progress):   
Placement: It is my opinion, based on this patient's performance to date, that Ms. Bucky Conte may benefit from intensive therapy at a [address] [place] [address] after discharge due to the functional deficits listed above that are likely to improve with skilled rehabilitation and concerns that he/she may be unsafe to be unsupervised at home due to medical complications and mobility deficits which put her at risk for functional decline and/or falling . Equipment:  
? None at this time HISTORY:  
History of Present Injury/Illness (Reason for Referral): 
See H&P. Past Medical History/Comorbidities: Ms. 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), Neuropathy, Obesity (BMI 30-39.9) (10/2/14), PAD (peripheral artery disease) ([address] PCI (pneumatosis cystoides intestinalis) (11/5/2013), Pleural effusion, bilateral (11/21/2011), Postoperative anemia due to acute blood loss (11/21/2011), Renal mass (5/14/2016), Rib cage fracture (11/5/2013), S/P CABG (coronary artery bypass graft) (11/05/2013), S/P CABG x 3 (11/18/2011), Stasis edema of both lower extremities (1/3/2018), Status post-operative repair of hip fracture (09/15/2014), Stroke ([address] Type II or unspecified type diabetes mellitus without mention of complication, uncontrolled ([address] (12/16/2013), and Unspecified adverse effect of anesthesia.  She also has no past medical history of Difficult intubation, Malignant hyperthermia due to anesthesia, Nausea & vomiting, or Pseudocholinesterase deficiency. Ms. Jacqui Roblero  has a past surgical history that includes hx colonoscopy; pr cabg, artery-vein, four (Oct. 2011); hx cataract removal (Bilateral, 11/2012); hx hysterectomy; hx lap cholecystectomy; hx hip fracture tx (Left); pr close cystostomy; vascular surgery procedure unlist (Left, 06/07/2017); and vascular surgery procedure unlist (Left, 08/10/2017). Social History/Living Environment:  
Home Environment: Private residence # Steps to Enter: 0 Wheelchair Ramp: Yes One/Two Story Residence: Two story, live on 1st floor Living Alone: No 
Support Systems: Spouse/Significant Other/Partner, Child(dania) Patient Expects to be Discharged to[de-identified] Rehabilitation facility Current DME Used/Available at Home: Wheelchair, Viktoria Baltazar, rollator Tub or Shower Type: Shower Prior Level of Function/Work/Activity: Pt poor historian, somewhat unclear. Uses walker for transfers and short distances, otherwise [place]. On O2 at baseline. Number of Personal Factors/Comorbidities that affect the Plan of Care: 1-2: MODERATE COMPLEXITY EXAMINATION:  
Most Recent Physical Functioning:  
Gross Assessment: 
  
         
  
Posture: 
  
Balance: 
Sitting - Static: Good (unsupported) Sitting - Dynamic: Good (unsupported) Standing: Impaired Bed Mobility: 
Rolling: Moderate assistance Supine to Sit: Moderate assistance Sit to Supine: Minimum assistance Scooting: Moderate assistance Wheelchair Mobility: 
  
Transfers: 
Sit to Stand: Minimum assistance; Moderate assistance Interventions: Verbal cues; Visual cues; Safety awareness training; Tactile cues;Manual cues Duration: 25 Minutes Gait: 
  
   
  
Body Structures Involved: 1. Nerves 2. Lungs 3. Bones 4. Joints 5. Muscles Body Functions Affected: 1. Sensory/Pain 2. Neuromusculoskeletal 
3. Movement Related Activities and Participation Affected: 1. General Tasks and Demands 2. Mobility 3. Interpersonal Interactions and Relationships 4. Community, Social and [place] Number of elements that affect the Plan of Care: 3: MODERATE COMPLEXITY CLINICAL PRESENTATION:  
Presentation: Stable and uncomplicated: LOW COMPLEXITY CLINICAL DECISION MAKIN[address] [place] [id number] AM-PAC 6 Clicks Basic Mobility Inpatient Short Form How much difficulty does the patient currently have. .. Unable A Lot A Little None 1. Turning over in bed (including adjusting bedclothes, sheets and blankets)? ? 1   ? 2   ? 3   ? 4  
2. Sitting down on and standing up from a chair with arms ( e.g., wheelchair, bedside commode, etc.)   ? 1   ? 2   ? 3   ? 4  
3. Moving from lying on back to sitting on the side of the bed?   ? 1   ? 2   ? 3   ? 4 How much help from another person does the patient currently need. .. Total A Lot A Little None 4. Moving to and from a bed to a chair (including a wheelchair)? ? 1   ? 2   ? 3   ? 4  
5. Need to walk in hospital room? ? 1   ? 2   ? 3   ? 4  
6. Climbing 3-5 steps with a railing? ? 1   ? 2   ? 3   ? 4  
© 2007, Trustees of [address] [place] [address], under license to [employer]. All rights reserved Score:  Initial: 17 Most Recent: X (Date: -- ) Interpretation of Tool:  Represents activities that are increasingly more difficult (i.e. Bed mobility, Transfers, Gait). Medical Necessity:    
· Patient is expected to demonstrate progress in strength, range of motion, balance, coordination and functional technique ·  to increase independence with all mobility. · . Reason for Services/Other Comments: 
· Patient continues to require skilled intervention due to medical complications and mobility deficits which impact her level of function, independence, and safety as indicated above. · . Use of outcome tool(s) and clinical judgement create a POC that gives a: Clear prediction of patient's progress: LOW COMPLEXITY  
  
 
TREATMENT:  
 (In addition to Assessment/Re-Assessment sessions the following treatments were rendered) Pre-treatment Symptoms/Complaints:  \"I'll just be lazy, I don't want to try anymore\" Pain: Initial:  
Pain Intensity 1: 0  Post Session:  0/10 Therapeutic Activity: (  25 Minutes): Therapeutic activities including bed mobility (supine <-> sit transfer, rolling, scooting), seated activities, sit-stand transfer trials x 3 reps to improve mobility, strength, balance and coordination. Mod-max A bed mobility and min-mod A sit-stand. Pt self limiting. Braces/Orthotics/Lines/Etc:  
· purewick · O2 Device: Nasal cannula Treatment/Session Assessment:   
· Response to Treatment:  See above. · Interdisciplinary Collaboration:  
o Physical Therapist 
o Registered Nurse · After treatment position/precautions:  
o Supine in bed 
o Bed/Chair-wheels locked 
o Bed in low position 
o Call light within reach 
o RN notified · Compliance with Program/Exercises: Compliant all of the time · Recommendations/Intent for next treatment session: \"Next visit will focus on advancements to more challenging activities and reduction in assistance provided\". Total Treatment Duration: PT Patient Time In/Time Out Time In: 4391 Time Out: 5589 Sammi Andrews DPT

## 2019-07-09 NOTE — PROGRESS NOTES
[facility] CARDIOLOGY PROGRESS NOTE 
      
 
7/9/2019 11:58 AM 
 
Admit Date: 6/29/2019 Subjective: In SR. No recurrent AF. Denies any recurrent CP; prior sx atypical and reproducible. On 3L NC at home. Multiple co-morbidities and not very ambulatory at baseline per  ROS: 
Cardiovascular:  As noted above Objective:  
  
Vitals:  
 07/09/19 0727 07/09/19 0810 07/09/19 1135 07/09/19 1625 BP: 150/69  162/74 157/70 Pulse: (!) 50 63 69 (!) 55 Resp: 18  18 18 Temp: 97.8 °F (36.6 °C)  97.9 °F (36.6 °C) 97.7 °F (36.5 °C) SpO2: 96%  98% 99% Weight:      
Height:      
 
 
Physical Exam: 
General-No Acute Distress Neck- supple, no JVD 
CV- regular rate and rhythm no MRG Lung- clear bilaterally Abd- soft, nontender, nondistended Ext- tr edema bilaterally. Skin- warm and dry Data Review:  
Recent Labs  
  07/09/19 
0945 07/08/19 
1139 07/08/19 
0400 07/07/19 2004   --  143  --   
K 3.5  --  3.2*  --   
BUN 90*  --  91*  --   
CREA 3.03*  --  3.02*  --   
*  --  272*  --   
WBC 13.1*  --  13.1*  --   
HGB 9.2*  --  9.0*  --   
HCT 28.1*  --  27.6*  --   
  --  244  --   
TROIQ  --  0.85*  --  1.25* Assessment/Plan:  
 
Principal Problem: 
  Acute cystitis with hematuria (6/29/2019) Active Problems: 
  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 difficile diarrhea (6/30/2019) PAF (paroxysmal atrial fibrillation) [facility] (7/7/2019) Elevated troponin (7/7/2019) Chest pain (7/7/2019) CAD (coronary artery disease) (7/7/2019) Elevated troponin (~pk at 1.4 and downtrended) likely multifactorial in the setting of ARF/hypotension/infection. CP appears non cardiac and reproducible. Has cardiolyte from 3/19 with no sig reversibility. Poor cath candidate nonetheless and would not . Worsened EF noted at 30-35% compared to prior mildly impaired (however was in AF at the time); reassess as outpt. Start nitrates/hydralazine as BPs tolerate Started po amiodarone with noted PAF (plan 200mg bid for 2 weeks and then 200mg daily); restarted eliquis. Also replete K. Some mild bradycardia; on lopressor 50mg bid and change to toprol with LV dysfn at decreased dose (50mg daily) Improving renal function; long term will need to reassess NOAC use as CKD progresses. Extensive discussion with pt and  Vel Alfaro MD 
7/9/2019 11:58 AM

## 2019-07-09 NOTE — PROGRESS NOTES
Problem: Falls - Risk of 
Goal: *Absence of Falls Description Document Miranda Tavares Fall Risk and appropriate interventions in the flowsheet. Outcome: Progressing Towards Goal 
  
Problem: Patient Education: Go to Patient Education Activity Goal: Patient/Family Education Outcome: Progressing Towards Goal 
  
Problem: Risk for Spread of Infection Goal: Prevent transmission of infectious organism to others Description Prevent the transmission of infectious organisms to other patients, staff members, and visitors. Outcome: Progressing Towards Goal 
  
Problem: Patient Education:  Go to Education Activity Goal: Patient/Family Education Outcome: Progressing Towards Goal

## 2019-07-09 NOTE — PROGRESS NOTES
Hospitalist Progress Note Admit Date:  2019  9:55 PM  
Name:  Bree Vega Age:  78 y.o. 
:  1940 MRN:  [id number] PCP:  Anali Farias MD 
Treatment Team: Attending Provider: Vera Valencia MD; Primary Nurse: Tennis Heaps; Consulting Provider: Alejandro Carroll MD; Utilization Review: Rowan Keane RN; Care Manager: Gisel Cook RN; Charge Nurse: Bia Reyes; Consulting Provider: Sarah Carter MD; Occupational Therapy Assistant: Nolan Finch Subjective:  
2019- seen - crying and tearful at the thought of hd; declined therapy today. No diarrhea 
 
 
2019 - pt seen - renal function improving - so no hd for now; sounds wet  
 
2019- pt seen - notes ear feels funny and hearing not as good 2019 - pt seen -  at bedside- updated- pt feels ok - ready to go home but wbc high and hr up  
 
 
2019- pt seen - dsypnic when lying flat- still notes not hearing well per the daughter family hx of ear wax requiring manual removal-  
 
2019- pt seen - called emergently by nursing for patient c/o chest pain that started at night. Also vomiting. The patient states she thought it was indigestion and it would go away but it hasn't. She takes nitro at home- although she doesn't like to. Yanci Roberson 2019- pt seen - She had an episode of chest pain last night and vomiting x 2 was found to be in afib with rvr. Now lying in bed c/o of chest pain again this am  at bedside distraught; also c/o nausea. 2019- pt seen - with no complaints today. However she does not want to get up to work with PT. Has dry heaving and retching but no actual vomit coming up. Bradycardic inti the 40's with starting of amiodarone but asymptomatic Objective:  
 
Patient Vitals for the past 24 hrs: 
 Temp Pulse Resp BP SpO2  
19 0727 97.8 °F (36.6 °C) (!) 50 18 150/69 96 % 07/09/19 0443 98 °F (36.7 °C) (!) 58 18 142/74 95 % 07/08/19 2253 97.9 °F (36.6 °C) (!) 50 18 151/73 95 % 07/08/19 2047  61     
07/08/19 1950 98.4 °F (36.9 °C) (!) 53 18 137/70 95 % 07/08/19 1658 98 °F (36.7 °C) (!) 57 16 159/71 96 % 07/08/19 1521     96 % 07/08/19 1203     97 % 07/08/19 1156 98.3 °F (36.8 °C) (!) 56 16 155/70 94 % Oxygen Therapy O2 Sat (%): 96 % (07/09/19 0727) Pulse via Oximetry: 60 beats per minute (07/08/19 1521) O2 Device: Nasal cannula (07/09/19 0727) O2 Flow Rate (L/min): 3 l/min (07/09/19 0727) Intake/Output Summary (Last 24 hours) at 7/9/2019 0801 Last data filed at 7/8/2019 2200 Gross per 24 hour Intake 440 ml Output 450 ml Net -10 ml General:    Well nourished. Alert. CV:   RRR. No murmur, rub, or gallop. Lungs:   A/e =  No wheezing, rhonchi, or rales. Abdomen:   Soft, nontender, nondistended. Extremities: Warm and dry. No cyanosis or edema. Skin:     No rashes or jaundice. Data Review: 
I have reviewed all labs, meds, telemetry events, and studies from the last 24 hours. Recent Results (from the past 24 hour(s)) EKG, 12 LEAD, SUBSEQUENT Collection Time: 07/08/19 11:23 AM  
Result Value Ref Range Ventricular Rate 64 BPM  
 Atrial Rate 64 BPM  
 P-R Interval 152 ms QRS Duration 110 ms  
 Q-T Interval 442 ms QTC Calculation (Bezet) 455 ms Calculated P Axis 67 degrees Calculated R Axis -19 degrees Calculated T Axis 87 degrees Diagnosis Normal sinus rhythm with sinus arrhythmia Incomplete left bundle branch block Left ventricular hypertrophy with repolarization abnormality Abnormal ECG When compared with ECG of 07-JUL-2019 21:45, 
Previous ECG has undetermined rhythm, needs review Nonspecific T wave abnormality no longer evident in Inferior leads Nonspecific T wave abnormality now evident in Anterior leads Confirmed by Saint Gins MD (), Scott Carmona ([id number]) on 7/8/2019 1:51:51 PM 
  
 TROPONIN I Collection Time: 07/08/19 11:39 AM  
Result Value Ref Range Troponin-I, Qt. 0.85 (HH) 0.02 - 0.05 NG/ML  
GLUCOSE, POC Collection Time: 07/08/19 11:41 AM  
Result Value Ref Range Glucose (POC) 333 (H) 65 - 100 mg/dL GLUCOSE, POC Collection Time: 07/08/19  4:24 PM  
Result Value Ref Range Glucose (POC) 336 (H) 65 - 100 mg/dL GLUCOSE, POC Collection Time: 07/08/19  8:09 PM  
Result Value Ref Range Glucose (POC) 299 (H) 65 - 100 mg/dL GLUCOSE, POC Collection Time: 07/09/19  7:19 AM  
Result Value Ref Range Glucose (POC) 299 (H) 65 - 100 mg/dL All Micro Results Procedure Component Value Units Date/Time Denise Watkins [[id number]] Collected:  06/29/19 2259 Order Status:  Completed Specimen:  Stool Updated:  07/08/19 1024 Special Requests: NO SPECIAL REQUESTS Culture result:    
  No Salmonella, Shigella, or Ecoli 0157 isolated. CULTURE, BLOOD [[id number]] Collected:  06/30/19 0041 Order Status:  Completed Specimen:  Whole Blood Updated:  07/05/19 0617 Special Requests: --     
  RIGHT JUGULAR VEIN Culture result: NO GROWTH 5 DAYS     
 CULTURE, BLOOD [[id number]] Collected:  06/30/19 8632 Order Status:  Completed Specimen:  Blood Updated:  07/05/19 7978 Special Requests: --     
  RIGHT 
HAND Culture result: NO GROWTH 5 DAYS     
 CULTURE, URINE [[id number]]  (Abnormal)  (Susceptibility) Collected:  06/29/19 2308 Order Status:  Completed Specimen:  Cath Urine Updated:  07/02/19 2881 Special Requests: NO SPECIAL REQUESTS Culture result:    
  >100,000 COLONIES/mL CITROBACTER FREUNDII  
     
 C. DIFFICILE/EPI PCR [[id number]]  (Abnormal) Collected:  06/29/19 2306 Order Status:  Completed Specimen:  Stool Updated:  06/30/19 0021 Special Requests: NO SPECIAL REQUESTS Culture result: Toxigenic C Diff POS/027-NAP1-BI PRESUMPTIVE POS RESULTS VERIFIED, PHONED TO AND READ BACK BY 
DR. Leni Pereira @ 001 ON [id number] BY TVO Current Meds: 
Current Facility-Administered Medications Medication Dose Route Frequency  albumin human 25% (BUMINATE) solution 12.5 g  12.5 g IntraVENous ONCE  
 furosemide (LASIX) injection 40 mg  40 mg IntraVENous ONCE  
 amiodarone (CORDARONE) tablet 200 mg  200 mg Oral BID  aspirin chewable tablet 81 mg  81 mg Oral DAILY  apixaban (ELIQUIS) tablet 5 mg  5 mg Oral BID  alum-mag hydroxide-simeth (MYLANTA) oral suspension 30 mL  30 mL Oral Q4H PRN  
 nitroglycerin (NITROBID) 2 % ointment 1 Inch  1 Inch Topical BID  metoprolol tartrate (LOPRESSOR) tablet 50 mg  50 mg Oral BID  nitroglycerin (NITROSTAT) tablet 0.4 mg  0.4 mg SubLINGual PRN  
 metoprolol (LOPRESSOR) injection 5 mg  5 mg IntraVENous Q6H PRN  
 carbamide peroxide (DEBROX) 6.5 % otic solution 5 Drop  5 Drop Both Ears BID  ondansetron (ZOFRAN) injection 4 mg  4 mg IntraVENous Q6H PRN  
 LORazepam (ATIVAN) injection 0.26 mg  0.26 mg IntraVENous Q6H PRN  
 carbidopa-levodopa (SINEMET)  mg per tablet 1 Tab  1 Tab Oral TID  levothyroxine (SYNTHROID) tablet 25 mcg  25 mcg Oral ACB  sodium chloride (NS) flush 5-40 mL  5-40 mL IntraVENous Q8H  
 sodium chloride (NS) flush 5-40 mL  5-40 mL IntraVENous PRN  
 acetaminophen (TYLENOL) tablet 650 mg  650 mg Oral Q4H PRN  
 bisacodyl (DULCOLAX) tablet 5 mg  5 mg Oral DAILY PRN  
 insulin lispro (HUMALOG) injection   SubCUTAneous AC&HS  vancomycin 50 mg/mL oral solution (compounded) 125 mg  125 mg Oral Q6H  
 metroNIDAZOLE (FLAGYL) IVPB premix 500 mg  500 mg IntraVENous Q8H Other Studies (last 24 hours): No results found. Assessment and Plan:  
 
Hospital Problems as of 7/9/2019 Date Reviewed: 6/19/2019 Codes Class Noted - Resolved POA Volume overload ICD-10-CM: E87.70 ICD-9-CM: 276.69  7/8/2019 - Present Unknown PAF (paroxysmal atrial fibrillation) (HCC) ICD-10-CM: I48.0 ICD-9-CM: 427.31  7/7/2019 - Present Unknown Elevated troponin ICD-10-CM: R74.8 ICD-9-CM: 790.6  7/7/2019 - Present Unknown Chest pain ICD-10-CM: R07.9 ICD-9-CM: 786.50  7/7/2019 - Present Unknown CAD (coronary artery disease) ICD-10-CM: I25.10 ICD-9-CM: 414.00  7/7/2019 - Present Unknown Demand ischemia ([facility] ICD-10-CM: I24.8 ICD-9-CM: 411.89  7/7/2019 - Present Unknown C. difficile diarrhea ICD-10-CM: A04.72 
ICD-9-CM: 008.45  6/30/2019 - Present Yes Acute on chronic renal failure (HCC) ICD-10-CM: N17.9, N18.9 ICD-9-CM: 584.9, 585.9  6/29/2019 - Present Yes * (Principal) Acute cystitis with hematuria ICD-10-CM: N30.01 
ICD-9-CM: 595.0  6/29/2019 - Present Yes Morbid obesity ([facility] (Chronic) ICD-10-CM: E66.01 
ICD-9-CM: 278.01  6/29/2019 - Present Yes Hypokalemia ICD-10-CM: E87.6 ICD-9-CM: 276.8  6/29/2019 - Present Yes Parkinson disease ([facility] (Chronic) ICD-10-CM: G20 
ICD-9-CM: 332.0  6/19/2019 - Present Yes  
   
 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 
ICD-9-CM: 250.40, 583.81  11/15/2017 - Present Yes  
   
 AF (atrial fibrillation) (HCC) (Chronic) ICD-10-CM: I48.91 
ICD-9-CM: 427.31  11/20/2011 - Present Yes HTN (hypertension) (Chronic) ICD-10-CM: I10 
ICD-9-CM: 401.9  11/10/2011 - Present Yes PLAN:   
· Sepsis due to cdiif - resolved · CDIFF - continue vanc- last dose scheduled for tomorrow · uti- CITROBACTER FREUNDII-  S/p  rocephin · ANTONIO on ckd stage v - per renal - holding lr -  
· Sob with lying flat- less with lasix · Demand ischemia vs Nstemi - cardiology following - do not feel its an Nstemi  But EF has decreased from march of this year to 30-35% · Volume overload- continue lasix · AFIB with rvr- now back in sinus on lopressor and amiodarone. Cardiology has re-started her eliquis at a higher dose of 5mg po bid · Anemia- monitor- hb stable around 8.9 - 9.6 · Persistent dry heaving and retching - will get GI input · Elevated wbc - holding around 13. No gross signs of any new infection · Chest pain unclear etiology- non - cardiac per cards · PD- sinemet · Ear wax- continue ceruminolytic defer to Dr. Robin Roque on DC whom patients  says removes the wax for them · Asked pt to get up with PT today and she says she will ·  
DC planning/Dispo:  Plan for rehab in the next 1-2 days if medically stable DVT ppx:  jordon Signed: 
Connie West MD

## 2019-07-10 LAB
ANION GAP SERPL CALC-SCNC: 10 MMOL/L (ref 7–16)
BASOPHILS # BLD: 0.1 K/UL (ref 0–0.2)
BASOPHILS NFR BLD: 1 % (ref 0–2)
BUN SERPL-MCNC: 88 MG/DL (ref 8–23)
CALCIUM SERPL-MCNC: 8 MG/DL (ref 8.3–10.4)
CHLORIDE SERPL-SCNC: 105 MMOL/L (ref 98–107)
CO2 SERPL-SCNC: 25 MMOL/L (ref 21–32)
CREAT SERPL-MCNC: 2.95 MG/DL (ref 0.6–1)
DIFFERENTIAL METHOD BLD: ABNORMAL
EOSINOPHIL # BLD: 0.3 K/UL (ref 0–0.8)
EOSINOPHIL NFR BLD: 2 % (ref 0.5–7.8)
ERYTHROCYTE [DISTWIDTH] IN BLOOD BY AUTOMATED COUNT: 14.1 % (ref 11.9–14.6)
GLUCOSE BLD STRIP.AUTO-MCNC: 283 MG/DL (ref 65–100)
GLUCOSE BLD STRIP.AUTO-MCNC: 300 MG/DL (ref 65–100)
GLUCOSE BLD STRIP.AUTO-MCNC: 310 MG/DL (ref 65–100)
GLUCOSE BLD STRIP.AUTO-MCNC: 350 MG/DL (ref 65–100)
GLUCOSE SERPL-MCNC: 263 MG/DL (ref 65–100)
HCT VFR BLD AUTO: 27.9 % (ref 35.8–46.3)
HGB BLD-MCNC: 9.2 G/DL (ref 11.7–15.4)
IMM GRANULOCYTES # BLD AUTO: 0.2 K/UL (ref 0–0.5)
IMM GRANULOCYTES NFR BLD AUTO: 2 % (ref 0–5)
LYMPHOCYTES # BLD: 0.8 K/UL (ref 0.5–4.6)
LYMPHOCYTES NFR BLD: 7 % (ref 13–44)
MCH RBC QN AUTO: 30.9 PG (ref 26.1–32.9)
MCHC RBC AUTO-ENTMCNC: 33 G/DL (ref 31.4–35)
MCV RBC AUTO: 93.6 FL (ref 79.6–97.8)
MONOCYTES # BLD: 0.7 K/UL (ref 0.1–1.3)
MONOCYTES NFR BLD: 5 % (ref 4–12)
NEUTS SEG # BLD: 10.3 K/UL (ref 1.7–8.2)
NEUTS SEG NFR BLD: 84 % (ref 43–78)
NRBC # BLD: 0.05 K/UL (ref 0–0.2)
PLATELET # BLD AUTO: 284 K/UL (ref 150–450)
PMV BLD AUTO: 12.2 FL (ref 9.4–12.3)
POTASSIUM SERPL-SCNC: 3.3 MMOL/L (ref 3.5–5.1)
RBC # BLD AUTO: 2.98 M/UL (ref 4.05–5.2)
SODIUM SERPL-SCNC: 140 MMOL/L (ref 136–145)
WBC # BLD AUTO: 12.4 K/UL (ref 4.3–11.1)

## 2019-07-10 PROCEDURE — 74011250637 HC RX REV CODE- 250/637: Performed by: INTERNAL MEDICINE

## 2019-07-10 PROCEDURE — 74011250637 HC RX REV CODE- 250/637: Performed by: FAMILY MEDICINE

## 2019-07-10 PROCEDURE — 65660000000 HC RM CCU STEPDOWN

## 2019-07-10 PROCEDURE — 74011636637 HC RX REV CODE- 636/637: Performed by: INTERNAL MEDICINE

## 2019-07-10 PROCEDURE — 80048 BASIC METABOLIC PNL TOTAL CA: CPT

## 2019-07-10 PROCEDURE — 97110 THERAPEUTIC EXERCISES: CPT

## 2019-07-10 PROCEDURE — 74011250636 HC RX REV CODE- 250/636: Performed by: INTERNAL MEDICINE

## 2019-07-10 PROCEDURE — 77010033678 HC OXYGEN DAILY

## 2019-07-10 PROCEDURE — 82962 GLUCOSE BLOOD TEST: CPT

## 2019-07-10 PROCEDURE — 97530 THERAPEUTIC ACTIVITIES: CPT

## 2019-07-10 PROCEDURE — 36415 COLL VENOUS BLD VENIPUNCTURE: CPT

## 2019-07-10 PROCEDURE — 85025 COMPLETE CBC W/AUTO DIFF WBC: CPT

## 2019-07-10 PROCEDURE — P9047 ALBUMIN (HUMAN), 25%, 50ML: HCPCS | Performed by: INTERNAL MEDICINE

## 2019-07-10 PROCEDURE — 94760 N-INVAS EAR/PLS OXIMETRY 1: CPT

## 2019-07-10 RX ORDER — FUROSEMIDE 10 MG/ML
40 INJECTION INTRAMUSCULAR; INTRAVENOUS ONCE
Status: COMPLETED | OUTPATIENT
Start: 2019-07-10 | End: 2019-07-10

## 2019-07-10 RX ORDER — FAMOTIDINE 20 MG/1
20 TABLET, FILM COATED ORAL EVERY EVENING
Status: DISCONTINUED | OUTPATIENT
Start: 2019-07-10 | End: 2019-07-12 | Stop reason: HOSPADM

## 2019-07-10 RX ORDER — METOPROLOL SUCCINATE 25 MG/1
25 TABLET, EXTENDED RELEASE ORAL DAILY
Status: DISCONTINUED | OUTPATIENT
Start: 2019-07-10 | End: 2019-07-12

## 2019-07-10 RX ORDER — ALBUMIN HUMAN 250 G/1000ML
12.5 SOLUTION INTRAVENOUS ONCE
Status: COMPLETED | OUTPATIENT
Start: 2019-07-10 | End: 2019-07-10

## 2019-07-10 RX ADMIN — INSULIN LISPRO 6 UNITS: 100 INJECTION, SOLUTION INTRAVENOUS; SUBCUTANEOUS at 08:32

## 2019-07-10 RX ADMIN — INSULIN LISPRO 8 UNITS: 100 INJECTION, SOLUTION INTRAVENOUS; SUBCUTANEOUS at 20:21

## 2019-07-10 RX ADMIN — INSULIN LISPRO 10 UNITS: 100 INJECTION, SOLUTION INTRAVENOUS; SUBCUTANEOUS at 12:06

## 2019-07-10 RX ADMIN — CARBAMIDE PEROXIDE 6.5% 5 DROP: 6.5 LIQUID AURICULAR (OTIC) at 17:09

## 2019-07-10 RX ADMIN — ISOSORBIDE DINITRATE 20 MG: 20 TABLET ORAL at 09:30

## 2019-07-10 RX ADMIN — ONDANSETRON 4 MG: 2 INJECTION INTRAMUSCULAR; INTRAVENOUS at 15:02

## 2019-07-10 RX ADMIN — Medication 10 ML: at 15:00

## 2019-07-10 RX ADMIN — CARBIDOPA AND LEVODOPA 1 TABLET: 25; 100 TABLET ORAL at 09:22

## 2019-07-10 RX ADMIN — FUROSEMIDE 40 MG: 10 INJECTION, SOLUTION INTRAMUSCULAR; INTRAVENOUS at 20:19

## 2019-07-10 RX ADMIN — FAMOTIDINE 20 MG: 20 TABLET ORAL at 17:08

## 2019-07-10 RX ADMIN — APIXABAN 5 MG: 5 TABLET, FILM COATED ORAL at 17:07

## 2019-07-10 RX ADMIN — INSULIN LISPRO 8 UNITS: 100 INJECTION, SOLUTION INTRAVENOUS; SUBCUTANEOUS at 17:07

## 2019-07-10 RX ADMIN — ISOSORBIDE DINITRATE 20 MG: 20 TABLET ORAL at 21:05

## 2019-07-10 RX ADMIN — LEVOTHYROXINE SODIUM 25 MCG: 50 TABLET ORAL at 05:55

## 2019-07-10 RX ADMIN — ALBUMIN (HUMAN) 12.5 G: 0.25 INJECTION, SOLUTION INTRAVENOUS at 20:19

## 2019-07-10 RX ADMIN — APIXABAN 5 MG: 5 TABLET, FILM COATED ORAL at 09:22

## 2019-07-10 RX ADMIN — Medication 10 ML: at 06:00

## 2019-07-10 RX ADMIN — Medication 10 ML: at 21:08

## 2019-07-10 RX ADMIN — AMIODARONE HYDROCHLORIDE 200 MG: 200 TABLET ORAL at 17:08

## 2019-07-10 RX ADMIN — CARBIDOPA AND LEVODOPA 1 TABLET: 25; 100 TABLET ORAL at 15:00

## 2019-07-10 RX ADMIN — ONDANSETRON 4 MG: 2 INJECTION INTRAMUSCULAR; INTRAVENOUS at 08:38

## 2019-07-10 RX ADMIN — AMIODARONE HYDROCHLORIDE 200 MG: 200 TABLET ORAL at 09:22

## 2019-07-10 RX ADMIN — CARBAMIDE PEROXIDE 6.5% 5 DROP: 6.5 LIQUID AURICULAR (OTIC) at 08:34

## 2019-07-10 RX ADMIN — CARBIDOPA AND LEVODOPA 1 TABLET: 25; 100 TABLET ORAL at 21:05

## 2019-07-10 RX ADMIN — ISOSORBIDE DINITRATE 20 MG: 20 TABLET ORAL at 15:00

## 2019-07-10 NOTE — PROGRESS NOTES
[facility] Subjective: A on CKD . Chest Pain better . Nausea + Review of Systems -  
General ROS: negative for - fever, chills Respiratory ROS: no SOB, cough, WARREN Cardiovascular ROS: no CP, palpitations Gastrointestinal ROS: no  abdominal pain, diarrhea Genito-Urinary ROS: no difficulty voiding, dysuria Neurological ROS: no seizures, focal weekness Objective: 
 
Vitals:  
 07/09/19 2002 07/10/19 1803 07/10/19 3336 07/10/19 9943 BP: 125/46 169/58 (!) 157/97 172/71 Pulse: (!) 53 (!) 44 (!) 54 (!) 57 Resp: 20 18 18 18 Temp: 96.4 °F (35.8 °C) 97.6 °F (36.4 °C) 98 °F (36.7 °C) 97.8 °F (36.6 °C) SpO2: 99% 96% 94% 99% Weight:   115.7 kg (255 lb) Height:      
 
 
PE 
Gen: in no acute distress CV:reg rate Chest:clear Abd: soft Ext/Access: no edema [facility] Recent Labs  
  07/10/19 
0610 07/09/19 
0945 07/08/19 
0400 WBC 12.4* 13.1* 13.1* HGB 9.2* 9.2* 9.0*  
HCT 27.9* 28.1* 27.6*  
 268 244 Recent Labs  
  07/10/19 
0610 07/09/19 
0945 07/08/19 
1139 07/08/19 
0400 07/07/19 2004 07/07/19 
1502  139  --  143  --   --   
K 3.3* 3.5  --  3.2*  --   --   
 105  --  108*  --   --   
CO2 25 22  --  19*  --   --   
* 355*  --  272*  --   --   
BUN 88* 90*  --  91*  --   --   
CREA 2.95* 3.03*  --  3.02*  --   --   
CA 8.0* 8.3  --  8.4  --   --   
TROIQ  --   --  0.85*  --  1.25* 1.32* Radiology A/P:  
Patient Active Problem List  
Diagnosis Code  
 HTN (hypertension) I10  
 AF (atrial fibrillation) ([facility]) I48.91  
 Diabetic neuropathy ([facility] E11.40  A-V fistula ([facility]) I77.0  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 [facility] E11.21  
 SARAH (obstructive sleep apnea) G47.33  
 CKD (chronic kidney disease) stage 5, GFR less than 15 ml/min ([facility]) N18.5  Parkinson disease ([facility] G20  
 Acute on chronic renal failure ([facility]) N17.9, N18.9  Acute cystitis with hematuria N30.01  
  Morbid obesity (HCC) E66.01  
 Hypokalemia E87.6  
 C. difficile diarrhea A04.72  
 PAF (paroxysmal atrial fibrillation) (HCC) I48.0  Elevated troponin R74.8  Chest pain R07.9  CAD (coronary artery disease) I25.10  Volume overload E87.70  Demand ischemia (HCC) I24.8 A on CKD -Renal function stable  Following. Demetrius Lobo CP 
 
 
 
HTN    
 
C Diff Virginia Umanzor MD

## 2019-07-10 NOTE — PROGRESS NOTES
Problem: Self Care Deficits Care Plan (Adult) Goal: *Acute Goals and Plan of Care (Insert Text) Description 1. Patient will complete total body bathing and dressing with minimal assistance and adaptive equipment as needed. 2. Patient will complete toileting with contact guard assistance and adaptive equipment as needed. 3. Patient will tolerate 20 minutes of OT treatment with up to 3 rest breaks to increase activity tolerance for ADLs. 4. Patient will complete functional transfers with stand by assistance and adaptive equipment as needed. 5. Patient will demonstrate modified independence with therapeutic exercise HEP to increase strength in BUEs for increased safety and independence with functional transfers. 6. Patient will complete functional mobility for ADLs with stand by assistance and adaptive equipment as needed. 7. Patient will complete bed mobility in preparation for functional transfers with minimal assistance and adaptive equipment as needed. Timeframe: 7 visits Outcome: Progressing Towards Goal 
  
OCCUPATIONAL THERAPY: Daily Note and PM 7/10/2019 INPATIENT: OT Visit Days: 2 [place] / [place] / Product Type: Medicare /  
  
NAME/AGE/GENDER: Sue Le is a 78 y.o. female PRIMARY DIAGNOSIS:  Acute on chronic renal failure (HCC) [N17.9, N18.9] Acute cystitis with hematuria Acute cystitis with hematuria ICD-10: Treatment Diagnosis:  
 · Generalized Muscle Weakness (M62.81) · Other lack of cordination (R27.8) · History of falling (Z91.81) Precautions/Allergies: 
  Fall precautions  Baclofen and Lipitor [atorvastatin] ASSESSMENT:  
Ms. Jacqui Roblero is a 78 y.o. female admitted with cystitis with hematuria, weakness, n/v/d. At baseline pt lives with spouse and son and reports independence to modified independence with ADLs, mobility with w/c, transfers and ambulation of short distances with walker. 7/10/19: Pt was supine in the bed upon arrival and reports she hasn't changed positions from supine today. Pt needs some encouragement to participate due to pt reporting nausea. Pt reports dry heaving with movements. Pt did fairly well with sitting to the edge of the bed needing only minimal assistance to sit on the edge of the bed. Pt worked on sitting tolerance, scooting, dynamic reaching, and therapeutic exercises edge of bed. When movement was encouraged, pt would start dry heaving, therefore all activity required additional time. Pt refused to attempt standing to transfer to the chair this pm. Pt eventually returned back to supine stating she was very fatigued. Pt's dinner tray placed in front of her and pt was able to tolerate eating her banana pudding. Pt is currently functioning below baseline. Pt progressing towards bed mobility goals. This section established at most recent assessment PROBLEM LIST (Impairments causing functional limitations): 1. Decreased Strength 2. Decreased ADL/Functional Activities 3. Decreased Transfer Abilities 4. Decreased Ambulation Ability/Technique 5. Decreased Balance 6. Increased Pain 7. Decreased Activity Tolerance 8. Increased Fatigue 9. Increased Shortness of Breath 10. Decreased Flexibility/Joint Mobility 11. Edema/Girth 12. Decreased Knowledge of Precautions 13. Decreased [place] with Home Exercise Program 
14. Decreased Cognition INTERVENTIONS PLANNED: (Benefits and precautions of occupational therapy have been discussed with the patient.) 1. Activities of daily living training 2. Adaptive equipment training 3. Balance training 4. Clothing management 5. Cognitive training 6. Donning&doffing training 7. Hygiene training 8. Neuromuscular re-eduation 9. Re-evaluation 10. Therapeutic activity 11. Therapeutic exercise TREATMENT PLAN: Frequency/Duration: Follow patient 3x/week to address above goals. Rehabilitation Potential For Stated Goals: Good REHAB RECOMMENDATIONS (at time of discharge pending progress):   
Placement: It is my opinion, based on this patient's performance to date, that Ms. Demetrius Michaels may benefit from intensive therapy at a [address] [place] [address] after discharge due to the functional deficits listed above that are likely to improve with skilled rehabilitation and concerns that he/she may be unsafe to be unsupervised at home due to decreased balance, activity tolerance, bed mobility, functional transfers impacting ADLs and increasing risk for falls . Equipment: ? TBD   
    
 
 
 
OCCUPATIONAL PROFILE AND HISTORY:  
History of Present Injury/Illness (Reason for Referral): 
See H&P. Past Medical History/Comorbidities: Ms. 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), Neuropathy, Obesity (BMI 30-39.9) (10/2/14), PAD (peripheral artery disease) ([address] PCI (pneumatosis cystoides intestinalis) (11/5/2013), Pleural effusion, bilateral (11/21/2011), Postoperative anemia due to acute blood loss (11/21/2011), Renal mass (5/14/2016), Rib cage fracture (11/5/2013), S/P CABG (coronary artery bypass graft) (11/05/2013), S/P CABG x 3 (11/18/2011), Stasis edema of both lower extremities (1/3/2018), Status post-operative repair of hip fracture (09/15/2014), Stroke ([address] Type II or unspecified type diabetes mellitus without mention of complication, uncontrolled ([address] (12/16/2013), and Unspecified adverse effect of anesthesia. She also has no past medical history of Difficult intubation, Malignant hyperthermia due to anesthesia, Nausea & vomiting, or Pseudocholinesterase deficiency. Ms. Jyothi Zimmerman  has a past surgical history that includes hx colonoscopy; pr cabg, artery-vein, four (Oct. 2011); hx cataract removal (Bilateral, 11/2012); hx hysterectomy; hx lap cholecystectomy; hx hip fracture tx (Left); pr close cystostomy; vascular surgery procedure unlist (Left, 06/07/2017); and vascular surgery procedure unlist (Left, 08/10/2017). Social History/Living Environment:  
Home Environment: Private residence # Steps to Enter: 0 Wheelchair Ramp: Yes One/Two Story Residence: Two story, live on 1st floor Living Alone: No 
Support Systems: Spouse/Significant Other/Partner, Child(dania) Patient Expects to be Discharged to[de-identified] Rehabilitation facility Current DME Used/Available at Home: Wheelchair, [address], rollator Tub or Shower Type: Shower Prior Level of Function/Work/Activity: At baseline pt lives with spouse and son and reports independence to modified independence with ADLs, mobility with w/c, transfers and ambulation of short distances with walker. Pt demonstrates confusion throughout session though oriented x3, so pt likely poor historian. Utilizes 3L supplemental O2. Dominant Side:  
      RIGHT Personal Factors:   
      Sex:  female Age:  78 y.o. Other factors that influence how disability is experienced by the patient:  Multiple co-morbidities, falls Number of Personal Factors/Comorbidities that affect the Plan of Care: Expanded review of therapy/medical records (1-2):  MODERATE COMPLEXITY ASSESSMENT OF OCCUPATIONAL PERFORMANCE[de-identified]  
Activities of Daily Living:  
Basic ADLs (From Assessment) Complex ADLs (From Assessment) Feeding: Independent Oral Facial Hygiene/Grooming: Contact guard assistance Bathing: Moderate assistance Upper Body Dressing: Setup Lower Body Dressing: Total assistance Toileting: Moderate assistance Instrumental ADL Meal Preparation: Total assistance Homemaking: Total assistance Medication Management: Total assistance Financial Management: Total assistance Grooming/Bathing/Dressing Activities of Daily Living Grooming Brushing/Combing Hair: Set-up Cognitive Retraining Safety/Judgement: Awareness of environment Feeding Feeding Assistance: Set-up Container Management: Set-up Food to Mouth: Stand-by assistance Drink to Mouth: Stand-by assistance Bed/Mat Mobility Rolling: Minimum assistance Supine to Sit: Minimum assistance Sit to Supine: Minimum assistance Scooting: Moderate assistance Most Recent Physical Functioning:  
Gross Assessment: 
  
         
  
Posture: 
  
Balance: 
Sitting: Intact Sitting - Static: Good (unsupported) Sitting - Dynamic: Prop sitting Bed Mobility: 
Rolling: Minimum assistance Supine to Sit: Minimum assistance Sit to Supine: Minimum assistance Scooting: Moderate assistance Wheelchair Mobility: 
  
Transfers: 
   
 
    
 
Patient Vitals for the past 6 hrs: 
 BP BP Patient Position SpO2 O2 Flow Rate (L/min) Pulse  
07/10/19 1118 152/64 At rest 99 % 3 l/min (!) 57  
07/10/19 1643 170/75 Sitting 99 % 3 l/min 67 Mental Status Neurologic State: Alert Orientation Level: Oriented X4 Cognition: Appropriate for age attention/concentration, Follows commands Perception: Appears intact Perseveration: No perseveration noted Safety/Judgement: Awareness of environment Physical Skills Involved: 1. Range of Motion 2. Balance 3. Strength 4. Activity Tolerance 5. Sensation 6. Pain (Chronic) Cognitive Skills Affected (resulting in the inability to perform in a timely and safe manner): 1. Executive Function 2. Short Term Recall 3. Long Term Memory 4. Sustained Attention 5. Divided Attention 6. Comprehension Psychosocial Skills Affected: 1. Habits/Routines 2. Social Interaction 3. Emotional Regulation 4. Self-Awareness 5. Awareness of Others 6. Social Roles Number of elements that affect the Plan of Care: 5+:  HIGH COMPLEXITY CLINICAL DECISION MAKIN[address] [place] [id number] AM-PAC 6 Clicks Daily Activity Inpatient Short Form How much help from another person does the patient currently need. .. Total A Lot A Little None 1. Putting on and taking off regular lower body clothing? ? 1   ? 2   ? 3   ? 4  
2. Bathing (including washing, rinsing, drying)? ? 1   ? 2   ? 3   ? 4  
3. Toileting, which includes using toilet, bedpan or urinal?   ? 1   ? 2   ? 3   ? 4  
4. Putting on and taking off regular upper body clothing? ? 1   ? 2   ? 3   ? 4  
5. Taking care of personal grooming such as brushing teeth? ? 1   ? 2   ? 3   ? 4  
6. Eating meals? ? 1   ? 2   ? 3   ? 4  
© , Trustees of [address] [place] [id number], under license to [employer]. All rights reserved Score:  Initial: 12 19 Most Recent: X (Date: -- ) Interpretation of Tool:  Represents activities that are increasingly more difficult (i.e. Bed mobility, Transfers, Gait). Medical Necessity:    
· Patient demonstrates good ·  rehab potential due to higher previous functional level. Reason for Services/Other Comments: 
· Patient continues to require skilled intervention due to inability to complete ADLs at prior level of independence · . Use of outcome tool(s) and clinical judgement create a POC that gives a: MODERATE COMPLEXITY  
 
 
 
TREATMENT:  
(In addition to Assessment/Re-Assessment sessions the following treatments were rendered) Pre-treatment Symptoms/Complaints:   
Pain: Initial:  
Pain Intensity 1: 0 Pt quantifies pain as 0/10 when prompted, then c/o pain in back and shoulder with bed mobility Post Session:  no complaint of pain at rest  
 
Therapeutic Activity: (    15 minutes):   Therapeutic activities including Bed transfers and dynamic sitting balance to adjust socks (with encouragement), forward reaching, combing hair to improve mobility, strength, balance and coordination. Required minimal visual/verbal and occasional tactile cues   to promote dynamic balance in sitting.  
 
n/a Therapeutic Exercise: (  8 minutes):  Exercises per grid below to improve mobility, strength, balance and coordination. Required moderate visual and verbal cues to promote proper body alignment, promote proper body posture and promote proper body mechanics. Progressed repetitions and complexity of movement as indicated. Additional time with exercises due to dry heaving and fatigue. Date: 
7/10/19 Date: 
 Date: Activity/Exercise Parameters Parameters Parameters Shoulder flexion  5-10 reps Scapular retraction  15 reps with 2 res breaks Finger flicks 15 reps Shoulder abduction  5 reps Braces/Orthotics/Lines/Etc:  
· O2 Device: Nasal cannula Treatment/Session Assessment:   
· Response to Treatment:  Tolerated well · Interdisciplinary Collaboration:  
o Occupational Therapist 
o Registered Nurse 
o Certified Nursing Assistant/Patient Care Technician · After treatment position/precautions:  
o Supine in bed 
o Bed alarm/tab alert on 
o Bed/Chair-wheels locked 
o Bed in low position 
o Call light within reach · Compliance with Program/Exercises: Compliant all of the time, Will assess as treatment progresses. · Recommendations/Intent for next treatment session: \"Next visit will focus on advancements to more challenging activities and reduction in assistance provided\". Total Treatment Duration: OT Patient Time In/Time Out Time In: 1389 Time Out: 2097 Nicholas Castro OT

## 2019-07-10 NOTE — PROGRESS NOTES
Hourly rounds performed. All needs meet. Bed low/locked. No N/V this shift. Bradycardia per tele. MD made aware. Patient asymptomatic. No orders received. Call light within reach. Patient denies needs at this time.   Will continue to monitor and report to oncoming RN

## 2019-07-10 NOTE — PROGRESS NOTES
Pt has an old scar on left hip that has minimum drainage. Wound bed is black. Placed mepilex over to cover. Will make MD aware.

## 2019-07-10 NOTE — PROGRESS NOTES
Problem: Falls - Risk of 
Goal: *Absence of Falls Description Document Chai Magdaleno Fall Risk and appropriate interventions in the flowsheet. Outcome: Progressing Towards Goal 
  
Problem: Patient Education: Go to Patient Education Activity Goal: Patient/Family Education Outcome: Progressing Towards Goal 
  
Problem: Risk for Spread of Infection Goal: Prevent transmission of infectious organism to others Description Prevent the transmission of infectious organisms to other patients, staff members, and visitors. Outcome: Progressing Towards Goal 
  
Problem: Patient Education:  Go to Education Activity Goal: Patient/Family Education Outcome: Progressing Towards Goal 
  
Problem: Patient Education: Go to Patient Education Activity Goal: Patient/Family Education Outcome: Progressing Towards Goal

## 2019-07-10 NOTE — PROGRESS NOTES
[facility] CARDIOLOGY PROGRESS NOTE 
      
 
7/10/2019 7:54 AM 
 
Admit Date: 6/29/2019 Subjective:  
Notes constant chest tightness, low grade, present for as long as she can remember, no exacerbating or alleviating factors, nausea and vomiting if she turns over in bed. No anginal quality discomfort. Toprol held overnight with heart rate 41. Remains in SR.  
 
ROS: 
Cardiovascular:  As noted above Objective:  
  
Vitals:  
 07/09/19 2002 07/10/19 1763 07/10/19 4924 07/10/19 8367 BP: 125/46 169/58 (!) 157/97 172/71 Pulse: (!) 53 (!) 44 (!) 54 (!) 57 Resp: 20 18 18 18 Temp: 96.4 °F (35.8 °C) 97.6 °F (36.4 °C) 98 °F (36.7 °C) 97.8 °F (36.6 °C) SpO2: 99% 96% 94% 99% Weight:   255 lb (115.7 kg) Height:      
 
 
Physical Exam: 
General-No Acute Distress Neck- supple, no JVD 
CV- regular rate and rhythm soft systolic murmur left sternal border Lung- clear bilaterally Abd- soft, nontender, nondistended Ext- trace edema bilaterally. Skin- warm and dry Data Review:  
Recent Labs  
  07/10/19 
0610 07/09/19 
0945 07/08/19 
1139  07/07/19 2004  139  --    < >  --   
K 3.3* 3.5  --    < >  --   
BUN 88* 90*  --    < >  --   
CREA 2.95* 3.03*  --    < >  --   
* 355*  --    < >  -- WBC 12.4* 13.1*  --    < >  --   
HGB 9.2* 9.2*  --    < >  --   
HCT 27.9* 28.1*  --    < >  --   
 268  --    < >  --   
TROIQ  --   --  0.85*  --  1.25*  
 < > = values in this interval not displayed. Assessment/Plan:  
 
Principal Problem: 
  Acute cystitis with hematuria (6/29/2019) Active Problems: 
  HTN (hypertension) (11/10/2011) Began hydralazine/nitrates just yesterday, continue to follow AF (atrial fibrillation) [facility] (11/20/2011) Elevated troponin (~pk at 1.4 and downtrended) likely multifactorial in the setting of ARF/hypotension/infection.  CP appears non cardiac and reproducible. Has cardiolyte from 3/19 with no sig reversibility. Poor cath candidate nonetheless and would not . Worsened EF noted at 30-35% compared to prior mildly impaired (however was in AF at the time); reassess as outpt. No further afib, now on amiodarone which also contributes to slower heart rate. 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 ([address] (11/15/2017) Cr trending down CKD (chronic kidney disease) stage 5, GFR less than 15 ml/min ([facility]) (11/30/2018) Parkinson disease ([address] (6/19/2019) Acute on chronic renal failure ([address] (6/29/2019) Morbid obesity ([address] (6/29/2019) Hypokalemia (6/29/2019) C. difficile diarrhea (6/30/2019) PAF (paroxysmal atrial fibrillation) ([address] (7/7/2019) See above. Patient on Eliquis with CKD, advanced age, will stop ASA to help lower bleeding risk. May need to reconsider warfarin as opposed to [address] should renal failure progress to end stage. Elevated troponin (7/7/2019) Chest pain (7/7/2019) CAD (coronary artery disease) (7/7/2019) Volume overload (7/8/2019) Demand ischemia ([address] (7/7/2019) See above Christina Bliss MD 
7/10/2019 7:54 AM

## 2019-07-10 NOTE — PROGRESS NOTES
Hospitalist Progress Note Admit Date:  2019  9:55 PM  
Name:  Talisha Guo Age:  78 y.o. 
:  1940 MRN:  [id number] PCP:  Mikey Prasad MD 
Treatment Team: Attending Provider: Jose Manuel De Oliveira MD; Primary Nurse: Chastity Dillard; Consulting Provider: Silvia Fish MD; Utilization Review: Jong Ashraf RN; Care Manager: Samantha Gordillo RN; Charge Nurse: Michel Bare; Consulting Provider: Radha Devine MD; Consulting Provider: Yulisa Campo MD; Occupational Therapist: Minal Herrera OT Subjective:  
2019- seen - crying and tearful at the thought of hd; declined therapy today. No diarrhea 
 
 
2019 - pt seen - renal function improving - so no hd for now; sounds wet  
 
2019- pt seen - notes ear feels funny and hearing not as good 2019 - pt seen -  at bedside- updated- pt feels ok - ready to go home but wbc high and hr up  
 
 
2019- pt seen - dsypnic when lying flat- still notes not hearing well per the daughter family hx of ear wax requiring manual removal-  
 
2019- pt seen - called emergently by nursing for patient c/o chest pain that started at night. Also vomiting. The patient states she thought it was indigestion and it would go away but it hasn't. She takes nitro at home- although she doesn't like to. Allayne Sarah 2019- pt seen - She had an episode of chest pain last night and vomiting x 2 was found to be in afib with rvr. Now lying in bed c/o of chest pain again this am  at bedside distraught; also c/o nausea. 2019- pt seen - with no complaints today. However she does not want to get up to work with PT. Has dry heaving and retching but no actual vomit coming up. Bradycardic inti the 40's with starting of amiodarone but asymptomatic 
 
 
07/10/2019- pt seen - she has refused last 2 doses of vanc. ...informed cdiff may return 07/11/2019- pt seen - refused EGD with GI yesterday and they have signed off. She is no longer nauseous and is eager to leave. Objective:  
 
Patient Vitals for the past 24 hrs: 
 Temp Pulse Resp BP SpO2  
07/10/19 1118 97.7 °F (36.5 °C) (!) 57 18 152/64 99 % 07/10/19 0712 97.8 °F (36.6 °C) (!) 57 18 172/71 99 % 07/10/19 0612 98 °F (36.7 °C) (!) 54 18 (!) 157/97 94 % 07/10/19 0029 97.6 °F (36.4 °C) (!) 44 18 169/58 96 % 07/09/19 2002 96.4 °F (35.8 °C) (!) 53 20 125/46 99 % 07/09/19 1625 97.7 °F (36.5 °C) (!) 55 18 157/70 99 % Oxygen Therapy O2 Sat (%): 99 % (07/10/19 1118) Pulse via Oximetry: 60 beats per minute (07/08/19 1521) O2 Device: Nasal cannula (07/10/19 1118) O2 Flow Rate (L/min): 3 l/min (07/10/19 1118) Intake/Output Summary (Last 24 hours) at 7/10/2019 1517 Last data filed at 7/10/2019 1458 Gross per 24 hour Intake 240 ml Output 1650 ml Net -1410 ml General:    Well nourished. Alert. CV:   RRR. No murmur, rub, or gallop. Lungs:   A/e =  No wheezing, rhonchi, or rales. Abdomen:   Soft, nontender, nondistended. Extremities: Warm and dry. No cyanosis or edema. Skin:     No rashes or jaundice. Data Review: 
I have reviewed all labs, meds, telemetry events, and studies from the last 24 hours. Recent Results (from the past 24 hour(s)) GLUCOSE, POC Collection Time: 07/09/19  4:20 PM  
Result Value Ref Range Glucose (POC) 312 (H) 65 - 100 mg/dL GLUCOSE, POC Collection Time: 07/09/19  9:10 PM  
Result Value Ref Range Glucose (POC) 283 (H) 65 - 100 mg/dL CBC WITH AUTOMATED DIFF Collection Time: 07/10/19  6:10 AM  
Result Value Ref Range WBC 12.4 (H) 4.3 - 11.1 K/uL  
 RBC 2.98 (L) 4.05 - 5.2 M/uL HGB 9.2 (L) 11.7 - 15.4 g/dL HCT 27.9 (L) 35.8 - 46.3 % MCV 93.6 79.6 - 97.8 FL  
 MCH 30.9 26.1 - 32.9 PG  
 MCHC 33.0 31.4 - 35.0 g/dL  
 RDW 14.1 11.9 - 14.6 % PLATELET 238 946 - 856 K/uL  MPV 12.2 9.4 - 12.3 FL  
 ABSOLUTE NRBC 0.05 0.0 - 0.2 K/uL  
 DF AUTOMATED NEUTROPHILS 84 (H) 43 - 78 % LYMPHOCYTES 7 (L) 13 - 44 % MONOCYTES 5 4.0 - 12.0 % EOSINOPHILS 2 0.5 - 7.8 % BASOPHILS 1 0.0 - 2.0 % IMMATURE GRANULOCYTES 2 0.0 - 5.0 %  
 ABS. NEUTROPHILS 10.3 (H) 1.7 - 8.2 K/UL  
 ABS. LYMPHOCYTES 0.8 0.5 - 4.6 K/UL  
 ABS. MONOCYTES 0.7 0.1 - 1.3 K/UL  
 ABS. EOSINOPHILS 0.3 0.0 - 0.8 K/UL  
 ABS. BASOPHILS 0.1 0.0 - 0.2 K/UL  
 ABS. IMM. GRANS. 0.2 0.0 - 0.5 K/UL METABOLIC PANEL, BASIC Collection Time: 07/10/19  6:10 AM  
Result Value Ref Range Sodium 140 136 - 145 mmol/L Potassium 3.3 (L) 3.5 - 5.1 mmol/L Chloride 105 98 - 107 mmol/L  
 CO2 25 21 - 32 mmol/L Anion gap 10 7 - 16 mmol/L Glucose 263 (H) 65 - 100 mg/dL BUN 88 (H) 8 - 23 MG/DL Creatinine 2.95 (H) 0.6 - 1.0 MG/DL  
 GFR est AA 20 (L) >60 ml/min/1.73m2 GFR est non-AA 16 (L) >60 ml/min/1.73m2 Calcium 8.0 (L) 8.3 - 10.4 MG/DL  
GLUCOSE, POC Collection Time: 07/10/19  7:07 AM  
Result Value Ref Range Glucose (POC) 283 (H) 65 - 100 mg/dL GLUCOSE, POC Collection Time: 07/10/19 11:13 AM  
Result Value Ref Range Glucose (POC) 350 (H) 65 - 100 mg/dL All Micro Results Procedure Component Value Units Date/Time Denise Watkins [[id number]] Collected:  06/29/19 0642 Order Status:  Completed Specimen:  Stool Updated:  07/08/19 1024 Special Requests: NO SPECIAL REQUESTS Culture result:    
  No Salmonella, Shigella, or Ecoli 0157 isolated. CULTURE, BLOOD [[id number]] Collected:  06/30/19 0041 Order Status:  Completed Specimen:  Whole Blood Updated:  07/05/19 0617 Special Requests: --     
  RIGHT JUGULAR VEIN Culture result: NO GROWTH 5 DAYS     
 CULTURE, BLOOD [[id number]] Collected:  06/30/19 4116 Order Status:  Completed Specimen:  Blood Updated:  07/05/19 8762 Special Requests: --     
  RIGHT 
HAND Culture result: NO GROWTH 5 DAYS CULTURE, URINE [[id number]]  (Abnormal)  (Susceptibility) Collected:  06/29/19 2308 Order Status:  Completed Specimen:  Cath Urine Updated:  07/02/19 8486 Special Requests: NO SPECIAL REQUESTS Culture result:    
  >100,000 COLONIES/mL CITROBACTER FREUNDII  
     
 C. DIFFICILE/EPI PCR [[id number]]  (Abnormal) Collected:  06/29/19 2306 Order Status:  Completed Specimen:  Stool Updated:  06/30/19 0021 Special Requests: NO SPECIAL REQUESTS Culture result: Toxigenic C Diff POS/027-NAP1-BI PRESUMPTIVE POS RESULTS VERIFIED, PHONED TO AND READ BACK BY 
DR. Letty Patricio @ 0018 ON [id number] BY TVO Current Meds: 
Current Facility-Administered Medications Medication Dose Route Frequency  metoprolol succinate (TOPROL-XL) XL tablet 25 mg  25 mg Oral DAILY  amiodarone (CORDARONE) tablet 200 mg  200 mg Oral BID  isosorbide dinitrate (ISORDIL) tablet 20 mg  20 mg Oral TID  [START ON 7/22/2019] amiodarone (CORDARONE) tablet 200 mg  200 mg Oral DAILY  apixaban (ELIQUIS) tablet 5 mg  5 mg Oral BID  alum-mag hydroxide-simeth (MYLANTA) oral suspension 30 mL  30 mL Oral Q4H PRN  
 nitroglycerin (NITROSTAT) tablet 0.4 mg  0.4 mg SubLINGual PRN  
 carbamide peroxide (DEBROX) 6.5 % otic solution 5 Drop  5 Drop Both Ears BID  ondansetron (ZOFRAN) injection 4 mg  4 mg IntraVENous Q6H PRN  
 LORazepam (ATIVAN) injection 0.26 mg  0.26 mg IntraVENous Q6H PRN  
 carbidopa-levodopa (SINEMET)  mg per tablet 1 Tab  1 Tab Oral TID  levothyroxine (SYNTHROID) tablet 25 mcg  25 mcg Oral ACB  sodium chloride (NS) flush 5-40 mL  5-40 mL IntraVENous Q8H  
 sodium chloride (NS) flush 5-40 mL  5-40 mL IntraVENous PRN  
 acetaminophen (TYLENOL) tablet 650 mg  650 mg Oral Q4H PRN  
 bisacodyl (DULCOLAX) tablet 5 mg  5 mg Oral DAILY PRN  
 insulin lispro (HUMALOG) injection   SubCUTAneous AC&HS Other Studies (last 24 hours): No results found. Assessment and Plan:  
 
Hospital Problems as of 7/10/2019 Date Reviewed: 6/19/2019 Codes Class Noted - Resolved POA Volume overload ICD-10-CM: E87.70 ICD-9-CM: 276.69  7/8/2019 - Present Unknown PAF (paroxysmal atrial fibrillation) (HCC) ICD-10-CM: I48.0 ICD-9-CM: 427.31  7/7/2019 - Present Unknown Elevated troponin ICD-10-CM: R74.8 ICD-9-CM: 790.6  7/7/2019 - Present Unknown Chest pain ICD-10-CM: R07.9 ICD-9-CM: 786.50  7/7/2019 - Present Unknown CAD (coronary artery disease) ICD-10-CM: I25.10 ICD-9-CM: 414.00  7/7/2019 - Present Unknown Demand ischemia ([facility] ICD-10-CM: I24.8 ICD-9-CM: 411.89  7/7/2019 - Present Unknown C. difficile diarrhea ICD-10-CM: A04.72 
ICD-9-CM: 008.45  6/30/2019 - Present Yes Acute on chronic renal failure (HCC) ICD-10-CM: N17.9, N18.9 ICD-9-CM: 584.9, 585.9  6/29/2019 - Present Yes * (Principal) Acute cystitis with hematuria ICD-10-CM: N30.01 
ICD-9-CM: 595.0  6/29/2019 - Present Yes Morbid obesity ([facility] (Chronic) ICD-10-CM: E66.01 
ICD-9-CM: 278.01  6/29/2019 - Present Yes Hypokalemia ICD-10-CM: E87.6 ICD-9-CM: 276.8  6/29/2019 - Present Yes Parkinson disease ([facility] (Chronic) ICD-10-CM: G20 
ICD-9-CM: 332.0  6/19/2019 - Present Yes  
   
 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 
ICD-9-CM: 250.40, 583.81  11/15/2017 - Present Yes  
   
 AF (atrial fibrillation) (HCC) (Chronic) ICD-10-CM: I48.91 
ICD-9-CM: 427.31  11/20/2011 - Present Yes HTN (hypertension) (Chronic) ICD-10-CM: I10 
ICD-9-CM: 401.9  11/10/2011 - Present Yes PLAN:   
· Sepsis due to cdiif - resolved · CDIFF - pt completed all but 2 doses · uti- CITROBACTER FREUNDII-  S/p  rocephin · ANTONIO on ckd stage v - per renal   
· Sob with lying flat- resolving · Demand ischemia vs Nstemi - cardiology following - Not an Nstemi  Per cardiology. EF has decreased from march of this year to 30-35% · Volume overload- continue lasix- switch to po today in anticipation of discharge · AFIB with rvr- now back in sinus on lopressor and amiodarone. Cardiology has re-started her eliquis at a higher dose of 5mg po bid · Anemia- monitor- hb stable around 8.9 - 9.6 · Persistent dry heaving and retching - gi input appreciated -  improved with stopping vanc and flagyl will consider imaging · Elevated wbc - holding around 12- 13 No gross signs of any new infection · Chest pain unclear etiology- non - cardiac per cards · PD- sinemet · Ear wax- continue ceruminolytic defer to Dr. Robin Roque on DC whom patients  says removes the wax for them · Asked pt to get up with PT - she is not very co-operative · HTN uncontrolled- start hydralazine DC planning/Dispo:  Plan for [facility] if medically stable DVT ppx:  jordon Signed: 
Connie West MD

## 2019-07-10 NOTE — PROGRESS NOTES
07/10/19 0029 Vital Signs Temp 97.6 °F (36.4 °C) Temp Source Oral  
Pulse (Heart Rate) (!) 44 (RN Notified) Heart Rate Source Monitor Resp Rate 18  
O2 Sat (%) 96 % Level of Consciousness Alert /58 MAP (Calculated) 95 BP 1 Method Automatic  
BP 1 Location Right arm BP Patient Position At rest  
MEWS Score 2 MD made aware. No orders received. Patient sleeping and asymptomatic at present. Will continue to monitor.

## 2019-07-10 NOTE — PROGRESS NOTES
Interdisciplinary Rounds completed 07/10/19. Nursing, Case Management, Physician and PT present. Plan of care reviewed and updated.  
 
Discharge in am

## 2019-07-10 NOTE — PROGRESS NOTES
[facility] Progress  / Sign-off Note Admit Date:  6/29/2019 Today's Date:  7/10/2019 CC:  Nausea, vomiting Subjective:  
 
Patient still having nausea and vomiting with bilious emesis. No improvement. No abdominal pain, hematochezia or melena. Three stools recorded yesterday. Medications:  
Current Facility-Administered Medications Medication Dose Route Frequency  metoprolol succinate (TOPROL-XL) XL tablet 25 mg  25 mg Oral DAILY  amiodarone (CORDARONE) tablet 200 mg  200 mg Oral BID  isosorbide dinitrate (ISORDIL) tablet 20 mg  20 mg Oral TID  [START ON 7/22/2019] amiodarone (CORDARONE) tablet 200 mg  200 mg Oral DAILY  apixaban (ELIQUIS) tablet 5 mg  5 mg Oral BID  alum-mag hydroxide-simeth (MYLANTA) oral suspension 30 mL  30 mL Oral Q4H PRN  
 nitroglycerin (NITROSTAT) tablet 0.4 mg  0.4 mg SubLINGual PRN  
 carbamide peroxide (DEBROX) 6.5 % otic solution 5 Drop  5 Drop Both Ears BID  ondansetron (ZOFRAN) injection 4 mg  4 mg IntraVENous Q6H PRN  
 LORazepam (ATIVAN) injection 0.26 mg  0.26 mg IntraVENous Q6H PRN  
 carbidopa-levodopa (SINEMET)  mg per tablet 1 Tab  1 Tab Oral TID  levothyroxine (SYNTHROID) tablet 25 mcg  25 mcg Oral ACB  sodium chloride (NS) flush 5-40 mL  5-40 mL IntraVENous Q8H  
 sodium chloride (NS) flush 5-40 mL  5-40 mL IntraVENous PRN  
 acetaminophen (TYLENOL) tablet 650 mg  650 mg Oral Q4H PRN  
 bisacodyl (DULCOLAX) tablet 5 mg  5 mg Oral DAILY PRN  
 insulin lispro (HUMALOG) injection   SubCUTAneous AC&HS  vancomycin 50 mg/mL oral solution (compounded) 125 mg  125 mg Oral Q6H Review of Systems: 
No chest pain or SOB. Diet:  Gis oft Objective:  
Vitals: 
Visit Vitals /71 (BP 1 Location: Right arm, BP Patient Position: At rest) Pulse (!) 57 Temp 97.8 °F (36.6 °C) Resp 18 Ht 5' 8\" (1.727 m) Wt 115.7 kg (255 lb) SpO2 99% Breastfeeding? No  
BMI 38.77 kg/m² Intake/Output: 
No intake/output data recorded. 1901 - 07/10 0700 In: 680 [P.O.:480; I.V.:200] Out: 9091 [[facility]] Exam: 
General appearance: alert, cooperative, no distress Lungs: clear to auscultation bilaterally anteriorly Heart: regular rate and rhythm Abdomen: soft, non-tender. Bowel sounds normal. No masses, no organomegaly Extremities: extremities normal, atraumatic, no cyanosis or edema Neuro:  alert and oriented Data Review (Labs):   
Recent Labs  
  07/10/19 
0610 19 
0945 19 
0400 WBC 12.4* 13.1* 13.1* HGB 9.2* 9.2* 9.0*  
HCT 27.9* 28.1* 27.6*  
 268 244 MCV 93.6 94.3 93.9  139 143  
K 3.3* 3.5 3.2*  
 105 108* CO2 25 22 19* BUN 88* 90* 91* CREA 2.95* 3.03* 3.02* CA 8.0* 8.3 8.4 * 355* 272* Assessment:  
 
Principal Problem: 
  Acute cystitis with hematuria (2019) Active Problems: 
  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 difficile diarrhea (2019) PAF (paroxysmal atrial fibrillation) ([address] (2019) Elevated troponin (2019) Chest pain (2019) CAD (coronary artery disease) (2019) Volume overload (2019) Demand ischemia ([address] (2019) 77 y/o female with CKD, CAD s/p CABG in , ? PAF on Eliquis, DM, Parkinson's, HTN, admitted with sepsis secondary to c. Diff and UTI, and seen by GI for 1 month history of nausea with vomiting and dry heaving.   DDx is broad and includes medication side effect, recent infection, uncontrolled diabetes, CHF, renal failure, neurologic or inner ear source (patient reports symptoms started after she fell and hit her head and she has also been experiencing vertigo since that time), gastroparesis, PUD, partial GOO, large HH, neoplasia, h. Pylori or other. LFTs this admission normal.  She thinks her GI symptoms are exacerbated by abx, and today is her last day of vancomycin Plan:  
1) will see if she improves once off vancomcyin, but suspect she will ultimately require further diagnostic testing 2) defer to Dr. Letty Glass question of EGD versus UGI versus GES. Patient hesitant to undergo sedated procedure as she states \"I don't wake up\" from anesthesia. Family at bedside, however, says she \"needs to have something done\" 3) currently getting zofran prn. May need to make this scheduled and/or add H2 blocker LESTER Ball Patient seen and examined. Agree with above with following changes. Patient reports that she is feeling better but still with nausea exacerbated by sudden movements. No significant vomiting. Stools getting more formed. Hoping to go to [facility]. Discussed assessment and plans - she declines EGD evaluation. Will start empiric Pepcid - low dose secondary to renal insufficiency. May need Calorie Counts if po intake not adequate. May consider UGI x-rays and GES if still symptomatic as inpatient or out-patient. May also need ENT / Neuro evaluation for vertigo and nausea related to movements - possible inner ear or cerebellar disease. Continue symptomatic therapy. We will sign off. Please call or re-consult as needed. Robinson Glass MD

## 2019-07-10 NOTE — PROGRESS NOTES
Patient refused Vanc PO this shift. Rn encouraged taking Vanc with zofran to decrease nausea. But patient refused, saying: \"it makes me sick; I don't want to keep taking so many antibiotics. They're making me so sick. \"

## 2019-07-10 NOTE — PROGRESS NOTES
Night shift note: 
 
Paged by nursing staff for episodes of bradycardia, isaias of 41 bpm, patient asymptomatic and hemodynamically stable (currently sleeping). Will hold Toprol XL dose, observed SBPs intermittently in 160s-170s, patient's bradycardia may be a physiologic response to elevated blood pressure. Cardiology following. Continue to monitor.

## 2019-07-11 LAB
ANION GAP SERPL CALC-SCNC: 12 MMOL/L (ref 7–16)
BASOPHILS # BLD: 0.1 K/UL (ref 0–0.2)
BASOPHILS NFR BLD: 1 % (ref 0–2)
BUN SERPL-MCNC: 80 MG/DL (ref 8–23)
CALCIUM SERPL-MCNC: 8.2 MG/DL (ref 8.3–10.4)
CHLORIDE SERPL-SCNC: 106 MMOL/L (ref 98–107)
CO2 SERPL-SCNC: 23 MMOL/L (ref 21–32)
CREAT SERPL-MCNC: 2.79 MG/DL (ref 0.6–1)
DIFFERENTIAL METHOD BLD: ABNORMAL
EOSINOPHIL # BLD: 0.3 K/UL (ref 0–0.8)
EOSINOPHIL NFR BLD: 3 % (ref 0.5–7.8)
ERYTHROCYTE [DISTWIDTH] IN BLOOD BY AUTOMATED COUNT: 13.7 % (ref 11.9–14.6)
GLUCOSE BLD STRIP.AUTO-MCNC: 259 MG/DL (ref 65–100)
GLUCOSE BLD STRIP.AUTO-MCNC: 310 MG/DL (ref 65–100)
GLUCOSE BLD STRIP.AUTO-MCNC: 311 MG/DL (ref 65–100)
GLUCOSE BLD STRIP.AUTO-MCNC: 335 MG/DL (ref 65–100)
GLUCOSE SERPL-MCNC: 214 MG/DL (ref 65–100)
HCT VFR BLD AUTO: 26.4 % (ref 35.8–46.3)
HGB BLD-MCNC: 9 G/DL (ref 11.7–15.4)
IMM GRANULOCYTES # BLD AUTO: 0.2 K/UL (ref 0–0.5)
IMM GRANULOCYTES NFR BLD AUTO: 2 % (ref 0–5)
LYMPHOCYTES # BLD: 1 K/UL (ref 0.5–4.6)
LYMPHOCYTES NFR BLD: 9 % (ref 13–44)
MCH RBC QN AUTO: 31.3 PG (ref 26.1–32.9)
MCHC RBC AUTO-ENTMCNC: 34.1 G/DL (ref 31.4–35)
MCV RBC AUTO: 91.7 FL (ref 79.6–97.8)
MONOCYTES # BLD: 0.8 K/UL (ref 0.1–1.3)
MONOCYTES NFR BLD: 6 % (ref 4–12)
NEUTS SEG # BLD: 9.6 K/UL (ref 1.7–8.2)
NEUTS SEG NFR BLD: 80 % (ref 43–78)
NRBC # BLD: 0.06 K/UL (ref 0–0.2)
PLATELET # BLD AUTO: 291 K/UL (ref 150–450)
PMV BLD AUTO: 11.8 FL (ref 9.4–12.3)
POTASSIUM SERPL-SCNC: 3.1 MMOL/L (ref 3.5–5.1)
RBC # BLD AUTO: 2.88 M/UL (ref 4.05–5.2)
SODIUM SERPL-SCNC: 141 MMOL/L (ref 136–145)
WBC # BLD AUTO: 12 K/UL (ref 4.3–11.1)

## 2019-07-11 PROCEDURE — 85025 COMPLETE CBC W/AUTO DIFF WBC: CPT

## 2019-07-11 PROCEDURE — 74011250637 HC RX REV CODE- 250/637: Performed by: INTERNAL MEDICINE

## 2019-07-11 PROCEDURE — 74011250637 HC RX REV CODE- 250/637: Performed by: NURSE PRACTITIONER

## 2019-07-11 PROCEDURE — 65660000000 HC RM CCU STEPDOWN

## 2019-07-11 PROCEDURE — 82962 GLUCOSE BLOOD TEST: CPT

## 2019-07-11 PROCEDURE — 36415 COLL VENOUS BLD VENIPUNCTURE: CPT

## 2019-07-11 PROCEDURE — 74011636637 HC RX REV CODE- 636/637: Performed by: INTERNAL MEDICINE

## 2019-07-11 PROCEDURE — 80048 BASIC METABOLIC PNL TOTAL CA: CPT

## 2019-07-11 PROCEDURE — 74011250637 HC RX REV CODE- 250/637: Performed by: FAMILY MEDICINE

## 2019-07-11 RX ORDER — FUROSEMIDE 20 MG/1
20 TABLET ORAL
Status: DISCONTINUED | OUTPATIENT
Start: 2019-07-11 | End: 2019-07-12 | Stop reason: HOSPADM

## 2019-07-11 RX ORDER — HYDRALAZINE HYDROCHLORIDE 10 MG/1
10 TABLET, FILM COATED ORAL 3 TIMES DAILY
Status: DISCONTINUED | OUTPATIENT
Start: 2019-07-11 | End: 2019-07-12

## 2019-07-11 RX ORDER — POTASSIUM CHLORIDE 20 MEQ/1
40 TABLET, EXTENDED RELEASE ORAL
Status: COMPLETED | OUTPATIENT
Start: 2019-07-11 | End: 2019-07-11

## 2019-07-11 RX ORDER — POTASSIUM CHLORIDE 20 MEQ/1
20 TABLET, EXTENDED RELEASE ORAL DAILY
Status: DISCONTINUED | OUTPATIENT
Start: 2019-07-12 | End: 2019-07-12

## 2019-07-11 RX ADMIN — LEVOTHYROXINE SODIUM 25 MCG: 50 TABLET ORAL at 05:00

## 2019-07-11 RX ADMIN — CARBIDOPA AND LEVODOPA 1 TABLET: 25; 100 TABLET ORAL at 08:26

## 2019-07-11 RX ADMIN — INSULIN LISPRO 8 UNITS: 100 INJECTION, SOLUTION INTRAVENOUS; SUBCUTANEOUS at 17:14

## 2019-07-11 RX ADMIN — ISOSORBIDE DINITRATE 20 MG: 20 TABLET ORAL at 08:26

## 2019-07-11 RX ADMIN — CARBAMIDE PEROXIDE 6.5% 5 DROP: 6.5 LIQUID AURICULAR (OTIC) at 17:15

## 2019-07-11 RX ADMIN — CARBAMIDE PEROXIDE 6.5% 5 DROP: 6.5 LIQUID AURICULAR (OTIC) at 08:27

## 2019-07-11 RX ADMIN — FAMOTIDINE 20 MG: 20 TABLET ORAL at 17:13

## 2019-07-11 RX ADMIN — CARBIDOPA AND LEVODOPA 1 TABLET: 25; 100 TABLET ORAL at 16:02

## 2019-07-11 RX ADMIN — INSULIN LISPRO 8 UNITS: 100 INJECTION, SOLUTION INTRAVENOUS; SUBCUTANEOUS at 12:33

## 2019-07-11 RX ADMIN — AMIODARONE HYDROCHLORIDE 200 MG: 200 TABLET ORAL at 17:13

## 2019-07-11 RX ADMIN — HYDRALAZINE HYDROCHLORIDE 10 MG: 10 TABLET, FILM COATED ORAL at 21:19

## 2019-07-11 RX ADMIN — INSULIN LISPRO 6 UNITS: 100 INJECTION, SOLUTION INTRAVENOUS; SUBCUTANEOUS at 08:25

## 2019-07-11 RX ADMIN — Medication 10 ML: at 13:55

## 2019-07-11 RX ADMIN — ISOSORBIDE DINITRATE 20 MG: 20 TABLET ORAL at 16:02

## 2019-07-11 RX ADMIN — POTASSIUM CHLORIDE 40 MEQ: 20 TABLET, EXTENDED RELEASE ORAL at 12:34

## 2019-07-11 RX ADMIN — Medication 10 ML: at 21:25

## 2019-07-11 RX ADMIN — HYDRALAZINE HYDROCHLORIDE 10 MG: 10 TABLET, FILM COATED ORAL at 16:02

## 2019-07-11 RX ADMIN — CARBIDOPA AND LEVODOPA 1 TABLET: 25; 100 TABLET ORAL at 21:18

## 2019-07-11 RX ADMIN — INSULIN LISPRO 8 UNITS: 100 INJECTION, SOLUTION INTRAVENOUS; SUBCUTANEOUS at 21:25

## 2019-07-11 RX ADMIN — ISOSORBIDE DINITRATE 20 MG: 20 TABLET ORAL at 21:18

## 2019-07-11 RX ADMIN — FUROSEMIDE 20 MG: 20 TABLET ORAL at 16:02

## 2019-07-11 RX ADMIN — APIXABAN 5 MG: 5 TABLET, FILM COATED ORAL at 08:26

## 2019-07-11 RX ADMIN — AMIODARONE HYDROCHLORIDE 200 MG: 200 TABLET ORAL at 08:26

## 2019-07-11 RX ADMIN — Medication 10 ML: at 05:01

## 2019-07-11 RX ADMIN — APIXABAN 5 MG: 5 TABLET, FILM COATED ORAL at 17:13

## 2019-07-11 NOTE — PROGRESS NOTES
Hospitalist Progress Note Admit Date:  2019  9:55 PM  
Name:  Pradeep Burgos Age:  78 y.o. 
:  1940 MRN:  [id number] PCP:  Char Brown MD 
Treatment Team: Attending Provider: Kristy Weir MD; Primary Nurse: Chery Stanley; Consulting Provider: Tillie Habermann, MD; Utilization Review: Dewayne Boland RN; Charge Nurse: Ivy Lopez; Consulting Provider: Valentina Bobo MD; Consulting Provider: Kay Abdullahi MD; Care Manager: Anastasia Cornejo RN Subjective:  
78 yr old initially admitted with ARF and cidff diarrhea. Cr> 9 on admit and pt was concerning for needing HD but she desperately wanted to avoid that so she was started IVFL resuscitation. Over a prolonged time her Renal fxn improved but she became volume overloaded and went into afib and was noted to have demand ischemia. Seen by cardiology and medications were adjusted. She was diuresed and renal fxn continued to improve. Workup revealed a drop in her ef to 30-35%. Her Hospital course also complicated by Emotional lability, a uti treated with rocephin, chest pain determined to be msk by cards and nausea, vomiting and retching associated with vancomycin. She completed her vancomycin therapy except for the last 2 doses which she refused. Seen by GI and an EGD was recommended but she declined. Plan is for rehab but BP is elevated and she has been started on hydralazine. If bp improved/ stable on hydralazine and she is tolerating po lasix she can be discharged to rehab to follow up in the outaptinet clinic. 
 
 
2019- pt seen - refused EGD with GI yesterday and they have signed off. She is no longer nauseous and is eager to leave. Objective:  
 
Patient Vitals for the past 24 hrs: 
 Temp Pulse Resp BP SpO2  
19 1648 98 °F (36.7 °C) (!) 57 18 169/72 98 % 19 1141 98 °F (36.7 °C) 79 18 176/80 96 % 19 0711 97.8 °F (36.6 °C) (!) 55 18 177/77 95 % 07/11/19 0500 98 °F (36.7 °C) 65 18 171/83 96 % 07/10/19 2306 98.4 °F (36.9 °C) 62 18 160/64 98 % 07/10/19 1917 97.6 °F (36.4 °C) (!) 56 18 173/77 99 % Oxygen Therapy O2 Sat (%): 98 % (07/11/19 1648) Pulse via Oximetry: 60 beats per minute (07/08/19 1521) O2 Device: Nasal cannula (07/11/19 1648) O2 Flow Rate (L/min): 3 l/min (07/11/19 1648) Intake/Output Summary (Last 24 hours) at 7/11/2019 1725 Last data filed at 7/11/2019 1438 Gross per 24 hour Intake  Output 2100 ml Net -2100 ml General:    Well nourished. Alert. CV:   RRR. No murmur, rub, or gallop. Lungs:   A/e =  No wheezing, rhonchi, or rales. Abdomen:   Soft, nontender, nondistended. Extremities: Warm and dry. No cyanosis or edema. Skin:     No rashes or jaundice. Data Review: 
I have reviewed all labs, meds, telemetry events, and studies from the last 24 hours. Recent Results (from the past 24 hour(s)) GLUCOSE, POC Collection Time: 07/10/19  8:04 PM  
Result Value Ref Range Glucose (POC) 300 (H) 65 - 100 mg/dL CBC WITH AUTOMATED DIFF Collection Time: 07/11/19  5:30 AM  
Result Value Ref Range WBC 12.0 (H) 4.3 - 11.1 K/uL  
 RBC 2.88 (L) 4.05 - 5.2 M/uL HGB 9.0 (L) 11.7 - 15.4 g/dL HCT 26.4 (L) 35.8 - 46.3 % MCV 91.7 79.6 - 97.8 FL  
 MCH 31.3 26.1 - 32.9 PG  
 MCHC 34.1 31.4 - 35.0 g/dL  
 RDW 13.7 11.9 - 14.6 % PLATELET 332 821 - 740 K/uL MPV 11.8 9.4 - 12.3 FL ABSOLUTE NRBC 0.06 0.0 - 0.2 K/uL  
 DF AUTOMATED NEUTROPHILS 80 (H) 43 - 78 % LYMPHOCYTES 9 (L) 13 - 44 % MONOCYTES 6 4.0 - 12.0 % EOSINOPHILS 3 0.5 - 7.8 % BASOPHILS 1 0.0 - 2.0 % IMMATURE GRANULOCYTES 2 0.0 - 5.0 %  
 ABS. NEUTROPHILS 9.6 (H) 1.7 - 8.2 K/UL  
 ABS. LYMPHOCYTES 1.0 0.5 - 4.6 K/UL  
 ABS. MONOCYTES 0.8 0.1 - 1.3 K/UL  
 ABS. EOSINOPHILS 0.3 0.0 - 0.8 K/UL  
 ABS. BASOPHILS 0.1 0.0 - 0.2 K/UL  
 ABS. IMM. GRANS. 0.2 0.0 - 0.5 K/UL METABOLIC PANEL, BASIC  
 Collection Time: 07/11/19  5:30 AM  
Result Value Ref Range Sodium 141 136 - 145 mmol/L Potassium 3.1 (L) 3.5 - 5.1 mmol/L Chloride 106 98 - 107 mmol/L  
 CO2 23 21 - 32 mmol/L Anion gap 12 7 - 16 mmol/L Glucose 214 (H) 65 - 100 mg/dL BUN 80 (H) 8 - 23 MG/DL Creatinine 2.79 (H) 0.6 - 1.0 MG/DL  
 GFR est AA 21 (L) >60 ml/min/1.73m2 GFR est non-AA 17 (L) >60 ml/min/1.73m2 Calcium 8.2 (L) 8.3 - 10.4 MG/DL  
GLUCOSE, POC Collection Time: 07/11/19  7:04 AM  
Result Value Ref Range Glucose (POC) 259 (H) 65 - 100 mg/dL GLUCOSE, POC Collection Time: 07/11/19 11:34 AM  
Result Value Ref Range Glucose (POC) 311 (H) 65 - 100 mg/dL GLUCOSE, POC Collection Time: 07/11/19  4:37 PM  
Result Value Ref Range Glucose (POC) 335 (H) 65 - 100 mg/dL All Micro Results Procedure Component Value Units Date/Time Wanda Magallon [[id number]] Collected:  06/29/19 2259 Order Status:  Completed Specimen:  Stool Updated:  07/08/19 1024 Special Requests: NO SPECIAL REQUESTS Culture result:    
  No Salmonella, Shigella, or Ecoli 0157 isolated. CULTURE, BLOOD [[id number]] Collected:  06/30/19 0041 Order Status:  Completed Specimen:  Whole Blood Updated:  07/05/19 0617 Special Requests: --     
  RIGHT JUGULAR VEIN Culture result: NO GROWTH 5 DAYS     
 CULTURE, BLOOD [[id number]] Collected:  06/30/19 0437 Order Status:  Completed Specimen:  Blood Updated:  07/05/19 9845 Special Requests: --     
  RIGHT 
HAND Culture result: NO GROWTH 5 DAYS     
 CULTURE, URINE [[id number]]  (Abnormal)  (Susceptibility) Collected:  06/29/19 2095 Order Status:  Completed Specimen:  Cath Urine Updated:  07/02/19 5842 Special Requests: NO SPECIAL REQUESTS Culture result:    
  >100,000 COLONIES/mL CITROBACTER FREUNDII  
     
 C. DIFFICILE/EPI PCR [[id number]]  (Abnormal) Collected:  06/29/19 2098 Order Status:  Completed Specimen:  Stool Updated:  06/30/19 0021 Special Requests: NO SPECIAL REQUESTS Culture result: Toxigenic C Diff POS/027-NAP1-BI PRESUMPTIVE POS RESULTS VERIFIED, PHONED TO AND READ BACK BY 
DR. Virginie Amos @ 9163 ON [id number] BY TVO Current Meds: 
Current Facility-Administered Medications Medication Dose Route Frequency  hydrALAZINE (APRESOLINE) tablet 10 mg  10 mg Oral TID  furosemide (LASIX) tablet 20 mg  20 mg Oral ACB&D  [START ON 7/12/2019] potassium chloride (K-DUR, KLOR-CON) SR tablet 20 mEq  20 mEq Oral DAILY  metoprolol succinate (TOPROL-XL) XL tablet 25 mg  25 mg Oral DAILY  famotidine (PEPCID) tablet 20 mg  20 mg Oral QPM  
 amiodarone (CORDARONE) tablet 200 mg  200 mg Oral BID  isosorbide dinitrate (ISORDIL) tablet 20 mg  20 mg Oral TID  [START ON 7/22/2019] amiodarone (CORDARONE) tablet 200 mg  200 mg Oral DAILY  apixaban (ELIQUIS) tablet 5 mg  5 mg Oral BID  alum-mag hydroxide-simeth (MYLANTA) oral suspension 30 mL  30 mL Oral Q4H PRN  
 nitroglycerin (NITROSTAT) tablet 0.4 mg  0.4 mg SubLINGual PRN  
 carbamide peroxide (DEBROX) 6.5 % otic solution 5 Drop  5 Drop Both Ears BID  ondansetron (ZOFRAN) injection 4 mg  4 mg IntraVENous Q6H PRN  
 LORazepam (ATIVAN) injection 0.26 mg  0.26 mg IntraVENous Q6H PRN  
 carbidopa-levodopa (SINEMET)  mg per tablet 1 Tab  1 Tab Oral TID  levothyroxine (SYNTHROID) tablet 25 mcg  25 mcg Oral ACB  sodium chloride (NS) flush 5-40 mL  5-40 mL IntraVENous Q8H  
 sodium chloride (NS) flush 5-40 mL  5-40 mL IntraVENous PRN  
 acetaminophen (TYLENOL) tablet 650 mg  650 mg Oral Q4H PRN  
 bisacodyl (DULCOLAX) tablet 5 mg  5 mg Oral DAILY PRN  
 insulin lispro (HUMALOG) injection   SubCUTAneous AC&HS Other Studies (last 24 hours): No results found. Assessment and Plan:  
 
Hospital Problems as of 7/11/2019 Date Reviewed: 6/19/2019 Codes Class Noted - Resolved POA Volume overload ICD-10-CM: E87.70 ICD-9-CM: 276.69  7/8/2019 - Present Unknown PAF (paroxysmal atrial fibrillation) (HCC) ICD-10-CM: I48.0 ICD-9-CM: 427.31  7/7/2019 - Present Unknown Elevated troponin ICD-10-CM: R74.8 ICD-9-CM: 790.6  7/7/2019 - Present Unknown Chest pain ICD-10-CM: R07.9 ICD-9-CM: 786.50  7/7/2019 - Present Unknown CAD (coronary artery disease) ICD-10-CM: I25.10 ICD-9-CM: 414.00  7/7/2019 - Present Unknown Demand ischemia ([facility] ICD-10-CM: I24.8 ICD-9-CM: 411.89  7/7/2019 - Present Unknown C. difficile diarrhea ICD-10-CM: A04.72 
ICD-9-CM: 008.45  6/30/2019 - Present Yes Acute on chronic renal failure (HCC) ICD-10-CM: N17.9, N18.9 ICD-9-CM: 584.9, 585.9  6/29/2019 - Present Yes * (Principal) Acute cystitis with hematuria ICD-10-CM: N30.01 
ICD-9-CM: 595.0  6/29/2019 - Present Yes Morbid obesity ([facility] (Chronic) ICD-10-CM: E66.01 
ICD-9-CM: 278.01  6/29/2019 - Present Yes Hypokalemia ICD-10-CM: E87.6 ICD-9-CM: 276.8  6/29/2019 - Present Yes Parkinson disease ([facility] (Chronic) ICD-10-CM: G20 
ICD-9-CM: 332.0  6/19/2019 - Present Yes  
   
 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 
ICD-9-CM: 250.40, 583.81  11/15/2017 - Present Yes  
   
 AF (atrial fibrillation) (HCC) (Chronic) ICD-10-CM: I48.91 
ICD-9-CM: 427.31  11/20/2011 - Present Yes HTN (hypertension) (Chronic) ICD-10-CM: I10 
ICD-9-CM: 401.9  11/10/2011 - Present Yes PLAN:   
· Sepsis due to cdiif - resolved · CDIFF - pt completed all but 2 doses · uti- CITROBACTER FREUNDII-  S/p  rocephin · ANTONIO on ckd stage v - per renal   
· Sob with lying flat- resolving · Demand ischemia vs Nstemi - cardiology following - Not an Nstemi  Per cardiology. EF has decreased from march of this year to 30-35% · Volume overload- continue lasix- switch to po today in anticipation of discharge · AFIB with rvr- now back in sinus on lopressor and amiodarone. Cardiology has re-started her eliquis at a higher dose of 5mg po bid · Anemia- monitor- hb stable around 8.9 - 9.6 · Persistent dry heaving and retching - gi input appreciated -  improved with stopping vanc and flagyl will consider imaging · Elevated wbc - holding around 12- 13 No gross signs of any new infection · Chest pain unclear etiology- non - cardiac per cards · PD- sinemet · Ear wax- continue ceruminolytic defer to Dr. Marquis Hassan on DC whom patients  says removes the wax for them · Asked pt to get up with PT - she is not very co-operative · HTN uncontrolled- start hydralazine DC planning/Dispo:  Plan for rehab [facility] if medically stable DVT ppx:  eliquis Signed: 
Jose L Mejia MD

## 2019-07-11 NOTE — PROGRESS NOTES
Interdisciplinary Rounds completed 07/11/19. Nursing, Case Management, Physician and PT present. Plan of care reviewed and updated.  
 
Looking to discharge in am.

## 2019-07-11 NOTE — PROGRESS NOTES
Nutrition Follow Up 
  
Assessment:  
Food/Nutrition Patient History:    
Diet: DIET GI SOFT No options chosen The patient reports her intake is improving with resolution of vomiting and her  bringing her teeth into the hospital.  She reports she continues to consume less food than baseline. She has previously declined all oral supplements. At my visit she requests milk with her meals because she isn't eating much. Anthropometrics:Height: 5' 8\" (172.7 cm),  Weight: 116.5 kg (256 lb 12.8 oz), Weight Source: Bed. Macronutrient needs: 63.6 kg IBW 
EER:  6558-1017 kcal /day (25-35 kcal/kg) EPR:  51-64 grams protein/day (0.8-1grams/kg) 
  Intake/Comparative Standards:  
Zero recorded for various meals, current meal ~25% consumed intake likely meeting <25% estimated kcal and protein needs. Intervention: 
Meals and snacks: Continue current diet. Allow milk TID outside of cho restriction. Nutrition Supplement Therapy: Patient declines all ONS. Discharge plan: Too soon to determine. [place], [address], [place] [address]

## 2019-07-11 NOTE — PROGRESS NOTES
Problem: Falls - Risk of 
Goal: *Absence of Falls Description Document Krystal Pickard Fall Risk and appropriate interventions in the flowsheet. Outcome: Progressing Towards Goal 
  
Problem: Patient Education: Go to Patient Education Activity Goal: Patient/Family Education Outcome: Progressing Towards Goal 
  
Problem: Risk for Spread of Infection Goal: Prevent transmission of infectious organism to others Description Prevent the transmission of infectious organisms to other patients, staff members, and visitors. Outcome: Progressing Towards Goal 
  
Problem: Patient Education:  Go to Education Activity Goal: Patient/Family Education Outcome: Progressing Towards Goal 
  
Problem: Pressure Injury - Risk of 
Goal: *Prevention of pressure injury Description Document Devin Scale and appropriate interventions in the flowsheet. Outcome: Progressing Towards Goal 
  
Problem: Patient Education: Go to Patient Education Activity Goal: Patient/Family Education Outcome: Progressing Towards Goal 
  
Problem: Patient Education: Go to Patient Education Activity Goal: Patient/Family Education Outcome: Progressing Towards Goal 
  
Problem: Patient Education: Go to Patient Education Activity Goal: Patient/Family Education Outcome: Progressing Towards Goal 
  
Problem: Nutrition Deficit Goal: *Optimize nutritional status Outcome: Progressing Towards Goal

## 2019-07-11 NOTE — PROGRESS NOTES
[facility] CARDIOLOGY PROGRESS NOTE 
      
 
7/11/2019 11:22 AM 
 
Admit Date: 6/29/2019 Subjective:  
Doing well. Plans for transfer to rehab today by hospitalist --remains in NSR  
 
ROS: 
Cardiovascular:  As noted above Objective:  
  
Vitals:  
 07/10/19 2306 07/11/19 0500 07/11/19 3846 07/11/19 2947 BP: 160/64 171/83  177/77 Pulse: 62 65  (!) 55 Resp: 18 18  18 Temp: 98.4 °F (36.9 °C) 98 °F (36.7 °C)  97.8 °F (36.6 °C) SpO2: 98% 96%  95% Weight:   115.2 kg (253 lb 14.4 oz) Height:      
 
 
Physical Exam: 
General-No Acute Distress Neck- supple, no JVD 
CV- regular rate and rhythm no MRG Lung- clear bilaterally Abd- soft, nontender, nondistended Ext- no edema bilaterally. Skin- warm and dry Data Review:  
Recent Labs  
  07/11/19 
0530 07/10/19 
0610  07/08/19 
1139  140   < >  --   
K 3.1* 3.3*   < >  --   
BUN 80* 88*   < >  --   
CREA 2.79* 2.95*   < >  --   
* 263*   < >  -- WBC 12.0* 12.4*   < >  --   
HGB 9.0* 9.2*   < >  --   
HCT 26.4* 27.9*   < >  --   
 284   < >  --   
TROIQ  --   --   --  0.85*  
 < > = values in this interval not displayed. Assessment/Plan:  
 
Principal Problem: 
  Acute cystitis with hematuria (6/29/2019) Per primary team  
 
Active Problems: 
  HTN (hypertension) (11/10/2011) Will add hydralazine, Ace stopped this admission due to renal failure AF (atrial fibrillation) ([facility] (11/20/2011) In NSR on amiodarone and eliquis 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 ([facility] (11/15/2017) CKD (chronic kidney disease) stage 5, GFR less than 15 ml/min (HCC) (11/30/2018) May need to eventually adjust eliquis dose Parkinson disease ([facility] (6/19/2019) Sinemet Acute on chronic renal failure ([facility] (6/29/2019) Morbid obesity ([facility] (6/29/2019) Hypokalemia (6/29/2019) Replacement ordered. C. difficile diarrhea (6/30/2019) Elevated troponin (7/7/2019) Medical management. Demand ischemia, not felt to be cardiac cath candidate Chest pain (7/7/2019) 
reolved CAD (coronary artery disease) (7/7/2019)--not currently on asa but with significant anemia trending down for last several months. Intolerant to statin Volume overload (7/8/2019) Demand ischemia ([address] (7/7/2019) Armen Nixon NP 
7/11/2019 11:22 AM

## 2019-07-11 NOTE — PROGRESS NOTES
[facility] Subjective: A on CKD . Chest Pain better . Nausea + Review of Systems -  
General ROS: negative for - fever, chills Respiratory ROS: no SOB, cough, WARREN Cardiovascular ROS: no CP, palpitations Gastrointestinal ROS: no  abdominal pain, diarrhea Genito-Urinary ROS: no difficulty voiding, dysuria Neurological ROS: no seizures, focal weekness Objective: 
 
Vitals:  
 07/11/19 0500 07/11/19 0513 07/11/19 0711 07/11/19 1141 BP: 171/83  177/77 176/80 Pulse: 65  (!) 55 79 Resp: 18  18 18 Temp: 98 °F (36.7 °C)  97.8 °F (36.6 °C) 98 °F (36.7 °C) SpO2: 96%  95% 96% Weight:  115.2 kg (253 lb 14.4 oz) Height:      
 
 
PE 
Gen: in no acute distress CV:reg rate Chest:clear Abd: soft Ext/Access: no edema Jaxon Shaw LAB Recent Labs  
  07/11/19 
0530 07/10/19 
0610 07/09/19 
0945 WBC 12.0* 12.4* 13.1* HGB 9.0* 9.2* 9.2* HCT 26.4* 27.9* 28.1*  
 284 268 Recent Labs  
  07/11/19 
0530 07/10/19 
0610 07/09/19 
0945  140 139  
K 3.1* 3.3* 3.5  105 105 CO2 23 25 22 * 263* 355* BUN 80* 88* 90* CREA 2.79* 2.95* 3.03* CA 8.2* 8.0* 8.3 Radiology A/P:  
Patient Active Problem List  
Diagnosis Code  
 HTN (hypertension) I10  
 AF (atrial fibrillation) ([facility]) I48.91  
 Diabetic neuropathy ([facility] E11.40  A-V fistula ([facility]) I77.0  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 ([facility] E11.21  
 SARAH (obstructive sleep apnea) G47.33  
 CKD (chronic kidney disease) stage 5, GFR less than 15 ml/min ([facility]) N18.5  Parkinson disease ([facility] G20  
 Acute on chronic renal failure ([facility]) N17.9, N18.9  Acute cystitis with hematuria N30.01  
 Morbid obesity (HCC) E66.01  
 Hypokalemia E87.6  
 C. difficile diarrhea A04.72  
 PAF (paroxysmal atrial fibrillation) (HCC) I48.0  Elevated troponin R74.8  Chest pain R07.9  CAD (coronary artery disease) I25.10  Volume overload E87.70  
  Demand ischemia (HCC) I24.8 A on CKD -significant improvement in renal function seen Good UO on lasix Hypokalemia - placed on kcl replacement Planned for rehab Sharita Holland MD

## 2019-07-11 NOTE — PROGRESS NOTES
Hourly round completed throughout the shift. No N/V this shift Mathew per tele X 1. No sypmtoms. No complain of pain and denies any needs at this time Bed in lower position and call light/ personal items within reach. Will continue to monitor and give bed side shift report to on coming day shift nurse.

## 2019-07-12 VITALS
WEIGHT: 271.2 LBS | TEMPERATURE: 97.7 F | RESPIRATION RATE: 18 BRPM | HEIGHT: 68 IN | SYSTOLIC BLOOD PRESSURE: 158 MMHG | BODY MASS INDEX: 41.1 KG/M2 | OXYGEN SATURATION: 98 % | DIASTOLIC BLOOD PRESSURE: 72 MMHG | HEART RATE: 61 BPM

## 2019-07-12 LAB
GLUCOSE BLD STRIP.AUTO-MCNC: 287 MG/DL (ref 65–100)
GLUCOSE BLD STRIP.AUTO-MCNC: 293 MG/DL (ref 65–100)
GLUCOSE BLD STRIP.AUTO-MCNC: 381 MG/DL (ref 65–100)

## 2019-07-12 PROCEDURE — 94760 N-INVAS EAR/PLS OXIMETRY 1: CPT

## 2019-07-12 PROCEDURE — 74011250637 HC RX REV CODE- 250/637: Performed by: INTERNAL MEDICINE

## 2019-07-12 PROCEDURE — 74011250637 HC RX REV CODE- 250/637: Performed by: FAMILY MEDICINE

## 2019-07-12 PROCEDURE — 74011636637 HC RX REV CODE- 636/637: Performed by: INTERNAL MEDICINE

## 2019-07-12 PROCEDURE — 77010033678 HC OXYGEN DAILY

## 2019-07-12 PROCEDURE — 82962 GLUCOSE BLOOD TEST: CPT

## 2019-07-12 PROCEDURE — 74011250637 HC RX REV CODE- 250/637: Performed by: HOSPITALIST

## 2019-07-12 RX ORDER — METOPROLOL SUCCINATE 25 MG/1
12.5 TABLET, EXTENDED RELEASE ORAL DAILY
Status: DISCONTINUED | OUTPATIENT
Start: 2019-07-12 | End: 2019-07-12 | Stop reason: HOSPADM

## 2019-07-12 RX ORDER — FAMOTIDINE 20 MG/1
20 TABLET, FILM COATED ORAL EVERY EVENING
Qty: 30 TAB | Refills: 2 | Status: SHIPPED | OUTPATIENT
Start: 2019-07-12 | End: 2019-08-11

## 2019-07-12 RX ORDER — HYDRALAZINE HYDROCHLORIDE 25 MG/1
25 TABLET, FILM COATED ORAL 3 TIMES DAILY
Status: DISCONTINUED | OUTPATIENT
Start: 2019-07-12 | End: 2019-07-12 | Stop reason: HOSPADM

## 2019-07-12 RX ORDER — METOPROLOL SUCCINATE 25 MG/1
25 TABLET, EXTENDED RELEASE ORAL DAILY
Qty: 30 TAB | Refills: 2 | Status: SHIPPED | OUTPATIENT
Start: 2019-07-12 | End: 2019-08-11

## 2019-07-12 RX ORDER — ISOSORBIDE DINITRATE 20 MG/1
20 TABLET ORAL 3 TIMES DAILY
Qty: 90 TAB | Refills: 3 | Status: SHIPPED | OUTPATIENT
Start: 2019-07-12 | End: 2019-08-11

## 2019-07-12 RX ORDER — HYDRALAZINE HYDROCHLORIDE 10 MG/1
10 TABLET, FILM COATED ORAL 3 TIMES DAILY
Qty: 90 TAB | Refills: 0 | Status: SHIPPED | OUTPATIENT
Start: 2019-07-12 | End: 2019-08-11

## 2019-07-12 RX ORDER — POTASSIUM CHLORIDE 20 MEQ/1
40 TABLET, EXTENDED RELEASE ORAL DAILY
Status: DISCONTINUED | OUTPATIENT
Start: 2019-07-12 | End: 2019-07-12 | Stop reason: HOSPADM

## 2019-07-12 RX ORDER — AMIODARONE HYDROCHLORIDE 200 MG/1
200 TABLET ORAL 2 TIMES DAILY
Qty: 18 TAB | Refills: 0 | Status: SHIPPED | OUTPATIENT
Start: 2019-07-12 | End: 2019-07-21

## 2019-07-12 RX ORDER — AMIODARONE HYDROCHLORIDE 200 MG/1
200 TABLET ORAL DAILY
Qty: 30 TAB | Refills: 2 | Status: SHIPPED | OUTPATIENT
Start: 2019-07-22 | End: 2019-08-21

## 2019-07-12 RX ORDER — NITROGLYCERIN 0.4 MG/1
0.4 TABLET SUBLINGUAL AS NEEDED
Qty: 30 TAB | Refills: 3 | Status: SHIPPED
Start: 2019-07-12

## 2019-07-12 RX ADMIN — POTASSIUM CHLORIDE 40 MEQ: 20 TABLET, EXTENDED RELEASE ORAL at 09:39

## 2019-07-12 RX ADMIN — HYDRALAZINE HYDROCHLORIDE 25 MG: 25 TABLET, FILM COATED ORAL at 09:40

## 2019-07-12 RX ADMIN — APIXABAN 5 MG: 5 TABLET, FILM COATED ORAL at 09:40

## 2019-07-12 RX ADMIN — INSULIN LISPRO 15 UNITS: 100 INJECTION, SOLUTION INTRAVENOUS; SUBCUTANEOUS at 12:32

## 2019-07-12 RX ADMIN — LEVOTHYROXINE SODIUM 25 MCG: 50 TABLET ORAL at 05:28

## 2019-07-12 RX ADMIN — AMIODARONE HYDROCHLORIDE 200 MG: 200 TABLET ORAL at 09:41

## 2019-07-12 RX ADMIN — FUROSEMIDE 20 MG: 20 TABLET ORAL at 05:27

## 2019-07-12 RX ADMIN — ISOSORBIDE DINITRATE 20 MG: 20 TABLET ORAL at 09:39

## 2019-07-12 RX ADMIN — CARBAMIDE PEROXIDE 6.5% 5 DROP: 6.5 LIQUID AURICULAR (OTIC) at 09:41

## 2019-07-12 RX ADMIN — Medication 10 ML: at 05:28

## 2019-07-12 RX ADMIN — CARBIDOPA AND LEVODOPA 1 TABLET: 25; 100 TABLET ORAL at 09:39

## 2019-07-12 RX ADMIN — INSULIN LISPRO 4 UNITS: 100 INJECTION, SOLUTION INTRAVENOUS; SUBCUTANEOUS at 09:38

## 2019-07-12 NOTE — PROGRESS NOTES
Patient to discharge to [place] rehab room 414. Report line, [id number], given to RN. [employer] transportation setup for [employer]. Family aware and in agreement. Beena Carey Care Management Interventions PCP Verified by CM: Yes Mode of Transport at Discharge: Other (see comment) Transition of Care Consult (CM Consult): Discharge Planning Discharge Durable Medical Equipment: No 
Physical Therapy Consult: Yes Occupational Therapy Consult: Yes Current Support Network: Own Home, Lives with Spouse Confirm Follow Up Transport: Family Plan discussed with Pt/Family/Caregiver: Yes Freedom of Choice Offered: Yes Discharge Location Discharge Placement: Skilled nursing facility

## 2019-07-12 NOTE — PROGRESS NOTES
[facility] Subjective: A on CKD . Chest Pain better . Nausea + Review of Systems -  
General ROS: negative for - fever, chills Respiratory ROS: no SOB, cough, WARREN Cardiovascular ROS: no CP, palpitations Gastrointestinal ROS: no  abdominal pain, diarrhea Genito-Urinary ROS: no difficulty voiding, dysuria Neurological ROS: no seizures, focal weekness Objective: 
 
Vitals:  
 07/12/19 9506 07/12/19 0456 07/12/19 0741 07/12/19 0901 BP:  151/61 158/72 Pulse:  (!) 54 61 Resp:  18 18 Temp:  97.6 °F (36.4 °C) 97.7 °F (36.5 °C) SpO2:  98% 98% 98% Weight: 123 kg (271 lb 3.2 oz) Height:      
 
 
PE 
Gen: in no acute distress CV:reg rate Chest:clear Abd: soft Ext/Access: no edema Judithe Oilmont LAB Recent Labs  
  07/11/19 
0530 07/10/19 
4873 WBC 12.0* 12.4* HGB 9.0* 9.2* HCT 26.4* 27.9*  
 284 Recent Labs  
  07/11/19 
0530 07/10/19 
1644  140  
K 3.1* 3.3*  
 105 CO2 23 25 * 263* BUN 80* 88* CREA 2.79* 2.95* CA 8.2* 8.0* Radiology A/P:  
Patient Active Problem List  
Diagnosis Code  
 HTN (hypertension) I10  
 AF (atrial fibrillation) ([facility]) I48.91  
 Diabetic neuropathy ([facility] E11.40  A-V fistula ([facility]) I77.0  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 ([facility] E11.21  
 SARAH (obstructive sleep apnea) G47.33  
 CKD (chronic kidney disease) stage 5, GFR less than 15 ml/min ([facility]) N18.5  Parkinson disease ([facility] G20  
 Acute on chronic renal failure ([facility]) N17.9, N18.9  Acute cystitis with hematuria N30.01  
 Morbid obesity ([facility]) E66.01  
 Hypokalemia E87.6  
 C. difficile diarrhea A04.72  
 PAF (paroxysmal atrial fibrillation) ([facility]) I48.0  Elevated troponin R74.8  Chest pain R07.9  CAD (coronary artery disease) I25.10  Volume overload E87.70  Demand ischemia (HCC) I24.8 A on CKD -significant improvement in renal function seen Good UO on lasix Hypokalemia - placed on kcl replacement Planned for rehab [facility] today , continue with current dose of lasix and kcl Polo Titus MD

## 2019-07-12 NOTE — PROGRESS NOTES
Report called to [facility], spoke with Emma Orosco. Pt is going to room 414. Spouse have taking all pt's belongings with him.    time is at 1pm.

## 2019-07-12 NOTE — DISCHARGE SUMMARY
Hospitalist Discharge Summary Patient ID: 
Rach Kimball [id number] 
26 y.o. 
1940 Admit date: 6/29/2019  9:55 PM 
Discharge date and time: 7/12/2019 Attending: Yahaira Gibbons MD 
PCP:  Gene Hinton MD 
Treatment Team: Attending Provider: Yahaira Gibbons MD; Primary Nurse: Bryce Holliday; Consulting Provider: Nick Lawler MD; Utilization Review: Calixto Dowell RN; Charge Nurse: Josue Arango; Consulting Provider: Hyun Henson MD; Consulting Provider: Jay Jay Shah MD; Care Manager: Alyssa Lechuga RN 
 
Principal Diagnosis Acute cystitis with hematuria C.diff diarrhea Fluid overload Chronic systolic CHF with EF 08-04% ANTONIO on CKD-5 Principal Problem: 
  Acute cystitis with hematuria (6/29/2019) Active Problems: 
  HTN (hypertension) (11/10/2011) AF (atrial fibrillation) ([address] (11/20/2011) 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 ([address] (11/15/2017) CKD (chronic kidney disease) stage 5, GFR less than 15 ml/min ([facility]) (11/30/2018) Parkinson disease ([address] (6/19/2019) Acute on chronic renal failure ([address] (6/29/2019) Morbid obesity ([address] (6/29/2019) Hypokalemia (6/29/2019) C. difficile diarrhea (6/30/2019) PAF (paroxysmal atrial fibrillation) ([address] (7/7/2019) Elevated troponin (7/7/2019) Chest pain (7/7/2019) CAD (coronary artery disease) (7/7/2019) Volume overload (7/8/2019) Demand ischemia ([address] (7/7/2019) Hospital Course: 
Please refer to the admission H&P for details of presentation. In summary, the patient is 78 yr old initially admitted on 6/29/19 with ARF and cidff diarrhea. Cr> 9 on admit and pt was concerning for needing HD but she desperately wanted to avoid that so she was started IVFL resuscitation.  Over a prolonged time her Renal fxn improved but she became volume overloaded and went into afib and was noted to have demand ischemia. Seen by cardiology and medications were adjusted. She was diuresed and renal fxn continued to improve. Workup revealed a drop in her ef to 30-35%. Her Hospital course also complicated by Emotional lability, a uti treated with rocephin, chest pain determined to be msk by cards and nausea, vomiting and retching associated with vancomycin. She completed her vancomycin therapy except for the last 2 doses which she refused. Seen by GI and an EGD was recommended but she declined. Pt was started on hydralazine, and has tolerated it well with optimal control of BP. Pt has been accepted for STR, is medically cleared and hemodynamically stable for discharge today. Significant Diagnostic Studies:  
Portable AP upright chest dated 7/7/2019 at 0802 hours 
  
Comparison exam 7/3/2019 
  
CLINICAL INFORMATION: Chest pain 
  
Metallic sternal wires are present. Heart is enlarged. Mediastinum unremarkable. Vascularity does not appear congested. No pulmonary infiltrate or pleural 
effusion. 
  
IMPRESSION IMPRESSION: No acute abnormality EXAM: Acute abdomen series. 
  
INDICATION: Vomiting. 
  
COMPARISON: Prior chest x-ray on January 3, 2018. 
  
TECHNIQUE: Supine and upright views of the abdomen were supplemented with a 
frontal view of the chest. 
  
FINDINGS: The lungs are clear. The heart is mildly enlarged and there has been a 
prior CABG. No pneumothorax, vascular congestion or pleural effusion is seen. 
  
The abdominal bowel gas pattern is within normal limits. No bowel obstruction or 
free air is identified. Note is made of cholecystectomy clips in the right upper 
quadrant and a prior left hip arthroplasty. 
  
IMPRESSION IMPRESSION: No acute process in the chest or abdomen.   
 
EXAM: Noncontrast CT head. 
  
INDICATION: Headache, recent fall injury. 
  
COMPARISON: Prior MRI brain on May 16, 2016. 
  
 TECHNIQUE: Noncontrast CT images of the head were obtained. Radiation dose 
reduction techniques were used for this study. Our CT scanners use one or all 
of the following:  Automated exposure control, adjustment of the mA or kV 
according to patient size, iterative reconstruction. 
  
FINDINGS: Brain volume is appropriate for age. No acute infarct or hemorrhage is 
identified. An approximate 2 cm mass along the anterior skull base in the 
midline is unchanged, probably a meningioma. There is no mass effect, midline 
shift or depressed fracture. The visualized paranasal sinuses and mastoid air 
cells are clear. 
  
IMPRESSION IMPRESSION: No acute process. Labs: Results:  
   
Chemistry Recent Labs  
  07/11/19 
0530 07/10/19 
0610 07/09/19 
0945 * 263* 355*  140 139  
K 3.1* 3.3* 3.5  105 105 CO2 23 25 22 BUN 80* 88* 90* CREA 2.79* 2.95* 3.03* CA 8.2* 8.0* 8.3 AGAP 12 10 12 CBC w/Diff Recent Labs  
  07/11/19 
0530 07/10/19 
0610 07/09/19 
0945 WBC 12.0* 12.4* 13.1*  
RBC 2.88* 2.98* 2.98* HGB 9.0* 9.2* 9.2* HCT 26.4* 27.9* 28.1*  
 284 268 GRANS 80* 84* 85* LYMPH 9* 7* 6*  
EOS 3 2 2 Cardiac Enzymes No results for input(s): CPK, CKND1, CURTIS in the last 72 hours. No lab exists for component: Sissy Pat Coagulation No results for input(s): PTP, INR, APTT in the last 72 hours. No lab exists for component: INREXT Lipid Panel Lab Results Component Value Date/Time Cholesterol, total 278 (H) 08/28/2018 12:00 PM  
 Cholesterol (POC) 238 12/11/2013 08:22 AM  
 HDL Cholesterol 47 08/28/2018 12:00 PM  
 LDL, calculated 186 (H) 08/28/2018 12:00 PM  
 VLDL, calculated 45 (H) 08/28/2018 12:00 PM  
 Triglyceride 225 (H) 08/28/2018 12:00 PM  
 Triglycerides (POC) 105 12/11/2013 08:22 AM  
 CHOL/HDL Ratio 4.6 05/16/2016 06:06 AM  
  
BNP No results for input(s): BNPP in the last 72 hours. Liver Enzymes No results for input(s): TP, ALB, TBIL, AP, SGOT, GPT in the last 72 hours. No lab exists for component: DBIL Thyroid Studies Lab Results Component Value Date/Time TSH 3.530 05/29/2019 08:51 AM  
    
 
 
Discharge Exam: 
Visit Vitals /61 (BP 1 Location: Right arm, BP Patient Position: At rest) Pulse (!) 54 Temp 97.6 °F (36.4 °C) Resp 18 Ht 5' 8\" (1.727 m) Wt 123 kg (271 lb 3.2 oz) SpO2 98% Breastfeeding? No  
BMI 41.24 kg/m² General appearance: alert, cooperative, morbidly obese, NAD Lungs: coarse breath sounds bilaterally, diminished at bases Heart: IRIR, S1, S2 normal, no murmur, click, rub or gallop Abdomen: soft, non-tender. Bowel sounds normal. No masses,  no organomegaly Extremities: no cyanosis or edema Neurologic: GCS 15, no motor or sensory deficits Psych: AO x3, mood and affect appropriate Disposition: STR Discharge Condition: stable Patient Instructions:  
Current Discharge Medication List  
  
START taking these medications Details  
metoprolol succinate (TOPROL-XL) 25 mg XL tablet Take 1 Tab by mouth daily for 30 days. Qty: 30 Tab, Refills: 2  
  
nitroglycerin (NITROSTAT) 0.4 mg SL tablet 1 Tab by SubLINGual route as needed for Chest Pain. Up to 3 doses. Qty: 30 Tab, Refills: 3  
  
isosorbide dinitrate (ISORDIL) 20 mg tablet Take 1 Tab by mouth three (3) times daily for 30 days. Qty: 90 Tab, Refills: 3  
  
hydrALAZINE (APRESOLINE) 10 mg tablet Take 1 Tab by mouth three (3) times daily for 30 days. Qty: 90 Tab, Refills: 0  
  
!! amiodarone (CORDARONE) 200 mg tablet Take 1 Tab by mouth two (2) times a day for 9 days. Qty: 18 Tab, Refills: 0  
  
!! amiodarone (CORDARONE) 200 mg tablet Take 1 Tab by mouth daily for 30 days. Qty: 30 Tab, Refills: 2  
  
famotidine (PEPCID) 20 mg tablet Take 1 Tab by mouth every evening for 30 days. Qty: 30 Tab, Refills: 2  
  
 !! - Potential duplicate medications found. Please discuss with provider. CONTINUE these medications which have CHANGED Details  
apixaban (ELIQUIS) 5 mg tablet Take 1 Tab by mouth two (2) times a day for 30 days. Qty: 60 Tab, Refills: 2 CONTINUE these medications which have NOT CHANGED Details  
ondansetron hcl (ZOFRAN) 8 mg tablet Take 1 Tab by mouth every eight (8) hours as needed for Nausea. Qty: 20 Tab, Refills: 0  
  
ezetimibe (ZETIA) 10 mg tablet Take 1 Tab by mouth daily. Qty: 90 Tab, Refills: 3  
  
insulin NPH/insulin regular (HUMULIN 70/30 U-100 INSULIN) 100 unit/mL (70-30) injection 65 units before breakfast, 55 units before supper 
Qty: 60 mL, Refills: 5  
  
potassium chloride (K-DUR, KLOR-CON) 20 mEq tablet Take 1 Tab by mouth daily as needed (Only on days you take metolazone). Indications: prevention of low potassium in the blood Qty: 30 Tab, Refills: 0  
  
ergocalciferol (VITAMIN D2) 50,000 unit capsule Take 1 Cap by mouth every seven (7) days. Qty: 12 Cap, Refills: 3 Associated Diagnoses: Malaise and fatigue  
  
prednisoLONE acetate (PRED FORTE) 1 % ophthalmic suspension Administer 1 Drop to both eyes four (4) times daily. carbidopa-levodopa (SINEMET)  mg per tablet TAKE 1 TABLET BY MOUTH THREE TIMES DAILY Qty: 810 Tab, Refills: 3 Comments: **Patient requests 90 days supply**  
  
furosemide (LASIX) 40 mg tablet Take 2 Tabs by mouth daily. Qty: 180 Tab, Refills: 3  
  
levothyroxine (SYNTHROID) 25 mcg tablet Take 1 Tab by mouth Daily (before breakfast). Qty: 90 Tab, Refills: 3  
  
febuxostat (ULORIC) 80 mg tab tablet Take 1 Tab by mouth daily. Qty: 90 Tab, Refills: 3  
  
terbinafine HCl (LAMISIL AT) 1 % topical cream Apply  to affected area two (2) times a day. Qty: 30 g, Refills: 2 Lactobacillus acidophilus (ACIDOPHILUS) cap Take 2 Caps by mouth two (2) times a day. Insulin Syringe-Needle U-100 (BD INSULIN SYRINGE ULTRA-FINE) 1 mL 31 gauge x 5/16 syrg DX E 10.65  Inject insulin tid Qty: 300 Syringe, Refills: 1 OXYGEN-AIR DELIVERY SYSTEMS 3 L by Nasal route continuous. glucose blood VI test strips (ACCU-CHEK GUIDE) strip Check BS TID. DX E11.9 Qty: 300 Strip, Refills: 3 Lancets (ACCU-CHEK FASTCLIX) misc Check bs TID. DX E11.9 Qty: 300 Each, Refills: 3  
  
cyanocobalamin, vitamin B-12, 1,000 mcg/mL kit 1,000 mcg by Injection route every month. on the 1st  
  
ferrous sulfate 325 mg (65 mg iron) tablet Take 324 mg by mouth daily. triamcinolone acetonide (KENALOG) 0.1 % topical cream Apply  to affected area two (2) times a day. Refills: 0 STOP taking these medications  
  
 benazepril (LOTENSIN) 20 mg tablet Comments:  
Reason for Stopping:   
   
 metOLazone (ZAROXOLYN) 2.5 mg tablet Comments:  
Reason for Stopping:   
   
 pregabalin (LYRICA) 50 mg capsule Comments:  
Reason for Stopping:   
   
 carvedilol (COREG) 25 mg tablet Comments:  
Reason for Stopping:   
   
 nitroglycerin (NITROLINGUAL) 400 mcg/spray spray Comments:  
Reason for Stopping:   
   
  
 
 
Activity: PT/OT Eval and Treat Diet: Cardiac Diet Wound Care: As directed Follow-up · Follow up with physician at [address] [place] [address] Time spent to discharge patient 35 minutes Signed: 
Earleen Collet, MD 
7/12/2019 
7:26 AM

## 2019-07-12 NOTE — PROGRESS NOTES
[facility] CARDIOLOGY PROGRESS NOTE 
 
7/12/2019 8:14 AM 
 
Admit Date: 6/29/2019 Admit Diagnosis: Acute on chronic renal failure (HCC) [N17.9, N18.9] Subjective:  
Stable overnight without angina, CHF, or palpitations. Sinus candelaria in 50's and stable otherwise, mild HTN persistent. No other complaints overnight. Tolerating meds well. Objective:  
  
Vitals:  
 07/12/19 0006 07/12/19 0455 07/12/19 0456 07/12/19 4454 BP: 146/65  151/61 158/72 Pulse:   (!) 54 61 Resp:   18 18 Temp:   97.6 °F (36.4 °C) 97.7 °F (36.5 °C) SpO2:   98% 98% Weight:  123 kg (271 lb 3.2 oz) Height:      
 
 
Physical Exam: 
Neck- supple, no JVD 
CV- regular rate and rhythm no MRG Lung- clear bilaterally, mildly decreased bibasilar Abd- soft, nontender, nondistended Ext- no edema Skin- warm and dry Data Review:  
Recent Labs  
  07/11/19 
0530 07/10/19 
1116  140  
K 3.1* 3.3*  
BUN 80* 88* CREA 2.79* 2.95* * 263* WBC 12.0* 12.4* HGB 9.0* 9.2* HCT 26.4* 27.9*  
 284 Assessment and Plan:  
 
Principal Problem: 
  Acute cystitis with hematuria (6/29/2019)- per primary MD's 
   
  
Active Problems: 
  HTN (hypertension) (11/10/2011) Began hydralazine/nitrates just yesterday, continue to follow as outpatient- titrate as tolerated, avoid hypotension and worsening of renal function, OK with systolics 878-356'Y 
  
  AF (atrial fibrillation) ([facility] (11/20/2011) Elevated troponin (~pk at 1.4 and downtrended) likely multifactorial in the setting of ARF/hypotension/infection. CP appears non cardiac and reproducible. Has cardiolyte from 3/19 with no sig reversibility. Poor cath candidate nonetheless and would not . Worsened EF noted at 30-35% compared to prior mildly impaired (however was in AF at the time); reassess as outpt.  No further afib, now on amiodarone which also contributes to slower heart rate.   
  
 
 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 
 
  
  CKD (chronic kidney disease) stage 5, GFR less than 15 ml/min ([facility]) (11/30/2018) 
   
  
  Parkinson disease ([facility] (6/19/2019) 
   
  Acute on chronic renal failure ([facility] (6/29/2019) 
   
  Morbid obesity ([facility] (6/29/2019) 
   
  
  Hypokalemia (6/29/2019) 
   
  
  C. difficile diarrhea (6/30/2019) 
   
  
  PAF (paroxysmal atrial fibrillation) ([facility] (7/7/2019) See above. Patient on Eliquis with CKD, advanced age, stopped ASA to help lower bleeding risk. May need to reconsider warfarin as opposed to [address] should renal failure progress to end stage. Continue current meds for now.  
  
  Elevated troponin (7/7/2019)- no angina overnight, demand ischemia most likely, manage conservatively for now given renal dysfunction, not yet on HD 
   
  
  Chest pain (7/7/2019)- none overnight, OK to go to rehab today DECREASE TOPROL XL TO 12.5MG DAILY, CONTINUE ONCE DAILY AMIODARONE FOR NOW INCREASE HYDRALAZINE TO 25MG TID AS TOLERATED We will be on standby and follow from a distance. Please call if any further assistance is needed or if any new questions arise. Thanks for the consult. We will arrange outpatient follow up in a week or so. Jarred Wiley MD 
[address] Cardiology Pager [phone]

## 2019-07-12 NOTE — PROGRESS NOTES
Problem: Falls - Risk of 
Goal: *Absence of Falls Description Document David Fells Fall Risk and appropriate interventions in the flowsheet. Outcome: Progressing Towards Goal 
  
Problem: Patient Education: Go to Patient Education Activity Goal: Patient/Family Education Outcome: Progressing Towards Goal 
  
Problem: Risk for Spread of Infection Goal: Prevent transmission of infectious organism to others Description Prevent the transmission of infectious organisms to other patients, staff members, and visitors. Outcome: Progressing Towards Goal 
  
Problem: Patient Education:  Go to Education Activity Goal: Patient/Family Education Outcome: Progressing Towards Goal 
  
Problem: Pressure Injury - Risk of 
Goal: *Prevention of pressure injury Description Document Devin Scale and appropriate interventions in the flowsheet. Outcome: Progressing Towards Goal 
  
Problem: Patient Education: Go to Patient Education Activity Goal: Patient/Family Education Outcome: Progressing Towards Goal 
  
Problem: Patient Education: Go to Patient Education Activity Goal: Patient/Family Education Outcome: Progressing Towards Goal 
  
Problem: Patient Education: Go to Patient Education Activity Goal: Patient/Family Education Outcome: Progressing Towards Goal 
  
Problem: Nutrition Deficit Goal: *Optimize nutritional status Outcome: Progressing Towards Goal

## 2019-07-15 ENCOUNTER — PATIENT OUTREACH (OUTPATIENT)
Dept: CASE MANAGEMENT | Age: 79
End: 2019-07-15

## 2019-07-15 NOTE — PROGRESS NOTES
This note will not be viewable in [address] Patient discharged to a SNF Preferred Provider Network facility [facility]. Patient will be included in weekly care coordination calls. Message sent to Melissa Vargas RN SNF Preferred Provider [address]

## 2019-07-15 NOTE — Clinical Note
Patient admitted 06/29/19 for Acute Cystitis with Hematuria.  DC 07/12/19 for [facility]

## 2019-07-22 ENCOUNTER — PATIENT OUTREACH (OUTPATIENT)
Dept: CASE MANAGEMENT | Age: 79
End: 2019-07-22

## 2019-07-22 NOTE — PROGRESS NOTES
This note will not be viewable in [address] RNCM notified pt at [facility] for rehab, dc date TBD. RNCM will follow for MAGAN to home.

## 2019-07-22 NOTE — PROGRESS NOTES
Community Care Team documentation for patient in [facility]    The information below provided by:[facility]    PT Update: MOD Assist for bed mobility and transfers. MOD and MAX for ADLs        Nursing Update:Acute cystitis, hematuria, tx w/ Vanc & ABT; monitoring for poss.  C-diff, psych consult r/t depression      Discharge Date:TBD      Assign to [address] [facility]

## 2019-07-26 ENCOUNTER — PATIENT OUTREACH (OUTPATIENT)
Dept: CASE MANAGEMENT | Age: 79
End: 2019-07-26

## 2019-07-26 NOTE — PROGRESS NOTES
Community Care Team documentation for patient in [facility]    The information below provided by:[facility]    PT Update: 10 feet ambulation. MIN Assist for bed mobility and transfers. MAX for LB ADLS and toileting.         Nursing Update:Med mgmt r/t hypoglycemic episodes, f/u with nephrology      Discharge Date:7/31/19  W/ [facility]      Assign to [address] [place] [address]

## 2019-08-01 ENCOUNTER — PATIENT OUTREACH (OUTPATIENT)
Dept: CASE MANAGEMENT | Age: 79
End: 2019-08-01

## 2019-08-01 NOTE — PROGRESS NOTES
Community Care Team documentation for patient in [facility] The information below provided by:Zofia PT Update: Going home today Nursing Update:Discharging today per pt. request 
 
 
Discharge Date:7/30/19 Stephan Parmar Assign to [address] [place] [address]

## 2019-08-01 NOTE — PROGRESS NOTES
This note will not be viewable in [address] RNCM notified of pt dc from [facility], attempted to reach pt for MAGAN. RNCM left vm message, will continue attempts to reach pt.

## 2019-08-02 ENCOUNTER — PATIENT OUTREACH (OUTPATIENT)
Dept: CASE MANAGEMENT | Age: 79
End: 2019-08-02

## 2019-08-02 NOTE — PROGRESS NOTES
This note will not be viewable in [address] Transition of Care Discharge Follow-up Questionnaire Date/Time of Call: 8/2/19    3p What was the patient hospitalized for? ARF and cdff diarrhea Does the patient understand his/her diagnosis and/or treatment and what happened during the hospitalization? Pt verbalizes understanding of dx and treatment. Pt denies nausea, abd pain, or diarrhea since dc from ED. Did the patient receive discharge instructions? Yes  
CM Assessed Risk for Readmission:  
 
Patient stated Risk for Readmission:   
 
none Review any discharge instructions (see discharge instructions/AVS in [facility]). Ask patient if they understand these. Do they have any questions? DC Instructions reviewed w/ pt. Were home services ordered (nursing, PT, OT, ST, etc.)? [facility] If so, has the first visit occurred? If not, why? (Assist with coordination of services if necessary.) No, RNCM called [facility]  And confirmed services, will reach out to schedule. Was any DME ordered? Walker, hosp bed, w/c, BSC, walk in shower, ramp If so, has it been received? If not, why?  (Assist patient in obtaining DME orders &/or equipment if necessary. ) NA Complete a review of all medications (new, continued and discontinued meds per the D/C instructions and medication tab in [facility]). Medications reviewed w/ pt. Were all new prescriptions filled? If not, why?  (Assist patient in obtaining medications if necessary  escalate for CCM &/or SW if ongoing issues are verbalized by pt or anticipated) NA Does the patient understand the purpose and dosing instructions for all medications? (If patient has questions, provide explanation and education.) Yes Does the patient have any problems in performing ADLs? (If patient is unable to perform ADLs  what is the limiting factor(s)?   Do they have a support system that can assist? If no support system is present, discuss possible assistance that they may be able to obtain. Escalate for CCM/SW if ongoing issues are verbalized by pt or anticipated) Family assists w/ ADLs. Does the patient have all follow-up appointments scheduled? 7 day f/up with PCP?  
(f/up with PCP may be w/in 14 days if patient has a f/up with their specialist w/in 7 days) 7-14 day f/up with specialist?  
(or per discharge instructions) If f/up has not been made  what actions has the care coordinator made to accomplish this? Has transportation been arranged? 8/6/2019 11:00 AM Jaimie Dorado MD [place] INTER[facility]IC[facility]  
8/7/2019 1:30 PM [facility] INFUSION SERVICES [address]  
8/14/2019 9:00 AM NUR4 [facility] INFUSION SERVICES [address]  
9/23/2019 2:00 PM Osman Beaver MD [address] [place] [address] Any other questions or concerns expressed by the patient?  none Schedule next appointment with JUAN JOSÉ AREVALO Coordinator or refer to RN Case Manager/ per the workflow guidelines. When is care coordinators next follow-up call scheduled? If referred for CCM  what RN care manager was the referral assigned? RNCM scheduled f/u w/ in 30d. MAGAN Call Completed By: Shell Fowler, RN, BSN Community Nurse Care Manager

## 2019-08-07 ENCOUNTER — HOSPITAL ENCOUNTER (OUTPATIENT)
Dept: INFUSION THERAPY | Age: 79
Discharge: HOME | End: 2019-08-07
Payer: MEDICARE

## 2019-08-07 VITALS
HEART RATE: 61 BPM | SYSTOLIC BLOOD PRESSURE: 125 MMHG | RESPIRATION RATE: 18 BRPM | DIASTOLIC BLOOD PRESSURE: 66 MMHG | TEMPERATURE: 98.5 F | OXYGEN SATURATION: 98 %

## 2019-08-07 PROCEDURE — 96361 HYDRATE IV INFUSION ADD-ON: CPT

## 2019-08-07 PROCEDURE — 96365 THER/PROPH/DIAG IV INF INIT: CPT

## 2019-08-07 PROCEDURE — 74011250636 HC RX REV CODE- 250/636

## 2019-08-07 PROCEDURE — 74011250636 HC RX REV CODE- 250/636: Performed by: INTERNAL MEDICINE

## 2019-08-07 RX ADMIN — SODIUM CHLORIDE 500 ML: 900 INJECTION, SOLUTION INTRAVENOUS at 13:30

## 2019-08-07 RX ADMIN — FERRIC CARBOXYMALTOSE INJECTION 750 MG: 50 INJECTION, SOLUTION INTRAVENOUS at 13:59

## 2019-08-07 NOTE — PROGRESS NOTES
Pt arrived via wheelchair today at [id number], to receive IV injectafer. Pt tolerated without difficulty. Patient discharged via wheelchair accompanied by spouse. Instructed to notify physician of any problems, questions or concerns. Allowed opportunity for patient/family to ask questions. Verbalized understanding. Next appointment is August 14 at 0900 with Rin Goldberg.

## 2019-08-14 ENCOUNTER — HOSPITAL ENCOUNTER (OUTPATIENT)
Dept: INFUSION THERAPY | Age: 79
Discharge: HOME | End: 2019-08-14
Payer: MEDICARE

## 2019-08-14 VITALS
DIASTOLIC BLOOD PRESSURE: 46 MMHG | RESPIRATION RATE: 18 BRPM | OXYGEN SATURATION: 95 % | HEART RATE: 52 BPM | SYSTOLIC BLOOD PRESSURE: 101 MMHG | TEMPERATURE: 97.6 F

## 2019-08-14 PROCEDURE — 74011250636 HC RX REV CODE- 250/636: Performed by: INTERNAL MEDICINE

## 2019-08-14 PROCEDURE — 96365 THER/PROPH/DIAG IV INF INIT: CPT

## 2019-08-14 RX ADMIN — FERRIC CARBOXYMALTOSE INJECTION 750 MG: 50 INJECTION, SOLUTION INTRAVENOUS at 09:03

## 2019-08-14 NOTE — PROGRESS NOTES
[facility] via [facility].  injectafer completed. Patient tolerated well. Any issues or concerns during appointment: no. 
Discharged to home via [facility] with her .

## 2019-08-27 ENCOUNTER — PATIENT OUTREACH (OUTPATIENT)
Dept: CASE MANAGEMENT | Age: 79
End: 2019-08-27

## 2019-08-27 NOTE — PROGRESS NOTES
Case staffed with ELIS Quiroz. No further SW involvement is indicated at this time. She will notify the SW that will be assigned to the pt's PCP office as of 9/2/19, if needs arise in the future. CCM SW episode is resolved, goal is completed and SW is removed from the treatment team.    This note will not be viewable in [address]

## 2019-08-28 ENCOUNTER — PATIENT OUTREACH (OUTPATIENT)
Dept: CASE MANAGEMENT | Age: 79
End: 2019-08-28

## 2019-08-28 NOTE — PROGRESS NOTES
This note will not be viewable in [address] Community Care Management  Follow up Outreach Note   Outreach type: Phone call   Date/Time of Outreach: 8/28/19    1150a   Reason for follow-up: HRRA d/t ESRD CKD5 NOT yet HD, comorbidities and age. Disease specific complaints/issues: RNCM spoke pt who reports she is tired, but is still going and not giving up. She is accepting getting older and not being able to do as she once did. Lives w/ dgtr who never , no grandchildren. Dgtr had fall last wk. Patient progress towards goals set from last contact: Progressing, discussed s/s to report to physicians, pt able to verbalize. Has patient attended any PCP or specialist follow-up appointments since last contact? What was outcome of appointment? When is next follow-up scheduled? Pt has received iron infusions x2 in last mo. Upcoming Appts:  10/4/2019 2:15 PM [facility]   10/8/2019 10:15 AM Clarisa Alfaro MD [place] INTER[facility] MEDIC[facility]      Review medications. Any medication changes since last outreach? Does patient have any questions or issues related to their medications? RNCM asked pt if she had received Medicare Extrahelp application to assist w/ medications mailed out from [facility]. Pt states she has received application, but hasn't had chance to complete. None    None   Home health active? If yes  any issue? Progress? [facility] RN and PT   Referrals needed?  (SW, Diabetes education, HH, etc. ) No   Other issues/Miscellaneous? (Transportation, access to meals, ability to perform ADLs, adequate caregiver support, etc.) No   Next Outreach Scheduled:  Next Steps/Goals:   RNCM  will graduate from [facility] at this time.      Community Care Manager: Candida Garcia, RN, BSN Community Nurse Care Mgr

## 2019-08-30 ENCOUNTER — PATIENT OUTREACH (OUTPATIENT)
Dept: CASE MANAGEMENT | Age: 79
End: 2019-08-30

## 2019-08-30 NOTE — PROGRESS NOTES
This note will not be viewable in [address] Encounter to [facility] on care team.  Pt has RNCM contact information for future needs, questions, or concerns.

## 2019-10-22 ENCOUNTER — HOSPITAL ENCOUNTER (OUTPATIENT)
Dept: MRI IMAGING | Age: 79
Discharge: HOME | End: 2019-10-22
Attending: OTOLARYNGOLOGY
Payer: MEDICARE

## 2019-10-22 PROCEDURE — 70551 MRI BRAIN STEM W/O DYE: CPT

## 2019-12-03 PROBLEM — D32.9 MENINGIOMA (HCC): Status: ACTIVE | Noted: 2019-12-03

## 2020-01-01 ENCOUNTER — HOSPITAL ENCOUNTER (INPATIENT)
Age: 80
LOS: 11 days | Discharge: HOME HEALTH | DRG: 291 | End: 2020-12-27
Attending: EMERGENCY MEDICINE | Admitting: FAMILY MEDICINE
Payer: MEDICARE

## 2020-01-01 ENCOUNTER — HOSPITAL ENCOUNTER (OUTPATIENT)
Dept: LAB | Age: 80
Discharge: HOME | End: 2020-07-16
Payer: MEDICARE

## 2020-01-01 ENCOUNTER — APPOINTMENT (OUTPATIENT)
Dept: GENERAL RADIOLOGY | Age: 80
DRG: 291 | End: 2020-01-01
Attending: FAMILY MEDICINE
Payer: MEDICARE

## 2020-01-01 ENCOUNTER — APPOINTMENT (OUTPATIENT)
Dept: ULTRASOUND IMAGING | Age: 80
DRG: 291 | End: 2020-01-01
Attending: NURSE PRACTITIONER
Payer: MEDICARE

## 2020-01-01 ENCOUNTER — APPOINTMENT (OUTPATIENT)
Dept: GENERAL RADIOLOGY | Age: 80
DRG: 291 | End: 2020-01-01
Attending: EMERGENCY MEDICINE
Payer: MEDICARE

## 2020-01-01 ENCOUNTER — APPOINTMENT (OUTPATIENT)
Dept: ULTRASOUND IMAGING | Age: 80
DRG: 291 | End: 2020-01-01
Attending: FAMILY MEDICINE
Payer: MEDICARE

## 2020-01-01 ENCOUNTER — APPOINTMENT (OUTPATIENT)
Dept: GENERAL RADIOLOGY | Age: 80
DRG: 291 | End: 2020-01-01
Attending: INTERNAL MEDICINE
Payer: MEDICARE

## 2020-01-01 ENCOUNTER — APPOINTMENT (OUTPATIENT)
Dept: CT IMAGING | Age: 80
DRG: 291 | End: 2020-01-01
Attending: INTERNAL MEDICINE
Payer: MEDICARE

## 2020-01-01 ENCOUNTER — HOSPITAL ENCOUNTER (OUTPATIENT)
Dept: MRI IMAGING | Age: 80
Discharge: HOME | End: 2020-06-01
Attending: NEUROLOGICAL SURGERY
Payer: MEDICARE

## 2020-01-01 ENCOUNTER — HOSPITAL ENCOUNTER (OUTPATIENT)
Dept: LAB | Age: 80
Discharge: HOME | End: 2020-02-19
Payer: MEDICARE

## 2020-01-01 VITALS
HEART RATE: 80 BPM | TEMPERATURE: 97.7 F | SYSTOLIC BLOOD PRESSURE: 180 MMHG | HEIGHT: 69 IN | BODY MASS INDEX: 40.65 KG/M2 | DIASTOLIC BLOOD PRESSURE: 73 MMHG | RESPIRATION RATE: 21 BRPM | OXYGEN SATURATION: 93 % | WEIGHT: 274.43 LBS

## 2020-01-01 DIAGNOSIS — D64.9 ACUTE ON CHRONIC ANEMIA: ICD-10-CM

## 2020-01-01 DIAGNOSIS — R06.02 SOB (SHORTNESS OF BREATH): Primary | ICD-10-CM

## 2020-01-01 DIAGNOSIS — J96.21 ACUTE ON CHRONIC RESPIRATORY FAILURE WITH HYPOXIA (HCC): ICD-10-CM

## 2020-01-01 DIAGNOSIS — I50.33 ACUTE ON CHRONIC HEART FAILURE WITH PRESERVED EJECTION FRACTION (HCC): ICD-10-CM

## 2020-01-01 DIAGNOSIS — I48.91 ATRIAL FIBRILLATION, UNSPECIFIED TYPE (HCC): Chronic | ICD-10-CM

## 2020-01-01 DIAGNOSIS — I48.0 PAF (PAROXYSMAL ATRIAL FIBRILLATION) (HCC): ICD-10-CM

## 2020-01-01 DIAGNOSIS — E87.70 HYPERVOLEMIA, UNSPECIFIED HYPERVOLEMIA TYPE: ICD-10-CM

## 2020-01-01 DIAGNOSIS — Z51.5 ENCOUNTER FOR PALLIATIVE CARE: ICD-10-CM

## 2020-01-01 DIAGNOSIS — N18.5 CKD (CHRONIC KIDNEY DISEASE) STAGE 5, GFR LESS THAN 15 ML/MIN (HCC): Chronic | ICD-10-CM

## 2020-01-01 DIAGNOSIS — D47.2 MGUS (MONOCLONAL GAMMOPATHY OF UNKNOWN SIGNIFICANCE): ICD-10-CM

## 2020-01-01 DIAGNOSIS — I50.22 SYSTOLIC CHF, CHRONIC (HCC): ICD-10-CM

## 2020-01-01 DIAGNOSIS — I50.31 ACUTE HEART FAILURE WITH PRESERVED EJECTION FRACTION (HFPEF) (HCC): ICD-10-CM

## 2020-01-01 DIAGNOSIS — D32.9 MENINGIOMA (HCC): ICD-10-CM

## 2020-01-01 DIAGNOSIS — N18.9 CHRONIC KIDNEY DISEASE, UNSPECIFIED CKD STAGE: ICD-10-CM

## 2020-01-01 DIAGNOSIS — I10 ESSENTIAL HYPERTENSION: ICD-10-CM

## 2020-01-01 DIAGNOSIS — I25.10 CORONARY ARTERY DISEASE INVOLVING NATIVE CORONARY ARTERY OF NATIVE HEART WITHOUT ANGINA PECTORIS: ICD-10-CM

## 2020-01-01 DIAGNOSIS — N28.89 LEFT KIDNEY MASS: ICD-10-CM

## 2020-01-01 DIAGNOSIS — N28.89 RENAL MASS, RIGHT: ICD-10-CM

## 2020-01-01 DIAGNOSIS — I10 HYPERTENSION, UNSPECIFIED TYPE: Chronic | ICD-10-CM

## 2020-01-01 DIAGNOSIS — Z71.89 ACP (ADVANCE CARE PLANNING): ICD-10-CM

## 2020-01-01 DIAGNOSIS — N28.89 RENAL MASS, LEFT: ICD-10-CM

## 2020-01-01 DIAGNOSIS — I73.9 PAD (PERIPHERAL ARTERY DISEASE) (HCC): ICD-10-CM

## 2020-01-01 DIAGNOSIS — R53.81 PHYSICAL DEBILITY: ICD-10-CM

## 2020-01-01 LAB
ABO + RH BLD: NORMAL
ACC. NO. FROM MICRO ORDER, ACCP: ABNORMAL
ALBUMIN SERPL ELPH-MCNC: 3.57 G/DL (ref 3.2–5.6)
ALBUMIN SERPL-MCNC: 3.3 G/DL (ref 3.2–4.6)
ALBUMIN SERPL-MCNC: 3.4 G/DL (ref 3.2–4.6)
ALBUMIN/GLOB SERPL: 0.8 {RATIO} (ref 1.2–3.5)
ALBUMIN/GLOB SERPL: 0.9 {RATIO} (ref 1.2–3.5)
ALBUMIN/GLOB SERPL: 1 {RATIO}
ALP SERPL-CCNC: 108 U/L (ref 50–136)
ALP SERPL-CCNC: 133 U/L (ref 50–136)
ALPHA1 GLOB SERPL ELPH-MCNC: 0.25 G/DL (ref 0.1–0.4)
ALPHA2 GLOB SERPL ELPH-MCNC: 1.06 G/DL (ref 0.4–1.2)
ALT SERPL-CCNC: 11 U/L (ref 12–65)
ALT SERPL-CCNC: 14 U/L (ref 12–65)
ANION GAP SERPL CALC-SCNC: 10 MMOL/L (ref 7–16)
ANION GAP SERPL CALC-SCNC: 10 MMOL/L (ref 7–16)
ANION GAP SERPL CALC-SCNC: 11 MMOL/L (ref 7–16)
ANION GAP SERPL CALC-SCNC: 4 MMOL/L (ref 7–16)
ANION GAP SERPL CALC-SCNC: 5 MMOL/L (ref 7–16)
ANION GAP SERPL CALC-SCNC: 6 MMOL/L (ref 7–16)
ANION GAP SERPL CALC-SCNC: 7 MMOL/L (ref 7–16)
ANION GAP SERPL CALC-SCNC: 8 MMOL/L (ref 7–16)
ANION GAP SERPL CALC-SCNC: 9 MMOL/L (ref 7–16)
APPEARANCE UR: ABNORMAL
ARTERIAL PATENCY WRIST A: YES
ARTERIAL PATENCY WRIST A: YES
AST SERPL-CCNC: 7 U/L (ref 15–37)
AST SERPL-CCNC: 8 U/L (ref 15–37)
ATRIAL RATE: 62 BPM
ATRIAL RATE: 63 BPM
B-GLOBULIN SERPL QL ELPH: 1.04 G/DL (ref 0.6–1.3)
BACTERIA SPEC CULT: ABNORMAL
BACTERIA SPEC CULT: NORMAL
BACTERIA SPEC CULT: NORMAL
BACTERIA URNS QL MICRO: ABNORMAL /HPF
BASE DEFICIT BLD-SCNC: 6 MMOL/L
BASE DEFICIT BLD-SCNC: 8 MMOL/L
BASOPHILS # BLD: 0.1 K/UL (ref 0–0.2)
BASOPHILS # BLD: 0.1 K/UL (ref 0–0.2)
BASOPHILS NFR BLD: 1 % (ref 0–2)
BASOPHILS NFR BLD: 1 % (ref 0–2)
BDY SITE: ABNORMAL
BDY SITE: ABNORMAL
BILIRUB SERPL-MCNC: 0.4 MG/DL (ref 0.2–1.1)
BILIRUB SERPL-MCNC: 0.5 MG/DL (ref 0.2–1.1)
BILIRUB UR QL: NEGATIVE
BLD PROD TYP BPU: NORMAL
BLOOD GROUP ANTIBODIES SERPL: NORMAL
BNP SERPL-MCNC: 4349 PG/ML
BNP SERPL-MCNC: 9332 PG/ML
BNP SERPL-MCNC: ABNORMAL PG/ML
BNP SERPL-MCNC: ABNORMAL PG/ML
BPU ID: NORMAL
BUN SERPL-MCNC: 107 MG/DL (ref 8–23)
BUN SERPL-MCNC: 109 MG/DL (ref 8–23)
BUN SERPL-MCNC: 113 MG/DL (ref 8–23)
BUN SERPL-MCNC: 114 MG/DL (ref 8–23)
BUN SERPL-MCNC: 118 MG/DL (ref 8–23)
BUN SERPL-MCNC: 56 MG/DL (ref 8–23)
BUN SERPL-MCNC: 67 MG/DL (ref 8–23)
BUN SERPL-MCNC: 68 MG/DL (ref 8–23)
BUN SERPL-MCNC: 68 MG/DL (ref 8–23)
BUN SERPL-MCNC: 77 MG/DL (ref 8–23)
BUN SERPL-MCNC: 88 MG/DL (ref 8–23)
BUN SERPL-MCNC: 88 MG/DL (ref 8–23)
BUN SERPL-MCNC: 95 MG/DL (ref 8–23)
BUN SERPL-MCNC: 97 MG/DL (ref 8–23)
CALCIUM SERPL-MCNC: 8.3 MG/DL (ref 8.3–10.4)
CALCIUM SERPL-MCNC: 8.5 MG/DL (ref 8.3–10.4)
CALCIUM SERPL-MCNC: 8.8 MG/DL (ref 8.3–10.4)
CALCIUM SERPL-MCNC: 8.8 MG/DL (ref 8.3–10.4)
CALCIUM SERPL-MCNC: 8.9 MG/DL (ref 8.3–10.4)
CALCIUM SERPL-MCNC: 9.1 MG/DL (ref 8.3–10.4)
CALCIUM SERPL-MCNC: 9.2 MG/DL (ref 8.3–10.4)
CALCIUM SERPL-MCNC: 9.3 MG/DL (ref 8.3–10.4)
CALCIUM SERPL-MCNC: 9.3 MG/DL (ref 8.3–10.4)
CALCULATED P AXIS, ECG09: 76 DEGREES
CALCULATED P AXIS, ECG09: 95 DEGREES
CALCULATED R AXIS, ECG10: -39 DEGREES
CALCULATED R AXIS, ECG10: -43 DEGREES
CALCULATED T AXIS, ECG11: 106 DEGREES
CALCULATED T AXIS, ECG11: 126 DEGREES
CHLORIDE SERPL-SCNC: 102 MMOL/L (ref 98–107)
CHLORIDE SERPL-SCNC: 104 MMOL/L (ref 98–107)
CHLORIDE SERPL-SCNC: 107 MMOL/L (ref 98–107)
CHLORIDE SERPL-SCNC: 108 MMOL/L (ref 98–107)
CHLORIDE SERPL-SCNC: 109 MMOL/L (ref 98–107)
CHLORIDE SERPL-SCNC: 110 MMOL/L (ref 98–107)
CHLORIDE SERPL-SCNC: 110 MMOL/L (ref 98–107)
CHLORIDE SERPL-SCNC: 111 MMOL/L (ref 98–107)
CHLORIDE SERPL-SCNC: 113 MMOL/L (ref 98–107)
CHLORIDE SERPL-SCNC: 114 MMOL/L (ref 98–107)
CK SERPL-CCNC: 601 U/L (ref 21–215)
CO2 BLD-SCNC: 18 MMOL/L
CO2 BLD-SCNC: 20 MMOL/L
CO2 SERPL-SCNC: 20 MMOL/L (ref 21–32)
CO2 SERPL-SCNC: 21 MMOL/L (ref 21–32)
CO2 SERPL-SCNC: 22 MMOL/L (ref 21–32)
CO2 SERPL-SCNC: 24 MMOL/L (ref 21–32)
CO2 SERPL-SCNC: 24 MMOL/L (ref 21–32)
CO2 SERPL-SCNC: 29 MMOL/L (ref 21–32)
COLLECT TIME,HTIME: 1320
COLLECT TIME,HTIME: 1550
COLOR UR: YELLOW
COVID-19 RAPID TEST, COVR: NOT DETECTED
CREAT SERPL-MCNC: 2.76 MG/DL (ref 0.6–1)
CREAT SERPL-MCNC: 2.76 MG/DL (ref 0.6–1)
CREAT SERPL-MCNC: 2.85 MG/DL (ref 0.6–1)
CREAT SERPL-MCNC: 2.92 MG/DL (ref 0.6–1)
CREAT SERPL-MCNC: 2.95 MG/DL (ref 0.6–1)
CREAT SERPL-MCNC: 3.03 MG/DL (ref 0.6–1)
CREAT SERPL-MCNC: 3.08 MG/DL (ref 0.6–1)
CREAT SERPL-MCNC: 3.37 MG/DL (ref 0.6–1)
CREAT SERPL-MCNC: 3.5 MG/DL (ref 0.6–1)
CREAT SERPL-MCNC: 3.51 MG/DL (ref 0.6–1)
CREAT SERPL-MCNC: 3.56 MG/DL (ref 0.6–1)
CREAT SERPL-MCNC: 3.8 MG/DL (ref 0.6–1)
CREAT SERPL-MCNC: 3.8 MG/DL (ref 0.6–1)
CREAT SERPL-MCNC: 3.95 MG/DL (ref 0.6–1)
CREAT UR-MCNC: 124 MG/DL
CREAT UR-MCNC: 127 MG/DL
CROSSMATCH RESULT,%XM: NORMAL
CRYSTALS URNS QL MICRO: ABNORMAL /LPF
D DIMER PPP FEU-MCNC: 1.43 UG/ML(FEU)
DIAGNOSIS, 93000: NORMAL
DIAGNOSIS, 93000: NORMAL
DIFFERENTIAL METHOD BLD: ABNORMAL
DIFFERENTIAL METHOD BLD: ABNORMAL
ENTEROBACTER CLOACAE COMPLEX, ECCP: DETECTED
EOSINOPHIL # BLD: 0.3 K/UL (ref 0–0.8)
EOSINOPHIL # BLD: 0.5 K/UL (ref 0–0.8)
EOSINOPHIL NFR BLD: 4 % (ref 0.5–7.8)
EOSINOPHIL NFR BLD: 5 % (ref 0.5–7.8)
EPI CELLS #/AREA URNS HPF: ABNORMAL /HPF
ERYTHROCYTE [DISTWIDTH] IN BLOOD BY AUTOMATED COUNT: 12.6 % (ref 11.9–14.6)
ERYTHROCYTE [DISTWIDTH] IN BLOOD BY AUTOMATED COUNT: 13.4 % (ref 11.9–14.6)
ERYTHROCYTE [DISTWIDTH] IN BLOOD BY AUTOMATED COUNT: 13.5 % (ref 11.9–14.6)
ERYTHROCYTE [DISTWIDTH] IN BLOOD BY AUTOMATED COUNT: 13.6 % (ref 11.9–14.6)
ERYTHROCYTE [DISTWIDTH] IN BLOOD BY AUTOMATED COUNT: 13.6 % (ref 11.9–14.6)
ERYTHROCYTE [DISTWIDTH] IN BLOOD BY AUTOMATED COUNT: 13.7 % (ref 11.9–14.6)
ERYTHROCYTE [DISTWIDTH] IN BLOOD BY AUTOMATED COUNT: 14.5 % (ref 11.9–14.6)
ERYTHROCYTE [DISTWIDTH] IN BLOOD BY AUTOMATED COUNT: 14.6 % (ref 11.9–14.6)
ERYTHROCYTE [DISTWIDTH] IN BLOOD BY AUTOMATED COUNT: 14.6 % (ref 11.9–14.6)
ERYTHROCYTE [DISTWIDTH] IN BLOOD BY AUTOMATED COUNT: 14.9 % (ref 11.9–14.6)
ERYTHROCYTE [DISTWIDTH] IN BLOOD BY AUTOMATED COUNT: 15.1 % (ref 11.9–14.6)
ERYTHROCYTE [DISTWIDTH] IN BLOOD BY AUTOMATED COUNT: 15.4 % (ref 11.9–14.6)
ERYTHROCYTE [DISTWIDTH] IN BLOOD BY AUTOMATED COUNT: 15.5 % (ref 11.9–14.6)
EXHALED MINUTE VOLUME, VE: 29.3 L/MIN
FERRITIN SERPL-MCNC: 697 NG/ML (ref 8–388)
FLOW RATE ISTAT,IFRATE: 3 L/MIN
FOLATE SERPL-MCNC: 13.6 NG/ML (ref 3.1–17.5)
GAMMA GLOB MFR SERPL ELPH: 1.19 G/DL (ref 0.5–1.6)
GAS FLOW.O2 O2 DELIVERY SYS: ABNORMAL L/MIN
GAS FLOW.O2 O2 DELIVERY SYS: ABNORMAL L/MIN
GAS FLOW.O2 SETTING OXYMISER: 8 BPM
GLOBULIN SER CALC-MCNC: 4 G/DL (ref 2.3–3.5)
GLOBULIN SER CALC-MCNC: 4.2 G/DL (ref 2.3–3.5)
GLUCOSE BLD STRIP.AUTO-MCNC: 106 MG/DL (ref 65–100)
GLUCOSE BLD STRIP.AUTO-MCNC: 107 MG/DL (ref 65–100)
GLUCOSE BLD STRIP.AUTO-MCNC: 108 MG/DL (ref 65–100)
GLUCOSE BLD STRIP.AUTO-MCNC: 108 MG/DL (ref 65–100)
GLUCOSE BLD STRIP.AUTO-MCNC: 110 MG/DL (ref 65–100)
GLUCOSE BLD STRIP.AUTO-MCNC: 119 MG/DL (ref 65–100)
GLUCOSE BLD STRIP.AUTO-MCNC: 120 MG/DL (ref 65–100)
GLUCOSE BLD STRIP.AUTO-MCNC: 120 MG/DL (ref 65–100)
GLUCOSE BLD STRIP.AUTO-MCNC: 121 MG/DL (ref 65–100)
GLUCOSE BLD STRIP.AUTO-MCNC: 123 MG/DL (ref 65–100)
GLUCOSE BLD STRIP.AUTO-MCNC: 123 MG/DL (ref 65–100)
GLUCOSE BLD STRIP.AUTO-MCNC: 131 MG/DL (ref 65–100)
GLUCOSE BLD STRIP.AUTO-MCNC: 134 MG/DL (ref 65–100)
GLUCOSE BLD STRIP.AUTO-MCNC: 140 MG/DL (ref 65–100)
GLUCOSE BLD STRIP.AUTO-MCNC: 141 MG/DL (ref 65–100)
GLUCOSE BLD STRIP.AUTO-MCNC: 143 MG/DL (ref 65–100)
GLUCOSE BLD STRIP.AUTO-MCNC: 149 MG/DL (ref 65–100)
GLUCOSE BLD STRIP.AUTO-MCNC: 159 MG/DL (ref 65–100)
GLUCOSE BLD STRIP.AUTO-MCNC: 160 MG/DL (ref 65–100)
GLUCOSE BLD STRIP.AUTO-MCNC: 163 MG/DL (ref 65–100)
GLUCOSE BLD STRIP.AUTO-MCNC: 164 MG/DL (ref 65–100)
GLUCOSE BLD STRIP.AUTO-MCNC: 168 MG/DL (ref 65–100)
GLUCOSE BLD STRIP.AUTO-MCNC: 171 MG/DL (ref 65–100)
GLUCOSE BLD STRIP.AUTO-MCNC: 173 MG/DL (ref 65–100)
GLUCOSE BLD STRIP.AUTO-MCNC: 191 MG/DL (ref 65–100)
GLUCOSE BLD STRIP.AUTO-MCNC: 196 MG/DL (ref 65–100)
GLUCOSE BLD STRIP.AUTO-MCNC: 197 MG/DL (ref 65–100)
GLUCOSE BLD STRIP.AUTO-MCNC: 199 MG/DL (ref 65–100)
GLUCOSE BLD STRIP.AUTO-MCNC: 200 MG/DL (ref 65–100)
GLUCOSE BLD STRIP.AUTO-MCNC: 201 MG/DL (ref 65–100)
GLUCOSE BLD STRIP.AUTO-MCNC: 209 MG/DL (ref 65–100)
GLUCOSE BLD STRIP.AUTO-MCNC: 227 MG/DL (ref 65–100)
GLUCOSE BLD STRIP.AUTO-MCNC: 237 MG/DL (ref 65–100)
GLUCOSE BLD STRIP.AUTO-MCNC: 260 MG/DL (ref 65–100)
GLUCOSE BLD STRIP.AUTO-MCNC: 293 MG/DL (ref 65–100)
GLUCOSE BLD STRIP.AUTO-MCNC: 64 MG/DL (ref 65–100)
GLUCOSE BLD STRIP.AUTO-MCNC: 70 MG/DL (ref 65–100)
GLUCOSE BLD STRIP.AUTO-MCNC: 89 MG/DL (ref 65–100)
GLUCOSE BLD STRIP.AUTO-MCNC: 95 MG/DL (ref 65–100)
GLUCOSE BLD STRIP.AUTO-MCNC: 96 MG/DL (ref 65–100)
GLUCOSE SERPL-MCNC: 101 MG/DL (ref 65–100)
GLUCOSE SERPL-MCNC: 102 MG/DL (ref 65–100)
GLUCOSE SERPL-MCNC: 112 MG/DL (ref 65–100)
GLUCOSE SERPL-MCNC: 119 MG/DL (ref 65–100)
GLUCOSE SERPL-MCNC: 125 MG/DL (ref 65–100)
GLUCOSE SERPL-MCNC: 126 MG/DL (ref 65–100)
GLUCOSE SERPL-MCNC: 128 MG/DL (ref 65–100)
GLUCOSE SERPL-MCNC: 148 MG/DL (ref 65–100)
GLUCOSE SERPL-MCNC: 53 MG/DL (ref 65–100)
GLUCOSE SERPL-MCNC: 54 MG/DL (ref 65–100)
GLUCOSE SERPL-MCNC: 62 MG/DL (ref 65–100)
GLUCOSE SERPL-MCNC: 93 MG/DL (ref 65–100)
GLUCOSE SERPL-MCNC: 95 MG/DL (ref 65–100)
GLUCOSE SERPL-MCNC: 97 MG/DL (ref 65–100)
GLUCOSE UR STRIP.AUTO-MCNC: NEGATIVE MG/DL
GRAM STN SPEC: ABNORMAL
GRAM STN SPEC: NORMAL
HCO3 BLD-SCNC: 17 MMOL/L (ref 22–26)
HCO3 BLD-SCNC: 18.6 MMOL/L (ref 22–26)
HCT VFR BLD AUTO: 21.8 % (ref 35.8–46.3)
HCT VFR BLD AUTO: 23 % (ref 35.8–46.3)
HCT VFR BLD AUTO: 23.2 % (ref 35.8–46.3)
HCT VFR BLD AUTO: 23.5 % (ref 35.8–46.3)
HCT VFR BLD AUTO: 23.7 % (ref 35.8–46.3)
HCT VFR BLD AUTO: 23.9 % (ref 35.8–46.3)
HCT VFR BLD AUTO: 24.2 % (ref 35.8–46.3)
HCT VFR BLD AUTO: 24.5 % (ref 35.8–46.3)
HCT VFR BLD AUTO: 24.7 % (ref 35.8–46.3)
HCT VFR BLD AUTO: 25.5 % (ref 35.8–46.3)
HCT VFR BLD AUTO: 25.6 % (ref 35.8–46.3)
HCT VFR BLD AUTO: 26.4 % (ref 35.8–46.3)
HCT VFR BLD AUTO: 26.9 % (ref 35.8–46.3)
HCT VFR BLD AUTO: 30 % (ref 35.8–46.3)
HCT VFR BLD AUTO: 30.3 % (ref 35.8–46.3)
HGB BLD-MCNC: 10 G/DL (ref 11.7–15.4)
HGB BLD-MCNC: 10.3 G/DL (ref 11.7–15.4)
HGB BLD-MCNC: 6.9 G/DL (ref 11.7–15.4)
HGB BLD-MCNC: 7.1 G/DL (ref 11.7–15.4)
HGB BLD-MCNC: 7.2 G/DL (ref 11.7–15.4)
HGB BLD-MCNC: 7.2 G/DL (ref 11.7–15.4)
HGB BLD-MCNC: 7.3 G/DL (ref 11.7–15.4)
HGB BLD-MCNC: 7.5 G/DL (ref 11.7–15.4)
HGB BLD-MCNC: 7.5 G/DL (ref 11.7–15.4)
HGB BLD-MCNC: 7.6 G/DL (ref 11.7–15.4)
HGB BLD-MCNC: 7.6 G/DL (ref 11.7–15.4)
HGB BLD-MCNC: 7.8 G/DL (ref 11.7–15.4)
HGB BLD-MCNC: 8 G/DL (ref 11.7–15.4)
HGB BLD-MCNC: 8.1 G/DL (ref 11.7–15.4)
HGB BLD-MCNC: 8.2 G/DL (ref 11.7–15.4)
HGB RETIC QN AUTO: 33 PG (ref 29–35)
HGB UR QL STRIP: ABNORMAL
HISTORY CHECKED?,CKHIST: NORMAL
IGA SERPL-MCNC: 102 MG/DL (ref 85–499)
IGG SERPL-MCNC: 1099 MG/DL (ref 610–1616)
IGM SERPL-MCNC: 56 MG/DL (ref 35–242)
IMM GRANULOCYTES # BLD AUTO: 0.1 K/UL (ref 0–0.5)
IMM GRANULOCYTES # BLD AUTO: 0.1 K/UL (ref 0–0.5)
IMM GRANULOCYTES NFR BLD AUTO: 1 % (ref 0–5)
IMM GRANULOCYTES NFR BLD AUTO: 1 % (ref 0–5)
IMM RETICS NFR: 20.9 % (ref 3–15.9)
INSPIRATION.DURATION SETTING TIME VENT: 0.9 SEC
INTERPRETATION: ABNORMAL
IRON SATN MFR SERPL: 13 %
IRON SERPL-MCNC: 32 UG/DL (ref 35–150)
KAPPA LC FREE SER-MCNC: 104.25 MG/L (ref 3.3–19.4)
KAPPA LC FREE/LAMBDA FREE SER: 1.49 {RATIO} (ref 0.26–1.65)
KETONES UR QL STRIP.AUTO: NEGATIVE MG/DL
KPC (CARBAPENEM RESISTANCE GENE): NOT DETECTED
LACTATE SERPL-SCNC: 1 MMOL/L (ref 0.4–2)
LACTATE SERPL-SCNC: 1.1 MMOL/L (ref 0.4–2)
LAMBDA LC FREE SERPL-MCNC: 69.92 MG/L (ref 5.71–26.3)
LEUKOCYTE ESTERASE UR QL STRIP.AUTO: ABNORMAL
LYMPHOCYTES # BLD: 1.1 K/UL (ref 0.5–4.6)
LYMPHOCYTES # BLD: 1.3 K/UL (ref 0.5–4.6)
LYMPHOCYTES NFR BLD: 11 % (ref 13–44)
LYMPHOCYTES NFR BLD: 16 % (ref 13–44)
M PROTEIN SERPL ELPH-MCNC: 0.5 G/DL
MAGNESIUM SERPL-MCNC: 2.5 MG/DL (ref 1.8–2.4)
MAGNESIUM SERPL-MCNC: 3 MG/DL (ref 1.8–2.4)
MCH RBC QN AUTO: 30.5 PG (ref 26.1–32.9)
MCH RBC QN AUTO: 30.7 PG (ref 26.1–32.9)
MCH RBC QN AUTO: 30.7 PG (ref 26.1–32.9)
MCH RBC QN AUTO: 30.9 PG (ref 26.1–32.9)
MCH RBC QN AUTO: 31 PG (ref 26.1–32.9)
MCH RBC QN AUTO: 31.1 PG (ref 26.1–32.9)
MCH RBC QN AUTO: 31.7 PG (ref 26.1–32.9)
MCH RBC QN AUTO: 31.7 PG (ref 26.1–32.9)
MCH RBC QN AUTO: 31.9 PG (ref 26.1–32.9)
MCH RBC QN AUTO: 32.2 PG (ref 26.1–32.9)
MCH RBC QN AUTO: 32.7 PG (ref 26.1–32.9)
MCHC RBC AUTO-ENTMCNC: 30.3 G/DL (ref 31.4–35)
MCHC RBC AUTO-ENTMCNC: 30.4 G/DL (ref 31.4–35)
MCHC RBC AUTO-ENTMCNC: 30.4 G/DL (ref 31.4–35)
MCHC RBC AUTO-ENTMCNC: 30.5 G/DL (ref 31.4–35)
MCHC RBC AUTO-ENTMCNC: 30.6 G/DL (ref 31.4–35)
MCHC RBC AUTO-ENTMCNC: 31.1 G/DL (ref 31.4–35)
MCHC RBC AUTO-ENTMCNC: 31.3 G/DL (ref 31.4–35)
MCHC RBC AUTO-ENTMCNC: 31.6 G/DL (ref 31.4–35)
MCHC RBC AUTO-ENTMCNC: 31.7 G/DL (ref 31.4–35)
MCHC RBC AUTO-ENTMCNC: 31.8 G/DL (ref 31.4–35)
MCHC RBC AUTO-ENTMCNC: 34 G/DL (ref 31.4–35)
MCV RBC AUTO: 100.4 FL (ref 79.6–97.8)
MCV RBC AUTO: 100.4 FL (ref 79.6–97.8)
MCV RBC AUTO: 100.8 FL (ref 79.6–97.8)
MCV RBC AUTO: 101.1 FL (ref 79.6–97.8)
MCV RBC AUTO: 101.2 FL (ref 79.6–97.8)
MCV RBC AUTO: 101.7 FL (ref 79.6–97.8)
MCV RBC AUTO: 101.8 FL (ref 79.6–97.8)
MCV RBC AUTO: 101.8 FL (ref 79.6–97.8)
MCV RBC AUTO: 101.9 FL (ref 79.6–97.8)
MCV RBC AUTO: 102.2 FL (ref 79.6–97.8)
MCV RBC AUTO: 104.7 FL (ref 79.6–97.8)
MCV RBC AUTO: 96.2 FL (ref 79.6–97.8)
MCV RBC AUTO: 99.6 FL (ref 79.6–97.8)
MONOCYTES # BLD: 0.5 K/UL (ref 0.1–1.3)
MONOCYTES # BLD: 0.6 K/UL (ref 0.1–1.3)
MONOCYTES NFR BLD: 6 % (ref 4–12)
MONOCYTES NFR BLD: 6 % (ref 4–12)
NEUTS SEG # BLD: 5.9 K/UL (ref 1.7–8.2)
NEUTS SEG # BLD: 7.2 K/UL (ref 1.7–8.2)
NEUTS SEG NFR BLD: 72 % (ref 43–78)
NEUTS SEG NFR BLD: 76 % (ref 43–78)
NITRITE UR QL STRIP.AUTO: NEGATIVE
NRBC # BLD: 0 K/UL (ref 0–0.2)
O2/TOTAL GAS SETTING VFR VENT: 60 %
P-R INTERVAL, ECG05: 176 MS
P-R INTERVAL, ECG05: 178 MS
PCO2 BLD: 30.4 MMHG (ref 35–45)
PCO2 BLD: 33.5 MMHG (ref 35–45)
PEEP RESPIRATORY: 8 CMH2O
PH BLD: 7.35 [PH] (ref 7.35–7.45)
PH BLD: 7.36 [PH] (ref 7.35–7.45)
PH UR STRIP: 7.5 [PH] (ref 5–9)
PHOSPHATE SERPL-MCNC: 6.6 MG/DL (ref 2.3–3.7)
PIP ISTAT,IPIP: 18
PLATELET # BLD AUTO: 213 K/UL (ref 150–450)
PLATELET # BLD AUTO: 221 K/UL (ref 150–450)
PLATELET # BLD AUTO: 226 K/UL (ref 150–450)
PLATELET # BLD AUTO: 227 K/UL (ref 150–450)
PLATELET # BLD AUTO: 232 K/UL (ref 150–450)
PLATELET # BLD AUTO: 246 K/UL (ref 150–450)
PLATELET # BLD AUTO: 249 K/UL (ref 150–450)
PLATELET # BLD AUTO: 251 K/UL (ref 150–450)
PLATELET # BLD AUTO: 253 K/UL (ref 150–450)
PLATELET # BLD AUTO: 263 K/UL (ref 150–450)
PLATELET # BLD AUTO: 276 K/UL (ref 150–450)
PLATELET # BLD AUTO: 280 K/UL (ref 150–450)
PLATELET # BLD AUTO: 292 K/UL (ref 150–450)
PMV BLD AUTO: 10.8 FL (ref 9.4–12.3)
PMV BLD AUTO: 10.8 FL (ref 9.4–12.3)
PMV BLD AUTO: 11 FL (ref 9.4–12.3)
PMV BLD AUTO: 11 FL (ref 9.4–12.3)
PMV BLD AUTO: 11.1 FL (ref 9.4–12.3)
PMV BLD AUTO: 11.1 FL (ref 9.4–12.3)
PMV BLD AUTO: 11.3 FL (ref 9.4–12.3)
PMV BLD AUTO: 11.3 FL (ref 9.4–12.3)
PMV BLD AUTO: 11.5 FL (ref 9.4–12.3)
PMV BLD AUTO: 11.5 FL (ref 9.4–12.3)
PMV BLD AUTO: 11.6 FL (ref 9.4–12.3)
PMV BLD AUTO: 11.7 FL (ref 9.4–12.3)
PMV BLD AUTO: 11.8 FL (ref 9.4–12.3)
PO2 BLD: 245 MMHG (ref 75–100)
PO2 BLD: 75 MMHG (ref 75–100)
POTASSIUM SERPL-SCNC: 3.9 MMOL/L (ref 3.5–5.1)
POTASSIUM SERPL-SCNC: 4 MMOL/L (ref 3.5–5.1)
POTASSIUM SERPL-SCNC: 4.3 MMOL/L (ref 3.5–5.1)
POTASSIUM SERPL-SCNC: 4.6 MMOL/L (ref 3.5–5.1)
POTASSIUM SERPL-SCNC: 4.8 MMOL/L (ref 3.5–5.1)
POTASSIUM SERPL-SCNC: 4.8 MMOL/L (ref 3.5–5.1)
POTASSIUM SERPL-SCNC: 4.9 MMOL/L (ref 3.5–5.1)
POTASSIUM SERPL-SCNC: 5 MMOL/L (ref 3.5–5.1)
POTASSIUM SERPL-SCNC: 5.1 MMOL/L (ref 3.5–5.1)
POTASSIUM SERPL-SCNC: 5.2 MMOL/L (ref 3.5–5.1)
POTASSIUM SERPL-SCNC: 5.2 MMOL/L (ref 3.5–5.1)
POTASSIUM SERPL-SCNC: 5.3 MMOL/L (ref 3.5–5.1)
POTASSIUM SERPL-SCNC: 5.3 MMOL/L (ref 3.5–5.1)
PROCALCITONIN SERPL-MCNC: 0.08 NG/ML
PROCALCITONIN SERPL-MCNC: 38.15 NG/ML
PROCALCITONIN SERPL-MCNC: 4.06 NG/ML
PROT PATTERN SERPL ELPH-IMP: ABNORMAL
PROT PATTERN SPEC IFE-IMP: ABNORMAL
PROT SERPL-MCNC: 7.1 G/DL (ref 6.3–8.2)
PROT SERPL-MCNC: 7.4 G/DL (ref 6.3–8.2)
PROT SERPL-MCNC: 7.5 G/DL (ref 6.3–8.2)
PROT UR STRIP-MCNC: 30 MG/DL
PROT UR-MCNC: 64 MG/DL
PROT/CREAT UR-RTO: 0.5
Q-T INTERVAL, ECG07: 408 MS
Q-T INTERVAL, ECG07: 412 MS
QRS DURATION, ECG06: 104 MS
QRS DURATION, ECG06: 110 MS
QTC CALCULATION (BEZET), ECG08: 417 MS
QTC CALCULATION (BEZET), ECG08: 418 MS
RBC # BLD AUTO: 2.14 M/UL (ref 4.05–5.2)
RBC # BLD AUTO: 2.26 M/UL (ref 4.05–5.2)
RBC # BLD AUTO: 2.28 M/UL (ref 4.05–5.2)
RBC # BLD AUTO: 2.3 M/UL (ref 4.05–5.2)
RBC # BLD AUTO: 2.33 M/UL (ref 4.05–5.2)
RBC # BLD AUTO: 2.4 M/UL (ref 4.05–5.2)
RBC # BLD AUTO: 2.42 M/UL (ref 4.05–5.2)
RBC # BLD AUTO: 2.46 M/UL (ref 4.05–5.2)
RBC # BLD AUTO: 2.54 M/UL (ref 4.05–5.2)
RBC # BLD AUTO: 2.54 M/UL (ref 4.05–5.2)
RBC # BLD AUTO: 2.57 M/UL (ref 4.05–5.2)
RBC # BLD AUTO: 2.61 M/UL (ref 4.05–5.2)
RBC # BLD AUTO: 3.15 M/UL (ref 4.05–5.25)
RBC #/AREA URNS HPF: ABNORMAL /HPF
RETICS # AUTO: 0.08 M/UL (ref 0.03–0.1)
RETICS/RBC NFR AUTO: 3.6 % (ref 0.3–2)
SAO2 % BLD: 100 % (ref 95–98)
SAO2 % BLD: 94 % (ref 95–98)
SARS COV-2, XPGCVT: NEGATIVE
SERVICE CMNT-IMP: ABNORMAL
SERVICE CMNT-IMP: NORMAL
SERVICE CMNT-IMP: NORMAL
SODIUM SERPL-SCNC: 136 MMOL/L (ref 136–145)
SODIUM SERPL-SCNC: 137 MMOL/L (ref 136–145)
SODIUM SERPL-SCNC: 137 MMOL/L (ref 136–145)
SODIUM SERPL-SCNC: 139 MMOL/L (ref 136–145)
SODIUM SERPL-SCNC: 140 MMOL/L (ref 136–145)
SODIUM SERPL-SCNC: 141 MMOL/L (ref 136–145)
SODIUM SERPL-SCNC: 141 MMOL/L (ref 136–145)
SODIUM SERPL-SCNC: 142 MMOL/L (ref 136–145)
SODIUM SERPL-SCNC: 143 MMOL/L (ref 136–145)
SODIUM SERPL-SCNC: 143 MMOL/L (ref 136–145)
SODIUM UR-SCNC: 20 MMOL/L
SOURCE, COVRS: NORMAL
SP GR UR REFRACTOMETRY: 1.02 (ref 1–1.02)
SPECIMEN EXP DATE BLD: NORMAL
SPECIMEN TYPE: ABNORMAL
SPECIMEN TYPE: ABNORMAL
STATUS OF UNIT,%ST: NORMAL
TIBC SERPL-MCNC: 245 UG/DL (ref 250–450)
TROPONIN-HIGH SENSITIVITY: 31.6 PG/ML (ref 0–14)
TROPONIN-HIGH SENSITIVITY: 32.9 PG/ML (ref 0–14)
TROPONIN-HIGH SENSITIVITY: 329 PG/ML (ref 0–14)
TROPONIN-HIGH SENSITIVITY: 394 PG/ML (ref 0–14)
TROPONIN-HIGH SENSITIVITY: 455 PG/ML (ref 0–14)
UNIT DIVISION, %UDIV: 0
UROBILINOGEN UR QL STRIP.AUTO: 0.2 EU/DL (ref 0.2–1)
VANCOMYCIN SERPL-MCNC: 16.5 UG/ML
VANCOMYCIN SERPL-MCNC: 22.1 UG/ML
VENTRICULAR RATE, ECG03: 62 BPM
VENTRICULAR RATE, ECG03: 63 BPM
VIT B12 SERPL-MCNC: 399 PG/ML (ref 193–986)
VT SETTING VENT: 999 ML
WBC # BLD AUTO: 10.6 K/UL (ref 4.3–11.1)
WBC # BLD AUTO: 11 K/UL (ref 4.3–11.1)
WBC # BLD AUTO: 11.4 K/UL (ref 4.3–11.1)
WBC # BLD AUTO: 11.4 K/UL (ref 4.3–11.1)
WBC # BLD AUTO: 13.6 K/UL (ref 4.3–11.1)
WBC # BLD AUTO: 13.7 K/UL (ref 4.3–11.1)
WBC # BLD AUTO: 8.2 K/UL (ref 4.3–11.1)
WBC # BLD AUTO: 8.7 K/UL (ref 4.3–11.1)
WBC # BLD AUTO: 8.8 K/UL (ref 4.3–11.1)
WBC # BLD AUTO: 8.8 K/UL (ref 4.3–11.1)
WBC # BLD AUTO: 9.4 K/UL (ref 4.3–11.1)
WBC URNS QL MICRO: ABNORMAL /HPF

## 2020-01-01 PROCEDURE — 74011636637 HC RX REV CODE- 636/637: Performed by: FAMILY MEDICINE

## 2020-01-01 PROCEDURE — 74011250637 HC RX REV CODE- 250/637: Performed by: INTERNAL MEDICINE

## 2020-01-01 PROCEDURE — 80048 BASIC METABOLIC PNL TOTAL CA: CPT

## 2020-01-01 PROCEDURE — 74011000258 HC RX REV CODE- 258: Performed by: FAMILY MEDICINE

## 2020-01-01 PROCEDURE — 74011250636 HC RX REV CODE- 250/636: Performed by: FAMILY MEDICINE

## 2020-01-01 PROCEDURE — 74011250637 HC RX REV CODE- 250/637: Performed by: FAMILY MEDICINE

## 2020-01-01 PROCEDURE — 71250 CT THORAX DX C-: CPT

## 2020-01-01 PROCEDURE — 82550 ASSAY OF CK (CPK): CPT

## 2020-01-01 PROCEDURE — 76770 US EXAM ABDO BACK WALL COMP: CPT

## 2020-01-01 PROCEDURE — 87086 URINE CULTURE/COLONY COUNT: CPT

## 2020-01-01 PROCEDURE — 74011000636 HC RX REV CODE- 636: Performed by: INTERNAL MEDICINE

## 2020-01-01 PROCEDURE — 99223 1ST HOSP IP/OBS HIGH 75: CPT | Performed by: INTERNAL MEDICINE

## 2020-01-01 PROCEDURE — 74011000250 HC RX REV CODE- 250: Performed by: FAMILY MEDICINE

## 2020-01-01 PROCEDURE — 36415 COLL VENOUS BLD VENIPUNCTURE: CPT

## 2020-01-01 PROCEDURE — 70553 MRI BRAIN STEM W/O & W/DYE: CPT

## 2020-01-01 PROCEDURE — 99222 1ST HOSP IP/OBS MODERATE 55: CPT | Performed by: INTERNAL MEDICINE

## 2020-01-01 PROCEDURE — 85018 HEMOGLOBIN: CPT

## 2020-01-01 PROCEDURE — 99233 SBSQ HOSP IP/OBS HIGH 50: CPT | Performed by: INTERNAL MEDICINE

## 2020-01-01 PROCEDURE — 83880 ASSAY OF NATRIURETIC PEPTIDE: CPT

## 2020-01-01 PROCEDURE — C9113 INJ PANTOPRAZOLE SODIUM, VIA: HCPCS | Performed by: FAMILY MEDICINE

## 2020-01-01 PROCEDURE — 36600 WITHDRAWAL OF ARTERIAL BLOOD: CPT

## 2020-01-01 PROCEDURE — 80202 ASSAY OF VANCOMYCIN: CPT

## 2020-01-01 PROCEDURE — 85027 COMPLETE CBC AUTOMATED: CPT

## 2020-01-01 PROCEDURE — 65270000029 HC RM PRIVATE

## 2020-01-01 PROCEDURE — 2709999900 HC NON-CHARGEABLE SUPPLY

## 2020-01-01 PROCEDURE — 97530 THERAPEUTIC ACTIVITIES: CPT

## 2020-01-01 PROCEDURE — 82962 GLUCOSE BLOOD TEST: CPT

## 2020-01-01 PROCEDURE — 77030038269 HC DRN EXT URIN PURWCK BARD -A

## 2020-01-01 PROCEDURE — 87186 SC STD MICRODIL/AGAR DIL: CPT

## 2020-01-01 PROCEDURE — 83540 ASSAY OF IRON: CPT

## 2020-01-01 PROCEDURE — 84484 ASSAY OF TROPONIN QUANT: CPT

## 2020-01-01 PROCEDURE — 82728 ASSAY OF FERRITIN: CPT

## 2020-01-01 PROCEDURE — 36430 TRANSFUSION BLD/BLD COMPNT: CPT

## 2020-01-01 PROCEDURE — C8929 TTE W OR WO FOL WCON,DOPPLER: HCPCS

## 2020-01-01 PROCEDURE — 87040 BLOOD CULTURE FOR BACTERIA: CPT

## 2020-01-01 PROCEDURE — 85379 FIBRIN DEGRADATION QUANT: CPT

## 2020-01-01 PROCEDURE — 82746 ASSAY OF FOLIC ACID SERUM: CPT

## 2020-01-01 PROCEDURE — 84156 ASSAY OF PROTEIN URINE: CPT

## 2020-01-01 PROCEDURE — 71045 X-RAY EXAM CHEST 1 VIEW: CPT

## 2020-01-01 PROCEDURE — 99231 SBSQ HOSP IP/OBS SF/LOW 25: CPT | Performed by: INTERNAL MEDICINE

## 2020-01-01 PROCEDURE — 83883 ASSAY NEPHELOMETRY NOT SPEC: CPT

## 2020-01-01 PROCEDURE — 87077 CULTURE AEROBIC IDENTIFY: CPT

## 2020-01-01 PROCEDURE — 77010033678 HC OXYGEN DAILY

## 2020-01-01 PROCEDURE — 82607 VITAMIN B-12: CPT

## 2020-01-01 PROCEDURE — 85025 COMPLETE CBC W/AUTO DIFF WBC: CPT

## 2020-01-01 PROCEDURE — 74011636637 HC RX REV CODE- 636/637: Performed by: INTERNAL MEDICINE

## 2020-01-01 PROCEDURE — 97161 PT EVAL LOW COMPLEX 20 MIN: CPT

## 2020-01-01 PROCEDURE — 86923 COMPATIBILITY TEST ELECTRIC: CPT

## 2020-01-01 PROCEDURE — 74011250636 HC RX REV CODE- 250/636: Performed by: EMERGENCY MEDICINE

## 2020-01-01 PROCEDURE — 87635 SARS-COV-2 COVID-19 AMP PRB: CPT

## 2020-01-01 PROCEDURE — 97110 THERAPEUTIC EXERCISES: CPT

## 2020-01-01 PROCEDURE — 99285 EMERGENCY DEPT VISIT HI MDM: CPT

## 2020-01-01 PROCEDURE — 87070 CULTURE OTHR SPECIMN AEROBIC: CPT

## 2020-01-01 PROCEDURE — 80053 COMPREHEN METABOLIC PANEL: CPT

## 2020-01-01 PROCEDURE — 84145 PROCALCITONIN (PCT): CPT

## 2020-01-01 PROCEDURE — 99221 1ST HOSP IP/OBS SF/LOW 40: CPT | Performed by: NURSE PRACTITIONER

## 2020-01-01 PROCEDURE — 82803 BLOOD GASES ANY COMBINATION: CPT

## 2020-01-01 PROCEDURE — 65660000000 HC RM CCU STEPDOWN

## 2020-01-01 PROCEDURE — 5A09357 ASSISTANCE WITH RESPIRATORY VENTILATION, LESS THAN 24 CONSECUTIVE HOURS, CONTINUOUS POSITIVE AIRWAY PRESSURE: ICD-10-PCS | Performed by: FAMILY MEDICINE

## 2020-01-01 PROCEDURE — 74011250636 HC RX REV CODE- 250/636: Performed by: INTERNAL MEDICINE

## 2020-01-01 PROCEDURE — 81001 URINALYSIS AUTO W/SCOPE: CPT

## 2020-01-01 PROCEDURE — 93005 ELECTROCARDIOGRAM TRACING: CPT | Performed by: INTERNAL MEDICINE

## 2020-01-01 PROCEDURE — 93005 ELECTROCARDIOGRAM TRACING: CPT | Performed by: FAMILY MEDICINE

## 2020-01-01 PROCEDURE — 30233N1 TRANSFUSION OF NONAUTOLOGOUS RED BLOOD CELLS INTO PERIPHERAL VEIN, PERCUTANEOUS APPROACH: ICD-10-PCS | Performed by: FAMILY MEDICINE

## 2020-01-01 PROCEDURE — 83605 ASSAY OF LACTIC ACID: CPT

## 2020-01-01 PROCEDURE — 94760 N-INVAS EAR/PLS OXIMETRY 1: CPT

## 2020-01-01 PROCEDURE — 83735 ASSAY OF MAGNESIUM: CPT

## 2020-01-01 PROCEDURE — 96374 THER/PROPH/DIAG INJ IV PUSH: CPT

## 2020-01-01 PROCEDURE — 74011000258 HC RX REV CODE- 258: Performed by: INTERNAL MEDICINE

## 2020-01-01 PROCEDURE — 84300 ASSAY OF URINE SODIUM: CPT

## 2020-01-01 PROCEDURE — 85046 RETICYTE/HGB CONCENTRATE: CPT

## 2020-01-01 PROCEDURE — 82570 ASSAY OF URINE CREATININE: CPT

## 2020-01-01 PROCEDURE — 99497 ADVNCD CARE PLAN 30 MIN: CPT | Performed by: NURSE PRACTITIONER

## 2020-01-01 PROCEDURE — 76450000000

## 2020-01-01 PROCEDURE — 84165 PROTEIN E-PHORESIS SERUM: CPT

## 2020-01-01 PROCEDURE — 84132 ASSAY OF SERUM POTASSIUM: CPT

## 2020-01-01 PROCEDURE — 96375 TX/PRO/DX INJ NEW DRUG ADDON: CPT

## 2020-01-01 PROCEDURE — 87205 SMEAR GRAM STAIN: CPT

## 2020-01-01 PROCEDURE — 94660 CPAP INITIATION&MGMT: CPT

## 2020-01-01 PROCEDURE — 87088 URINE BACTERIA CULTURE: CPT

## 2020-01-01 PROCEDURE — A9575 INJ GADOTERATE MEGLUMI 0.1ML: HCPCS | Performed by: NEUROLOGICAL SURGERY

## 2020-01-01 PROCEDURE — 93970 EXTREMITY STUDY: CPT

## 2020-01-01 PROCEDURE — 84100 ASSAY OF PHOSPHORUS: CPT

## 2020-01-01 PROCEDURE — P9016 RBC LEUKOCYTES REDUCED: HCPCS

## 2020-01-01 PROCEDURE — 86900 BLOOD TYPING SEROLOGIC ABO: CPT

## 2020-01-01 PROCEDURE — 86334 IMMUNOFIX E-PHORESIS SERUM: CPT

## 2020-01-01 PROCEDURE — 87150 DNA/RNA AMPLIFIED PROBE: CPT

## 2020-01-01 PROCEDURE — 74011250636 HC RX REV CODE- 250/636: Performed by: NEUROLOGICAL SURGERY

## 2020-01-01 RX ORDER — ERGOCALCIFEROL 1.25 MG/1
50000 CAPSULE ORAL
Status: DISCONTINUED | OUTPATIENT
Start: 2020-01-01 | End: 2020-01-01

## 2020-01-01 RX ORDER — FUROSEMIDE 10 MG/ML
40 INJECTION INTRAMUSCULAR; INTRAVENOUS 2 TIMES DAILY
Status: DISCONTINUED | OUTPATIENT
Start: 2020-01-01 | End: 2020-01-01

## 2020-01-01 RX ORDER — DEXTROSE 40 %
15 GEL (GRAM) ORAL AS NEEDED
Status: DISCONTINUED | OUTPATIENT
Start: 2020-01-01 | End: 2020-01-01 | Stop reason: HOSPADM

## 2020-01-01 RX ORDER — HYDRALAZINE HYDROCHLORIDE 20 MG/ML
20 INJECTION INTRAMUSCULAR; INTRAVENOUS
Status: COMPLETED | OUTPATIENT
Start: 2020-01-01 | End: 2020-01-01

## 2020-01-01 RX ORDER — INSULIN LISPRO 100 [IU]/ML
12 INJECTION, SOLUTION INTRAVENOUS; SUBCUTANEOUS
Status: DISCONTINUED | OUTPATIENT
Start: 2020-01-01 | End: 2020-01-01

## 2020-01-01 RX ORDER — HYDRALAZINE HYDROCHLORIDE 25 MG/1
25 TABLET, FILM COATED ORAL
Status: DISCONTINUED | OUTPATIENT
Start: 2020-01-01 | End: 2020-01-01 | Stop reason: HOSPADM

## 2020-01-01 RX ORDER — SODIUM CHLORIDE 9 MG/ML
250 INJECTION, SOLUTION INTRAVENOUS AS NEEDED
Status: DISCONTINUED | OUTPATIENT
Start: 2020-01-01 | End: 2020-01-01 | Stop reason: HOSPADM

## 2020-01-01 RX ORDER — ONDANSETRON 4 MG/1
8 TABLET, ORALLY DISINTEGRATING ORAL
Status: DISCONTINUED | OUTPATIENT
Start: 2020-01-01 | End: 2020-01-01 | Stop reason: HOSPADM

## 2020-01-01 RX ORDER — VANCOMYCIN/0.9 % SOD CHLORIDE 1.5G/250ML
1500 PLASTIC BAG, INJECTION (ML) INTRAVENOUS ONCE
Status: COMPLETED | OUTPATIENT
Start: 2020-01-01 | End: 2020-01-01

## 2020-01-01 RX ORDER — LORAZEPAM 2 MG/ML
1 INJECTION INTRAMUSCULAR ONCE
Status: COMPLETED | OUTPATIENT
Start: 2020-01-01 | End: 2020-01-01

## 2020-01-01 RX ORDER — ASPIRIN 81 MG/1
81 TABLET ORAL DAILY
Status: DISCONTINUED | OUTPATIENT
Start: 2020-01-01 | End: 2020-01-01 | Stop reason: HOSPADM

## 2020-01-01 RX ORDER — INSULIN LISPRO 100 [IU]/ML
INJECTION, SOLUTION INTRAVENOUS; SUBCUTANEOUS
Status: DISCONTINUED | OUTPATIENT
Start: 2020-01-01 | End: 2020-01-01 | Stop reason: HOSPADM

## 2020-01-01 RX ORDER — SODIUM CHLORIDE 0.9 % (FLUSH) 0.9 %
10 SYRINGE (ML) INJECTION
Status: COMPLETED | OUTPATIENT
Start: 2020-01-01 | End: 2020-01-01

## 2020-01-01 RX ORDER — AMLODIPINE BESYLATE 5 MG/1
5 TABLET ORAL DAILY
Qty: 30 TAB | Refills: 0 | Status: SHIPPED | OUTPATIENT
Start: 2020-01-01

## 2020-01-01 RX ORDER — SODIUM CHLORIDE 0.9 % (FLUSH) 0.9 %
5-40 SYRINGE (ML) INJECTION AS NEEDED
Status: DISCONTINUED | OUTPATIENT
Start: 2020-01-01 | End: 2020-01-01 | Stop reason: HOSPADM

## 2020-01-01 RX ORDER — AMLODIPINE BESYLATE 5 MG/1
5 TABLET ORAL DAILY
Status: DISCONTINUED | OUTPATIENT
Start: 2020-01-01 | End: 2020-01-01 | Stop reason: HOSPADM

## 2020-01-01 RX ORDER — LANOLIN ALCOHOL/MO/W.PET/CERES
324 CREAM (GRAM) TOPICAL DAILY
Status: DISCONTINUED | OUTPATIENT
Start: 2020-01-01 | End: 2020-01-01 | Stop reason: HOSPADM

## 2020-01-01 RX ORDER — ACETAMINOPHEN 325 MG/1
650 TABLET ORAL
Status: DISCONTINUED | OUTPATIENT
Start: 2020-01-01 | End: 2020-01-01 | Stop reason: HOSPADM

## 2020-01-01 RX ORDER — PANTOPRAZOLE SODIUM 40 MG/1
40 TABLET, DELAYED RELEASE ORAL
Status: DISCONTINUED | OUTPATIENT
Start: 2020-01-01 | End: 2020-01-01 | Stop reason: HOSPADM

## 2020-01-01 RX ORDER — EZETIMIBE 10 MG/1
10 TABLET ORAL DAILY
Status: DISCONTINUED | OUTPATIENT
Start: 2020-01-01 | End: 2020-01-01 | Stop reason: HOSPADM

## 2020-01-01 RX ORDER — LEVOTHYROXINE SODIUM 50 UG/1
25 TABLET ORAL
Status: DISCONTINUED | OUTPATIENT
Start: 2020-01-01 | End: 2020-01-01 | Stop reason: HOSPADM

## 2020-01-01 RX ORDER — DEXTROSE 50 % IN WATER (D50W) INTRAVENOUS SYRINGE
25-50 AS NEEDED
Status: DISCONTINUED | OUTPATIENT
Start: 2020-01-01 | End: 2020-01-01 | Stop reason: HOSPADM

## 2020-01-01 RX ORDER — INSULIN GLARGINE 100 [IU]/ML
67 INJECTION, SOLUTION SUBCUTANEOUS
Status: DISCONTINUED | OUTPATIENT
Start: 2020-01-01 | End: 2020-01-01 | Stop reason: HOSPADM

## 2020-01-01 RX ORDER — GADOTERATE MEGLUMINE 376.9 MG/ML
23 INJECTION INTRAVENOUS
Status: COMPLETED | OUTPATIENT
Start: 2020-01-01 | End: 2020-01-01

## 2020-01-01 RX ORDER — NITROGLYCERIN 0.4 MG/1
0.4 TABLET SUBLINGUAL AS NEEDED
Status: DISCONTINUED | OUTPATIENT
Start: 2020-01-01 | End: 2020-01-01 | Stop reason: HOSPADM

## 2020-01-01 RX ORDER — VANCOMYCIN 2 GRAM/500 ML IN 0.9 % SODIUM CHLORIDE INTRAVENOUS
2000 ONCE
Status: COMPLETED | OUTPATIENT
Start: 2020-01-01 | End: 2020-01-01

## 2020-01-01 RX ORDER — CARVEDILOL 12.5 MG/1
12.5 TABLET ORAL 2 TIMES DAILY WITH MEALS
Status: DISCONTINUED | OUTPATIENT
Start: 2020-01-01 | End: 2020-01-01

## 2020-01-01 RX ORDER — FUROSEMIDE 40 MG/1
80 TABLET ORAL 2 TIMES DAILY
Status: DISCONTINUED | OUTPATIENT
Start: 2020-01-01 | End: 2020-01-01

## 2020-01-01 RX ORDER — POTASSIUM CHLORIDE 20 MEQ/1
20 TABLET, EXTENDED RELEASE ORAL
Status: DISCONTINUED | OUTPATIENT
Start: 2020-01-01 | End: 2020-01-01

## 2020-01-01 RX ORDER — PROMETHAZINE HYDROCHLORIDE 25 MG/1
12.5 TABLET ORAL
Status: DISCONTINUED | OUTPATIENT
Start: 2020-01-01 | End: 2020-01-01 | Stop reason: HOSPADM

## 2020-01-01 RX ORDER — SODIUM CHLORIDE 0.9 % (FLUSH) 0.9 %
5-40 SYRINGE (ML) INJECTION EVERY 8 HOURS
Status: DISCONTINUED | OUTPATIENT
Start: 2020-01-01 | End: 2020-01-01 | Stop reason: HOSPADM

## 2020-01-01 RX ORDER — PREDNISOLONE ACETATE 10 MG/ML
1 SUSPENSION/ DROPS OPHTHALMIC 4 TIMES DAILY
Status: DISCONTINUED | OUTPATIENT
Start: 2020-01-01 | End: 2020-01-01 | Stop reason: HOSPADM

## 2020-01-01 RX ORDER — FUROSEMIDE 10 MG/ML
80 INJECTION INTRAMUSCULAR; INTRAVENOUS 2 TIMES DAILY
Status: DISCONTINUED | OUTPATIENT
Start: 2020-01-01 | End: 2020-01-01

## 2020-01-01 RX ORDER — HYDRALAZINE HYDROCHLORIDE 25 MG/1
25 TABLET, FILM COATED ORAL 3 TIMES DAILY
Status: DISCONTINUED | OUTPATIENT
Start: 2020-01-01 | End: 2020-01-01 | Stop reason: HOSPADM

## 2020-01-01 RX ORDER — CARVEDILOL 25 MG/1
25 TABLET ORAL 2 TIMES DAILY WITH MEALS
Status: DISCONTINUED | OUTPATIENT
Start: 2020-01-01 | End: 2020-01-01

## 2020-01-01 RX ORDER — LISINOPRIL 20 MG/1
20 TABLET ORAL DAILY
Status: DISCONTINUED | OUTPATIENT
Start: 2020-01-01 | End: 2020-01-01

## 2020-01-01 RX ORDER — FUROSEMIDE 10 MG/ML
80 INJECTION INTRAMUSCULAR; INTRAVENOUS
Status: COMPLETED | OUTPATIENT
Start: 2020-01-01 | End: 2020-01-01

## 2020-01-01 RX ORDER — ONDANSETRON 2 MG/ML
4 INJECTION INTRAMUSCULAR; INTRAVENOUS
Status: DISCONTINUED | OUTPATIENT
Start: 2020-01-01 | End: 2020-01-01 | Stop reason: HOSPADM

## 2020-01-01 RX ORDER — FUROSEMIDE 40 MG/1
40 TABLET ORAL DAILY
Status: DISCONTINUED | OUTPATIENT
Start: 2020-01-01 | End: 2020-01-01 | Stop reason: HOSPADM

## 2020-01-01 RX ORDER — LISINOPRIL 20 MG/1
20 TABLET ORAL DAILY
Status: DISCONTINUED | OUTPATIENT
Start: 2020-01-01 | End: 2020-01-01 | Stop reason: HOSPADM

## 2020-01-01 RX ORDER — GUAIFENESIN 100 MG/5ML
81 LIQUID (ML) ORAL DAILY
Status: DISCONTINUED | OUTPATIENT
Start: 2020-01-01 | End: 2020-01-01

## 2020-01-01 RX ORDER — ACETAMINOPHEN 650 MG/1
650 SUPPOSITORY RECTAL
Status: DISCONTINUED | OUTPATIENT
Start: 2020-01-01 | End: 2020-01-01 | Stop reason: HOSPADM

## 2020-01-01 RX ORDER — PANTOPRAZOLE SODIUM 40 MG/1
40 TABLET, DELAYED RELEASE ORAL
Qty: 60 TAB | Refills: 0 | Status: SHIPPED | OUTPATIENT
Start: 2020-01-01

## 2020-01-01 RX ORDER — FUROSEMIDE 10 MG/ML
40 INJECTION INTRAMUSCULAR; INTRAVENOUS DAILY
Status: DISCONTINUED | OUTPATIENT
Start: 2020-01-01 | End: 2020-01-01

## 2020-01-01 RX ORDER — MORPHINE SULFATE 2 MG/ML
1 INJECTION, SOLUTION INTRAMUSCULAR; INTRAVENOUS ONCE
Status: COMPLETED | OUTPATIENT
Start: 2020-01-01 | End: 2020-01-01

## 2020-01-01 RX ORDER — DOXYCYCLINE 100 MG/1
100 TABLET ORAL 2 TIMES DAILY
Qty: 8 TAB | Refills: 0 | Status: SHIPPED | OUTPATIENT
Start: 2020-01-01 | End: 2020-01-01

## 2020-01-01 RX ORDER — CARBIDOPA AND LEVODOPA 25; 100 MG/1; MG/1
1 TABLET ORAL 4 TIMES DAILY
Status: DISCONTINUED | OUTPATIENT
Start: 2020-01-01 | End: 2020-01-01 | Stop reason: HOSPADM

## 2020-01-01 RX ORDER — POLYETHYLENE GLYCOL 3350 17 G/17G
17 POWDER, FOR SOLUTION ORAL DAILY PRN
Status: DISCONTINUED | OUTPATIENT
Start: 2020-01-01 | End: 2020-01-01 | Stop reason: HOSPADM

## 2020-01-01 RX ORDER — AMOXICILLIN AND CLAVULANATE POTASSIUM 500; 125 MG/1; MG/1
1 TABLET, FILM COATED ORAL EVERY 12 HOURS
Qty: 8 TAB | Refills: 0 | Status: SHIPPED | OUTPATIENT
Start: 2020-01-01 | End: 2020-01-01

## 2020-01-01 RX ADMIN — Medication 10 ML: at 06:00

## 2020-01-01 RX ADMIN — FUROSEMIDE 80 MG: 10 INJECTION, SOLUTION INTRAMUSCULAR; INTRAVENOUS at 18:02

## 2020-01-01 RX ADMIN — Medication 10 ML: at 05:26

## 2020-01-01 RX ADMIN — INSULIN LISPRO 12 UNITS: 100 INJECTION, SOLUTION INTRAVENOUS; SUBCUTANEOUS at 12:20

## 2020-01-01 RX ADMIN — INSULIN LISPRO 12 UNITS: 100 INJECTION, SOLUTION INTRAVENOUS; SUBCUTANEOUS at 16:59

## 2020-01-01 RX ADMIN — CEFEPIME HYDROCHLORIDE 1 G: 1 INJECTION, POWDER, FOR SOLUTION INTRAMUSCULAR; INTRAVENOUS at 08:33

## 2020-01-01 RX ADMIN — CARBIDOPA AND LEVODOPA 1 TABLET: 25; 100 TABLET ORAL at 17:46

## 2020-01-01 RX ADMIN — CARBIDOPA AND LEVODOPA 1 TABLET: 25; 100 TABLET ORAL at 17:25

## 2020-01-01 RX ADMIN — PREDNISOLONE ACETATE 1 DROP: 10 SUSPENSION/ DROPS OPHTHALMIC at 09:02

## 2020-01-01 RX ADMIN — Medication 10 ML: at 13:27

## 2020-01-01 RX ADMIN — PANTOPRAZOLE SODIUM 40 MG: 40 TABLET, DELAYED RELEASE ORAL at 16:42

## 2020-01-01 RX ADMIN — FERROUS SULFATE TAB 325 MG (65 MG ELEMENTAL FE) 324 MG: 325 (65 FE) TAB at 08:07

## 2020-01-01 RX ADMIN — LEVOTHYROXINE SODIUM 25 MCG: 0.05 TABLET ORAL at 06:10

## 2020-01-01 RX ADMIN — CARVEDILOL 25 MG: 25 TABLET, FILM COATED ORAL at 08:55

## 2020-01-01 RX ADMIN — ASPIRIN 81 MG CHEWABLE TABLET 81 MG: 81 TABLET CHEWABLE at 09:00

## 2020-01-01 RX ADMIN — EZETIMIBE 10 MG: 10 TABLET ORAL at 08:55

## 2020-01-01 RX ADMIN — Medication 10 ML: at 13:57

## 2020-01-01 RX ADMIN — VANCOMYCIN HYDROCHLORIDE 1500 MG: 10 INJECTION, POWDER, LYOPHILIZED, FOR SOLUTION INTRAVENOUS at 13:00

## 2020-01-01 RX ADMIN — CARVEDILOL 25 MG: 25 TABLET, FILM COATED ORAL at 08:38

## 2020-01-01 RX ADMIN — LISINOPRIL 20 MG: 20 TABLET ORAL at 08:56

## 2020-01-01 RX ADMIN — EZETIMIBE 10 MG: 10 TABLET ORAL at 10:02

## 2020-01-01 RX ADMIN — CEFEPIME HYDROCHLORIDE 1 G: 1 INJECTION, POWDER, FOR SOLUTION INTRAMUSCULAR; INTRAVENOUS at 08:55

## 2020-01-01 RX ADMIN — EZETIMIBE 10 MG: 10 TABLET ORAL at 08:06

## 2020-01-01 RX ADMIN — INSULIN LISPRO 6 UNITS: 100 INJECTION, SOLUTION INTRAVENOUS; SUBCUTANEOUS at 12:40

## 2020-01-01 RX ADMIN — CARBIDOPA AND LEVODOPA 1 TABLET: 25; 100 TABLET ORAL at 00:27

## 2020-01-01 RX ADMIN — INSULIN GLARGINE 67 UNITS: 100 INJECTION, SOLUTION SUBCUTANEOUS at 00:14

## 2020-01-01 RX ADMIN — CARBIDOPA AND LEVODOPA 1 TABLET: 25; 100 TABLET ORAL at 08:56

## 2020-01-01 RX ADMIN — INSULIN LISPRO 12 UNITS: 100 INJECTION, SOLUTION INTRAVENOUS; SUBCUTANEOUS at 12:04

## 2020-01-01 RX ADMIN — INSULIN LISPRO 12 UNITS: 100 INJECTION, SOLUTION INTRAVENOUS; SUBCUTANEOUS at 17:25

## 2020-01-01 RX ADMIN — PREDNISOLONE ACETATE 1 DROP: 10 SUSPENSION/ DROPS OPHTHALMIC at 12:04

## 2020-01-01 RX ADMIN — INSULIN LISPRO 12 UNITS: 100 INJECTION, SOLUTION INTRAVENOUS; SUBCUTANEOUS at 08:55

## 2020-01-01 RX ADMIN — LEVOTHYROXINE SODIUM 25 MCG: 0.05 TABLET ORAL at 05:13

## 2020-01-01 RX ADMIN — SODIUM CHLORIDE 40 MG: 9 INJECTION INTRAMUSCULAR; INTRAVENOUS; SUBCUTANEOUS at 05:15

## 2020-01-01 RX ADMIN — PREDNISOLONE ACETATE 1 DROP: 10 SUSPENSION/ DROPS OPHTHALMIC at 15:30

## 2020-01-01 RX ADMIN — PREDNISOLONE ACETATE 1 DROP: 10 SUSPENSION/ DROPS OPHTHALMIC at 13:00

## 2020-01-01 RX ADMIN — PREDNISOLONE ACETATE 1 DROP: 10 SUSPENSION/ DROPS OPHTHALMIC at 10:04

## 2020-01-01 RX ADMIN — Medication 10 ML: at 22:04

## 2020-01-01 RX ADMIN — INSULIN LISPRO 12 UNITS: 100 INJECTION, SOLUTION INTRAVENOUS; SUBCUTANEOUS at 07:30

## 2020-01-01 RX ADMIN — CARBIDOPA AND LEVODOPA 1 TABLET: 25; 100 TABLET ORAL at 09:01

## 2020-01-01 RX ADMIN — FERROUS SULFATE TAB 325 MG (65 MG ELEMENTAL FE) 324 MG: 325 (65 FE) TAB at 08:51

## 2020-01-01 RX ADMIN — INSULIN LISPRO 12 UNITS: 100 INJECTION, SOLUTION INTRAVENOUS; SUBCUTANEOUS at 12:11

## 2020-01-01 RX ADMIN — CARBIDOPA AND LEVODOPA 1 TABLET: 25; 100 TABLET ORAL at 17:53

## 2020-01-01 RX ADMIN — CARBIDOPA AND LEVODOPA 1 TABLET: 25; 100 TABLET ORAL at 17:00

## 2020-01-01 RX ADMIN — EZETIMIBE 10 MG: 10 TABLET ORAL at 08:53

## 2020-01-01 RX ADMIN — CARBIDOPA AND LEVODOPA 1 TABLET: 25; 100 TABLET ORAL at 12:46

## 2020-01-01 RX ADMIN — CARBIDOPA AND LEVODOPA 1 TABLET: 25; 100 TABLET ORAL at 21:37

## 2020-01-01 RX ADMIN — APIXABAN 2.5 MG: 2.5 TABLET, FILM COATED ORAL at 08:52

## 2020-01-01 RX ADMIN — LEVOTHYROXINE SODIUM 25 MCG: 0.05 TABLET ORAL at 05:37

## 2020-01-01 RX ADMIN — PERFLUTREN 1 ML: 6.52 INJECTION, SUSPENSION INTRAVENOUS at 14:15

## 2020-01-01 RX ADMIN — PREDNISOLONE ACETATE 1 DROP: 10 SUSPENSION/ DROPS OPHTHALMIC at 21:44

## 2020-01-01 RX ADMIN — INSULIN LISPRO 12 UNITS: 100 INJECTION, SOLUTION INTRAVENOUS; SUBCUTANEOUS at 13:01

## 2020-01-01 RX ADMIN — GADOTERATE MEGLUMINE 23 ML: 376.9 INJECTION INTRAVENOUS at 11:54

## 2020-01-01 RX ADMIN — INSULIN GLARGINE 67 UNITS: 100 INJECTION, SOLUTION SUBCUTANEOUS at 21:32

## 2020-01-01 RX ADMIN — PREDNISOLONE ACETATE 1 DROP: 10 SUSPENSION/ DROPS OPHTHALMIC at 17:35

## 2020-01-01 RX ADMIN — SODIUM CHLORIDE 40 MG: 9 INJECTION INTRAMUSCULAR; INTRAVENOUS; SUBCUTANEOUS at 12:09

## 2020-01-01 RX ADMIN — CARBIDOPA AND LEVODOPA 1 TABLET: 25; 100 TABLET ORAL at 21:23

## 2020-01-01 RX ADMIN — SODIUM ZIRCONIUM CYCLOSILICATE 10 G: 10 POWDER, FOR SUSPENSION ORAL at 10:03

## 2020-01-01 RX ADMIN — FERROUS SULFATE TAB 325 MG (65 MG ELEMENTAL FE) 324 MG: 325 (65 FE) TAB at 10:02

## 2020-01-01 RX ADMIN — CARBIDOPA AND LEVODOPA 1 TABLET: 25; 100 TABLET ORAL at 21:27

## 2020-01-01 RX ADMIN — Medication 10 ML: at 15:31

## 2020-01-01 RX ADMIN — INSULIN LISPRO 12 UNITS: 100 INJECTION, SOLUTION INTRAVENOUS; SUBCUTANEOUS at 17:46

## 2020-01-01 RX ADMIN — INSULIN LISPRO 4 UNITS: 100 INJECTION, SOLUTION INTRAVENOUS; SUBCUTANEOUS at 21:40

## 2020-01-01 RX ADMIN — CARBIDOPA AND LEVODOPA 1 TABLET: 25; 100 TABLET ORAL at 21:53

## 2020-01-01 RX ADMIN — PREDNISOLONE ACETATE 1 DROP: 10 SUSPENSION/ DROPS OPHTHALMIC at 17:58

## 2020-01-01 RX ADMIN — EZETIMIBE 10 MG: 10 TABLET ORAL at 08:38

## 2020-01-01 RX ADMIN — PREDNISOLONE ACETATE 1 DROP: 10 SUSPENSION/ DROPS OPHTHALMIC at 17:15

## 2020-01-01 RX ADMIN — PREDNISOLONE ACETATE 1 DROP: 10 SUSPENSION/ DROPS OPHTHALMIC at 22:00

## 2020-01-01 RX ADMIN — SODIUM CHLORIDE 40 MG: 9 INJECTION INTRAMUSCULAR; INTRAVENOUS; SUBCUTANEOUS at 21:29

## 2020-01-01 RX ADMIN — INSULIN LISPRO 12 UNITS: 100 INJECTION, SOLUTION INTRAVENOUS; SUBCUTANEOUS at 12:39

## 2020-01-01 RX ADMIN — CARBIDOPA AND LEVODOPA 1 TABLET: 25; 100 TABLET ORAL at 12:20

## 2020-01-01 RX ADMIN — CARBIDOPA AND LEVODOPA 1 TABLET: 25; 100 TABLET ORAL at 22:01

## 2020-01-01 RX ADMIN — Medication 5 ML: at 07:12

## 2020-01-01 RX ADMIN — SODIUM CHLORIDE 40 MG: 9 INJECTION INTRAMUSCULAR; INTRAVENOUS; SUBCUTANEOUS at 00:00

## 2020-01-01 RX ADMIN — AMLODIPINE BESYLATE 5 MG: 5 TABLET ORAL at 09:32

## 2020-01-01 RX ADMIN — INSULIN LISPRO 12 UNITS: 100 INJECTION, SOLUTION INTRAVENOUS; SUBCUTANEOUS at 09:13

## 2020-01-01 RX ADMIN — INSULIN GLARGINE 67 UNITS: 100 INJECTION, SOLUTION SUBCUTANEOUS at 21:51

## 2020-01-01 RX ADMIN — LEVOTHYROXINE SODIUM 25 MCG: 0.05 TABLET ORAL at 05:44

## 2020-01-01 RX ADMIN — LEVOTHYROXINE SODIUM 25 MCG: 0.05 TABLET ORAL at 06:28

## 2020-01-01 RX ADMIN — PREDNISOLONE ACETATE 1 DROP: 10 SUSPENSION/ DROPS OPHTHALMIC at 09:00

## 2020-01-01 RX ADMIN — INSULIN LISPRO 12 UNITS: 100 INJECTION, SOLUTION INTRAVENOUS; SUBCUTANEOUS at 11:52

## 2020-01-01 RX ADMIN — LORAZEPAM 1 MG: 2 INJECTION INTRAMUSCULAR; INTRAVENOUS at 13:38

## 2020-01-01 RX ADMIN — LISINOPRIL 20 MG: 20 TABLET ORAL at 08:52

## 2020-01-01 RX ADMIN — FERROUS SULFATE TAB 325 MG (65 MG ELEMENTAL FE) 324 MG: 325 (65 FE) TAB at 08:53

## 2020-01-01 RX ADMIN — FUROSEMIDE 40 MG: 10 INJECTION, SOLUTION INTRAMUSCULAR; INTRAVENOUS at 08:38

## 2020-01-01 RX ADMIN — APIXABAN 2.5 MG: 2.5 TABLET, FILM COATED ORAL at 17:53

## 2020-01-01 RX ADMIN — CEFEPIME HYDROCHLORIDE 1 G: 1 INJECTION, POWDER, FOR SOLUTION INTRAMUSCULAR; INTRAVENOUS at 08:53

## 2020-01-01 RX ADMIN — CARVEDILOL 25 MG: 25 TABLET, FILM COATED ORAL at 17:34

## 2020-01-01 RX ADMIN — CEFEPIME HYDROCHLORIDE 1 G: 1 INJECTION, POWDER, FOR SOLUTION INTRAMUSCULAR; INTRAVENOUS at 09:01

## 2020-01-01 RX ADMIN — ASPIRIN 81 MG: 81 TABLET ORAL at 09:32

## 2020-01-01 RX ADMIN — Medication 10 ML: at 05:18

## 2020-01-01 RX ADMIN — INSULIN GLARGINE 30 UNITS: 100 INJECTION, SOLUTION SUBCUTANEOUS at 21:45

## 2020-01-01 RX ADMIN — CARVEDILOL 25 MG: 25 TABLET, FILM COATED ORAL at 08:52

## 2020-01-01 RX ADMIN — Medication 10 ML: at 12:14

## 2020-01-01 RX ADMIN — INSULIN LISPRO 12 UNITS: 100 INJECTION, SOLUTION INTRAVENOUS; SUBCUTANEOUS at 12:12

## 2020-01-01 RX ADMIN — EZETIMIBE 10 MG: 10 TABLET ORAL at 09:01

## 2020-01-01 RX ADMIN — ACETAMINOPHEN 650 MG: 325 TABLET, FILM COATED ORAL at 15:30

## 2020-01-01 RX ADMIN — PREDNISOLONE ACETATE 1 DROP: 10 SUSPENSION/ DROPS OPHTHALMIC at 08:07

## 2020-01-01 RX ADMIN — ASPIRIN 81 MG: 81 TABLET ORAL at 08:32

## 2020-01-01 RX ADMIN — PREDNISOLONE ACETATE 1 DROP: 10 SUSPENSION/ DROPS OPHTHALMIC at 08:49

## 2020-01-01 RX ADMIN — FUROSEMIDE 40 MG: 40 TABLET ORAL at 12:39

## 2020-01-01 RX ADMIN — CARBIDOPA AND LEVODOPA 1 TABLET: 25; 100 TABLET ORAL at 08:39

## 2020-01-01 RX ADMIN — SODIUM CHLORIDE 40 MG: 9 INJECTION INTRAMUSCULAR; INTRAVENOUS; SUBCUTANEOUS at 05:12

## 2020-01-01 RX ADMIN — PREDNISOLONE ACETATE 1 DROP: 10 SUSPENSION/ DROPS OPHTHALMIC at 08:44

## 2020-01-01 RX ADMIN — Medication 10 ML: at 21:24

## 2020-01-01 RX ADMIN — FUROSEMIDE 40 MG: 10 INJECTION INTRAMUSCULAR; INTRAVENOUS at 10:00

## 2020-01-01 RX ADMIN — CARBIDOPA AND LEVODOPA 1 TABLET: 25; 100 TABLET ORAL at 12:39

## 2020-01-01 RX ADMIN — Medication 10 ML: at 21:28

## 2020-01-01 RX ADMIN — CARBIDOPA AND LEVODOPA 1 TABLET: 25; 100 TABLET ORAL at 12:03

## 2020-01-01 RX ADMIN — MORPHINE SULFATE 1 MG: 2 INJECTION, SOLUTION INTRAMUSCULAR; INTRAVENOUS at 13:23

## 2020-01-01 RX ADMIN — INSULIN LISPRO 12 UNITS: 100 INJECTION, SOLUTION INTRAVENOUS; SUBCUTANEOUS at 08:39

## 2020-01-01 RX ADMIN — PREDNISOLONE ACETATE 1 DROP: 10 SUSPENSION/ DROPS OPHTHALMIC at 13:27

## 2020-01-01 RX ADMIN — Medication 10 ML: at 21:32

## 2020-01-01 RX ADMIN — CARBIDOPA AND LEVODOPA 1 TABLET: 25; 100 TABLET ORAL at 12:29

## 2020-01-01 RX ADMIN — PANTOPRAZOLE SODIUM 40 MG: 40 TABLET, DELAYED RELEASE ORAL at 06:29

## 2020-01-01 RX ADMIN — PREDNISOLONE ACETATE 1 DROP: 10 SUSPENSION/ DROPS OPHTHALMIC at 08:58

## 2020-01-01 RX ADMIN — FERROUS SULFATE TAB 325 MG (65 MG ELEMENTAL FE) 324 MG: 325 (65 FE) TAB at 08:33

## 2020-01-01 RX ADMIN — Medication 10 ML: at 05:37

## 2020-01-01 RX ADMIN — HYDRALAZINE HYDROCHLORIDE 25 MG: 25 TABLET, FILM COATED ORAL at 20:36

## 2020-01-01 RX ADMIN — PREDNISOLONE ACETATE 1 DROP: 10 SUSPENSION/ DROPS OPHTHALMIC at 18:00

## 2020-01-01 RX ADMIN — LEVOTHYROXINE SODIUM 25 MCG: 0.05 TABLET ORAL at 05:47

## 2020-01-01 RX ADMIN — INSULIN GLARGINE 67 UNITS: 100 INJECTION, SOLUTION SUBCUTANEOUS at 21:23

## 2020-01-01 RX ADMIN — PREDNISOLONE ACETATE 1 DROP: 10 SUSPENSION/ DROPS OPHTHALMIC at 08:38

## 2020-01-01 RX ADMIN — Medication 10 ML: at 13:07

## 2020-01-01 RX ADMIN — CARVEDILOL 12.5 MG: 12.5 TABLET, FILM COATED ORAL at 16:45

## 2020-01-01 RX ADMIN — CEFEPIME HYDROCHLORIDE 1 G: 1 INJECTION, POWDER, FOR SOLUTION INTRAMUSCULAR; INTRAVENOUS at 10:02

## 2020-01-01 RX ADMIN — Medication 5 ML: at 14:21

## 2020-01-01 RX ADMIN — VANCOMYCIN HYDROCHLORIDE 2000 MG: 10 INJECTION, POWDER, LYOPHILIZED, FOR SOLUTION INTRAVENOUS at 08:53

## 2020-01-01 RX ADMIN — LISINOPRIL 20 MG: 20 TABLET ORAL at 12:48

## 2020-01-01 RX ADMIN — PREDNISOLONE ACETATE 1 DROP: 10 SUSPENSION/ DROPS OPHTHALMIC at 22:03

## 2020-01-01 RX ADMIN — CARBIDOPA AND LEVODOPA 1 TABLET: 25; 100 TABLET ORAL at 12:33

## 2020-01-01 RX ADMIN — AMLODIPINE BESYLATE 5 MG: 5 TABLET ORAL at 08:32

## 2020-01-01 RX ADMIN — PREDNISOLONE ACETATE 1 DROP: 10 SUSPENSION/ DROPS OPHTHALMIC at 13:06

## 2020-01-01 RX ADMIN — LEVOTHYROXINE SODIUM 25 MCG: 0.05 TABLET ORAL at 05:25

## 2020-01-01 RX ADMIN — Medication 10 ML: at 05:47

## 2020-01-01 RX ADMIN — FERROUS SULFATE TAB 325 MG (65 MG ELEMENTAL FE) 324 MG: 325 (65 FE) TAB at 08:32

## 2020-01-01 RX ADMIN — Medication 5 ML: at 00:01

## 2020-01-01 RX ADMIN — FUROSEMIDE 40 MG: 40 TABLET ORAL at 08:32

## 2020-01-01 RX ADMIN — INSULIN LISPRO 6 UNITS: 100 INJECTION, SOLUTION INTRAVENOUS; SUBCUTANEOUS at 17:53

## 2020-01-01 RX ADMIN — FERROUS SULFATE TAB 325 MG (65 MG ELEMENTAL FE) 324 MG: 325 (65 FE) TAB at 08:39

## 2020-01-01 RX ADMIN — CARBIDOPA AND LEVODOPA 1 TABLET: 25; 100 TABLET ORAL at 10:02

## 2020-01-01 RX ADMIN — INSULIN GLARGINE 67 UNITS: 100 INJECTION, SOLUTION SUBCUTANEOUS at 21:58

## 2020-01-01 RX ADMIN — FERROUS SULFATE TAB 325 MG (65 MG ELEMENTAL FE) 324 MG: 325 (65 FE) TAB at 08:45

## 2020-01-01 RX ADMIN — CARBIDOPA AND LEVODOPA 1 TABLET: 25; 100 TABLET ORAL at 21:49

## 2020-01-01 RX ADMIN — INSULIN LISPRO 12 UNITS: 100 INJECTION, SOLUTION INTRAVENOUS; SUBCUTANEOUS at 17:10

## 2020-01-01 RX ADMIN — PREDNISOLONE ACETATE 1 DROP: 10 SUSPENSION/ DROPS OPHTHALMIC at 17:06

## 2020-01-01 RX ADMIN — Medication 10 ML: at 21:47

## 2020-01-01 RX ADMIN — PREDNISOLONE ACETATE 1 DROP: 10 SUSPENSION/ DROPS OPHTHALMIC at 17:26

## 2020-01-01 RX ADMIN — HYDRALAZINE HYDROCHLORIDE 25 MG: 25 TABLET, FILM COATED ORAL at 18:31

## 2020-01-01 RX ADMIN — PREDNISOLONE ACETATE 1 DROP: 10 SUSPENSION/ DROPS OPHTHALMIC at 17:47

## 2020-01-01 RX ADMIN — CARBIDOPA AND LEVODOPA 1 TABLET: 25; 100 TABLET ORAL at 08:33

## 2020-01-01 RX ADMIN — INSULIN LISPRO 4 UNITS: 100 INJECTION, SOLUTION INTRAVENOUS; SUBCUTANEOUS at 12:30

## 2020-01-01 RX ADMIN — SODIUM CHLORIDE 40 MG: 9 INJECTION INTRAMUSCULAR; INTRAVENOUS; SUBCUTANEOUS at 17:52

## 2020-01-01 RX ADMIN — CARBIDOPA AND LEVODOPA 1 TABLET: 25; 100 TABLET ORAL at 21:29

## 2020-01-01 RX ADMIN — SODIUM ZIRCONIUM CYCLOSILICATE 10 G: 10 POWDER, FOR SUSPENSION ORAL at 09:31

## 2020-01-01 RX ADMIN — INSULIN GLARGINE 30 UNITS: 100 INJECTION, SOLUTION SUBCUTANEOUS at 21:27

## 2020-01-01 RX ADMIN — CARBIDOPA AND LEVODOPA 1 TABLET: 25; 100 TABLET ORAL at 17:08

## 2020-01-01 RX ADMIN — PREDNISOLONE ACETATE 1 DROP: 10 SUSPENSION/ DROPS OPHTHALMIC at 00:00

## 2020-01-01 RX ADMIN — PREDNISOLONE ACETATE 1 DROP: 10 SUSPENSION/ DROPS OPHTHALMIC at 21:56

## 2020-01-01 RX ADMIN — INSULIN LISPRO 12 UNITS: 100 INJECTION, SOLUTION INTRAVENOUS; SUBCUTANEOUS at 17:35

## 2020-01-01 RX ADMIN — CARBIDOPA AND LEVODOPA 1 TABLET: 25; 100 TABLET ORAL at 12:48

## 2020-01-01 RX ADMIN — APIXABAN 2.5 MG: 2.5 TABLET, FILM COATED ORAL at 08:32

## 2020-01-01 RX ADMIN — CARVEDILOL 25 MG: 25 TABLET, FILM COATED ORAL at 07:58

## 2020-01-01 RX ADMIN — CEFEPIME HYDROCHLORIDE 1 G: 1 INJECTION, POWDER, FOR SOLUTION INTRAMUSCULAR; INTRAVENOUS at 09:25

## 2020-01-01 RX ADMIN — FUROSEMIDE 80 MG: 40 TABLET ORAL at 12:11

## 2020-01-01 RX ADMIN — APIXABAN 2.5 MG: 2.5 TABLET, FILM COATED ORAL at 17:15

## 2020-01-01 RX ADMIN — EZETIMIBE 10 MG: 10 TABLET ORAL at 08:32

## 2020-01-01 RX ADMIN — CARBIDOPA AND LEVODOPA 1 TABLET: 25; 100 TABLET ORAL at 13:00

## 2020-01-01 RX ADMIN — CEFEPIME HYDROCHLORIDE 1 G: 1 INJECTION, POWDER, FOR SOLUTION INTRAMUSCULAR; INTRAVENOUS at 08:46

## 2020-01-01 RX ADMIN — PREDNISOLONE ACETATE 1 DROP: 10 SUSPENSION/ DROPS OPHTHALMIC at 13:07

## 2020-01-01 RX ADMIN — PREDNISOLONE ACETATE 1 DROP: 10 SUSPENSION/ DROPS OPHTHALMIC at 12:39

## 2020-01-01 RX ADMIN — Medication 10 ML: at 06:28

## 2020-01-01 RX ADMIN — LEVOTHYROXINE SODIUM 25 MCG: 0.05 TABLET ORAL at 05:27

## 2020-01-01 RX ADMIN — FERROUS SULFATE TAB 325 MG (65 MG ELEMENTAL FE) 324 MG: 325 (65 FE) TAB at 09:31

## 2020-01-01 RX ADMIN — Medication 10 ML: at 13:00

## 2020-01-01 RX ADMIN — SODIUM CHLORIDE 40 MG: 9 INJECTION INTRAMUSCULAR; INTRAVENOUS; SUBCUTANEOUS at 08:56

## 2020-01-01 RX ADMIN — FUROSEMIDE 80 MG: 40 TABLET ORAL at 21:22

## 2020-01-01 RX ADMIN — CARBIDOPA AND LEVODOPA 1 TABLET: 25; 100 TABLET ORAL at 17:15

## 2020-01-01 RX ADMIN — EZETIMIBE 10 MG: 10 TABLET ORAL at 08:51

## 2020-01-01 RX ADMIN — CARBIDOPA AND LEVODOPA 1 TABLET: 25; 100 TABLET ORAL at 21:41

## 2020-01-01 RX ADMIN — CARBIDOPA AND LEVODOPA 1 TABLET: 25; 100 TABLET ORAL at 12:11

## 2020-01-01 RX ADMIN — CARBIDOPA AND LEVODOPA 1 TABLET: 25; 100 TABLET ORAL at 17:23

## 2020-01-01 RX ADMIN — APIXABAN 2.5 MG: 2.5 TABLET, FILM COATED ORAL at 17:09

## 2020-01-01 RX ADMIN — EZETIMIBE 10 MG: 10 TABLET ORAL at 08:33

## 2020-01-01 RX ADMIN — Medication 10 ML: at 11:54

## 2020-01-01 RX ADMIN — LEVOTHYROXINE SODIUM 25 MCG: 0.05 TABLET ORAL at 06:02

## 2020-01-01 RX ADMIN — SODIUM CHLORIDE 40 MG: 9 INJECTION INTRAMUSCULAR; INTRAVENOUS; SUBCUTANEOUS at 17:50

## 2020-01-01 RX ADMIN — Medication 10 ML: at 06:10

## 2020-01-01 RX ADMIN — PREDNISOLONE ACETATE 1 DROP: 10 SUSPENSION/ DROPS OPHTHALMIC at 17:11

## 2020-01-01 RX ADMIN — Medication 10 ML: at 13:06

## 2020-01-01 RX ADMIN — PREDNISOLONE ACETATE 1 DROP: 10 SUSPENSION/ DROPS OPHTHALMIC at 21:41

## 2020-01-01 RX ADMIN — FERROUS SULFATE TAB 325 MG (65 MG ELEMENTAL FE) 324 MG: 325 (65 FE) TAB at 08:56

## 2020-01-01 RX ADMIN — LEVOTHYROXINE SODIUM 25 MCG: 0.05 TABLET ORAL at 05:53

## 2020-01-01 RX ADMIN — PREDNISOLONE ACETATE 1 DROP: 10 SUSPENSION/ DROPS OPHTHALMIC at 08:39

## 2020-01-01 RX ADMIN — APIXABAN 2.5 MG: 2.5 TABLET, FILM COATED ORAL at 00:27

## 2020-01-01 RX ADMIN — INSULIN LISPRO 12 UNITS: 100 INJECTION, SOLUTION INTRAVENOUS; SUBCUTANEOUS at 07:56

## 2020-01-01 RX ADMIN — INSULIN LISPRO 2 UNITS: 100 INJECTION, SOLUTION INTRAVENOUS; SUBCUTANEOUS at 21:50

## 2020-01-01 RX ADMIN — Medication 10 ML: at 17:54

## 2020-01-01 RX ADMIN — INSULIN GLARGINE 67 UNITS: 100 INJECTION, SOLUTION SUBCUTANEOUS at 21:28

## 2020-01-01 RX ADMIN — FUROSEMIDE 80 MG: 10 INJECTION, SOLUTION INTRAMUSCULAR; INTRAVENOUS at 09:01

## 2020-01-01 RX ADMIN — HYDRALAZINE HYDROCHLORIDE 20 MG: 20 INJECTION, SOLUTION INTRAMUSCULAR; INTRAVENOUS at 17:27

## 2020-01-01 RX ADMIN — INSULIN LISPRO 12 UNITS: 100 INJECTION, SOLUTION INTRAVENOUS; SUBCUTANEOUS at 16:22

## 2020-01-01 RX ADMIN — LISINOPRIL 20 MG: 20 TABLET ORAL at 08:32

## 2020-01-01 RX ADMIN — CARVEDILOL 25 MG: 25 TABLET, FILM COATED ORAL at 17:09

## 2020-01-01 RX ADMIN — APIXABAN 2.5 MG: 2.5 TABLET, FILM COATED ORAL at 08:58

## 2020-01-01 RX ADMIN — SODIUM ZIRCONIUM CYCLOSILICATE 10 G: 10 POWDER, FOR SUSPENSION ORAL at 12:33

## 2020-01-01 RX ADMIN — APIXABAN 2.5 MG: 2.5 TABLET, FILM COATED ORAL at 17:34

## 2020-01-01 RX ADMIN — ASPIRIN 81 MG: 81 TABLET ORAL at 12:48

## 2020-01-01 RX ADMIN — Medication 10 ML: at 21:44

## 2020-01-01 RX ADMIN — ASPIRIN 81 MG CHEWABLE TABLET 81 MG: 81 TABLET CHEWABLE at 08:38

## 2020-01-01 RX ADMIN — HYDRALAZINE HYDROCHLORIDE 25 MG: 25 TABLET, FILM COATED ORAL at 08:40

## 2020-01-01 RX ADMIN — PREDNISOLONE ACETATE 1 DROP: 10 SUSPENSION/ DROPS OPHTHALMIC at 09:31

## 2020-01-01 RX ADMIN — SODIUM CHLORIDE 40 MG: 9 INJECTION INTRAMUSCULAR; INTRAVENOUS; SUBCUTANEOUS at 05:45

## 2020-01-01 RX ADMIN — INSULIN LISPRO 2 UNITS: 100 INJECTION, SOLUTION INTRAVENOUS; SUBCUTANEOUS at 17:22

## 2020-01-01 RX ADMIN — CARBIDOPA AND LEVODOPA 1 TABLET: 25; 100 TABLET ORAL at 17:34

## 2020-01-01 RX ADMIN — APIXABAN 2.5 MG: 2.5 TABLET, FILM COATED ORAL at 12:39

## 2020-01-01 RX ADMIN — CARBIDOPA AND LEVODOPA 1 TABLET: 25; 100 TABLET ORAL at 08:53

## 2020-01-01 RX ADMIN — CARBIDOPA AND LEVODOPA 1 TABLET: 25; 100 TABLET ORAL at 08:45

## 2020-01-01 RX ADMIN — SODIUM CHLORIDE 40 MG: 9 INJECTION INTRAMUSCULAR; INTRAVENOUS; SUBCUTANEOUS at 05:26

## 2020-01-01 RX ADMIN — SODIUM CHLORIDE 40 MG: 9 INJECTION INTRAMUSCULAR; INTRAVENOUS; SUBCUTANEOUS at 16:54

## 2020-01-01 RX ADMIN — SODIUM CHLORIDE 40 MG: 9 INJECTION INTRAMUSCULAR; INTRAVENOUS; SUBCUTANEOUS at 17:48

## 2020-01-01 RX ADMIN — PREDNISOLONE ACETATE 1 DROP: 10 SUSPENSION/ DROPS OPHTHALMIC at 21:34

## 2020-01-01 RX ADMIN — ACETAMINOPHEN 650 MG: 650 SUPPOSITORY RECTAL at 16:23

## 2020-01-01 RX ADMIN — LEVOTHYROXINE SODIUM 25 MCG: 0.05 TABLET ORAL at 07:10

## 2020-01-01 RX ADMIN — INSULIN GLARGINE 67 UNITS: 100 INJECTION, SOLUTION SUBCUTANEOUS at 21:40

## 2020-01-01 RX ADMIN — CARBIDOPA AND LEVODOPA 1 TABLET: 25; 100 TABLET ORAL at 08:32

## 2020-01-01 RX ADMIN — PREDNISOLONE ACETATE 1 DROP: 10 SUSPENSION/ DROPS OPHTHALMIC at 17:31

## 2020-01-01 RX ADMIN — LISINOPRIL 20 MG: 20 TABLET ORAL at 09:31

## 2020-01-01 RX ADMIN — DIATRIZOATE MEGLUMINE AND DIATRIZOATE SODIUM 15 ML: 660; 100 LIQUID ORAL; RECTAL at 08:56

## 2020-01-01 RX ADMIN — CARBIDOPA AND LEVODOPA 1 TABLET: 25; 100 TABLET ORAL at 08:06

## 2020-01-01 RX ADMIN — PREDNISOLONE ACETATE 1 DROP: 10 SUSPENSION/ DROPS OPHTHALMIC at 21:23

## 2020-01-01 RX ADMIN — Medication 10 ML: at 05:45

## 2020-01-01 RX ADMIN — APIXABAN 2.5 MG: 2.5 TABLET, FILM COATED ORAL at 08:38

## 2020-01-01 RX ADMIN — Medication 10 ML: at 21:31

## 2020-01-01 RX ADMIN — CARBIDOPA AND LEVODOPA 1 TABLET: 25; 100 TABLET ORAL at 17:10

## 2020-01-01 RX ADMIN — FERROUS SULFATE TAB 325 MG (65 MG ELEMENTAL FE) 324 MG: 325 (65 FE) TAB at 09:01

## 2020-01-01 RX ADMIN — CARBIDOPA AND LEVODOPA 1 TABLET: 25; 100 TABLET ORAL at 08:51

## 2020-01-01 RX ADMIN — PREDNISOLONE ACETATE 1 DROP: 10 SUSPENSION/ DROPS OPHTHALMIC at 21:53

## 2020-01-01 RX ADMIN — INSULIN LISPRO 12 UNITS: 100 INJECTION, SOLUTION INTRAVENOUS; SUBCUTANEOUS at 08:51

## 2020-01-01 RX ADMIN — FUROSEMIDE 80 MG: 10 INJECTION, SOLUTION INTRAMUSCULAR; INTRAVENOUS at 17:35

## 2020-01-01 RX ADMIN — EZETIMIBE 10 MG: 10 TABLET ORAL at 08:45

## 2020-01-01 RX ADMIN — PREDNISOLONE ACETATE 1 DROP: 10 SUSPENSION/ DROPS OPHTHALMIC at 12:42

## 2020-01-01 RX ADMIN — Medication 10 ML: at 21:57

## 2020-01-01 RX ADMIN — CARVEDILOL 12.5 MG: 12.5 TABLET, FILM COATED ORAL at 08:33

## 2020-01-01 RX ADMIN — Medication 10 ML: at 00:27

## 2020-01-01 RX ADMIN — CARBIDOPA AND LEVODOPA 1 TABLET: 25; 100 TABLET ORAL at 09:31

## 2020-01-01 RX ADMIN — Medication 10 ML: at 21:49

## 2020-01-01 RX ADMIN — PREDNISOLONE ACETATE 1 DROP: 10 SUSPENSION/ DROPS OPHTHALMIC at 21:30

## 2020-01-01 RX ADMIN — Medication 10 ML: at 05:13

## 2020-01-01 RX ADMIN — CARBIDOPA AND LEVODOPA 1 TABLET: 25; 100 TABLET ORAL at 21:33

## 2020-01-01 RX ADMIN — PREDNISOLONE ACETATE 1 DROP: 10 SUSPENSION/ DROPS OPHTHALMIC at 12:30

## 2020-01-01 RX ADMIN — INSULIN LISPRO 12 UNITS: 100 INJECTION, SOLUTION INTRAVENOUS; SUBCUTANEOUS at 12:33

## 2020-01-01 RX ADMIN — AMLODIPINE BESYLATE 5 MG: 5 TABLET ORAL at 12:48

## 2020-01-01 RX ADMIN — INSULIN LISPRO 12 UNITS: 100 INJECTION, SOLUTION INTRAVENOUS; SUBCUTANEOUS at 17:15

## 2020-01-01 RX ADMIN — Medication 10 ML: at 06:01

## 2020-01-01 RX ADMIN — CARVEDILOL 25 MG: 25 TABLET, FILM COATED ORAL at 17:15

## 2020-01-01 RX ADMIN — ONDANSETRON 4 MG: 2 INJECTION INTRAMUSCULAR; INTRAVENOUS at 00:33

## 2020-01-01 RX ADMIN — EZETIMIBE 10 MG: 10 TABLET ORAL at 09:31

## 2020-01-10 PROBLEM — D32.9 MENINGIOMA (HCC): Status: RESOLVED | Noted: 2019-12-03 | Resolved: 2020-01-10

## 2020-05-11 PROBLEM — N18.4 ANEMIA OF CHRONIC KIDNEY FAILURE, STAGE 4 (SEVERE) (HCC): Status: ACTIVE | Noted: 2019-07-15

## 2020-05-11 PROBLEM — R71.0 DECREASED HEMOGLOBIN: Status: ACTIVE | Noted: 2018-01-23

## 2020-05-11 PROBLEM — N28.9 ACUTE RENAL INSUFFICIENCY: Status: ACTIVE | Noted: 2018-01-23

## 2020-05-11 PROBLEM — E03.9 ACQUIRED HYPOTHYROIDISM: Status: ACTIVE | Noted: 2019-07-15

## 2020-05-11 PROBLEM — I50.22 CHRONIC SYSTOLIC HEART FAILURE (HCC): Status: ACTIVE | Noted: 2019-07-14

## 2020-05-11 PROBLEM — Z87.440 HISTORY OF UTI: Status: ACTIVE | Noted: 2019-07-18

## 2020-05-11 PROBLEM — D63.1 ANEMIA OF CHRONIC KIDNEY FAILURE, STAGE 4 (SEVERE) (HCC): Status: ACTIVE | Noted: 2019-07-15

## 2020-05-11 PROBLEM — F32.A DEPRESSION: Status: ACTIVE | Noted: 2019-07-15

## 2020-07-23 NOTE — PROGRESS NOTES
Labs reviewed and OK    elevated fluid pressure in heart   No change in meds recommended at this time .   Findings a reflection of CKD and CHF   increasing fluid medications may help but would make kidneys worse

## 2020-10-23 PROBLEM — R29.3 POSTURAL IMBALANCE: Status: ACTIVE | Noted: 2020-01-01

## 2020-12-16 PROBLEM — I50.9 CHF EXACERBATION (HCC): Status: ACTIVE | Noted: 2020-01-01

## 2020-12-16 NOTE — ED TRIAGE NOTES
Pt arrives via EMS from a doctors office. The doctor noticed her in respiratory distress and pt notes she has been having trouble breathing when laying down at night. Pt has a hx of CHF and renal failure and is supposed to be starting dialysis very soon. Pt alert and oriented. Pt was 95% before CPAP but breathing very labored, EMS put pt on CPAP and pt is satting 99%. Pt /65, HR 65 sinus. .

## 2020-12-16 NOTE — ED PROVIDER NOTES
Patient presents the ER via EMS for worsening shortness of breath. Apparently she was at her wound care follow-up appointment for chronic leg wounds which she is found in ED significantly dyspneic. EMS was called, patient with significant tachypnea and hypoxia. Was placed on CPAP in route. There is no reported fevers or cough. Hypertensive in route. Reports some chest pain as well. Was given topical nitroglycerin with some improvement. The history is provided by the patient. Respiratory Distress  This is a new problem. The problem occurs frequently. The current episode started 3 to 5 hours ago. The problem has been gradually worsening. Associated symptoms include cough. Pertinent negatives include no sputum production, no vomiting, no leg swelling and no claudication. Associated medical issues include heart failure.         Past Medical History:   Diagnosis Date    Adverse effect of anesthesia     difficult awakening    AF (atrial fibrillation) ([facility] [address] 11/20/2011    Arthritis 11/18/2011    generalized     CAD (coronary artery disease) 2011    CABG x 4    Cervical disc disease     Steroid injections    Chronic kidney disease     Chronic pain     back    DJD (degenerative joint disease)     Drainage from wound 10/3/2014    Full dentures     Gout     managed with medication     Hemorrhoid 11/5/2013    [place] (hard of hearing)     Hyperlipemia 11/10/2011    managed with medication     Hypertension     controlled with meds    Hypertriglyceridemia     managed with medication     Hypothyroidism     managed with medication     MGUS (monoclonal gammopathy of unknown significance) 12/1/2017    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    Neuropathy     legs and arms, managed with medication     Obesity (BMI 30-39.9) 10/2/14    BMI 36.3    PAD (peripheral artery disease) (HCC)     PCI (pneumatosis cystoides intestinalis) 11/5/2013    Pleural effusion, bilateral 11/21/2011    Postoperative anemia due to acute blood loss 11/21/2011    expected     Renal mass 5/14/2016    Rib cage fracture 11/5/2013    x 2     S/P CABG (coronary artery bypass graft) 11/05/2013    CABG x 4    S/P CABG x 3 11/18/2011    Stasis edema of both lower extremities 1/3/2018    Status post-operative repair of hip fracture 09/15/2014    left side, repair with hardware    Stroke [facility])     left sided weakness residual     Type II or unspecified type diabetes mellitus without mention of complication, uncontrolled ([facility] [address] 12/16/2013    oral and insulin reliant/ Avg / low BS s/s/ @ 70/ last A1c 8.3    Unspecified adverse effect of anesthesia     difficult to arouse after general anesthesia       Past Surgical History:   Procedure Laterality Date    CABG, ARTERY-VEIN, FOUR  Oct. 2011    CLOSE CYSTOSTOMY      exploratory with removal of mass of infection    HX CATARACT REMOVAL Bilateral 11/2012    with IOL implants, bilateral    HX COLONOSCOPY      HX HIP FRACTURE TX Left     repair with hardware    HX HYSTERECTOMY      HX LAP CHOLECYSTECTOMY      VASCULAR SURGERY PROCEDURE UNLIST Left 06/07/2017    AVF creation    VASCULAR SURGERY PROCEDURE UNLIST Left 08/10/2017    AVF elevation         Family History:   Problem Relation Age of Onset    Heart Disease Mother     Cancer Mother         colon    Diabetes Mother     Cancer Father         lung    Cancer Sister         breast    Breast Cancer Sister 28    Cancer Brother         Leukemia    Breast Cancer Maternal Aunt 54       Social History     Socioeconomic History    Marital status:      Spouse name: Not on file    Number of children: Not on file    Years of education: Not on file    Highest education level: Not on file   Occupational History    Not on file   Social Needs    Financial resource strain: Not on file    Food insecurity     Worry: Not on file     Inability: Not on file    Transportation needs     Medical: Not on file     Non-medical: Not on file   Tobacco Use    Smoking status: Never Smoker    Smokeless tobacco: Never Used   Substance and Sexual Activity    Alcohol use: No    Drug use: No     Types: Prescription    Sexual activity: Never   Lifestyle    Physical activity     Days per week: Not on file     Minutes per session: Not on file    Stress: Not on file   Relationships    Social connections     Talks on phone: Not on file     Gets together: Not on file     Attends Restorationism service: Not on file     Active member of club or organization: Not on file     Attends meetings of clubs or organizations: Not on file     Relationship status: Not on file    Intimate partner violence     Fear of current or ex partner: Not on file     Emotionally abused: Not on file     Physically abused: Not on file     Forced sexual activity: Not on file   Other Topics Concern    Not on file   Social History Narrative    Not on file         ALLERGIES: Baclofen and Lipitor [atorvastatin]    Review of Systems   Constitutional: Positive for fatigue. HENT: Negative for congestion. Eyes: Negative for photophobia and visual disturbance. Respiratory: Positive for cough and shortness of breath. Negative for sputum production and chest tightness. Cardiovascular: Negative for claudication and leg swelling. Gastrointestinal: Negative for vomiting. Genitourinary: Negative for vaginal bleeding and vaginal discharge. Skin: Negative for color change. Neurological: Negative for tremors, weakness and light-headedness. Hematological: Negative for adenopathy. Does not bruise/bleed easily. Psychiatric/Behavioral: Negative for behavioral problems. All other systems reviewed and are negative. Vitals:    12/16/20 1543   BP: (!) 222/93   Pulse: 66   Resp: 26   Temp: 98.4 °F (36.9 °C)   SpO2: 100%   Weight: 117.9 kg (260 lb)   Height: 5' 9\" (1.753 m)            Physical Exam  Vitals signs reviewed. Constitutional:       Appearance: She is ill-appearing. HENT:      Head: Normocephalic. Eyes:      Extraocular Movements: Extraocular movements intact. Conjunctiva/sclera: Conjunctivae normal.      Pupils: Pupils are equal, round, and reactive to light. Neck:      Musculoskeletal: Normal range of motion and neck supple. No neck rigidity or muscular tenderness. Cardiovascular:      Rate and Rhythm: Normal rate and regular rhythm. Pulses: Normal pulses. Heart sounds: Normal heart sounds. Pulmonary:      Effort: Pulmonary effort is normal. No respiratory distress. Breath sounds: Normal breath sounds. No wheezing or rhonchi. Abdominal:      General: Abdomen is flat. There is no distension. Palpations: Abdomen is soft. Tenderness: There is no abdominal tenderness. Musculoskeletal:      Right lower leg: Edema present. Left lower leg: Edema present. Skin:     General: Skin is warm. Neurological:      General: No focal deficit present. Mental Status: Mental status is at baseline. Cranial Nerves: No cranial nerve deficit. Sensory: No sensory deficit. MDM  Number of Diagnoses or Management Options  Acute on chronic anemia  Chronic kidney disease, unspecified CKD stage  Hypervolemia, unspecified hypervolemia type  SOB (shortness of breath)  Diagnosis management comments: Shortness of breath could be multifactorial.  Apparently she has had worsening renal function recently. Will obtain chest x-ray, transition to BiPAP. Obtain ABG.    5:56 PM  ABG the pH of 7.35, PaCO2 of 33, PaO2 245  Chest x-ray shows cardiomegaly, no gross edema or infiltrate. Chemistry panel fairly stable. Elevated proBNP. Patient was noted to be weaned off BiPAP, currently on her home 3 L. Hgb Noted to be 7 today, previous was 9, rectal was negative for blood.     We will give 80 of Lasix IV here       Amount and/or Complexity of Data Reviewed  Clinical lab tests: ordered and reviewed  Tests in the radiology section of CPT®: ordered and reviewed  Discuss the patient with other providers: yes  Independent visualization of images, tracings, or specimens: yes    Risk of Complications, Morbidity, and/or Mortality  Presenting problems: high  Diagnostic procedures: moderate  Management options: high           Procedures               Results Include:    Recent Results (from the past 24 hour(s))   NT-PRO BNP    Collection Time: 12/16/20  3:51 PM   Result Value Ref Range    NT pro-BNP 9,332 (H) <450 PG/ML   PROCALCITONIN    Collection Time: 12/16/20  3:51 PM   Result Value Ref Range    Procalcitonin 0.08 ng/mL   LACTIC ACID    Collection Time: 12/16/20  3:51 PM   Result Value Ref Range    Lactic acid 1.1 0.4 - 2.0 MMOL/L   TROPONIN-HIGH SENSITIVITY    Collection Time: 12/16/20  3:51 PM   Result Value Ref Range    Troponin-High Sensitivity 31.6 (H) 0 - 14 pg/mL   CBC WITH AUTOMATED DIFF    Collection Time: 12/16/20  3:51 PM   Result Value Ref Range    WBC 9.4 4.3 - 11.1 K/uL    RBC 2.28 (L) 4.05 - 5.2 M/uL    HGB 7.1 (L) 11.7 - 15.4 g/dL    HCT 23.2 (L) 35.8 - 46.3 %    .8 (H) 79.6 - 97.8 FL    MCH 31.1 26.1 - 32.9 PG    MCHC 30.6 (L) 31.4 - 35.0 g/dL    RDW 13.6 11.9 - 14.6 %    PLATELET 732 928 - 422 K/uL    MPV 11.0 9.4 - 12.3 FL    ABSOLUTE NRBC 0.00 0.0 - 0.2 K/uL    DF AUTOMATED      NEUTROPHILS 76 43 - 78 %    LYMPHOCYTES 11 (L) 13 - 44 %    MONOCYTES 6 4.0 - 12.0 %    EOSINOPHILS 5 0.5 - 7.8 %    BASOPHILS 1 0.0 - 2.0 %    IMMATURE GRANULOCYTES 1 0.0 - 5.0 %    ABS. NEUTROPHILS 7.2 1.7 - 8.2 K/UL    ABS. LYMPHOCYTES 1.1 0.5 - 4.6 K/UL    ABS. MONOCYTES 0.6 0.1 - 1.3 K/UL    ABS. EOSINOPHILS 0.5 0.0 - 0.8 K/UL    ABS. BASOPHILS 0.1 0.0 - 0.2 K/UL    ABS. IMM.  GRANS. 0.1 0.0 - 0.5 K/UL   METABOLIC PANEL, COMPREHENSIVE    Collection Time: 12/16/20  3:51 PM   Result Value Ref Range    Sodium 143 136 - 145 mmol/L    Potassium 4.3 3.5 - 5.1 mmol/L    Chloride 113 (H) 98 - 107 mmol/L    CO2 21 21 - 32 mmol/L    Anion gap 9 7 - 16 mmol/L Glucose 125 (H) 65 - 100 mg/dL    BUN 67 (H) 8 - 23 MG/DL    Creatinine 2.85 (H) 0.6 - 1.0 MG/DL    GFR est AA 20 (L) >60 ml/min/1.73m2    GFR est non-AA 17 (L) >60 ml/min/1.73m2    Calcium 8.8 8.3 - 10.4 MG/DL    Bilirubin, total 0.4 0.2 - 1.1 MG/DL    ALT (SGPT) 14 12 - 65 U/L    AST (SGOT) 7 (L) 15 - 37 U/L    Alk. phosphatase 108 50 - 136 U/L    Protein, total 7.5 6.3 - 8.2 g/dL    Albumin 3.3 3.2 - 4.6 g/dL    Globulin 4.2 (H) 2.3 - 3.5 g/dL    A-G Ratio 0.8 (L) 1.2 - 3.5     MAGNESIUM    Collection Time: 12/16/20  3:51 PM   Result Value Ref Range    Magnesium 2.5 (H) 1.8 - 2.4 mg/dL   POC G3    Collection Time: 12/16/20  3:52 PM   Result Value Ref Range    Device: BIPAP      FIO2 (POC) 60 %    pH (POC) 7.35 7.35 - 7.45      pCO2 (POC) 33.5 (L) 35 - 45 MMHG    pO2 (POC) 245 (H) 75 - 100 MMHG    HCO3 (POC) 18.6 (L) 22 - 26 MMOL/L    sO2 (POC) 100 (H) 95 - 98 %    Base deficit (POC) 6 mmol/L    Tidal volume 999 ml    Set Rate 8 bpm    PEEP/CPAP (POC) 8 cmH2O    PIP (POC) 18      Allens test (POC) YES      Inspiratory Time 0.9 sec    Site RIGHT RADIAL      Specimen type (POC) ARTERIAL      Performed by Livan     CO2, POC 20 MMOL/L    Respiratory comment: PhysicianNotified     Exhaled minute volume 29.30 L/min    COLLECT TIME 1,550             Voice dictation software was used during the making of this note. This software is not perfect and grammatical and other typographical errors may be present. This note has been proofread, but may still contain errors.   Karlos Abraham MD; 12/16/2020 @7:26 PM   ===================================================================

## 2020-12-17 PROBLEM — J96.01 ACUTE RESPIRATORY FAILURE WITH HYPOXIA (HCC): Status: ACTIVE | Noted: 2020-01-01

## 2020-12-17 PROBLEM — I50.9 CHF EXACERBATION (HCC): Status: RESOLVED | Noted: 2020-01-01 | Resolved: 2020-01-01

## 2020-12-17 PROBLEM — I16.0 HYPERTENSIVE URGENCY: Status: ACTIVE | Noted: 2020-01-01

## 2020-12-17 PROBLEM — J96.21 ACUTE ON CHRONIC RESPIRATORY FAILURE WITH HYPOXIA (HCC): Status: ACTIVE | Noted: 2020-01-01

## 2020-12-17 PROBLEM — I16.1 HYPERTENSIVE EMERGENCY: Status: ACTIVE | Noted: 2020-01-01

## 2020-12-17 NOTE — PROGRESS NOTES
12/16/20 2212   Dual Skin Pressure Injury Assessment   Dual Skin Pressure Injury Assessment WDL   Second Care Provider (Based on [address] [place] [address]) Sulema Rutledge RN       Pitting edema, benjamin lower extremities w/chronic leg wounds.

## 2020-12-17 NOTE — PROGRESS NOTES
Pt had increased SOB, vomiting, and chest pain w/4 L NC. Monitor room had Afib w/PVCs. Administered ondansetron IV and notified MD, who ordered a high sensitivity troponin test. Pt fell asleep soon after and has not notified me of worsening symptoms. Will continue to monitor.

## 2020-12-17 NOTE — ED NOTES
TRANSFER - OUT REPORT:    Verbal report given to [facility] RN(name) on [employer]  being transferred to [facility] 2(unit) for routine progression of care       Report consisted of patients Situation, Background, Assessment and   Recommendations(SBAR). Information from the following report(s) SBAR, ED Summary, [facility] and Recent Results was reviewed with the receiving nurse. Lines:   Peripheral IV 12/16/20 Right Antecubital (Active)   Site Assessment Clean, dry, & intact 12/16/20 2053   Phlebitis Assessment 0 12/16/20 2053   Infiltration Assessment 0 12/16/20 2053        Opportunity for questions and clarification was provided.       Patient transported with:   O2 @ 4 liters  Tech

## 2020-12-17 NOTE — PROGRESS NOTES
Hospitalist Progress Note    2020  Admit Date: 2020  3:42 PM   NAME: Lindsey Rogers   :  [birth year]   MRN:  [id number]   Attending: Memo Bliss DO  PCP:  Rio Parks MD    SUBJECTIVE:   Patient is an [de-identified] with PMH extensive PMH including afib on anticoagulation, sCHF, HTN, CAD, DM, CKD who presents from wound care appointment with hypoxia. Patient was found to be dyspneic; EMS was called and patient was placed on CPAP/BiPAP given respiratory distress. Upon arrival to ER, patient was also found to be significantly hypertensive and given topical NG. Patient was weaned off of BiPAP and now is on 3L/m of supplemental O2 (which is her baseline). Patient was found to have BLE edema and elevated pBNP concerning for CHF exacerbation. Started on Lasix IV and Cardiology has been consulted     This Am pt stated she feels better than when she first came in. No other complaints. on 3LNC. Denies fever, chills, SOB, chest pain, abdominal pain, N/V, diarrhea or constipation. Review of Systems negative with exception of pertinent positives noted above  PHYSICAL EXAM     Visit Vitals  BP (!) 152/69   Pulse 62   Temp 97.9 °F (36.6 °C)   Resp 20   Ht 5' 9\" (1.753 m)   Wt 117.9 kg (260 lb)   SpO2 98%   BMI 38.40 kg/m²      Temp (24hrs), Av.7 °F (36.5 °C), Min:97.4 °F (36.3 °C), Max:97.9 °F (36.6 °C)    Oxygen Therapy  O2 Sat (%): 98 % (20 151)  Pulse via Oximetry: 64 beats per minute (20)  O2 Device: Nasal cannula (20)  O2 Flow Rate (L/min): 3 l/min (20)  FIO2 (%): 32 % (20)    Intake/Output Summary (Last 24 hours) at 2020 7416  Last data filed at 2020 2350  Gross per 24 hour   Intake --   Output 400 ml   Net -400 ml      General: No acute distress   Lungs:  CTA Bilaterally. Heart:  Regular rate and rhythm,  No murmur, rub, or gallop  Abdomen: Soft, Non distended, Non tender, Positive bowel sounds  Extremities: No cyanosis or clubbing.  2+ pitting edema on b/l LE's. Multiple ulcerations on b/l LE's--dressing intact and clean. Neurologic:  No focal deficits    XR CHEST PORT   Final Result   Impression: Enlarged cardiac silhouette with grossly clear lungs. ASSESSMENT      Active Hospital Problems    Diagnosis Date Noted    Hypertensive urgency 12/17/2020    Anemia of chronic kidney failure, stage 4 (severe) ([facility] [address] 07/15/2019    Volume overload 07/08/2019    Parkinson disease [facility] 06/19/2019    SARAH (obstructive sleep apnea) 05/24/2018    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 ([facility] 11/15/2017    AF (atrial fibrillation) ([facility] 11/20/2011    HTN (hypertension) 11/10/2011     Plan:    Acute on chronic hypoxic respiratory failure (bl 3LNC) 2/2  Acute exacerbation of HFrEF/Volume overload  - While patient has no evidence of pulmonary edema on CXR, she has BLE edema as well as elevated pBNP 9332. TTE in 7/2019 with EF 30-35%. Started on BIPAP in ED.  - Started on Lasix 80mg IV BID  - Strict I/Os  - Repeat TTE shows improved EF>55%    - Able to wean down to [facility].   - Change IV Lasix to her home Lasix dose 80mg po BID  - Cardiology consulted    HTN Emergency, resolved  - W/ associated clinical symptoms of CHF/volume overload. Given lasix for diuresis that did cause BP to come down minimally  - Restarted BP home meds with Coreg and Lisinopril. PO hydralazine PRN.     BLE wounds  - Wound Care has been following  - Wound Care on board while hospitalized to continue treatment     Anemia of chronic disease  - Hgb is low at 7, hemoccult negative in ER  - Will monitor H/H--stable     CKD Stage 4: Cr appears to be around baseline   DM2: Home insulin. SSI. Diabetic diet   Atrial Fibrillation: Currently rate controlled. Cont home meds including eliquis   Parkinson's disease: Home meds  SARAH: CPAP nightly    DVT Prophylaxis: Apixaban   Dispo:  When stable    Signed By: Kiki Castellano DO     December 17, 2020

## 2020-12-17 NOTE — H&P
HOSPITALIST H&P/CONSULT  NAME:  Johana Aguilar   Age:  [de-identified] y.o.  :   1940   MRN:   [id number]  PCP: Luz Snowden MD  Consulting MD:  Treatment Team: Attending Provider: Alan Zavala MD; Primary Nurse: Lida Peterson  HPI:   Patient is an [de-identified] with PMH extensive PMH including afib on anticoagulation, sCHF, HTN, CAD, DM, CKD who presents from wound care appointment with hypoxia. Patient was found to be dyspneic; EMS was called and patient was placed on CPAP/BiPAP given respiratory distress. Upon arrival to ER, patient was also found to be significantly hypertensive and given topical NG. Patient was weaned off of BiPAP and now is on 3L/m of supplemental O2 (which is her baseline). Patient was found to have BLE edema and elevated pBNP concerning for CHF exacerbation. Hospitalist to admit. Patient denies fevers, chills, nausea, vomiting. Reports previous dyspnea and chest tightness, but that has improved somewhat with ER treatment. Complete ROS done and is as stated in HPI or otherwise negative.     Past Medical History:   Diagnosis Date    Adverse effect of anesthesia     difficult awakening    AF (atrial fibrillation) ([facility] [address] 2011    Arthritis 2011    generalized     CAD (coronary artery disease)     CABG x 4    Cervical disc disease     Steroid injections    Chronic kidney disease     Chronic pain     back    DJD (degenerative joint disease)     Drainage from wound 10/3/2014    Full dentures     Gout     managed with medication     Hemorrhoid 2013    [place] (hard of hearing)     Hyperlipemia 11/10/2011    managed with medication     Hypertension     controlled with meds    Hypertriglyceridemia     managed with medication     Hypothyroidism     managed with medication     MGUS (monoclonal gammopathy of unknown significance) 2017    Mixed action and resting tremor 2017    Neuropathy     legs and arms, managed with medication     Obesity (BMI 30-39.9) 10/2/14    BMI 36.3    PAD (peripheral artery disease) (HCC)     PCI (pneumatosis cystoides intestinalis) 11/5/2013    Pleural effusion, bilateral 11/21/2011    Postoperative anemia due to acute blood loss 11/21/2011    expected     Renal mass 5/14/2016    Rib cage fracture 11/5/2013    x 2     S/P CABG (coronary artery bypass graft) 11/05/2013    CABG x 4    S/P CABG x 3 11/18/2011    Stasis edema of both lower extremities 1/3/2018    Status post-operative repair of hip fracture 09/15/2014    left side, repair with hardware    Stroke [facility])     left sided weakness residual     Type II or unspecified type diabetes mellitus without mention of complication, uncontrolled ([address] 12/16/2013    oral and insulin reliant/ Avg / low BS s/s/ @ 70/ last A1c 8.3    Unspecified adverse effect of anesthesia     difficult to arouse after general anesthesia    Past Medical History reviewed. Past Surgical History:   Procedure Laterality Date    CABG, ARTERY-VEIN, FOUR  Oct. 2011    CLOSE CYSTOSTOMY      exploratory with removal of mass of infection    HX CATARACT REMOVAL Bilateral 11/2012    with IOL implants, bilateral    HX COLONOSCOPY      HX HIP FRACTURE TX Left     repair with hardware    HX HYSTERECTOMY      HX LAP CHOLECYSTECTOMY      VASCULAR SURGERY PROCEDURE UNLIST Left 06/07/2017    AVF creation    VASCULAR SURGERY PROCEDURE UNLIST Left 08/10/2017    AVF elevation      Prior to Admission Medications   Prescriptions Last Dose Informant Patient Reported? Taking? Insulin Syringe-Needle U-100 (BD INSULIN SYRINGE ULTRA-FINE) 1 mL 31 gauge x 5/16 syrg   No No   Sig: DX E 10.65  Inject insulin tid   Lactobacillus acidophilus (ACIDOPHILUS) cap   Yes No   Sig: Take 2 Caps by mouth two (2) times a day. Lancets (ACCU-CHEK FASTCLIX) misc   No No   Sig: Check bs TID. DX E11.9   OXYGEN-AIR DELIVERY SYSTEMS   Yes No   Sig: 3 L by Nasal route continuous.    apixaban (ELIQUIS) 2.5 mg tablet   Yes No   Sig: Take 2.5 mg by mouth two (2) times a day. benazepriL (LOTENSIN) 20 mg tablet   No No   Sig: Take 1 Tab by mouth daily. betamethasone dipropionate (DIPROSONE) 0.05 % topical cream   Yes No   Sig: MAURA EXT AA ON THE BODY BID   carbidopa-levodopa (SINEMET)  mg per tablet   No No   Sig: Take 1 Tab by mouth four (4) times daily. Indications: a type of movement disorder called parkinsonism   carvediloL (COREG) 12.5 mg tablet   No No   Sig: Take 2 Tabs by mouth two (2) times daily (with meals). cyanocobalamin, vitamin B-12, 1,000 mcg/mL kit   Yes No   Si,000 mcg by Injection route every month. on the 1st   ergocalciferol (VITAMIN D2) 50,000 unit capsule   No No   Sig: Take 1 Cap by mouth every seven (7) days. ezetimibe (ZETIA) 10 mg tablet   No No   Sig: Take 1 Tab by mouth daily. ferrous sulfate 325 mg (65 mg iron) tablet   Yes No   Sig: Take 324 mg by mouth daily. furosemide (LASIX) 40 mg tablet   No No   Sig: Take 2 Tabs by mouth two (2) times a day. glucose blood VI test strips (ACCU-CHEK GUIDE) strip   No No   Sig: Check BS TID. DX E11.9   insulin NPH/insulin regular (HumuLIN 70/30 U-100 Insulin) 100 unit/mL (70-30) injection   No No   Si units before breakfast, 55 units before supper   levothyroxine (SYNTHROID) 25 mcg tablet   No No   Sig: Take 1 Tab by mouth Daily (before breakfast). nitroglycerin (NITROSTAT) 0.4 mg SL tablet   No No   Si Tab by SubLINGual route as needed for Chest Pain. Up to 3 doses. nystatin (MYCOSTATIN) topical cream   Yes No   Sig: APPLY TO AFFECTED AREA TWICE DAILY. MIX WITH TRIAMCINOLONE CREAM PRIOR TO APPLICATION   ondansetron hcl (ZOFRAN) 8 mg tablet   No No   Sig: Take 1 Tab by mouth every eight (8) hours as needed for Nausea. potassium chloride (K-DUR, KLOR-CON) 20 mEq tablet   No No   Sig: Take 1 Tab by mouth daily as needed (Only on days you take metolazone).  Indications: prevention of low potassium in the blood prednisoLONE acetate (PRED FORTE) 1 % ophthalmic suspension   Yes No   Sig: Administer 1 Drop to both eyes four (4) times daily. terbinafine HCl (LAMISIL AT) 1 % topical cream   No No   Sig: Apply  to affected area two (2) times a day. triamcinolone acetonide (KENALOG) 0.1 % topical cream   Yes No   Sig: Apply  to affected area two (2) times a day. Facility-Administered Medications: None     Allergies   Allergen Reactions    Baclofen Other (comments)     Psychosis and altered mental status    Lipitor [Atorvastatin] Myalgia      Social History     Tobacco Use    Smoking status: Never Smoker    Smokeless tobacco: Never Used   Substance Use Topics    Alcohol use: No      Family History   Problem Relation Age of Onset    Heart Disease Mother     Cancer Mother         colon    Diabetes Mother     Cancer Father         lung    Cancer Sister         breast    Breast Cancer Sister 28    Cancer Brother         Leukemia    Breast Cancer Maternal Aunt 54    Family History reviewed and is non-contributory to current presentation. Objective:     Visit Vitals  BP (!) 214/79   Pulse 63   Temp 98.4 °F (36.9 °C)   Resp 22   Ht 5' 9\" (1.753 m)   Wt 117.9 kg (260 lb)   SpO2 99%   BMI 38.40 kg/m²      Temp (24hrs), Av.4 °F (36.9 °C), Min:98.4 °F (36.9 °C), Max:98.4 °F (36.9 °C)    Oxygen Therapy  O2 Sat (%): 99 % (20)  Pulse via Oximetry: 69 beats per minute (20)  O2 Device: Nasal cannula (20)  O2 Flow Rate (L/min): 3 l/min (20)  FIO2 (%): 32 % (20)  Physical Exam:  General:    Alert, cooperative, no distress, appears stated age. Head:   Normocephalic, without obvious abnormality, atraumatic. Nose:  Nares normal. No drainage or sinus tenderness. Lungs:   Clear to auscultation bilaterally. No Wheezing or Rhonchi. No rales. Heart:   Regular rate and rhythm, no murmur, rub or gallop. Abdomen:   Soft, non-tender. Not distended.   Bowel sounds normal.   Extremities: No cyanosis. 1-2+ BLE edema. No clubbing. Skin:     Texture, turgor normal.  Not Jaundiced. Neurologic: Alert and oriented x 3, no focal deficits. Data Review:   Recent Results (from the past 24 hour(s))   NT-PRO BNP    Collection Time: 12/16/20  3:51 PM   Result Value Ref Range    NT pro-BNP 9,332 (H) <450 PG/ML   PROCALCITONIN    Collection Time: 12/16/20  3:51 PM   Result Value Ref Range    Procalcitonin 0.08 ng/mL   LACTIC ACID    Collection Time: 12/16/20  3:51 PM   Result Value Ref Range    Lactic acid 1.1 0.4 - 2.0 MMOL/L   TROPONIN-HIGH SENSITIVITY    Collection Time: 12/16/20  3:51 PM   Result Value Ref Range    Troponin-High Sensitivity 31.6 (H) 0 - 14 pg/mL   CBC WITH AUTOMATED DIFF    Collection Time: 12/16/20  3:51 PM   Result Value Ref Range    WBC 9.4 4.3 - 11.1 K/uL    RBC 2.28 (L) 4.05 - 5.2 M/uL    HGB 7.1 (L) 11.7 - 15.4 g/dL    HCT 23.2 (L) 35.8 - 46.3 %    .8 (H) 79.6 - 97.8 FL    MCH 31.1 26.1 - 32.9 PG    MCHC 30.6 (L) 31.4 - 35.0 g/dL    RDW 13.6 11.9 - 14.6 %    PLATELET 045 979 - 416 K/uL    MPV 11.0 9.4 - 12.3 FL    ABSOLUTE NRBC 0.00 0.0 - 0.2 K/uL    DF AUTOMATED      NEUTROPHILS 76 43 - 78 %    LYMPHOCYTES 11 (L) 13 - 44 %    MONOCYTES 6 4.0 - 12.0 %    EOSINOPHILS 5 0.5 - 7.8 %    BASOPHILS 1 0.0 - 2.0 %    IMMATURE GRANULOCYTES 1 0.0 - 5.0 %    ABS. NEUTROPHILS 7.2 1.7 - 8.2 K/UL    ABS. LYMPHOCYTES 1.1 0.5 - 4.6 K/UL    ABS. MONOCYTES 0.6 0.1 - 1.3 K/UL    ABS. EOSINOPHILS 0.5 0.0 - 0.8 K/UL    ABS. BASOPHILS 0.1 0.0 - 0.2 K/UL    ABS. IMM.  GRANS. 0.1 0.0 - 0.5 K/UL   METABOLIC PANEL, COMPREHENSIVE    Collection Time: 12/16/20  3:51 PM   Result Value Ref Range    Sodium 143 136 - 145 mmol/L    Potassium 4.3 3.5 - 5.1 mmol/L    Chloride 113 (H) 98 - 107 mmol/L    CO2 21 21 - 32 mmol/L    Anion gap 9 7 - 16 mmol/L    Glucose 125 (H) 65 - 100 mg/dL    BUN 67 (H) 8 - 23 MG/DL    Creatinine 2.85 (H) 0.6 - 1.0 MG/DL    GFR est AA 20 (L) >60 ml/min/1.73m2 GFR est non-AA 17 (L) >60 ml/min/1.73m2    Calcium 8.8 8.3 - 10.4 MG/DL    Bilirubin, total 0.4 0.2 - 1.1 MG/DL    ALT (SGPT) 14 12 - 65 U/L    AST (SGOT) 7 (L) 15 - 37 U/L    Alk. phosphatase 108 50 - 136 U/L    Protein, total 7.5 6.3 - 8.2 g/dL    Albumin 3.3 3.2 - 4.6 g/dL    Globulin 4.2 (H) 2.3 - 3.5 g/dL    A-G Ratio 0.8 (L) 1.2 - 3.5     MAGNESIUM    Collection Time: 12/16/20  3:51 PM   Result Value Ref Range    Magnesium 2.5 (H) 1.8 - 2.4 mg/dL   POC G3    Collection Time: 12/16/20  3:52 PM   Result Value Ref Range    Device: BIPAP      FIO2 (POC) 60 %    pH (POC) 7.35 7.35 - 7.45      pCO2 (POC) 33.5 (L) 35 - 45 MMHG    pO2 (POC) 245 (H) 75 - 100 MMHG    HCO3 (POC) 18.6 (L) 22 - 26 MMOL/L    sO2 (POC) 100 (H) 95 - 98 %    Base deficit (POC) 6 mmol/L    Tidal volume 999 ml    Set Rate 8 bpm    PEEP/CPAP (POC) 8 cmH2O    PIP (POC) 18      Allens test (POC) YES      Inspiratory Time 0.9 sec    Site RIGHT RADIAL      Specimen type (POC) ARTERIAL      Performed by Livan     CO2, POC 20 MMOL/L    Respiratory comment: PhysicianNotified     Exhaled minute volume 29.30 L/min    COLLECT TIME 1,550       Imaging /Procedures /Studies     Assessment and Plan:      Active Hospital Problems    Diagnosis Date Noted    [facility] 12/16/2020    Anemia of chronic kidney failure, stage 4 (severe) ([facility] [address] 07/15/2019    Parkinson disease ([facility] [address] 06/19/2019    SARAH (obstructive sleep apnea) 05/24/2018    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 ([facility] [address] 11/15/2017    AF [facility]at[facility] fibrillation) ([facility] 11/20/2011    HTN (hypertension) 11/10/2011       PLAN  CHF Exacerbation  - While patient has no evidence of pulmonary edema on CXR, she has BLE edema as well as elevated pBNP  - pBNP is 9332, up from a few months ago at 4349 (pt does have known CKD)  - Will start Lasix 80mg IV BID  - Strict I/Os  - Supplemental O2 as needed (typically uses 3L/m at baseline)  - Last echo in chart is from 7/7/19 with EF of 30-35%, will order repeat echo     HTN  - BP has been elevated for entirety of ER stay  - Given lasix for diuresis that did cause BP to come down minimally  - Restart home meds  - PO hydralazine PRN    BLE wounds  - Wound Care has been following  - Will consult with Wound Care while hospitalized to continue treatment    Anemia of chronic disease  - Hgb is low at 7, hemoccult negative in ER  - Will monitor H/H    CKD  - Stage 4  - Cr appears to be around baseline    DM2  - Home insulin  - SSI  - Diabetic diet    Atrial Fibrillation  - Currently rate controlled  - Home meds including eliquis    Parkinson's disease  - Home meds    Anticipated discharge: 2 days, pending clinical course    Signed By: Sonam Tejada MD     December 16, 2020

## 2020-12-17 NOTE — PROGRESS NOTES
END OF SHIFT NOTE:    INTAKE/OUTPUT  12/16 0701 - 12/17 0700  In: -   Out: 400 [Urine:400]  Voiding: YES  Catheter: NO  Drain:   External Female Catheter 12/16/20 (Active)   Site Assessment Clean, dry, & intact 12/17/20 0803   Repositioned No 12/17/20 0803   Perineal Care No 12/17/20 0803   Wick Changed No 12/17/20 0803   Suction Canister/Tubing Changed No 12/17/20 0803   Urine Output (mL) 250 ml 12/17/20 1822               Flatus: Patient does have flatus present. Stool:  0 occurrences. Characteristics:       Emesis: 0 occurrences. Characteristics:        VITAL SIGNS  Patient Vitals for the past 12 hrs:   Temp Pulse Resp BP SpO2   12/17/20 1518 97.9 °F (36.6 °C) 62 20 (!) 152/69 98 %   12/17/20 1133 97.8 °F (36.6 °C) 62 20 126/66 99 %   12/17/20 0803 -- (!) 118 -- -- --   12/17/20 0702 97.8 °F (36.6 °C) (!) 112 20 (!) 143/76 98 %       Pain Assessment  Pain Intensity 1: 0 (12/16/20 1543)        Patient Stated Pain Goal: 0    Ambulating  No, patient sat up on side of the bed. Shift report given to oncoming nurse at the bedside.     Tyshawn Melara RN

## 2020-12-17 NOTE — PROGRESS NOTES
TRANSFER - IN REPORT:    Verbal report received from Sridevi Arevalo on Niall Hughes  being received from ER for routine progression of care      Report consisted of patients Situation, Background, Assessment and   Recommendations(SBAR). Information from the following report(s) SBAR, Kardex, ED Summary, MAR, Recent Results and Cardiac Rhythm . was reviewed with the receiving nurse. Opportunity for questions and clarification was provided. Assessment completed upon patients arrival to unit and care assumed.

## 2020-12-17 NOTE — PROGRESS NOTES
CM reviewed chart for discharge planning. No consult received. Patient appears to have been having a frustrated time navigating between doctors and treatments for her leg wounds. Specialist recommendations still pending. Wound care following. No therapy orders at this time. CM will continue to follow for potential discharge needs.     Discharge Location  Discharge Placement: Unable to determine at this time

## 2020-12-17 NOTE — CONSULTS
[facility] Cardiology Consult                Date of  Admission: 12/16/2020  3:42 PM     Primary Care Physician: Rio Parks MD  Primary Cardiologist: Dr Rob Holm  Referring Physician: CHF Coordinator  Consulting Physician: Dr Martell Sacks for consult: CHF coordinator evaluation for possible HF      Lindsey Rogers is a [de-identified] y.o. female with hx of A Fib, HTN, PAD, CAD s/p CABG, parkinsons, DM type II, CKD stage V, HLD, hypothyroidism, obesity, HON, and DJD. The patient came to the ED from wound care appointment with hypoxia. The patient was transported by EMS using none invasive ventilation and was placed on BiPAP in the ED . The patient was found to be significantly hypertensive and given topical NGT and was able to wean to NC at 3 lpm via NC which is the patients baseline. Noted labs include WBC of 11.4, Hgb of 8.1 from 7.1, Cr 2.76 from 2.85, and NT Pro BMP of 19k from 9k on admission. The patients initial BP was 222/93 and is currently 126/66. Per the patient she has been compliant with medications and diet at home. She just states \"I had another spell\" . The patient feels much better with no new complaints today. She denies CP, HA, LOC, AMS, weakness, dizziness, falls, or exposure to [address]. Recent Cardiac Synopsis w/ Labs  LHC/NST: 2011 Severe Multivessel CAD prior to CABG  Echo: 7/7/2019 -  Left ventricle: The ventricle was moderately dilated. Systolic function   Was moderately reduced. Ejection fraction was estimated in the range of 30 % to   35 %. This study was inadequate for the evaluation of regional wall motion.   -  Right ventricle: The ventricle was dilated. Systolic function was reduced.   -  Left atrium: The atrium was mildly to moderately dilated.   -  Right atrium: The atrium was moderately dilated.   -  Inferior vena cava, hepatic veins: The inferior vena cava was dilated. The  respirophasic change in diameter was less than 50%.  -  Aortic valve:  There was mild to moderate regurgitation.   -  Mitral valve: There was mild annular calcification. There was mild  regurgitation.   -  Tricuspid valve: There was moderate regurgitation.   -  Pulmonic valve: There was mild to moderate regurgitation.   EKG:      Past Medical History:   Diagnosis Date    Adverse effect of anesthesia     difficult awakening    AF (atrial fibrillation) ([facility] [address] 11/20/2011    Arthritis 11/18/2011    generalized     CAD (coronary artery disease) 2011    CABG x 4    Cervical disc disease     Steroid injections    Chronic kidney disease     Chronic pain     back    DJD (degenerative joint disease)     Drainage from wound 10/3/2014    Full dentures     Gout     managed with medication     Hemorrhoid 11/5/2013    [place] ([place] hearing[place]     Hyperlipemia 11/10/2011    managed with medication     Hypertension     controlled with meds    Hypertriglyceridemia     managed with medication     Hypothyroidism     managed with medication     MGUS (monoclonal gammopathy of unknown significance) 12/1/2017    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    Neuropathy     legs and arms, managed with medication     Obesity (BMI 30-39.9) 10/2/14    BMI 36.3    PAD (peripheral artery disease) (HCC)     PCI (pneumatosis cystoides intestinalis) 11/5/2013    Pleural effusion, bilateral 11/21/2011    Postoperative anemia due to acute blood loss 11/21/2011    expected     Renal mass 5/14/2016    Rib cage fracture 11/5/2013    x 2     S/P CABG (coronary artery bypass graft) 11/05/2013    CABG x 4    S/P CABG x 3 11/18/2011    Stasis edema of both lower extremities 1/3/2018    Status post-operative repair of hip fracture 09/15/2014    left side, repair with hardware    Stroke [facility])     left sided weakness residual     Type II or unspecified type diabetes mellitus without mention of complication, uncontrolled ([facility] [address] 12/16/2013    oral and insulin reliant/ Avg / low BS s/s/ @ 70/ last A1c 8.3    Unspecified adverse effect of anesthesia     difficult to arouse after general anesthesia      Past Surgical History:   Procedure Laterality Date    CABG, ARTERY-VEIN, FOUR  Oct. 2011    CLOSE CYSTOSTOMY      exploratory with removal of mass of infection    HX CATARACT REMOVAL Bilateral 11/2012    with IOL implants, bilateral    HX COLONOSCOPY      HX HIP FRACTURE TX Left     repair with hardware    HX HYSTERECTOMY      HX LAP CHOLECYSTECTOMY      VASCULAR SURGERY PROCEDURE UNLIST Left 06/07/2017    AVF creation    VASCULAR SURGERY PROCEDURE UNLIST Left 08/10/2017    AVF elevation     Allergies   Allergen Reactions    Baclofen Other (comments)     Psychosis and altered mental status    Lipitor [Atorvastatin] Myalgia      Family History   Problem Relation Age of Onset    Heart Disease Mother     Cancer Mother         colon    Diabetes Mother     Cancer Father         lung    Cancer Sister         breast    Breast Cancer Sister 28    Cancer Brother         Leukemia    Breast Cancer Maternal Aunt 54      Social History     Socioeconomic History    Marital status:      Spouse name: Not on file    Number of children: Not on file    Years of education: Not on file    Highest education level: Not on file   Occupational History    Not on file   Social Needs    Financial resource strain: Not on file    Food insecurity     Worry: Not on file     Inability: Not on file    Transportation needs     Medical: Not on file     Non-medical: Not on file   Tobacco Use    Smoking status: Never Smoker    Smokeless tobacco: Never Used   Substance and Sexual Activity    Alcohol use: No    Drug use: No     Types: Prescription    Sexual activity: Never   Lifestyle    Physical activity     Days per week: Not on file     Minutes per session: Not on file    Stress: Not on file   Relationships    Social connections     Talks on phone: Not on file     Gets together: Not on file     Attends [employer] service: Not on file     Active member of club or organization: Not on file     Attends meetings of clubs or organizations: Not on file     Relationship status: Not on file    Intimate partner violence     Fear of current or ex partner: Not on file     Emotionally abused: Not on file     Physically abused: Not on file     Forced sexual activity: Not on file   Other Topics Concern    Not on file   Social History Narrative    Not on file       Current Facility-Administered Medications   Medication Dose Route Frequency    furosemide (LASIX) injection 40 mg  40 mg IntraVENous DAILY    perflutren lipid microspheres (DEFINITY) in NS bolus IV  1 mL IntraVENous PRN    ferrous sulfate tablet 324 mg  324 mg Oral DAILY    prednisoLONE acetate (PRED FORTE) 1 % ophthalmic suspension 1 Drop  1 Drop Both Eyes QID    ondansetron (ZOFRAN ODT) tablet 8 mg  8 mg Oral Q8H PRN    apixaban (ELIQUIS) tablet 2.5 mg  2.5 mg Oral BID    carvediloL (COREG) tablet 25 mg  25 mg Oral BID WITH MEALS    lisinopriL (PRINIVIL, ZESTRIL) tablet 20 mg  20 mg Oral DAILY    levothyroxine (SYNTHROID) tablet 25 mcg  25 mcg Oral ACB    ezetimibe (ZETIA) tablet 10 mg  10 mg Oral DAILY    carbidopa-levodopa (SINEMET)  mg per tablet 1 Tab  1 Tab Oral QID    sodium chloride (NS) flush 5-40 mL  5-40 mL IntraVENous Q8H    sodium chloride (NS) flush 5-40 mL  5-40 mL IntraVENous PRN    acetaminophen (TYLENOL) tablet 650 mg  650 mg Oral Q6H PRN    Or    acetaminophen (TYLENOL) suppository 650 mg  650 mg Rectal Q6H PRN    polyethylene glycol (MIRALAX) packet 17 g  17 g Oral DAILY PRN    promethazine (PHENERGAN) tablet 12.5 mg  12.5 mg Oral Q6H PRN    Or    ondansetron (ZOFRAN) injection 4 mg  4 mg IntraVENous Q6H PRN    hydrALAZINE (APRESOLINE) tablet 25 mg  25 mg Oral Q4H PRN    insulin glargine (LANTUS) injection 67 Units  67 Units SubCUTAneous QHS    And    insulin lispro (HUMALOG) injection 12 Units  12 Units SubCUTAneous TIDAC       Review of Systems   Constitution: Negative for weight gain and weight loss. HENT: Negative. Eyes: Negative. Cardiovascular: Positive for dyspnea on exertion. Negative for chest pain (currently pain free), claudication, cyanosis, irregular heartbeat, leg swelling, near-syncope, orthopnea, palpitations, paroxysmal nocturnal dyspnea and syncope. Respiratory: Positive for shortness of breath ( at baseline). Negative for cough and wheezing. Endocrine: Negative. Skin: Positive for poor wound healing. Musculoskeletal: Negative. Gastrointestinal: Negative for nausea and vomiting. Genitourinary: Negative. Neurological: Negative for dizziness. Psychiatric/Behavioral: Negative. Allergic/Immunologic: Negative.          Physical Exam  Vitals:    12/17/20 0702 12/17/20 0803 12/17/20 1119 12/17/20 1133   BP: (!) 143/76   126/66   Pulse: (!) 112 (!) 118  62   Resp: 20   20   Temp: 97.8 °F (36.6 °C)   97.8 °F (36.6 °C)   SpO2: 98%   99%   Weight:       Height:   5' 9\" (1.753 m)        Last 3 Recorded Weights in this Encounter    12/16/20 1543   Weight: 117.9 kg (260 lb)         Physical Exam:  General: Well Developed, Obese, No Acute Distress  HEENT: pupils equal and round, no abnormalities noted  Neck: supple, no JVD, no carotid bruits  Heart: S1S2 with RRR without murmurs or gallops  Lungs: Clear throughout auscultation bilaterally without adventitious sounds but hypo volume due to size  Abd: soft, nontender, nondistended, with good bowel sounds  Ext: warm,  Edema, poor healing covered wounds, red skin, calves supple/nontender, pulses 2+ bilaterally  Skin: warm and dry with positive turger on arms but edema at legs  Psychiatric: Normal mood and affect  Neurologic: Alert and oriented X 3      Labs:   Recent Labs     12/17/20  0147 12/16/20  1551    143   K 4.0 4.3   MG  --  2.5*   BUN 68* 67*   CREA 2.76* 2.85*   * 125*   WBC 11.4* 9.4   HGB 8.1* 7.1*   HCT 25.6* 23.2*    292       Xr Chest Port    Result Date: 12/16/2020  Impression: Enlarged cardiac silhouette with grossly clear lungs. Assessment/Plan:     Assessment:      Principal Problem:    Volume overload (7/8/2019) Up dated Echo Pending, Volume overload is 2nd to HTN urgency. Pt has been complaint with diet and medication at home but does not weight herself daily. Pt education was completed. Added daily weight and 2 lpd fluid restriction. Continue Strict I and O. Continue IV Lasix. Daily BMP and Mag, replace Lytes PRN. Further recommendations pending clinical course and attending recommendations. Active Problems:    HTN (hypertension) (11/10/2011) BP improved, continue to monitor. 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 Seeing Wound clinic and Vascular surgery. SARAH (obstructive sleep apnea) (5/24/2018) Use CPAP PRN per primary team      Parkinson disease ([facility] [address] (6/19/2019) Per Primary Team      Anemia of chronic kidney failure, stage 4 (severe) ([facility] [address] [facility] (7/15/2019) CKG stage 5 by hx. Pt has been prepped by Nephrology for possible HD post surgery but not active on HD at home. Hypertensive urgency (12/17/2020) BP improved now on Coreg 25 mg, Hydralazine 25 mg PRN as a rescue, and Lisinopril 20 mg Daily. Echo pending. Could consider stopping Lisinopril and adding lower dose Norvasc with PO hydralazine with Renal function. Will default to attending. Further recommendations pending clinical course and attending recommendations. Thank you very much for this referral. We appreciate the opportunity to participate in this patient's care. We will follow along with above stated plan.     Rafat Meyer NP [facility]  Consulting MD: Dr Shravan Saleh

## 2020-12-17 NOTE — PROGRESS NOTES
Comprehensive Nutrition Assessment    Type and Reason for Visit: Initial, Positive nutrition screen  Best Practice Alert for Malnutrition Screening Tool: Recently Lost Weight Without Trying: Yes(due to antibiotics for the past 28 pills for leg infection), If Yes, How Much Weight Loss: 2-13 lbs, Eating Poorly Due to Decreased Appetite: Yes(pt nauseous on antibiotics)    Nutrition Recommendations/Plan:    Continue current diet   Continue Glucerna TID     Malnutrition Assessment:  Malnutrition Status: At risk for malnutrition (specify)(poor PO x 2 weeks prior to admission)    Nutrition Assessment:   Nutrition History: Patient reports \"just picking\" for ~2 weeks prior to admission due decline in appetite from antibiotics. Nutrition Background: Patient with PMH significant for CHF, HTN, CAD s/p CABG, DM2, CKD, HLD, CVA, obesity. She presented from wound care appointment with hypoxia. Admitted with CHF exacerbation. Daily Update:  Patient seen up in chair sleeping, but awakens easily to name call. She states that her appetite always declines when she is on antibiotics. She states that she just finished to prescription and appetite is still not at baseline. She states that she was not hungry for breakfast, but typically does not eat a very big breakfast anyway. She states that she did not drink Glucerna as she has been diabetic for years and has lost a taste for sweet things. She is open to continuing Glucerna until appetite improves once RD recommends diluting with milk to dilute sweetness - to obtain from floor stock as needed.     Nutrition Related Findings:     Wound Type: (multiple lacerations and blisters to BLE)    Current Nutrition Therapies:  DIET DIABETIC CONSISTENT CARB Regular; 2 GM NA (House Low NA)  DIET NUTRITIONAL SUPPLEMENTS All Meals; Glucerna Shake    Anthropometric Measures:  Height: 5' 9\" (175.3 cm)  Current Body Wt: 118 kg (260 lb 2.3 oz), Weight source: Stated(12/16)  BMI: 38.4, Obese class 2 (BMI 35.0-39. 9)     Ideal Body Wt: 145 lbs (66 kg), 179.4 %  Usual Body Wt: 117.9 kg (260 lb)(office visits 9/28/20 and 12/10/20), Percent weight change: 0.1          Estimated Daily Nutrient Needs:  Energy (kcal): 4695-2140 (Kcal/kg(23-28), Weight Used: Ideal(65.9 kg))  Protein (g): 59-66(0.9-1 g/kg) Weight Used: (Ideal)  Fluid (ml/day):   (1 ml/kcal)    Nutrition Diagnosis:   · Inadequate oral intake related to (decline in appetite) as evidenced by (patient reported barrier to PO, intake as above)    Nutrition Interventions:   Food and/or Nutrient Delivery: Continue current diet, Continue oral nutrition supplement     Coordination of Nutrition Care: Continue to monitor while inpatient    Goals:    Meet at least 75% nutrition needs within 7 days    Nutrition Monitoring and Evaluation:      Food/Nutrient Intake Outcomes: Food and nutrient intake       Discharge Planning:     Too soon to determine    [facility] [place], LD on 12/17/2020 at 12:06 PM  Contact: [phone]        Disaster Mode active

## 2020-12-17 NOTE — PROGRESS NOTES
END OF SHIFT NOTE:    Pt had increased SOB, vomiting, and chest pain. Administered ondansetron and pt fell asleep. See previous note. INTAKE/OUTPUT  12/16 0701 - 12/17 0700  In: -   Out: 400 [Urine:400]  Voiding: YES  Catheter: NO  Drain:   External Female Catheter 12/16/20 (Active)   Urine Output (mL) 400 ml 12/16/20 2350               Flatus: Patient does have flatus present. Stool:  0 occurrences. Characteristics:       Emesis: 1 occurrences. Characteristics:        VITAL SIGNS  Patient Vitals for the past 12 hrs:   Temp Pulse Resp BP SpO2   12/17/20 0219 97.9 °F (36.6 °C) (!) 106 22 127/68 97 %   12/16/20 2350 97.4 °F (36.3 °C) (!) 127 22 (!) 109/54 96 %   12/16/20 2129 97.6 °F (36.4 °C) 78 22 (!) 120/52 95 %   12/16/20 2031 -- 63 -- (!) 197/85 98 %   12/16/20 2013 -- -- 22 -- --   12/16/20 2002 -- 63 30 (!) 214/79 99 %   12/16/20 1932 -- 62 29 (!) 208/93 98 %   12/16/20 1807 -- 62 -- (!) 190/123 100 %   12/16/20 1803 -- 62 -- (!) 190/115 99 %   12/16/20 1746 -- 64 -- (!) 190/115 99 %       Pain Assessment  Pain Intensity 1: 0 (12/16/20 1543)        Patient Stated Pain Goal: 0    Ambulating  Yes    Shift report given to oncoming nurse at the bedside.     Chuck Section

## 2020-12-17 NOTE — WOUND CARE
Patient seen for wound dressings to bilateral lower legs, left dosral foot, left dorsal surface of toes 1-3. All wounds on left lower extremity are yellow slough (toes also have some crusted brown). Patient has been seen by vascular recently and noted JOSE CARLOS's and vascular status. Patient legs hard and swollen. Multiple ulcerations to left leg and foot. Right lower leg (near knee) shallow pink ulcer was a blister that came up this week per patient report. Patient goals is to try to keep free of infection and heal but without procedures. Compression wraps not appropriate. Patient needs to keep legs elevated. Xeroform dressings applied to all sites. Wound beds are dry and old hydrofiber dressings were stuck to her wounds. New dressings applied. Answered all patient questions at bedside.

## 2020-12-18 NOTE — ROUTINE PROCESS
CHF teaching started post introduction to pt/family via cell ; aware of diagnosis. Planner/scale(home) @ BS and will follow. Smoking/ ETOH/Illicit drug use cessation and maintain a healthy weight covered. Pt/family aware that I can not prescribe nor adjust  medications: 15mins  Palliative Care score: on hold  Refused ACP on admission  Start 2L/D Fluid restriction/ cardiac diet  CHF teaching continues to pt/family. Emphasis on taking prescription meds as ordered, to keep F/U appts and to call MD STAT if any of the following occur: If you gain 2 lbs in one day or 5 lbs in a week, and short of breath. If you can not lay flat without developing short of breath or rapid breathing at night; or if it wakes you up. Develop a cough or wheezing. If you notice swollen hands/feet/ankles or stomach with a bloated/ full feeling. If you become confused or mentally fuzzy or dizzy. If you notice a rapid or change in your heart rate. If you become more exhausted all the time and unable to do the same level of activity without stopping to catch your breath. Drink no more than 8 cups a day in 8 oz. cups. Your Heart can not handle any more. Stay away from salt (limit anything with salt or sodium in it). Limit to 250mg per serving.   Pt/family verbalizes understanding, will follow to reinforce teaching skills: 20 mins    Has Interm HH, continue  Has DME

## 2020-12-18 NOTE — PROGRESS NOTES
END OF SHIFT NOTE:    INTAKE/OUTPUT  12/17 0701 - 12/18 0700  In: -   Out: 625 [Urine:625]  Voiding: YES  Catheter: NO  Drain:   External Female Catheter 12/16/20 (Active)   Site Assessment Clean, dry, & intact 12/18/20 0257   Repositioned Yes 12/18/20 0257   Perineal Care Yes 12/18/20 0257   Wick Changed Yes 12/18/20 0257   Suction Canister/Tubing Changed No 12/18/20 0257   Urine Output (mL) 200 ml 12/18/20 0257               Flatus: Patient does have flatus present. Stool:  0 occurrences. Characteristics:       Emesis: 0 occurrences. Characteristics:        VITAL SIGNS  Patient Vitals for the past 12 hrs:   Temp Pulse Resp BP SpO2   12/18/20 0257 98.6 °F (37 °C) 60 18 120/69 97 %   12/17/20 2309 98 °F (36.7 °C) 64 18 132/68 95 %       Pain Assessment  Pain Intensity 1: 0 (12/18/20 0028)        Patient Stated Pain Goal: 0    Ambulating  No    Shift report given to oncoming nurse at the bedside.     [address]

## 2020-12-18 NOTE — PROGRESS NOTES
Hospitalist Progress Note    2020  Admit Date: 2020  3:42 PM   NAME: Ebonie Bellamy   :     MRN:  [id number]   Attending: Zachariah Jones DO  PCP:  Zoila Yoo MD    SUBJECTIVE:   Patient is an [de-identified] with PMH extensive PMH including afib on anticoagulation, sCHF, HTN, CAD, DM, CKD who presents from wound care appointment with hypoxia. Patient was found to be dyspneic; EMS was called and patient was placed on CPAP/BiPAP given respiratory distress. Upon arrival to ER, patient was also found to be significantly hypertensive and given topical NG. Patient was weaned off of BiPAP and now is on 3L/m of supplemental O2 (which is her baseline). Patient was found to have BLE edema and elevated pBNP concerning for CHF exacerbation. Started on Lasix IV and Cardiology has been consulted     Pt is at baseline 3LNC. Stated she feels at baseline but has SOB chronically at baseline. She said her peripheral edema is better. Doesn't want to go to [facility], prefers to go home. Asked if she could go home soon. Denies fever, chills, chest pain, abdominal pain, N/V, diarrhea or constipation.     Review of Systems negative with exception of pertinent positives noted above  PHYSICAL EXAM     Visit Vitals  /72   Pulse 60   Temp 97.9 °F (36.6 °C)   Resp 18   Ht 5' 9\" (1.753 m)   Wt 122.7 kg (270 lb 8 oz)   SpO2 99%   BMI 39.95 kg/m²      Temp (24hrs), Av.2 °F (36.8 °C), Min:97.9 °F (36.6 °C), Max:98.8 °F (37.1 °C)    Oxygen Therapy  O2 Sat (%): 99 % (20 1120)  Pulse via Oximetry: 64 beats per minute (20)  O2 Device: Nasal cannula (20)  O2 Flow Rate (L/min): 3 l/min (20)  FIO2 (%): 32 % (20 1730)    Intake/Output Summary (Last 24 hours) at 2020 1435  Last data filed at 2020 0900  Gross per 24 hour   Intake --   Output 875 ml   Net -875 ml      General: No acute distress   Lungs:  CTA b/l  Heart:  Regular rate and rhythm,  No murmur, rub, or gallop  Abdomen: Soft, Non distended, Non tender, Positive bowel sounds  Extremities: No cyanosis or clubbing. 2+ pitting edema on b/l LE's-improved. Multiple ulcerations on b/l LE's--dressing intact and clean. Neurologic:  No focal deficits    XR CHEST SNGL V   Final Result   IMPRESSION:      1. Cardiomegaly and vascular congestion, similar to prior exam.      XR CHEST PORT   Final Result   Impression: Enlarged cardiac silhouette with grossly clear lungs. 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 TTE in 7/2019 with EF 30-35%. Started on BIPAP in ED.  - Started on Lasix 80mg IV BID  - Strict I/Os  - Repeat TTE shows improved EF>55%    - Able to wean down to [facility]. - pBNP 3981>[id number]  - Cardiology consulted: W/ elevated pBNP, +JVD w/ SOB, cont with IV lasix BID    HTN Emergency, resolved  - W/ associated clinical symptoms of CHF/volume overload. Given lasix for diuresis that did cause BP to come down minimally  - Restarted BP home meds with Coreg and Lisinopril.  PO hydralazine PRN.     BLE wounds  - Wound Care has been following  - Wound Care on board while hospitalized to continue treatment     Anemia of chronic disease  - Hgb is low at 7, hemoccult negative in ER  - Will monitor H/H--stable  - Obtain iron studies    CAD/PAD: Cardiology recommended baby ASA  Atrial Fibrillation: Currently rate controlled. Cont coreg including eliquis     CKD Stage 4: Cr slightly worsens. CTM as pt on high dose lasix    DM2: Home insulin. SSI. Diabetic diet  Parkinson's disease: Home meds  SARAH: CPAP nightly    DVT Prophylaxis: Apixaban   Dispo:  When stable    Signed By: Sharon Vicente,      December 18, 2020

## 2020-12-18 NOTE — CONSULTS
[address] [place] [address] 
CONSULTATION Name:  Loree Juarez 
MR#:  [id number] :  1940 ACCOUNT #:  [de-identified] DATE OF SERVICE:  2020 HISTORY OF PRESENT ILLNESS:  This is an 59-year-old lady who we were asked to see for congestive heart failure. She has multiple medical problems. She has coronary artery disease and prior CABG. She is followed on a regular basis by Dr. Jaxon Porter. Her last echocardiogram prior to this admission was in 2019. At that time, her left ventricular ejection fraction was 30% to 35%. She had a nuclear stress test in 2019 which showed a fixed defect but no definite ischemia. Her last cardiac catheterization was in 2011 which was prior to her bypass surgery. In addition to her coronary artery disease and left ventricular dysfunction, there is a history of atrial fibrillation. She is on chronic oral anticoagulation. Her last EKG to review was from 2020 and she was in a normal sinus rhythm at that time. She has peripheral arterial disease and nonhealing ulcers of her lower extremities. She is followed by the [address] [place] [address] by Dr. Eunice Ramos. Yesterday, while there, she was severely short of breath and with hypoxemia. She was admitted to this facility since most of her physicians are [facility] related. Since admission, she is feeling better, less short of breath after being treated for severe hypertension and congestive heart failure. Her blood pressure on presentation yesterday was as high as 214/79. This afternoon, she says she is feeling a good bit better. She is less short of breath. No chest pain, palpitations, cough, or fever or chills. PAST MEDICAL HISTORY:  Her other medical problems include diabetes, chronic kidney disease, Parkinson disease, and neuropathy. Her physicians in town include her internist, cardiologist, neurologist, pulmonologist, hematologist, and neurosurgery for meningioma. FAMILY HISTORY:  Noncontributory. SOCIAL HISTORY:  She lives in [facility] with her  and daughter in their own home. She is a nonsmoker. No alcohol. CURRENT MEDICATIONS:  Her most recent list of medications include: 1. Sinemet. 2.  Betamethasone cream. 
3.  Insulin. 4.  Synthroid. 5.  Carvedilol. 6.  Lasix. 7.  Mycostatin cream. 
8.  Eliquis. 9.  Eyedrops. 10.  Lamisil. 11.  Acidophilus. 12.  Oxygen. 13.  B12. 
14.  Ferrous sulfate. 15.  Kenalog cream. 16.  Zetia. 17.  Benazepril. 18.  P.r.n. nitroglycerin. 19.  Zofran. 20.  Potassium. 21.  Vitamin D2. 
 
REVIEW OF SYSTEMS:  Performed and is otherwise noncontributory. No fever, chills, diplopia, tinnitus, epistaxis, dysphagia, abdominal pain or melena, flank pain or dysuria, rash, adenopathy, or signs or symptoms of stroke. PHYSICAL EXAMINATION: 
VITAL SIGNS:  Her blood pressure this afternoon is 152/69, pulse is 62. GENERAL:  This is a well-developed, well-nourished obese lady in no acute distress. HEENT:  Grossly negative. There is no jaundice or drainage. NECK:  Her neck veins seem flat. LUNGS:  No wheezes or definite rales. HEART:  Regular rhythm. There is no loud gallop or murmur. ABDOMEN:  Obese, nontender. No masses. There is a diaper in place. EXTREMITIES:  Her lower extremities show some swelling and some erythema and bandages for her ulceration. LABORATORY DATA:  EKG:  None to review. Lab Work:  She is anemic, hemoglobin 8.1. Her BUN is 68 and creatinine is 2.7. Her proBNP was almost 20,000. Her chest x-ray did not show obvious heart failure. There was an echocardiogram performed today which showed her ejection fraction to be up to 55% with evidence of diastolic dysfunction.  
 
IMPRESSION: 
 1.  Acute heart failure which appears to be heart failure with preserved ejection fraction. Her her left ventricular fraction was depressed in the past, but surprisingly, her ejection fraction is normal on yesterday's echocardiogram. 
2.  Hypertension and hypertensive heart disease. The hypertension is uncontrolled. 3.  Coronary artery disease with prior coronary artery bypass graft. 4.  History of paroxysmal atrial fibrillation on chronic lower dose oral anticoagulation. 5.  Parkinson's. 6.  Diabetic. 7.  Neuropathy. 8.  Peripheral arterial disease with nonhealing ulcers. 9.  History of meningioma. 10.  History of monoclonal gammopathy of undetermined significance followed by Hematology. 11.  History of sleep apnea. RECOMMENDATIONS:  Her antihypertensive regimen needs to be intensified. Her volume status will be controlled with diuretic therapy. We will follow the patient with you. Reji Holguin MD 
 
 
[id number] 
D:  12/17/2020 18:31 
T:  12/17/2020 22:42 JOB #:  [id number]

## 2020-12-18 NOTE — PROGRESS NOTES
[facility] CARDIOLOGY PROGRESS NOTE           12/18/2020 6:53 AM    Admit Date: 12/16/2020      Subjective: The patient does not report CP or palpitations. She continues to have SOB and is currently on 4L NC O2. The patient says she wears 3L O2 at home. ROS:  Constitutional:   Negative for fevers and unexplained weight loss. Eyes:   Negative for visual disturbance. ENT:   Negative for significant hearing loss and tinnitus. Respiratory:   Negative for hemoptysis. Cardiovascular:   Negative except as noted in HPI. Gastrointestinal:   Negative for melena and abdominal pain. Genitourinary:   Negative for hematuria, renal stones. Integumentary:   Negative for rash or non-healing wounds  Hematologic/Lymphatic:   Negative for excessive bleeding hx or clotting disorder. Musculoskeletal:  Negative for active, unexplained/severe joint pain. Neurological:   Negative for stroke. Behavioral/Psych:   Negative for suicidal ideations. Endocrine:   Negative for uncontrolled diabetic symptoms including polyuria, polydipsia and poor wound healing.      Objective:      Vitals:    12/17/20 1518 12/17/20 1903 12/17/20 2309 12/18/20 0257   BP: (!) 152/69 123/66 132/68 120/69   Pulse: 62 61 64 60   Resp: 20 21 18 18   Temp: 97.9 °F (36.6 °C) 98 °F (36.7 °C) 98 °F (36.7 °C) 98.6 °F (37 °C)   SpO2: 98% 97% 95% 97%   Weight:    270 lb 8 oz (122.7 kg)   Height:           Physical Exam:  General-+SOB  Neck- supple, +JVD  CV- regular rate and rhythm no RG  Lung- clear bilaterally  Abd- soft, nontender, nondistended  Ext- 1+ edema with bilateral erythema and wrapped legs  Skin- warm and dry    Data Review:   Recent Labs     12/17/20  0147 12/16/20  1551    143   K 4.0 4.3   MG  --  2.5*   BUN 68* 67*   CREA 2.76* 2.85*   * 125*   WBC 11.4* 9.4   HGB 8.1* 7.1*   HCT 25.6* 23.2*    292       Assessment/Plan:     Acute HFpEF  - EF increased per ECHO reports since 7/2019 but IVC dilated  - BNP 9332 -> 04404 and patient continues to have JVD with SOB  - currently on PO Lasix  - switch to IVP Lasix 80 mg BID  - goal K>4 and Mg>2 within normal limits    HTN  - c/w ACEi and Coreg    CAD  - recommend baby ASA unless contraindicated (hemoccult reportedly negative in the ER)  - c/w Coreg and ACEi    PAD  - recommend baby ASA unless contraindicated (hemoccult reportedly negative in the ER)    AF  - c/w Eliquis (lower dose due to age and sCr)  - c/w Melany Encarnacion MD

## 2020-12-19 NOTE — PROGRESS NOTES
Problem: Pressure Injury - Risk of  Goal: *Prevention of pressure injury  Description: Document Devin Scale and appropriate interventions in the flowsheet.   Outcome: Progressing Towards Goal  Note: Pressure Injury Interventions:  Sensory Interventions: Assess need for specialty bed, Check visual cues for pain, Keep linens dry and wrinkle-free, Minimize linen layers    Moisture Interventions: Absorbent underpads, Apply protective barrier, creams and emollients, Check for incontinence Q2 hours and as needed, Internal/External urinary devices, Limit adult briefs    Activity Interventions: Increase time out of bed, Pressure redistribution bed/mattress(bed type)    Mobility Interventions: HOB 30 degrees or less, Pressure redistribution bed/mattress (bed type)    Nutrition Interventions: Offer support with meals,snacks and hydration    Friction and Shear Interventions: Apply protective barrier, creams and emollients, HOB 30 degrees or less

## 2020-12-19 NOTE — PROGRESS NOTES
[facility] CARDIOLOGY PROGRESS NOTE           12/19/2020 11:07 AM    Admit Date: 12/16/2020      Subjective:   Feels a little better. ROS:  GEN:  No fever or chills  Cardiovascular:  As noted above:no CP or palpitations. Pulmonary:  As noted above:SOB is a better. Neuro:  No new focal motor or sensory loss    Objective:      Vitals:    12/18/20 2012 12/18/20 2333 12/19/20 0318 12/19/20 0712   BP: 118/65 123/70 (!) 142/72 (!) 156/63   Pulse: (!) 58 67 (!) 57 65   Resp: 20 20 20 19   Temp: 97.9 °F (36.6 °C) 98.3 °F (36.8 °C) 98.6 °F (37 °C) 98.1 °F (36.7 °C)   SpO2: 97% 95% 95% 95%   Weight:   270 lb 8 oz (122.7 kg)    Height:           Physical Exam:  General-no distress  Neck- supple, no JVD  CV- regular rate and rhythm no MRG  Lung- clear bilaterally  Abd- soft, nontender, obese  Ext- 2-3 + bilateral edema bilaterally. Skin- warm and dry  Psychiatric:  Normal mood and affect. Neurologic:  Alert and oriented X 3      Data Review:   Recent Labs     12/19/20  0527 12/18/20  0720 12/16/20  1551 12/16/20  1551    140   < > 143   K 5.0 4.8   < > 4.3   MG  --   --   --  2.5*   BUN 88* 77*   < > 67*   CREA 3.50* 3.37*   < > 2.85*   * 148*   < > 125*   WBC 10.6 9.4   < > 9.4   HGB 7.2* 7.3*   < > 7.1*   HCT 23.0* 23.5*   < > 23.2*    280   < > 292    < > = values in this interval not displayed. TELEMETRY:  n/a    Assessment/Plan:     Principal Problem:    Acute on chronic respiratory failure with hypoxia ([facility] (12/17/2020)    Active Problems:    HTN (hypertension) (11/10/2011):stable. .Continue current medications. AF (atrial fibrillation) ([facility] (11/20/2011):Resolved. .Continue current medications. SARAH (obstructive sleep apnea) (5/24/2018)      Parkinson disease ([facility] (6/19/2019)      Volume overload (7/8/2019)/HFpEF:Continue iv Lasix. Anemia of chronic kidney failure, stage 4 (severe) ([facility] (7/15/2019): Worse. Stop iv Lasix if renal deteriorates Hypertensive emergency (12/17/2020): Improved. .Continue current medications.                 Kandi Hardin MD  12/19/2020 11:07 AM

## 2020-12-19 NOTE — PROGRESS NOTES
END OF SHIFT NOTE:    INTAKE/OUTPUT  12/18 0701 - 12/19 0700  In: -   Out: 1050 [Urine:1050]  Voiding: YES  Catheter: NO  Drain:   External Female Catheter 12/16/20 (Active)   Site Assessment Clean, dry, & intact 12/18/20 0715   Repositioned No 12/18/20 0715   Perineal Care No 12/18/20 0715   Wick Changed No 12/18/20 0715   Suction Canister/Tubing Changed No 12/18/20 0715   Urine Output (mL) 600 ml 12/19/20 0025               Flatus: Patient does have flatus present. Stool:  0 occurrences. Characteristics:       Emesis: 0 occurrences. Characteristics:        VITAL SIGNS  Patient Vitals for the past 12 hrs:   Temp Pulse Resp BP SpO2   12/19/20 0318 98.6 °F (37 °C) (!) 57 20 (!) 142/72 95 %   12/18/20 2333 98.3 °F (36.8 °C) 67 20 123/70 95 %   12/18/20 2012 97.9 °F (36.6 °C) (!) 58 20 118/65 97 %   12/18/20 1944 -- -- -- -- 98 %       Pain Assessment  Pain Intensity 1: 7 (12/18/20 0715)  Pain Location 1: Leg  Pain Intervention(s) 1: Repositioned, Rest, Emotional support  Patient Stated Pain Goal: 0    Ambulating  No  Slept long periods. SOB with exertion but able to recover within a couple of minutes. Shift report given to oncoming nurse at the bedside.     Silvia Shepard RN

## 2020-12-19 NOTE — PROGRESS NOTES
Hospitalist Progress Note    2020  Admit Date: 2020  3:42 PM   NAME: Angie Mckay   :     MRN:  [id number]   Attending: Jl Moyer DO  PCP:  Vanesa Rivera MD    SUBJECTIVE:   Patient is an [de-identified] with PMH extensive PMH including afib on anticoagulation, sCHF, HTN, CAD, DM, CKD who presents from wound care appointment with hypoxia. Patient was found to be dyspneic; EMS was called and patient was placed on CPAP/BiPAP given respiratory distress. Upon arrival to ER, patient was also found to be significantly hypertensive and given topical NG. Patient was weaned off of BiPAP and now is on 3L/m of supplemental O2 (which is her baseline). Patient was found to have BLE edema and elevated pBNP concerning for CHF exacerbation. Started on Lasix IV and Cardiology has been consulted     Pt was pleasant this AM eating breakfast. Informed her that her kidney function has worsened and we are holding her IV lasix for the time being. Still on 3-5LNC (bl 3L). Doesn't want to go to [facility], prefers to go home. Denies fever, chills, chest pain, abdominal pain, N/V, diarrhea or constipation.     Review of Systems negative with exception of pertinent positives noted above  PHYSICAL EXAM     Visit Vitals  /63   Pulse 62   Temp 98.8 °F (37.1 °C)   Resp 21   Ht 5' 9\" (1.753 m)   Wt 122.7 kg (270 lb 8 oz)   SpO2 97%   BMI 39.95 kg/m²      Temp (24hrs), Av.2 °F (36.8 °C), Min:97.6 °F (36.4 °C), Max:98.8 °F (37.1 °C)    Oxygen Therapy  O2 Sat (%): 97 % (20 1107)  Pulse via Oximetry: 52 beats per minute (20)  O2 Device: Nasal cannula (20)  O2 Flow Rate (L/min): 5 l/min(weaned to 3L - wears 3L @ home ) (20)  FIO2 (%): 32 % (200)    Intake/Output Summary (Last 24 hours) at 2020 1218  Last data filed at 2020 0025  Gross per 24 hour   Intake --   Output 800 ml   Net -800 ml      General: No acute distress   Lungs:  CTA b/l  Heart:  Regular rate and rhythm,  No murmur, rub, or gallop  Abdomen: Soft, Non distended, Non tender, Positive bowel sounds  Extremities: No cyanosis or clubbing. 2+ pitting edema on b/l LE's-improved. Multiple ulcerations on b/l LE's--dressing intact and clean. Neurologic:  No focal deficits    XR CHEST SNGL V   Final Result   IMPRESSION:      1. Cardiomegaly and vascular congestion, similar to prior exam.      XR CHEST PORT   Final Result   Impression: Enlarged cardiac silhouette with grossly clear lungs. 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 TTE in 7/2019 with EF 30-35%. Started on BIPAP in ED. - Strict I/Os  - Repeat TTE shows improved EF>55%    - On 3-5L NC. Wean O2 as tolerated. - pBNP 2439>19627  - Cardiology consulted: cont with IV lasix BID and hold if renal function deteriorates  - Holding IV Lasix d/t kidney function for now. ANTONIO on CKD Stage 4: Cr slightly worsens. Hold Lasix and Lisinopril    HTN Emergency, resolved  - W/ associated clinical symptoms of CHF/volume overload. Given lasix for diuresis that did cause BP to come down minimally  - Restarted BP home meds with Coreg. Holding Lisinopril and Lasix d/t ANTONIO. PO hydralazine PRN. Iron deficiency Anemia  - Iron studies c/w NADEGE. Hemoccult negative in ER  - Will monitor H/H--stable  - Start ferrous sulfate     BLE wounds  - Wound Care has been following  - Wound Care on board while hospitalized to continue treatment     CAD/PAD: Cardiology recommended baby ASA  Atrial Fibrillation: Currently rate controlled. Cont coreg including eliquis   DM2: Home insulin. SSI. Diabetic diet  Parkinson's disease: Home meds  SARAH: CPAP nightly    DVT Prophylaxis: Apixaban   Dispo:  When stable    Signed By: Amanuel Lawson DO     December 19, 2020

## 2020-12-19 NOTE — PROGRESS NOTES
Pt sd does not wear CPAP @ home, only O2 - SAT 97 on 3L     12/18/20 1944   Oxygen Therapy   O2 Sat (%) 98 %   Pulse via Oximetry 52 beats per minute   O2 Device Nasal cannula   O2 Flow Rate (L/min) 5 l/min  (decreased to 3L - wears 3L @ home )

## 2020-12-20 PROBLEM — R06.03 ACUTE RESPIRATORY DISTRESS: Status: ACTIVE | Noted: 2020-01-01

## 2020-12-20 NOTE — CONSULTS
[facility] Consult Note       Primary GI Physician: Dr. Patricia Guerrero MD: Dr. Raquel Jj    Referring Provider:  Dr. Ruby Newberry Date:  12/20/2020    Admit Date:  12/16/2020    Chief Complaint:  NADEGE    Subjective:     History of Present Illness:  Patient is a [de-identified] y.o. female with PMH of including but not limited to A Fib on Eliquis, CHF (EF 55% on echo 12/2020) HTN, PAD, CAD s/p CABG, parkinsons, DM type II, CKD stage V, HLD, hypothyroidism, obesity, HON, and DJD, who is seen in consultation at the request of Dr. Trey Isabel for NADEGE. She presented to the ED 12/16 from wound care with hypoxia and was placed on BiPAP in the ED. The patient was found to be significantly hypertensive (222/93) and given topical NGT and was able to wean to NC at 3 lpm via NC which is the patients baseline. Noted admission labs include WBC of 11.4, Hgb of 8.1 from 7.1, Cr 2.76 from 2.85, and NT Pro BMP of 19k. Today, patient appears to have increased effort to breath. HGB has trended down to 6.9 and is macrocytic but she is iron deficient. B12 399, folate 13.6, Iron 32, TIBC 245, transferrin saturation 13%, ferritin 697. Patient reports being on antibiotics for lower extremity wound for approximately 10 days at home prior to admission. She states that antibiotics always cause her to have N/V but she denies hematemesis. She also denies hematochezia or melena at home and nurse reports dark green stools. Patient denies abdominal pain, N/V, or chest pan. C/o fatigue, weakness and SOB. She is to receive one unit PRBC today and CXR is outside the room now. WBC up today at 13.7. She was Admitted 7/2019 and was seen by GI for Cdiff and persistent N/V but she declined an EGD at that time because she has \"trouble waking up from anesthesia\". She denied NSAID use at that time. [place] 2014 by Dr. Raquel Jj revealed TA polyp.       PMH:  Past Medical History:   Diagnosis Date    Adverse effect of anesthesia     difficult awakening  AF (atrial fibrillation) ([facility] [address] 11/20/2011    Arthritis 11/18/2011    generalized     CAD (coronary artery disease) 2011    CABG x 4    Cervical disc disease     Steroid injections    Chronic kidney disease     Chronic pain     back    DJD (degenerative joint disease)     Drainage from wound 10/3/2014    Full dentures     Gout     managed with medication     Hemorrhoid 11/5/2013    [place] (hard of hearing)     Hyperlipemia 11/10/2011    managed with medication     Hypertension     controlled with meds    Hypertriglyceridemia     managed with medication     Hypothyroidism     managed with medication     MGUS (monoclonal gammopathy of unknown significance) 12/1/2017    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    Neuropathy     legs and arms, managed with medication     Obesity (BMI 30-39.9) 10/2/14    BMI 36.3    PAD (peripheral artery disease) ([facility])     PCI (pneumatosis cystoides intestinalis) 11/5/2013    Pleural effusion, bilateral 11/21/2011    Postoperative anemia due to acute blood loss 11/21/2011    expected     Renal mass 5/14/2016    Rib cage fracture 11/5/2013    x 2     S/P CABG (coronary artery bypass graft) 11/05/2013    CABG x 4    S/P CABG x 3 11/18/2011    Stasis edema of both lower extremities 1/3/2018    Status post-operative repair of hip fracture 09/15/2014    left side, repair with hardware    Stroke [facility])     left sided weakness residual     Type II or unspecified type diabetes mellitus without mention of complication, uncontrolled ([facility] [address] 12/16/2013    oral and insulin reliant/ Avg / low BS s/s/ @ 70/ last A1c 8.3    Unspecified adverse effect of anesthesia     difficult to arouse after general anesthesia       PSH:  Past Surgical History:   Procedure Laterality Date    CABG, ARTERY-VEIN, FOUR  Oct. 2011    CLOSE CYSTOSTOMY      exploratory with removal of mass of infection    HX CATARACT REMOVAL Bilateral 11/2012    with IOL implants, bilateral    HX COLONOSCOPY      HX HIP FRACTURE TX Left     repair with hardware    HX HYSTERECTOMY      HX LAP CHOLECYSTECTOMY      VASCULAR SURGERY PROCEDURE UNLIST Left 06/07/2017    AVF creation    VASCULAR SURGERY PROCEDURE UNLIST Left 08/10/2017    AVF elevation       Allergies: Allergies   Allergen Reactions    Baclofen Other (comments)     Psychosis and altered mental status    Lipitor [Atorvastatin] Myalgia       Home Medications:  Prior to Admission medications    Medication Sig Start Date End Date Taking? Authorizing Provider   carbidopa-levodopa (SINEMET)  mg per tablet Take 1 Tab by mouth four (4) times daily. Indications: a type of movement disorder called parkinsonism 10/23/20  Yes Claudia Hodges MD   betamethasone dipropionate (DIPROSONE) 0.05 % topical cream MAURA EXT AA ON THE BODY BID 8/18/20  Yes Provider, Historical   insulin NPH/insulin regular (HumuLIN 70/30 U-100 Insulin) 100 unit/mL (70-30) injection 65 units before breakfast, 55 units before supper 9/28/20  Yes Tomas Hankins MD   levothyroxine (SYNTHROID) 25 mcg tablet Take 1 Tab by mouth Daily (before breakfast). 3/10/20  Yes Tomas Hankins MD   carvediloL (COREG) 12.5 mg tablet Take 2 Tabs by mouth two (2) times daily (with meals). 2/10/20  Yes Tomas Hankins MD   furosemide (LASIX) 40 mg tablet Take 2 Tabs by mouth two (2) times a day. 1/10/20  Yes Tomas Hankins MD   nystatin (MYCOSTATIN) topical cream APPLY TO AFFECTED AREA TWICE DAILY. MIX WITH TRIAMCINOLONE CREAM PRIOR TO APPLICATION 64/00/96  Yes Provider, Historical   Insulin Syringe-Needle U-100 (BD INSULIN SYRINGE ULTRA-FINE) 1 mL 31 gauge x 5/16 syrg DX E 10.65  Inject insulin tid 10/8/19  Yes Tomas Hankins MD   apixaban (ELIQUIS) 2.5 mg tablet Take 2.5 mg by mouth two (2) times a day. Yes Provider, Historical   prednisoLONE acetate (PRED FORTE) 1 % ophthalmic suspension Administer 1 Drop to both eyes four (4) times daily.    Yes Provider, Historical   terbinafine HCl (LAMISIL AT) 1 % topical cream Apply  to affected area two (2) times a day. 6/25/18  Yes Patria Kanner, MD   Lactobacillus acidophilus (ACIDOPHILUS) cap Take 2 Caps by mouth two (2) times a day. Yes Provider, Historical   OXYGEN-AIR DELIVERY SYSTEMS 3 L by Nasal route continuous. Yes Provider, Historical   glucose blood VI test strips (ACCU-CHEK GUIDE) strip Check BS TID. DX E11.9 3/22/18  Yes Patria Kanner, MD   Lancets (ACCU-CHEK FASTCLIX) misc Check bs TID. DX E11.9 3/22/18  Yes Patria Kanner, MD   cyanocobalamin, vitamin B-12, 1,000 mcg/mL kit 1,000 mcg by Injection route every month. on the 1st   Yes Provider, Historical   ferrous sulfate 325 mg (65 mg iron) tablet Take 324 mg by mouth daily. Yes Provider, Historical   triamcinolone acetonide (KENALOG) 0.1 % topical cream Apply  to affected area two (2) times a day. 6/15/16  Yes Provider, Historical   ezetimibe (ZETIA) 10 mg tablet Take 1 Tab by mouth daily. 9/28/20   Patria Kanner, MD   benazepriL (LOTENSIN) 20 mg tablet Take 1 Tab by mouth daily. 3/10/20   Patria Kanner, MD   nitroglycerin (NITROSTAT) 0.4 mg SL tablet 1 Tab by SubLINGual route as needed for Chest Pain. Up to 3 doses. 7/12/19   David Hernandez MD   ondansetron hcl [facility]) 8 mg tablet Take 1 Tab by mouth every eight (8) hours as needed for Nausea. 6/27/19   Patria Kanner, MD   potassium chloride (K-DUR, KLOR-CON) 20 mEq tablet Take 1 Tab by mouth daily as needed (Only on days you take metolazone). Indications: prevention of low potassium in the blood 5/31/19   Cj Coy MD   ergocalciferol (VITAMIN D2) 50,000 unit capsule Take 1 Cap by mouth every seven (7) days.  4/19/19   Carlos Rodriguez MD       Hospital Medications:  Current Facility-Administered Medications   Medication Dose Route Frequency    0.9% sodium chloride infusion 250 mL  250 mL IntraVENous PRN    furosemide (LASIX) tablet 80 mg  80 mg Oral BID    pantoprazole (PROTONIX) 40 mg in 0.9% sodium chloride 10 mL injection  40 mg IntraVENous Q12H    [Held by provider] aspirin chewable tablet 81 mg  81 mg Oral DAILY    ferrous sulfate tablet 324 mg  324 mg Oral DAILY    prednisoLONE acetate (PRED FORTE) 1 % ophthalmic suspension 1 Drop  1 Drop Both Eyes QID    ondansetron (ZOFRAN ODT) tablet 8 mg  8 mg Oral Q8H PRN    [Held by provider] apixaban (ELIQUIS) tablet 2.5 mg  2.5 mg Oral BID    carvediloL (COREG) tablet 25 mg  25 mg Oral BID WITH MEALS    [Held by provider] lisinopriL (PRINIVIL, ZESTRIL) tablet 20 mg  20 mg Oral DAILY    levothyroxine (SYNTHROID) tablet 25 mcg  25 mcg Oral ACB    ezetimibe (ZETIA) tablet 10 mg  10 mg Oral DAILY    carbidopa-levodopa (SINEMET)  mg per tablet 1 Tab  1 Tab Oral QID    sodium chloride (NS) flush 5-40 mL  5-40 mL IntraVENous Q8H    sodium chloride (NS) flush 5-40 mL  5-40 mL IntraVENous PRN    acetaminophen (TYLENOL) tablet 650 mg  650 mg Oral Q6H PRN    Or    acetaminophen (TYLENOL) suppository 650 mg  650 mg Rectal Q6H PRN    polyethylene glycol (MIRALAX) packet 17 g  17 g Oral DAILY PRN    promethazine (PHENERGAN) tablet 12.5 mg  12.5 mg Oral Q6H PRN    Or    ondansetron (ZOFRAN) injection 4 mg  4 mg IntraVENous Q6H PRN    hydrALAZINE (APRESOLINE) tablet 25 mg  25 mg Oral Q4H PRN    insulin glargine (LANTUS) injection 67 Units  67 Units SubCUTAneous QHS    And    insulin lispro (HUMALOG) injection 12 Units  12 Units SubCUTAneous TIDAC       Social History:  Social History     Tobacco Use    Smoking status: Never Smoker    Smokeless tobacco: Never Used   Substance Use Topics    Alcohol use: No       Pt denies any history of drug use, blood transfusions, or tattoos.     Family History:  Family History   Problem Relation Age of Onset    Heart Disease Mother     Cancer Mother         colon    Diabetes Mother     Cancer Father         lung    Cancer Sister         breast    Breast Cancer Sister 28    Cancer Brother         Leukemia    Breast Cancer Maternal Aunt 54         Review of Systems:  A detailed 10 system ROS is obtained, with pertinent positives as listed above. All others are negative. Diet:  diabetic    Objective:     Physical Exam:  Vitals:  Visit Vitals  /68   Pulse 60   Temp 97.8 °F (36.6 °C)   Resp 21   Ht 5' 9\" (1.753 m)   Wt 121 kg (266 lb 12.8 oz)   SpO2 96%   BMI 39.40 kg/m²     Gen:  Pt is alert, cooperative, appears tachypneic   Skin:  Extremities and face reveal LE wounds with dressing and skin discoloration cw chronic edema. Also ecchymosis of bilat upper extr. Lorri Jazmin HEENT: Sclerae anicteric. Extra-occular muscles are intact. No oral ulcers. No abnormal pigmentation of the lips. The neck is supple. Cardiovascular: Regular rate and rhythm. No murmurs, gallops, or rubs. Respiratory:  INCREASED effort to breath on 3-4L via NC. Clear breath sounds anteriorly with no wheezes, rales, or rhonchi. GI:  Abdomen nondistended, soft, and nontender. Normal active bowel sounds. No enlargement of the liver or spleen. No masses palpable. Rectal:  Deferred  Musculoskeletal:  2-3+ pitting edema of LEs  Neurological:  Gross memory appears intact. Patient is alert and oriented. Psychiatric:  Mood appears appropriate with judgement intact. Lymphatic:  No cervical or supraclavicular adenopathy. Laboratory:    Recent Labs     12/20/20  0455 12/19/20  0527 12/18/20  0720   WBC 13.7* 10.6 9.4   HGB 6.9* 7.2* 7.3*   HCT 21.8* 23.0* 23.5*    249 280   .9* 101.8* 102.2*    139 140   K 5.0 5.0 4.8   * 107 110*   CO2 20* 24 20*   BUN 97* 88* 77*   CREA 3.51* 3.50* 3.37*   CA 9.2 8.9 9.2   * 112* 148*      2D Echo 12/16/20: SUMMARY:     -  Left ventricle: Systolic function was normal. Ejection fraction was  estimated to be greater than 55 %. There was mild concentric hypertrophy.     -  Right ventricle:  The size was at the upper limits of normal.     -  Left atrium: The atrium was dilated.     -  Right atrium: The atrium was dilated.     -  Inferior vena cava, hepatic veins: The inferior vena cava was dilated.     -  Aortic valve: Calcification of the non-coronary cusp.     -  Mitral valve: There was mild to moderate regurgitation.     -  Tricuspid valve: There was moderate regurgitation.     -  Pulmonic valve: There was mild to moderate regurgitation.     -  Aorta, systemic arteries: Ascending aorta is upper limits of normal.      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 female with PMH of including but not limited to A Fib on Eliquis, CHF (EF 55% on echo 12/2020) HTN, PAD, CAD s/p CABG, parkinsons, DM type II, CKD stage V, HLD, hypothyroidism, obesity, HON, and DJD, who is seen in consultation at the request of Dr. Sonja Bernabe for NADEGE. She presented to the ED 12/16 from wound care with hypoxia and BNP 19k initially on BiPAP but weaned to [place]. Oxygen via NC at 3L is reportedly pt's baseline but she appears to have tachypnea today and WBC increased to 13.7. Creatinine slightly increased from admission as well. HGB has trended down to 6.9 from 8 and is macrocytic but she is iron deficient. B12 399, folate 13.6, Iron 32, TIBC 245, transferrin saturation 13%, ferritin 697. Patient reports N/V prior to admission possibly a result of Abx for LE wound infection. No overt bleeding. She is to receive one unit PRBC today (12/20) and CXR.     Colonoscopy Dr. Carla Hashimoto 2014: TA polyp. Pt refused EGD for eval N/V during admission 7/2019. Plan:     - no endoscopy planned until respiratory status improves. - continue to hold Eliquis  - trend H/H and transfuse prn.  - start Pepcid daily (low dose due to lack of sx and Acute on Chronic renal failure)  - will follow    LESTER Hinson      Patient is seen and examined in collaboration with Dr. Anupam El. Assessment and plan as per Dr. Anupam El.

## 2020-12-20 NOTE — PROGRESS NOTES
TRANSFER - IN REPORT:    Verbal report received from WILL Grey on [employer]  being received from 2nd floor for routine progression of care      Report consisted of patients Situation, Background, Assessment and   Recommendations(SBAR). Information from the following report(s) SBAR was reviewed with the receiving nurse. Opportunity for questions and clarification was provided. Assessment will be completed upon patients arrival to unit and care will be assumed.

## 2020-12-20 NOTE — PROGRESS NOTES
Troponin resulted at 455. Cardiologist notified. Stated to re-draw trop in 3 hrs, and notify if pt c/o any acute chest pain. Pt not reporting chest pain at time.

## 2020-12-20 NOTE — PROGRESS NOTES
630END OF SHIFT NOTE:    INTAKE/OUTPUT  12/19 0701 - 12/20 0700  In: -   Out: 300 [Urine:300]  Voiding: YES  Catheter: NO  Drain:   External Female Catheter 12/16/20 (Active)   Site Assessment Clean, dry, & intact 12/19/20 1745   Repositioned Yes 12/19/20 1745   Perineal Care Yes 12/19/20 1745   Wick Changed Yes 12/19/20 1745   Suction Canister/Tubing Changed No 12/19/20 1745   Urine Output (mL) 300 ml 12/19/20 1745               Flatus: Patient does have flatus present. Stool:  0 occurrences. Characteristics:       Emesis: 0 occurrences. Characteristics:        VITAL SIGNS  Patient Vitals for the past 12 hrs:   Temp Pulse Resp BP SpO2   12/20/20 0344 97.9 °F (36.6 °C) 65 24 (!) 147/75 96 %   12/19/20 2326 98.9 °F (37.2 °C) 65 24 (!) 181/75 98 %   12/19/20 1928 99 °F (37.2 °C) (!) 58 22 (!) 117/42 98 %       Pain Assessment  Pain Intensity 1: 0 (12/19/20 0750)  Pain Location 1: Leg  Pain Intervention(s) 1: Repositioned, Rest, Emotional support  Patient Stated Pain Goal: 0    Ambulating  No  Moderately strong thrill/bruit to left arm access. 0630-I unit of prbcs ordered per Dr. Oneil Conte message via perfect serve. Shift report given to oncoming nurse at the bedside.     Alex Mckeon RN

## 2020-12-20 NOTE — PROGRESS NOTES
Hospitalist Progress Note    2020  Admit Date: 2020  3:42 PM   NAME: Arlet Patrick   :     MRN:  [id number]   Attending: Geno Pierce DO  PCP:  Yessenia Estrada MD    SUBJECTIVE:   Patient is an [de-identified] with PMH extensive PMH including afib on anticoagulation, sCHF, HTN, CAD, DM, CKD who presents from wound care appointment with hypoxia. Patient was found to be dyspneic; EMS was called and patient was placed on CPAP/BiPAP given respiratory distress. Upon arrival to ER, patient was also found to be significantly hypertensive and given topical NG. Patient was weaned off of BiPAP and now is on 3L/m of supplemental O2 (which is her baseline). Patient was found to have BLE edema and elevated pBNP concerning for CHF exacerbation. Started on Lasix IV and Cardiology has been consulted     This Am pt still remains on 3LNC (her baseline) but stated she didn't feel well but couldn't describe to me her symptoms. Stated she has been coughing. She was just finishing her breakfast this Am at my arrival. Hgb this AM was 6.9 and 1 UPRBC was ordered. Gi was consulted  Later was notified by RN that her HR was in the 40's per telemetry and she was in acute distress complaining of pain in her b/l Le's and upper abdomen. Came by to evaluate her bedside and pt was panicking. She said \"Just help me\" but couldn't tell me her symptoms. Vitals in the room with HR back up in 60's at baseline without hypotension or worsening hypoxia.      Review of Systems negative with exception of pertinent positives noted above  PHYSICAL EXAM     Visit Vitals  /67   Pulse (!) 45 Comment: per monitor room   Temp 98.8 °F (37.1 °C)   Resp 20   Ht 5' 9\" (1.753 m)   Wt 121 kg (266 lb 12.8 oz)   SpO2 97%   BMI 39.40 kg/m²      Temp (24hrs), Av.4 °F (36.9 °C), Min:97.8 °F (36.6 °C), Max:99 °F (37.2 °C)    Oxygen Therapy  O2 Sat (%): 97 % (20 1100)  Pulse via Oximetry: 52 beats per minute (20)  O2 Device: Nasal cannula (12/20/20 0759)  O2 Flow Rate (L/min): 3 l/min (12/20/20 0759)  FIO2 (%): 32 % (12/16/20 1730)    Intake/Output Summary (Last 24 hours) at 12/20/2020 1418  Last data filed at 12/20/2020 4525  Gross per 24 hour   Intake --   Output 500 ml   Net -500 ml      General: Distress withy tachypnea  Lungs:  CTA b/l but shallow breathings  Heart:  Regular rate and rhythm,  No murmur, rub, or gallop  Abdomen: Soft, Non distended, Non tender, Positive bowel sounds  Extremities: No cyanosis or clubbing. 2+ pitting edema on b/l LE's-improved. Multiple ulcerations on b/l LE's--dressing intact and clean. Neurologic:  No focal deficits    XR CHEST SNGL V   Final Result   IMPRESSION:  Cardiomegaly without acute abnormality. XR CHEST SNGL V   Final Result   IMPRESSION:      1. Cardiomegaly and vascular congestion, similar to prior exam.      XR CHEST PORT   Final Result   Impression: Enlarged cardiac silhouette with grossly clear lungs. 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 TTE in 7/2019 with EF 30-35%. Started on BIPAP in ED.   - Strict I/Os  - Repeat TTE shows improved EF>55%    - Cardiology consulted: Consider Nephrology referral if renal function deteriorates  - On 3L NC at baseline. Wean O2 as tolerated. - Holding IV Lasix d/t kidney function for now. Acute respiratory distress  On bl 3LNC but has tachypnea with WBC 13.7  ABG without acidosis or hypercarbia. CXR shows cardiomegaly but clear lungs. She has been on Eliquis so low suspicion for VTE. ? Panic attack, given 1x ativan and morphine for air hunger and pt was calmer. W/ elevated WBC, cough and SOB, will test her for Covid. Check EKG, HS-trop, procal    Iron deficiency Anemia  - Iron studies c/w NADEGE. - Started on ferrous sulfate   - Hgb down 6.9 today 12/20> Transfuse 1U PRBC with Lasix IV 1x  - Hold home Apixaban as well as ASA. Check hemoccult and abdominal XR.  - Consulted GI---no endoscopy planned until respiratory status improves, cont to hold Eliquis and f/u H/H    Bradycardia  Hx of afib on Coreg. HR in 40's per telemetry but normal at baseline upon recheck in the room. EKG reads sinus candelaria with PVC's. Recheck electrolytes    ANTONIO on CKD Stage 4: Cr slightly worsens. Holding Lasix and Lisinopril. If worsens will consult nephrology for assistance. HTN Emergency, resolved  - W/ associated clinical symptoms of CHF/volume overload. Given lasix for diuresis that did cause BP to come down minimally  - Restarted BP home meds with Coreg. Holding Lisinopril and Lasix d/t ANTONIO. PO hydralazine PRN. BLE wounds  - Wound Care has been following  - Wound Care on board while hospitalized to continue treatment     CAD/PAD: Cardiology recommended baby ASA  Atrial Fibrillation: Currently rate controlled. coreg including eliquis   DM2: Home insulin. SSI. Diabetic diet  Parkinson's disease: Home meds  SARAH: pt does not wear CPAP nightly. Only oxygen    DVT Prophylaxis: SCD's for now   Dispo:  When stable    Signed By: Sharon Vicente, DO     December 20, 2020

## 2020-12-20 NOTE — PROGRESS NOTES
TRANSFER - OUT REPORT:    Verbal report given to Norma(name) on Melva Viera  being transferred to *th floor(unit) for change in patient condition(COVID rule-out)       Report consisted of patients Situation, Background, Assessment and   Recommendations(SBAR). Information from the following report(s) SBAR was reviewed with the receiving nurse. Opportunity for questions and clarification was provided.       Patient transported with:   [employer]

## 2020-12-20 NOTE — PROGRESS NOTES
[facility] CARDIOLOGY PROGRESS NOTE           12/20/2020 8:11 AM    Admit Date: 12/16/2020      Subjective:   She continues to experience cough and feels bad. Claudia Pickens wants to go home. ROS:  GEN:  No fever or chills  Cardiovascular:  As noted above:no CP or palpitations. Pulmonary:  As noted above:SOB and cough persists. Neuro:  No new focal motor or sensory loss    Objective:      Vitals:    12/19/20 2326 12/20/20 0344 12/20/20 0629 12/20/20 0659   BP: (!) 181/75 (!) 147/75  133/68   Pulse: 65 65  60   Resp: 24 24  21   Temp: 98.9 °F (37.2 °C) 97.9 °F (36.6 °C)  97.8 °F (36.6 °C)   SpO2: 98% 96%  96%   Weight:   266 lb 12.8 oz (121 kg)    Height:           Physical Exam:  General-no distress  Neck- supple, no JVD  CV- regular rate and rhythm no MRG  Lung- coarse bs  bilaterally  Abd- soft, nontender, nondistended  Ext- 1+edema bilaterally. Skin- warm and dry  Psychiatric:  Normal mood and affect. Neurologic:  Alert and oriented X 3      Data Review:   Recent Labs     12/20/20  0455 12/19/20  0527    139   K 5.0 5.0   BUN 97* 88*   CREA 3.51* 3.50*   * 112*   WBC 13.7* 10.6   HGB 6.9* 7.2*   HCT 21.8* 23.0*    249       TELEMETRY: NSR    Assessment/Plan:     Principal Problem:    Acute on chronic respiratory failure with hypoxia (HCC) (12/17/2020):per hospitalists. She continues to cough. Active Problems:      AF (atrial fibrillation) ([facility] (11/20/2011):NSR is present. .Continue current medications. 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 ([facility] (11/15/2017)      SARAH (obstructive sleep apnea) (5/24/2018)      Parkinson disease ([facility] (6/19/2019)      Volume overload (7/8/2019): Improved with iv Lasix. HFpEF. Anemia of chronic kidney failure, stage 4 (severe) (HCC) (7/15/2019):Lasix and Lisinopril are being held. Consider Nephrology if renal fx worsens. Hgb remains low and treatment per hospitalist.      Hypertensive emergency (12/17/2020): Improved.                 Herbert Torres MD  12/20/2020 8:11 AM

## 2020-12-20 NOTE — PROGRESS NOTES
Entered pt'/s room to asses bc monitor room called and she had candelaria'd down to 45. Pt in acute distress. SOB and c/o pain in BLE's and upper abdomen. Assisted to reposition her to seated on side of bed. MD contacted. Utonipfq2mr 1x, trop, EKG, and ABG's ordered. MD coming to assess. COVID rule-out also ordered.

## 2020-12-21 NOTE — PROGRESS NOTES
Awake, alert oriented to person, place, situation, forgetful with time. Incontinent of urine with care given and repositioning. No c/o. No distress.

## 2020-12-21 NOTE — PROGRESS NOTES
Hospitalist Progress Note    2020  Admit Date: 2020  3:42 PM   NAME: Shirley Riley   :  [age]   MRN:  [id number]   Attending: Kim Sinha DO  PCP:  Shu Hernandez MD    SUBJECTIVE:   Patient is an [de-identified] with PMH extensive PMH including afib on anticoagulation, sCHF, HTN, CAD, DM, CKD who presents from wound care appointment with hypoxia. Patient was found to be dyspneic; EMS was called and patient was placed on CPAP/BiPAP given respiratory distress. Upon arrival to ER, patient was also found to be significantly hypertensive and given topical NG. Patient was weaned off of BiPAP and now is on 3L/m of supplemental O2 (which is her baseline). Patient was found to have BLE edema and elevated pBNP concerning for CHF exacerbation. Started on Lasix IV and Cardiology has been consulted     : This Am pt still remains on 3LNC (her baseline) but stated she didn't feel well but couldn't describe to me her symptoms. Stated she has been coughing. She was just finishing her breakfast this Am at my arrival. Hgb this AM was 6.9 and 1 UPRBC was ordered. Gi was consulted  Later was notified by RN that her HR was in the 40's per telemetry and she was in acute distress complaining of pain in her b/l Le's and upper abdomen. Came by to evaluate her bedside and pt was panicking. She said \"Just help me\" but couldn't tell me her symptoms. Vitals in the room with HR back up in 60's at baseline without hypotension or worsening hypoxia. She has been coughing more w/ worsening leukocytosis so Covid test was ordered to r/o. Cardiologist notified of elevated Troponin. EKG similar to previous.  this Am pt told me she was tired because she didn't get much rest. Denies pain anywhere. However still has notable SOB with conversation. CXR shows cardiomegaly but clear lungs. Rasheed a T 100.8 prior to blood transfusion and but had Tmax of 101.3 subsequently. Denies abdominal pain, N/V, diarrhea or constipation. Denies changes in her stool color. Review of Systems negative with exception of pertinent positives noted above  PHYSICAL EXAM     Visit Vitals  /60 (BP 1 Location: Right arm, BP Patient Position: At rest)   Pulse (!) 49   Temp 97.6 °F (36.4 °C)   Resp 22   Ht 5' 9\" (1.753 m)   Wt 129.7 kg (286 lb)   SpO2 95%   BMI 42.23 kg/m²      Temp (24hrs), Av.5 °F (36.9 °C), Min:97.4 °F (36.3 °C), Max:101.3 °F (38.5 °C)    Oxygen Therapy  O2 Sat (%): 95 % (20 1121)  Pulse via Oximetry: 52 beats per minute (20 1944)  O2 Device: Nasal cannula (20 0753)  O2 Flow Rate (L/min): 3 l/min (20 0753)  FIO2 (%): 32 % (20 1730)    Intake/Output Summary (Last 24 hours) at 2020 1602  Last data filed at 2020 1853  Gross per 24 hour   Intake 378.8 ml   Output --   Net 378.8 ml      General: Drowsy but dyspnea with conversation  Lungs:  CTA b/l but shallow breathings  Heart:  Regular rate and rhythm,  No murmur, rub, or gallop  Abdomen: Soft, Non distended, Non tender, Positive bowel sounds  Extremities: No cyanosis or clubbing. 2+ pitting edema on b/l LE's-improved. Multiple ulcerations on b/l LE's--dressing intact and clean. Neurologic:  No focal deficits    US RETROPERITONEUM COMP   Final Result   IMPRESSION:   No hydronephrosis. Borderline renal echogenicity. 3.6 cm enlarging   mass left kidney, complex cyst versus neoplasm. MRI follow-up should be   considered. XR CHEST SNGL V   Final Result   IMPRESSION:  Cardiomegaly without acute abnormality. XR CHEST SNGL V   Final Result   IMPRESSION:      1. Cardiomegaly and vascular congestion, similar to prior exam.      XR CHEST PORT   Final Result   Impression: Enlarged cardiac silhouette with grossly clear lungs.                            ASSESSMENT      Active Hospital Problems    Diagnosis Date Noted    Acute respiratory distress 2020    Hypertensive emergency 2020    Acute on chronic respiratory failure with hypoxia ([facility] 12/17/2020    Anemia of chronic kidney failure, stage 4 (severe) ([facility] 07/15/2019    Volume overload 07/08/2019    Parkinson disease ([facility] 06/19/2019    SARAH (obstructive sleep apnea) 05/24/2018    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 ([facility] 11/15/2017    Bradycardia 05/17/2016    Sepsis ([facility] 05/12/2016    AF (atrial fibrillation) ([facility] 11/20/2011    HTN (hypertension) 11/10/2011     Plan:    Acute on chronic hypoxic respiratory failure (bl 3LNC) 2/2  Acute exacerbation of HFrEF/Volume overload  - While patient has no evidence of pulmonary edema on CXR, she has BLE edema as well as elevated pBNP 9332 on admission. TTE in 7/2019 with EF 30-35%. Started on BIPAP in ED. - Strict I/Os  - Repeat TTE shows improved EF>55%    - Cardiology consulted: Consider Nephrology referral if renal function deteriorates  - Holding IV Lasix d/t kidney function for now. Still on 3LNC at baseline with O2sat >90%. CXR 12/20 shows cardiomegaly but clear lungs    Acute respiratory distress 12/20, resolved  On bl 3LNC but has tachypnea with WBC 13.7  ABG without acidosis or hypercarbia. CXR shows cardiomegaly but clear lungs. She has been on Eliquis so low suspicion for VTE. However, will check venous US b/l. Hold off CT chest d/t her renal function. ? Panic attack, given 1x ativan and morphine for air hunger and pt was calmer. W/ elevated WBC, cough and SOB, will test her for Covid. Sepsis  Tmax 101 w/ WBC 13.7, tachypnea--meets criteria. Procal positive >4. With worsening cough, concern for bacterial pneumonia. Covid r/o--rapid negative. PCR pending  Started her on Vanc/Cefepime (12/21-. ..)  BCx, UA pending. Repeat CXR in AM.      Elevated troponin  HS Trop 902>258>113. EKG shows sinus rhythm with PVC's, c/w previous. Pt denies chest pain. Cardiology on board. Iron deficiency Anemia  - Iron studies c/w NADEGE.    - Started on ferrous sulfate   - Hgb down 6.9 today 12/20> Transfuse 1U PRBC with Lasix IV 1x  - Hold home Apixaban as well as ASA. Check hemoccult and abdominal XR.  - Consulted GI---no endoscopy planned until respiratory status improves and covid resulted cont to hold Eliquis and f/u H/H    Bradycardia  Hx of afib on Coreg. HR in 40's per telemetry but normal at baseline upon recheck in the room. EKG reads sinus candelaria with PVC's. Recheck electrolytes  Decrease dose of Coreg down to 12.5mg BID    ANTONIO on CKD Stage 4: Cr worsens. Holding Lasix and Lisinopril. Consulted Nephrology. HTN Emergency, resolved  - W/ associated clinical symptoms of CHF/volume overload. Given lasix for diuresis that did cause BP to come down minimally  - Restarted BP home meds with Coreg. Holding Lisinopril and Lasix d/t ANTONIO. PO hydralazine PRN. BLE wounds  - Wound Care has been following  - Wound Care on board while hospitalized to continue treatment     CAD/PAD: Cardiology recommended baby ASA. Hold for now d/t anemia  Atrial Fibrillation: Currently rate controlled. Holding coreg including eliquis d/t bradycardia and anemia  DM2: Home insulin. SSI. Diabetic diet  Parkinson's disease: Home meds  SARAH: pt does not wear CPAP nightly. Only oxygen    DVT Prophylaxis: SCD's for now   Dispo:  When stable    Signed By: Minal Castaneda, DO     December 21, 2020

## 2020-12-21 NOTE — PROGRESS NOTES
Patient's blood sugar was 95. I held her dinner dose of 12 units of Humalog.  Dr. Arevalo Mins aware

## 2020-12-21 NOTE — PROGRESS NOTES
Not seen. Awaiting Covid PCR results. No clinical bleeding noted in chart. HGB stable. Once, Covid PCR has resulted, can plan for endoscopy. Please call with any overt bleeding.    GAL Ng

## 2020-12-21 NOTE — PROGRESS NOTES
[facility] CARDIOLOGY PROGRESS NOTE           12/21/2020 4:48 PM    Admit Date: 12/16/2020      Subjective:   No significant progress. Weak. Cough persists. Objective:      Vitals:    12/21/20 0824 12/21/20 1121 12/21/20 1419 12/21/20 1624   BP: (!) 169/62 125/60  (!) 141/63   Pulse: (!) 51 (!) 49  61   Resp: 20 22 22   Temp: 97.7 °F (36.5 °C) 97.6 °F (36.4 °C)  98.1 °F (36.7 °C)   SpO2: 95% 95%  96%   Weight:   129.7 kg (286 lb)    Height:           Physical Exam:  General-No Acute Distress, awake and alert  Ext- no edema bilaterally.   Skin- warm and dry    Data Review:   Recent Labs     12/21/20  0723 12/20/20  2047 12/20/20  1351 12/20/20  0455     --   --  137   K 5.0  --  4.9 5.0   MG  --   --  3.0*  --    *  --   --  97*   CREA 3.80*  --   --  3.51*   GLU 97  --   --  101*   WBC 13.6*  --   --  13.7*   HGB 7.6* 7.6*  --  6.9*   HCT 23.9* 24.2*  --  21.8*     --   --  251       Assessment/Plan:     Principal Problem:    Acute on chronic respiratory failure with hypoxia (HCC) (12/17/2020)        Active Problems:    HTN (hypertension) (11/10/2011)        AF (atrial fibrillation) ([facility] (11/20/2011)          Sepsis ([facility] (5/12/2016)          Bradycardia (5/17/2016)          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 ([facility] (11/15/2017)          SARAH (obstructive sleep apnea) (5/24/2018)          Parkinson disease ([facility] (6/19/2019)          Volume overload (7/8/2019)          Anemia of chronic kidney failure, stage 4 (severe) ([facility] (7/15/2019)          Hypertensive emergency (12/17/2020)          Acute respiratory distress (12/20/2020)    /////    No new cardiac recommendation  Will follow from a distance              Ja Henry MD  12/21/2020 4:48 PM

## 2020-12-21 NOTE — PROGRESS NOTES
Remains drowsy, arousing briefly with no c/o. No distress noted. Repositioned self in bed. Bed alarm set, call light in reach.

## 2020-12-21 NOTE — CONSULTS
Nephrology consult    Admission Date:  12/16/2020    Admission Diagnosis  CHF exacerbation ([facility] [I50.9]    We are asked by Dr. Nesha Harding     History of Present Illness: Ms. Dina Andersen is a [de-identified] y.o female with PMH significant for Atrial fib, CAD,  S/p CABG x4 vessel, CKD 4, MGUS, HTN, HLD, DM, CVA, sCHF with pEF 55%, Pulm HTN 45-50 mmHg. Admitted with complaints of SOB and found hypoxic reported placed on BiPAP then weaned to NC O2. cxr cardiomegaly without acute process, hgb 6.9 s/p PRBC. Troponin 455.0->329.0, COVID rapid test negative, confirmatory pending, We are consulted for ANTONIO/CKD IV. From a renal standpoint her Cr on admission was 2.85->3.37->3.51->3.80, baseline Cr in the upper 2s to 3s. No recent contrast exposure, no noted hypotension since admission, she had fever up to 101.3 last 24 hours, she has been on eliquis, lasix, lisinopril and vancomycin since admission- now held. Cefepime renally dosed. Patient seen and examined on rounds she is SOB with conversation, reports decrease in uop, intermittent dysuria, poor intake, N/V prior to admission, + fever, + edema better with diuresis. Denies CP, dizziness, syncope, NSAID use.      Past Medical History:   Diagnosis Date    Adverse effect of anesthesia     difficult awakening    AF (atrial fibrillation) ([facility] 11/20/2011    Arthritis 11/18/2011    generalized     CAD (coronary artery disease) 2011    CABG x 4    Cervical disc disease     Steroid injections    Chronic kidney disease     Chronic pain     back    DJD (degenerative joint disease)     Drainage from wound 10/3/2014    Full dentures     Gout     managed with medication     Hemorrhoid 11/5/2013    [place] (hard of hearing)     Hyperlipemia 11/10/2011    managed with medication     Hypertension     controlled with meds    Hypertriglyceridemia     managed with medication     Hypothyroidism     managed with medication     MGUS (monoclonal gammopathy of unknown significance) 12/1/2017    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    Neuropathy     legs and arms, managed with medication     Obesity (BMI 30-39.9) 10/2/14    BMI 36.3    PAD (peripheral artery disease) ([facility]     PCI (pneumatosis cystoides intestinalis) 11/5/2013    Pleural effusion, bilateral 11/21/2011    Postoperative anemia due to acute blood loss 11/21/2011    expected     Renal mass 5/14/2016    Rib cage fracture 11/5/2013    x 2     S/P CABG (coronary artery bypass graft) 11/05/2013    CABG x 4    S/P CABG x 3 11/18/2011    Stasis edema of both lower extremities 1/3/2018    Status post-operative repair of hip fracture 09/15/2014    left side, repair with hardware    Stroke [facility])     left sided weakness residual     Type II or unspecified type diabetes mellitus without mention of complication, uncontrolled ([facility] 12/16/2013    oral and insulin reliant/ Avg / low BS s/s/ @ 70/ last A1c 8.3    Unspecified adverse effect of anesthesia     difficult to arouse after general anesthesia      Past Surgical History:   Procedure Laterality Date    CABG, ARTERY-VEIN, FOUR  Oct. 2011    CLOSE CYSTOSTOMY      exploratory with removal of mass of infection    HX CATARACT REMOVAL Bilateral 11/2012    with IOL implants, bilateral    HX COLONOSCOPY      HX HIP FRACTURE TX Left     repair with hardware    HX HYSTERECTOMY      HX LAP CHOLECYSTECTOMY      VASCULAR SURGERY PROCEDURE UNLIST Left 06/07/2017    AVF creation    VASCULAR SURGERY PROCEDURE UNLIST Left 08/10/2017    AVF elevation      Current Facility-Administered Medications   Medication Dose Route Frequency    cefepime (MAXIPIME) 1 g in 0.9% sodium chloride (MBP/ADV) 50 mL MBP  1 g IntraVENous Q24H    VANCOMYCIN INTERMITTENT DOSING PER PHARMACY   Other Rx Dosing/Monitoring    carvediloL (COREG) tablet 12.5 mg  12.5 mg Oral BID WITH MEALS    0.9% sodium chloride infusion 250 mL  250 mL IntraVENous PRN    [Held by provider] furosemide (LASIX) tablet 80 mg  80 mg Oral BID    pantoprazole (PROTONIX) 40 mg in 0.9% sodium chloride 10 mL injection  40 mg IntraVENous Q12H    phenol throat spray (CHLORASEPTIC) 1 Spray  1 Spray Oral PRN    nitroglycerin (NITROSTAT) tablet 0.4 mg  0.4 mg SubLINGual PRN    ferrous sulfate tablet 324 mg  324 mg Oral DAILY    prednisoLONE acetate (PRED FORTE) 1 % ophthalmic suspension 1 Drop  1 Drop Both Eyes QID    ondansetron (ZOFRAN ODT) tablet 8 mg  8 mg Oral Q8H PRN    [Held by provider] apixaban (ELIQUIS) tablet 2.5 mg  2.5 mg Oral BID    [Held by provider] lisinopriL (PRINIVIL, ZESTRIL) tablet 20 mg  20 mg Oral DAILY    levothyroxine (SYNTHROID) tablet 25 mcg  25 mcg Oral ACB    ezetimibe (ZETIA) tablet 10 mg  10 mg Oral DAILY    carbidopa-levodopa (SINEMET)  mg per tablet 1 Tab  1 Tab Oral QID    sodium chloride (NS) flush 5-40 mL  5-40 mL IntraVENous Q8H    sodium chloride (NS) flush 5-40 mL  5-40 mL IntraVENous PRN    acetaminophen (TYLENOL) tablet 650 mg  650 mg Oral Q6H PRN    Or    acetaminophen (TYLENOL) suppository 650 mg  650 mg Rectal Q6H PRN    polyethylene glycol (MIRALAX) packet 17 g  17 g Oral DAILY PRN    promethazine (PHENERGAN) tablet 12.5 mg  12.5 mg Oral Q6H PRN    Or    ondansetron (ZOFRAN) injection 4 mg  4 mg IntraVENous Q6H PRN    hydrALAZINE (APRESOLINE) tablet 25 mg  25 mg Oral Q4H PRN    insulin glargine (LANTUS) injection 67 Units  67 Units SubCUTAneous QHS    And    insulin lispro (HUMALOG) injection 12 Units  12 Units SubCUTAneous TIDAC     Allergies   Allergen Reactions    Baclofen Other (comments)     Psychosis and altered mental status    Lipitor [Atorvastatin] Myalgia      Social History     Tobacco Use    Smoking status: Never Smoker    Smokeless tobacco: Never Used   Substance Use Topics    Alcohol use: No      Family History   Problem Relation Age of Onset    Heart Disease Mother     Cancer Mother         colon    Diabetes Mother     Cancer Father         lung    Cancer Sister         breast    Breast Cancer Sister 28    Cancer Brother         Leukemia    Breast Cancer Maternal Aunt 54        Review of Systems  Gen - + fever- better, no chills, appetite poor, + weakness/fatigue  HEENT - no sore throat, no decreased vision, no hearing loss  CV - no chest pain, no palpitation, + orthopnea  Lung - + shortness of breath, + cough, no hemoptysis  Abd - no tenderness, no nausea/vomiting, no bloody stool  Ext - + LE edema, no clubbing, no cyanosis  Musculoskeletal - no joint pain, no back pain  Neurologic - no headaches, no dizziness, no seizures  Psychiatric - no anxiety, no depression  Skin - no rashes, no pupura  Genitourinary - + decreased urine output, no hematuria. Objective:     Vitals:    12/20/20 2028 12/20/20 2335 12/21/20 0402 12/21/20 0824   BP: (!) 123/54 138/62 (!) 130/56 (!) 169/62   Pulse: (!) 51 (!) 47 (!) 50 (!) 51   Resp: 24 24 22 20   Temp: 98.1 °F (36.7 °C) 97.4 °F (36.3 °C) 97.4 °F (36.3 °C) 97.7 °F (36.5 °C)   SpO2: 95% 97% 98% 95%   Weight:       Height:           Intake/Output Summary (Last 24 hours) at 12/21/2020 1143  Last data filed at 12/20/2020 1853  Gross per 24 hour   Intake 378.8 ml   Output 100 ml   Net 278.8 ml       Physical Exam  GEN :in no distress, alert and oriented  HEENT: anicteric sclerae, dry mucous membranes  Neck - supple without JVD  CV - regular rate, S1, S2, no rub   Lung - clear bilaterally, lungs expand symmetrically  Abd - soft, nontender, bowel sounds present, no hepatosplenomegaly  Ext - no clubbing, no cyanosis, + trace BLE edema  Neurologic - nonfocal  Genitourinary - bladder nonpalpable, Purwik   Skin - no rashes, no purpura  Psychiatric: Normal mood and affect.   Access: LUE AVF + bruit, + thrill       Data Review:   Recent Labs     12/21/20  0723 12/20/20  2047 12/20/20  0455 12/19/20  0527   WBC 13.6*  --  13.7* 10.6   HGB 7.6* 7.6* 6.9* 7.2*   HCT 23.9* 24.2* 21.8* 23.0*     --  251 249     Recent Labs 12/21/20  0723 12/20/20  1351 12/20/20  0455 12/19/20  0527     --  137 139   K 5.0 4.9 5.0 5.0   *  --  108* 107   CO2 22  --  20* 24   *  --  97* 88*   CREA 3.80*  --  3.51* 3.50*   GLU 97  --  101* 112*   CA 8.9  --  9.2 8.9   MG  --  3.0*  --   --      No results for input(s): PH, PCO2, PO2, PCO2 in the last 72 hours. Problem List:     Patient Active Problem List    Diagnosis Date Noted    Acute respiratory distress 12/20/2020    Hypertensive emergency 12/17/2020    Acute on chronic respiratory failure with hypoxia ([facility] 12/17/2020    Postural imbalance 10/23/2020    Meningioma ([facility] 12/03/2019    History of UTI 07/18/2019    Acquired hypothyroidism 07/15/2019    Anemia of chronic kidney failure, stage 4 (severe) ([facility] 07/15/2019    Depression 07/15/2019    Chronic systolic heart failure ([facility] 07/14/2019    Volume overload 07/08/2019    PAF (paroxysmal atrial fibrillation) ([facility] 07/07/2019    Elevated troponin 07/07/2019    Chest pain 07/07/2019    CAD (coronary artery disease) 07/07/2019    Demand ischemia ([address] 07/07/2019    C. difficile diarrhea 06/30/2019    Acute on chronic renal failure ([address] 06/29/2019    Acute cystitis with hematuria 06/29/2019    Morbid obesity ([facility] 06/29/2019    Hypokalemia 06/29/2019    Parkinson disease ([address] 06/19/2019    CKD (chronic kidney disease) stage 5, GFR less than 15 ml/min ([address] 11/30/2018    SARAH (obstructive sleep apnea) 05/24/2018    Acute renal insufficiency 01/23/2018    Decreased hemoglobin 01/23/2018    Encounter for medication management 12/01/2017    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 ([address] 11/15/2017    A-V fistula ([place] [id number] 09/06/2017    Bradycardia 05/17/2016    Sepsis ([place] [id number] 05/12/2016    PAD (peripheral artery disease) ([place] [address] 11/05/2013    Diabetic neuropathy ([place] [address] 11/05/2013    AF (atrial fibrillation) ([place] [address] 11/20/2011    HTN (hypertension) 11/10/2011       Impression:    Plan:   1. ANTONIO/CKD IV likely volume depleted with diuresis close to baseline Cr upper 2s-3s. Non oliguric via purwik. -will obtain UA, UCr, Sydnee, P/C ratio, renal US  -continue to hold lasix, ACEi, monitor vanc levels   -she has a LUE AVF that is patent, but no indication for acute RRT at this time  -trend renal function with strict I&O    2. PUI COVID- confirmatory test pending     3. Anemia s/p PRBC, on Fe, occult stool pending     4. Acute respiratory failure with hypoxemia/sCHFrEF, Pulm HTN/SARAH -back on NC O2 after diuresis    5.  DM type II per hosp

## 2020-12-21 NOTE — PROGRESS NOTES
Pharmacokinetic Consult to Pharmacist    Vijay Reagan is a [de-identified] y.o. female being treated for HAP with vancomycin. Height: 5' 9\" (175.3 cm)  Weight: 129.7 kg (286 lb)  Lab Results   Component Value Date/Time     (H) 12/21/2020 07:23 AM    Creatinine 3.80 (H) 12/21/2020 07:23 AM    WBC 13.6 (H) 12/21/2020 07:23 AM    Procalcitonin 4.06 12/20/2020 01:51 PM    Lactic acid 1.0 12/20/2020 04:58 PM    Lactic Acid (POC) 1.03 06/29/2019 10:08 PM      Estimated Creatinine Clearance: 17.1 mL/min (A) (based on SCr of 3.8 mg/dL (H)). CULTURES:  pending    No results found for: Benedicta Fillers    Day 1 of vancomycin. Goal trough is 15-20. Will load with 2000 mg and then dose intermittently for now due to unstable renal function. Will continue to follow patient.       Thank you,  Conchis Rodriguez, PharmD, [id number]  Clinical Pharmacy Specialist  [phone]

## 2020-12-21 NOTE — PROGRESS NOTES
Resting quietly, resp even, unlab at rest with O2 at 3L N/C, eyes closed with relaxed facial expression. Skin cool, dry. Assessed as noted, pt did not awaken. With sternal rub, pt able to arouse briefly, saying \"Yeah, I'm just sleeping\", in response to nurse's question, \"are you all right? \" Facial expression relaxed with no distress noted. Call light in reach, bed alarm set. Unable to determine pt's level of orientation at this time due to sedation.

## 2020-12-21 NOTE — ROUTINE PROCESS
Respiratory Care Services     Policy Number: -[id number]    Title: Oxygen Protocol    Effective Date: 01/1996    Revised Date: 06/2013, 02/29/2016, 4/2018, 7/2019    Reviewed Date: 05/2014, 03/2015, 06/2017        I. Policy: The Oxygen Protocol will be initiated for all patients upon written order from physician for administration of oxygen therapy or if a patient is found to have an oxygen saturation of 88% or less. Special consideration: the goal of oxygen therapy for COPD patients is to maintain oxygen saturation between 88% - 92% to comply with GOLD Guidelines. II. Purpose: To provide protocol driven respiratory therapy for the administration of oxygen at concentrations greater than that in ambient air with the intent of treating or preventing the symptoms and manifestations of hypoxia. III. Responsibility: Director Respiratory Care Services, all Respiratory Care Practitioners     IV. Indications:   A. Implement this protocol for patients when physician orders oxygen to be administered or when patient is found to have an oxygen saturation of 88% or less. B. To assure routine monitoring of patient's oxygen saturation b.i.d. and to make appropriate adjustments in accordance with ordered oxygen saturation parameters. C. To assure continuity of respiratory care that meets [facility] Clinical Practice Guidelines and GOLD Guidelines. D. Hb < 8  E. Sickle Cell anemia crisis    V. Assessment:  Assess the following parameters to determine the need to adjust oxygen:  A. Measurement of patient's oxygen saturation via pulse oximetry. B. Observation of patient's color, respiratory effort, and responsiveness. C. Measurement of heart rate and respiratory rate. D. Complete a three-step ambulatory oxygen saturation when ordered. VI. Initiation:  Upon receipt of an order for oxygen, the RCP will:   A.  Verify order in the patient's EMR, which should include the desired oxygen saturation to be maintained. B. The patient shall be placed on oxygen with humidity (except for those oxygen delivery devices that do not require humidity, i.e. venturi masks and non-rebreather masks) as ordered by the physician to achieve the prescribed oxygen saturation. C. In the event that no saturation is specified, a saturation of 90% will be maintained. D. Patients, who are found to have a SaO2 of 88% or less, may be started on supplemental oxygen as described above. E. Patients admitted with cardiac problems/disease shall be maintained at 92% per Chest Committee recommendation. F. The patient will be informed of the \"no smoking policy\" and instructed in the proper use of oxygen therapy. G. Once desired oxygen saturation has been achieved, the RCP will document FIO2 and oxygen saturation in the respiratory section of the patient's EMR. VII. Maintenance:   A. 30-second oxygen saturation check will be taken to maintain the saturation ordered by the physician each day. B. Patients will be assessed each shift and as needed by pulse oximetry to determine if oxygen needs to be decreased, increased or discontinued. C. If changes in FIO2 are indicated, all changes will be documented in the respiratory section of the patient's EMR. D. If no changes in FIO2 are required, the patient's oxygen flow rate and saturation will be recorded in the respiratory section of the patient's EMR. E. Per [facility], patients who are receiving oxygen therapy but are not on oxygen at home, should be weaned off oxygen as soon as possible or when anticipated discharge becomes evident. Radha Smoker will be discontinued after oxygen saturation has been maintained for 24 hours on room air and documented in the patient's EMR. G. Patients on the Inpatient Rehabilitation area on 9th floor will be exempt from having their oxygen discontinued per protocol.  Oxygen may be weaned but will be changed to prn to meet the needs of the patient when exercising and participating in therapy. H. The goal of oxygen therapy is to maintain patients with a diagnosis of COPD at oxygen saturation between 88% - 92% to comply with GOLD Guidelines. VIII. Safety: RCP will address the following safety issues:  A. Identify patient using the two patient identifiers name and birth date via ID bracelet. B. Perform hand hygiene per hospital policy utilizing Standard Precautions for all patients and following transmission-based isolation as indicated per hospital policy. C. Cardiac patients will be maintained at an oxygen saturation of 92%. D. If a patient's FIO2 requirements necessitate changing oxygen delivery devices to a high concentration of oxygen, documentation indicating the change must be included in the progress notes, as well as in the respiratory flowsheet. E. If a patient has a hemoglobin level <8 mg. RCP will consult physician before discontinuing oxygen. IX. Interventions:   A. RCP will assess patient for signs of respiratory distress or suspicion of CO2 retention. B. An ABG may be obtained for patients exhibiting respiratory distress or sickle cell      crisis. C. An order should be entered into patient's EMR for ABG under per protocol. X. Documentation  A. Document assessment findings in the respiratory section of the patient's EMR. B. Document changes in therapy per protocol in the respiratory orders section and in the care plan section of the patient's EMR. C. Document patient education in the patient education section of the patient's EMR. XI. Reportable Conditions:  Report to the physician immediately:  A. Acute changes in patient's respiratory status. B. An oxygen saturation <85%. C. A change in oxygen delivery device to provide a high concentration of oxygen. XII. Patient Instructions: Review with Patient  A. Purpose of oxygen therapy  B. Proper technique for using oxygen  C.  No smoking policy    Approval: Pulmonary Committee (1-25-96)  Revision: Chest Committee (4-28-05)    L - Respiratory Care Department Policy, Procedure and Protocol Guideline Manual, 1995,         SHERRON Rodriguez. L - Therapist Driven Respiratory Care Protocols - A Practitioner's Guide for Criteria-Based       Respiratory Care by Mainor Jara M.D., and SHERRON Woods, CHANEL. L - The rationale for therapist-driven protocols: an update. Respiratory Care 1998. N - [facility] Clinical Practice Guidelines.

## 2020-12-22 NOTE — ACP (ADVANCE CARE PLANNING)
Ms Gerda Kyle confirmed that she is DNR, and that her dtr/only child Castle Dad is her health care surrogate.

## 2020-12-22 NOTE — PROGRESS NOTES
Progress Note    Patient: Denise Henson MRN: [id number]  SSN: [id number]    YOB: 1940  Age: [de-identified] y.o. Sex: female      Admit Date: 12/16/2020    LOS: 6 days     Subjective:   [de-identified] yo F with PMH extensive PMH including afib on anticoagulation, HFrEF, HTN, CAD, DM, CKD who presented from wound care appointment with hypoxia. Patient seen and examined at bedside. This morning still havinf some SOB. Denies cough, no chest pain, no abdominal pain, no nausea or vomiting. Objective:     Vitals:    12/22/20 0339 12/22/20 0441 12/22/20 1251 12/22/20 1609   BP: (!) 156/67  (!) 150/66 (!) 128/54   Pulse: (!) 103  (!) 53 (!) 50   Resp: 20 20 20   Temp: 97.7 °F (36.5 °C)  97.5 °F (36.4 °C) 97.9 °F (36.6 °C)   SpO2: 98%  98% 98%   Weight:  122.7 kg (270 lb 6.4 oz)     Height:            Intake and Output:  Current Shift: No intake/output data recorded.   Last three shifts: 12/20 1901 - 12/22 0700  In: 360 [P.O.:360]  Out: 350 [Urine:350]    ROS  10 ROS negative except from stated on subjective    Physical Exam:   General: Alert, oriented, NAD  HEENT: NC/AT, EOM are intact  Neck: supple, no JVD  Cardiovascular: RRR, S1, S2, no murmurs  Respiratory: Decrease breath sounds, no wheezes  Abdomen: Soft, NT, ND  Back: No CVA tenderness, no paraspinal tenderness  Extremities: LE without pedal edema, no erythema  Neuro: A&O, CN are intact, no focal deficits  Skin: no rash or ulcers  Psych: good mood and affect    Lab/Data Review:  I have personally reviewed patients laboratory data showing  Recent Results (from the past 24 hour(s))   GLUCOSE, POC    Collection Time: 12/21/20  4:59 PM   Result Value Ref Range    Glucose (POC) 95 65 - 100 mg/dL   GLUCOSE, POC    Collection Time: 12/21/20  8:44 PM   Result Value Ref Range    Glucose (POC) 143 (H) 65 - 100 mg/dL   CBC W/O DIFF    Collection Time: 12/22/20  4:25 AM   Result Value Ref Range    WBC 11.0 4.3 - 11.1 K/uL    RBC 2.46 (L) 4.05 - 5.2 M/uL    HGB 7.5 (L) 11.7 - 15.4 g/dL    HCT 24.7 (L) 35.8 - 46.3 %    .4 (H) 79.6 - 97.8 FL    MCH 30.5 26.1 - 32.9 PG    MCHC 30.4 (L) 31.4 - 35.0 g/dL    RDW 15.5 (H) 11.9 - 14.6 %    PLATELET 527 924 - 374 K/uL    MPV 11.3 9.4 - 12.3 FL    ABSOLUTE NRBC 0.00 0.0 - 0.2 K/uL   METABOLIC PANEL, BASIC    Collection Time: 12/22/20  4:25 AM   Result Value Ref Range    Sodium 141 136 - 145 mmol/L    Potassium 5.1 3.5 - 5.1 mmol/L    Chloride 110 (H) 98 - 107 mmol/L    CO2 20 (L) 21 - 32 mmol/L    Anion gap 11 7 - 16 mmol/L    Glucose 128 (H) 65 - 100 mg/dL     (H) 8 - 23 MG/DL    Creatinine 3.95 (H) 0.6 - 1.0 MG/DL    GFR est AA 14 (L) >60 ml/min/1.73m2    GFR est non-AA 12 (L) >60 ml/min/1.73m2    Calcium 8.3 8.3 - 10.4 MG/DL   VANCOMYCIN, RANDOM    Collection Time: 12/22/20  4:25 AM   Result Value Ref Range    Vancomycin, random 16.5 UG/ML   PROCALCITONIN    Collection Time: 12/22/20  4:25 AM   Result Value Ref Range    Procalcitonin 38.15 ng/mL   GLUCOSE, POC    Collection Time: 12/22/20  7:54 AM   Result Value Ref Range    Glucose (POC) 120 (H) 65 - 100 mg/dL   GLUCOSE, POC    Collection Time: 12/22/20 12:47 PM   Result Value Ref Range    Glucose (POC) 160 (H) 65 - 100 mg/dL   GLUCOSE, POC    Collection Time: 12/22/20  4:21 PM   Result Value Ref Range    Glucose (POC) 164 (H) 65 - 100 mg/dL        Image:  I have personally reviewed patients imaging showing  DUPLEX LOWER EXT VENOUS BILAT   Final Result   IMPRESSION:   1. No evidence of deep venous thrombosis in either lower extremity. CT CHEST ABD PELV WO CONT   Final Result   IMPRESSION:    1) Small bilateral pleural effusions, coronary atherosclerosis and anemia. 2) Stable cyst left lobe liver, stable mild low lipoma right adrenal, stable   nodule left adrenal, status post cholecystectomy and right renal stones, sigmoid   diverticulosis. 3) New irregular 4.1 x 2.2 cm attenuating focus posterior left kidney suspicious   for neoplasm.             XR CHEST SNGL V Final Result   IMPRESSION: Normal chest.      US RETROPERITONEUM COMP   Final Result   IMPRESSION:   No hydronephrosis. Borderline renal echogenicity. 3.6 cm enlarging   mass left kidney, complex cyst versus neoplasm. MRI follow-up should be   considered. XR CHEST SNGL V   Final Result   IMPRESSION:  Cardiomegaly without acute abnormality. XR CHEST SNGL V   Final Result   IMPRESSION:      1. Cardiomegaly and vascular congestion, similar to prior exam.      XR CHEST PORT   Final Result   Impression: Enlarged cardiac silhouette with grossly clear lungs. Hospital problems     Principal Problem:    Acute on chronic respiratory failure with hypoxia ([address] (12/17/2020)    Active Problems:    HTN (hypertension) (11/10/2011)      AF (atrial fibrillation) (HCC) (11/20/2011)      Sepsis ([address] (5/12/2016)      Bradycardia (5/17/2016)      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 ([address] (11/15/2017)      SARAH (obstructive sleep apnea) (5/24/2018)      Parkinson disease ([address] (6/19/2019)      Volume overload (7/8/2019)      Anemia of chronic kidney failure, stage 4 (severe) ([address] (7/15/2019)      Hypertensive emergency (12/17/2020)      Acute respiratory distress (12/20/2020)        Assessment and Plan:   [de-identified] yo F with PMH extensive PMH including afib on anticoagulation, HFrEF, HTN, CAD, DM, CKD who presented from wound care appointment with hypoxia. 1. Acute on chronic hypoxic respiratory failure (bl 3LNC) 2/2  acute decompensated HFrEF  - BNP 9332 on admission  - TTE in 7/2019 with EF 30-35%  - Repeat TTE shows improved EF>55%    - O2 therapy to maintain O2 Sat>92%  - Strict I/Os  - Daily weights  - Holding lasix due to worsening renal function  - SARS CoV 2 PCR negative  - BL LE US negative for DVT   - Hold off CT chest d/t her renal function  - Cardiology recs appreciated      2.  Concerning of sepsis of unclear source  - Continue cefepime and vancomycin  - Follow BCx - NGTD  - Monitor CBC  - Will consider ID consult     3. Iron deficiency Anemia  - Iron studies c/w NADEGE. - Started on ferrous sulfate   - S/p 1U PRBC   - Hold home Apixaban as well as ASA  - Consulted GI---no endoscopy planned until respiratory status improves and covid resulted cont to hold Eliquis and f/u H/H     4. Bradycardia / Hx Afib  - Telemetry  - Cardiology recs appreciated     5. ANTONIO on CKD Stage 4  - Holding Lasix and Lisinopril  - Nephrology recs appreciated     6. HTN Emergency, resolved  - Continue coreg  - Holding Lisinopril and Lasix d/t ANTONIO  - PO hydralazine PRN.     7. BLE wounds  - Wound Care recs appreciated     8. CAD/PAD  - Cardiology recommended baby ASA. Hold for now d/t anemia    9. DM2  - ISS  - BS ACHS    10. Parkinson's disease  - Continue home meds    11. SARAH  - Pt does not wear CPAP nightly, only oxygen     DVT Prophylaxis: SCD's for now     I have reviewed, updated, and verified this note's content and spent 38 minutes of my 42 minutes visit performing counseling and coordination of care regarding medical management.      Signed By: Kostas Daugherty MD     December 22, 2020

## 2020-12-22 NOTE — PROGRESS NOTES
[facility]        Subjective: CKD   Resting comfortably in bed    Review of Systems -   General ROS: negative for - fever, chills  Respiratory ROS: no SOB, cough, WARREN  Cardiovascular ROS: no CP, palpitations  Gastrointestinal ROS: no N&V, abdominal pain, diarrhea  Genito-Urinary ROS: no difficulty voiding, dysuria  Neurological ROS: no seizures, focal weekness        Objective:    Vitals:    12/21/20 2346 12/22/20 0052 12/22/20 0339 12/22/20 0441   BP: 139/65 (!) 152/62 (!) 156/67    Pulse: (!) 51 (!) 53 (!) 103    Resp: 20 20 20    Temp: 98.2 °F (36.8 °C) 98.2 °F (36.8 °C) 97.7 °F (36.5 °C)    SpO2: 95% 97% 98%    Weight:    122.7 kg (270 lb 6.4 oz)   Height:           PE  Gen: in no acute distress  CV:reg rate  Chest:clear  Abd: soft  Ext/Access: no edema       . LAB  Recent Labs     12/22/20  0425 12/21/20  0723 12/20/20  2047 12/20/20  0455   WBC 11.0 13.6*  --  13.7*   HGB 7.5* 7.6* 7.6* 6.9*   HCT 24.7* 23.9* 24.2* 21.8*    213  --  251     Recent Labs     12/22/20  0425 12/21/20  1013 12/21/20  0723 12/20/20  1351 12/20/20  0455     --  141  --  137   K 5.1  --  5.0 4.9 5.0   *  --  109*  --  108*   CO2 20*  --  22  --  20*   *  --  97  --  101*   *  --  109*  --  97*   CREA 3.95*  --  3.80*  --  3.51*   MG  --   --   --  3.0*  --    PHOS  --  6.6*  --   --   --    CA 8.3  --  8.9  --  9.2           Radiology    A/P:   Patient Active Problem List   Diagnosis Code    HTN (hypertension) I10    AF (atrial fibrillation) (HCC) I48.91    PAD (peripheral artery disease) ([facility]) I73.9    Diabetic neuropathy ([facility]) E11.40    Sepsis ([facility]) A41.9    Bradycardia R00.1    A-V fistula ([facility]) I77.0    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 ([facility] E11.21    Encounter for medication management Z79.899    SARAH (obstructive sleep apnea) G47.33    CKD (chronic kidney disease) stage 5, GFR less than 15 ml/min ([facility]) N18.5    Parkinson disease ([facility] G20    Acute on chronic renal failure (HCC) N17.9, N18.9    Acute cystitis with hematuria N30.01    Morbid obesity ([facility]) E66.01    Hypokalemia E87.6    C. difficile diarrhea A04.72    PAF (paroxysmal atrial fibrillation) ([facility]) I48.0    Elevated troponin R77.8    Chest pain R07.9    CAD (coronary artery disease) I25.10    Volume overload E87.70    Demand ischemia ([facility]) I24.8    Meningioma ([facility]) D32.9    Acquired hypothyroidism E03.9    Acute renal insufficiency N28.9    Anemia of chronic kidney failure, stage 4 (severe) ([facility]) N18.4, D63.1    Chronic systolic heart failure ([facility]) I50.22    Decreased hemoglobin R71.0    Depression F32.9    History of UTI Z87.440    Postural imbalance R29.3    Hypertensive emergency I16.1    Acute on chronic respiratory failure with hypoxia ([facility]) J96.21    Acute respiratory distress R06.03     A on CKD - labs are not much worse. Lungs are clear. No urgent need to start dialysis. Would continue to hold diuretics and carefullly hydrate to see if we cna get any renal imporvement. Anemia    DM    CHF    COVID - negative.       Michelle Degroot MD

## 2020-12-22 NOTE — PROGRESS NOTES
Bedside shift report received from Jenn Fofana, formerly [facility].

## 2020-12-22 NOTE — PROGRESS NOTES
12/22/20 0036   Dual Skin Pressure Injury Assessment   Dual Skin Pressure Injury Assessment WDL     No pressure injury noted,  BLE vascular wounds noted

## 2020-12-22 NOTE — CONSULTS
Palliative Care    Patient: Candi Small MRN: [id number]  SSN: [id number]    YOB: 1940  Age: [de-identified] y.o. Sex: female       Date of Request: 12/20/20  Date of Consult:  12/22/2020  Reason for Consult:  goals of care  Requesting Physician: WILL Feliz     Assessment/Plan:     Principal Diagnosis:    Debility, Unspecified  R53.81    Additional Diagnoses:   · Acute Respiratory Failure, Unspecified  J96.00  · Advance Care Planning Counseling Z71.89  · Counseling, Encounter for Medical Advice  Z71.9  · Encounter for Palliative Care  Z51.5    Palliative Performance Scale (PPS):       Medical Decision Making:   Reviewed and summarized notes from admission to present. Discussed case with appropriate providers: Dr Radha Montalvo laboratory and x-ray data from admission to present. The pt is resting in bed, no visitors present. She endorses a poor appetite x 2 weeks; denies pain, SOB, nausea, and other discomfort. She says that she primarily uses a WC at home as her legs tend to [place] out\". Per pt, she is on the waiting list to start HD in [place], and already has fistula.  will drive her to HD which is 5 minutes from their house. Per pt, surgery to improve blood flow to left LE was planned, but delayed due to hospitalization. Pt and  (79 yo, with health problems) share a house with their daughter/only child, who oversees their care and is her health care surogate. Ms Pop Todd talked realistically, and with good humor, about the challenges of being in poor health and losing one's independence. Ms Ginny White confirmed that she is DNR. Will discuss findings with members of the interdisciplinary team.      Thank you for this referral.          .   In addition to the E&M described above, a 30--minute ACP meeting was spent discussing the pt's wishes concerning end of life care.   Subjective:     History obtained from:  Patient, Care Provider and Chart    Chief Complaint: Debility    History of Present Illness:  Patient is an [de-identified] with PMH extensive PMH including afib on anticoagulation, sCHF, Parkinsons disease, HTN, CAD, DM, CKD4, and other conditions listed below who presented from wound care appointment with hypoxia on 12/16/20. Patient was found to be dyspneic; EMS was called and patient was placed on CPAP/BiPAP given respiratory distress. Upon arrival to ER, patient was also found to be significantly hypertensive and given topical NG. Patient was weaned off of BiPAP and now is on 3L/m of supplemental O2 (which is her baseline). Patient was found to have BLE edema and elevated pBNP concerning for CHF exacerbation. Per Cardiology, pt has HF with EJ 30-35%. GI planning endoscopy OP. Nephrology following. On 12/21/20 pt had Tmax 101 and worsening cough; concern growing for pneumonia. Advance Directive: No       Code Status:  DNR            Health Care Power of : No - Patient does not have a [address]. Dtr is surrogate.     Past Medical History:   Diagnosis Date    Adverse effect of anesthesia     difficult awakening    AF (atrial fibrillation) ([facility] 11/20/2011    Arthritis 11/18/2011    generalized     CAD (coronary artery disease) 2011    CABG x 4    Cervical disc disease     Steroid injections    Chronic kidney disease     Chronic pain     back    DJD (degenerative joint disease)     Drainage from wound 10/3/2014    Full dentures     Gout     managed with medication     Hemorrhoid 11/5/2013    [place] (hard of hearing)     Hyperlipemia 11/10/2011    managed with medication     Hypertension     controlled with meds    Hypertriglyceridemia     managed with medication     Hypothyroidism     managed with medication     MGUS (monoclonal gammopathy of unknown significance) 12/1/2017    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    Neuropathy     legs and arms, managed with medication     Obesity (BMI 30-39.9) 10/2/14    BMI 36.3    PAD (peripheral artery disease) (HCC)     PCI (pneumatosis cystoides intestinalis) 11/5/2013    Pleural effusion, bilateral 11/21/2011    Postoperative anemia due to acute blood loss 11/21/2011    expected     Renal mass 5/14/2016    Rib cage fracture 11/5/2013    x 2     S/P CABG (coronary artery bypass graft) 11/05/2013    CABG x 4    S/P CABG x 3 11/18/2011    Stasis edema of both lower extremities 1/3/2018    Status post-operative repair of hip fracture 09/15/2014    left side, repair with hardware    Stroke [facility])     left sided weakness residual     Type II or unspecified type diabetes mellitus without mention of complication, uncontrolled ([facility] [address] 12/16/2013    oral and insulin reliant/ Avg / low BS s/s/ @ 70/ last A1c 8.3    Unspecified adverse effect of anesthesia     difficult to arouse after general anesthesia      Past Surgical History:   Procedure Laterality Date    CABG, ARTERY-VEIN, FOUR  Oct. 2011    CLOSE CYSTOSTOMY      exploratory with removal of mass of infection    HX CATARACT REMOVAL Bilateral 11/2012    with IOL implants, bilateral    HX COLONOSCOPY      HX HIP FRACTURE TX Left     repair with hardware    HX HYSTERECTOMY      HX LAP CHOLECYSTECTOMY      VASCULAR SURGERY PROCEDURE UNLIST Left 06/07/2017    AVF creation    VASCULAR SURGERY PROCEDURE UNLIST Left 08/10/2017    AVF elevation     Family History   Problem Relation Age of Onset    Heart Disease Mother     Cancer Mother         colon    Diabetes Mother     Cancer Father         lung    Cancer Sister         breast    Breast Cancer Sister 28    Cancer Brother         Leukemia    Breast Cancer Maternal Aunt 54      Social History     Tobacco Use    Smoking status: Never Smoker    Smokeless tobacco: Never Used   Substance Use Topics    Alcohol use: No     Prior to Admission medications    Medication Sig Start Date End Date Taking?  Authorizing Provider   carbidopa-levodopa (SINEMET)  mg per tablet Take 1 Tab by mouth four (4) times daily. Indications: a type of movement disorder called parkinsonism 10/23/20  Yes Clay Laura MD   betamethasone dipropionate (DIPROSONE) 0.05 % topical cream MAURA EXT AA ON THE BODY BID 8/18/20  Yes Provider, Historical   insulin NPH/insulin regular (HumuLIN 70/30 U-100 Insulin) 100 unit/mL (70-30) injection 65 units before breakfast, 55 units before supper 9/28/20  Yes Zoila Yoo MD   levothyroxine (SYNTHROID) 25 mcg tablet Take 1 Tab by mouth Daily (before breakfast). 3/10/20  Yes Zoila Yoo MD   carvediloL (COREG) 12.5 mg tablet Take 2 Tabs by mouth two (2) times daily (with meals). 2/10/20  Yes Zoila Yoo MD   furosemide (LASIX) 40 mg tablet Take 2 Tabs by mouth two (2) times a day. 1/10/20  Yes Zoila Yoo MD   nystatin (MYCOSTATIN) topical cream APPLY TO AFFECTED AREA TWICE DAILY. MIX WITH TRIAMCINOLONE CREAM PRIOR TO APPLICATION 38/59/49  Yes Provider, Historical   Insulin Syringe-Needle U-100 (BD INSULIN SYRINGE ULTRA-FINE) 1 mL 31 gauge x 5/16 syrg DX E 10.65  Inject insulin tid 10/8/19  Yes Zoila Yoo MD   apixaban (ELIQUIS) 2.5 mg tablet Take 2.5 mg by mouth two (2) times a day. Yes Provider, Historical   prednisoLONE acetate (PRED FORTE) 1 % ophthalmic suspension Administer 1 Drop to both eyes four (4) times daily. Yes Provider, Historical   terbinafine HCl (LAMISIL AT) 1 % topical cream Apply  to affected area two (2) times a day. 6/25/18  Yes Zoila Yoo MD   Lactobacillus acidophilus (ACIDOPHILUS) cap Take 2 Caps by mouth two (2) times a day. Yes Provider, Historical   OXYGEN-AIR DELIVERY SYSTEMS 3 L by Nasal route continuous. Yes Provider, Historical   glucose blood VI test strips (ACCU-CHEK GUIDE) strip Check BS TID. DX E11.9 3/22/18  Yes Zoila Yoo MD   Lancets (ACCU-CHEK FASTCLIX) misc Check bs TID.  DX E11.9 3/22/18  Yes Zoila Yoo MD   cyanocobalamin, vitamin B-12, 1,000 mcg/mL kit 1,000 mcg by Injection route every month. on the 1st   Yes Provider, Historical   ferrous sulfate 325 mg (65 mg iron) tablet Take 324 mg by mouth daily. Yes Provider, Historical   triamcinolone acetonide (KENALOG) 0.1 % topical cream Apply  to affected area two (2) times a day. 6/15/16  Yes Provider, Historical   ezetimibe (ZETIA) 10 mg tablet Take 1 Tab by mouth daily. 9/28/20   Mitchel Desouza MD   benazepriL (LOTENSIN) 20 mg tablet Take 1 Tab by mouth daily. 3/10/20   Mitchel Desouza MD   nitroglycerin (NITROSTAT) 0.4 mg SL tablet 1 Tab by SubLINGual route as needed for Chest Pain. Up to 3 doses. 7/12/19   Yung Hatfield MD   ondansetron hcl [facility]) 8 mg tablet Take 1 Tab by mouth every eight (8) hours as needed for Nausea. 6/27/19   Mitchel Desouza MD   potassium chloride (K-DUR, KLOR-CON) 20 mEq tablet Take 1 Tab by mouth daily as needed (Only on days you take metolazone). Indications: prevention of low potassium in the blood 5/31/19   Chano Hansen MD   ergocalciferol (VITAMIN D2) 50,000 unit capsule Take 1 Cap by mouth every seven (7) days. 4/19/19   Dago Rushing MD       Allergies   Allergen Reactions    Baclofen Other (comments)     Psychosis and altered mental status    Lipitor [Atorvastatin] Myalgia        Review of Systems:  A comprehensive review of systems was negative except as described above. Objective:     Visit Vitals  BP (!) 156/67 (BP 1 Location: Right arm, BP Patient Position: At rest)   Pulse (!) 103   Temp 97.7 °F (36.5 °C)   Resp 20   Ht 5' 9\" (1.753 m)   Wt 270 lb 6.4 oz (122.7 kg)   SpO2 98%   BMI 39.93 kg/m²        Physical Exam:    General:  Cooperative. No acute distress. Eyes:  Conjunctivae/corneas clear    Nose: Nares normal. Septum midline.    Neck: Supple, symmetrical, trachea midline, no JVD   Lungs:   Clear to auscultation bilaterally, unlabored   Heart:  Regular rate and rhythm, no murmur    Abdomen: Soft, non-tender, non-distended   Extremities: Normal, atraumatic, no cyanosis or edema   Skin: Skin color, texture, turgor normal. No rash or lesions.    Neurologic: Nonfocal   Psych: Alert and oriented      Assessment:     Hospital Problems  Date Reviewed: 10/23/2020          Codes Class Noted POA    Acute respiratory distress ICD-10-CM: R06.03  ICD-9-CM: 518.82  12/20/2020 No        Hypertensive emergency ICD-10-CM: I16.1  ICD-9-CM: 401.9  12/17/2020 Yes        * (Principal) Acute on chronic respiratory failure with hypoxia (HCC) ICD-10-CM: J96.21  ICD-9-CM: 518.84, 799.02  12/17/2020 Yes        Anemia of chronic kidney failure, stage 4 (severe) ([facility] ICD-10-CM: N18.4, D63.1  ICD-9-CM: 285.21, 585.4  7/15/2019 Yes        Volume overload ICD-10-CM: E87.70  ICD-9-CM: 276.69  7/8/2019 Yes        Parkinson disease ([facility] (Chronic) ICD-10-CM: G20  ICD-9-CM: 332.0  6/19/2019 Yes        SARAH (obstructive sleep apnea) (Chronic) ICD-10-CM: T49.64  ICD-9-CM: 327.23  5/24/2018 Yes        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

## 2020-12-22 NOTE — PROGRESS NOTES
Pharmacokinetic Consult to Pharmacist    Maria Luisa Sienna is a [de-identified] y.o. female being treated for HAP with vancomycin. Height: 5' 9\" (175.3 cm)  Weight: 122.7 kg (270 lb 6.4 oz)  Lab Results   Component Value Date/Time     (H) 12/22/2020 04:25 AM    Creatinine 3.95 (H) 12/22/2020 04:25 AM    WBC 11.0 12/22/2020 04:25 AM    Procalcitonin 38.15 12/22/2020 04:25 AM    Lactic acid 1.0 12/20/2020 04:58 PM    Lactic Acid (POC) 1.03 06/29/2019 10:08 PM      Estimated Creatinine Clearance: 15.9 mL/min (A) (based on SCr of 3.95 mg/dL (H)). Lab Results   Component Value Date/Time    Vancomycin, random 16.5 12/22/2020 04:25 AM       Day 2 of vancomycin. Goal trough is 15-20. Random level is within goal range. Give vancomycin 1500 mg IV x 1. Continue to dose intermittently for now. Further levels will be ordered as clinically indicated. Pharmacy will continue to follow. Please call with any questions.     Thank you,  Kristian Shin, PharmD, BCPS  Clinical Pharmacist  [phone]

## 2020-12-22 NOTE — PROGRESS NOTES
[facility] Progress Note         Admit Date:  12/16/2020    Today's Date:  12/22/2020    CC:  NADEGE    Subjective:     Patient reports having a brown stool this morning. Denies any abdominal pain. Reports decreased appetite for 2-3 weeks. Denies any dysphagia, Heartburn,nausea, or vomiting.        Medications:   Current Facility-Administered Medications   Medication Dose Route Frequency    cefepime (MAXIPIME) 1 g in 0.9% sodium chloride (MBP/ADV) 50 mL MBP  1 g IntraVENous Q24H    VANCOMYCIN INTERMITTENT DOSING PER PHARMACY   Other Rx Dosing/Monitoring    carvediloL (COREG) tablet 12.5 mg  12.5 mg Oral BID WITH MEALS    0.9% sodium chloride infusion 250 mL  250 mL IntraVENous PRN    [Held by provider] furosemide (LASIX) tablet 80 mg  80 mg Oral BID    pantoprazole (PROTONIX) 40 mg in 0.9% sodium chloride 10 mL injection  40 mg IntraVENous Q12H    phenol throat spray (CHLORASEPTIC) 1 Spray  1 Spray Oral PRN    nitroglycerin (NITROSTAT) tablet 0.4 mg  0.4 mg SubLINGual PRN    ferrous sulfate tablet 324 mg  324 mg Oral DAILY    prednisoLONE acetate (PRED FORTE) 1 % ophthalmic suspension 1 Drop  1 Drop Both Eyes QID    ondansetron (ZOFRAN ODT) tablet 8 mg  8 mg Oral Q8H PRN    [Held by provider] apixaban (ELIQUIS) tablet 2.5 mg  2.5 mg Oral BID    [Held by provider] lisinopriL (PRINIVIL, ZESTRIL) tablet 20 mg  20 mg Oral DAILY    levothyroxine (SYNTHROID) tablet 25 mcg  25 mcg Oral ACB    ezetimibe (ZETIA) tablet 10 mg  10 mg Oral DAILY    carbidopa-levodopa (SINEMET)  mg per tablet 1 Tab  1 Tab Oral QID    sodium chloride (NS) flush 5-40 mL  5-40 mL IntraVENous Q8H    sodium chloride (NS) flush 5-40 mL  5-40 mL IntraVENous PRN    acetaminophen (TYLENOL) tablet 650 mg  650 mg Oral Q6H PRN    Or    acetaminophen (TYLENOL) suppository 650 mg  650 mg Rectal Q6H PRN    polyethylene glycol (MIRALAX) packet 17 g  17 g Oral DAILY PRN    promethazine (PHENERGAN) tablet 12.5 mg  12.5 mg Oral Q6H PRN    Or    ondansetron (ZOFRAN) injection 4 mg  4 mg IntraVENous Q6H PRN    hydrALAZINE (APRESOLINE) tablet 25 mg  25 mg Oral Q4H PRN    insulin glargine (LANTUS) injection 67 Units  67 Units SubCUTAneous QHS    And    insulin lispro (HUMALOG) injection 12 Units  12 Units SubCUTAneous TIDAC       Review of Systems:  ROS was obtained, with pertinent positives as listed above. No chest pain or SOB. Diet:  Diabetic diet, 2gm NA    Objective:   Vitals:  Visit Vitals  BP (!) 156/67 (BP 1 Location: Right arm, BP Patient Position: At rest)   Pulse (!) 103   Temp 97.7 °F (36.5 °C)   Resp 20   Ht 5' 9\" (1.753 m)   Wt 122.7 kg (270 lb 6.4 oz)   SpO2 98%   BMI 39.93 kg/m²     Intake/Output:  No intake/output data recorded. 12/20 1901 - 12/22 0700  In: 360 [P.O.:360]  Out: 350 [Urine:350]  Exam:  General appearance: alert, cooperative, no distress  Lungs: clear to auscultation bilaterally anteriorly  Heart: regular rate and rhythm  Abdomen: soft, non-tender. Bowel sounds normal. No masses, no organomegaly  Extremities: extremities normal, atraumatic, no cyanosis or edema  Neuro:  alert and oriented    Data Review (Labs):    Recent Labs     12/22/20  0425 12/21/20  0723 12/20/20  2047 12/20/20  1351 12/20/20  0455   WBC 11.0 13.6*  --   --  13.7*   HGB 7.5* 7.6* 7.6*  --  6.9*   HCT 24.7* 23.9* 24.2*  --  21.8*    213  --   --  251   .4* 99.6*  --   --  101.9*    141  --   --  137   K 5.1 5.0  --  4.9 5.0   * 109*  --   --  108*   CO2 20* 22  --   --  20*   * 109*  --   --  97*   CREA 3.95* 3.80*  --   --  3.51*   CA 8.3 8.9  --   --  9.2   MG  --   --   --  3.0*  --    * 97  --   --  101*       Specimen source Nasopharyngeal      Final   COVID-19 rapid test Not detected   NOTD   Final   Comment:        The specimen is NEGATIVE for SARS-CoV2, the novel coronavirus associated with COVID-19.    A negative result does not rule out COVID-19.         This test has been authorized by the FDA under an Emergency Use Authorization (EUA) for use by authorized laboratories.         Fact sheet for Healthcare Providers: ConventionUpdate.co.nz   Fact sheet for Patients: ConventionUpdate.co.nz         Methodology: Isothermal Nucleic Acid Amplification    SARS CoV-2 Negative   Negative   Final   Comment:   (NOTE)      2D Echo 12/16/20: SUMMARY:     -  Left ventricle: Systolic function was normal. Ejection fraction was  estimated to be greater than 55 %. There was mild concentric hypertrophy.     -  Right ventricle: The size was at the upper limits of normal.     -  Left atrium: The atrium was dilated.     -  Right atrium: The atrium was dilated.     -  Inferior vena cava, hepatic veins: The inferior vena cava was dilated.     -  Aortic valve: Calcification of the non-coronary cusp.     -  Mitral valve: There was mild to moderate regurgitation.     -  Tricuspid valve: There was moderate regurgitation.     -  Pulmonic valve:  There was mild to moderate regurgitation.     -  Aorta, systemic arteries: Ascending aorta is upper limits of normal.       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 female with PMH of including but not limited to A Fib on Eliquis, CHF (EF 55% on echo 12/2020) HTN, PAD, CAD s/p CABG, parkinsons, DM type II, CKD stage V, HLD, hypothyroidism, obesity, HON, and DJD, who is seen in consultation at the request of Dr. Nesha Harding for NADEGE. She presented to the ED 12/16 from wound care with hypoxia and BNP 19k initially on BiPAP but weaned to [place]. Oxygen via NC at 3L. HGB trended down to 6.9 from 8 and is macrocytic but she is iron deficient. B12 399, folate 13.6, Iron 32, TIBC 245, transferrin saturation 13%, ferritin 697. Patient reports N/V prior to admission possibly a result of Abx for LE wound infection. No overt bleeding. Colonoscopy Dr. Karoline Nielson 2014: TA polyp. Pt refused EGD for eval N/V during admission 7/2019.       12/22: Covid PCR negative  HGB stabilized following 1 unit of PRBC on 12/20. Cough persists- on 3 L NC which is her bseline. Plan:     Acute respiratory Distress: Covid negative. On 3L nC- baseline. ABG without acidosis or hypercarbia. CXR shows cardiomegaly but clear lungs. NADEGE: Iron studies with transferrin sat 13. Started on Ferrous Sulfate. HGB stabilized after 1 unit of PRBC on 12/20. Had brown stool this AM. Continue to hold Eliquis. Trend H/H and transfuse prn.  continue with Protonix IV BID. -Holding on endoscopy for now without overt bleeding     Colonoscopy Dr. Karoline Nielson 2014: TA polyp. Pt refused EGD for eval N/V during admission 7/2019. Sepsis: on Vanc and Cefepime. WBC improving. ANTONIO on CKD stage 4: Creatinine climbing. Nephrology following. BLE wounds: Wound care following. Afib: Eliquis on hold       Marguerita Hodgkins, NP    Patient is seen and examined in collaboration with Dr. Austen Beckham. Assessment and plan as per Dr. Austen Beckham.

## 2020-12-22 NOTE — PROGRESS NOTES
END OF SHIFT NOTE:    INTAKE/OUTPUT  12/21 0701 - 12/22 0700  In: 360 [P.O.:360]  Out: 350 [Urine:350]  Voiding: YES, oliguria  Catheter: NO  Drain:   External Female Catheter 12/16/20 (Active)   Site Assessment Clean, dry, & intact 12/21/20 1945   Repositioned Yes 12/20/20 1457   Perineal Care Yes 12/20/20 1457   Wick Changed Yes 12/20/20 1457   Suction Canister/Tubing Changed Yes 12/20/20 1457   Urine Output (mL) 100 ml 12/22/20 1196               Flatus: Patient does have flatus present. Stool:  0 occurrences. Characteristics:       Emesis: 0 occurrences. Characteristics:        VITAL SIGNS  Patient Vitals for the past 12 hrs:   Temp Pulse Resp BP SpO2   12/22/20 0339 97.7 °F (36.5 °C) (!) 103 20 (!) 156/67 98 %   12/22/20 0052 98.2 °F (36.8 °C) (!) 53 20 (!) 152/62 97 %   12/21/20 2346 98.2 °F (36.8 °C) (!) 51 20 139/65 95 %   12/21/20 2021 97.5 °F (36.4 °C) (!) 52 20 (!) 129/53 96 %       Pain Assessment  Pain Intensity 1: 0 (12/22/20 0036)  Pain Location 1: Abdomen, Leg(BLE & mid upper abd)  Pain Intervention(s) 1: Repositioned(contacting MD)  Patient Stated Pain Goal: 0    Ambulating  No    Shift report given to oncoming nurse at the bedside.     Jasmin Lobo RN

## 2020-12-22 NOTE — PROGRESS NOTES
TRANSFER - OUT REPORT:    Verbal report given to Natividad Man RN(name) on Mariza Brown  being transferred to [facility](unit) for routine progression of care       Report consisted of patients Situation, Background, Assessment and   Recommendations(SBAR). Information from the following report(s) SBAR, Kardex, Intake/Output, MAR, Accordion and Recent Results was reviewed with the receiving nurse. Lines:   Peripheral IV 12/20/20 Right; Inner Forearm (Active)   Site Assessment Clean, dry, & intact 12/21/20 1945   Phlebitis Assessment 0 12/21/20 1945   Infiltration Assessment 0 12/21/20 1945   Dressing Status Clean, dry, & intact 12/21/20 1945   Dressing Type Transparent;Tape 12/21/20 1945   Hub Color/Line Status Patent; Flushed 12/21/20 1945        Opportunity for questions and clarification was provided.       Patient transported with:   O2 @ 3 liters  Registered Nurse  [facility]

## 2020-12-22 NOTE — PROGRESS NOTES
TRANSFER - IN REPORT:    Verbal report received from Tony Mendoza RN(name) on [facility]  being received from 8th floor(unit) for routine progression of care      Report consisted of patients Situation, Background, Assessment and   Recommendations(SBAR). Information from the following report(s) SBAR, Kardex, ED Summary, Procedure Summary, Intake/Output, MAR, Recent Results and Cardiac Rhythm sinus candelaria was reviewed with the receiving nurse. Opportunity for questions and clarification was provided. Assessment completed upon patients arrival to unit and care assumed.

## 2020-12-22 NOTE — PROGRESS NOTES
Pt in bed eating breakfast. Alert and oriented x4. Pt not having any pain or distress at this time. 3L Nasal Cannula. Assesment completed. Dressings in place to BLE, to be changed later in the day. Call light within reach. Safety measures in place.

## 2020-12-23 NOTE — PROGRESS NOTES
[facility]        Subjective: CKD   Resting comfortably in bed    Review of Systems -   General ROS: negative for - fever, chills  Respiratory ROS: no SOB, cough, WARREN  Cardiovascular ROS: no CP, palpitations  Gastrointestinal ROS: no N&V, abdominal pain, diarrhea  Genito-Urinary ROS: no difficulty voiding, dysuria  Neurological ROS: no seizures, focal weekness        Objective:    Vitals:    12/22/20 2315 12/23/20 0354 12/23/20 0511 12/23/20 0751   BP: (!) 142/71 (!) 145/62  (!) 148/53   Pulse: (!) 54 (!) 57  (!) 49   Resp: 20 20 20   Temp: 98.4 °F (36.9 °C) 97.7 °F (36.5 °C)  97.7 °F (36.5 °C)   SpO2: 97% 97%  98%   Weight:   122.8 kg (270 lb 11.6 oz)    Height:           PE  Gen: in no acute distress  CV:reg rate  Chest:clear  Abd: soft  Ext/Access: no edema       . LAB  Recent Labs     12/23/20  0448 12/22/20  0425 12/21/20  0723   WBC 8.7 11.0 13.6*   HGB 7.2* 7.5* 7.6*   HCT 23.7* 24.7* 23.9*    227 213     Recent Labs     12/23/20  0448 12/22/20  0425 12/21/20  1013 12/21/20  0723 12/20/20  1351    141  --  141  --    K 5.3* 5.1  --  5.0 4.9   * 110*  --  109*  --    CO2 21 20*  --  22  --    GLU 95 128*  --  97  --    * 114*  --  109*  --    CREA 3.80* 3.95*  --  3.80*  --    MG  --   --   --   --  3.0*   PHOS  --   --  6.6*  --   --    CA 8.5 8.3  --  8.9  --            Radiology    A/P:   Patient Active Problem List   Diagnosis Code    HTN (hypertension) I10    AF (atrial fibrillation) ([facility]) I48.91    PAD (peripheral artery disease) ([facility]) I73.9    Diabetic neuropathy ([facility]) E11.40    Sepsis ([facility]) A41.9    Bradycardia R00.1    A-V fistula ([facility]) I77.0    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 ([facility] E11.21    Encounter for medication management Z79.899    SARAH (obstructive sleep apnea) G47.33    CKD (chronic kidney disease) stage 5, GFR less than 15 ml/min ([facility]) N18.5    Parkinson disease ([facility] G20    Acute on chronic renal failure ([facility]) N17.9, N18.9    Acute cystitis with hematuria N30.01    Morbid obesity ([facility]) E66.01    Hypokalemia E87.6    C. difficile diarrhea A04.72    PAF (paroxysmal atrial fibrillation) ([facility]) I48.0    Elevated troponin R77.8    Chest pain R07.9    CAD (coronary artery disease) I25.10    Volume overload E87.70    Demand ischemia ([facility]) I24.8    Meningioma ([facility]) D32.9    Acquired hypothyroidism E03.9    Acute renal insufficiency N28.9    Anemia of chronic kidney failure, stage 4 (severe) ([facility]) N18.4, D63.1    Chronic systolic heart failure ([facility]) I50.22    Decreased hemoglobin R71.0    Depression F32.9    History of UTI Z87.440    Postural imbalance R29.3    Hypertensive emergency I16.1    Acute on chronic respiratory failure with hypoxia ([facility]) J96.21    Acute respiratory distress R06.03     A on CKD - labs are not much worse. Lungs are clear. No urgent need to start dialysis. UA - No active sediments   UPCR 0.5  US kidney - 3.6 cm enlarging mass/complex cyst  seen on the upper pole of left kidney - will get urology evaluation   S/p Left ureteral stent placement for left ureteral obs/UTI /pyelonephritis / Left ureteral stone in 2016 by Dr Areli Fofana vancomycin - level 16.5 on 12/22 . Deescalate ASAP     Anemia    DM    CHF    COVID - negative.       Home Rankin MD

## 2020-12-23 NOTE — PROGRESS NOTES
END OF SHIFT NOTE:    INTAKE/OUTPUT  12/22 0701 - 12/23 0700  In: 400 [P.O.:400]  Out: 500 [Urine:500]  Voiding: YES  Catheter: NO  Drain:   External Female Catheter 12/16/20 (Active)   Site Assessment Clean, dry, & intact 12/23/20 0514   Repositioned Yes 12/23/20 0514   Perineal Care Yes 12/23/20 0514   Wick Changed Yes 12/23/20 0514   Suction Canister/Tubing Changed No 12/23/20 0514   Urine Output (mL) 200 ml 12/23/20 0631     Flatus: Patient does have flatus present. Stool:  0 occurrences. Characteristics:    Emesis: 0 occurrences. Characteristics:     VITAL SIGNS  Patient Vitals for the past 12 hrs:   Temp Pulse Resp BP SpO2   12/23/20 0354 97.7 °F (36.5 °C) (!) 57 20 (!) 145/62 97 %   12/22/20 2315 98.4 °F (36.9 °C) (!) 54 20 (!) 142/71 97 %   12/22/20 1955 98 °F (36.7 °C) (!) 56 20 (!) 151/69 98 %   12/22/20 1930 -- (!) 57 -- -- --       Pain Assessment  Pain Intensity 1: 0 (12/22/20 1930)  Pain Location 1: Abdomen, Leg(BLE & mid upper abd)  Pain Intervention(s) 1: Repositioned(contacting MD)  Patient Stated Pain Goal: 0    Ambulating  No    Shift report given to oncoming nurse at the bedside.     [address]

## 2020-12-23 NOTE — PROGRESS NOTES
Progress Note    Patient: Mickey Greenberg MRN: [id number]  SSN: [id number]    YOB: 1940  Age: [de-identified] y.o. Sex: female      Admit Date: 12/16/2020    LOS: 7 days     Subjective:   [de-identified] yo F with PMH extensive PMH including afib on anticoagulation, HFrEF, HTN, CAD, DM, CKD who presented from wound care appointment with hypoxia. Patient seen and examined at bedside. This morning still having some SOB. Denies cough, no chest pain, no abdominal pain, no nausea or vomiting. Objective:     Vitals:    12/22/20 2315 12/23/20 0354 12/23/20 0511 12/23/20 0751   BP: (!) 142/71 (!) 145/62  (!) 148/53   Pulse: (!) 54 (!) 57  (!) 49   Resp: 20 20 20   Temp: 98.4 °F (36.9 °C) 97.7 °F (36.5 °C)  97.7 °F (36.5 °C)   SpO2: 97% 97%  98%   Weight:   122.8 kg (270 lb 11.6 oz)    Height:            Intake and Output:  Current Shift: No intake/output data recorded.   Last three shifts: 12/21 1901 - 12/23 0700  In: 400 [P.O.:400]  Out: 850 [Urine:850]    ROS  10 ROS negative except from stated on subjective    Physical Exam:   General: Alert, oriented, NAD  HEENT: NC/AT, EOM are intact  Neck: supple, no JVD  Cardiovascular: RRR, S1, S2, no murmurs  Respiratory: Decrease breath sounds, no wheezes  Abdomen: Soft, NT, ND  Back: No CVA tenderness, no paraspinal tenderness  Extremities: LE without pedal edema, no erythema  Neuro: A&O, CN are intact, no focal deficits  Skin: no rash or ulcers  Psych: good mood and affect    Lab/Data Review:  I have personally reviewed patients laboratory data showing  Recent Results (from the past 24 hour(s))   GLUCOSE, POC    Collection Time: 12/22/20 12:47 PM   Result Value Ref Range    Glucose (POC) 160 (H) 65 - 100 mg/dL   GLUCOSE, POC    Collection Time: 12/22/20  4:21 PM   Result Value Ref Range    Glucose (POC) 164 (H) 65 - 100 mg/dL   GLUCOSE, POC    Collection Time: 12/22/20  9:05 PM   Result Value Ref Range    Glucose (POC) 108 (H) 65 - 510 mg/dL   METABOLIC PANEL, BASIC    Collection Time: 12/23/20  4:48 AM   Result Value Ref Range    Sodium 140 136 - 145 mmol/L    Potassium 5.3 (H) 3.5 - 5.1 mmol/L    Chloride 111 (H) 98 - 107 mmol/L    CO2 21 21 - 32 mmol/L    Anion gap 8 7 - 16 mmol/L    Glucose 95 65 - 100 mg/dL     (H) 8 - 23 MG/DL    Creatinine 3.80 (H) 0.6 - 1.0 MG/DL    GFR est AA 15 (L) >60 ml/min/1.73m2    GFR est non-AA 12 (L) >60 ml/min/1.73m2    Calcium 8.5 8.3 - 10.4 MG/DL   CBC W/O DIFF    Collection Time: 12/23/20  4:48 AM   Result Value Ref Range    WBC 8.7 4.3 - 11.1 K/uL    RBC 2.33 (L) 4.05 - 5.2 M/uL    HGB 7.2 (L) 11.7 - 15.4 g/dL    HCT 23.7 (L) 35.8 - 46.3 %    .7 (H) 79.6 - 97.8 FL    MCH 30.9 26.1 - 32.9 PG    MCHC 30.4 (L) 31.4 - 35.0 g/dL    RDW 15.1 (H) 11.9 - 14.6 %    PLATELET 537 836 - 372 K/uL    MPV 11.6 9.4 - 12.3 FL    ABSOLUTE NRBC 0.00 0.0 - 0.2 K/uL   GLUCOSE, POC    Collection Time: 12/23/20  8:47 AM   Result Value Ref Range    Glucose (POC) 123 (H) 65 - 100 mg/dL   GLUCOSE, POC    Collection Time: 12/23/20 12:10 PM   Result Value Ref Range    Glucose (POC) 201 (H) 65 - 100 mg/dL        Image:  I have personally reviewed patients imaging showing  DUPLEX LOWER EXT VENOUS BILAT   Final Result   IMPRESSION:   1. No evidence of deep venous thrombosis in either lower extremity. CT CHEST ABD PELV WO CONT   Final Result   IMPRESSION:    1) Small bilateral pleural effusions, coronary atherosclerosis and anemia. 2) Stable cyst left lobe liver, stable mild low lipoma right adrenal, stable   nodule left adrenal, status post cholecystectomy and right renal stones, sigmoid   diverticulosis. 3) New irregular 4.1 x 2.2 cm attenuating focus posterior left kidney suspicious   for neoplasm. XR CHEST SNGL V   Final Result   IMPRESSION: Normal chest.      US RETROPERITONEUM COMP   Final Result   IMPRESSION:   No hydronephrosis. Borderline renal echogenicity. 3.6 cm enlarging   mass left kidney, complex cyst versus neoplasm.  MRI follow-up should be   considered. XR CHEST SNGL V   Final Result   IMPRESSION:  Cardiomegaly without acute abnormality. XR CHEST SNGL V   Final Result   IMPRESSION:      1. Cardiomegaly and vascular congestion, similar to prior exam.      XR CHEST PORT   Final Result   Impression: Enlarged cardiac silhouette with grossly clear lungs. 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 1. Acute on chronic hypoxic respiratory failure (bl 3LNC) 2/2  acute decompensated HFrEF  - BNP 9332 on admission  - TTE in 7/2019 with EF 30-35%  - Repeated TTE shows improved EF>55%    - O2 therapy to maintain O2 Sat>92%  - Strict I/Os  - Daily weights  - Holding lasix due to worsening renal function  - SARS CoV 2 PCR negative  - BL LE US negative for DVT   - Hold off CT chest d/t her renal function  - Cardiology recs appreciated      2. Concerning of sepsis due to LE cellulitis vs UTI  - Continue cefepime   - D/c vancomycin  - Follow BCx - NGTD  - Follow UCx - GNR  - Monitor CBC  - Will consider ID consult    3. Radiologic findings concerning of renal neoplasm  - Oncology consulted     4.  Iron deficiency Anemia  - Iron studies c/w NADEGE  - Continue ferrous sulfate   - S/p 1U PRBC   - Hold home Apixaban, ASA  - Per GI - no endoscopy planned until respiratory status improves      5. Bradycardia / Hx Afib  - Telemetry  - Cardiology recs appreciated     6. ANTONIO on CKD Stage 4  - Holding Lasix and Lisinopril  - Nephrology recs appreciated     7. HTN Emergency, resolved  - Continue coreg  - Holding Lisinopril and Lasix d/t ANTONIO  - PO hydralazine PRN.     8. BLE wounds  - Wound Care recs appreciated     9. CAD/PAD  - Cardiology recommended baby ASA. Hold for now d/t anemia    10. DM2  - ISS  - BS ACHS    11. Parkinson's disease  - Continue home meds    12. SARAH  - Pt does not wear CPAP nightly, only oxygen     DVT Prophylaxis: SCD's for now     I have reviewed, updated, and verified this note's content and spent 38 minutes of my 42 minutes visit performing counseling and coordination of care regarding medical management.      Signed By: Marjorie Moreno MD     December 23, 2020

## 2020-12-23 NOTE — PROGRESS NOTES
ACUTE PHYSICAL THERAPY GOALS:  (Developed with and agreed upon by patient and/or caregiver. )  LTG:  (1.)Ms. Maddie Castro will move from supine to sit and sit to supine , scoot up and down and roll side to side in bed with MODIFIED INDEPENDENT within 7 treatment day(s). (2.)Ms. Maddie Castro will transfer from bed to chair and chair to bed with SUPERVISION using the least restrictive device within 7 treatment day(s). (3.)Ms. Maddie Castro will ambulate with STAND BY ASSIST for 15 feet with the least restrictive device within 7 treatment day(s) while maintaining normal vital signs. (4.)Ms. Maddie Castro will be independent in HEP for B LE strengthening and AROM within 7 treatment days.    ________________________________________________________________________________________________       PHYSICAL THERAPY ASSESSMENT: Initial Assessment and AM PT Treatment Day # 1      Melva Castro is a [de-identified] y.o. female   PRIMARY DIAGNOSIS: Acute on chronic respiratory failure with hypoxia ([address]  CHF exacerbation ([facility]) [I50.9]       Reason for Referral:    ICD-10: Treatment Diagnosis: Generalized Muscle Weakness (M62.81)  Difficulty in walking, Not elsewhere classified (R26.2)  [place]PAT[place]: [place] / [place] / Product Type: Medicare /     ASSESSMENT:     REHAB RECOMMENDATIONS:   Recommendation to date pending progress:  Setting:  [address] Therapy  Equipment:    none, has all DME for home all ready     PRIOR LEVEL OF FUNCTION:  (Prior to Hospitalization) INITIAL/CURRENT LEVEL OF FUNCTION:  (Most Recently Demonstrated)   Bed Mobility:   Modified Independent  Sit to Stand:   Modified Independent  Transfers:   Modified Independent  Gait/Mobility:   Modified Independent Bed Mobility:   Contact Guard Assistance  Sit to Stand:   Contact Guard Assistance  Transfers:   Contact Guard Assistance  Gait/Mobility:   Contact Guard Assistance     ASSESSMENT:  Ms. Maddie Castro with decreased bed mobility, transfers, ambulation, balance, activity tolerance, and overall general functional mobility s/p hospital admission on 12/16/20 with SOB. Pt history significant for CHF, Parkinson's, chronic LE wounds. Pt A & O x 4 this date, 3 L O2 via nc, pure wick. Pt reports lives with spouse and daughter, states uses manual w/c mostly in home, ambulates very short distances in home into bathroom. Pt states mod I with ADLs, sleeps in hospital bed, chronic O2 at 3 L. Pt today SOB with conversation; however, pt states that is baseline. Pt CGA for bed mobility, transfers, and ambulation with RW bed to chair in room. Pt fatigued with activity, some additional time required for tasks, SOB once in chair. Pt again states this is close to her baseline. PT to cont to follow for acute care needs to address deficits noted above. Pt would benefit from HHPT at discharge pending progress with mobility. SUBJECTIVE:   Ms. Tiffany Barrios states, \"I have been to all the therapy. \"    SOCIAL HISTORY/LIVING ENVIRONMENT:   Home Environment: Private residence  # Steps to Enter: 4  One/Two Story Residence: One story  Living Alone: No  Support Systems: Child(dania), Family member(s)  OBJECTIVE:     PAIN: VITAL SIGNS: LINES/DRAINS:   Pre Treatment: Pain Screen  Pain Scale 1: Numeric (0 - 10)  Pain Intensity 1: 0  Post Treatment: 0 Vital Signs  O2 Device: Nasal cannula  O2 Flow Rate (L/min): 3 l/min Purewick  O2 Device: Nasal cannula     GROSS EVALUATION:  B LE Within Functional Limits Abnormal/ Functional Abnormal/ Non-Functional (see comments) Not Tested Comments:   AROM [] [x] [] []    PROM [] [x] [] []    Strength [] [x] [] []    Balance [] [x] [] []    Posture [] [x] [] []    Sensation [] [x] [] [] Wounds B feet/lower legs   Coordination [] [x] [] []    Tone [] [] [] [x]    Edema [] [] [] [x]    Activity Tolerance [] [x] [] []     [] [] [] []      COGNITION/  PERCEPTION: Intact Impaired   (see comments) Comments:   Orientation [x] []    Vision [x] []    Hearing [x] []    Command Following [x] []    Safety Awareness [x] []     [] []      MOBILITY: I Mod I S SBA CGA Min Mod Max Total  NT x2 Comments:   Bed Mobility    Rolling [] [] [] [] [x] [] [] [] [] [] []    Supine to Sit [] [] [] [] [x] [] [] [] [] [] []    Scooting [] [] [] [] [x] [] [] [] [] [] []    Sit to Supine [] [] [] [] [] [] [] [] [] [x] []    Transfers    Sit to Stand [] [] [] [] [x] [] [] [] [] [] []    Bed to Chair [] [] [] [] [x] [] [] [] [] [] []    Stand to Sit [] [] [] [] [x] [] [] [] [] [] []    I=Independent, Mod I=Modified Independent, S=Supervision, SBA=Standby Assistance, CGA=Contact Guard Assistance,   Min=Minimal Assistance, Mod=Moderate Assistance, Max=Maximal Assistance, Total=Total Assistance, NT=Not Tested  GAIT: I Mod I S SBA CGA Min Mod Max Total  NT x2 Comments:   Level of Assistance [] [] [] [] [x] [] [] [] [] [] []    Distance 2    DME Rolling Walker    Gait Quality Slow, trunk sway, steppage gait    I=Independent, Mod I=Modified Independent, S=Supervision, SBA=Standby Assistance, CGA=Contact Guard Assistance,   Min=Minimal Assistance, Mod=Moderate Assistance, Max=Maximal Assistance, Total=Total Assistance, NT=Not Tested    [address] [facility] [address] [address] AM-PAC 6 Clicks   Basic Mobility Inpatient Short Form       How much difficulty does the patient currently have. .. Unable A Lot A Little None   1. Turning over in bed (including adjusting bedclothes, sheets and blankets)? [] 1   [] 2   [x] 3   [] 4   2. Sitting down on and standing up from a chair with arms ( e.g., wheelchair, bedside commode, etc.)   [] 1   [] 2   [x] 3   [] 4   3. Moving from lying on back to sitting on the side of the bed? [] 1   [] 2   [x] 3   [] 4   How much help from another person does the patient currently need. .. Total A Lot A Little None   4. Moving to and from a bed to a chair (including a wheelchair)? [] 1   [] 2   [x] 3   [] 4   5. Need to walk in hospital room? [] 1   [] 2   [x] 3   [] 4   6.   Climbing 3-5 steps with a railing? [] 1   [x] 2   [] 3   [] 4   © 2007, Trustees of [address] [place] [address] [address] [id number], under license to [employer]. All rights reserved     Score:  Initial: 17 Most Recent: X (Date: -- )    Interpretation of Tool:  Represents activities that are increasingly more difficult (i.e. Bed mobility, Transfers, Gait). PLAN:   FREQUENCY/DURATION: PT Plan of Care: 3 times/week for duration of hospital stay or until stated goals are met, whichever comes first.    PROBLEM LIST:   (Skilled intervention is medically necessary to address:)  1. Decreased ADL/Functional Activities  2. Decreased Activity Tolerance  3. Decreased Coordination  4. Decreased Gait Ability  5. Decreased Transfer Abilities   INTERVENTIONS PLANNED:   (Benefits and precautions of physical therapy have been discussed with the patient.)  1. Therapeutic Activity  2. Therapeutic Exercise/HEP  3. Neuromuscular Re-education  4. Gait Training  5. Manual Therapy  6. Education     TREATMENT:     EVALUATION: Low Complexity : (Untimed Charge)    TREATMENT:   ($$ Therapeutic Activity: 8-22 mins    )  Therapeutic Activity (10  Minutes): Therapeutic activity included Supine to Sit, Scooting, Transfer Training, Ambulation on level ground, Sitting balance  and Standing balance to improve functional Mobility, Strength, ROM and Activity tolerance.     AFTER TREATMENT POSITION/PRECAUTIONS:  Chair, Needs within reach and RN notified    INTERDISCIPLINARY COLLABORATION:  RN/PCT    TOTAL TREATMENT DURATION:  PT Patient Time In/Time Out  Time In: 1104  Time Out: 5[address] Vir[address] PT

## 2020-12-23 NOTE — CONSULTS
Urology Consult    Patient: Martha Vergara MRN: [id number]  SSN: [id number]    YOB: 1940  Age: [de-identified] y.o. Sex: female      Subjective:      Martha Vergara is a [de-identified] y.o. female admitted from her wound care appt with hypoxia. She was admitted by hospitalist service for acute respiratory failure and CHF exacerbation. She has hx of a-fib on anticoagulation, CAD, CKD, DM. Urine culture growing >100K GNR and mixed skin roxi. Cn on admit 2.85. Today is 3.80. Nephrology on board. Renal US ordered and shows 3.6 cm cyst in the upper pole of left kidney. Pt known to Dr. Luis Eduardo Carlos with hx of kidney stones. CT A/P shows no hydronephrosis and a 4.1 cm soft tissue density posterior left kidney. We are consulted. Pt is voiding. Denies any new left flank pain.       Past Medical History:   Diagnosis Date    Adverse effect of anesthesia     difficult awakening    AF (atrial fibrillation) ([facility] [address] 11/20/2011    Arthritis 11/18/2011    generalized     CAD (coronary artery disease) 2011    CABG x 4    Cervical disc disease     Steroid injections    Chronic kidney disease     Chronic pain     back    DJD (degenerative joint disease)     Drainage from wound 10/3/2014    Full dentures     Gout     managed with medication     Hemorrhoid 11/5/2013    [place] (hard of hearing)     Hyperlipemia 11/10/2011    managed with medication     Hypertension     controlled with meds    Hypertriglyceridemia     managed with medication     Hypothyroidism     managed with medication     MGUS (monoclonal gammopathy of unknown significance) 12/1/2017    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    Neuropathy     legs and arms, managed with medication     Obesity (BMI 30-39.9) 10/2/14    BMI 36.3    PAD (peripheral artery disease) (HCC)     PCI (pneumatosis cystoides intestinalis) 11/5/2013    Pleural effusion, bilateral 11/21/2011    Postoperative anemia due to acute blood loss 11/21/2011    expected     Renal mass 5/14/2016    Rib cage fracture 11/5/2013    x 2     S/P CABG (coronary artery bypass graft) 11/05/2013    CABG x 4    S/P CABG x 3 11/18/2011    Stasis edema of both lower extremities 1/3/2018    Status post-operative repair of hip fracture 09/15/2014    left side, repair with hardware    Stroke [facility])     left sided weakness residual     Type II or unspecified type diabetes mellitus without mention of complication, uncontrolled ([address] 12/16/2013    oral and insulin reliant/ Avg / low BS s/s/ @ 70/ last A1c 8.3    Unspecified adverse effect of anesthesia     difficult to arouse after general anesthesia     Past Surgical History:   Procedure Laterality Date    CABG, ARTERY-VEIN, FOUR  Oct. 2011    CLOSE CYSTOSTOMY      exploratory with removal of mass of infection    HX CATARACT REMOVAL Bilateral 11/2012    with IOL implants, bilateral    HX COLONOSCOPY      HX HIP FRACTURE TX Left     repair with hardware    HX HYSTERECTOMY      HX LAP CHOLECYSTECTOMY      VASCULAR SURGERY PROCEDURE UNLIST Left 06/07/2017    AVF creation    VASCULAR SURGERY PROCEDURE UNLIST Left 08/10/2017    AVF elevation      Family History   Problem Relation Age of Onset    Heart Disease Mother     Cancer Mother         colon    Diabetes Mother     Cancer Father         lung    Cancer Sister         breast    Breast Cancer Sister 28    Cancer Brother         Leukemia    Breast Cancer Maternal Aunt 54     Social History     Tobacco Use    Smoking status: Never Smoker    Smokeless tobacco: Never Used   Substance Use Topics    Alcohol use: No      Prior to Admission medications    Medication Sig Start Date End Date Taking? Authorizing Provider   carbidopa-levodopa (SINEMET)  mg per tablet Take 1 Tab by mouth four (4) times daily.  Indications: a type of movement disorder called parkinsonism 10/23/20  Yes Zeus Vincent MD   betamethasone dipropionate (DIPROSONE) 0.05 % topical cream MAURA EXT AA ON THE BODY BID 8/18/20  Yes Provider, Historical   insulin NPH/insulin regular (HumuLIN 70/30 U-100 Insulin) 100 unit/mL (70-30) injection 65 units before breakfast, 55 units before supper 9/28/20  Yes Torres Costa MD   levothyroxine (SYNTHROID) 25 mcg tablet Take 1 Tab by mouth Daily (before breakfast). 3/10/20  Yes Torres Costa MD   carvediloL (COREG) 12.5 mg tablet Take 2 Tabs by mouth two (2) times daily (with meals). 2/10/20  Yes Torres Costa MD   furosemide (LASIX) 40 mg tablet Take 2 Tabs by mouth two (2) times a day. 1/10/20  Yes Torres Costa MD   nystatin (MYCOSTATIN) topical cream APPLY TO AFFECTED AREA TWICE DAILY. MIX WITH TRIAMCINOLONE CREAM PRIOR TO APPLICATION 65/18/27  Yes Provider, Historical   Insulin Syringe-Needle U-100 (BD INSULIN SYRINGE ULTRA-FINE) 1 mL 31 gauge x 5/16 syrg DX E 10.65  Inject insulin tid 10/8/19  Yes Torres Costa MD   apixaban (ELIQUIS) 2.5 mg tablet Take 2.5 mg by mouth two (2) times a day. Yes Provider, Historical   prednisoLONE acetate (PRED FORTE) 1 % ophthalmic suspension Administer 1 Drop to both eyes four (4) times daily. Yes Provider, Historical   terbinafine HCl (LAMISIL AT) 1 % topical cream Apply  to affected area two (2) times a day. 6/25/18  Yes Torres Costa MD   Lactobacillus acidophilus (ACIDOPHILUS) cap Take 2 Caps by mouth two (2) times a day. Yes Provider, Historical   OXYGEN-AIR DELIVERY SYSTEMS 3 L by Nasal route continuous. Yes Provider, Historical   glucose blood VI test strips (ACCU-CHEK GUIDE) strip Check BS TID. DX E11.9 3/22/18  Yes Torres Costa MD   Lancets (ACCU-CHEK FASTCLIX) misc Check bs TID. DX E11.9 3/22/18  Yes Torres Costa MD   cyanocobalamin, vitamin B-12, 1,000 mcg/mL kit 1,000 mcg by Injection route every month. on the 1st   Yes Provider, Historical   ferrous sulfate 325 mg (65 mg iron) tablet Take 324 mg by mouth daily.    Yes Provider, Historical   triamcinolone acetonide (KENALOG) 0.1 % topical cream Apply  to affected area two (2) times a day. 6/15/16  Yes Provider, Historical   ezetimibe (ZETIA) 10 mg tablet Take 1 Tab by mouth daily. 9/28/20   Cristofer Lindsay MD   benazepriL (LOTENSIN) 20 mg tablet Take 1 Tab by mouth daily. 3/10/20   Cristofer Lindsay MD   nitroglycerin (NITROSTAT) 0.4 mg SL tablet 1 Tab by SubLINGual route as needed for Chest Pain. Up to 3 doses. 7/12/19   Luis Armando Manzano MD   ondansetron hcl [facility]) 8 mg tablet Take 1 Tab by mouth every eight (8) hours as needed for Nausea. 6/27/19   Cristofer Lindsay MD   potassium chloride (K-DUR, KLOR-CON) 20 mEq tablet Take 1 Tab by mouth daily as needed (Only on days you take metolazone). Indications: prevention of low potassium in the blood 5/31/19   Paddy Garcia MD   ergocalciferol (VITAMIN D2) 50,000 unit capsule Take 1 Cap by mouth every seven (7) days. 4/19/19   Leigh Ann Nicolas MD        Allergies   Allergen Reactions    Baclofen Other (comments)     Psychosis and altered mental status    Lipitor [Atorvastatin] Myalgia       Review of Systems:  A comprehensive review of systems was negative except for that written in the History of Present Illness.     Objective:     Vitals:    12/22/20 2315 12/23/20 0354 12/23/20 0511 12/23/20 0751   BP: (!) 142/71 (!) 145/62  (!) 148/53   Pulse: (!) 54 (!) 57  (!) 49   Resp: 20 20 20   Temp: 98.4 °F (36.9 °C) 97.7 °F (36.5 °C)  97.7 °F (36.5 °C)   SpO2: 97% 97%  98%   Weight:   270 lb 11.6 oz (122.8 kg)    Height:            Physical Exam:  GENERAL: alert, cooperative, no distress  EYE: PERRLA   THROAT & NECK: neck supple  LUNG: clear to auscultation bilaterally, On O2 via NC  HEART: regular rate and rhythm, S1, S2   ABDOMEN: soft, non-tender  EXTREMITIES:  extremities normal, atraumatic, no cyanosis or edema  SKIN: no rash or abnormalities  NEUROLOGIC: AOx3    Assessment:     Hospital Problems  Date Reviewed: 10/23/2020          Codes Class Noted POA    Acute respiratory distress ICD-10-CM: R06.03  ICD-9-CM: 518.82  12/20/2020 No        Hypertensive emergency ICD-10-CM: I16.1  ICD-9-CM: 401.9  12/17/2020 Yes        * (Principal) Acute on chronic respiratory failure with hypoxia (HCC) ICD-10-CM: J96.21  ICD-9-CM: 518.84, 799.02  12/17/2020 Yes        Anemia of chronic kidney failure, stage 4 (severe) ([facility] ICD-10-CM: N18.4, D63.1  ICD-9-CM: 285.21, 585.4  7/15/2019 Yes        Volume overload ICD-10-CM: E87.70  ICD-9-CM: 276.69  7/8/2019 Yes        Parkinson disease ([facility] (Chronic) ICD-10-CM: G20  ICD-9-CM: 332.0  6/19/2019 Yes        SARAH (obstructive sleep apnea) (Chronic) ICD-10-CM: U42.01  ICD-9-CM: 327.23  5/24/2018 Yes        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 Matthew Hopper. Pt likely to have a complex cyst in the mid pole of left kidney. Would recommend 1 year follow up with renal US. We will sign off. Call with questions/concerns. Thank you for the opportunity to assist in the care of this patient. Signed By: Dru Hartley NP     December 23, 2020      Complex cyst versus mass has only enlarged SLIGHTLY since summer '18 CT. I have reviewed the above note and examined the patient. I agree with the exam, assessment and plan. Rell Diallo.  Matthew Hopper MD

## 2020-12-23 NOTE — PROGRESS NOTES
Comprehensive Nutrition Assessment    Type and Reason for Visit: Reassess    Nutrition Recommendations/Plan:    Continue current diet   Continue Glucerna TID     Malnutrition Assessment:  Malnutrition Status: At risk for malnutrition (specify)(poor PO x 2 weeks prior to admission)    Nutrition Assessment:   Nutrition History: Patient reports \"just picking\" for ~2 weeks prior to admission due decline in appetite from antibiotics. Nutrition Background: Patient with PMH significant for CHF, HTN, CAD s/p CABG, DM2, CKD, HLD, CVA, obesity. She presented from wound care appointment with hypoxia. Admitted with CHF exacerbation. Daily Update:  Patient seen with lunch tray. When RD asking if she has been eating any better she points to her tray. Observed bites of entree consumed. She states that she is still eating, but likely will not eat much more. She states she has been drinking Glucerna and enjoys them. Noted nephrology consulted with worsening renal function and renal mass. Possible to start HD. Onocology also consulted.     Nutrition Related Findings:     Wound Type: (multiple lacerations and blisters to BLE)    Current Nutrition Therapies:  DIET NUTRITIONAL SUPPLEMENTS All Meals; Glucerna Shake  DIET DIABETIC CONSISTENT CARB Regular; 2 GM NA (House Low NA); FR 2000ML    Current Intake:   Average Meal Intake: 26-50% Average Supplement Intake: %      Anthropometric Measures:  Height: 5' 9\" (175.3 cm)  Current Body Wt: 122.8 kg (270 lb 11.6 oz)(12/23), Weight source: Bed scale  BMI: 40, Obese class 3 (BMI 40.0 or greater)     Ideal Body Wt: 145 lbs (66 kg), 179.4 %  Usual Body Wt: 117.9 kg (260 lb)(office visits 9/28/20 and 12/10/20), Percent weight change: 0.1          Estimated Daily Nutrient Needs:  Energy (kcal/day): 6746-2599 (Kcal/kg(23-28), Weight Used: Ideal(65.9 kg))  Protein (g/day): 59-66(0.9-1 g/kg) Weight Used: (Ideal)  Fluid (ml/day):   (1 ml/kcal)    Nutrition Diagnosis:   · Inadequate oral intake related to (poor appetite) as evidenced by (patient reported barrier to PO, intake as above)    Nutrition Interventions:   Food and/or Nutrient Delivery: Continue current diet, Continue oral nutrition supplement     Coordination of Nutrition Care: Continue to monitor while inpatient      Goals: Previous Goal Met: Progressing toward goal(s)  Active Goal: Meet at least 75% nutrition needs within 7 days    Nutrition Monitoring and Evaluation:      Food/Nutrient Intake Outcomes: Food and nutrient intake       Discharge Planning:     Too soon to determine    [address] [place], LD on 12/23/2020 at 1:20 PM  Contact: [phone]        Disaster Mode active

## 2020-12-23 NOTE — CONSULTS
HEMATOLOGY/ [facility]      Patient Name: Vijay Kirk    Date of Consult: 2020  : 1940  Age:80 y.o. VERENA:[id number]          Reason for Consultation:  Ms. Rocio Zaragoza is a [de-identified] y.o. female admitted on 2020 with a primary diagnosis of The primary encounter diagnosis was SOB (shortness of breath). Diagnoses of Hypervolemia, unspecified hypervolemia type, Chronic kidney disease, unspecified CKD stage, Acute on chronic anemia, Acute on chronic respiratory failure with hypoxia (HCC), Acute on chronic heart failure with preserved ejection fraction (HCC), Acute heart failure with preserved ejection fraction (HFpEF) ([address] Atrial fibrillation, unspecified type ([address] Hypertension, unspecified type, Coronary artery disease involving native coronary artery of native heart without angina pectoris, PAD (peripheral artery disease) ([address] CKD (chronic kidney disease) stage 5, GFR less than 15 ml/min ([address] Physical debility, ACP (advance care planning), and Encounter for palliative care were also pertinent to this visit. [facility] History of Present Illness:  MS. Rocio Zaragoza is a patient with a variety of medical problems as noted above that we are asked to see for a left renal mass. She was admitted with a significant exacerbation of her CHF initially requiring BiPap. She is feeling better now but remains more short of breath than at her normal but compromised baseline. In the process of her evaluation, she underwent a CT of chest, abdomen and pelvis which demonstrated an exophytic lesion arising from the left kidney measuring 4.1 by 2.2 cm. This was reportedly new from prior scans performed in  and  against which it was compared. The lesion was of soft tissue density. On a scan im 2018 however this lesion was read as an exophytic probable complex cyst which was unmeasured. To my eye however, the current lesion looks bigger.  In 2016, she had \"fibroinflammatory debris removed from her left ureter. She has generalized discomfort but nothing that localizes to the kidney and she has had no hematuria. There has been no weight loss. Her creatinine on admission was 3.8 (2.5-3 in 2019).      Medications:   Current Facility-Administered Medications   Medication Dose Route Frequency Provider Last Rate Last Admin    cefepime (MAXIPIME) 1 g in 0.9% sodium chloride (MBP/ADV) 50 mL MBP  1 g IntraVENous Q24H Blankenship, Thu,  mL/hr at 12/23/20 0901 1 g at 12/23/20 0901    VANCOMYCIN INTERMITTENT DOSING PER PHARMACY   Other Rx Dosing/Monitoring Blankenship, Thu, DO        [Held by provider] carvediloL (COREG) tablet 12.5 mg  12.5 mg Oral BID WITH MEALS Blankenship, Thu, DO   12.5 mg at 12/22/20 7812    0.9% sodium chloride infusion 250 mL  250 mL IntraVENous PRN Mercy Rush MD        [Held by provider] furosemide (LASIX) tablet 80 mg  80 mg Oral BID Blankenship, Thu, DO   80 mg at 12/20/20 1211    pantoprazole (PROTONIX) 40 mg in 0.9% sodium chloride 10 mL injection  40 mg IntraVENous Q12H Blankenship, Thu, DO   40 mg at 12/23/20 0515    phenol throat spray (CHLORASEPTIC) 1 Spray  1 Spray Oral PRN Blankenship, Thu, DO        nitroglycerin (NITROSTAT) tablet 0.4 mg  0.4 mg SubLINGual PRN Blankenship, Thu, DO        ferrous sulfate tablet 324 mg  324 mg Oral DAILY Yulisa Daley MD   324 mg at 12/23/20 0901    prednisoLONE acetate (PRED FORTE) 1 % ophthalmic suspension 1 Drop  1 Drop Both Eyes QID Yulisa Daley MD   1 Drop at 12/23/20 0902    ondansetron (ZOFRAN ODT) tablet 8 mg  8 mg Oral Q8H PRN Yulisa Daley MD        [Held by provider] apixaban [place] Keys) tablet 2.5 mg  2.5 mg Oral BID Yulisa Daley MD   2.5 mg at 12/19/20 1709    [Held by provider] lisinopriL (PRINIVIL, ZESTRIL) tablet 20 mg  20 mg Oral DAILY Yulisa Daley MD   20 mg at 12/18/20 0856    levothyroxine (SYNTHROID) tablet 25 mcg  25 mcg Oral ACB Yulisa Daley MD   25 mcg at 12/23/20 0513    ezetimibe (ZETIA) tablet 10 mg  10 mg Oral DAILY Baldev Ben Salcedo MD   10 mg at 12/23/20 0901    carbidopa-levodopa (SINEMET)  mg per tablet 1 Tab  1 Tab Oral QID Antony Hines MD   1 Tab at 12/23/20 0901    sodium chloride (NS) flush 5-40 mL  5-40 mL IntraVENous Q8H Antony Hines MD   10 mL at 12/23/20 0518    sodium chloride (NS) flush 5-40 mL  5-40 mL IntraVENous PRN Antony Hines MD        acetaminophen (TYLENOL) tablet 650 mg  650 mg Oral Q6H PRN Antony Hines MD   Stopped at 12/20/20 1615    Or    acetaminophen (TYLENOL) suppository 650 mg  650 mg Rectal Q6H PRN Antony Hines MD   650 mg at 12/20/20 1623    polyethylene glycol (MIRALAX) packet 17 g  17 g Oral DAILY PRN Antony Hines MD        promethazine (PHENERGAN) tablet 12.5 mg  12.5 mg Oral Q6H PRN Antony Hines MD        Or    ondansetron [facility]) injection 4 mg  4 mg IntraVENous Q6H PRN Antony Hines MD   4 mg at 12/17/20 0033    hydrALAZINE (APRESOLINE) tablet 25 mg  25 mg Oral Q4H PRN Antony Hines MD   25 mg at 12/16/20 2036    insulin glargine (LANTUS) injection 67 Units  67 Units SubCUTAneous QHS Antony Hines MD   30 Units at 12/22/20 2145    And    insulin lispro (HUMALOG) injection 12 Units  12 Units SubCUTAneous Sage Willis MD   12 Units at 12/23/20 9086       Allergies: Allergies   Allergen Reactions    Baclofen Other (comments)     Psychosis and altered mental status    Lipitor [Atorvastatin] Myalgia       Review of Systems: The Review of Systems is documented in full in the internal medical record. All systems are negative other than for those noted above.      Past Medical History:  Past Medical History:   Diagnosis Date    Adverse effect of anesthesia     difficult awakening    AF (atrial fibrillation) [address] 11/20/2011    Arthritis 11/18/2011    generalized     CAD (coronary artery disease) 2011    CABG x 4    Cervical disc disease     Steroid injections    Chronic kidney disease     Chronic pain     back    DJD (degenerative joint disease)     Drainage from wound 10/3/2014    Full dentures     Gout     managed with medication     Hemorrhoid 11/5/2013    [place] (hard of hearing)     Hyperlipemia 11/10/2011    managed with medication     Hypertension     controlled with meds    Hypertriglyceridemia     managed with medication     Hypothyroidism     managed with medication     MGUS (monoclonal gammopathy of unknown significance) 12/1/2017    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    Neuropathy     legs and arms, managed with medication     Obesity (BMI 30-39.9) 10/2/14    BMI 36.3    PAD (peripheral artery disease) (HCC)     PCI (pneumatosis cystoides intestinalis) 11/5/2013    Pleural effusion, bilateral 11/21/2011    Postoperative anemia due to acute blood loss 11/21/2011    expected     Renal mass 5/14/2016    Rib cage fracture 11/5/2013    x 2     S/P CABG (coronary artery bypass graft) 11/05/2013    CABG x 4    S/P CABG x 3 11/18/2011    Stasis edema of both lower extremities 1/3/2018    Status post-operative repair of hip fracture 09/15/2014    left side, repair with hardware    Stroke [facility])     left sided weakness residual     Type II or unspecified type diabetes mellitus without mention of complication, uncontrolled ([facility] 12/16/2013    oral and insulin reliant/ Avg / low BS s/s/ @ 70/ last A1c 8.3    Unspecified adverse effect of anesthesia     difficult to arouse after general anesthesia       Past Surgical History:  Past Surgical History:   Procedure Laterality Date    CABG, ARTERY-VEIN, FOUR  Oct. 2011    CLOSE CYSTOSTOMY      exploratory with removal of mass of infection    HX CATARACT REMOVAL Bilateral 11/2012    with IOL implants, bilateral    HX COLONOSCOPY      HX HIP FRACTURE TX Left     repair with hardware    HX HYSTERECTOMY      HX LAP CHOLECYSTECTOMY      VASCULAR SURGERY PROCEDURE UNLIST Left 06/07/2017    AVF creation    VASCULAR SURGERY PROCEDURE UNLIST Left 08/10/2017    AVF elevation       Social History:  Social History     Tobacco Use    Smoking status: Never Smoker    Smokeless tobacco: Never Used   Substance Use Topics    Alcohol use: No    Drug use: No     Types: Prescription       Family History:  Family History   Problem Relation Age of Onset    Heart Disease Mother     Cancer Mother         colon    Diabetes Mother     Cancer Father         lung    Cancer Sister         breast    Breast Cancer Sister 28    Cancer Brother         Leukemia    Breast Cancer Maternal Aunt 54         Physical Examination:  General Appearance: Chronically ill appearing patient in no acute distress. Vital signs:   Visit Vitals  BP (!) 148/53   Pulse (!) 49   Temp 97.7 °F (36.5 °C)   Resp 20   Ht 5' 9\" (1.753 m)   Wt 270 lb 11.6 oz (122.8 kg)   SpO2 98%   BMI 39.98 kg/m²     HEENT: No oral or pharyngeal masses. There is no ulceration or thrush. There is no sinus tenderness. Neck: Supple. There is no thyromegaly. Lymph nodes: There is no cervical, supraclavicular, axillary or inguinal adenopathy. Breasts: Not examined  Lungs: The lungs are clear to auscultation and percussion. There is no egophony. There is no chest wall tenderness and no  use of accessory respiratory musculature. Heart: There is no jugular venous distention. The rate is normal and rhythm regular. The S1 and S2 are normal and there are no murmurs or rubs. Abdomen: Soft, non-tender, bowel sounds present and normal, no appreciated hepatosplenomegaly. No palpable masses. Skin: No rash, petechiae or ecchymoses. No evidence of malignancy. Musculoskeletal: No bony or muscular tenderness. No joint effusions  Extremities: No cyanosis, clubbing; 1+ edema. Neurologic: Comprehension and speech normal. Cranial nerves intact. No focality in motor, sensory or reflex exams.      Labs:    Recent Results (from the past 24 hour(s))   GLUCOSE, POC    Collection Time: 12/22/20 12:47 PM   Result Value Ref Range Glucose (POC) 160 (H) 65 - 100 mg/dL   GLUCOSE, POC    Collection Time: 12/22/20  4:21 PM   Result Value Ref Range    Glucose (POC) 164 (H) 65 - 100 mg/dL   GLUCOSE, POC    Collection Time: 12/22/20  9:05 PM   Result Value Ref Range    Glucose (POC) 108 (H) 65 - 706 mg/dL   METABOLIC PANEL, BASIC    Collection Time: 12/23/20  4:48 AM   Result Value Ref Range    Sodium 140 136 - 145 mmol/L    Potassium 5.3 (H) 3.5 - 5.1 mmol/L    Chloride 111 (H) 98 - 107 mmol/L    CO2 21 21 - 32 mmol/L    Anion gap 8 7 - 16 mmol/L    Glucose 95 65 - 100 mg/dL     (H) 8 - 23 MG/DL    Creatinine 3.80 (H) 0.6 - 1.0 MG/DL    GFR est AA 15 (L) >60 ml/min/1.73m2    GFR est non-AA 12 (L) >60 ml/min/1.73m2    Calcium 8.5 8.3 - 10.4 MG/DL   CBC W/O DIFF    Collection Time: 12/23/20  4:48 AM   Result Value Ref Range    WBC 8.7 4.3 - 11.1 K/uL    RBC 2.33 (L) 4.05 - 5.2 M/uL    HGB 7.2 (L) 11.7 - 15.4 g/dL    HCT 23.7 (L) 35.8 - 46.3 %    .7 (H) 79.6 - 97.8 FL    MCH 30.9 26.1 - 32.9 PG    MCHC 30.4 (L) 31.4 - 35.0 g/dL    RDW 15.1 (H) 11.9 - 14.6 %    PLATELET 307 769 - 426 K/uL    MPV 11.6 9.4 - 12.3 FL    ABSOLUTE NRBC 0.00 0.0 - 0.2 K/uL   GLUCOSE, POC    Collection Time: 12/23/20  8:47 AM   Result Value Ref Range    Glucose (POC) 123 (H) 65 - 100 mg/dL       Imaging:  As above. ASSESSMENT:  Reina Merchant has a left sided renal lesion which could be a neoplasm and potentially could be a renal cell carcinoma. To my eye, this has shown progressive growth since 2018. The major issue of concern from my perspective is regardless of whether this is an RCC, what is her life expectancy to make a workup and potential therapy \"worthwhile\". She has rather significant pre-existent renal dysfunction and any therapeutic impact on the left kidney could make her potentially dialysis dependent.        PLAN:  For now, I would check an ultrasound to determine the presence of a cystic component to this since CT was done without contrast and limits detail. If we are to be aggressive, a renal scan might be important to assess how much this kidney contributes to overall GFR. Finally if this appears to be malignant, one could consider thermal ablation as a therapeutic approach rather than surgery. The main issue however given overall status is whether an exhaustive workup is appropriate if therapeutic options will be limited by patient, family or pre-existent comorbidities. Alexandro Lucero MD [facility]    Oncology and Hematology   [address] [phone]  F [phone]  [email or web address]      Elements of this note have been dictated using speech recognition software. As a result, errors of speech recognition may have occurred.

## 2020-12-23 NOTE — PROGRESS NOTES
Per report, patient is not medically stable for d/c.   Kidney function remains low - on iv abx. Resume home health order sent to [facility] as requested. D/c summary will need to be faxed to [facility] upon d/c - [phone]. Information input into follow-up section for d/c summary. CM will continue to follow for d/c needs.

## 2020-12-23 NOTE — PROGRESS NOTES
Spiritual Care Visit, initial visit. Visited with patient and her   at bedside. Prayed for patient's healing and health. Visit by Yung Hogna, Staff .  Johnny, Laney.EVANS., B.A.

## 2020-12-24 NOTE — PROGRESS NOTES
CM following for d/c planning. At d/c - d/c summary will need to be faxed to [address] [phone]. Ivy Magaña Resume home health orders sent previously.

## 2020-12-24 NOTE — PROGRESS NOTES
Progress Note    Patient: Vy Hamilton MRN: [id number]  SSN: [id number]    YOB: 1940  Age: [de-identified] y.o. Sex: female      Admit Date: 12/16/2020    LOS: 8 days     Subjective:   [de-identified] yo F with PMH extensive PMH including afib on anticoagulation, HFrEF, HTN, CAD, DM, CKD who presented from wound care appointment with hypoxia. Patient seen and examined at bedside. This morning still having some SOB. Denies cough, no chest pain, no abdominal pain, no nausea or vomiting.      Objective:     Vitals:    12/24/20 0348 12/24/20 0531 12/24/20 0630 12/24/20 0740   BP: (!) 169/69 (!) 145/57  (!) 166/65   Pulse: (!) 47 (!) 48  (!) 52   Resp: 22 21   Temp: 98 °F (36.7 °C)   97.2 °F (36.2 °C)   SpO2: 97% 97%  97%   Weight:   123.2 kg (271 lb 9.6 oz)    Height:            Intake and Output:  Current Shift: 12/24 0701 - 12/24 1900  In: -   Out: 200 [Urine:200]  Last three shifts: 12/22 1901 - 12/24 0700  In: 880 [P.O.:880]  Out: 1100 [Urine:1100]    ROS  10 ROS negative except from stated on subjective    Physical Exam:   General: Alert, oriented, NAD  HEENT: NC/AT, EOM are intact  Neck: supple, no JVD  Cardiovascular: RRR, S1, S2, no murmurs  Respiratory: Decrease breath sounds, no wheezes  Abdomen: Soft, NT, ND  Back: No CVA tenderness, no paraspinal tenderness  Extremities: LE without pedal edema, no erythema  Neuro: A&O, CN are intact, no focal deficits  Skin: no rash or ulcers  Psych: good mood and affect    Lab/Data Review:  I have personally reviewed patients laboratory data showing  Recent Results (from the past 24 hour(s))   GLUCOSE, POC    Collection Time: 12/23/20 12:10 PM   Result Value Ref Range    Glucose (POC) 201 (H) 65 - 100 mg/dL   D DIMER    Collection Time: 12/23/20  2:01 PM   Result Value Ref Range    D DIMER 1.43 (H) <0.56 ug/ml(FEU)   GLUCOSE, POC    Collection Time: 12/23/20  4:56 PM   Result Value Ref Range    Glucose (POC) 140 (H) 65 - 100 mg/dL   GLUCOSE, POC    Collection Time: 12/23/20 8:30 PM   Result Value Ref Range    Glucose (POC) 120 (H) 65 - 987 mg/dL   METABOLIC PANEL, BASIC    Collection Time: 12/24/20  5:24 AM   Result Value Ref Range    Sodium 140 136 - 145 mmol/L    Potassium 5.3 (H) 3.5 - 5.1 mmol/L    Chloride 114 (H) 98 - 107 mmol/L    CO2 22 21 - 32 mmol/L    Anion gap 4 (L) 7 - 16 mmol/L    Glucose 53 (L) 65 - 100 mg/dL     (H) 8 - 23 MG/DL    Creatinine 3.56 (H) 0.6 - 1.0 MG/DL    GFR est AA 16 (L) >60 ml/min/1.73m2    GFR est non-AA 13 (L) >60 ml/min/1.73m2    Calcium 8.8 8.3 - 10.4 MG/DL   CBC W/O DIFF    Collection Time: 12/24/20  5:24 AM   Result Value Ref Range    WBC 8.8 4.3 - 11.1 K/uL    RBC 2.42 (L) 4.05 - 5.2 M/uL    HGB 7.5 (L) 11.7 - 15.4 g/dL    HCT 24.5 (L) 35.8 - 46.3 %    .2 (H) 79.6 - 97.8 FL    MCH 31.0 26.1 - 32.9 PG    MCHC 30.6 (L) 31.4 - 35.0 g/dL    RDW 14.9 (H) 11.9 - 14.6 %    PLATELET 431 401 - 414 K/uL    MPV 11.7 9.4 - 12.3 FL    ABSOLUTE NRBC 0.00 0.0 - 0.2 K/uL   VANCOMYCIN, RANDOM    Collection Time: 12/24/20  5:24 AM   Result Value Ref Range    Vancomycin, random 22.1 UG/ML   GLUCOSE, POC    Collection Time: 12/24/20  8:02 AM   Result Value Ref Range    Glucose (POC) 70 65 - 100 mg/dL        Image:  I have personally reviewed patients imaging showing  DUPLEX LOWER EXT VENOUS BILAT   Final Result   IMPRESSION:   1. No evidence of deep venous thrombosis in either lower extremity. CT CHEST ABD PELV WO CONT   Final Result   IMPRESSION:    1) Small bilateral pleural effusions, coronary atherosclerosis and anemia. 2) Stable cyst left lobe liver, stable mild low lipoma right adrenal, stable   nodule left adrenal, status post cholecystectomy and right renal stones, sigmoid   diverticulosis. 3) New irregular 4.1 x 2.2 cm attenuating focus posterior left kidney suspicious   for neoplasm.             XR CHEST SNGL V   Final Result   IMPRESSION: Normal chest.      US RETROPERITONEUM COMP   Final Result   IMPRESSION:   No hydronephrosis. Borderline renal echogenicity. 3.6 cm enlarging   mass left kidney, complex cyst versus neoplasm. MRI follow-up should be   considered. XR CHEST SNGL V   Final Result   IMPRESSION:  Cardiomegaly without acute abnormality. XR CHEST SNGL V   Final Result   IMPRESSION:      1. Cardiomegaly and vascular congestion, similar to prior exam.      XR CHEST PORT   Final Result   Impression: Enlarged cardiac silhouette with grossly clear lungs. 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 1. Acute on chronic hypoxic respiratory failure (bl 3LNC) 2/2  acute decompensated HFrEF  - BNP 9332 on admission  - TTE in 7/2019 with EF 30-35%  - Repeated TTE shows improved EF>55%    - O2 therapy to maintain O2 Sat>92%  - Strict I/Os  - Daily weights  - Holding lasix due to worsening renal function  - SARS CoV 2 PCR negative  - BL LE US negative for DVT   - Cardiology recs appreciated      2.  Concerning of sepsis due to LE cellulitis vs UTI  - Continue cefepime   - Follow BCx - NGTD  - Follow UCx - GNR  - Monitor CBC  - Will consider ID consult    3. Radiologic findings concerning of renal neoplasm  - Oncology recs appreciated      4. Iron deficiency Anemia  - Iron studies c/w NADEGE  - Continue ferrous sulfate   - S/p 1U PRBC   - Hold home Apixaban, ASA  - Per GI - no endoscopy planned until respiratory status improves      5. Bradycardia / Hx Afib  - Telemetry  - Cardiology recs appreciated     6. ANTONIO on CKD Stage 4  - Holding Lasix and Lisinopril  - Nephrology recs appreciated     7. HTN Emergency, resolved  - Continue coreg  - Holding Lisinopril and Lasix d/t ANTONIO   - PO hydralazine PRN.     8. BLE wounds  - Wound Care recs appreciated     9. CAD/PAD  - Holding ASA due to anemia  - Cardiology recs appreciated    10. DM2  - ISS  - BS ACHS    11. Parkinson's disease  - Continue home meds    12. SARAH  - Pt does not wear CPAP nightly, only oxygen     DVT Prophylaxis: SCD's for now     I have reviewed, updated, and verified this note's content and spent 38 minutes of my 42 minutes visit performing counseling and coordination of care regarding medical management.      Signed By: Dana Lowery MD     December 24, 2020

## 2020-12-24 NOTE — PROGRESS NOTES
ACUTE PHYSICAL THERAPY GOALS:  (Developed with and agreed upon by patient and/or caregiver. )  LTG:  (1.)Ms. Jim Sommers will move from supine to sit and sit to supine , scoot up and down and roll side to side in bed with MODIFIED INDEPENDENT within 7 treatment day(s). (2.)Ms. Jim Sommers will transfer from bed to chair and chair to bed with SUPERVISION using the least restrictive device within 7 treatment day(s). (3.)Ms. Jim Sommers will ambulate with STAND BY ASSIST for 15 feet with the least restrictive device within 7 treatment day(s) while maintaining normal vital signs. (4.)Ms. Jim Sommers will be independent in HEP for B LE strengthening and AROM within 7 treatment days. PHYSICAL THERAPY: Daily Note and AM Treatment Day # 2    Melva Sommers is a [de-identified] y.o. female   PRIMARY DIAGNOSIS: Acute on chronic respiratory failure with hypoxia ([address]  CHF exacerbation (HCC) [I50.9]         ASSESSMENT:     REHAB RECOMMENDATIONS: CURRENT LEVEL OF FUNCTION:  (Most Recently Demonstrated)   Recommendation to date pending progress:  [address] [place] [address] Therapy  Equipment:    None   has all DME already at home Bed Mobility:   Contact Guard Assistance  Sit to Stand:   Contact Guard Assistance  Transfers:   Contact Guard Assistance  Gait/Mobility:   Contact Guard Assistance     ASSESSMENT:  Ms. Jim Sommers performs bed mobs, transfers, and ambulated with CGA using rolling walker. Pt does get SOB with activity and requires rest breaks. Good progress towards goals. Pt would benefit from HHPT at discharge pending progress with mobility. .     SUBJECTIVE:   Ms. Jim Sommers is a very pleasant lady who jokes around a lot.      SOCIAL HISTORY/ LIVING ENVIRONMENT:   Home Environment: Private residence  # Steps to Enter: 4  One/Two Story Residence: One story  Living Alone: No  Support Systems: Child(dania), Family member(s)  OBJECTIVE:     PAIN: VITAL SIGNS: LINES/DRAINS:   Pre Treatment: Pain Screen  Pain Scale 1: FLACC  Pain Intensity 1: 0  Post Treatment: 0   IV and Purewick  O2 Device: Nasal cannula     MOBILITY: I Mod I S SBA CGA Min Mod Max Total  NT x2 Comments:   Bed Mobility    Rolling [] [] [] [] [x] [] [] [] [] [] []    Supine to Sit [] [] [] [] [x] [] [] [] [] [] []    Scooting [] [] [] [] [x] [] [] [] [] [] []    Sit to Supine [] [] [] [] [] [] [] [] [] [] []    Transfers    Sit to Stand [] [] [] [] [x] [] [] [] [] [] []    Bed to Chair [] [] [] [] [x] [] [] [] [] [] []    Stand to Sit [] [] [] [] [x] [] [] [] [] [] []    I=Independent, Mod I=Modified Independent, S=Supervision, SBA=Standby Assistance, CGA=Contact Guard Assistance,   Min=Minimal Assistance, Mod=Moderate Assistance, Max=Maximal Assistance, Total=Total Assistance, NT=Not Tested    GAIT: I Mod I S SBA CGA Min Mod Max Total  NT x2 Comments:   Level of Assistance [] [] [] [] [x] [] [] [] [] [] []    Distance 5'    DME Rolling Walker    Gait Quality Slightly flexed posture, slow steady gait    I=Independent, Mod I=Modified Independent, S=Supervision, SBA=Standby Assistance, CGA=Contact Guard Assistance,   Min=Minimal Assistance, Mod=Moderate Assistance, Max=Maximal Assistance, Total=Total Assistance, NT=Not Tested    PLAN:   FREQUENCY/DURATION: PT Plan of Care: 3 times/week for duration of hospital stay or until stated goals are met, whichever comes first.  TREATMENT:     TREATMENT:   ($$ Therapeutic Activity: 8-22 mins  $$ Therapeutic Exercises: 8-22 mins    )  Therapeutic Activity (15 Minutes): Therapeutic activity included Supine to Sit, Scooting, Ambulation on level ground and Standing balance to improve functional Mobility, Strength and Activity tolerance. Therapeutic Exercise (14 Minutes): Therapeutic exercises noted below to improve functional activity tolerance, AROM and mobility.       Date:  12/24/20 Date:   Date:     ACTIVITY/EXERCISE AM PM AM PM AM PM   Ambulation:           Distance  Device  Duration         Seated Heel Raises X 20 B        Seated Toe Raises X 20 B        Seated Long Arc Quads X 15 B        Seated Marching X 15 B        Seated Hip Abduction X 15 B        Shoulder flexion X 15 B        Shoulder shrugs X 10 B         Elbow flex/ext X 25 B        Shoulder abd X 10 B        Punch outs X 10 B        B = bilateral; AA = active assistive; A = active; P = passive        AFTER TREATMENT POSITION/PRECAUTIONS:  Chair and RN notified    INTERDISCIPLINARY COLLABORATION:  RN/PCT and PT/PTA    TOTAL TREATMENT DURATION:  PT Patient Time In/Time Out  Time In: 0940  Time Out: [facility], [facility]

## 2020-12-24 NOTE — PROGRESS NOTES
[facility]        Subjective: CKD   Resting comfortably in bed    Review of Systems -   General ROS: negative for - fever, chills  Respiratory ROS: no SOB, cough, WARREN  Cardiovascular ROS: no CP, palpitations  Gastrointestinal ROS: no N&V, abdominal pain, diarrhea  Genito-Urinary ROS: no difficulty voiding, dysuria  Neurological ROS: no seizures, focal weekness        Objective:    Vitals:    12/24/20 0348 12/24/20 0531 12/24/20 0630 12/24/20 0740   BP: (!) 169/69 (!) 145/57  (!) 166/65   Pulse: (!) 47 (!) 48  (!) 52   Resp: 22   21   Temp: 98 °F (36.7 °C)   97.2 °F (36.2 °C)   SpO2: 97% 97%  97%   Weight:   123.2 kg (271 lb 9.6 oz)    Height:           PE  Gen: in no acute distress  CV:reg rate  Chest:clear  Abd: soft  Ext/Access: no edema       . LAB  Recent Labs     12/24/20  0524 12/23/20  0448 12/22/20  0425   WBC 8.8 8.7 11.0   HGB 7.5* 7.2* 7.5*   HCT 24.5* 23.7* 24.7*    221 227     Recent Labs     12/24/20  0524 12/23/20  0448 12/22/20  0425    140 141   K 5.3* 5.3* 5.1   * 111* 110*   CO2 22 21 20*   GLU 53* 95 128*   * 118* 114*   CREA 3.56* 3.80* 3.95*   CA 8.8 8.5 8.3           Radiology    A/P:   Patient Active Problem List   Diagnosis Code    HTN (hypertension) I10    AF (atrial fibrillation) ([facility]) I48.91    PAD (peripheral artery disease) ([facility]) I73.9    Diabetic neuropathy ([facility]) E11.40    Sepsis ([facility]) A41.9    Bradycardia R00.1    A-V fistula ([facility]) I77.0    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 ([facility] E11.21    Encounter for medication management Z79.899    SARAH (obstructive sleep apnea) G47.33    CKD (chronic kidney disease) stage 5, GFR less than 15 ml/min (HCC) N18.5    Parkinson disease (HCC) G20    Acute on chronic renal failure (HCC) N17.9, N18.9    Acute cystitis with hematuria N30.01    Morbid obesity (HCC) E66.01    Hypokalemia E87.6    C. difficile diarrhea A04.72    PAF (paroxysmal atrial fibrillation) (HCC) I48.0    Elevated troponin R77.8    Chest pain R07.9    CAD (coronary artery disease) I25.10    Volume overload E87.70    Demand ischemia (HCC) I24.8    Meningioma (HCC) D32.9    Acquired hypothyroidism E03.9    Acute renal insufficiency N28.9    Anemia of chronic kidney failure, stage 4 (severe) (HCC) N18.4, D63.1    Chronic systolic heart failure (HCC) I50.22    Decreased hemoglobin R71.0    Depression F32.9    History of UTI Z87.440    Postural imbalance R29.3    Hypertensive emergency I16.1    Acute on chronic respiratory failure with hypoxia ([facility]) J96.21    Acute respiratory distress R06.03     A on CKD - labs are not much worse. Lungs are clear. No urgent need to start dialysis. UA - No active sediments   UPCR 0.5  US kidney - 3.6 cm enlarging mass/complex cyst  seen on the upper pole of left kidney -reviewed urology recommendation     S/p Left ureteral stent placement for left ureteral obs/UTI /pyelonephritis / Left ureteral stone in 2016 by Dr Areli Swanson vancomycin - level 16.5 on 12/22 . Deescalate ASAP     Hyperkalemia- started on lokelma     Anemia    DM    CHF    COVID - negative.       Bria Simpson MD

## 2020-12-24 NOTE — PROGRESS NOTES
Patient BGL is 120. Per patient, did not eat dinner tonight. Scheduled to receive 67 units of Lantus tonight. Would you like to hold, give, or decrease amount. Last night patient received 30units. Hospitalist notified. Dose adjusted.

## 2020-12-24 NOTE — PROGRESS NOTES
HEMATOLOGY/ MEDICAL ONCOLOGY PROGRESS NOTE      Patient Name: Mariza Brown    Date of Consult: 2020  : 1940  Age:80 y.o. VUX:[id number]          24 Hour Events:  VSS, afebrile  Some orthopnea but respiratory status improved  Up to bedside chair  No overnight events    ROS:  Constitutional: Negative for fever, chills, weakness, malaise, fatigue. CV: Negative for chest pain, palpitations, edema. Respiratory: +dyspnea. Negative for cough, wheezing. GI: Negative for nausea, abdominal pain, diarrhea. 10 point review of systems is otherwise negative with the exception of the elements mentioned above in the HPI.            Medications:   Current Facility-Administered Medications   Medication Dose Route Frequency Provider Last Rate Last Admin    sodium zirconium cyclosilicate (LOKELMA) powder packet 10 g  10 g Oral DAILY Seafordshwetha Wren MD        cefepime (MAXIPIME) 1 g in 0.9% sodium chloride (MBP/ADV) 50 mL MBP  1 g IntraVENous Q24H Blankenship, Thu,  mL/hr at 20 0846 1 g at 20 0846    [Held by provider] carvediloL (COREG) tablet 12.5 mg  12.5 mg Oral BID WITH MEALS Blankenship, Thu, DO   12.5 mg at 20 9374    0.9% sodium chloride infusion 250 mL  250 mL IntraVENous PRN Sergio Haro MD        [Held by provider] furosemide (LASIX) tablet 80 mg  80 mg Oral BID Blankenship, Thu, DO   80 mg at 20 1211    pantoprazole (PROTONIX) 40 mg in 0.9% sodium chloride 10 mL injection  40 mg IntraVENous Q12H Blankenship, Thu, DO   40 mg at 20 0526    phenol throat spray (CHLORASEPTIC) 1 Spray  1 Spray Oral PRN Blankenship, Thu, DO        nitroglycerin (NITROSTAT) tablet 0.4 mg  0.4 mg SubLINGual PRN Blankenship, Thu, DO        ferrous sulfate tablet 324 mg  324 mg Oral DAILY Yara Silva MD   324 mg at 20 0845    prednisoLONE acetate (PRED FORTE) 1 % ophthalmic suspension 1 Drop  1 Drop Both Eyes QID Yara Silva MD   1 Drop at 20 0849    ondansetron (ZOFRAN ODT) tablet 8 mg  8 mg Oral Q8H PRN Shahla Bay MD        [Held by provider] apixaban Lizzie Navarro) tablet 2.5 mg  2.5 mg Oral BID Shahla Bay MD   2.5 mg at 12/19/20 1709    [Held by provider] lisinopriL (PRINIVIL, ZESTRIL) tablet 20 mg  20 mg Oral DAILY Shahla Bay MD   20 mg at 12/18/20 0856    levothyroxine (SYNTHROID) tablet 25 mcg  25 mcg Oral ACB Shahla Bay MD   25 mcg at 12/24/20 0525    ezetimibe (ZETIA) tablet 10 mg  10 mg Oral DAILY Shahla Bay MD   10 mg at 12/24/20 0845    carbidopa-levodopa (SINEMET)  mg per tablet 1 Tab  1 Tab Oral QID Shahla Bay MD   1 Tab at 12/24/20 0845    sodium chloride (NS) flush 5-40 mL  5-40 mL IntraVENous Q8H Shahla Bay MD   10 mL at 12/24/20 0526    sodium chloride (NS) flush 5-40 mL  5-40 mL IntraVENous PRN Shahla Bay MD        acetaminophen (TYLENOL) tablet 650 mg  650 mg Oral Q6H PRN Shahla Bay MD   Stopped at 12/20/20 1615    Or    acetaminophen (TYLENOL) suppository 650 mg  650 mg Rectal Q6H PRN Shahla Bay MD   650 mg at 12/20/20 1623    polyethylene glycol (MIRALAX) packet 17 g  17 g Oral DAILY PRN Shahla Bay MD        promethazine (PHENERGAN) tablet 12.5 mg  12.5 mg Oral Q6H PRN Shahla Bay MD        Or    [facility]etron [facility]) injection 4 mg  4 mg IntraVENous Q6H PRN Shahla Bay MD   4 mg at 12/17/20 0033    hydrALAZINE (APRESOLINE) tablet 25 mg  25 mg Oral Q4H PRN Shahla Bay MD   25 mg at 12/16/20 2036    insulin glargine (LANTUS) injection 67 Units  67 Units SubCUTAneous QHS Shahla Bay MD   30 Units at 12/23/20 2127    And    insulin lispro (HUMALOG) injection 12 Units  12 Units SubCUTAneous Khushi Youssef MD   Stopped at 12/24/20 0284       Allergies: Allergies   Allergen Reactions    Baclofen Other (comments)     Psychosis and altered mental status    Lipitor [Atorvastatin] Myalgia       Review of Systems:   The Review of Systems is documented in full in the internal medical record. All systems are negative other than for those noted above.      Past Medical History:  Past Medical History:   Diagnosis Date    Adverse effect of anesthesia     difficult awakening    AF (atrial fibrillation) ([facility] [address] [address] 11/20/2011    Arthritis 11/18/2011    generalized     CAD (coronary artery disease) 2011    CABG x 4    Cervical disc disease     Steroid injections    Chronic kidney disease     Chronic pain     back    DJD (degenerative joint disease)     Drainage from wound 10/3/2014    Full dentures     Gout     managed with medication     Hemorrhoid 11/5/2013    [place] (hard of hearing)     Hyperlipemia 11/10/2011    managed with medication     Hypertension     controlled with meds    Hypertriglyceridemia     managed with medication     Hypothyroidism     managed with medication     MGUS (monoclonal gammopathy of unknown significance) 12/1/2017    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    Neuropathy     legs and arms, managed with medication     Obesity (BMI 30-39.9) 10/2/14    BMI 36.3    PAD (peripheral artery disease) (HCC)     PCI (pneumatosis cystoides intestinalis) 11/5/2013    Pleural effusion, bilateral 11/21/2011    Postoperative anemia due to acute blood loss 11/21/2011    expected     Renal mass 5/14/2016    Rib cage fracture 11/5/2013    x 2     S/P CABG (coronary artery bypass graft) 11/05/2013    CABG x 4    S/P CABG x 3 11/18/2011    Stasis edema of both lower extremities 1/3/2018    Status post-operative repair of hip fracture 09/15/2014    left side, repair with hardware    Stroke [facility])     left sided weakness residual     Type II or unspecified type diabetes mellitus without mention of complication, uncontrolled ([facility] [address] 12/16/2013    oral and insulin reliant/ Avg / low BS s/s/ @ 70/ last A1c 8.3    Unspecified adverse effect of anesthesia     difficult to arouse after general anesthesia       Past Surgical History:  Past Surgical History:   Procedure Laterality Date    CABG, ARTERY-VEIN, FOUR  Oct. 2011    CLOSE CYSTOSTOMY      exploratory with removal of mass of infection    HX CATARACT REMOVAL Bilateral 11/2012    with IOL implants, bilateral    HX COLONOSCOPY      HX HIP FRACTURE TX Left     repair with hardware    HX HYSTERECTOMY      HX LAP CHOLECYSTECTOMY      VASCULAR SURGERY PROCEDURE UNLIST Left 06/07/2017    AVF creation    VASCULAR SURGERY PROCEDURE UNLIST Left 08/10/2017    AVF elevation       Social History:  Social History     Tobacco Use    Smoking status: Never Smoker    Smokeless tobacco: Never Used   Substance Use Topics    Alcohol use: No    Drug use: No     Types: Prescription       Family History:  Family History   Problem Relation Age of Onset    Heart Disease Mother     Cancer Mother         colon    Diabetes Mother     Cancer Father         lung    Cancer Sister         breast    Breast Cancer Sister 28    Cancer Brother         Leukemia    Breast Cancer Maternal Aunt 54         Physical Examination:  General Appearance: Chronically ill appearing patient in no acute distress. Vital signs:   Visit Vitals  BP (!) 166/65   Pulse (!) 52   Temp 97.2 °F (36.2 °C)   Resp 21   Ht 5' 9\" (1.753 m)   Wt 271 lb 9.6 oz (123.2 kg)   SpO2 97%   BMI 40.11 kg/m²     HEENT: No oral or pharyngeal masses. There is no ulceration or thrush. There is no sinus tenderness. Neck: Supple. There is no thyromegaly. Lymph nodes: There is no cervical, supraclavicular, axillary or inguinal adenopathy. Breasts: Not examined  Lungs: The lungs are clear to auscultation and percussion. There is no egophony. There is no chest wall tenderness and no  use of accessory respiratory musculature. Heart: There is no jugular venous distention. The rate is normal and rhythm regular. The S1 and S2 are normal and there are no murmurs or rubs.     Abdomen: Soft, non-tender, bowel sounds present and normal, no appreciated hepatosplenomegaly. No palpable masses. Skin: No rash, petechiae or ecchymoses. No evidence of malignancy. Musculoskeletal: No bony or muscular tenderness. No joint effusions  Extremities: No cyanosis, clubbing; 1+ edema. Neurologic: Comprehension and speech normal. Cranial nerves intact. No focality in motor, sensory or reflex exams.      Labs:    Recent Results (from the past 24 hour(s))   GLUCOSE, POC    Collection Time: 12/23/20 12:10 PM   Result Value Ref Range    Glucose (POC) 201 (H) 65 - 100 mg/dL   D DIMER    Collection Time: 12/23/20  2:01 PM   Result Value Ref Range    D DIMER 1.43 (H) <0.56 ug/ml(FEU)   GLUCOSE, POC    Collection Time: 12/23/20  4:56 PM   Result Value Ref Range    Glucose (POC) 140 (H) 65 - 100 mg/dL   GLUCOSE, POC    Collection Time: 12/23/20  8:30 PM   Result Value Ref Range    Glucose (POC) 120 (H) 65 - 137 mg/dL   METABOLIC PANEL, BASIC    Collection Time: 12/24/20  5:24 AM   Result Value Ref Range    Sodium 140 136 - 145 mmol/L    Potassium 5.3 (H) 3.5 - 5.1 mmol/L    Chloride 114 (H) 98 - 107 mmol/L    CO2 22 21 - 32 mmol/L    Anion gap 4 (L) 7 - 16 mmol/L    Glucose 53 (L) 65 - 100 mg/dL     (H) 8 - 23 MG/DL    Creatinine 3.56 (H) 0.6 - 1.0 MG/DL    GFR est AA 16 (L) >60 ml/min/1.73m2    GFR est non-AA 13 (L) >60 ml/min/1.73m2    Calcium 8.8 8.3 - 10.4 MG/DL   CBC W/O DIFF    Collection Time: 12/24/20  5:24 AM   Result Value Ref Range    WBC 8.8 4.3 - 11.1 K/uL    RBC 2.42 (L) 4.05 - 5.2 M/uL    HGB 7.5 (L) 11.7 - 15.4 g/dL    HCT 24.5 (L) 35.8 - 46.3 %    .2 (H) 79.6 - 97.8 FL    MCH 31.0 26.1 - 32.9 PG    MCHC 30.6 (L) 31.4 - 35.0 g/dL    RDW 14.9 (H) 11.9 - 14.6 %    PLATELET 111 977 - 571 K/uL    MPV 11.7 9.4 - 12.3 FL    ABSOLUTE NRBC 0.00 0.0 - 0.2 K/uL   VANCOMYCIN, RANDOM    Collection Time: 12/24/20  5:24 AM   Result Value Ref Range    Vancomycin, random 22.1 UG/ML   GLUCOSE, POC    Collection Time: 12/24/20  8:02 AM   Result Value Ref Range    Glucose (POC) 70 65 - 100 mg/dL       Imaging:  As above. ASSESSMENT:  Gerrit Hammans has a left sided renal lesion which could be a neoplasm and potentially could be a renal cell carcinoma. To my eye, this has shown progressive growth since 2018. The major issue of concern from my perspective is regardless of whether this is an RCC, what is her life expectancy to make a workup and potential therapy \"worthwhile\". She has rather significant pre-existent renal dysfunction and any therapeutic impact on the left kidney could make her potentially dialysis dependent. PLAN:  For now, I would check an ultrasound to determine the presence of a cystic component to this since CT was done without contrast and limits detail. If we are to be aggressive, a renal scan might be important to assess how much this kidney contributes to overall GFR. Finally if this appears to be malignant, one could consider thermal ablation as a therapeutic approach rather than surgery. The main issue however given overall status is whether an exhaustive workup is appropriate if therapeutic options will be limited by patient, family or pre-existent comorbidities. 12/24 Renal US completed 12/21 and showed no hydronephrosis but 3.6 cm mass in left kidney - cyst vs neoplasm and recommended follow-up MRI. Cr stable. Urology signed off - felt area of suspicion to be cystic and recommended 1 year follow-up. No further inpatient work-up warranted given her other acute issues. However, would recommend she follow-up in office with Dr Anthony Herrera in 3 months and consider re-imaging at that time given. Our office will arrange this follow-up.                  KRISTI Nunez. [address] Hematology & Oncology  [address] [address]  Office : [phone]  Fax : [phone]

## 2020-12-25 NOTE — CONSULTS
Comprehensive Nutrition Assessment    Type and Reason for Visit: Reassess, Consult  Poor intake/appetite 5 or more days (Hospitalist)    Nutrition Recommendations/Plan:    Continue current diet   Change Glucerna to Nepro TID (hyperkalemia, also higher in kcal and protein)     Malnutrition Assessment:  Malnutrition Status: At risk for malnutrition (specify)(poor PO x 2 weeks prior to admission)    Nutrition Assessment:   Nutrition History: Patient reports \"just picking\" for ~2 weeks prior to admission due decline in appetite from antibiotics. Nutrition Background: Patient with PMH significant for CHF, HTN, CAD s/p CABG, DM2, CKD, HLD, CVA, obesity. She presented from wound care appointment with hypoxia. Admitted with CHF exacerbation. Daily Update:  Noted Urology and Oncology following for renal mass (? Cyst vs malignant)  Patient seen today in follow-up in bed. She appears very weak and states she is just tired and not feeling well. She states that she is not eating any better and mostly just drinking her nutrition shakes and other beverages. She agrees to change to Nepro.   Labs significant for K+ 5.2 - started on Lokelma    Nutrition Related Findings:     Wound Type: (multiple lacerations and blisters to BLE)    Current Nutrition Therapies:  DIET NUTRITIONAL SUPPLEMENTS All Meals; Glucerna Shake  DIET DIABETIC CONSISTENT CARB Regular; 2 GM NA (House Low NA); FR 2000ML    Current Intake:   Average Meal Intake: 1-25% Average Supplement Intake: %      Anthropometric Measures:  Height: 5' 9\" (175.3 cm)  Current Body Wt: 125.2 kg (276 lb 0.3 oz)(12/25), Weight source: Bed scale  BMI: 40.7, Obese class 3 (BMI 40.0 or greater)     Ideal Body Wt: 145 lbs (66 kg), 179.4 %  Usual Body Wt: 117.9 kg (260 lb)(office visits 9/28/20 and 12/10/20), Percent weight change: 0.1          Estimated Daily Nutrient Needs:  Energy (kcal/day): 3007-7601 (Kcal/kg(23-28), Weight Used: Ideal(65.9 kg))  Protein (g/day): 59-66(0.9-1 g/kg) Weight Used: (Ideal)  Fluid (ml/day):   (1 ml/kcal)    Nutrition Diagnosis:   · Inadequate oral intake related to (poor appetite) as evidenced by (patient reported barrier to PO, intake as above)    Nutrition Interventions:   Food and/or Nutrient Delivery: Continue current diet, Continue oral nutrition supplement     Coordination of Nutrition Care: Continue to monitor while inpatient    Goals: Previous Goal Met: Progress towards goal(s) declining  Active Goal: Meet at least 75% nutrition needs within 7 days    Nutrition Monitoring and Evaluation:      Food/Nutrient Intake Outcomes: Food and nutrient intake, Supplement intake       Discharge Planning:     Too soon to determine    [address] [place], LD on 12/25/2020 at 2:00 PM  Contact: [phone]        Disaster Mode active

## 2020-12-25 NOTE — PROGRESS NOTES
Progress Note    Patient: Candi Small MRN: [id number]  SSN: [id number]    YOB: 1940  Age: [de-identified] y.o. Sex: female      Admit Date: 12/16/2020    LOS: 9 days     Subjective:   [de-identified] yo F with PMH extensive PMH including afib on anticoagulation, HFrEF, HTN, CAD, DM, CKD who presented from wound care appointment with hypoxia. Patient seen and examined at bedside. This morning feeling weak. Denies cough, no chest pain, no abdominal pain, no nausea or vomiting. Objective:     Vitals:    12/25/20 0219 12/25/20 0422 12/25/20 0641 12/25/20 0741   BP: (!) 147/52 (!) 149/59  (!) 192/83   Pulse: 60 (!) 53  (!) 54   Resp: 20 20 20   Temp: 98.6 °F (37 °C) 97.4 °F (36.3 °C)  97.3 °F (36.3 °C)   SpO2: 96% 97%  100%   Weight:  125.3 kg (276 lb 4.8 oz) 125.2 kg (276 lb)    Height:            Intake and Output:  Current Shift: No intake/output data recorded.   Last three shifts: 12/23 1901 - 12/25 0700  In: 640 [P.O.:640]  Out: 1100 [Urine:1100]    ROS  10 ROS negative except from stated on subjective    Physical Exam:   General: Alert, oriented, NAD  HEENT: NC/AT, EOM are intact  Neck: supple, no JVD  Cardiovascular: RRR, S1, S2, no murmurs  Respiratory: Decrease breath sounds, no wheezes  Abdomen: Soft, NT, ND  Back: No CVA tenderness, no paraspinal tenderness  Extremities: LE without pedal edema, no erythema  Neuro: A&O, CN are intact, no focal deficits  Skin: no rash or ulcers  Psych: good mood and affect    Lab/Data Review:  I have personally reviewed patients laboratory data showing  Recent Results (from the past 24 hour(s))   GLUCOSE, POC    Collection Time: 12/24/20 12:07 PM   Result Value Ref Range    Glucose (POC) 196 (H) 65 - 100 mg/dL   GLUCOSE, POC    Collection Time: 12/24/20  5:05 PM   Result Value Ref Range    Glucose (POC) 199 (H) 65 - 100 mg/dL   GLUCOSE, POC    Collection Time: 12/24/20  9:21 PM   Result Value Ref Range    Glucose (POC) 121 (H) 65 - 694 mg/dL   METABOLIC PANEL, BASIC Collection Time: 12/25/20  5:01 AM   Result Value Ref Range    Sodium 142 136 - 145 mmol/L    Potassium 5.2 (H) 3.5 - 5.1 mmol/L    Chloride 113 (H) 98 - 107 mmol/L    CO2 22 21 - 32 mmol/L    Anion gap 7 7 - 16 mmol/L    Glucose 54 (L) 65 - 100 mg/dL     (H) 8 - 23 MG/DL    Creatinine 3.03 (H) 0.6 - 1.0 MG/DL    GFR est AA 19 (L) >60 ml/min/1.73m2    GFR est non-AA 16 (L) >60 ml/min/1.73m2    Calcium 8.9 8.3 - 10.4 MG/DL   CBC W/O DIFF    Collection Time: 12/25/20  5:01 AM   Result Value Ref Range    WBC 9.4 4.3 - 11.1 K/uL    RBC 2.54 (L) 4.05 - 5.2 M/uL    HGB 7.8 (L) 11.7 - 15.4 g/dL    HCT 25.5 (L) 35.8 - 46.3 %    .4 (H) 79.6 - 97.8 FL    MCH 30.7 26.1 - 32.9 PG    MCHC 30.6 (L) 31.4 - 35.0 g/dL    RDW 14.6 11.9 - 14.6 %    PLATELET 898 915 - 955 K/uL    MPV 11.3 9.4 - 12.3 FL    ABSOLUTE NRBC 0.00 0.0 - 0.2 K/uL   GLUCOSE, POC    Collection Time: 12/25/20  6:42 AM   Result Value Ref Range    Glucose (POC) 106 (H) 65 - 100 mg/dL   GLUCOSE, POC    Collection Time: 12/25/20  7:45 AM   Result Value Ref Range    Glucose (POC) 110 (H) 65 - 100 mg/dL        Image:  I have personally reviewed patients imaging showing  DUPLEX LOWER EXT VENOUS BILAT   Final Result   IMPRESSION:   1. No evidence of deep venous thrombosis in either lower extremity. CT CHEST ABD PELV WO CONT   Final Result   IMPRESSION:    1) Small bilateral pleural effusions, coronary atherosclerosis and anemia. 2) Stable cyst left lobe liver, stable mild low lipoma right adrenal, stable   nodule left adrenal, status post cholecystectomy and right renal stones, sigmoid   diverticulosis. 3) New irregular 4.1 x 2.2 cm attenuating focus posterior left kidney suspicious   for neoplasm. XR CHEST SNGL V   Final Result   IMPRESSION: Normal chest.      US RETROPERITONEUM COMP   Final Result   IMPRESSION:   No hydronephrosis. Borderline renal echogenicity. 3.6 cm enlarging   mass left kidney, complex cyst versus neoplasm.  MRI follow-up should be   considered. XR CHEST SNGL V   Final Result   IMPRESSION:  Cardiomegaly without acute abnormality. XR CHEST SNGL V   Final Result   IMPRESSION:      1. Cardiomegaly and vascular congestion, similar to prior exam.      XR CHEST PORT   Final Result   Impression: Enlarged cardiac silhouette with grossly clear lungs. 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 1. Acute on chronic hypoxic respiratory failure (bl 3LNC) 2/2  acute decompensated HFrEF  - BNP 9332 on admission  - TTE in 7/2019 with EF 30-35%  - Repeated TTE shows improved EF>55%    - O2 therapy to maintain O2 Sat>92%  - Strict I/Os  - Daily weights  - Holding lasix for now due to ANTONIO  - SARS CoV 2 PCR negative  - BL LE US negative for DVT   - Cardiology recs appreciated      2. Concerning of sepsis due to LE cellulitis vs UTI  - Continue cefepime   - Follow BCx - NGTD  - Follow UCx - E Coli  - Monitor CBC  - Will consider ID consult    3. Radiologic findings concerning of renal neoplasm  - Oncology recs appreciated   - Urology recs appreciated     4.  Iron deficiency Anemia  - Iron studies c/w NADEGE  - Continue ferrous sulfate   - S/p 1U PRBC   - Hold home Apixaban  - Per GI - no endoscopy planned until respiratory status improves      5. Bradycardia / Hx Afib  - Telemetry  - Cardiology recs appreciated     6. ANTONIO on CKD Stage 4, seems to be back to baseline  - Holding Lasix   - Nephrology recs appreciated     7. HTN  - Hold coreg due to bradycardia  - Start amlodipine  - Resume lisinopril  - PO hydralazine PRN     8. BLE wounds  - Wound Care recs appreciated     9. CAD/PAD  - Resume ASA   - Cardiology recs appreciated    10. DM2  - ISS  - BS ACHS    11. Parkinson's disease  - Continue sinemet    12. SARAH  - Pt does not wear CPAP nightly, only oxygen     DVT Prophylaxis: SCD's for now     I have reviewed, updated, and verified this note's content and spent 38 minutes of my 42 minutes visit performing counseling and coordination of care regarding medical management.      Signed By: Dae Ordonez MD     December 25, 2020

## 2020-12-25 NOTE — PROGRESS NOTES
END OF SHIFT NOTE:    INTAKE/OUTPUT  12/24 0701 - 12/25 0700  In: 640 [P.O.:640]  Out: 1000 [Urine:1000]  Voiding: YES  Catheter: YES  Drain:   External Female Catheter 12/24/20 (Active)   Site Assessment Clean, dry, & intact 12/24/20 1911   Repositioned No 12/24/20 1911   Perineal Care No 12/24/20 1911   Wick Changed No 12/24/20 1911   Suction Canister/Tubing Changed No 12/24/20 1911   Urine Output (mL) 350 ml 12/25/20 0533     Flatus: Patient does have flatus present. Stool:  0 occurrences. Characteristics:     Emesis: 0 occurrences. Characteristics:      VITAL SIGNS  Patient Vitals for the past 12 hrs:   Temp Pulse Resp BP SpO2   12/25/20 0422 97.4 °F (36.3 °C) (!) 53 20 (!) 149/59 97 %   12/25/20 0219 98.6 °F (37 °C) 60 20 (!) 147/52 96 %   12/24/20 2101 -- (!) 59 -- (!) 165/66 96 %   12/24/20 2043 98 °F (36.7 °C) (!) 57 20 (!) 180/71 97 %       Pain Assessment  Pain Intensity 1: 0 (12/24/20 1911)  Pain Location 1: Abdomen, Leg(BLE & mid upper abd)  Pain Intervention(s) 1: Repositioned(contacting MD)  Patient Stated Pain Goal: 0    Ambulating  No    Shift report given to oncoming nurse at the bedside.     [address] [facility]

## 2020-12-25 NOTE — PROGRESS NOTES
[facility]        Subjective: CKD   Resting comfortably in bed    Review of Systems -   General ROS: negative for - fever, chills  Respiratory ROS: no SOB, cough, WARREN  Cardiovascular ROS: no CP, palpitations  Gastrointestinal ROS: no N&V, abdominal pain, diarrhea  Genito-Urinary ROS: no difficulty voiding, dysuria  Neurological ROS: no seizures, focal weekness        Objective:    Vitals:    12/25/20 0219 12/25/20 0422 12/25/20 0641 12/25/20 0741   BP: (!) 147/52 (!) 149/59  (!) 192/83   Pulse: 60 (!) 53  (!) 54   Resp: 20 20 20   Temp: 98.6 °F (37 °C) 97.4 °F (36.3 °C)  97.3 °F (36.3 °C)   SpO2: 96% 97%  100%   Weight:  125.3 kg (276 lb 4.8 oz) 125.2 kg (276 lb)    Height:           PE  Gen: in no acute distress  CV:reg rate  Chest:clear  Abd: soft  Ext/Access: no edema       . LAB  Recent Labs     12/25/20  0501 12/24/20  0524 12/23/20  0448   WBC 9.4 8.8 8.7   HGB 7.8* 7.5* 7.2*   HCT 25.5* 24.5* 23.7*    232 221     Recent Labs     12/25/20  0501 12/24/20  0524 12/23/20  0448    140 140   K 5.2* 5.3* 5.3*   * 114* 111*   CO2 22 22 21   GLU 54* 53* 95   * 113* 118*   CREA 3.03* 3.56* 3.80*   CA 8.9 8.8 8.5           Radiology    A/P:   Patient Active Problem List   Diagnosis Code    HTN (hypertension) I10    AF (atrial fibrillation) (HCC) I48.91    PAD (peripheral artery disease) (HCC) I73.9    Diabetic neuropathy (HCC) E11.40    Sepsis (HCC) A41.9    Bradycardia R00.1    A-V fistula ([facility]) I77.0    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 ([facility] E11.21    Encounter for medication management Z79.899    SARAH (obstructive sleep apnea) G47.33    CKD (chronic kidney disease) stage 5, GFR less than 15 ml/min (HCC) N18.5    Parkinson disease (HCC) G20    Acute on chronic renal failure (HCC) N17.9, N18.9    Acute cystitis with hematuria N30.01    Morbid obesity (HCC) E66.01    Hypokalemia E87.6    C. difficile diarrhea A04.72    PAF (paroxysmal atrial fibrillation) ([facility]) I48.0    Elevated troponin R77.8    Chest pain R07.9    CAD (coronary artery disease) I25.10    Volume overload E87.70    Demand ischemia ([facility]) I24.8    Meningioma (HCC) D32.9    Acquired hypothyroidism E03.9    Acute renal insufficiency N28.9    Anemia of chronic kidney failure, stage 4 (severe) ([facility]) N18.4, D63.1    Chronic systolic heart failure ([facility]) I50.22    Decreased hemoglobin R71.0    Depression F32.9    History of UTI Z87.440    Postural imbalance R29.3    Hypertensive emergency I16.1    Acute on chronic respiratory failure with hypoxia ([facility]) J96.21    Acute respiratory distress R06.03     A on CKD -mild improvement in renal function seen   UA - No active sediments   UPCR 0.5  US kidney - 3.6 cm enlarging mass/complex cyst  seen on the upper pole of left kidney -reviewed urology recommendation     S/p Left ureteral stent placement for left ureteral obs/UTI /pyelonephritis / Left ureteral stone in 2016 by Dr Areli Lopez vancomycin - level 16.5 on 12/22 . Deescalate ASAP     Hyperkalemia- started on lokelma     Anemia    DM    CHF    COVID - negative.       Christian Torres MD

## 2020-12-25 NOTE — PROGRESS NOTES
Smita from lab called with blood culture results from left hand taken on 12/20:    Gram (-) neg ronnie  Intra bacter cloacae complex

## 2020-12-26 NOTE — CONSULTS
[facility] RE-Consult Note         Admit Date:  12/16/2020    Today's Date:  12/26/2020    CC:  NADEGE, GI bleed    Subjective:      [de-identified] y.o. female with history of MGUS admitted from her wound care appt with hypoxia. She was admitted by hospitalist service for acute respiratory failure and CHF exacerbation. She has hx of a-fib on anticoagulation, CAD, CKD, DM. Urine culture growing >100K GNR and mixed skin roxi who we are reconsulted due to NADEGE and improved respiratory status. Patient was first seen in consultation during this admission on 12/20/2020 by Dr. Elissa Esteban and LESTER Hernandez for NADEGE. She was placed on Protonix BID and Eliquis was on hold. Her Covid-19 test was pending at that time. She was last seen on 12/22 and was having brown stools without overt GI bleeding. Covid testing was negative. HGB had stabilized following 1 unit of PRBC on 12/20. We planned for outpatient follow-up once respiratory status improved. Her HGB has remained in the 7-8 range. Her Baseline HGB, earlier this year, was in the 9-10 range. She has remained on oral iron. She is on Cefepime for LE cellulitis-vs- UTI. She has not had any signs of overt bleeding. Mrs. Jeanie Odonnell reports having a good appetite but not eating much at the hospital due to taste, per her report. Denies any abdominal pain, nausea, or vomiting. Having BM every other day during admission. She reports that stools are brown. Her daughter is at the bedside and helps with history. They report that Mrs. Jeanie Odonnell has had anemia for years and gets periodic blood transfusions as an outpatient with Dr. Suleiman Wyman. She denies any heartburn, GERD, Nausea, or vomiting. During admission, Nephology has followed for rising creatinine. Urology and Hematology have seen her for a left renal mass with recommendations for outpatient follow-up and monitoring of this.        She was Admitted 7/2019 and was seen by GI for Cdiff and persistent N/V but she declined an EGD at that time because she has \"trouble waking up from anesthesia\". She denied NSAID use at that time. [place] 2014 by Dr. Sindhu Minor revealed TA polyp.     Medications:   Current Facility-Administered Medications   Medication Dose Route Frequency    furosemide (LASIX) tablet 40 mg  40 mg Oral DAILY    pantoprazole (PROTONIX) tablet 40 mg  40 mg Oral ACB&D    aspirin delayed-release tablet 81 mg  81 mg Oral DAILY    amLODIPine (NORVASC) tablet 5 mg  5 mg Oral DAILY    dextrose 40% (GLUTOSE) oral gel 1 Tube  15 g Oral PRN    glucagon (GLUCAGEN) injection 1 mg  1 mg IntraMUSCular PRN    dextrose (D50W) injection syrg 12.5-25 g  25-50 mL IntraVENous PRN    insulin lispro (HUMALOG) injection   SubCUTAneous AC&HS    lisinopriL (PRINIVIL, ZESTRIL) tablet 20 mg  20 mg Oral DAILY    sodium zirconium cyclosilicate (LOKELMA) powder packet 10 g  10 g Oral DAILY    cefepime (MAXIPIME) 1 g in 0.9% sodium chloride (MBP/ADV) 50 mL MBP  1 g IntraVENous Q24H    0.9% sodium chloride infusion 250 mL  250 mL IntraVENous PRN    phenol throat spray (CHLORASEPTIC) 1 Spray  1 Spray Oral PRN    nitroglycerin (NITROSTAT) tablet 0.4 mg  0.4 mg SubLINGual PRN    ferrous sulfate tablet 324 mg  324 mg Oral DAILY    prednisoLONE acetate (PRED FORTE) 1 % ophthalmic suspension 1 Drop  1 Drop Both Eyes QID    ondansetron (ZOFRAN ODT) tablet 8 mg  8 mg Oral Q8H PRN    [Held by provider] apixaban (ELIQUIS) tablet 2.5 mg  2.5 mg Oral BID    levothyroxine (SYNTHROID) tablet 25 mcg  25 mcg Oral ACB    ezetimibe (ZETIA) tablet 10 mg  10 mg Oral DAILY    carbidopa-levodopa (SINEMET)  mg per tablet 1 Tab  1 Tab Oral QID    sodium chloride (NS) flush 5-40 mL  5-40 mL IntraVENous Q8H    sodium chloride (NS) flush 5-40 mL  5-40 mL IntraVENous PRN    acetaminophen (TYLENOL) tablet 650 mg  650 mg Oral Q6H PRN    Or    acetaminophen (TYLENOL) suppository 650 mg  650 mg Rectal Q6H PRN    polyethylene glycol (MIRALAX) packet 17 g  17 g Oral DAILY PRN    promethazine (PHENERGAN) tablet 12.5 mg  12.5 mg Oral Q6H PRN    Or    ondansetron (ZOFRAN) injection 4 mg  4 mg IntraVENous Q6H PRN    hydrALAZINE (APRESOLINE) tablet 25 mg  25 mg Oral Q4H PRN    insulin glargine (LANTUS) injection 67 Units  67 Units SubCUTAneous QHS       Review of Systems:  ROS was obtained, with pertinent positives as listed above. No chest pain or SOB. Diet:      Objective:   Vitals:  Visit Vitals  BP (!) 159/76   Pulse 63   Temp 97.7 °F (36.5 °C)   Resp 21   Ht 5' 9\" (1.753 m)   Wt 124.5 kg (274 lb 7.6 oz)   SpO2 98%   BMI 40.53 kg/m²     Intake/Output:  12/26 0701 - 12/26 1900  In: -   Out: 400 [Urine:400]  12/24 1901 - 12/26 0700  In: 640 [P.O.:640]  Out: 2550 [Urine:2550]  Exam:  General appearance: alert, cooperative, no distress  ENT: Wearing O2 NC  Lungs: Wheezes throughout, diminished in bases bilaterally. Heart: regular rate and rhythm  Abdomen: soft, non-tender. Bowel sounds normal. No masses, no organomegaly  Extremities: extremities normal, atraumatic, no cyanosis or edema. Left foot with bandage in place.    Neuro:  alert and oriented    Data Review (Labs):    Recent Labs     12/26/20  0521 12/25/20  0501 12/24/20  0524   WBC 8.8 9.4 8.8   HGB 8.2* 7.8* 7.5*   HCT 26.9* 25.5* 24.5*    246 232   .7* 100.4* 101.2*    142 140   K 5.2* 5.2* 5.3*   * 113* 114*   CO2 21 22 22   BUN 95* 107* 113*   CREA 2.95* 3.03* 3.56*   CA 9.2 8.9 8.8   GLU 93 54* 53*       Assessment:     Principal Problem:    Acute on chronic respiratory failure with hypoxia (HCC) (12/17/2020)    Active Problems:    HTN (hypertension) (11/10/2011)      AF (atrial fibrillation) (HCC) (11/20/2011)      Sepsis ([address] (5/12/2016)      Bradycardia (5/17/2016)      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 ([facility] (11/15/2017)      SARAH (obstructive sleep apnea) (5/24/2018)      Parkinson disease ([facility] (6/19/2019)      Volume overload (7/8/2019)      Anemia of chronic kidney failure, stage 4 (severe) ([facility] (7/15/2019)      Hypertensive emergency (12/17/2020)      Acute respiratory distress (12/20/2020)     [de-identified] y.o. female admitted from her wound care appt with hypoxia. She was admitted by hospitalist service for acute respiratory failure and CHF exacerbation. She has hx of a-fib on anticoagulation, CAD, CKD, DM. Urine culture growing >100K GNR and mixed skin roxi who we are reconsulted due to NADEGE and improved respiratory status. She is iron deficient. B12 and folate are WNL. Patient has stage 4 CKD with evidence of acute on chronic renal failure. Anemia could be multifactorial given CKD. HGB has been stable since transfusion. She is reluctant to undergo EGD at this time. She has not had any overt clinical bleeding since admission and denies any prior to admission. Plan:     Continue with PPI BID  Start back on Anticoagulation. If she bleeds, then will pursue EGD inpatient. Otherwise, plan for outpatient follow-up with possible EGD. She is reluctant to have an EGD at this time as she is recovering from respiratory illness. Follow HGB and transfuse as needed      Jose L Bonner NP      Patient is seen and examined in collaboration with Dr. Sammi Hill. Assessment and plan as per Dr. Sammi Hill.

## 2020-12-26 NOTE — ROUTINE PROCESS
END OF SHIFT NOTE:    INTAKE/OUTPUT  12/24 0701 - 12/25 0700  In: 640 [P.O.:640]  Out: 1000 [Urine:1000]  Voiding: YES  Catheter: YES; External cath  Drain:   External Female Catheter 12/24/20 (Active)   Site Assessment Clean, dry, & intact 12/25/20 1540   Repositioned Yes 12/25/20 1540   Perineal Care Yes 12/25/20 1540   Wick Changed Yes 12/25/20 1540   Suction Canister/Tubing Changed Yes 12/25/20 1540   Urine Output (mL) 750 ml 12/25/20 1540       Flatus: Patient does have flatus present. Stool:  0 occurrences. Characteristics:       Emesis: 0 occurrences. Characteristics:        VITAL SIGNS  Patient Vitals for the past 12 hrs:   Temp Pulse Resp BP SpO2   12/25/20 1922 97.9 °F (36.6 °C) 65 20 (!) 171/74 96 %   12/25/20 1902 98.6 °F (37 °C) 62 20 (!) 188/65 96 %   12/25/20 1540 98.2 °F (36.8 °C) 63 20 (!) 171/55 96 %   12/25/20 1200 -- (!) 59 -- (!) 182/64 --   12/25/20 1145 97.5 °F (36.4 °C) 66 20 (!) 195/79 96 %       Pain Assessment  Pain Intensity 1: 8 (12/25/20 1530)  Pain Location 1: Foot  Pain Intervention(s) 1: Medication (see MAR)  Patient Stated Pain Goal: 0    Ambulating  No    Shift report given to oncoming nurse at the bedside.     Adebayo Jalloh RN

## 2020-12-26 NOTE — PROGRESS NOTES
Progress Note    Patient: Keegan Vanessa MRN: [id number]  SSN: [id number]    YOB: 1940  Age: [de-identified] y.o. Sex: female      Admit Date: 12/16/2020    LOS: 10 days     Subjective:   [de-identified] yo F with PMH extensive PMH including afib on anticoagulation, HFrEF, HTN, CAD, DM, CKD who presented from wound care appointment with hypoxia. Patient seen and examined at bedside. This morning feeling weak. Denies cough, no chest pain, no abdominal pain, no nausea or vomiting.      Objective:     Vitals:    12/25/20 2330 12/26/20 0447 12/26/20 0548 12/26/20 0751   BP: (!) 162/75 (!) 165/64  (!) 159/76   Pulse: 64 (!) 56  63   Resp: 20 20 21   Temp: 97.5 °F (36.4 °C) 97.7 °F (36.5 °C)  97.7 °F (36.5 °C)   SpO2: 96% 95%  98%   Weight:   124.5 kg (274 lb 7.6 oz)    Height:            Intake and Output:  Current Shift: 12/26 0701 - 12/26 1900  In: -   Out: 400 [Urine:400]  Last three shifts: 12/24 1901 - 12/26 0700  In: 640 [P.O.:640]  Out: 2550 [Urine:2550]    ROS  10 ROS negative except from stated on subjective    Physical Exam:   General: Alert, oriented, NAD  HEENT: NC/AT, EOM are intact  Neck: supple, no JVD  Cardiovascular: RRR, S1, S2, no murmurs  Respiratory: Decrease breath sounds, no wheezes  Abdomen: Soft, NT, ND  Back: No CVA tenderness, no paraspinal tenderness  Extremities: LE without pedal edema, no erythema  Neuro: A&O, CN are intact, no focal deficits  Skin: no rash or ulcers  Psych: good mood and affect    Lab/Data Review:  I have personally reviewed patients laboratory data showing  Recent Results (from the past 24 hour(s))   GLUCOSE, POC    Collection Time: 12/25/20  3:39 PM   Result Value Ref Range    Glucose (POC) 191 (H) 65 - 100 mg/dL   GLUCOSE, POC    Collection Time: 12/25/20  8:51 PM   Result Value Ref Range    Glucose (POC) 171 (H) 65 - 717 mg/dL   METABOLIC PANEL, BASIC    Collection Time: 12/26/20  5:21 AM   Result Value Ref Range    Sodium 142 136 - 145 mmol/L    Potassium 5.2 (H) 3.5 - 5.1 mmol/L    Chloride 113 (H) 98 - 107 mmol/L    CO2 21 21 - 32 mmol/L    Anion gap 8 7 - 16 mmol/L    Glucose 93 65 - 100 mg/dL    BUN 95 (H) 8 - 23 MG/DL    Creatinine 2.95 (H) 0.6 - 1.0 MG/DL    GFR est AA 20 (L) >60 ml/min/1.73m2    GFR est non-AA 16 (L) >60 ml/min/1.73m2    Calcium 9.2 8.3 - 10.4 MG/DL   CBC W/O DIFF    Collection Time: 12/26/20  5:21 AM   Result Value Ref Range    WBC 8.8 4.3 - 11.1 K/uL    RBC 2.57 (L) 4.05 - 5.2 M/uL    HGB 8.2 (L) 11.7 - 15.4 g/dL    HCT 26.9 (L) 35.8 - 46.3 %    .7 (H) 79.6 - 97.8 FL    MCH 31.9 26.1 - 32.9 PG    MCHC 30.5 (L) 31.4 - 35.0 g/dL    RDW 14.6 11.9 - 14.6 %    PLATELET 079 788 - 547 K/uL    MPV 11.8 9.4 - 12.3 FL    ABSOLUTE NRBC 0.00 0.0 - 0.2 K/uL   GLUCOSE, POC    Collection Time: 12/26/20  7:53 AM   Result Value Ref Range    Glucose (POC) 96 65 - 100 mg/dL        Image:  I have personally reviewed patients imaging showing  XR CHEST SNGL V   Final Result   Impression: No significant change. DUPLEX LOWER EXT VENOUS BILAT   Final Result   IMPRESSION:   1. No evidence of deep venous thrombosis in either lower extremity. CT CHEST ABD PELV WO CONT   Final Result   IMPRESSION:    1) Small bilateral pleural effusions, coronary atherosclerosis and anemia. 2) Stable cyst left lobe liver, stable mild low lipoma right adrenal, stable   nodule left adrenal, status post cholecystectomy and right renal stones, sigmoid   diverticulosis. 3) New irregular 4.1 x 2.2 cm attenuating focus posterior left kidney suspicious   for neoplasm. XR CHEST SNGL V   Final Result   IMPRESSION: Normal chest.      US RETROPERITONEUM COMP   Final Result   IMPRESSION:   No hydronephrosis. Borderline renal echogenicity. 3.6 cm enlarging   mass left kidney, complex cyst versus neoplasm. MRI follow-up should be   considered. XR CHEST SNGL V   Final Result   IMPRESSION:  Cardiomegaly without acute abnormality.                 XR CHEST SNGL V Final Result   IMPRESSION:      1. Cardiomegaly and vascular congestion, similar to prior exam.      XR CHEST PORT   Final Result   Impression: Enlarged cardiac silhouette with grossly clear lungs. US ABD LTD    (Results Pending)        Hospital problems     Principal Problem:    Acute on chronic respiratory failure with hypoxia ([facility] (12/17/2020)    Active Problems:    HTN (hypertension) (11/10/2011)      AF (atrial fibrillation) (HCC) (11/20/2011)      Sepsis ([address] (5/12/2016)      Bradycardia (5/17/2016)      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 ([address] (11/15/2017)      SARAH (obstructive sleep apnea) (5/24/2018)      Parkinson disease ([address] (6/19/2019)      Volume overload (7/8/2019)      Anemia of chronic kidney failure, stage 4 (severe) ([facility] (7/15/2019)      Hypertensive emergency (12/17/2020)      Acute respiratory distress (12/20/2020)        Assessment and Plan:   [de-identified] yo F with PMH extensive PMH including afib on anticoagulation, HFrEF, HTN, CAD, DM, CKD who presented from wound care appointment with hypoxia. 1. Acute on chronic hypoxic respiratory failure (bl 3LNC) 2/2  acute decompensated HFrEF  - BNP 9332 on admission  - TTE in 7/2019 with EF 30-35%  - Repeated TTE shows improved EF>55%    - O2 therapy to maintain O2 Sat>92%  - Strict I/Os  - Daily weights  - Resume lasix  - SARS CoV 2 PCR negative  - BL LE US negative for DVT   - Cardiology recs appreciated      2. Concerning of sepsis due to LE cellulitis vs UTI  - Continue cefepime   - Follow BCx - NGTD  - Follow UCx - E Coli  - Monitor CBC    3. Radiologic findings concerning of renal neoplasm  - Oncology recs appreciated   - Urology recs appreciated     4. Iron deficiency Anemia  - Iron studies c/w NADEGE  - Continue ferrous sulfate   - S/p 1U PRBC   - Per GI no EGD for now and ok to restart [address]     5. Bradycardia / Hx Afib  - Telemetry  - Cardiology recs appreciated  - Resume Eliquis     6.  ANTONIO on CKD Stage 4, seems to be back to baseline  - Monitor renal function  - Nephrology recs appreciated     7. HTN  - Hold coreg due to bradycardia  - Continue amlodipine, lisinopril  - PO hydralazine PRN     8. BLE wounds  - Wound Care recs appreciated     9. CAD/PAD  - Resume ASA   - Cardiology recs appreciated    10. DM2  - ISS  - BS ACHS    11. Parkinson's disease  - Continue sinemet    12. SARAH  - Pt does not wear CPAP nightly, only oxygen     DVT Prophylaxis: SCD's for now     I have reviewed, updated, and verified this note's content and spent 38 minutes of my 42 minutes visit performing counseling and coordination of care regarding medical management.      Signed By: Dom Patton MD     December 26, 2020

## 2020-12-26 NOTE — PROGRESS NOTES
END OF SHIFT NOTE:    INTAKE/OUTPUT  12/25 0701 - 12/26 0700  In: -   Out: 1750 [DJNYS:1027]  Voiding: YES  Catheter: NO  Drain:   External Female Catheter 12/24/20 (Active)   Site Assessment Clean, dry, & intact 12/25/20 1540   Repositioned Yes 12/25/20 1540   Perineal Care Yes 12/25/20 1540   Wick Changed Yes 12/25/20 1540   Suction Canister/Tubing Changed Yes 12/25/20 1540   Urine Output (mL) 400 ml 12/26/20 0447               Flatus: Patient does have flatus present. Stool:  0 occurrences. Characteristics:       Emesis: 0 occurrences. Characteristics:        VITAL SIGNS  Patient Vitals for the past 12 hrs:   Temp Pulse Resp BP SpO2   12/26/20 0447 97.7 °F (36.5 °C) (!) 56 20 (!) 165/64 95 %   12/25/20 2330 97.5 °F (36.4 °C) 64 20 (!) 162/75 96 %   12/25/20 2040 -- -- -- -- 97 %   12/25/20 1922 97.9 °F (36.6 °C) 65 20 (!) 171/74 96 %   12/25/20 1902 98.6 °F (37 °C) 62 20 (!) 188/65 96 %       Pain Assessment  Pain Intensity 1: 8 (12/25/20 1530)  Pain Location 1: Foot  Pain Intervention(s) 1: Medication (see MAR)  Patient Stated Pain Goal: 0    Ambulating  No    Shift report given to oncoming nurse at the bedside.     Leah Colon RN

## 2020-12-26 NOTE — PROGRESS NOTES
[facility]        Subjective: CKD   Resting comfortably in bed    Review of Systems -   General ROS: negative for - fever, chills  Respiratory ROS: no SOB, cough, WARREN  Cardiovascular ROS: no CP, palpitations  Gastrointestinal ROS: no N&V, abdominal pain, diarrhea  Genito-Urinary ROS: no difficulty voiding, dysuria  Neurological ROS: no seizures, focal weekness        Objective:    Vitals:    12/25/20 2330 12/26/20 0447 12/26/20 0548 12/26/20 0751   BP: (!) 162/75 (!) 165/64  (!) 159/76   Pulse: 64 (!) 56  63   Resp: 20 20 21   Temp: 97.5 °F (36.4 °C) 97.7 °F (36.5 °C)  97.7 °F (36.5 °C)   SpO2: 96% 95%  98%   Weight:   124.5 kg (274 lb 7.6 oz)    Height:           PE  Gen: in no acute distress  CV:reg rate  Chest:clear  Abd: soft  Ext/Access: no edema       . LAB  Recent Labs     12/26/20  0521 12/25/20  0501 12/24/20  0524   WBC 8.8 9.4 8.8   HGB 8.2* 7.8* 7.5*   HCT 26.9* 25.5* 24.5*    246 232     Recent Labs     12/26/20  0521 12/25/20  0501 12/24/20  0524    142 140   K 5.2* 5.2* 5.3*   * 113* 114*   CO2 21 22 22   GLU 93 54* 53*   BUN 95* 107* 113*   CREA 2.95* 3.03* 3.56*   CA 9.2 8.9 8.8           Radiology    A/P:   Patient Active Problem List   Diagnosis Code    HTN (hypertension) I10    AF (atrial fibrillation) ([facility]) I48.91    PAD (peripheral artery disease) ([facility]) I73.9    Diabetic neuropathy ([facility]) E11.40    Sepsis ([facility]) A41.9    Bradycardia R00.1    A-V fistula ([facility]) I77.0    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 difficile diarrhea A04.72    PAF (paroxysmal atrial fibrillation) ([facility]) I48.0    Elevated troponin R77.8    Chest pain R07.9    CAD (coronary artery disease) I25.10    Volume overload E87.70    Demand ischemia (HCC) I24.8    Meningioma (HCC) D32.9    Acquired hypothyroidism E03.9    Acute renal insufficiency N28.9    Anemia of chronic kidney failure, stage 4 (severe) ([facility]) N18.4, D63.1    Chronic systolic heart failure ([facility]) I50.22    Decreased hemoglobin R71.0    Depression F32.9    History of UTI Z87.440    Postural imbalance R29.3    Hypertensive emergency I16.1    Acute on chronic respiratory failure with hypoxia ([facility]) J96.21    Acute respiratory distress R06.03     A on CKD -mild improvement in renal function seen   UA - No active sediments   UPCR 0.5  US kidney - 3.6 cm enlarging mass/complex cyst  seen on the upper pole of left kidney -reviewed urology recommendation     S/p Left ureteral stent placement for left ureteral obs/UTI /pyelonephritis / Left ureteral stone in 2016 by Dr Areli Santacruz         Hyperkalemia- started on lokelma     Anemia    DM    CHF    COVID - negative.       Keyshawn Amato MD

## 2020-12-27 NOTE — PROGRESS NOTES
[facility]        Subjective: CKD   Resting comfortably in bed    Review of Systems -   General ROS: negative for - fever, chills  Respiratory ROS: no SOB, cough, WARREN  Cardiovascular ROS: no CP, palpitations  Gastrointestinal ROS: no N&V, abdominal pain, diarrhea  Genito-Urinary ROS: no difficulty voiding, dysuria  Neurological ROS: no seizures, focal weekness        Objective:    Vitals:    12/26/20 2321 12/27/20 0351 12/27/20 0643 12/27/20 0731   BP: (!) 170/82 (!) 172/63  (!) 178/49   Pulse: 67 62  64   Resp: 19 19 20   Temp: 97.6 °F (36.4 °C) 98 °F (36.7 °C)  98.5 °F (36.9 °C)   SpO2: 95% 96%  96%   Weight:   124.5 kg (274 lb 6.9 oz)    Height:           PE  Gen: in no acute distress  CV:reg rate  Chest:clear  Abd: soft  Ext/Access: no edema       . LAB  Recent Labs     12/27/20  0607 12/26/20  0521 12/25/20  0501   WBC 11.4* 8.8 9.4   HGB 8.0* 8.2* 7.8*   HCT 26.4* 26.9* 25.5*    253 246     Recent Labs     12/27/20  0607 12/26/20  0521 12/25/20  0501    142 142   K 4.8 5.2* 5.2*   * 113* 113*   CO2 22 21 22   GLU 62* 93 54*   BUN 88* 95* 107*   CREA 2.76* 2.95* 3.03*   CA 9.3 9.2 8.9           Radiology    A/P:   Patient Active Problem List   Diagnosis Code    HTN (hypertension) I10    AF (atrial fibrillation) ([facility]) I48.91    PAD (peripheral artery disease) ([facility]) I73.9    Diabetic neuropathy ([facility]) E11.40    Sepsis ([facility]) A41.9    Bradycardia R00.1    A-V fistula ([facility]) I77.0    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 ([facility] [address] E11.21    Encounter for medication management Z79.899    SARAH (obstructive sleep apnea) G47.33    CKD (chronic kidney disease) stage 5, GFR less than 15 ml/min (HCC) N18.5    Parkinson disease (HCC) G20    Acute on chronic renal failure (HCC) N17.9, N18.9    Acute cystitis with hematuria N30.01    Morbid obesity (HCC) E66.01    Hypokalemia E87.6    C. difficile diarrhea A04.72    PAF (paroxysmal atrial fibrillation) ([facility]) I48.0  Elevated troponin R77.8    Chest pain R07.9    CAD (coronary artery disease) I25.10    Volume overload E87.70    Demand ischemia (HCC) I24.8    Meningioma (HCC) D32.9    Acquired hypothyroidism E03.9    Acute renal insufficiency N28.9    Anemia of chronic kidney failure, stage 4 (severe) (HCC) N18.4, D63.1    Chronic systolic heart failure (HCC) I50.22    Decreased hemoglobin R71.0    Depression F32.9    History of UTI Z87.440    Postural imbalance R29.3    Hypertensive emergency I16.1    Acute on chronic respiratory failure with hypoxia ([facility]) J96.21    Acute respiratory distress R06.03     A on CKD -mild improvement in renal function seen   UA - No active sediments   UPCR 0.5  US kidney - 3.6 cm enlarging mass/complex cyst  seen on the upper pole of left kidney -reviewed urology recommendation     S/p Left ureteral stent placement for left ureteral obs/UTI /pyelonephritis / Left ureteral stone in 2016 by Dr Areli Maher         Hyperkalemia- started on lokelma     Anemia    DM    CHF    COVID - negative. Stable renal function , will sign off , will f/w in office .  Please call us if needed       Мария Parsons MD

## 2020-12-27 NOTE — PROGRESS NOTES
Patient is up for discharge today. Patient will be getting discharged home with [facility]. Referral already sent previously. CM faxed d/c summary to [facility]. No other needs noted at this time.   Care Management Interventions  Transition of Care Consult (CM Consult): Home Health  Discharge Durable Medical Equipment: No  Physical Therapy Consult: No  Occupational Therapy Consult: Yes  Speech Therapy Consult: No  Discharge Location  Discharge Placement: Home with home health(Resume [facility])

## 2020-12-27 NOTE — PROGRESS NOTES
END OF SHIFT NOTE:    INTAKE/OUTPUT  12/25 0701 - 12/26 0700  In: -   Out: 1750 [[facility]:5951]  Voiding: YES  Catheter: YES; External cath  Drain:   External Female Catheter 12/24/20 (Active)   Site Assessment Clean, dry, & intact 12/26/20 1240   Repositioned Yes 12/26/20 1044   Perineal Care Yes 12/26/20 1044   Wick Changed Yes 12/26/20 1044   Suction Canister/Tubing Changed Yes 12/26/20 1044   Urine Output (mL) 400 ml 12/26/20 1044     Flatus: Patient does have flatus present. Stool:  0 occurrences. Characteristics:       Emesis: 0 occurrences. Characteristics:        VITAL SIGNS  Patient Vitals for the past 12 hrs:   Temp Pulse Resp BP SpO2   12/26/20 1650 98.4 °F (36.9 °C) 70 20 (!) 191/76 97 %   12/26/20 1159 98 °F (36.7 °C) 65 20 (!) 199/74 96 %   12/26/20 0751 97.7 °F (36.5 °C) 63 21 (!) 159/76 98 %       Pain Assessment  Pain Intensity 1: 0 (12/26/20 1240)  Pain Location 1: Foot  Pain Intervention(s) 1: Medication (see MAR)  Patient Stated Pain Goal: 0    Ambulating  Yes; up in chair today. Shift report given to oncoming nurse at the bedside.     Kwasi Tesfaye RN

## 2020-12-27 NOTE — DISCHARGE SUMMARY
Date of Admission: 12/16/2020  Date of Discharge: 12/27/2020    Discharge Diagnoses:  1. Acute on chronic hypoxic respiratory failure (bl 3LNC) 2/2 acute decompensated HFrEF  2. Concerning of sepsis due to LE cellulitis vs UTI    Discharge Medications:  Discharge Medication List as of 12/27/2020 11:35 AM      START taking these medications    Details   amLODIPine (NORVASC) 5 mg tablet Take 1 Tab by mouth daily. , Normal, Disp-30 Tab, R-0      pantoprazole (PROTONIX) 40 mg tablet Take 1 Tab by mouth Before breakfast and dinner., Normal, Disp-60 Tab, R-0      amoxicillin-clavulanate (AUGMENTIN) 500-125 mg per tablet Take 1 Tab by mouth every twelve (12) hours for 4 days. , Normal, Disp-8 Tab, R-0      doxycycline (ADOXA) 100 mg tablet Take 1 Tab by mouth two (2) times a day for 4 days. , Normal, Disp-8 Tab, R-0         CONTINUE these medications which have CHANGED    Details   insulin NPH/insulin regular (HumuLIN 70/30 U-100 Insulin) 100 unit/mL (70-30) injection 25 units before breakfast, 25 units before supper, No Print, Disp-60 mL, R-0         CONTINUE these medications which have NOT CHANGED    Details   carbidopa-levodopa (SINEMET)  mg per tablet Take 1 Tab by mouth four (4) times daily. Indications: a type of movement disorder called parkinsonism, Normal, Disp-360 Tab,R-3      betamethasone dipropionate (DIPROSONE) 0.05 % topical cream MAURA EXT AA ON THE BODY BID, Historical Med      ezetimibe (ZETIA) 10 mg tablet Take 1 Tab by mouth daily. , Normal, Disp-90 Tab,R-3      benazepriL (LOTENSIN) 20 mg tablet Take 1 Tab by mouth daily. , Normal, Disp-90 Tab, R-3      levothyroxine (SYNTHROID) 25 mcg tablet Take 1 Tab by mouth Daily (before breakfast). , Normal, Disp-90 Tab, R-3      carvediloL (COREG) 12.5 mg tablet Take 2 Tabs by mouth two (2) times daily (with meals). , Normal, Disp-180 Tab, R-3**Patient requests 90 days supply**      furosemide (LASIX) 40 mg tablet Take 2 Tabs by mouth two (2) times a day. , Normal, Disp-180 Tab, R-3      nystatin (MYCOSTATIN) topical cream APPLY TO AFFECTED AREA TWICE DAILY. MIX WITH TRIAMCINOLONE CREAM PRIOR TO APPLICATION, Historical Med, R-2      Insulin Syringe-Needle U-100 (BD INSULIN SYRINGE ULTRA-FINE) 1 mL 31 gauge x 5/16 syrg DX E 10.65  Inject insulin tid, Normal, Disp-300 Syringe, R-5      apixaban (ELIQUIS) 2.5 mg tablet Take 2.5 mg by mouth two (2) times a day., Historical Med      nitroglycerin (NITROSTAT) 0.4 mg SL tablet 1 Tab by SubLINGual route as needed for Chest Pain. Up to 3 doses. , No Print, Disp-30 Tab, R-3      ondansetron hcl (ZOFRAN) 8 mg tablet Take 1 Tab by mouth every eight (8) hours as needed for Nausea., Normal, Disp-20 Tab, R-0      potassium chloride (K-DUR, KLOR-CON) 20 mEq tablet Take 1 Tab by mouth daily as needed (Only on days you take metolazone). Indications: prevention of low potassium in the blood, Print, Disp-30 Tab, R-0      ergocalciferol (VITAMIN D2) 50,000 unit capsule Take 1 Cap by mouth every seven (7) days. , Normal, Disp-12 Cap, R-3      prednisoLONE acetate (PRED FORTE) 1 % ophthalmic suspension Administer 1 Drop to both eyes four (4) times daily. , Historical Med      terbinafine HCl (LAMISIL AT) 1 % topical cream Apply  to affected area two (2) times a day., Normal, Disp-30 g, R-2      Lactobacillus acidophilus (ACIDOPHILUS) cap Take 2 Caps by mouth two (2) times a day., Historical Med      OXYGEN-AIR DELIVERY SYSTEMS 3 L by Nasal route continuous. , Historical Med      glucose blood VI test strips (ACCU-CHEK GUIDE) strip Check BS TID. DX E11.9, Normal, Disp-300 Strip, R-3      Lancets (ACCU-CHEK FASTCLIX) misc Check bs TID. DX E11.9, Normal, Disp-300 Each, R-3      cyanocobalamin, vitamin B-12, 1,000 mcg/mL kit 1,000 mcg by Injection route every month. on the 1st, Historical Med      ferrous sulfate 325 mg (65 mg iron) tablet Take 324 mg by mouth daily. , Historical Med      triamcinolone acetonide (KENALOG) 0.1 % topical cream Apply to affected area two (2) times a day., Historical Med, R-0              Pending Labs:  None    Follow-up (including scheduled tests): Follow-up Information     Follow up With Specialties Details Why Jefrey Primrose Dr. Beverlyn Auerbach [address] [facility] [id number] [phone]    Dr. Corrine Lim  On 1/19/2021 at 10:30AM. Galen Perdue MD    [facility], [address] will contact you post discharge. Yecenia Garcia MD Internal Medicine  One Week please call Monday 12/28/20 to arrange appointment in 1 week following hospital stay. [facility] [address]  [phone]             History of Present Illness:  [de-identified] yoF with PMH extensive PMH including afib on anticoagulation, sCHF, HTN, CAD, DM, CKD who presents from wound care appointment with hypoxia. Patient was found to be dyspneic; EMS was called and patient was placed on CPAP/BiPAP given respiratory distress. Upon arrival to ER, patient was also found to be significantly hypertensive and given topical NG. Patient was weaned off of BiPAP and now is on 3L/m of supplemental O2 (which is her baseline). Patient was found to have BLE edema and elevated pBNP concerning for CHF exacerbation.     Past Medical History:  Past Medical History:   Diagnosis Date    Adverse effect of anesthesia     difficult awakening    AF (atrial fibrillation) ([facility] [address] 11/20/2011    Arthritis 11/18/2011    generalized     CAD (coronary artery disease) 2011    CABG x 4    Cervical disc disease     Steroid injections    Chronic kidney disease     Chronic pain     back    DJD (degenerative joint disease)     Drainage from wound 10/3/2014    Full dentures     Gout     managed with medication     Hemorrhoid 11/5/2013    [place] (hard of hearing)     Hyperlipemia 11/10/2011    managed with medication     Hypertension     controlled with meds    Hypertriglyceridemia     managed with medication     Hypothyroidism     managed with medication     MGUS (monoclonal gammopathy of unknown significance) 12/1/2017    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    Neuropathy     legs and arms, managed with medication     Obesity (BMI 30-39.9) 10/2/14    BMI 36.3    PAD (peripheral artery disease) ([facility] [address]     PCI (pneumatosis cystoides intestinalis) 11/5/2013    Pleural effusion, bilateral 11/21/2011    Postoperative anemia due to acute blood loss 11/21/2011    expected     Renal mass 5/14/2016    Rib cage fracture 11/5/2013    x 2     S/P CABG (coronary artery bypass graft) 11/05/2013    CABG x 4    S/P CABG x 3 11/18/2011    Stasis edema of both lower extremities 1/3/2018    Status post-operative repair of hip fracture 09/15/2014    left side, repair with hardware    Stroke [facility])     left sided weakness residual     Type II or unspecified type diabetes mellitus without mention of complication, uncontrolled ([facility] [address] 12/16/2013    oral and insulin reliant/ Avg / low BS s/s/ @ 70/ last A1c 8.3    Unspecified adverse effect of anesthesia     difficult to arouse after general anesthesia       Allergies: Allergies   Allergen Reactions    Baclofen Other (comments)     Psychosis and altered mental status    Lipitor [Atorvastatin] Myalgia       Hospital Course:  [de-identified] yo F with PMH extensive PMH including afib on anticoagulation, HFrEF, HTN, CAD, DM, CKD who presented from wound care appointment with hypoxia.     1. Acute on chronic hypoxic respiratory failure (bl 3LNC) 2/2  acute decompensated HFrEF  - WKF 7261 on admission  - TTE in 7/2019 with EF 30-35%  - Repeated TTE shows improved EF>55%    - O2 therapy to maintain O2 Sat>92%  - Started lasix  - SARS CoV 2 PCR negative  - BL LE US negative for DVT   - O2 baseline on discharge      2. Concerning of sepsis due to LE cellulitis vs UTI  - S/p cefepime   - Follow BCx - NGTD  - Follow UCx - E Coli  - Discharge on augmentin and doxy     3. Radiologic findings concerning of renal neoplasm  - Oncology consulted  - Urology consulted  - Outpatient follow up     4. Iron deficiency Anemia  - Iron studies c/w NADEGE  - Continue ferrous sulfate   - S/p 1U PRBC   - Per GI no EGD for now and ok to restart [address]     5. Bradycardia / Hx Afib  - Telemetry  - Cardiology consulted  - Resumed Eliquis     6. ANTONIO on CKD Stage 4  - Held lasix  - Nephrology consulted  - Renal function back to baseline  - Outpatient follow up     8.  BLE wounds  - Wound Care   - Outpatient follow up     Procedures:  None    Discharge Day Information:  Follow with PMD    Discharge Physical Exam:  General: Alert, oriented, NAD  HEENT: NC/AT, EOM are intact  Neck: supple, no JVD  Cardiovascular: RRR, S1, S2, no murmurs  Respiratory: Lungs are clear, no wheezes or rales  Abdomen: Soft, NT, ND  Back: No CVA tenderness, no paraspinal tenderness  Extremities: LE without pedal edema, no erythema  Neuro: A&O, CN are intact, no focal deficits  Skin: no rash or ulcers  Psych: good mood and affect    Condition: Improved    Disposition: Home with services    Consultants During This Hospitalization: GI, cardiology, nephrology

## 2020-12-27 NOTE — DISCHARGE INSTRUCTIONS
If fever, shortness of breath, confusion, worsening foot pain, return to the ED    Follow with wound care physician    Follow closely with primary doctor, cardiologist and nephrologist         Heart Failure: Care Instructions  Your Care Instructions     Heart failure occurs when your heart does not pump as much blood as the body needs. Failure does not mean that the heart has stopped pumping but rather that it is not pumping as well as it should. Over time, this causes fluid buildup in your lungs and other parts of your body. Fluid buildup can cause shortness of breath, fatigue, swollen ankles, and other problems. By taking medicines regularly, reducing sodium (salt) in your diet, checking your weight every day, and making lifestyle changes, you can feel better and live longer. Follow-up care is a key part of your treatment and safety. Be sure to make and go to all appointments, and call your doctor if you are having problems. It's also a good idea to know your test results and keep a list of the medicines you take. How can you care for yourself at home? Medicines    · Be safe with medicines. Take your medicines exactly as prescribed. Call your doctor if you think you are having a problem with your medicine.     · Do not take any vitamins, over-the-counter medicine, or herbal products without talking to your doctor first. [place] not take ibuprofen (Advil or Motrin) and naproxen (Aleve) without talking to your doctor first. They could make your heart failure worse.     · You may take some of the following medicine. ? Angiotensin-converting enzyme inhibitors (ACEIs) or angiotensin II receptor blockers (ARBs) reduce the heart's workload, lower blood pressure, and reduce swelling. Taking an ACEI or ARB may lower your chance of needing to be hospitalized. ? Beta-blockers can slow heart rate, decrease blood pressure, and improve your condition.  Taking a beta-blocker may lower your chance of needing to be hospitalized. ? Diuretics, also called water pills, reduce swelling. You will get more details on the specific medicines your doctor prescribes. Diet    · Your doctor may suggest that you limit sodium. Your doctor can tell you how much sodium is right for you. An example is less than 3,000 mg a day. This includes all the salt you eat in cooking or in packaged foods. People get most of their sodium from processed foods. Fast food and restaurant meals also tend to be very high in sodium.     · Ask your doctor how much liquid you can drink each day. You may have to limit liquids. Weight    · Weigh yourself without clothing at the same time each day. Record your weight. Call your doctor if you have a sudden weight gain, such as more than 2 to 3 pounds in a day or 5 pounds in a week. (Your doctor may suggest a different range of weight gain.) A sudden weight gain may mean that your heart failure is getting worse. Activity level    · Start light exercise (if your doctor says it is okay). Even if you can only do a small amount, exercise will help you get stronger, have more energy, and manage your weight and your stress. Walking is an easy way to get exercise. Start out by walking a little more than you did before. Bit by bit, increase the amount you walk.     · When you exercise, watch for signs that your heart is working too hard. You are pushing yourself too hard if you cannot talk while you are exercising. If you become short of breath or dizzy or have chest pain, stop, sit down, and rest.     · If you feel \"wiped out\" the day after you exercise, walk slower or for a shorter distance until you can work up to a better pace.     · Get enough rest at night. Sleeping with 1 or 2 pillows under your upper body and head may help you breathe easier. Lifestyle changes    · Do not smoke. Smoking can make a heart condition worse. If you need help quitting, talk to your doctor about stop-smoking programs and medicines. These can increase your chances of quitting for good. Quitting smoking may be the most important step you can take to protect your heart.     · Limit alcohol to 2 drinks a day for men and 1 drink a day for women. Too much alcohol can cause health problems.     · Avoid getting sick from colds and the flu. Get a pneumococcal vaccine shot. If you have had one before, ask your doctor whether you need another dose. Get a flu shot each year. If you must be around people with colds or the flu, wash your hands often. When should you call for help? Call 911 if you have symptoms of sudden heart failure such as:    · You have severe trouble breathing.     · You cough up pink, foamy mucus.     · You have a new irregular or rapid heartbeat. Call your doctor now or seek immediate medical care if:    · You have new or increased shortness of breath.     · You are dizzy or lightheaded, or you feel like you may faint.     · You have sudden weight gain, such as more than 2 to 3 pounds in a day or 5 pounds in a week. (Your doctor may suggest a different range of weight gain.)     · You have increased swelling in your legs, ankles, or feet.     · You are suddenly so tired or weak that you cannot do your usual activities. Watch closely for changes in your health, and be sure to contact your doctor if you develop new symptoms. Where can you learn more? Go to http://www.gray.com/  Enter K953 in the search box to learn more about \"Heart Failure: Care Instructions. \"  Current as of: December 16, 2019               Content Version: 12.6  © 8613-2134 [employer]. Care instructions adapted under license by [employer] (which disclaims liability or warranty for this information). If you have questions about a medical condition or this instruction, always ask your healthcare professional. [address] any warranty or liability for your use of this information.

## 2020-12-27 NOTE — PROGRESS NOTES
END OF SHIFT NOTE:    INTAKE/OUTPUT  12/26 0701 - 12/27 0700  In: -   Out: 750 [Urine:750]  Voiding: YES  Catheter: NO  Drain:   External Female Catheter 12/27/20 (Active)   Site Assessment Clean, dry, & intact 12/27/20 0527   Repositioned Yes 12/27/20 0527   Perineal Care Yes 12/27/20 0527   Wick Changed Yes 12/27/20 0527   Suction Canister/Tubing Changed No 12/27/20 0527   Urine Output (mL) 200 ml 12/27/20 0527               Flatus: Patient does have flatus present. Stool:  0 occurrences. Characteristics:       Emesis: 0 occurrences. Characteristics:        VITAL SIGNS  Patient Vitals for the past 12 hrs:   Temp Pulse Resp BP SpO2   12/27/20 0351 98 °F (36.7 °C) 62 19 (!) 172/63 96 %   12/26/20 2321 97.6 °F (36.4 °C) 67 19 (!) 170/82 95 %       Pain Assessment  Pain Intensity 1: 0 (12/26/20 1948)  Pain Location 1: Foot  Pain Intervention(s) 1: Medication (see MAR)  Patient Stated Pain Goal: 0    Ambulating  No    Shift report given to oncoming nurse at the bedside.     Edie Soares RN

## 2021-01-01 ENCOUNTER — APPOINTMENT (OUTPATIENT)
Dept: GENERAL RADIOLOGY | Age: 81
DRG: 177 | End: 2021-01-01
Attending: INTERNAL MEDICINE
Payer: MEDICARE

## 2021-01-01 ENCOUNTER — APPOINTMENT (OUTPATIENT)
Dept: ULTRASOUND IMAGING | Age: 81
DRG: 177 | End: 2021-01-01
Attending: NURSE PRACTITIONER
Payer: MEDICARE

## 2021-01-01 ENCOUNTER — HOSPITAL ENCOUNTER (INPATIENT)
Age: 81
LOS: 13 days | DRG: 177 | End: 2021-01-17
Attending: EMERGENCY MEDICINE | Admitting: INTERNAL MEDICINE
Payer: MEDICARE

## 2021-01-01 ENCOUNTER — APPOINTMENT (OUTPATIENT)
Dept: GENERAL RADIOLOGY | Age: 81
DRG: 177 | End: 2021-01-01
Attending: EMERGENCY MEDICINE
Payer: MEDICARE

## 2021-01-01 VITALS
RESPIRATION RATE: 21 BRPM | DIASTOLIC BLOOD PRESSURE: 53 MMHG | OXYGEN SATURATION: 75 % | TEMPERATURE: 97.6 F | WEIGHT: 237.2 LBS | SYSTOLIC BLOOD PRESSURE: 84 MMHG | BODY MASS INDEX: 35.13 KG/M2 | HEART RATE: 91 BPM | HEIGHT: 69 IN

## 2021-01-01 DIAGNOSIS — I48.0 PAF (PAROXYSMAL ATRIAL FIBRILLATION) (HCC): ICD-10-CM

## 2021-01-01 DIAGNOSIS — J96.01 ACUTE RESPIRATORY FAILURE WITH HYPOXIA (HCC): Primary | ICD-10-CM

## 2021-01-01 DIAGNOSIS — U07.1 PNEUMONIA DUE TO COVID-19 VIRUS: ICD-10-CM

## 2021-01-01 DIAGNOSIS — I48.91 ATRIAL FIBRILLATION, UNSPECIFIED TYPE (HCC): Chronic | ICD-10-CM

## 2021-01-01 DIAGNOSIS — I10 HYPERTENSION, UNSPECIFIED TYPE: Chronic | ICD-10-CM

## 2021-01-01 DIAGNOSIS — E11.21 WELL CONTROLLED TYPE 2 DIABETES MELLITUS WITH NEPHROPATHY (HCC): Chronic | ICD-10-CM

## 2021-01-01 DIAGNOSIS — I15.1 HYPERTENSION SECONDARY TO OTHER RENAL DISORDERS: ICD-10-CM

## 2021-01-01 DIAGNOSIS — N28.89 HYPERTENSION SECONDARY TO OTHER RENAL DISORDERS: ICD-10-CM

## 2021-01-01 DIAGNOSIS — E87.79 OTHER HYPERVOLEMIA: ICD-10-CM

## 2021-01-01 DIAGNOSIS — U07.1 COVID-19 VIRUS DETECTED: ICD-10-CM

## 2021-01-01 DIAGNOSIS — I50.33 ACUTE ON CHRONIC DIASTOLIC CONGESTIVE HEART FAILURE (HCC): ICD-10-CM

## 2021-01-01 DIAGNOSIS — E66.01 MORBID OBESITY (HCC): Chronic | ICD-10-CM

## 2021-01-01 DIAGNOSIS — G47.33 OSA (OBSTRUCTIVE SLEEP APNEA): Chronic | ICD-10-CM

## 2021-01-01 DIAGNOSIS — I50.22 CHRONIC SYSTOLIC HEART FAILURE (HCC): ICD-10-CM

## 2021-01-01 DIAGNOSIS — A04.72 C. DIFFICILE DIARRHEA: ICD-10-CM

## 2021-01-01 DIAGNOSIS — N18.5 CKD (CHRONIC KIDNEY DISEASE) STAGE 5, GFR LESS THAN 15 ML/MIN (HCC): Chronic | ICD-10-CM

## 2021-01-01 DIAGNOSIS — N17.9 ACUTE RENAL FAILURE, UNSPECIFIED ACUTE RENAL FAILURE TYPE (HCC): ICD-10-CM

## 2021-01-01 DIAGNOSIS — J12.82 PNEUMONIA DUE TO COVID-19 VIRUS: ICD-10-CM

## 2021-01-01 DIAGNOSIS — J96.21 ACUTE ON CHRONIC RESPIRATORY FAILURE WITH HYPOXIA (HCC): ICD-10-CM

## 2021-01-01 LAB
ABO + RH BLD: NORMAL
ALBUMIN SERPL-MCNC: 3.2 G/DL (ref 3.2–4.6)
ALBUMIN/GLOB SERPL: 0.6 {RATIO} (ref 1.2–3.5)
ALP SERPL-CCNC: 101 U/L (ref 50–136)
ALT SERPL-CCNC: 22 U/L (ref 12–65)
AMMONIA PLAS-SCNC: 12 UMOL/L (ref 11–32)
ANION GAP SERPL CALC-SCNC: 10 MMOL/L (ref 7–16)
ANION GAP SERPL CALC-SCNC: 10 MMOL/L (ref 7–16)
ANION GAP SERPL CALC-SCNC: 11 MMOL/L (ref 7–16)
ANION GAP SERPL CALC-SCNC: 12 MMOL/L (ref 7–16)
ANION GAP SERPL CALC-SCNC: 13 MMOL/L (ref 7–16)
ANION GAP SERPL CALC-SCNC: 8 MMOL/L (ref 7–16)
ANION GAP SERPL CALC-SCNC: 9 MMOL/L (ref 7–16)
APPEARANCE UR: ABNORMAL
ARTERIAL PATENCY WRIST A: YES
AST SERPL-CCNC: 19 U/L (ref 15–37)
ATRIAL RATE: 65 BPM
BACTERIA SPEC CULT: NORMAL
BACTERIA SPEC CULT: NORMAL
BACTERIA URNS QL MICRO: 0 /HPF
BASE DEFICIT BLD-SCNC: 8 MMOL/L
BASE EXCESS BLD CALC-SCNC: 0 MMOL/L
BASE EXCESS BLD CALC-SCNC: 5 MMOL/L
BASOPHILS # BLD: 0 K/UL (ref 0–0.2)
BASOPHILS NFR BLD: 0 % (ref 0–2)
BDY SITE: ABNORMAL
BILIRUB SERPL-MCNC: 0.7 MG/DL (ref 0.2–1.1)
BILIRUB UR QL: NEGATIVE
BLD PROD TYP BPU: NORMAL
BLOOD GROUP ANTIBODIES SERPL: NORMAL
BNP SERPL-MCNC: ABNORMAL PG/ML
BPU ID: NORMAL
BUN SERPL-MCNC: 101 MG/DL (ref 8–23)
BUN SERPL-MCNC: 105 MG/DL (ref 8–23)
BUN SERPL-MCNC: 113 MG/DL (ref 8–23)
BUN SERPL-MCNC: 116 MG/DL (ref 8–23)
BUN SERPL-MCNC: 126 MG/DL (ref 8–23)
BUN SERPL-MCNC: 126 MG/DL (ref 8–23)
BUN SERPL-MCNC: 134 MG/DL (ref 8–23)
BUN SERPL-MCNC: 144 MG/DL (ref 8–23)
BUN SERPL-MCNC: 65 MG/DL (ref 8–23)
BUN SERPL-MCNC: 81 MG/DL (ref 8–23)
BUN SERPL-MCNC: 87 MG/DL (ref 8–23)
BUN SERPL-MCNC: 88 MG/DL (ref 8–23)
BUN SERPL-MCNC: 94 MG/DL (ref 8–23)
CALCIUM SERPL-MCNC: 8.6 MG/DL (ref 8.3–10.4)
CALCIUM SERPL-MCNC: 8.6 MG/DL (ref 8.3–10.4)
CALCIUM SERPL-MCNC: 8.7 MG/DL (ref 8.3–10.4)
CALCIUM SERPL-MCNC: 8.8 MG/DL (ref 8.3–10.4)
CALCIUM SERPL-MCNC: 8.8 MG/DL (ref 8.3–10.4)
CALCIUM SERPL-MCNC: 8.9 MG/DL (ref 8.3–10.4)
CALCIUM SERPL-MCNC: 9 MG/DL (ref 8.3–10.4)
CALCIUM SERPL-MCNC: 9 MG/DL (ref 8.3–10.4)
CALCIUM SERPL-MCNC: 9.1 MG/DL (ref 8.3–10.4)
CALCIUM SERPL-MCNC: 9.2 MG/DL (ref 8.3–10.4)
CALCIUM SERPL-MCNC: 9.6 MG/DL (ref 8.3–10.4)
CALCULATED R AXIS, ECG10: -26 DEGREES
CALCULATED T AXIS, ECG11: 86 DEGREES
CASTS URNS QL MICRO: 0 /LPF
CHLORIDE SERPL-SCNC: 100 MMOL/L (ref 98–107)
CHLORIDE SERPL-SCNC: 102 MMOL/L (ref 98–107)
CHLORIDE SERPL-SCNC: 103 MMOL/L (ref 98–107)
CHLORIDE SERPL-SCNC: 104 MMOL/L (ref 98–107)
CHLORIDE SERPL-SCNC: 105 MMOL/L (ref 98–107)
CHLORIDE SERPL-SCNC: 105 MMOL/L (ref 98–107)
CHLORIDE SERPL-SCNC: 107 MMOL/L (ref 98–107)
CHLORIDE SERPL-SCNC: 108 MMOL/L (ref 98–107)
CHLORIDE SERPL-SCNC: 109 MMOL/L (ref 98–107)
CHLORIDE SERPL-SCNC: 115 MMOL/L (ref 98–107)
CHLORIDE SERPL-SCNC: 99 MMOL/L (ref 98–107)
CO2 BLD-SCNC: 18 MMOL/L
CO2 BLD-SCNC: 22 MMOL/L
CO2 BLD-SCNC: 29 MMOL/L
CO2 SERPL-SCNC: 19 MMOL/L (ref 21–32)
CO2 SERPL-SCNC: 19 MMOL/L (ref 21–32)
CO2 SERPL-SCNC: 21 MMOL/L (ref 21–32)
CO2 SERPL-SCNC: 22 MMOL/L (ref 21–32)
CO2 SERPL-SCNC: 25 MMOL/L (ref 21–32)
CO2 SERPL-SCNC: 26 MMOL/L (ref 21–32)
CO2 SERPL-SCNC: 27 MMOL/L (ref 21–32)
COLLECT TIME,HTIME: 1102
COLLECT TIME,HTIME: 2259
COLLECT TIME,HTIME: 830
COLOR UR: YELLOW
COVID-19 RAPID TEST, COVR: DETECTED
CREAT SERPL-MCNC: 2.57 MG/DL (ref 0.6–1)
CREAT SERPL-MCNC: 2.64 MG/DL (ref 0.6–1)
CREAT SERPL-MCNC: 2.68 MG/DL (ref 0.6–1)
CREAT SERPL-MCNC: 2.92 MG/DL (ref 0.6–1)
CREAT SERPL-MCNC: 3.61 MG/DL (ref 0.6–1)
CREAT SERPL-MCNC: 3.71 MG/DL (ref 0.6–1)
CREAT SERPL-MCNC: 3.71 MG/DL (ref 0.6–1)
CREAT SERPL-MCNC: 3.76 MG/DL (ref 0.6–1)
CREAT SERPL-MCNC: 3.84 MG/DL (ref 0.6–1)
CREAT SERPL-MCNC: 4 MG/DL (ref 0.6–1)
CREAT SERPL-MCNC: 4.02 MG/DL (ref 0.6–1)
CREAT SERPL-MCNC: 4.39 MG/DL (ref 0.6–1)
CREAT SERPL-MCNC: 4.78 MG/DL (ref 0.6–1)
CREAT UR-MCNC: 64.8 MG/DL
CRYSTALS URNS QL MICRO: 0 /LPF
D DIMER PPP FEU-MCNC: 0.71 UG/ML(FEU)
D DIMER PPP FEU-MCNC: 0.81 UG/ML(FEU)
DIAGNOSIS, 93000: NORMAL
DIFFERENTIAL METHOD BLD: ABNORMAL
EOSINOPHIL # BLD: 0 K/UL (ref 0–0.8)
EOSINOPHIL NFR BLD: 0 % (ref 0.5–7.8)
EPI CELLS #/AREA URNS HPF: 0 /HPF
ERYTHROCYTE [DISTWIDTH] IN BLOOD BY AUTOMATED COUNT: 13.3 % (ref 11.9–14.6)
ERYTHROCYTE [DISTWIDTH] IN BLOOD BY AUTOMATED COUNT: 13.4 % (ref 11.9–14.6)
ERYTHROCYTE [DISTWIDTH] IN BLOOD BY AUTOMATED COUNT: 13.5 % (ref 11.9–14.6)
ERYTHROCYTE [DISTWIDTH] IN BLOOD BY AUTOMATED COUNT: 13.6 % (ref 11.9–14.6)
ERYTHROCYTE [DISTWIDTH] IN BLOOD BY AUTOMATED COUNT: 13.6 % (ref 11.9–14.6)
ERYTHROCYTE [DISTWIDTH] IN BLOOD BY AUTOMATED COUNT: 13.7 % (ref 11.9–14.6)
ERYTHROCYTE [DISTWIDTH] IN BLOOD BY AUTOMATED COUNT: 13.7 % (ref 11.9–14.6)
ERYTHROCYTE [DISTWIDTH] IN BLOOD BY AUTOMATED COUNT: 13.9 % (ref 11.9–14.6)
ERYTHROCYTE [DISTWIDTH] IN BLOOD BY AUTOMATED COUNT: 14 % (ref 11.9–14.6)
ERYTHROCYTE [DISTWIDTH] IN BLOOD BY AUTOMATED COUNT: 14.1 % (ref 11.9–14.6)
ERYTHROCYTE [DISTWIDTH] IN BLOOD BY AUTOMATED COUNT: 14.5 % (ref 11.9–14.6)
ERYTHROCYTE [DISTWIDTH] IN BLOOD BY AUTOMATED COUNT: 14.6 % (ref 11.9–14.6)
EXHALED MINUTE VOLUME, VE: 18.1 L/MIN
EXHALED MINUTE VOLUME, VE: 27 L/MIN
FERRITIN SERPL-MCNC: 1909 NG/ML (ref 8–388)
FERRITIN SERPL-MCNC: 2360 NG/ML (ref 8–388)
FLOW RATE ISTAT,IFRATE: 60 L/MIN
GAS FLOW.O2 O2 DELIVERY SYS: ABNORMAL L/MIN
GAS FLOW.O2 SETTING OXYMISER: 15 BPM
GLOBULIN SER CALC-MCNC: 5.2 G/DL (ref 2.3–3.5)
GLUCOSE BLD STRIP.AUTO-MCNC: 108 MG/DL (ref 65–100)
GLUCOSE BLD STRIP.AUTO-MCNC: 109 MG/DL (ref 65–100)
GLUCOSE BLD STRIP.AUTO-MCNC: 112 MG/DL (ref 65–100)
GLUCOSE BLD STRIP.AUTO-MCNC: 117 MG/DL (ref 65–100)
GLUCOSE BLD STRIP.AUTO-MCNC: 126 MG/DL (ref 65–100)
GLUCOSE BLD STRIP.AUTO-MCNC: 130 MG/DL (ref 65–100)
GLUCOSE BLD STRIP.AUTO-MCNC: 131 MG/DL (ref 65–100)
GLUCOSE BLD STRIP.AUTO-MCNC: 131 MG/DL (ref 65–100)
GLUCOSE BLD STRIP.AUTO-MCNC: 132 MG/DL (ref 65–100)
GLUCOSE BLD STRIP.AUTO-MCNC: 133 MG/DL (ref 65–100)
GLUCOSE BLD STRIP.AUTO-MCNC: 144 MG/DL (ref 65–100)
GLUCOSE BLD STRIP.AUTO-MCNC: 161 MG/DL (ref 65–100)
GLUCOSE BLD STRIP.AUTO-MCNC: 168 MG/DL (ref 65–100)
GLUCOSE BLD STRIP.AUTO-MCNC: 168 MG/DL (ref 65–100)
GLUCOSE BLD STRIP.AUTO-MCNC: 169 MG/DL (ref 65–100)
GLUCOSE BLD STRIP.AUTO-MCNC: 175 MG/DL (ref 65–100)
GLUCOSE BLD STRIP.AUTO-MCNC: 177 MG/DL (ref 65–100)
GLUCOSE BLD STRIP.AUTO-MCNC: 179 MG/DL (ref 65–100)
GLUCOSE BLD STRIP.AUTO-MCNC: 181 MG/DL (ref 65–100)
GLUCOSE BLD STRIP.AUTO-MCNC: 183 MG/DL (ref 65–100)
GLUCOSE BLD STRIP.AUTO-MCNC: 186 MG/DL (ref 65–100)
GLUCOSE BLD STRIP.AUTO-MCNC: 188 MG/DL (ref 65–100)
GLUCOSE BLD STRIP.AUTO-MCNC: 205 MG/DL (ref 65–100)
GLUCOSE BLD STRIP.AUTO-MCNC: 210 MG/DL (ref 65–100)
GLUCOSE BLD STRIP.AUTO-MCNC: 222 MG/DL (ref 65–100)
GLUCOSE BLD STRIP.AUTO-MCNC: 226 MG/DL (ref 65–100)
GLUCOSE BLD STRIP.AUTO-MCNC: 230 MG/DL (ref 65–100)
GLUCOSE BLD STRIP.AUTO-MCNC: 230 MG/DL (ref 65–100)
GLUCOSE BLD STRIP.AUTO-MCNC: 241 MG/DL (ref 65–100)
GLUCOSE BLD STRIP.AUTO-MCNC: 246 MG/DL (ref 65–100)
GLUCOSE BLD STRIP.AUTO-MCNC: 249 MG/DL (ref 65–100)
GLUCOSE BLD STRIP.AUTO-MCNC: 262 MG/DL (ref 65–100)
GLUCOSE BLD STRIP.AUTO-MCNC: 276 MG/DL (ref 65–100)
GLUCOSE BLD STRIP.AUTO-MCNC: 277 MG/DL (ref 65–100)
GLUCOSE BLD STRIP.AUTO-MCNC: 289 MG/DL (ref 65–100)
GLUCOSE BLD STRIP.AUTO-MCNC: 293 MG/DL (ref 65–100)
GLUCOSE BLD STRIP.AUTO-MCNC: 295 MG/DL (ref 65–100)
GLUCOSE BLD STRIP.AUTO-MCNC: 296 MG/DL (ref 65–100)
GLUCOSE BLD STRIP.AUTO-MCNC: 299 MG/DL (ref 65–100)
GLUCOSE BLD STRIP.AUTO-MCNC: 302 MG/DL (ref 65–100)
GLUCOSE BLD STRIP.AUTO-MCNC: 305 MG/DL (ref 65–100)
GLUCOSE BLD STRIP.AUTO-MCNC: 306 MG/DL (ref 65–100)
GLUCOSE BLD STRIP.AUTO-MCNC: 320 MG/DL (ref 65–100)
GLUCOSE BLD STRIP.AUTO-MCNC: 329 MG/DL (ref 65–100)
GLUCOSE BLD STRIP.AUTO-MCNC: 331 MG/DL (ref 65–100)
GLUCOSE BLD STRIP.AUTO-MCNC: 332 MG/DL (ref 65–100)
GLUCOSE BLD STRIP.AUTO-MCNC: 344 MG/DL (ref 65–100)
GLUCOSE BLD STRIP.AUTO-MCNC: 374 MG/DL (ref 65–100)
GLUCOSE BLD STRIP.AUTO-MCNC: 385 MG/DL (ref 65–100)
GLUCOSE BLD STRIP.AUTO-MCNC: 392 MG/DL (ref 65–100)
GLUCOSE BLD STRIP.AUTO-MCNC: 399 MG/DL (ref 65–100)
GLUCOSE BLD STRIP.AUTO-MCNC: 443 MG/DL (ref 65–100)
GLUCOSE BLD STRIP.AUTO-MCNC: 456 MG/DL (ref 65–100)
GLUCOSE BLD STRIP.AUTO-MCNC: 462 MG/DL (ref 65–100)
GLUCOSE BLD STRIP.AUTO-MCNC: 513 MG/DL (ref 65–100)
GLUCOSE BLD STRIP.AUTO-MCNC: 58 MG/DL (ref 65–100)
GLUCOSE BLD STRIP.AUTO-MCNC: 87 MG/DL (ref 65–100)
GLUCOSE SERPL-MCNC: 116 MG/DL (ref 65–100)
GLUCOSE SERPL-MCNC: 148 MG/DL (ref 65–100)
GLUCOSE SERPL-MCNC: 176 MG/DL (ref 65–100)
GLUCOSE SERPL-MCNC: 210 MG/DL (ref 65–100)
GLUCOSE SERPL-MCNC: 213 MG/DL (ref 65–100)
GLUCOSE SERPL-MCNC: 214 MG/DL (ref 65–100)
GLUCOSE SERPL-MCNC: 217 MG/DL (ref 65–100)
GLUCOSE SERPL-MCNC: 274 MG/DL (ref 65–100)
GLUCOSE SERPL-MCNC: 283 MG/DL (ref 65–100)
GLUCOSE SERPL-MCNC: 319 MG/DL (ref 65–100)
GLUCOSE SERPL-MCNC: 391 MG/DL (ref 65–100)
GLUCOSE SERPL-MCNC: 84 MG/DL (ref 65–100)
GLUCOSE SERPL-MCNC: 99 MG/DL (ref 65–100)
GLUCOSE UR STRIP.AUTO-MCNC: >1000 MG/DL
HAV IGM SER QL: NONREACTIVE
HBV CORE IGM SER QL: NONREACTIVE
HBV SURFACE AG SER QL: NONREACTIVE
HCO3 BLD-SCNC: 16.8 MMOL/L (ref 22–26)
HCO3 BLD-SCNC: 21.6 MMOL/L (ref 22–26)
HCO3 BLD-SCNC: 27.9 MMOL/L (ref 22–26)
HCT VFR BLD AUTO: 24.5 % (ref 35.8–46.3)
HCT VFR BLD AUTO: 26.2 % (ref 35.8–46.3)
HCT VFR BLD AUTO: 26.8 % (ref 35.8–46.3)
HCT VFR BLD AUTO: 27.8 % (ref 35.8–46.3)
HCT VFR BLD AUTO: 28.9 % (ref 35.8–46.3)
HCT VFR BLD AUTO: 29.4 % (ref 35.8–46.3)
HCT VFR BLD AUTO: 29.9 % (ref 35.8–46.3)
HCT VFR BLD AUTO: 31.2 % (ref 35.8–46.3)
HCT VFR BLD AUTO: 31.6 % (ref 35.8–46.3)
HCT VFR BLD AUTO: 32.1 % (ref 35.8–46.3)
HCT VFR BLD AUTO: 32.6 % (ref 35.8–46.3)
HCT VFR BLD AUTO: 33.3 % (ref 35.8–46.3)
HCV AB SER QL: NONREACTIVE
HGB BLD-MCNC: 10 G/DL (ref 11.7–15.4)
HGB BLD-MCNC: 10.1 G/DL (ref 11.7–15.4)
HGB BLD-MCNC: 10.2 G/DL (ref 11.7–15.4)
HGB BLD-MCNC: 10.2 G/DL (ref 11.7–15.4)
HGB BLD-MCNC: 10.6 G/DL (ref 11.7–15.4)
HGB BLD-MCNC: 7.7 G/DL (ref 11.7–15.4)
HGB BLD-MCNC: 8.2 G/DL (ref 11.7–15.4)
HGB BLD-MCNC: 8.5 G/DL (ref 11.7–15.4)
HGB BLD-MCNC: 9 G/DL (ref 11.7–15.4)
HGB BLD-MCNC: 9.1 G/DL (ref 11.7–15.4)
HGB BLD-MCNC: 9.2 G/DL (ref 11.7–15.4)
HGB BLD-MCNC: 9.5 G/DL (ref 11.7–15.4)
HGB UR QL STRIP: ABNORMAL
IMM GRANULOCYTES # BLD AUTO: 0.1 K/UL (ref 0–0.5)
IMM GRANULOCYTES NFR BLD AUTO: 1 % (ref 0–5)
INSPIRATION.DURATION SETTING TIME VENT: 0.8 SEC
INSPIRATION.DURATION SETTING TIME VENT: 0.8 SEC
KETONES UR QL STRIP.AUTO: NEGATIVE MG/DL
LACTATE SERPL-SCNC: 1.6 MMOL/L (ref 0.4–2)
LACTATE SERPL-SCNC: 2 MMOL/L (ref 0.4–2)
LACTATE SERPL-SCNC: 2 MMOL/L (ref 0.4–2)
LDH SERPL L TO P-CCNC: 516 U/L (ref 110–210)
LEUKOCYTE ESTERASE UR QL STRIP.AUTO: ABNORMAL
LYMPHOCYTES # BLD: 0.7 K/UL (ref 0.5–4.6)
LYMPHOCYTES NFR BLD: 8 % (ref 13–44)
MAGNESIUM SERPL-MCNC: 2.3 MG/DL (ref 1.8–2.4)
MAGNESIUM SERPL-MCNC: 2.3 MG/DL (ref 1.8–2.4)
MCH RBC QN AUTO: 30.2 PG (ref 26.1–32.9)
MCH RBC QN AUTO: 30.2 PG (ref 26.1–32.9)
MCH RBC QN AUTO: 30.4 PG (ref 26.1–32.9)
MCH RBC QN AUTO: 30.5 PG (ref 26.1–32.9)
MCH RBC QN AUTO: 30.6 PG (ref 26.1–32.9)
MCH RBC QN AUTO: 30.7 PG (ref 26.1–32.9)
MCH RBC QN AUTO: 30.8 PG (ref 26.1–32.9)
MCH RBC QN AUTO: 31 PG (ref 26.1–32.9)
MCH RBC QN AUTO: 31.4 PG (ref 26.1–32.9)
MCHC RBC AUTO-ENTMCNC: 30.8 G/DL (ref 31.4–35)
MCHC RBC AUTO-ENTMCNC: 31 G/DL (ref 31.4–35)
MCHC RBC AUTO-ENTMCNC: 31.1 G/DL (ref 31.4–35)
MCHC RBC AUTO-ENTMCNC: 31.3 G/DL (ref 31.4–35)
MCHC RBC AUTO-ENTMCNC: 31.4 G/DL (ref 31.4–35)
MCHC RBC AUTO-ENTMCNC: 31.7 G/DL (ref 31.4–35)
MCHC RBC AUTO-ENTMCNC: 31.8 G/DL (ref 31.4–35)
MCHC RBC AUTO-ENTMCNC: 31.8 G/DL (ref 31.4–35)
MCHC RBC AUTO-ENTMCNC: 32.1 G/DL (ref 31.4–35)
MCHC RBC AUTO-ENTMCNC: 32.3 G/DL (ref 31.4–35)
MCHC RBC AUTO-ENTMCNC: 32.3 G/DL (ref 31.4–35)
MCHC RBC AUTO-ENTMCNC: 32.7 G/DL (ref 31.4–35)
MCV RBC AUTO: 100 FL (ref 79.6–97.8)
MCV RBC AUTO: 100.7 FL (ref 79.6–97.8)
MCV RBC AUTO: 93.3 FL (ref 79.6–97.8)
MCV RBC AUTO: 94.2 FL (ref 79.6–97.8)
MCV RBC AUTO: 95 FL (ref 79.6–97.8)
MCV RBC AUTO: 95.1 FL (ref 79.6–97.8)
MCV RBC AUTO: 95.2 FL (ref 79.6–97.8)
MCV RBC AUTO: 96 FL (ref 79.6–97.8)
MCV RBC AUTO: 96.1 FL (ref 79.6–97.8)
MCV RBC AUTO: 97 FL (ref 79.6–97.8)
MCV RBC AUTO: 98.3 FL (ref 79.6–97.8)
MCV RBC AUTO: 98.5 FL (ref 79.6–97.8)
MM INDURATION POC: 0 MM (ref 0–5)
MM INDURATION POC: 0 MM (ref 0–5)
MONOCYTES # BLD: 0.6 K/UL (ref 0.1–1.3)
MONOCYTES NFR BLD: 8 % (ref 4–12)
MUCOUS THREADS URNS QL MICRO: 0 /LPF
NEUTS SEG # BLD: 6.9 K/UL (ref 1.7–8.2)
NEUTS SEG NFR BLD: 83 % (ref 43–78)
NITRITE UR QL STRIP.AUTO: NEGATIVE
NRBC # BLD: 0 K/UL (ref 0–0.2)
O2/TOTAL GAS SETTING VFR VENT: 100 %
O2/TOTAL GAS SETTING VFR VENT: 30 %
O2/TOTAL GAS SETTING VFR VENT: 85 %
PCO2 BLD: 24.3 MMHG (ref 35–45)
PCO2 BLD: 29.7 MMHG (ref 35–45)
PCO2 BLD: 34.4 MMHG (ref 35–45)
PH BLD: 7.36 [PH] (ref 7.35–7.45)
PH BLD: 7.52 [PH] (ref 7.35–7.45)
PH BLD: 7.56 [PH] (ref 7.35–7.45)
PH UR STRIP: 5.5 [PH] (ref 5–9)
PHOSPHATE SERPL-MCNC: 6.4 MG/DL (ref 2.3–3.7)
PIP ISTAT,IPIP: 13
PIP ISTAT,IPIP: 14
PLATELET # BLD AUTO: 136 K/UL (ref 150–450)
PLATELET # BLD AUTO: 145 K/UL (ref 150–450)
PLATELET # BLD AUTO: 159 K/UL (ref 150–450)
PLATELET # BLD AUTO: 160 K/UL (ref 150–450)
PLATELET # BLD AUTO: 165 K/UL (ref 150–450)
PLATELET # BLD AUTO: 166 K/UL (ref 150–450)
PLATELET # BLD AUTO: 168 K/UL (ref 150–450)
PLATELET # BLD AUTO: 171 K/UL (ref 150–450)
PLATELET # BLD AUTO: 172 K/UL (ref 150–450)
PLATELET # BLD AUTO: 174 K/UL (ref 150–450)
PLATELET # BLD AUTO: 190 K/UL (ref 150–450)
PLATELET # BLD AUTO: 231 K/UL (ref 150–450)
PMV BLD AUTO: 11.8 FL (ref 9.4–12.3)
PMV BLD AUTO: 11.9 FL (ref 9.4–12.3)
PMV BLD AUTO: 12 FL (ref 9.4–12.3)
PMV BLD AUTO: 12 FL (ref 9.4–12.3)
PMV BLD AUTO: 12.1 FL (ref 9.4–12.3)
PMV BLD AUTO: 12.2 FL (ref 9.4–12.3)
PMV BLD AUTO: 12.3 FL (ref 9.4–12.3)
PMV BLD AUTO: 12.5 FL (ref 9.4–12.3)
PMV BLD AUTO: 12.7 FL (ref 9.4–12.3)
PMV BLD AUTO: 12.8 FL (ref 9.4–12.3)
PMV BLD AUTO: 13 FL (ref 9.4–12.3)
PMV BLD AUTO: 13 FL (ref 9.4–12.3)
PO2 BLD: 199 MMHG (ref 75–100)
PO2 BLD: 66 MMHG (ref 75–100)
PO2 BLD: 72 MMHG (ref 75–100)
POTASSIUM SERPL-SCNC: 4 MMOL/L (ref 3.5–5.1)
POTASSIUM SERPL-SCNC: 4.2 MMOL/L (ref 3.5–5.1)
POTASSIUM SERPL-SCNC: 4.2 MMOL/L (ref 3.5–5.1)
POTASSIUM SERPL-SCNC: 4.3 MMOL/L (ref 3.5–5.1)
POTASSIUM SERPL-SCNC: 4.4 MMOL/L (ref 3.5–5.1)
POTASSIUM SERPL-SCNC: 4.4 MMOL/L (ref 3.5–5.1)
POTASSIUM SERPL-SCNC: 4.5 MMOL/L (ref 3.5–5.1)
POTASSIUM SERPL-SCNC: 4.6 MMOL/L (ref 3.5–5.1)
POTASSIUM SERPL-SCNC: 4.7 MMOL/L (ref 3.5–5.1)
POTASSIUM SERPL-SCNC: 4.7 MMOL/L (ref 3.5–5.1)
POTASSIUM SERPL-SCNC: 4.9 MMOL/L (ref 3.5–5.1)
POTASSIUM SERPL-SCNC: 5.1 MMOL/L (ref 3.5–5.1)
POTASSIUM SERPL-SCNC: 5.5 MMOL/L (ref 3.5–5.1)
PPD POC: NEGATIVE NEGATIVE
PPD POC: NEGATIVE NEGATIVE
PROCALCITONIN SERPL-MCNC: 5.63 NG/ML
PROT SERPL-MCNC: 8.4 G/DL (ref 6.3–8.2)
PROT UR STRIP-MCNC: 300 MG/DL
PROT UR-MCNC: 229 MG/DL
PROT/CREAT UR-RTO: 3.5
Q-T INTERVAL, ECG07: 370 MS
QRS DURATION, ECG06: 102 MS
QTC CALCULATION (BEZET), ECG08: 407 MS
RBC # BLD AUTO: 2.45 M/UL (ref 4.05–5.2)
RBC # BLD AUTO: 2.7 M/UL (ref 4.05–5.2)
RBC # BLD AUTO: 2.79 M/UL (ref 4.05–5.2)
RBC # BLD AUTO: 2.94 M/UL (ref 4.05–5.2)
RBC # BLD AUTO: 2.95 M/UL (ref 4.05–5.2)
RBC # BLD AUTO: 2.97 M/UL (ref 4.05–5.2)
RBC # BLD AUTO: 3.15 M/UL (ref 4.05–5.2)
RBC # BLD AUTO: 3.28 M/UL (ref 4.05–5.2)
RBC # BLD AUTO: 3.31 M/UL (ref 4.05–5.2)
RBC # BLD AUTO: 3.32 M/UL (ref 4.05–5.2)
RBC # BLD AUTO: 3.38 M/UL (ref 4.05–5.2)
RBC # BLD AUTO: 3.47 M/UL (ref 4.05–5.2)
RBC #/AREA URNS HPF: NORMAL /HPF
SAO2 % BLD: 100 % (ref 95–98)
SAO2 % BLD: 94 % (ref 95–98)
SAO2 % BLD: 96 % (ref 95–98)
SERVICE CMNT-IMP: ABNORMAL
SERVICE CMNT-IMP: NORMAL
SERVICE CMNT-IMP: NORMAL
SODIUM SERPL-SCNC: 135 MMOL/L (ref 136–145)
SODIUM SERPL-SCNC: 136 MMOL/L (ref 136–145)
SODIUM SERPL-SCNC: 136 MMOL/L (ref 136–145)
SODIUM SERPL-SCNC: 138 MMOL/L (ref 136–145)
SODIUM SERPL-SCNC: 138 MMOL/L (ref 136–145)
SODIUM SERPL-SCNC: 139 MMOL/L (ref 136–145)
SODIUM SERPL-SCNC: 140 MMOL/L (ref 136–145)
SODIUM SERPL-SCNC: 140 MMOL/L (ref 136–145)
SODIUM SERPL-SCNC: 141 MMOL/L (ref 136–145)
SODIUM SERPL-SCNC: 145 MMOL/L (ref 136–145)
SOURCE, COVRS: ABNORMAL
SP GR UR REFRACTOMETRY: 1.02 (ref 1–1.02)
SPECIMEN EXP DATE BLD: NORMAL
SPECIMEN TYPE: ABNORMAL
STATUS OF UNIT,%ST: NORMAL
TROPONIN-HIGH SENSITIVITY: 617.3 PG/ML (ref 0–14)
TROPONIN-HIGH SENSITIVITY: 626.1 PG/ML (ref 0–14)
TSH SERPL DL<=0.005 MIU/L-ACNC: 0.11 UIU/ML (ref 0.36–3.74)
UNIT DIVISION, %UDIV: NORMAL
UROBILINOGEN UR QL STRIP.AUTO: 0.2 EU/DL (ref 0.2–1)
VENTRICULAR RATE, ECG03: 73 BPM
VT SETTING VENT: 468 ML
VT SETTING VENT: 623 ML
WBC # BLD AUTO: 11.6 K/UL (ref 4.3–11.1)
WBC # BLD AUTO: 16.5 K/UL (ref 4.3–11.1)
WBC # BLD AUTO: 18.6 K/UL (ref 4.3–11.1)
WBC # BLD AUTO: 20.4 K/UL (ref 4.3–11.1)
WBC # BLD AUTO: 21.5 K/UL (ref 4.3–11.1)
WBC # BLD AUTO: 4.7 K/UL (ref 4.3–11.1)
WBC # BLD AUTO: 5.6 K/UL (ref 4.3–11.1)
WBC # BLD AUTO: 6.9 K/UL (ref 4.3–11.1)
WBC # BLD AUTO: 7.5 K/UL (ref 4.3–11.1)
WBC # BLD AUTO: 8.2 K/UL (ref 4.3–11.1)
WBC # BLD AUTO: 8.4 K/UL (ref 4.3–11.1)
WBC # BLD AUTO: 9.9 K/UL (ref 4.3–11.1)
WBC URNS QL MICRO: NORMAL /HPF
YEAST URNS QL MICRO: NORMAL

## 2021-01-01 PROCEDURE — 82962 GLUCOSE BLOOD TEST: CPT

## 2021-01-01 PROCEDURE — 65610000006 HC RM INTENSIVE CARE

## 2021-01-01 PROCEDURE — 5A1D70Z PERFORMANCE OF URINARY FILTRATION, INTERMITTENT, LESS THAN 6 HOURS PER DAY: ICD-10-PCS | Performed by: INTERNAL MEDICINE

## 2021-01-01 PROCEDURE — 74011250637 HC RX REV CODE- 250/637: Performed by: HOSPITALIST

## 2021-01-01 PROCEDURE — 74011250637 HC RX REV CODE- 250/637: Performed by: INTERNAL MEDICINE

## 2021-01-01 PROCEDURE — 99291 CRITICAL CARE FIRST HOUR: CPT | Performed by: INTERNAL MEDICINE

## 2021-01-01 PROCEDURE — 74011636637 HC RX REV CODE- 636/637: Performed by: HOSPITALIST

## 2021-01-01 PROCEDURE — 85027 COMPLETE CBC AUTOMATED: CPT

## 2021-01-01 PROCEDURE — 36415 COLL VENOUS BLD VENIPUNCTURE: CPT

## 2021-01-01 PROCEDURE — 83605 ASSAY OF LACTIC ACID: CPT

## 2021-01-01 PROCEDURE — 94660 CPAP INITIATION&MGMT: CPT

## 2021-01-01 PROCEDURE — 74011250636 HC RX REV CODE- 250/636: Performed by: INTERNAL MEDICINE

## 2021-01-01 PROCEDURE — 77030021668 HC NEB PREFIL KT VYRM -A

## 2021-01-01 PROCEDURE — 83880 ASSAY OF NATRIURETIC PEPTIDE: CPT

## 2021-01-01 PROCEDURE — 74011636637 HC RX REV CODE- 636/637: Performed by: INTERNAL MEDICINE

## 2021-01-01 PROCEDURE — 5A09357 ASSISTANCE WITH RESPIRATORY VENTILATION, LESS THAN 24 CONSECUTIVE HOURS, CONTINUOUS POSITIVE AIRWAY PRESSURE: ICD-10-PCS | Performed by: INTERNAL MEDICINE

## 2021-01-01 PROCEDURE — 99233 SBSQ HOSP IP/OBS HIGH 50: CPT | Performed by: INTERNAL MEDICINE

## 2021-01-01 PROCEDURE — 80048 BASIC METABOLIC PNL TOTAL CA: CPT

## 2021-01-01 PROCEDURE — 71045 X-RAY EXAM CHEST 1 VIEW: CPT

## 2021-01-01 PROCEDURE — 82803 BLOOD GASES ANY COMBINATION: CPT

## 2021-01-01 PROCEDURE — 65270000029 HC RM PRIVATE

## 2021-01-01 PROCEDURE — 90935 HEMODIALYSIS ONE EVALUATION: CPT

## 2021-01-01 PROCEDURE — 81003 URINALYSIS AUTO W/O SCOPE: CPT

## 2021-01-01 PROCEDURE — 77010033711 HC HIGH FLOW OXYGEN

## 2021-01-01 PROCEDURE — 2709999900 HC NON-CHARGEABLE SUPPLY

## 2021-01-01 PROCEDURE — 84145 PROCALCITONIN (PCT): CPT

## 2021-01-01 PROCEDURE — 99223 1ST HOSP IP/OBS HIGH 75: CPT | Performed by: INTERNAL MEDICINE

## 2021-01-01 PROCEDURE — 74011250637 HC RX REV CODE- 250/637: Performed by: NURSE PRACTITIONER

## 2021-01-01 PROCEDURE — 94760 N-INVAS EAR/PLS OXIMETRY 1: CPT

## 2021-01-01 PROCEDURE — 80053 COMPREHEN METABOLIC PANEL: CPT

## 2021-01-01 PROCEDURE — 36430 TRANSFUSION BLD/BLD COMPNT: CPT

## 2021-01-01 PROCEDURE — 74011250636 HC RX REV CODE- 250/636: Performed by: EMERGENCY MEDICINE

## 2021-01-01 PROCEDURE — 87040 BLOOD CULTURE FOR BACTERIA: CPT

## 2021-01-01 PROCEDURE — 36600 WITHDRAWAL OF ARTERIAL BLOOD: CPT

## 2021-01-01 PROCEDURE — 84156 ASSAY OF PROTEIN URINE: CPT

## 2021-01-01 PROCEDURE — 96374 THER/PROPH/DIAG INJ IV PUSH: CPT

## 2021-01-01 PROCEDURE — 85379 FIBRIN DEGRADATION QUANT: CPT

## 2021-01-01 PROCEDURE — 82728 ASSAY OF FERRITIN: CPT

## 2021-01-01 PROCEDURE — 94762 N-INVAS EAR/PLS OXIMTRY CONT: CPT

## 2021-01-01 PROCEDURE — XW13325 TRANSFUSION OF CONVALESCENT PLASMA (NONAUTOLOGOUS) INTO PERIPHERAL VEIN, PERCUTANEOUS APPROACH, NEW TECHNOLOGY GROUP 5: ICD-10-PCS | Performed by: INTERNAL MEDICINE

## 2021-01-01 PROCEDURE — 87635 SARS-COV-2 COVID-19 AMP PRB: CPT

## 2021-01-01 PROCEDURE — 85025 COMPLETE CBC W/AUTO DIFF WBC: CPT

## 2021-01-01 PROCEDURE — 99232 SBSQ HOSP IP/OBS MODERATE 35: CPT | Performed by: INTERNAL MEDICINE

## 2021-01-01 PROCEDURE — 84484 ASSAY OF TROPONIN QUANT: CPT

## 2021-01-01 PROCEDURE — 86901 BLOOD TYPING SEROLOGIC RH(D): CPT

## 2021-01-01 PROCEDURE — 74011000250 HC RX REV CODE- 250

## 2021-01-01 PROCEDURE — 80074 ACUTE HEPATITIS PANEL: CPT

## 2021-01-01 PROCEDURE — 83735 ASSAY OF MAGNESIUM: CPT

## 2021-01-01 PROCEDURE — 51702 INSERT TEMP BLADDER CATH: CPT

## 2021-01-01 PROCEDURE — 82140 ASSAY OF AMMONIA: CPT

## 2021-01-01 PROCEDURE — 84100 ASSAY OF PHOSPHORUS: CPT

## 2021-01-01 PROCEDURE — 74011000258 HC RX REV CODE- 258: Performed by: INTERNAL MEDICINE

## 2021-01-01 PROCEDURE — 81015 MICROSCOPIC EXAM OF URINE: CPT

## 2021-01-01 PROCEDURE — 74011000302 HC RX REV CODE- 302: Performed by: INTERNAL MEDICINE

## 2021-01-01 PROCEDURE — 94761 N-INVAS EAR/PLS OXIMETRY MLT: CPT

## 2021-01-01 PROCEDURE — 74011250637 HC RX REV CODE- 250/637: Performed by: EMERGENCY MEDICINE

## 2021-01-01 PROCEDURE — 76770 US EXAM ABDO BACK WALL COMP: CPT

## 2021-01-01 PROCEDURE — 81001 URINALYSIS AUTO W/SCOPE: CPT

## 2021-01-01 PROCEDURE — 83615 LACTATE (LD) (LDH) ENZYME: CPT

## 2021-01-01 PROCEDURE — 99285 EMERGENCY DEPT VISIT HI MDM: CPT

## 2021-01-01 PROCEDURE — 86580 TB INTRADERMAL TEST: CPT | Performed by: INTERNAL MEDICINE

## 2021-01-01 PROCEDURE — 93005 ELECTROCARDIOGRAM TRACING: CPT | Performed by: EMERGENCY MEDICINE

## 2021-01-01 PROCEDURE — 84443 ASSAY THYROID STIM HORMONE: CPT

## 2021-01-01 RX ORDER — INSULIN LISPRO 100 [IU]/ML
20 INJECTION, SOLUTION INTRAVENOUS; SUBCUTANEOUS ONCE
Status: COMPLETED | OUTPATIENT
Start: 2021-01-01 | End: 2021-01-01

## 2021-01-01 RX ORDER — AMLODIPINE BESYLATE 10 MG/1
10 TABLET ORAL DAILY
Status: DISCONTINUED | OUTPATIENT
Start: 2021-01-01 | End: 2021-01-01

## 2021-01-01 RX ORDER — INSULIN GLARGINE 100 [IU]/ML
40 INJECTION, SOLUTION SUBCUTANEOUS DAILY
Status: DISCONTINUED | OUTPATIENT
Start: 2021-01-01 | End: 2021-01-01

## 2021-01-01 RX ORDER — CARBIDOPA AND LEVODOPA 25; 100 MG/1; MG/1
1 TABLET ORAL 4 TIMES DAILY
Status: DISCONTINUED | OUTPATIENT
Start: 2021-01-01 | End: 2021-01-01

## 2021-01-01 RX ORDER — INSULIN GLARGINE 100 [IU]/ML
20 INJECTION, SOLUTION SUBCUTANEOUS DAILY
Status: DISCONTINUED | OUTPATIENT
Start: 2021-01-01 | End: 2021-01-01

## 2021-01-01 RX ORDER — DEXTROSE 50 % IN WATER (D50W) INTRAVENOUS SYRINGE
25-50 AS NEEDED
Status: DISCONTINUED | OUTPATIENT
Start: 2021-01-01 | End: 2021-01-01

## 2021-01-01 RX ORDER — DEXAMETHASONE SODIUM PHOSPHATE 4 MG/ML
6 INJECTION, SOLUTION INTRA-ARTICULAR; INTRALESIONAL; INTRAMUSCULAR; INTRAVENOUS; SOFT TISSUE DAILY
Status: COMPLETED | OUTPATIENT
Start: 2021-01-01 | End: 2021-01-01

## 2021-01-01 RX ORDER — CARVEDILOL 25 MG/1
25 TABLET ORAL 2 TIMES DAILY WITH MEALS
Status: DISCONTINUED | OUTPATIENT
Start: 2021-01-01 | End: 2021-01-01

## 2021-01-01 RX ORDER — ERGOCALCIFEROL 1.25 MG/1
50000 CAPSULE ORAL
Status: DISCONTINUED | OUTPATIENT
Start: 2021-01-01 | End: 2021-01-01

## 2021-01-01 RX ORDER — MORPHINE SULFATE 2 MG/ML
2 INJECTION, SOLUTION INTRAMUSCULAR; INTRAVENOUS
Status: DISCONTINUED | OUTPATIENT
Start: 2021-01-01 | End: 2021-01-01 | Stop reason: HOSPADM

## 2021-01-01 RX ORDER — FUROSEMIDE 40 MG/1
80 TABLET ORAL 2 TIMES DAILY
Status: DISCONTINUED | OUTPATIENT
Start: 2021-01-01 | End: 2021-01-01

## 2021-01-01 RX ORDER — FUROSEMIDE 40 MG/1
40 TABLET ORAL 2 TIMES DAILY
Status: DISCONTINUED | OUTPATIENT
Start: 2021-01-01 | End: 2021-01-01

## 2021-01-01 RX ORDER — INSULIN LISPRO 100 [IU]/ML
0-15 INJECTION, SOLUTION INTRAVENOUS; SUBCUTANEOUS
Status: DISCONTINUED | OUTPATIENT
Start: 2021-01-01 | End: 2021-01-01

## 2021-01-01 RX ORDER — MORPHINE SULFATE 2 MG/ML
2 INJECTION, SOLUTION INTRAMUSCULAR; INTRAVENOUS
Status: DISCONTINUED | OUTPATIENT
Start: 2021-01-01 | End: 2021-01-01

## 2021-01-01 RX ORDER — AMLODIPINE BESYLATE 5 MG/1
5 TABLET ORAL DAILY
Status: DISCONTINUED | OUTPATIENT
Start: 2021-01-01 | End: 2021-01-01

## 2021-01-01 RX ORDER — INSULIN LISPRO 100 [IU]/ML
7 INJECTION, SOLUTION INTRAVENOUS; SUBCUTANEOUS
Status: DISCONTINUED | OUTPATIENT
Start: 2021-01-01 | End: 2021-01-01

## 2021-01-01 RX ORDER — ONDANSETRON 2 MG/ML
4 INJECTION INTRAMUSCULAR; INTRAVENOUS
Status: DISCONTINUED | OUTPATIENT
Start: 2021-01-01 | End: 2021-01-01 | Stop reason: HOSPADM

## 2021-01-01 RX ORDER — LORAZEPAM 2 MG/ML
1 INJECTION INTRAMUSCULAR
Status: DISCONTINUED | OUTPATIENT
Start: 2021-01-01 | End: 2021-01-01 | Stop reason: HOSPADM

## 2021-01-01 RX ORDER — SODIUM CHLORIDE 9 MG/ML
250 INJECTION, SOLUTION INTRAVENOUS AS NEEDED
Status: DISCONTINUED | OUTPATIENT
Start: 2021-01-01 | End: 2021-01-01 | Stop reason: HOSPADM

## 2021-01-01 RX ORDER — MORPHINE SULFATE 2 MG/ML
1 INJECTION, SOLUTION INTRAMUSCULAR; INTRAVENOUS
Status: DISCONTINUED | OUTPATIENT
Start: 2021-01-01 | End: 2021-01-01

## 2021-01-01 RX ORDER — ACETAMINOPHEN 325 MG/1
650 TABLET ORAL
Status: DISCONTINUED | OUTPATIENT
Start: 2021-01-01 | End: 2021-01-01 | Stop reason: HOSPADM

## 2021-01-01 RX ORDER — INSULIN GLARGINE 100 [IU]/ML
30 INJECTION, SOLUTION SUBCUTANEOUS DAILY
Status: DISCONTINUED | OUTPATIENT
Start: 2021-01-01 | End: 2021-01-01

## 2021-01-01 RX ORDER — DEXTROSE 50 % IN WATER (D50W) INTRAVENOUS SYRINGE
Status: COMPLETED
Start: 2021-01-01 | End: 2021-01-01

## 2021-01-01 RX ORDER — HEPARIN SODIUM 5000 [USP'U]/ML
5000 INJECTION, SOLUTION INTRAVENOUS; SUBCUTANEOUS EVERY 8 HOURS
Status: DISCONTINUED | OUTPATIENT
Start: 2021-01-01 | End: 2021-01-01

## 2021-01-01 RX ORDER — HEPARIN SODIUM 1000 [USP'U]/ML
5000 INJECTION, SOLUTION INTRAVENOUS; SUBCUTANEOUS
Status: DISCONTINUED | OUTPATIENT
Start: 2021-01-01 | End: 2021-01-01

## 2021-01-01 RX ORDER — FUROSEMIDE 10 MG/ML
80 INJECTION INTRAMUSCULAR; INTRAVENOUS ONCE
Status: COMPLETED | OUTPATIENT
Start: 2021-01-01 | End: 2021-01-01

## 2021-01-01 RX ORDER — INSULIN LISPRO 100 [IU]/ML
30 INJECTION, SOLUTION INTRAVENOUS; SUBCUTANEOUS ONCE
Status: COMPLETED | OUTPATIENT
Start: 2021-01-01 | End: 2021-01-01

## 2021-01-01 RX ORDER — DEXTROSE 40 %
15 GEL (GRAM) ORAL AS NEEDED
Status: DISCONTINUED | OUTPATIENT
Start: 2021-01-01 | End: 2021-01-01

## 2021-01-01 RX ORDER — LISINOPRIL 5 MG/1
10 TABLET ORAL DAILY
Status: DISCONTINUED | OUTPATIENT
Start: 2021-01-01 | End: 2021-01-01

## 2021-01-01 RX ORDER — PANTOPRAZOLE SODIUM 40 MG/1
40 TABLET, DELAYED RELEASE ORAL
Status: DISCONTINUED | OUTPATIENT
Start: 2021-01-01 | End: 2021-01-01

## 2021-01-01 RX ORDER — INSULIN GLARGINE 100 [IU]/ML
15 INJECTION, SOLUTION SUBCUTANEOUS
Status: COMPLETED | OUTPATIENT
Start: 2021-01-01 | End: 2021-01-01

## 2021-01-01 RX ORDER — INSULIN LISPRO 100 [IU]/ML
INJECTION, SOLUTION INTRAVENOUS; SUBCUTANEOUS EVERY 6 HOURS
Status: DISCONTINUED | OUTPATIENT
Start: 2021-01-01 | End: 2021-01-01

## 2021-01-01 RX ORDER — LANOLIN ALCOHOL/MO/W.PET/CERES
324 CREAM (GRAM) TOPICAL DAILY
Status: DISCONTINUED | OUTPATIENT
Start: 2021-01-01 | End: 2021-01-01

## 2021-01-01 RX ORDER — MORPHINE SULFATE 2 MG/ML
2 INJECTION, SOLUTION INTRAMUSCULAR; INTRAVENOUS ONCE
Status: COMPLETED | OUTPATIENT
Start: 2021-01-01 | End: 2021-01-01

## 2021-01-01 RX ORDER — SODIUM CHLORIDE 0.9 % (FLUSH) 0.9 %
5-40 SYRINGE (ML) INJECTION EVERY 8 HOURS
Status: DISCONTINUED | OUTPATIENT
Start: 2021-01-01 | End: 2021-01-01

## 2021-01-01 RX ORDER — INSULIN GLARGINE 100 [IU]/ML
10 INJECTION, SOLUTION SUBCUTANEOUS ONCE
Status: COMPLETED | OUTPATIENT
Start: 2021-01-01 | End: 2021-01-01

## 2021-01-01 RX ORDER — INSULIN LISPRO 100 [IU]/ML
10 INJECTION, SOLUTION INTRAVENOUS; SUBCUTANEOUS
Status: DISCONTINUED | OUTPATIENT
Start: 2021-01-01 | End: 2021-01-01

## 2021-01-01 RX ORDER — INSULIN LISPRO 100 [IU]/ML
INJECTION, SOLUTION INTRAVENOUS; SUBCUTANEOUS
Status: DISCONTINUED | OUTPATIENT
Start: 2021-01-01 | End: 2021-01-01

## 2021-01-01 RX ORDER — GLYCOPYRROLATE 0.2 MG/ML
0.2 INJECTION INTRAMUSCULAR; INTRAVENOUS
Status: DISCONTINUED | OUTPATIENT
Start: 2021-01-01 | End: 2021-01-01 | Stop reason: HOSPADM

## 2021-01-01 RX ORDER — SODIUM CHLORIDE 0.9 % (FLUSH) 0.9 %
5-40 SYRINGE (ML) INJECTION AS NEEDED
Status: DISCONTINUED | OUTPATIENT
Start: 2021-01-01 | End: 2021-01-01 | Stop reason: HOSPADM

## 2021-01-01 RX ORDER — MORPHINE SULFATE 2 MG/ML
2 INJECTION, SOLUTION INTRAMUSCULAR; INTRAVENOUS
Status: COMPLETED | OUTPATIENT
Start: 2021-01-01 | End: 2021-01-01

## 2021-01-01 RX ORDER — FUROSEMIDE 10 MG/ML
40 INJECTION INTRAMUSCULAR; INTRAVENOUS
Status: COMPLETED | OUTPATIENT
Start: 2021-01-01 | End: 2021-01-01

## 2021-01-01 RX ORDER — INSULIN GLARGINE 100 [IU]/ML
10 INJECTION, SOLUTION SUBCUTANEOUS DAILY
Status: DISCONTINUED | OUTPATIENT
Start: 2021-01-01 | End: 2021-01-01

## 2021-01-01 RX ORDER — INSULIN GLARGINE 100 [IU]/ML
25 INJECTION, SOLUTION SUBCUTANEOUS DAILY
Status: DISCONTINUED | OUTPATIENT
Start: 2021-01-01 | End: 2021-01-01

## 2021-01-01 RX ORDER — INSULIN LISPRO 100 [IU]/ML
0-10 INJECTION, SOLUTION INTRAVENOUS; SUBCUTANEOUS
Status: DISCONTINUED | OUTPATIENT
Start: 2021-01-01 | End: 2021-01-01

## 2021-01-01 RX ORDER — LEVOTHYROXINE SODIUM 50 UG/1
25 TABLET ORAL
Status: DISCONTINUED | OUTPATIENT
Start: 2021-01-01 | End: 2021-01-01

## 2021-01-01 RX ORDER — EZETIMIBE 10 MG/1
10 TABLET ORAL DAILY
Status: DISCONTINUED | OUTPATIENT
Start: 2021-01-01 | End: 2021-01-01

## 2021-01-01 RX ORDER — SEVELAMER CARBONATE 800 MG/1
800 TABLET, FILM COATED ORAL
Status: DISCONTINUED | OUTPATIENT
Start: 2021-01-01 | End: 2021-01-01

## 2021-01-01 RX ORDER — LORAZEPAM 0.5 MG/1
0.5 TABLET ORAL
Status: DISCONTINUED | OUTPATIENT
Start: 2021-01-01 | End: 2021-01-01

## 2021-01-01 RX ORDER — MIDODRINE HYDROCHLORIDE 5 MG/1
5 TABLET ORAL
Status: DISCONTINUED | OUTPATIENT
Start: 2021-01-01 | End: 2021-01-01

## 2021-01-01 RX ADMIN — INSULIN LISPRO 4 UNITS: 100 INJECTION, SOLUTION INTRAVENOUS; SUBCUTANEOUS at 17:42

## 2021-01-01 RX ADMIN — SEVELAMER CARBONATE 800 MG: 800 TABLET, FILM COATED ORAL at 17:09

## 2021-01-01 RX ADMIN — FUROSEMIDE 40 MG: 40 TABLET ORAL at 08:34

## 2021-01-01 RX ADMIN — Medication 10 ML: at 22:11

## 2021-01-01 RX ADMIN — LORAZEPAM 1 MG: 2 INJECTION INTRAMUSCULAR; INTRAVENOUS at 22:55

## 2021-01-01 RX ADMIN — CARBIDOPA AND LEVODOPA 1 TABLET: 25; 100 TABLET ORAL at 09:04

## 2021-01-01 RX ADMIN — Medication 10 ML: at 13:34

## 2021-01-01 RX ADMIN — FUROSEMIDE 80 MG: 40 TABLET ORAL at 08:02

## 2021-01-01 RX ADMIN — INSULIN LISPRO 10 UNITS: 100 INJECTION, SOLUTION INTRAVENOUS; SUBCUTANEOUS at 12:48

## 2021-01-01 RX ADMIN — FERROUS SULFATE TAB 325 MG (65 MG ELEMENTAL FE) 324 MG: 325 (65 FE) TAB at 08:28

## 2021-01-01 RX ADMIN — INSULIN GLARGINE 40 UNITS: 100 INJECTION, SOLUTION SUBCUTANEOUS at 09:07

## 2021-01-01 RX ADMIN — INSULIN LISPRO 6 UNITS: 100 INJECTION, SOLUTION INTRAVENOUS; SUBCUTANEOUS at 11:30

## 2021-01-01 RX ADMIN — Medication 10 ML: at 16:14

## 2021-01-01 RX ADMIN — INSULIN LISPRO 10 UNITS: 100 INJECTION, SOLUTION INTRAVENOUS; SUBCUTANEOUS at 12:50

## 2021-01-01 RX ADMIN — MORPHINE SULFATE 2 MG: 2 INJECTION, SOLUTION INTRAMUSCULAR; INTRAVENOUS at 09:30

## 2021-01-01 RX ADMIN — CARBIDOPA AND LEVODOPA 1 TABLET: 25; 100 TABLET ORAL at 12:12

## 2021-01-01 RX ADMIN — Medication 5 ML: at 20:38

## 2021-01-01 RX ADMIN — LEVOTHYROXINE SODIUM 25 MCG: 0.05 TABLET ORAL at 05:19

## 2021-01-01 RX ADMIN — LEVOTHYROXINE SODIUM 25 MCG: 0.05 TABLET ORAL at 07:53

## 2021-01-01 RX ADMIN — SEVELAMER CARBONATE 800 MG: 800 TABLET, FILM COATED ORAL at 12:02

## 2021-01-01 RX ADMIN — CARBIDOPA AND LEVODOPA 1 TABLET: 25; 100 TABLET ORAL at 17:43

## 2021-01-01 RX ADMIN — Medication 10 ML: at 22:00

## 2021-01-01 RX ADMIN — INSULIN LISPRO 6 UNITS: 100 INJECTION, SOLUTION INTRAVENOUS; SUBCUTANEOUS at 17:12

## 2021-01-01 RX ADMIN — FERROUS SULFATE TAB 325 MG (65 MG ELEMENTAL FE) 324 MG: 325 (65 FE) TAB at 08:34

## 2021-01-01 RX ADMIN — INSULIN GLARGINE 25 UNITS: 100 INJECTION, SOLUTION SUBCUTANEOUS at 08:35

## 2021-01-01 RX ADMIN — MORPHINE SULFATE 2 MG: 2 INJECTION, SOLUTION INTRAMUSCULAR; INTRAVENOUS at 22:55

## 2021-01-01 RX ADMIN — FUROSEMIDE 40 MG: 40 TABLET ORAL at 18:30

## 2021-01-01 RX ADMIN — INSULIN LISPRO 10 UNITS: 100 INJECTION, SOLUTION INTRAVENOUS; SUBCUTANEOUS at 11:30

## 2021-01-01 RX ADMIN — FUROSEMIDE 80 MG: 40 TABLET ORAL at 08:56

## 2021-01-01 RX ADMIN — DEXAMETHASONE SODIUM PHOSPHATE 6 MG: 4 INJECTION, SOLUTION INTRAMUSCULAR; INTRAVENOUS at 08:03

## 2021-01-01 RX ADMIN — PANTOPRAZOLE SODIUM 40 MG: 40 TABLET, DELAYED RELEASE ORAL at 05:18

## 2021-01-01 RX ADMIN — INSULIN LISPRO 20 UNITS: 100 INJECTION, SOLUTION INTRAVENOUS; SUBCUTANEOUS at 18:22

## 2021-01-01 RX ADMIN — FUROSEMIDE 80 MG: 40 TABLET ORAL at 08:23

## 2021-01-01 RX ADMIN — FERROUS SULFATE TAB 325 MG (65 MG ELEMENTAL FE) 324 MG: 325 (65 FE) TAB at 09:04

## 2021-01-01 RX ADMIN — Medication 10 ML: at 12:39

## 2021-01-01 RX ADMIN — SEVELAMER CARBONATE 800 MG: 800 TABLET, FILM COATED ORAL at 08:00

## 2021-01-01 RX ADMIN — CARVEDILOL 25 MG: 25 TABLET, FILM COATED ORAL at 08:00

## 2021-01-01 RX ADMIN — INSULIN LISPRO 2 UNITS: 100 INJECTION, SOLUTION INTRAVENOUS; SUBCUTANEOUS at 05:53

## 2021-01-01 RX ADMIN — CARBIDOPA AND LEVODOPA 1 TABLET: 25; 100 TABLET ORAL at 09:19

## 2021-01-01 RX ADMIN — FUROSEMIDE 80 MG: 40 TABLET ORAL at 09:08

## 2021-01-01 RX ADMIN — HEPARIN SODIUM 5000 UNITS: 5000 INJECTION INTRAVENOUS; SUBCUTANEOUS at 23:46

## 2021-01-01 RX ADMIN — INSULIN LISPRO 4 UNITS: 100 INJECTION, SOLUTION INTRAVENOUS; SUBCUTANEOUS at 17:08

## 2021-01-01 RX ADMIN — INSULIN GLARGINE 25 UNITS: 100 INJECTION, SOLUTION SUBCUTANEOUS at 08:24

## 2021-01-01 RX ADMIN — Medication 10 ML: at 13:12

## 2021-01-01 RX ADMIN — Medication 5 ML: at 05:52

## 2021-01-01 RX ADMIN — INSULIN LISPRO 10 UNITS: 100 INJECTION, SOLUTION INTRAVENOUS; SUBCUTANEOUS at 16:42

## 2021-01-01 RX ADMIN — PANTOPRAZOLE SODIUM 40 MG: 40 TABLET, DELAYED RELEASE ORAL at 16:24

## 2021-01-01 RX ADMIN — SEVELAMER CARBONATE 800 MG: 800 TABLET, FILM COATED ORAL at 07:52

## 2021-01-01 RX ADMIN — SEVELAMER CARBONATE 800 MG: 800 TABLET, FILM COATED ORAL at 12:18

## 2021-01-01 RX ADMIN — INSULIN LISPRO 4 UNITS: 100 INJECTION, SOLUTION INTRAVENOUS; SUBCUTANEOUS at 08:20

## 2021-01-01 RX ADMIN — ACETAMINOPHEN 650 MG: 325 TABLET, FILM COATED ORAL at 12:36

## 2021-01-01 RX ADMIN — SEVELAMER CARBONATE 800 MG: 800 TABLET, FILM COATED ORAL at 08:34

## 2021-01-01 RX ADMIN — DEXAMETHASONE SODIUM PHOSPHATE 6 MG: 4 INJECTION, SOLUTION INTRAMUSCULAR; INTRAVENOUS at 08:58

## 2021-01-01 RX ADMIN — DEXAMETHASONE SODIUM PHOSPHATE 6 MG: 4 INJECTION, SOLUTION INTRAMUSCULAR; INTRAVENOUS at 08:35

## 2021-01-01 RX ADMIN — INSULIN LISPRO 12 UNITS: 100 INJECTION, SOLUTION INTRAVENOUS; SUBCUTANEOUS at 18:12

## 2021-01-01 RX ADMIN — INSULIN LISPRO 6 UNITS: 100 INJECTION, SOLUTION INTRAVENOUS; SUBCUTANEOUS at 08:01

## 2021-01-01 RX ADMIN — HEPARIN SODIUM 5000 UNITS: 1000 INJECTION INTRAVENOUS; SUBCUTANEOUS at 13:09

## 2021-01-01 RX ADMIN — CARBIDOPA AND LEVODOPA 1 TABLET: 25; 100 TABLET ORAL at 20:38

## 2021-01-01 RX ADMIN — HEPARIN SODIUM 5000 UNITS: 5000 INJECTION INTRAVENOUS; SUBCUTANEOUS at 16:24

## 2021-01-01 RX ADMIN — Medication 10 ML: at 07:53

## 2021-01-01 RX ADMIN — PANTOPRAZOLE SODIUM 40 MG: 40 TABLET, DELAYED RELEASE ORAL at 06:20

## 2021-01-01 RX ADMIN — INSULIN GLARGINE 10 UNITS: 100 INJECTION, SOLUTION SUBCUTANEOUS at 12:04

## 2021-01-01 RX ADMIN — SEVELAMER CARBONATE 800 MG: 800 TABLET, FILM COATED ORAL at 08:28

## 2021-01-01 RX ADMIN — DEXTROSE MONOHYDRATE 25 G: 25 INJECTION, SOLUTION INTRAVENOUS at 05:57

## 2021-01-01 RX ADMIN — CARBIDOPA AND LEVODOPA 1 TABLET: 25; 100 TABLET ORAL at 17:05

## 2021-01-01 RX ADMIN — CARBIDOPA AND LEVODOPA 1 TABLET: 25; 100 TABLET ORAL at 08:28

## 2021-01-01 RX ADMIN — CARBIDOPA AND LEVODOPA 1 TABLET: 25; 100 TABLET ORAL at 13:00

## 2021-01-01 RX ADMIN — INSULIN LISPRO 2 UNITS: 100 INJECTION, SOLUTION INTRAVENOUS; SUBCUTANEOUS at 18:31

## 2021-01-01 RX ADMIN — INSULIN LISPRO 10 UNITS: 100 INJECTION, SOLUTION INTRAVENOUS; SUBCUTANEOUS at 12:03

## 2021-01-01 RX ADMIN — INSULIN LISPRO 10 UNITS: 100 INJECTION, SOLUTION INTRAVENOUS; SUBCUTANEOUS at 16:10

## 2021-01-01 RX ADMIN — PANTOPRAZOLE SODIUM 40 MG: 40 TABLET, DELAYED RELEASE ORAL at 17:10

## 2021-01-01 RX ADMIN — PANTOPRAZOLE SODIUM 40 MG: 40 TABLET, DELAYED RELEASE ORAL at 09:05

## 2021-01-01 RX ADMIN — Medication 10 ML: at 05:53

## 2021-01-01 RX ADMIN — INSULIN LISPRO 10 UNITS: 100 INJECTION, SOLUTION INTRAVENOUS; SUBCUTANEOUS at 07:58

## 2021-01-01 RX ADMIN — INSULIN GLARGINE 40 UNITS: 100 INJECTION, SOLUTION SUBCUTANEOUS at 08:54

## 2021-01-01 RX ADMIN — TUBERCULIN PURIFIED PROTEIN DERIVATIVE 5 UNITS: 5 INJECTION, SOLUTION INTRADERMAL at 17:26

## 2021-01-01 RX ADMIN — SEVELAMER CARBONATE 800 MG: 800 TABLET, FILM COATED ORAL at 16:42

## 2021-01-01 RX ADMIN — MORPHINE SULFATE 2 MG: 2 INJECTION, SOLUTION INTRAMUSCULAR; INTRAVENOUS at 00:54

## 2021-01-01 RX ADMIN — FUROSEMIDE 80 MG: 10 INJECTION, SOLUTION INTRAMUSCULAR; INTRAVENOUS at 11:54

## 2021-01-01 RX ADMIN — FUROSEMIDE 80 MG: 40 TABLET ORAL at 23:47

## 2021-01-01 RX ADMIN — PANTOPRAZOLE SODIUM 40 MG: 40 TABLET, DELAYED RELEASE ORAL at 16:42

## 2021-01-01 RX ADMIN — SEVELAMER CARBONATE 800 MG: 800 TABLET, FILM COATED ORAL at 12:28

## 2021-01-01 RX ADMIN — PANTOPRAZOLE SODIUM 40 MG: 40 TABLET, DELAYED RELEASE ORAL at 05:53

## 2021-01-01 RX ADMIN — PANTOPRAZOLE SODIUM 40 MG: 40 TABLET, DELAYED RELEASE ORAL at 17:01

## 2021-01-01 RX ADMIN — INSULIN GLARGINE 20 UNITS: 100 INJECTION, SOLUTION SUBCUTANEOUS at 12:29

## 2021-01-01 RX ADMIN — INSULIN LISPRO 4 UNITS: 100 INJECTION, SOLUTION INTRAVENOUS; SUBCUTANEOUS at 21:28

## 2021-01-01 RX ADMIN — SEVELAMER CARBONATE 800 MG: 800 TABLET, FILM COATED ORAL at 12:47

## 2021-01-01 RX ADMIN — INSULIN GLARGINE 10 UNITS: 100 INJECTION, SOLUTION SUBCUTANEOUS at 12:18

## 2021-01-01 RX ADMIN — HEPARIN SODIUM 5000 UNITS: 5000 INJECTION INTRAVENOUS; SUBCUTANEOUS at 07:53

## 2021-01-01 RX ADMIN — FUROSEMIDE 80 MG: 40 TABLET ORAL at 09:05

## 2021-01-01 RX ADMIN — INSULIN LISPRO 10 UNITS: 100 INJECTION, SOLUTION INTRAVENOUS; SUBCUTANEOUS at 07:30

## 2021-01-01 RX ADMIN — PANTOPRAZOLE SODIUM 40 MG: 40 TABLET, DELAYED RELEASE ORAL at 17:06

## 2021-01-01 RX ADMIN — FUROSEMIDE 80 MG: 40 TABLET ORAL at 16:14

## 2021-01-01 RX ADMIN — AMLODIPINE BESYLATE 10 MG: 10 TABLET ORAL at 08:01

## 2021-01-01 RX ADMIN — LORAZEPAM 1 MG: 2 INJECTION INTRAMUSCULAR; INTRAVENOUS at 00:54

## 2021-01-01 RX ADMIN — INSULIN LISPRO 6 UNITS: 100 INJECTION, SOLUTION INTRAVENOUS; SUBCUTANEOUS at 16:43

## 2021-01-01 RX ADMIN — LEVOTHYROXINE SODIUM 25 MCG: 0.05 TABLET ORAL at 05:53

## 2021-01-01 RX ADMIN — Medication 10 ML: at 05:06

## 2021-01-01 RX ADMIN — FUROSEMIDE 80 MG: 40 TABLET ORAL at 17:45

## 2021-01-01 RX ADMIN — INSULIN LISPRO 10 UNITS: 100 INJECTION, SOLUTION INTRAVENOUS; SUBCUTANEOUS at 17:00

## 2021-01-01 RX ADMIN — PANTOPRAZOLE SODIUM 40 MG: 40 TABLET, DELAYED RELEASE ORAL at 16:14

## 2021-01-01 RX ADMIN — FUROSEMIDE 80 MG: 40 TABLET ORAL at 17:10

## 2021-01-01 RX ADMIN — CARVEDILOL 25 MG: 25 TABLET, FILM COATED ORAL at 16:14

## 2021-01-01 RX ADMIN — CARBIDOPA AND LEVODOPA 1 TABLET: 25; 100 TABLET ORAL at 12:30

## 2021-01-01 RX ADMIN — LEVOTHYROXINE SODIUM 25 MCG: 0.05 TABLET ORAL at 06:20

## 2021-01-01 RX ADMIN — INSULIN LISPRO 6 UNITS: 100 INJECTION, SOLUTION INTRAVENOUS; SUBCUTANEOUS at 12:16

## 2021-01-01 RX ADMIN — Medication 10 ML: at 13:10

## 2021-01-01 RX ADMIN — HEPARIN SODIUM 5000 UNITS: 5000 INJECTION INTRAVENOUS; SUBCUTANEOUS at 01:01

## 2021-01-01 RX ADMIN — CARBIDOPA AND LEVODOPA 1 TABLET: 25; 100 TABLET ORAL at 09:22

## 2021-01-01 RX ADMIN — LEVOTHYROXINE SODIUM 25 MCG: 0.05 TABLET ORAL at 07:49

## 2021-01-01 RX ADMIN — Medication 10 ML: at 21:17

## 2021-01-01 RX ADMIN — CARBIDOPA AND LEVODOPA 1 TABLET: 25; 100 TABLET ORAL at 12:02

## 2021-01-01 RX ADMIN — DEXAMETHASONE SODIUM PHOSPHATE 6 MG: 4 INJECTION, SOLUTION INTRAMUSCULAR; INTRAVENOUS at 12:28

## 2021-01-01 RX ADMIN — INSULIN LISPRO 10 UNITS: 100 INJECTION, SOLUTION INTRAVENOUS; SUBCUTANEOUS at 17:05

## 2021-01-01 RX ADMIN — INSULIN LISPRO 2 UNITS: 100 INJECTION, SOLUTION INTRAVENOUS; SUBCUTANEOUS at 12:17

## 2021-01-01 RX ADMIN — CARBIDOPA AND LEVODOPA 1 TABLET: 25; 100 TABLET ORAL at 21:16

## 2021-01-01 RX ADMIN — INSULIN HUMAN 10 UNITS: 100 INJECTION, SOLUTION PARENTERAL at 00:40

## 2021-01-01 RX ADMIN — Medication 5 ML: at 17:06

## 2021-01-01 RX ADMIN — EZETIMIBE 10 MG: 10 TABLET ORAL at 09:25

## 2021-01-01 RX ADMIN — FUROSEMIDE 80 MG: 40 TABLET ORAL at 17:03

## 2021-01-01 RX ADMIN — INSULIN GLARGINE 10 UNITS: 100 INJECTION, SOLUTION SUBCUTANEOUS at 08:58

## 2021-01-01 RX ADMIN — Medication 5 ML: at 14:00

## 2021-01-01 RX ADMIN — FERROUS SULFATE TAB 325 MG (65 MG ELEMENTAL FE) 324 MG: 325 (65 FE) TAB at 08:02

## 2021-01-01 RX ADMIN — SEVELAMER CARBONATE 800 MG: 800 TABLET, FILM COATED ORAL at 12:37

## 2021-01-01 RX ADMIN — FUROSEMIDE 80 MG: 40 TABLET ORAL at 17:00

## 2021-01-01 RX ADMIN — PANTOPRAZOLE SODIUM 40 MG: 40 TABLET, DELAYED RELEASE ORAL at 05:08

## 2021-01-01 RX ADMIN — INSULIN LISPRO 4 UNITS: 100 INJECTION, SOLUTION INTRAVENOUS; SUBCUTANEOUS at 05:47

## 2021-01-01 RX ADMIN — PANTOPRAZOLE SODIUM 40 MG: 40 TABLET, DELAYED RELEASE ORAL at 07:49

## 2021-01-01 RX ADMIN — INSULIN LISPRO 10 UNITS: 100 INJECTION, SOLUTION INTRAVENOUS; SUBCUTANEOUS at 17:09

## 2021-01-01 RX ADMIN — CARVEDILOL 25 MG: 25 TABLET, FILM COATED ORAL at 16:24

## 2021-01-01 RX ADMIN — CARBIDOPA AND LEVODOPA 1 TABLET: 25; 100 TABLET ORAL at 18:30

## 2021-01-01 RX ADMIN — INSULIN LISPRO 2 UNITS: 100 INJECTION, SOLUTION INTRAVENOUS; SUBCUTANEOUS at 22:02

## 2021-01-01 RX ADMIN — DEXAMETHASONE SODIUM PHOSPHATE 6 MG: 4 INJECTION, SOLUTION INTRAMUSCULAR; INTRAVENOUS at 08:59

## 2021-01-01 RX ADMIN — Medication 5 ML: at 20:15

## 2021-01-01 RX ADMIN — Medication 20 ML: at 06:00

## 2021-01-01 RX ADMIN — INSULIN LISPRO 9 UNITS: 100 INJECTION, SOLUTION INTRAVENOUS; SUBCUTANEOUS at 12:51

## 2021-01-01 RX ADMIN — FERROUS SULFATE TAB 325 MG (65 MG ELEMENTAL FE) 324 MG: 325 (65 FE) TAB at 09:24

## 2021-01-01 RX ADMIN — FUROSEMIDE 40 MG: 40 TABLET ORAL at 08:21

## 2021-01-01 RX ADMIN — SEVELAMER CARBONATE 800 MG: 800 TABLET, FILM COATED ORAL at 09:04

## 2021-01-01 RX ADMIN — AMLODIPINE BESYLATE 5 MG: 5 TABLET ORAL at 08:59

## 2021-01-01 RX ADMIN — INSULIN LISPRO 6 UNITS: 100 INJECTION, SOLUTION INTRAVENOUS; SUBCUTANEOUS at 11:48

## 2021-01-01 RX ADMIN — SEVELAMER CARBONATE 800 MG: 800 TABLET, FILM COATED ORAL at 16:24

## 2021-01-01 RX ADMIN — SEVELAMER CARBONATE 800 MG: 800 TABLET, FILM COATED ORAL at 12:30

## 2021-01-01 RX ADMIN — MORPHINE SULFATE 2 MG: 2 INJECTION, SOLUTION INTRAMUSCULAR; INTRAVENOUS at 09:13

## 2021-01-01 RX ADMIN — PANTOPRAZOLE SODIUM 40 MG: 40 TABLET, DELAYED RELEASE ORAL at 05:14

## 2021-01-01 RX ADMIN — SEVELAMER CARBONATE 800 MG: 800 TABLET, FILM COATED ORAL at 17:01

## 2021-01-01 RX ADMIN — INSULIN GLARGINE 40 UNITS: 100 INJECTION, SOLUTION SUBCUTANEOUS at 08:35

## 2021-01-01 RX ADMIN — SEVELAMER CARBONATE 800 MG: 800 TABLET, FILM COATED ORAL at 08:24

## 2021-01-01 RX ADMIN — CARBIDOPA AND LEVODOPA 1 TABLET: 25; 100 TABLET ORAL at 22:04

## 2021-01-01 RX ADMIN — CARBIDOPA AND LEVODOPA 1 TABLET: 25; 100 TABLET ORAL at 12:29

## 2021-01-01 RX ADMIN — SEVELAMER CARBONATE 800 MG: 800 TABLET, FILM COATED ORAL at 16:15

## 2021-01-01 RX ADMIN — CARBIDOPA AND LEVODOPA 1 TABLET: 25; 100 TABLET ORAL at 08:24

## 2021-01-01 RX ADMIN — SEVELAMER CARBONATE 800 MG: 800 TABLET, FILM COATED ORAL at 17:18

## 2021-01-01 RX ADMIN — INSULIN LISPRO 8 UNITS: 100 INJECTION, SOLUTION INTRAVENOUS; SUBCUTANEOUS at 05:41

## 2021-01-01 RX ADMIN — FERROUS SULFATE TAB 325 MG (65 MG ELEMENTAL FE) 324 MG: 325 (65 FE) TAB at 08:23

## 2021-01-01 RX ADMIN — HEPARIN SODIUM 5000 UNITS: 1000 INJECTION INTRAVENOUS; SUBCUTANEOUS at 07:51

## 2021-01-01 RX ADMIN — CARBIDOPA AND LEVODOPA 1 TABLET: 25; 100 TABLET ORAL at 12:50

## 2021-01-01 RX ADMIN — CARBIDOPA AND LEVODOPA 1 TABLET: 25; 100 TABLET ORAL at 16:21

## 2021-01-01 RX ADMIN — NITROGLYCERIN 1 INCH: 20 OINTMENT TOPICAL at 08:46

## 2021-01-01 RX ADMIN — CARVEDILOL 25 MG: 25 TABLET, FILM COATED ORAL at 07:53

## 2021-01-01 RX ADMIN — INSULIN LISPRO 10 UNITS: 100 INJECTION, SOLUTION INTRAVENOUS; SUBCUTANEOUS at 12:02

## 2021-01-01 RX ADMIN — INSULIN LISPRO 6 UNITS: 100 INJECTION, SOLUTION INTRAVENOUS; SUBCUTANEOUS at 21:25

## 2021-01-01 RX ADMIN — SEVELAMER CARBONATE 800 MG: 800 TABLET, FILM COATED ORAL at 17:05

## 2021-01-01 RX ADMIN — PANTOPRAZOLE SODIUM 40 MG: 40 TABLET, DELAYED RELEASE ORAL at 05:52

## 2021-01-01 RX ADMIN — Medication 10 ML: at 13:20

## 2021-01-01 RX ADMIN — SEVELAMER CARBONATE 800 MG: 800 TABLET, FILM COATED ORAL at 12:12

## 2021-01-01 RX ADMIN — CARBIDOPA AND LEVODOPA 1 TABLET: 25; 100 TABLET ORAL at 08:04

## 2021-01-01 RX ADMIN — CARVEDILOL 25 MG: 25 TABLET, FILM COATED ORAL at 17:18

## 2021-01-01 RX ADMIN — DEXAMETHASONE SODIUM PHOSPHATE 6 MG: 4 INJECTION, SOLUTION INTRAMUSCULAR; INTRAVENOUS at 08:22

## 2021-01-01 RX ADMIN — CARBIDOPA AND LEVODOPA 1 TABLET: 25; 100 TABLET ORAL at 21:01

## 2021-01-01 RX ADMIN — Medication 10 ML: at 06:00

## 2021-01-01 RX ADMIN — ACETAMINOPHEN 650 MG: 325 TABLET, FILM COATED ORAL at 19:15

## 2021-01-01 RX ADMIN — Medication 10 ML: at 12:19

## 2021-01-01 RX ADMIN — INSULIN LISPRO 8 UNITS: 100 INJECTION, SOLUTION INTRAVENOUS; SUBCUTANEOUS at 01:00

## 2021-01-01 RX ADMIN — PANTOPRAZOLE SODIUM 40 MG: 40 TABLET, DELAYED RELEASE ORAL at 07:53

## 2021-01-01 RX ADMIN — CARBIDOPA AND LEVODOPA 1 TABLET: 25; 100 TABLET ORAL at 21:21

## 2021-01-01 RX ADMIN — INSULIN LISPRO 2 UNITS: 100 INJECTION, SOLUTION INTRAVENOUS; SUBCUTANEOUS at 12:47

## 2021-01-01 RX ADMIN — Medication 5 ML: at 05:15

## 2021-01-01 RX ADMIN — LORAZEPAM 1 MG: 2 INJECTION INTRAMUSCULAR; INTRAVENOUS at 18:08

## 2021-01-01 RX ADMIN — INSULIN GLARGINE 25 UNITS: 100 INJECTION, SOLUTION SUBCUTANEOUS at 09:12

## 2021-01-01 RX ADMIN — CARVEDILOL 25 MG: 25 TABLET, FILM COATED ORAL at 17:09

## 2021-01-01 RX ADMIN — INSULIN LISPRO 8 UNITS: 100 INJECTION, SOLUTION INTRAVENOUS; SUBCUTANEOUS at 16:11

## 2021-01-01 RX ADMIN — Medication 10 ML: at 22:21

## 2021-01-01 RX ADMIN — MORPHINE SULFATE 1 MG: 2 INJECTION, SOLUTION INTRAMUSCULAR; INTRAVENOUS at 17:59

## 2021-01-01 RX ADMIN — CARBIDOPA AND LEVODOPA 1 TABLET: 25; 100 TABLET ORAL at 09:25

## 2021-01-01 RX ADMIN — CARVEDILOL 25 MG: 25 TABLET, FILM COATED ORAL at 16:42

## 2021-01-01 RX ADMIN — INSULIN LISPRO 10 UNITS: 100 INJECTION, SOLUTION INTRAVENOUS; SUBCUTANEOUS at 23:29

## 2021-01-01 RX ADMIN — FUROSEMIDE 80 MG: 40 TABLET ORAL at 17:43

## 2021-01-01 RX ADMIN — ACETAMINOPHEN 650 MG: 325 TABLET, FILM COATED ORAL at 20:32

## 2021-01-01 RX ADMIN — LEVOTHYROXINE SODIUM 25 MCG: 0.05 TABLET ORAL at 08:00

## 2021-01-01 RX ADMIN — AMLODIPINE BESYLATE 10 MG: 10 TABLET ORAL at 09:08

## 2021-01-01 RX ADMIN — CARBIDOPA AND LEVODOPA 1 TABLET: 25; 100 TABLET ORAL at 20:15

## 2021-01-01 RX ADMIN — FUROSEMIDE 80 MG: 40 TABLET ORAL at 16:22

## 2021-01-01 RX ADMIN — INSULIN GLARGINE 30 UNITS: 100 INJECTION, SOLUTION SUBCUTANEOUS at 08:04

## 2021-01-01 RX ADMIN — FUROSEMIDE 80 MG: 40 TABLET ORAL at 09:25

## 2021-01-01 RX ADMIN — FUROSEMIDE 80 MG: 40 TABLET ORAL at 17:09

## 2021-01-01 RX ADMIN — INSULIN LISPRO 10 UNITS: 100 INJECTION, SOLUTION INTRAVENOUS; SUBCUTANEOUS at 08:55

## 2021-01-01 RX ADMIN — LEVOTHYROXINE SODIUM 25 MCG: 0.05 TABLET ORAL at 05:52

## 2021-01-01 RX ADMIN — FUROSEMIDE 80 MG: 40 TABLET ORAL at 08:28

## 2021-01-01 RX ADMIN — MORPHINE SULFATE 2 MG: 2 INJECTION, SOLUTION INTRAMUSCULAR; INTRAVENOUS at 11:55

## 2021-01-01 RX ADMIN — CARBIDOPA AND LEVODOPA 1 TABLET: 25; 100 TABLET ORAL at 08:34

## 2021-01-01 RX ADMIN — INSULIN LISPRO 8 UNITS: 100 INJECTION, SOLUTION INTRAVENOUS; SUBCUTANEOUS at 21:16

## 2021-01-01 RX ADMIN — CEFEPIME HYDROCHLORIDE 1 G: 1 INJECTION, POWDER, FOR SOLUTION INTRAMUSCULAR; INTRAVENOUS at 17:33

## 2021-01-01 RX ADMIN — INSULIN LISPRO 4 UNITS: 100 INJECTION, SOLUTION INTRAVENOUS; SUBCUTANEOUS at 05:42

## 2021-01-01 RX ADMIN — FUROSEMIDE 80 MG: 40 TABLET ORAL at 17:18

## 2021-01-01 RX ADMIN — INSULIN LISPRO 2 UNITS: 100 INJECTION, SOLUTION INTRAVENOUS; SUBCUTANEOUS at 21:10

## 2021-01-01 RX ADMIN — PANTOPRAZOLE SODIUM 40 MG: 40 TABLET, DELAYED RELEASE ORAL at 07:52

## 2021-01-01 RX ADMIN — MORPHINE SULFATE 1 MG: 2 INJECTION, SOLUTION INTRAMUSCULAR; INTRAVENOUS at 13:00

## 2021-01-01 RX ADMIN — FERROUS SULFATE TAB 325 MG (65 MG ELEMENTAL FE) 324 MG: 325 (65 FE) TAB at 08:59

## 2021-01-01 RX ADMIN — FERROUS SULFATE TAB 325 MG (65 MG ELEMENTAL FE) 324 MG: 325 (65 FE) TAB at 08:21

## 2021-01-01 RX ADMIN — INSULIN LISPRO 4 UNITS: 100 INJECTION, SOLUTION INTRAVENOUS; SUBCUTANEOUS at 21:16

## 2021-01-01 RX ADMIN — DEXAMETHASONE SODIUM PHOSPHATE 6 MG: 4 INJECTION, SOLUTION INTRAMUSCULAR; INTRAVENOUS at 12:15

## 2021-01-01 RX ADMIN — CARBIDOPA AND LEVODOPA 1 TABLET: 25; 100 TABLET ORAL at 22:00

## 2021-01-01 RX ADMIN — PANTOPRAZOLE SODIUM 40 MG: 40 TABLET, DELAYED RELEASE ORAL at 18:30

## 2021-01-01 RX ADMIN — FUROSEMIDE 80 MG: 40 TABLET ORAL at 08:58

## 2021-01-01 RX ADMIN — INSULIN LISPRO 8 UNITS: 100 INJECTION, SOLUTION INTRAVENOUS; SUBCUTANEOUS at 02:12

## 2021-01-01 RX ADMIN — CARBIDOPA AND LEVODOPA 1 TABLET: 25; 100 TABLET ORAL at 16:14

## 2021-01-01 RX ADMIN — Medication 10 ML: at 12:52

## 2021-01-01 RX ADMIN — CARBIDOPA AND LEVODOPA 1 TABLET: 25; 100 TABLET ORAL at 08:21

## 2021-01-01 RX ADMIN — AMLODIPINE BESYLATE 10 MG: 10 TABLET ORAL at 08:57

## 2021-01-01 RX ADMIN — CARBIDOPA AND LEVODOPA 1 TABLET: 25; 100 TABLET ORAL at 21:46

## 2021-01-01 RX ADMIN — INSULIN LISPRO 10 UNITS: 100 INJECTION, SOLUTION INTRAVENOUS; SUBCUTANEOUS at 16:15

## 2021-01-01 RX ADMIN — DEXTROSE 50 % IN WATER (D50W) INTRAVENOUS SYRINGE 25 G: at 05:57

## 2021-01-01 RX ADMIN — INSULIN LISPRO 10 UNITS: 100 INJECTION, SOLUTION INTRAVENOUS; SUBCUTANEOUS at 08:00

## 2021-01-01 RX ADMIN — CARVEDILOL 25 MG: 25 TABLET, FILM COATED ORAL at 08:27

## 2021-01-01 RX ADMIN — Medication 10 ML: at 05:52

## 2021-01-01 RX ADMIN — PANTOPRAZOLE SODIUM 40 MG: 40 TABLET, DELAYED RELEASE ORAL at 08:00

## 2021-01-01 RX ADMIN — INSULIN LISPRO 8 UNITS: 100 INJECTION, SOLUTION INTRAVENOUS; SUBCUTANEOUS at 12:18

## 2021-01-01 RX ADMIN — Medication 5 ML: at 05:18

## 2021-01-01 RX ADMIN — INSULIN LISPRO 10 UNITS: 100 INJECTION, SOLUTION INTRAVENOUS; SUBCUTANEOUS at 18:10

## 2021-01-01 RX ADMIN — SEVELAMER CARBONATE 800 MG: 800 TABLET, FILM COATED ORAL at 12:50

## 2021-01-01 RX ADMIN — DEXAMETHASONE SODIUM PHOSPHATE 6 MG: 4 INJECTION, SOLUTION INTRAMUSCULAR; INTRAVENOUS at 16:58

## 2021-01-01 RX ADMIN — CARVEDILOL 25 MG: 25 TABLET, FILM COATED ORAL at 22:04

## 2021-01-01 RX ADMIN — INSULIN GLARGINE 15 UNITS: 100 INJECTION, SOLUTION SUBCUTANEOUS at 09:33

## 2021-01-01 RX ADMIN — INSULIN HUMAN 10 UNITS: 100 INJECTION, SOLUTION PARENTERAL at 00:41

## 2021-01-01 RX ADMIN — MORPHINE SULFATE 1 MG: 2 INJECTION, SOLUTION INTRAMUSCULAR; INTRAVENOUS at 00:51

## 2021-01-01 RX ADMIN — HEPARIN SODIUM 5000 UNITS: 5000 INJECTION INTRAVENOUS; SUBCUTANEOUS at 16:00

## 2021-01-01 RX ADMIN — MORPHINE SULFATE 2 MG: 2 INJECTION, SOLUTION INTRAMUSCULAR; INTRAVENOUS at 18:08

## 2021-01-01 RX ADMIN — PANTOPRAZOLE SODIUM 40 MG: 40 TABLET, DELAYED RELEASE ORAL at 17:12

## 2021-01-01 RX ADMIN — Medication 5 ML: at 12:34

## 2021-01-01 RX ADMIN — CARBIDOPA AND LEVODOPA 1 TABLET: 25; 100 TABLET ORAL at 21:28

## 2021-01-01 RX ADMIN — SEVELAMER CARBONATE 800 MG: 800 TABLET, FILM COATED ORAL at 08:20

## 2021-01-01 RX ADMIN — INSULIN LISPRO 6 UNITS: 100 INJECTION, SOLUTION INTRAVENOUS; SUBCUTANEOUS at 21:01

## 2021-01-01 RX ADMIN — FERROUS SULFATE TAB 325 MG (65 MG ELEMENTAL FE) 324 MG: 325 (65 FE) TAB at 08:57

## 2021-01-01 RX ADMIN — CARBIDOPA AND LEVODOPA 1 TABLET: 25; 100 TABLET ORAL at 12:47

## 2021-01-01 RX ADMIN — LEVOTHYROXINE SODIUM 25 MCG: 0.05 TABLET ORAL at 09:05

## 2021-01-01 RX ADMIN — SEVELAMER CARBONATE 800 MG: 800 TABLET, FILM COATED ORAL at 08:58

## 2021-01-01 RX ADMIN — LEVOTHYROXINE SODIUM 25 MCG: 0.05 TABLET ORAL at 05:08

## 2021-01-01 RX ADMIN — SEVELAMER CARBONATE 800 MG: 800 TABLET, FILM COATED ORAL at 18:30

## 2021-01-01 RX ADMIN — CARBIDOPA AND LEVODOPA 1 TABLET: 25; 100 TABLET ORAL at 17:11

## 2021-01-01 RX ADMIN — FERROUS SULFATE TAB 325 MG (65 MG ELEMENTAL FE) 324 MG: 325 (65 FE) TAB at 09:08

## 2021-01-01 RX ADMIN — HEPARIN SODIUM 5000 UNITS: 5000 INJECTION INTRAVENOUS; SUBCUTANEOUS at 07:49

## 2021-01-01 RX ADMIN — LEVOTHYROXINE SODIUM 25 MCG: 0.05 TABLET ORAL at 05:14

## 2021-01-01 RX ADMIN — CARBIDOPA AND LEVODOPA 1 TABLET: 25; 100 TABLET ORAL at 17:18

## 2021-01-01 RX ADMIN — LORAZEPAM 0.5 MG: 0.5 TABLET ORAL at 21:16

## 2021-01-01 RX ADMIN — Medication 10 ML: at 20:44

## 2021-01-01 RX ADMIN — CARBIDOPA AND LEVODOPA 1 TABLET: 25; 100 TABLET ORAL at 22:07

## 2021-01-01 RX ADMIN — Medication 5 ML: at 21:21

## 2021-01-01 RX ADMIN — AMLODIPINE BESYLATE 10 MG: 10 TABLET ORAL at 08:27

## 2021-01-01 RX ADMIN — CARBIDOPA AND LEVODOPA 1 TABLET: 25; 100 TABLET ORAL at 16:58

## 2021-01-01 RX ADMIN — Medication 10 ML: at 22:05

## 2021-01-01 RX ADMIN — DEXAMETHASONE SODIUM PHOSPHATE 6 MG: 4 INJECTION, SOLUTION INTRAMUSCULAR; INTRAVENOUS at 08:28

## 2021-01-01 RX ADMIN — SEVELAMER CARBONATE 800 MG: 800 TABLET, FILM COATED ORAL at 16:22

## 2021-01-01 RX ADMIN — INSULIN LISPRO 2 UNITS: 100 INJECTION, SOLUTION INTRAVENOUS; SUBCUTANEOUS at 06:00

## 2021-01-01 RX ADMIN — CARBIDOPA AND LEVODOPA 1 TABLET: 25; 100 TABLET ORAL at 08:58

## 2021-01-01 RX ADMIN — INSULIN LISPRO 30 UNITS: 100 INJECTION, SOLUTION INTRAVENOUS; SUBCUTANEOUS at 17:01

## 2021-01-01 RX ADMIN — CARBIDOPA AND LEVODOPA 1 TABLET: 25; 100 TABLET ORAL at 12:17

## 2021-01-01 RX ADMIN — HEPARIN SODIUM 5000 UNITS: 1000 INJECTION INTRAVENOUS; SUBCUTANEOUS at 12:37

## 2021-01-01 RX ADMIN — INSULIN LISPRO 2 UNITS: 100 INJECTION, SOLUTION INTRAVENOUS; SUBCUTANEOUS at 21:20

## 2021-01-01 RX ADMIN — CARBIDOPA AND LEVODOPA 1 TABLET: 25; 100 TABLET ORAL at 21:25

## 2021-01-01 RX ADMIN — PANTOPRAZOLE SODIUM 40 MG: 40 TABLET, DELAYED RELEASE ORAL at 16:22

## 2021-01-01 RX ADMIN — FUROSEMIDE 40 MG: 10 INJECTION, SOLUTION INTRAMUSCULAR; INTRAVENOUS at 08:46

## 2021-01-01 RX ADMIN — MORPHINE SULFATE 1 MG: 2 INJECTION, SOLUTION INTRAMUSCULAR; INTRAVENOUS at 05:16

## 2021-01-01 RX ADMIN — INSULIN LISPRO 7 UNITS: 100 INJECTION, SOLUTION INTRAVENOUS; SUBCUTANEOUS at 09:33

## 2021-01-01 RX ADMIN — Medication 10 ML: at 21:45

## 2021-01-01 RX ADMIN — PANTOPRAZOLE SODIUM 40 MG: 40 TABLET, DELAYED RELEASE ORAL at 16:12

## 2021-01-01 RX ADMIN — Medication 5 ML: at 05:08

## 2021-01-01 RX ADMIN — INSULIN GLARGINE 20 UNITS: 100 INJECTION, SOLUTION SUBCUTANEOUS at 08:21

## 2021-01-01 RX ADMIN — DEXAMETHASONE SODIUM PHOSPHATE 6 MG: 4 INJECTION, SOLUTION INTRAMUSCULAR; INTRAVENOUS at 09:08

## 2021-01-01 RX ADMIN — CARBIDOPA AND LEVODOPA 1 TABLET: 25; 100 TABLET ORAL at 12:18

## 2021-01-01 RX ADMIN — SEVELAMER CARBONATE 800 MG: 800 TABLET, FILM COATED ORAL at 07:53

## 2021-01-01 RX ADMIN — INSULIN LISPRO 10 UNITS: 100 INJECTION, SOLUTION INTRAVENOUS; SUBCUTANEOUS at 12:37

## 2021-01-01 RX ADMIN — CARVEDILOL 25 MG: 25 TABLET, FILM COATED ORAL at 08:57

## 2021-01-01 RX ADMIN — CARVEDILOL 25 MG: 25 TABLET, FILM COATED ORAL at 07:49

## 2021-01-01 RX ADMIN — INSULIN LISPRO 4 UNITS: 100 INJECTION, SOLUTION INTRAVENOUS; SUBCUTANEOUS at 06:14

## 2021-01-01 RX ADMIN — AMLODIPINE BESYLATE 5 MG: 5 TABLET ORAL at 09:25

## 2021-01-01 RX ADMIN — Medication 10 ML: at 21:31

## 2021-01-01 RX ADMIN — CARBIDOPA AND LEVODOPA 1 TABLET: 25; 100 TABLET ORAL at 17:09

## 2021-01-01 RX ADMIN — Medication 10 ML: at 06:55

## 2021-01-04 PROBLEM — J96.21 ACUTE ON CHRONIC RESPIRATORY FAILURE WITH HYPOXIA (HCC): Status: RESOLVED | Noted: 2020-01-01 | Resolved: 2021-01-01

## 2021-01-04 PROBLEM — N28.9 ACUTE RENAL INSUFFICIENCY: Status: RESOLVED | Noted: 2018-01-23 | Resolved: 2021-01-01

## 2021-01-04 PROBLEM — J96.21 ACUTE ON CHRONIC RESPIRATORY FAILURE WITH HYPOXIA (HCC): Status: ACTIVE | Noted: 2021-01-01

## 2021-01-04 PROBLEM — J12.82 PNEUMONIA DUE TO COVID-19 VIRUS: Status: ACTIVE | Noted: 2021-01-01

## 2021-01-04 PROBLEM — U07.1 PNEUMONIA DUE TO COVID-19 VIRUS: Status: ACTIVE | Noted: 2021-01-01

## 2021-01-04 PROBLEM — N17.9 ACUTE ON CHRONIC RENAL FAILURE (HCC): Status: RESOLVED | Noted: 2019-06-29 | Resolved: 2021-01-01

## 2021-01-04 PROBLEM — R06.03 ACUTE RESPIRATORY DISTRESS: Status: RESOLVED | Noted: 2020-01-01 | Resolved: 2021-01-01

## 2021-01-04 PROBLEM — N18.9 ACUTE ON CHRONIC RENAL FAILURE (HCC): Status: RESOLVED | Noted: 2019-06-29 | Resolved: 2021-01-01

## 2021-01-04 NOTE — ROUTINE PROCESS
TRANSFER - OUT REPORT: 
 
Verbal report given to Jessica Henry RN on [facility]  being transferred to 5th floor for routine progression of care Report consisted of patients Situation, Background, Assessment and  
Recommendations(SBAR). Information from the following report(s) ED Summary was reviewed with the receiving nurse. Lines:  
Peripheral IV 01/04/21 Right Forearm (Active) Site Assessment Clean, dry, & intact 01/04/21 2793 Phlebitis Assessment 0 01/04/21 0826 Infiltration Assessment 0 01/04/21 0826 Dressing Status Clean, dry, & intact 01/04/21 6948 Peripheral IV 01/04/21 Right Antecubital (Active) Site Assessment Clean, dry, & intact 01/04/21 8843 Phlebitis Assessment 0 01/04/21 0828 Infiltration Assessment 0 01/04/21 0828 Dressing Status Clean, dry, & intact 01/04/21 9787 Opportunity for questions and clarification was provided. Patient transported with: 
 Monitor Registered Nurse Resp with bipap

## 2021-01-04 NOTE — PROGRESS NOTES
Patient transferred to 1 with RT and ED RN. Afebrile, drowsy but follows commands, RR elevated with dyspnea at rest, patient placed on BiPaP by RT SpO2 96%, NSR, SBP elevated will monitor, no c/o pain, bath and 2 RN skin assessment done. Wound care consult placed for foot wound that had an old dressing on it and was cleansed and redressed by RN's upon unit arrival. Orders have been placed by Dr. Alphonso Wang. Daughter Bud Mohs ([phone]) updated and consent obtained for plasma transfusion.

## 2021-01-04 NOTE — H&P
HISTORY AND PHYSICAL [address] 1/4/2021 Date of Admission:  1/4/2021 The patient's chart is reviewed and the patient is discussed with the staff. Subjective:  
 
Patient is a [de-identified] y.o.  female presents with shortness of breath. She has a history of afib on eliquis, DM, HTN, CKD, SARAH. She was admitted here 12/16-27 with acute on chronic hypoxic respiratory failure secondary to decompensated heart failure. TTE at that time however showed EF 55% with mild hypertrophy with mild aortic regurg and mild to moderate MR. She presented then from wound care with severe hypertension and dyspnea with B LE edema and elevated BNP to 10,000. She was diuresed, Covid was negative at that time and she was discharged. Today she present with very similar symptoms with severe hypertension, BNP to 66,000, And increased dyspnea x 24 hours on 3L home O2 since last discharge. However, this time her Covid was +. Her  is reportedly in our ICU with covid at present. She was placed on bipap and we were asked to admit her. She states she did not feel well at the time of her last discharge. States she was only home a few days before coming back. Started having issues with vomiting and worsening shortness of breath. She struggles remembering details and cannot tell me if she had any fevers or not. Labs show BNP as above, creatinine at or near her baseline, and hgb near recent levels of 9.2. CXR showed pulmonary edema like pattern. Review of Systems A comprehensive review of systems was negative except for that written in the HPI. Patient Active Problem List  
Diagnosis Code  
 HTN (hypertension) I10  
 AF (atrial fibrillation) ([facility]) I48.91  
 PAD (peripheral artery disease) ([facility]) I73.9  Diabetic neuropathy ([facility]) E11.40  Sepsis ([facility] A41.9  Bradycardia R00.1  A-V fistula ([facility]) I77.0  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 ([facility] E11.21  
 Encounter for medication management Z79.899  SARAH (obstructive sleep apnea) G47.33  
 CKD (chronic kidney disease) stage 5, GFR less than 15 ml/min ([facility]) N18.5  Parkinson disease ([facility] G20  
 Acute cystitis with hematuria N30.01  
 Morbid obesity ([facility] E66.01  
 Hypokalemia E87.6  
 C. difficile diarrhea A04.72  
 PAF (paroxysmal atrial fibrillation) ([facility]) I48.0  Elevated troponin R77.8  Chest pain R07.9  CAD (coronary artery disease) I25.10  Volume overload E87.70  Demand ischemia ([facility]) I24.8  Meningioma ([facility] D32.9  Acquired hypothyroidism E03.9  Anemia of chronic kidney failure, stage 4 (severe) ([facility]) N18.4, D63.1  Chronic systolic heart failure ([facility]) I50.22  
 Decreased hemoglobin R71.0  Depression F32.9  History of UTI Z87.440  Postural imbalance R29.3  Hypertensive emergency I16.1  Pneumonia due to COVID-19 virus U07.1, J12.82  
 Acute on chronic respiratory failure with hypoxia ([facility]) J96.21 Prior to Admission Medications Prescriptions Last Dose Informant Patient Reported? Taking? Insulin Syringe-Needle U-100 (BD INSULIN SYRINGE ULTRA-FINE) 1 mL 31 gauge x 5/16 syrg   No No  
Sig: DX E 10.65  Inject insulin tid Lactobacillus acidophilus (ACIDOPHILUS) cap   Yes No  
Sig: Take 2 Caps by mouth two (2) times a day. Lancets (ACCU-CHEK FASTCLIX) misc   No No  
Sig: Check bs TID. DX E11.9 OXYGEN-AIR DELIVERY SYSTEMS   Yes No  
Sig: 3 L by Nasal route continuous. amLODIPine (NORVASC) 5 mg tablet   No No  
Sig: Take 1 Tab by mouth daily. apixaban (ELIQUIS) 2.5 mg tablet   Yes No  
Sig: Take 2.5 mg by mouth two (2) times a day. benazepriL (LOTENSIN) 20 mg tablet   No No  
Sig: Take 1 Tab by mouth daily.   
betamethasone dipropionate (DIPROSONE) 0.05 % topical cream   Yes No  
Sig: MAURA EXT AA ON THE BODY BID  
carbidopa-levodopa (SINEMET)  mg per tablet   No No  
 Sig: Take 1 Tab by mouth four (4) times daily. Indications: a type of movement disorder called parkinsonism  
carvediloL (COREG) 12.5 mg tablet   No No  
Sig: Take 2 Tabs by mouth two (2) times daily (with meals). cyanocobalamin, vitamin B-12, 1,000 mcg/mL kit   Yes No  
Si,000 mcg by Injection route every month. on the 1st  
ergocalciferol (VITAMIN D2) 50,000 unit capsule   No No  
Sig: Take 1 Cap by mouth every seven (7) days. ezetimibe (ZETIA) 10 mg tablet   No No  
Sig: Take 1 Tab by mouth daily. ferrous sulfate 325 mg (65 mg iron) tablet   Yes No  
Sig: Take 324 mg by mouth daily. furosemide (LASIX) 40 mg tablet   No No  
Sig: Take 2 Tabs by mouth two (2) times a day. glucose blood VI test strips (ACCU-CHEK GUIDE) strip   No No  
Sig: Check BS TID. DX E11.9  
insulin NPH/insulin regular (HumuLIN 70/30 U-100 Insulin) 100 unit/mL (70-30) injection   No No  
Si units before breakfast, 25 units before supper  
levothyroxine (SYNTHROID) 25 mcg tablet   No No  
Sig: Take 1 Tab by mouth Daily (before breakfast). nitroglycerin (NITROSTAT) 0.4 mg SL tablet   No No  
Si Tab by SubLINGual route as needed for Chest Pain. Up to 3 doses. nystatin (MYCOSTATIN) topical cream   Yes No  
Sig: APPLY TO AFFECTED AREA TWICE DAILY. MIX WITH TRIAMCINOLONE CREAM PRIOR TO APPLICATION  
ondansetron hcl (ZOFRAN) 8 mg tablet   No No  
Sig: Take 1 Tab by mouth every eight (8) hours as needed for Nausea. pantoprazole (PROTONIX) 40 mg tablet   No No  
Sig: Take 1 Tab by mouth Before breakfast and dinner. potassium chloride (K-DUR, KLOR-CON) 20 mEq tablet   No No  
Sig: Take 1 Tab by mouth daily as needed (Only on days you take metolazone). Indications: prevention of low potassium in the blood  
prednisoLONE acetate (PRED FORTE) 1 % ophthalmic suspension   Yes No  
Sig: Administer 1 Drop to both eyes four (4) times daily.   
terbinafine HCl (LAMISIL AT) 1 % topical cream   No No  
 Sig: Apply  to affected area two (2) times a day. triamcinolone acetonide (KENALOG) 0.1 % topical cream   Yes No  
Sig: Apply  to affected area two (2) times a day. Facility-Administered Medications: None Past Medical History:  
Diagnosis Date  Adverse effect of anesthesia   
 difficult awakening  AF (atrial fibrillation) ([facility] 11/20/2011  Arthritis 11/18/2011  
 generalized  CAD (coronary artery disease) 2011 CABG x 4  
 Cervical disc disease Steroid injections  Chronic kidney disease  Chronic pain   
 back  DJD (degenerative joint disease)  Drainage from wound 10/3/2014  Full dentures  Gout   
 managed with medication  Hemorrhoid 11/5/2013  
 [place] (hard of hearing)  Hyperlipemia 11/10/2011  
 managed with medication  Hypertension   
 controlled with meds  Hypertriglyceridemia   
 managed with medication  Hypothyroidism   
 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   
 legs and arms, managed with medication  Obesity (BMI 30-39.9) 10/2/14 BMI 36.3  
 PAD (peripheral artery disease) ([facility]  PCI (pneumatosis cystoides intestinalis) 11/5/2013  Pleural effusion, bilateral 11/21/2011  Postoperative anemia due to acute blood loss 11/21/2011  
 expected  Renal mass 5/14/2016  Rib cage fracture 11/5/2013  
 x 2   
 S/P CABG (coronary artery bypass graft) 11/05/2013 CABG x 4  
 S/P CABG x 3 11/18/2011  Stasis edema of both lower extremities 1/3/2018  Status post-operative repair of hip fracture 09/15/2014  
 left side, repair with hardware  Stroke [facility])   
 left sided weakness residual   
 Type II or unspecified type diabetes mellitus without mention of complication, uncontrolled ([facility] 12/16/2013  
 oral and insulin reliant/ Avg / low BS s/s/ @ 70/ last A1c 8.3  Unspecified adverse effect of anesthesia   
 difficult to arouse after general anesthesia Past Surgical History:  
Procedure Laterality Date  CABG, ARTERY-VEIN, FOUR  Oct. 2011  
 CLOSE CYSTOSTOMY    
 exploratory with removal of mass of infection  HX CATARACT REMOVAL Bilateral 11/2012  
 with IOL implants, bilateral  
 HX COLONOSCOPY    
 HX HIP FRACTURE TX Left   
 repair with hardware  HX HYSTERECTOMY  HX LAP CHOLECYSTECTOMY  VASCULAR SURGERY PROCEDURE UNLIST Left 06/07/2017 AVF creation  VASCULAR SURGERY PROCEDURE UNLIST Left 08/10/2017 AVF elevation Social History Socioeconomic History  Marital status:  Spouse name: Not on file  Number of children: Not on file  Years of education: Not on file  Highest education level: Not on file Occupational History  Not on file Social Needs  Financial resource strain: Not on file  Food insecurity Worry: Not on file Inability: Not on file  Transportation needs Medical: Not on file Non-medical: Not on file Tobacco Use  Smoking status: Never Smoker  Smokeless tobacco: Never Used Substance and Sexual Activity  Alcohol use: No  
 Drug use: No  
  Types: Prescription  Sexual activity: Never Lifestyle  Physical activity Days per week: Not on file Minutes per session: Not on file  Stress: Not on file Relationships  Social connections Talks on phone: Not on file Gets together: Not on file Attends Latter day service: Not on file Active member of club or organization: Not on file Attends meetings of clubs or organizations: Not on file Relationship status: Not on file  Intimate partner violence Fear of current or ex partner: Not on file Emotionally abused: Not on file Physically abused: Not on file Forced sexual activity: Not on file Other Topics Concern  Not on file Social History Narrative  Not on file Family History Problem Relation Age of Onset  Heart Disease Mother  Cancer Mother   
     colon  Diabetes Mother  Cancer Father   
     lung  Cancer Sister   
     breast  
 Breast Cancer Sister 28  Cancer Brother Leukemia  Breast Cancer Maternal Aunt 54 Allergies Allergen Reactions  Baclofen Other (comments) Psychosis and altered mental status  Lipitor [Atorvastatin] Myalgia No current facility-administered medications for this encounter. Current Outpatient Medications Medication Sig  
 insulin NPH/insulin regular (HumuLIN 70/30 U-100 Insulin) 100 unit/mL (70-30) injection 25 units before breakfast, 25 units before supper  amLODIPine (NORVASC) 5 mg tablet Take 1 Tab by mouth daily.  pantoprazole (PROTONIX) 40 mg tablet Take 1 Tab by mouth Before breakfast and dinner.  carbidopa-levodopa (SINEMET)  mg per tablet Take 1 Tab by mouth four (4) times daily. Indications: a type of movement disorder called parkinsonism  betamethasone dipropionate (DIPROSONE) 0.05 % topical cream MAURA EXT AA ON THE BODY BID  ezetimibe (ZETIA) 10 mg tablet Take 1 Tab by mouth daily.  benazepriL (LOTENSIN) 20 mg tablet Take 1 Tab by mouth daily.  levothyroxine (SYNTHROID) 25 mcg tablet Take 1 Tab by mouth Daily (before breakfast).  carvediloL (COREG) 12.5 mg tablet Take 2 Tabs by mouth two (2) times daily (with meals).  furosemide (LASIX) 40 mg tablet Take 2 Tabs by mouth two (2) times a day.  nystatin (MYCOSTATIN) topical cream APPLY TO AFFECTED AREA TWICE DAILY. MIX WITH TRIAMCINOLONE CREAM PRIOR TO APPLICATION  
 Insulin Syringe-Needle U-100 (BD INSULIN SYRINGE ULTRA-FINE) 1 mL 31 gauge x 5/16 syrg DX E 10.65  Inject insulin tid  apixaban (ELIQUIS) 2.5 mg tablet Take 2.5 mg by mouth two (2) times a day.  nitroglycerin (NITROSTAT) 0.4 mg SL tablet 1 Tab by SubLINGual route as needed for Chest Pain. Up to 3 doses.  ondansetron hcl (ZOFRAN) 8 mg tablet Take 1 Tab by mouth every eight (8) hours as needed for Nausea.  potassium chloride (K-DUR, KLOR-CON) 20 mEq tablet Take 1 Tab by mouth daily as needed (Only on days you take metolazone). Indications: prevention of low potassium in the blood  ergocalciferol (VITAMIN D2) 50,000 unit capsule Take 1 Cap by mouth every seven (7) days.  prednisoLONE acetate (PRED FORTE) 1 % ophthalmic suspension Administer 1 Drop to both eyes four (4) times daily.  terbinafine HCl (LAMISIL AT) 1 % topical cream Apply  to affected area two (2) times a day.  Lactobacillus acidophilus (ACIDOPHILUS) cap Take 2 Caps by mouth two (2) times a day.  OXYGEN-AIR DELIVERY SYSTEMS 3 L by Nasal route continuous.  glucose blood VI test strips (ACCU-CHEK GUIDE) strip Check BS TID. DX E11.9  Lancets (ACCU-CHEK FASTCLIX) misc Check bs TID. DX E11.9  cyanocobalamin, vitamin B-12, 1,000 mcg/mL kit 1,000 mcg by Injection route every month. on the 1st  
 ferrous sulfate 325 mg (65 mg iron) tablet Take 324 mg by mouth daily.  triamcinolone acetonide (KENALOG) 0.1 % topical cream Apply  to affected area two (2) times a day. Objective:  
 
Vitals:  
 01/04/21 2624 01/04/21 7803 01/04/21 7450 BP: (!) 238/139  (!) 195/86 Pulse: 88  78 Resp: (!) 34 Temp: 98.7 °F (37.1 °C) SpO2: 91% 92% Weight: 275 lb (124.7 kg) Height: 5' 9\" (1.753 m) PHYSICAL EXAM  
 
Constitutional:  the patient is well developed and in no acute distress EENMT:  Sclera clear, pupils equal, oral mucosa moist 
Respiratory: Difficult to hear due to obesity and bipap. Mild B crackles. Cardiovascular:  RRR without M,G,R 
Gastrointestinal: soft and non-tender; with positive bowel sounds. Musculoskeletal: warm without cyanosis. There is 1+ B lower extremity edema. Skin:  no jaundice or rashes, LLE wounds Neurologic: no gross neuro deficits Psychiatric:  alert and oriented x person, place. Poor detail recall CXR: 
CXR Results  (Last 48 hours) 01/04/21 0841  XR CHEST PORT Final result Impression:  IMPRESSION:  
1.  Pulmonary vascular congestion with perihilar airspace disease in a pattern  
which appears to reflect pulmonary edema or less likely multifocal lung  
infiltrates. 2.  Enlarged cardiac silhouette. Narrative:  EXAM: CHEST X-RAY, 1 VIEW INDICATION: Shortness of breath COMPARISON: Chest x-ray 12/25/2020 TECHNIQUE: Single AP view of the chest was obtained. FINDINGS: Pulmonary vascular congestion with interval development of perihilar  
airspace disease. No pneumothorax or pleural effusion. The cardiac silhouette is  
enlarged. The osseous structures are unremarkable. Recent Labs 01/04/21 
0831 WBC 8.2 HGB 9.2* HCT 29.9*  
 Recent Labs 01/04/21 
0831   
K 5.1 * * CO2 21 BUN 81* CREA 3.71* MG 2.3 CA 8.9 ALB 3.2 TBILI 0.7 ALT 22 Recent Labs 01/04/21 
0840 PHI 7.36  
PCO2I 29.7*  
PO2I 72* HCO3I 16.8* Recent Labs 01/04/21 
1109 01/04/21 
0831 LAC 2.0 2.0 Assessment:  (Medical Decision Making) Hospital Problems  Date Reviewed: 1/4/2021 Codes Class Noted POA * (Principal) Pneumonia due to COVID-19 virus ICD-10-CM: U07.1, J12.82 ICD-9-CM: 480.8  1/4/2021 Unknown Acute on chronic respiratory failure with hypoxia [facility]) ICD-10-CM: J96.21 
ICD-9-CM: 518.84, 799.02  1/4/2021 Unknown Chronic systolic heart failure ([facility]) ICD-10-CM: I50.22 ICD-9-CM: 428.22  7/14/2019 Yes Volume overload ICD-10-CM: E87.70 ICD-9-CM: 276.69  7/8/2019 Yes PAF (paroxysmal atrial fibrillation) ([facility]) ICD-10-CM: I48.0 ICD-9-CM: 427.31  7/7/2019 Yes Morbid obesity ([facility] (Chronic) ICD-10-CM: E66.01 
ICD-9-CM: 278.01  6/29/2019 Yes CKD (chronic kidney disease) stage 5, GFR less than 15 ml/min ([facility]) (Chronic) ICD-10-CM: N18.5 ICD-9-CM: 585.5  11/30/2018 Yes SARAH (obstructive sleep apnea) (Chronic) ICD-10-CM: G47.33 
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 
ICD-9-CM: 250.40, 583.81  11/15/2017 Yes A-V fistula ([facility]) (Chronic) ICD-10-CM: I77.0 ICD-9-CM: 447.0  9/6/2017 Yes Overview Signed 9/6/2017  2:35 PM by LESTER Nguyen  
  6/7/2017 - Doug Cooney MD - creation left brachiocephalic AV fistula 8/10/2017 Rubio Hatfield MD - elevation left b-c AVF 
  
  
   
 HTN (hypertension) (Chronic) ICD-10-CM: I10 
ICD-9-CM: 401.9  11/10/2011 Yes Pt with acute on chronic respiratory failure. Difficult to tell if this is due to covid or heart failure. [place] elevated [place] [address] but recent TTE with preserved EF. Plan:  (Medical Decision Making) --Will admit for further medical management to ICU with bipap. --start dexa, consent for plasma.  
-not a candidate for remdesivir with renal dysfunction. -will repeat TTE [place] 155[address] but normal recent TTE.  
-consult nephrology with already elevated creatinine and covid dianosis. -try to keep relatively dry. -wound care consult for LLE wounds.  
-daily labs. -check d dimer.  
-prophylactic heparin. More than 50% of the time documented was spent in face-to-face contact with the patient and in the care of the patient on the floor/unit where the patient is located.  
 
Anabell Dominguez MD

## 2021-01-04 NOTE — CONSULTS
Nephrology consult Admission Date: 
1/4/2021 Admission Diagnosis Pneumonia due to COVID-19 virus [U07.1, J12.82] We are asked by Dr. Felicitas Lopez History of Present Illness: Ms. Solomon Boyce is a [de-identified] y.o female with PMH significant for Atrial Fib, CAD, s/p CABG x4, CKD, DM, HTN, SARAH, HLD and hypothyroidism admitted with complaints of shortness of breath and hypertensive 238/139, COVID 19 + on rapid test, troponin 626.1, pBNP 66,490, cxr with evidence of pulmonary edema, ECHO pending. Started on decadron. We are consulted for ANTONIO/CKD. From a renal standpoint her Cr on admission was 3.71, baseline Cr mid to upper 3s. Home meds significant for PPI, lasix, ACEi. Patient seen and examined in ICU COVID she is on BiPap 30% O2, drowsy but arousable, SOB, LE edema, thirst, malaise with poor intake and decrease in uop for the last 3 days, states  has been hospitalized for the last week. Denies CP, N/V/D, fever/chills, dizziness or syncope. DW ICU RNrae with UOP. Past Medical History:  
Diagnosis Date  Adverse effect of anesthesia   
 difficult awakening  AF (atrial fibrillation) ([facility] [address] 11/20/2011  Arthritis 11/18/2011  
 generalized  CAD (coronary artery disease) 2011 CABG x 4  
 Cervical disc disease Steroid injections  Chronic kidney disease  Chronic pain   
 back  DJD (degenerative joint disease)  Drainage from wound 10/3/2014  Full dentures  Gout   
 managed with medication  Hemorrhoid 11/5/2013  
 [place] (hard of hearing)  Hyperlipemia 11/10/2011  
 managed with medication  Hypertension   
 controlled with meds  Hypertriglyceridemia   
 managed with medication  Hypothyroidism   
 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   
 legs and arms, managed with medication  Obesity (BMI 30-39.9) 10/2/14 BMI 36.3  PAD (peripheral artery disease) ([facility] [address]  PCI (pneumatosis cystoides intestinalis) 11/5/2013  Pleural effusion, bilateral 11/21/2011  Postoperative anemia due to acute blood loss 11/21/2011  
 expected  Renal mass 5/14/2016  Rib cage fracture 11/5/2013  
 x 2   
 S/P CABG (coronary artery bypass graft) 11/05/2013 CABG x 4  
 S/P CABG x 3 11/18/2011  Stasis edema of both lower extremities 1/3/2018  Status post-operative repair of hip fracture 09/15/2014  
 left side, repair with hardware  Stroke [facility])   
 left sided weakness residual   
 Type II or unspecified type diabetes mellitus without mention of complication, uncontrolled ([facility] [address] 12/16/2013  
 oral and insulin reliant/ Avg / low BS s/s/ @ 70/ last A1c 8.3  Unspecified adverse effect of anesthesia   
 difficult to arouse after general anesthesia Past Surgical History:  
Procedure Laterality Date  CABG, ARTERY-VEIN, FOUR  Oct. 2011  
 CLOSE CYSTOSTOMY    
 exploratory with removal of mass of infection  HX CATARACT REMOVAL Bilateral 11/2012  
 with IOL implants, bilateral  
 HX COLONOSCOPY    
 HX HIP FRACTURE TX Left   
 repair with hardware  HX HYSTERECTOMY  HX LAP CHOLECYSTECTOMY  VASCULAR SURGERY PROCEDURE UNLIST Left 06/07/2017 AVF creation  VASCULAR SURGERY PROCEDURE UNLIST Left 08/10/2017 AVF elevation Current Facility-Administered Medications Medication Dose Route Frequency  [START ON 1/5/2021] amLODIPine (NORVASC) tablet 5 mg  5 mg Oral DAILY  [START ON 1/5/2021] lisinopriL (PRINIVIL, ZESTRIL) tablet 10 mg  10 mg Oral DAILY  carbidopa-levodopa (SINEMET)  mg per tablet 1 Tab  1 Tab Oral QID  carvediloL (COREG) tablet 25 mg  25 mg Oral BID WITH MEALS  
 . PHARMACY TO SUBSTITUTE PER PROTOCOL (Reordered from: cyanocobalamin, vitamin B-12, 1,000 mcg/mL kit)    Per Protocol  ergocalciferol capsule 50,000 Units  50,000 Units Oral Q7D  
  [START ON 1/5/2021] ezetimibe (ZETIA) tablet 10 mg  10 mg Oral DAILY  [START ON 1/5/2021] ferrous sulfate tablet 324 mg  324 mg Oral DAILY  furosemide (LASIX) tablet 80 mg  80 mg Oral BID  [START ON 1/5/2021] levothyroxine (SYNTHROID) tablet 25 mcg  25 mcg Oral ACB  pantoprazole (PROTONIX) tablet 40 mg  40 mg Oral ACB&D  
 sodium chloride (NS) flush 5-40 mL  5-40 mL IntraVENous Q8H  
 sodium chloride (NS) flush 5-40 mL  5-40 mL IntraVENous PRN  
 heparin (porcine) injection 5,000 Units  5,000 Units SubCUTAneous Q8H  
 insulin lispro (HUMALOG) injection   SubCUTAneous Q6H  
 dexamethasone (DECADRON) 4 mg/mL injection 6 mg  6 mg IntraVENous DAILY  0.9% sodium chloride infusion 250 mL  250 mL IntraVENous PRN Allergies Allergen Reactions  Baclofen Other (comments) Psychosis and altered mental status  Lipitor [Atorvastatin] Myalgia Social History Tobacco Use  Smoking status: Never Smoker  Smokeless tobacco: Never Used Substance Use Topics  Alcohol use: No  
  
Family History Problem Relation Age of Onset  Heart Disease Mother  Cancer Mother   
     colon  Diabetes Mother  Cancer Father   
     lung  Cancer Sister   
     breast  
 Breast Cancer Sister 28  Cancer Brother Leukemia  Breast Cancer Maternal Aunt 54 Review of Systems Gen - no fever, no chills, appetite poor HEENT - no sore throat, no decreased vision, no hearing loss CV - no chest pain, no palpitation, no orthopnea Lung - + shortness of breath, + cough, no hemoptysis Abd - no tenderness, no nausea/vomiting, no bloody stool Ext - + LE edema, no clubbing, no cyanosis Musculoskeletal - no joint pain, no back pain Neurologic - no headaches, no dizziness, no seizures Psychiatric - no anxiety, no depression Skin - no rashes, no pupura Genitourinary - + decreased urine output, no hematuria, no dysuria Objective:  
 
Vitals: 01/04/21 1400 01/04/21 1419 01/04/21 1455 01/04/21 1509 BP: (!) 182/78 (!) 185/80 Pulse: 76 74  67 Resp: 23 24  30 Temp:    98.2 °F (36.8 °C) SpO2: 97% 94% 97% 97% Weight:    117 kg (258 lb) Height:      
 
No intake or output data in the 24 hours ending 01/04/21 1546 Physical Exam 
GEN :in no distress, drowsy but arousable, oriented HEENT: anicteric sclerae,  Dry mucous membranes Neck - supple without JVD 
CV - regular rate, S1, S2, no Rub Lung - clear upper lung sounds, diminished bases bilaterally, lungs expand symmetrically Abd - soft, nontender, bowel sounds present, no hepatosplenomegaly Ext - no clubbing, no cyanosis, 1+ BLE edema Neurologic - nonfocal 
Genitourinary - bladder nonpalpable, mcbride Skin - no rashes, no purpura Psychiatric: Normal mood and affect. Data Review:  
Recent Labs 01/04/21 
0831 WBC 8.2 HGB 9.2* HCT 29.9*  
 Recent Labs 01/04/21 
0831   
K 5.1 * CO2 21 BUN 81* CREA 3.71* * CA 8.9 MG 2.3 No results for input(s): PH, PCO2, PO2, PCO2 in the last 72 hours. Problem List:  
 
Patient Active Problem List  
 Diagnosis Date Noted  Pneumonia due to COVID-19 virus 01/04/2021  Acute on chronic respiratory failure with hypoxia ([facility] [address] 01/04/2021  Hypertensive emergency 12/17/2020  Postural imbalance 10/23/2020  Meningioma ([facility] [address] 12/03/2019  
 History of UTI 07/18/2019  Acquired hypothyroidism 07/15/2019  Anemia of chronic kidney failure, stage 4 (severe) ([address] 07/15/2019  Depression 07/15/2019  Chronic systolic heart failure ([address] 07/14/2019  Volume overload 07/08/2019  PAF (paroxysmal atrial fibrillation) ([address] 07/07/2019  Elevated troponin 07/07/2019  Chest pain 07/07/2019  CAD (coronary artery disease) 07/07/2019  Demand ischemia ([address] 07/07/2019  
 C. 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  
 HTN (hypertension) 11/10/2011 Impression: 
 
Plan: 1. ANTONIO/CKD IV close to baseline mid to upper 3s, Cr 3.71, probable ATN in the setting of COVID 
-continue lasix 80 mg BID, evidence of volume up with edema and pulm edema on cxr, hold ACEi 
-obtain renal US, UA, phos,  P/C ratio, no indication for acute RRT  
-trend renal function with strict I&O 2. COVID 19 + plan for convalescent plasma, on decadron 3. Anemia trend, transfuse hgb <7.0 4. HTN continue norvasc, coreg titrate prn, hold ACEi 5. DM type II- SSI per hosp

## 2021-01-04 NOTE — ED PROVIDER NOTES
Patient is 80-year-old female brought into the emergency department by EMS in acute respiratory distress for the last 24 hours. The patient is on 3 L nasal cannula at home but when EMS found her she was off her oxygen. They put her on a nonrebreather and oxygen saturation increased to 99%. The patient has a dialysis fistula in the left arm which is never been used according to her her daughter. They put it in 3 years ago but her kidney function stabilized and she did not require hemodialysis. The patients  is currently in the ICU with coronavirus and her daughter feels like she has the symptoms as well. They all live together. I talked to the patient about her difficulty breathing and she is adamant that she does not want to be intubated. I did explain to her that if her breathing was to get worse it could lead to death and she expressed understanding. Past Medical History:  
Diagnosis Date  Adverse effect of anesthesia   
 difficult awakening  AF (atrial fibrillation) ([facility] [address] 11/20/2011  Arthritis 11/18/2011  
 generalized  CAD (coronary artery disease) 2011 CABG x 4  
 Cervical disc disease Steroid injections  Chronic kidney disease  Chronic pain   
 back  DJD (degenerative joint disease)  Drainage from wound 10/3/2014  Full dentures  Gout   
 managed with medication  Hemorrhoid 11/5/2013  
 [place] (hard of hearing)  Hyperlipemia 11/10/2011  
 managed with medication  Hypertension   
 controlled with meds  Hypertriglyceridemia   
 managed with medication  Hypothyroidism   
 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   
 legs and arms, managed with medication  Obesity (BMI 30-39.9) 10/2/14 BMI 36.3  
 PAD (peripheral artery disease) ([facility] [address]  PCI (pneumatosis cystoides intestinalis) 11/5/2013  Pleural effusion, bilateral 11/21/2011  Postoperative anemia due to acute blood loss 11/21/2011  
 expected  Renal mass 5/14/2016  Rib cage fracture 11/5/2013  
 x 2   
 S/P CABG (coronary artery bypass graft) 11/05/2013 CABG x 4  
 S/P CABG x 3 11/18/2011  Stasis edema of both lower extremities 1/3/2018  Status post-operative repair of hip fracture 09/15/2014  
 left side, repair with hardware  Stroke [facility])   
 left sided weakness residual   
 Type II or unspecified type diabetes mellitus without mention of complication, uncontrolled ([facility] [address] 12/16/2013  
 oral and insulin reliant/ Avg / low BS s/s/ @ 70/ last A1c 8.3  Unspecified adverse effect of anesthesia   
 difficult to arouse after general anesthesia Past Surgical History:  
Procedure Laterality Date  CABG, ARTERY-VEIN, FOUR  Oct. 2011  
 CLOSE CYSTOSTOMY    
 exploratory with removal of mass of infection  HX CATARACT REMOVAL Bilateral 11/2012  
 with IOL implants, bilateral  
 HX COLONOSCOPY    
 HX HIP FRACTURE TX Left   
 repair with hardware  HX HYSTERECTOMY  HX LAP CHOLECYSTECTOMY  VASCULAR SURGERY PROCEDURE UNLIST Left 06/07/2017 AVF creation  VASCULAR SURGERY PROCEDURE UNLIST Left 08/10/2017 AVF elevation Family History:  
Problem Relation Age of Onset  Heart Disease Mother  Cancer Mother   
     colon  Diabetes Mother  Cancer Father   
     lung  Cancer Sister   
     breast  
 Breast Cancer Sister 28  Cancer Brother Leukemia  Breast Cancer Maternal Aunt 54 Social History Socioeconomic History  Marital status:  Spouse name: Not on file  Number of children: Not on file  Years of education: Not on file  Highest education level: Not on file Occupational History  Not on file Social Needs  Financial resource strain: Not on file  Food insecurity Worry: Not on file Inability: Not on file  Transportation needs Medical: Not on file Non-medical: Not on file Tobacco Use  Smoking status: Never Smoker  Smokeless tobacco: Never Used Substance and Sexual Activity  Alcohol use: No  
 Drug use: No  
  Types: Prescription  Sexual activity: Never Lifestyle  Physical activity Days per week: Not on file Minutes per session: Not on file  Stress: Not on file Relationships  Social connections Talks on phone: Not on file Gets together: Not on file Attends Voodoo service: Not on file Active member of club or organization: Not on file Attends meetings of clubs or organizations: Not on file Relationship status: Not on file  Intimate partner violence Fear of current or ex partner: Not on file Emotionally abused: Not on file Physically abused: Not on file Forced sexual activity: Not on file Other Topics Concern  Not on file Social History Narrative  Not on file ALLERGIES: Baclofen and Lipitor [atorvastatin] Review of Systems Respiratory: Positive for chest tightness and shortness of breath. Cardiovascular: Positive for leg swelling. All other systems reviewed and are negative. Vitals:  
 01/04/21 0353 01/04/21 2498 01/04/21 1706 BP: (!) 238/139  (!) 195/86 Pulse: 88  78 Resp: (!) 34 Temp: 98.7 °F (37.1 °C) SpO2: 91% 92% Weight: 124.7 kg (275 lb) Height: 5' 9\" (1.753 m) Physical Exam  
 
GENERAL:The patient has Body mass index is 40.61 kg/m². Well-hydrated. Acute respiratory distress. VITAL SIGNS: Heart rate, blood pressure, respiratory rate reviewed as recorded in 
nurse's notes EYES: Pupils reactive. Extraocular motion intact. No conjunctival redness or drainage. NECK: Supple, no meningeal signs. Trachea midline. No masses or thyromegaly. LUNGS: accessory muscle use with respiration. The patient's breathing is rapid with shallow respirations no obvious wheezing appreciated. Poor breath sounds heard in the lower lung fields probable secondary to rate of breathing and body habitus. CARDIOVASCULAR: Regular rate and rhythm no murmurs gallops or rubs appreciated. ABDOMEN: Soft without tenderness. No palpable masses or organomegaly. No 
peritoneal signs. No rigidity. EXTREMITIES: No clubbing or cyanosis. No joint swelling. Normal muscle tone. No 
restricted range of motion appreciated. NEUROLOGIC: Sensation is grossly intact. Cranial nerve exam reveals face is 
symmetrical, tongue is midline speech is clear. SKIN: No rash or petechiae. Good skin turgor palpated. PSYCHIATRIC: Alert and oriented. Appropriate behavior and judgment. MDM Number of Diagnoses or Management Options Diagnosis management comments: CHF, COPD, pneumonia, PE, 
 
MI, coronary artery disease, unstable angina, coronary artery disease, Atrial fibrillation, cardiac arrhythmia, PVC, medication induced palpitations, heart block, 
electrolyte induced palpitations, Aortic dissection, aortic aneurysm, GERD, musculoskeletal pain, costochondritis, rib fracture, pleurisy, Amount and/or Complexity of Data Reviewed Clinical lab tests: reviewed and ordered Tests in the radiology section of CPT®: ordered and reviewed Tests in the medicine section of CPT®: ordered and reviewed Obtain history from someone other than the patient: yes Review and summarize past medical records: yes Discuss the patient with other providers: yes Independent visualization of images, tracings, or specimens: yes ED Course as of Jan 04 1021 Keven Ana Jan 04, 2021  
8991 Case was discussed with the patient's daughter Zuly Berger [phone] and she confirmed that her mom has never wanted to be placed on life support or have intubation. Niko Abdullahi 1572 Patient is feeling much better after the BiPAP was placed on her. Her oxygen saturations still is around 93 to 94%. I talked to her about the need for admission to the hospital which she is agreeable with. Marybel Jiang 0945 COVID-19 rapid test(!!): Detected [KH] 1005 Troponin-High Sensitivity(!!): 626.1 [KH] 1005 NT pro-BNP(!): 66,490 [KH] 1020 Case was discussed with the intensivist who will come and see the patient and plan on admitting her to the hospital.  
 Marybel Jiang ED Course User Index [KH] Mike Peace DO  
 
 
Critical Care Performed by: Mike Peace DO Authorized by: Mike Peace DO Comments:  
   Critical care time: 107 minutes of critical care time was performed in the emergency department. This was separate from any other procedures listed during the patients emergency department course. The failure to initiate these interventions on an urgent basis would likely have resulted in sudden, clinically significant or life-threatening deterioration in the patients condition.

## 2021-01-04 NOTE — PROGRESS NOTES
overheard conversation with staff and patient for care goals 
 has visited with patient in the past 
will follow closely as the care continues

## 2021-01-04 NOTE — ED TRIAGE NOTES
Pt arrived via [facility] from home c/o shob, weakness, and AMS per the family. Pt wears 3L NC at home all the time and was found at 88% RA. Pt arrived on 12L NRB. Pt's  is COVID positive since Friday. Denies NVD, cp, fever

## 2021-01-05 NOTE — PROGRESS NOTES
Bedside shift change report given to Shahbaz Billings RN (oncoming nurse) by Delilah Carter RN (offgoing nurse). Report included the following information SBAR, Kardex, ED Summary, Intake/Output, MAR, Recent Results and Cardiac Rhythm a-fib.

## 2021-01-05 NOTE — PROGRESS NOTES
[address] Admission Date: 1/4/2021 Renal Daily Progress Note: 1/5/2021 The patient's chart is reviewed and the patient is discussed with the staff. Follow up ANTONIO/CKD IV Subjective:  
Patient seen and examined on [facility] ICU now on 40 % optiflo, with exertional SOB, cough, weakness, thristy. Labs and chart reviewed- renal function holding, non oliguric ROS:  
General: no fever/chills, appetite ok CV: no CP Lung: + SOB, + cough GI: no N/V/D Ext: + edema, right foot wound dressing intact Current Facility-Administered Medications Medication Dose Route Frequency  amLODIPine (NORVASC) tablet 5 mg  5 mg Oral DAILY  [Held by provider] lisinopriL (PRINIVIL, ZESTRIL) tablet 10 mg  10 mg Oral DAILY  carbidopa-levodopa (SINEMET)  mg per tablet 1 Tab  1 Tab Oral QID  carvediloL (COREG) tablet 25 mg  25 mg Oral BID WITH MEALS  ergocalciferol capsule 50,000 Units  50,000 Units Oral Q7D  
 ezetimibe (ZETIA) tablet 10 mg  10 mg Oral DAILY  ferrous sulfate tablet 324 mg  324 mg Oral DAILY  furosemide (LASIX) tablet 80 mg  80 mg Oral BID  levothyroxine (SYNTHROID) tablet 25 mcg  25 mcg Oral ACB  pantoprazole (PROTONIX) tablet 40 mg  40 mg Oral ACB&D  
 sodium chloride (NS) flush 5-40 mL  5-40 mL IntraVENous Q8H  
 sodium chloride (NS) flush 5-40 mL  5-40 mL IntraVENous PRN  
 heparin (porcine) injection 5,000 Units  5,000 Units SubCUTAneous Q8H  
 insulin lispro (HUMALOG) injection   SubCUTAneous Q6H  
 dexamethasone (DECADRON) 4 mg/mL injection 6 mg  6 mg IntraVENous DAILY  0.9% sodium chloride infusion 250 mL  250 mL IntraVENous PRN Objective:  
 
Vitals:  
 01/05/21 0900 01/05/21 0906 01/05/21 0929 01/05/21 1000 BP: (!) 147/72  (!) 123/57 134/69 Pulse: 79  68 62 Resp: 28  (!) 31 Temp:      
SpO2: 99% 98% 98% 95% Weight:      
Height:      
 
Intake and Output:  
01/03 1901 - 01/05 0700 In: 220 Out: 1150 [[facility] [place]:8630] 01/05 0701 - 01/05 1900 In: -  
Out: 200 [Urine:200] Physical Exam:  
Constitutional:  the patient is well developed and in no acute distress, alert and oriented HEENT:  Sclera clear, pupils equal, oral mucosa moist 
Lungs: rhonchi bilaterally Cardiovascular:  Regular rate, S1, S2, no rub Abd/GI: soft and non-tender; with positive bowel sounds. Ext: warm without cyanosis. There is trace lower leg edema, right foot wound with dressing intact. Skin:  no jaundice or rashes Neuro: no gross neuro deficits Psychiatric: Calm. Access: LUE AVF +bruit, + thrill LAB Recent Labs 01/05/21 
8931 01/04/21 
0831 WBC 7.5 8.2 HGB 7.7* 9.2* HCT 24.5* 29.9*  
 231 Recent Labs 01/05/21 
8763 01/04/21 
1726 01/04/21 
0831   --  138  
K 4.6  --  5.1 *  --  108* CO2 22  --  21  
*  --  391* BUN 94*  --  81* CREA 3.76*  --  3.71* MG  --   --  2.3 PHOS  --  6.4*  --   
ALB  --   --  3.2 No results for input(s): PH, PCO2, PO2, HCO3 in the last 72 hours. Assessment:  (Medical Decision Making) Hospital Problems  Date Reviewed: 1/4/2021 Codes Class Noted POA * (Principal) Pneumonia due to COVID-19 virus ICD-10-CM: U07.1, J12.82 ICD-9-CM: 480.8  1/4/2021 Unknown Acute on chronic respiratory failure with hypoxia [facility]) ICD-10-CM: J96.21 
ICD-9-CM: 518.84, 799.02  1/4/2021 Unknown Chronic systolic heart failure (HCC) ICD-10-CM: I50.22 ICD-9-CM: 428.22  7/14/2019 Yes Volume overload ICD-10-CM: E87.70 ICD-9-CM: 276.69  7/8/2019 Yes PAF (paroxysmal atrial fibrillation) (HCC) ICD-10-CM: I48.0 ICD-9-CM: 427.31  7/7/2019 Yes Morbid obesity ([facility] [address] (Chronic) ICD-10-CM: E66.01 
ICD-9-CM: 278.01  6/29/2019 Yes CKD (chronic kidney disease) stage 5, GFR less than 15 ml/min (HCC) (Chronic) ICD-10-CM: N18.5 ICD-9-CM: 585.5  11/30/2018 Yes SARAH (obstructive sleep apnea) (Chronic) ICD-10-CM: G47.33 
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 
ICD-9-CM: 250.40, 583.81  11/15/2017 Yes A-V fistula (HCC) (Chronic) ICD-10-CM: I77.0 ICD-9-CM: 447.0  9/6/2017 Yes Overview Signed 9/6/2017  2:35 PM by LESTER Fam  
  6/7/2017 - Edil Austin MD - creation left brachiocephalic AV fistula 8/10/2017 Prudencio De La Paz MD - elevation left b-c AVF 
  
  
   
 HTN (hypertension) (Chronic) ICD-10-CM: I10 
ICD-9-CM: 401.9  11/10/2011 Yes Plan:  (Medical Decision Making) 1. ANTONIO/CKD IV close to baseline mid to upper 3s, probable ATN in the setting of COVID, renal function holding, non oliguric  
-continue lasix 80 mg BID, evidence of volume up with edema and pulm edema on cxr, hold ACEi 
- renal US with CKD, no hydronephrosis, UA + protein, moderate LE, phos 6.4,  P/C ratio 3.5, no indication for acute RRT  
-she does have a patent LUE AVF if needed continue to trend renal function with strict I&O 
  
2. COVID 19 + s/p convalescent plasma, on decadron 
  
3. Anemia trend, transfuse hgb <7.0 
  
4. HTN continue norvasc, coreg titrate prn, hold ACEi 
  
5. DM type II- SSI per hosp Doretha Emmanuel NP

## 2021-01-05 NOTE — PROGRESS NOTES
Dr. Uli Dawn called about pt's BGL= 392. Orders to give 10 units of Regular insulin SQ and 10 units of Regular insulin IV. Recheck BGL in 2 hours.

## 2021-01-05 NOTE — PROGRESS NOTES
Benedicto Jackson 149 COVID-19 Note: 
 
Critical Care Note: 1/5/2021 [address] Admission Date: 1/4/2021     Length of Stay: 1 days Background: [de-identified] y.o. y/o female with acute hypoxemic respiratory failure secondary to COVID-19. Course complicated by highly elevated BNP at 66K, elevated troponin. Onset of COVID-19 symptoms: 12/26 Notable PMH: DM, HTN, CKD, SARAH, CHF, moderate MR 
 
24 Hour events:  
FSBS highly elevated in 390 range. Hgb dropped from 9.2 to 7.7 D-dimer only 0.71 Creatinine elevated at 3.76 Tm 100.3 Currently on 50lpm and 40% airvo Says she wants to be DNR REVIEW OF SYSTEMS: 
Constitutional:  + fevere and chills, nonight sweats, weight loss, weight gain, persistent fatigue, or lethargy/hypersomnolence. CV: No chest pain, pressure, discomfort, palpitations, orthopnea, murmurs, or edema. GI: No dysphagia, heartburn reflux, nausea/vomiting, diarrhea, abdominal pain, or bleeding. Neuro: There is no history of AMS, persistent headache, decreased level of consciousness, seizures, or motor or sensory deficits. Lines: (insertion date) PIVs only Drips: current dose (range) 
none Visit Vitals /69 Pulse 62 Temp 100.3 °F (37.9 °C) Resp (!) 31 Ht 5' 9\" (1.753 m) Wt 258 lb (117 kg) SpO2 95% BMI 38.10 kg/m² Pertinent Exam:           
Constitutional:  intubated and mechanically ventilated. EENMT:  Sclera clear, pupils equal, oral mucosa moist 
Respiratory:  crackles Cardiovascular:  RRR Gastrointestinal:  soft with no tenderness; positive bowel sounds present Musculoskeletal:  warm with no cyanosis, trace lower extremity edema Skin:  no jaundice or ecchymosis Neurologic:normal 
Psychiatric: sedated and paralyzed CXR None today Pertinent Labs:  
Recent Labs 01/05/21 
8279 01/04/21 
0831 WBC 7.5 8.2 HGB 7.7* 9.2* HCT 24.5* 29.9*  
 231 Recent Labs 01/05/21 
1015 01/04/21 
1726 01/04/21 
0831   --  138  
K 4.6  --  5.1 *  --  108* CO2 22  --  21  
*  --  391* BUN 94*  --  81* CREA 3.76*  --  3.71* MG  --   --  2.3 CA 8.6  --  8.9 PHOS  --  6.4*  --   
ALB  --   --  3.2 TBILI  --   --  0.7 ALT  --   --  22 Recent Labs 01/05/21 
0541 01/05/21 
0206 01/05/21 
0030 GLUCPOC 230* 344* 392* SARS-CoV-2 LAB Results 1/4 rapid test positive MICRO Date Source Result 1/4 blood NG  
1/4 blood NG  
 
COVID-19 Meds: 
Dexamethasone 6mg daily (1/4) Convalescent plasma transfusion (1/4) Remdesivir Not a candidate Anti-infectives (start date): 
 
Ventilator Settings:  5' 9\" (1.753 m) Mode FIO2 Rate Tidal Volume Pressure PEEP  
   40 % Peak airway pressure:    
Minute ventilation: 21.9 l/min ABG:  
Recent Labs 01/04/21 
0840 PHI 7.36  
PCO2I 29.7*  
PO2I 72* HCO3I 16.8* Impression: [de-identified] y.o. y/o female with acute hypoxemic respiratory failure secondary to COVID-19, CKD, h/o CHF, severe hyperglycemia on steroids Patient Active Problem List  
Diagnosis Code  
 HTN (hypertension) I10  
 AF (atrial fibrillation) ([facility]) I48.91  
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  
 Encounter for medication management Z79.899  SARAH (obstructive sleep apnea) G47.33  
 CKD (chronic kidney disease) stage 5, GFR less than 15 ml/min ([facility]) N18.5  Parkinson disease ([facility] G20  
 Acute cystitis with hematuria N30.01  
 Morbid obesity ([facility] E66.01  
 Hypokalemia E87.6  
 C. difficile diarrhea A04.72  
 PAF (paroxysmal atrial fibrillation) (HCC) I48.0  Elevated troponin R77.8  Chest pain R07.9  CAD (coronary artery disease) I25.10  Volume overload E87.70  Demand ischemia (HCC) I24.8  Meningioma ([address] D32.9  Acquired hypothyroidism E03.9  Anemia of chronic kidney failure, stage 4 (severe) (HCC) N18.4, D63.1  Chronic systolic heart failure ([facility]) I50.22  
 Decreased hemoglobin R71.0  Depression F32.9  History of UTI Z87.440  Postural imbalance R29.3  Hypertensive emergency I16.1  Pneumonia due to COVID-19 virus U07.1, J12.82  
 Acute on chronic respiratory failure with hypoxia ([facility]) J96.21  
 
1. COVID-19 pneumonia: continue steroids and close monitoring of O2 levels, bipap prn.  
2.  DM with hyperglycemia:  Add lantus 10U 
3. CHF:  Continue diuresis, monitoring renal function. TTE ordered but not done. 4.  Ppx: heparin, PPI 
 
consult Family updated by phone after rounds. Daughter Warden Ochoa can be reached at [phone] or [phone].    is in hospital. 
 
 
Alda Royal MD

## 2021-01-05 NOTE — WOUND CARE
Patient seen for left foot/toe wounds. They are chronic and shallow. Dorsal foot wound measured 2.6x1.5 x 0.2 cm with pink and tan base. also 2nd and 3rd have moist brown scab wounds. Cleansed all sites and xeroform used to cover each wound. Dry gauze and kerlix used to wrap. Floated heels off bed. Recommend heel medx boots for prevention. Is in covid ICU at present. Continue prevention measures for skin breakdown. Discussed with primary nurse. Will continue to monitor.

## 2021-01-05 NOTE — PROGRESS NOTES
Dr. Ingrid Hernandez called about pt's BGL- 443 and insulin given per protocol. Orders to recheck BGL in one hour. Orders to give missed dose of PO lasix.

## 2021-01-05 NOTE — PROGRESS NOTES
Briefed by interdisciplinary staff regarding patient/family needs. Chaplains remain available for patient/family care. Alejandro Campos MDiv Board Certified [employer]

## 2021-01-05 NOTE — PROGRESS NOTES
Bedside shift change report given to Rambo Kelley (oncoming nurse) by Kateryna Castro RN (offgoing nurse). Report included the following information SBAR, Kardex, ED Summary, Intake/Output, MAR, Recent Results and Cardiac Rhythm a-fib.

## 2021-01-06 NOTE — PROGRESS NOTES
Dx: 
1- Conid, bilateral pneumonia 2- Ac hypoxic resp fialure dt #1, on 40L 3- ANTONIO on CKD5, worse, non oliguric 4- Low hemoglobin, to monitor DM and HTN, controlled 6- Hx of SARAH, c difficile, s/p UTI and sCHF- all stable Rx: 
Decadron iv 
Conv. Plasma given Vit C/D, Zn 
BP control Lantus/ Humalog O2 as needed Nephrology following Dispo: TBD 
 
TIME: 35 minutes. Signed By: Stephanie Camarena MD   
 January 6, 2021

## 2021-01-06 NOTE — PROGRESS NOTES
Benedicto Jackson 149 COVID-19 Note: 
 
Critical Care Note: 1/6/2021 [address] Admission Date: 1/4/2021     Length of Stay: 2 days Background: [de-identified] y.o. y/o female with acute hypoxemic respiratory failure secondary to COVID-19. Course complicated by highly elevated BNP at 66K, elevated troponin. Onset of COVID-19 symptoms: 12/26 Notable PMH: DM, HTN, CKD, SARAH, CHF, moderate MR 
 
24 Hour events:  
Still on 40 % airvo No CBC this AM 
Still DNR REVIEW OF SYSTEMS: 
Constitutional:  + fevere and chills, nonight sweats, weight loss, weight gain, persistent fatigue, or lethargy/hypersomnolence. CV: No chest pain, pressure, discomfort, palpitations, orthopnea, murmurs, or edema. GI: No dysphagia, heartburn reflux, nausea/vomiting, diarrhea, abdominal pain, or bleeding. Neuro: There is no history of AMS, persistent headache, decreased level of consciousness, seizures, or motor or sensory deficits. Lines: (insertion date) PIVs only Drips: current dose (range) 
none Visit Vitals BP (!) 159/72 Pulse 80 Temp 98 °F (36.7 °C) Resp 23 Ht 5' 9\" (1.753 m) Wt 258 lb (117 kg) SpO2 96% BMI 38.10 kg/m² Pertinent Exam:           
Constitutional:  intubated and mechanically ventilated. EENMT:  Sclera clear, pupils equal, oral mucosa moist 
Respiratory:  crackles Cardiovascular:  RRR Gastrointestinal:  soft with no tenderness; positive bowel sounds present Musculoskeletal:  warm with no cyanosis, trace lower extremity edema Skin:  no jaundice or ecchymosis Neurologic:normal 
Psychiatric: sedated and paralyzed CXR Stable  No change Pertinent Labs:  
Recent Labs 01/05/21 
5872 01/04/21 
0831 WBC 7.5 8.2 HGB 7.7* 9.2* HCT 24.5* 29.9*  
 231 Recent Labs 01/05/21 
2310 01/04/21 
1726 01/04/21 
0831   --  138  
K 4.6  --  5.1 *  --  108* CO2 22  --  21  
*  --  391* BUN 94*  --  81* CREA 3.76*  --  3.71* MG  --   --  2.3 CA 8.6  --  8.9 PHOS  --  6.4*  --   
ALB  --   --  3.2 TBILI  --   --  0.7 ALT  --   --  22 Recent Labs 01/06/21 
0510 01/06/21 
0050 01/05/21 
1741 GLUCPOC 306* 331* 210* SARS-CoV-2 LAB Results 1/4 rapid test positive MICRO Date Source Result 1/4 blood NG  
1/4 blood NG  
 
COVID-19 Meds: 
Dexamethasone 6mg daily (1/4) Convalescent plasma transfusion (1/4) Remdesivir Not a candidate Anti-infectives (start date): 
 
Ventilator Settings:  5' 9\" (1.753 m) Mode FIO2 Rate Tidal Volume Pressure PEEP  
   40 % Peak airway pressure:    
Minute ventilation: 21.8 l/min ABG:  
Recent Labs 01/04/21 
0840 PHI 7.36  
PCO2I 29.7*  
PO2I 72* HCO3I 16.8* Impression: [de-identified] y.o. y/o female with acute hypoxemic respiratory failure secondary to COVID-19, CKD, h/o CHF, severe hyperglycemia on steroids Patient Active Problem List  
Diagnosis Code  
 HTN (hypertension) I10  
 AF (atrial fibrillation) ([facility]) I48.91  
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  
 Encounter for medication management Z79.899  SARAH (obstructive sleep apnea) G47.33  
 CKD (chronic kidney disease) stage 5, GFR less than 15 ml/min ([facility]) N18.5  Parkinson disease ([facility] G20  
 Acute cystitis with hematuria N30.01  
 Morbid obesity ([facility] E66.01  
 Hypokalemia E87.6  
 C. difficile diarrhea A04.72  
 PAF (paroxysmal atrial fibrillation) ([facility]) I48.0  Elevated troponin R77.8  Chest pain R07.9  CAD (coronary artery disease) I25.10  Volume overload E87.70  Demand ischemia ([facility]) I24.8  Meningioma ([facility] D32.9  Acquired hypothyroidism E03.9  Anemia of chronic kidney failure, stage 4 (severe) (HCC) N18.4, D63.1  Chronic systolic heart failure (HCC) I50.22  
  Decreased hemoglobin R71.0  Depression F32.9  History of UTI Z87.440  Postural imbalance R29.3  Hypertensive emergency I16.1  Pneumonia due to COVID-19 virus U07.1, J12.82  
 Acute on chronic respiratory failure with hypoxia (HCC) J96.21  
 
1. COVID-19 pneumonia: continue steroids and close monitoring of O2 levels, wean airvo, will try to see if she can be without BIPAP at night and then she should be able to moved out of ICU\ 
-continue to monitor Cr 2. DM with hyperglycemia:  Add lantus 10U 
3. CHF:  Continue diuresis, monitoring renal function. TTE ordered but not done. 4.  Ppx: heparin, PPI Daughter Manjinder Hathaway can be reached at [phone] or [phone].    is in hospital. 
 
 
Zena Moreno MD

## 2021-01-06 NOTE — PROGRESS NOTES
[address] Admission Date: 1/4/2021 Renal Daily Progress Note: 1/6/2021 The patient's chart is reviewed and the patient is discussed with the staff. Follow up ANTONIO/CKD IV Subjective:  
Patient seen and examined on COVID ICU remains on 40 % optiflo, sitting up in bed more interactive today feeling better, appetite better still with exertional SOB, cough. Labs and chart reviewed- non oliguric ROS:  
General: no fever/chills CV: no CP Lung: + SOB, + cough GI: no N/V/D Ext: + edema, right foot wound dressing intact Current Facility-Administered Medications Medication Dose Route Frequency  [START ON 1/7/2021] amLODIPine (NORVASC) tablet 10 mg  10 mg Oral DAILY  [START ON 1/7/2021] insulin glargine (LANTUS) injection 20 Units  20 Units SubCUTAneous DAILY  insulin lispro (HUMALOG) injection 7 Units  7 Units SubCUTAneous TIDAC  sevelamer carbonate (RENVELA) tab 800 mg  800 mg Oral TID WITH MEALS  
 acetaminophen (TYLENOL) tablet 650 mg  650 mg Oral Q6H PRN  
 carbidopa-levodopa (SINEMET)  mg per tablet 1 Tab  1 Tab Oral QID  carvediloL (COREG) tablet 25 mg  25 mg Oral BID WITH MEALS  ergocalciferol capsule 50,000 Units  50,000 Units Oral Q7D  
 ferrous sulfate tablet 324 mg  324 mg Oral DAILY  furosemide (LASIX) tablet 80 mg  80 mg Oral BID  levothyroxine (SYNTHROID) tablet 25 mcg  25 mcg Oral ACB  pantoprazole (PROTONIX) tablet 40 mg  40 mg Oral ACB&D  
 sodium chloride (NS) flush 5-40 mL  5-40 mL IntraVENous Q8H  
 sodium chloride (NS) flush 5-40 mL  5-40 mL IntraVENous PRN  
 insulin lispro (HUMALOG) injection   SubCUTAneous Q6H  
 dexamethasone (DECADRON) 4 mg/mL injection 6 mg  6 mg IntraVENous DAILY  0.9% sodium chloride infusion 250 mL  250 mL IntraVENous PRN Objective:  
 
Vitals:  
 01/06/21 0730 01/06/21 0758 01/06/21 0801 01/06/21 0830 BP: (!) 159/72  (!) 145/73 (!) 147/79 Pulse: 80  89 90  
 Resp: 23  22 26 Temp:      
SpO2: 92% 96% 95% 96% Weight:      
Height:      
 
Intake and Output:  
01/04 1901 - 01/06 0700 In: 220 Out: 2000 [[facility]:[id number]] No intake/output data recorded. Physical Exam:  
Constitutional:  the patient is well developed and in no acute distress, alert and oriented HEENT:  Sclera clear, pupils equal, oral mucosa moist 
Lungs: rhonchi bilaterally Cardiovascular:  Regular rate, S1, S2, no rub Abd/GI: soft and non-tender; with positive bowel sounds. Ext: warm without cyanosis. There is trace lower leg edema, right foot wound with dressing intact. Skin:  no jaundice or rashes Neuro: no gross neuro deficits Psychiatric: Calm. Access: LUE AVF +bruit, + thrill LAB Recent Labs 01/05/21 
3912 01/04/21 
0831 WBC 7.5 8.2 HGB 7.7* 9.2* HCT 24.5* 29.9*  
 231 Recent Labs 01/05/21 
4321 01/04/21 
1726 01/04/21 
0831   --  138  
K 4.6  --  5.1 *  --  108* CO2 22  --  21  
*  --  391* BUN 94*  --  81* CREA 3.76*  --  3.71* MG  --   --  2.3 PHOS  --  6.4*  --   
ALB  --   --  3.2 No results for input(s): PH, PCO2, PO2, HCO3 in the last 72 hours. Assessment:  (Medical Decision Making) Hospital Problems  Date Reviewed: 1/4/2021 Codes Class Noted POA * (Principal) Pneumonia due to COVID-19 virus ICD-10-CM: U07.1, J12.82 ICD-9-CM: 480.8  1/4/2021 Unknown Acute on chronic respiratory failure with hypoxia [facility]) ICD-10-CM: J96.21 
ICD-9-CM: 518.84, 799.02  1/4/2021 Unknown Chronic systolic heart failure (HCC) ICD-10-CM: I50.22 ICD-9-CM: 428.22  7/14/2019 Yes Volume overload ICD-10-CM: E87.70 ICD-9-CM: 276.69  7/8/2019 Yes PAF (paroxysmal atrial fibrillation) (HCC) ICD-10-CM: I48.0 ICD-9-CM: 427.31  7/7/2019 Yes Morbid obesity ([facility] (Chronic) ICD-10-CM: E66.01 
ICD-9-CM: 278.01  6/29/2019 Yes CKD (chronic kidney disease) stage 5, GFR less than 15 ml/min (HCC) (Chronic) ICD-10-CM: N18.5 ICD-9-CM: 585.5  11/30/2018 Yes SARAH (obstructive sleep apnea) (Chronic) ICD-10-CM: G47.33 
ICD-9-CM: 327.23  5/24/2018 Yes 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 ([facility] (Chronic) ICD-10-CM: E11.21 
ICD-9-CM: 250.40, 583.81  11/15/2017 Yes A-V fistula (HCC) (Chronic) ICD-10-CM: I77.0 ICD-9-CM: 447.0  9/6/2017 Yes Overview Signed 9/6/2017  2:35 PM by LESTER Lawrence  
  6/7/2017 - Jaymes Essex, MD - creation left brachiocephalic AV fistula 8/10/2017 Car Moreno MD - elevation left b-c AVF 
  
  
   
 HTN (hypertension) (Chronic) ICD-10-CM: I10 
ICD-9-CM: 401.9  11/10/2011 Yes Plan:  (Medical Decision Making) 1. ANTONIO/CKD IV close to baseline mid to upper 3s, probable ATN in the setting of COVID 
-continue lasix 80 mg BID, evidence of volume up with edema and pulm edema on cxr, hold ACEi 
- renal US with CKD, no hydronephrosis, UA + protein, moderate LE, phos 6.4,  P/C ratio 3.5, no indication for acute RRT  
-she does have a patent LUE AVF if needed continue to trend renal function with strict I&O 
-labs pending this AM, non oliguric, no indication for acute RRT at this time 
  
2. COVID 19 + s/p convalescent plasma, on decadron 
  
3. Anemia hgb trending down, transfuse hgb <7.0 
  
4. HTN continue norvasc, coreg titrate prn, hold ACEi 
  
5. DM type II- SSI per hosp Sawyer Rodriguez NP

## 2021-01-06 NOTE — PROGRESS NOTES
Chart reviewed by [facility]  
PT is in the BMT/ICU currently on 40L Airvo Pt is improving Patient's spouse is currently in the ICU Daughter is  Deb Petit [phone] Pt will need PT and OT evals. Will continue to follow for discharge planning needs Please consult  if any new issues arise Care Management Interventions PCP Verified by CM: Yes(Azul Chaudhry MD) Mode of Transport at Discharge: Self Transition of Care Consult (CM Consult): Discharge Planning Current Support Network: Own Home, Lives with Spouse(Jonnie Lacy (spouse)  [phone]) Confirm Follow Up Transport: Family(daughter Deb Petit [phone]) The Patient and/or Patient Representative was Provided with a Choice of Provider and Agrees with the Discharge Plan?: Yes Freedom of Choice List was Provided with Basic Dialogue that Supports the Patient's Individualized Plan of Care/Goals, Treatment Preferences and Shares the Quality Data Associated with the Providers?: Yes [employer] Information Provided?: No 
Discharge Location Discharge Placement: Unable to determine at this time

## 2021-01-06 NOTE — PROGRESS NOTES
SPEECH THERAPY SCREENING:  
 
Chart accessed for speech therapy screening. At this time, no speech therapy needs are identified. Consider placing consult for dysphagia evaluation if new concerns arise. Thank you, [address] CCC-SLP

## 2021-01-07 NOTE — PROGRESS NOTES
Progress Note Patient: Lauren Aguilar MRN: [id number]  SSN: [id number] YOB: 1940  Age: [de-identified] y.o. Sex: female Admit Date: 1/4/2021 LOS: 3 days Subjective:  
[de-identified] yo F with PMH extensive PMH including afib on anticoagulation, HFrEF, HTN, CAD, DM, CKD who presented with hypoxia in the setting of Covid 19 pna. Patient seen and examined at bedside. This morning feeling weak. Still having SOB. Denies chest pain, no abdominal pain, no nausea or vomiting. Objective:  
 
Vitals:  
 01/07/21 1400 01/07/21 1500 01/07/21 1531 01/07/21 1600 BP: 138/73 (!) 148/77 139/72 137/71 Pulse: 82 86 77 81 Resp: 28 25 28 (!) 31 Temp:    98.7 °F (37.1 °C) SpO2: 97% 97% 95% 96% Weight:      
Height:      
  
 
Intake and Output: 
Current Shift: 01/07 0701 - 01/07 1900 In: 26 [P. O.:682] Out: 1125 [[id number]] Last three shifts: 01/05 1901 - 01/07 0700 In: 1440 [P.O.:1440] Out: 7208 [[id number]:7572] ROS 
10 ROS negative except from stated on subjective Physical Exam:  
General: Alert, oriented, NAD HEENT: NC/AT, EOM are intact Neck: supple, no JVD Cardiovascular: RRR, S1, S2, no murmurs Respiratory: Decrease breath sounds Abdomen: Soft, NT, ND Back: No CVA tenderness, no paraspinal tenderness Extremities: LE without pedal edema, no erythema Neuro: A&O, CN are intact, no focal deficits Skin: no rash or ulcers Psych: good mood and affect Lab/Data Review: 
I have personally reviewed patients laboratory data showing Recent Results (from the past 24 hour(s)) GLUCOSE, POC Collection Time: 01/06/21  7:14 PM  
Result Value Ref Range Glucose (POC) 293 (H) 65 - 100 mg/dL METABOLIC PANEL, BASIC Collection Time: 01/07/21  3:15 AM  
Result Value Ref Range Sodium 136 136 - 145 mmol/L Potassium 5.5 (H) 3.5 - 5.1 mmol/L Chloride 107 98 - 107 mmol/L  
 CO2 21 21 - 32 mmol/L Anion gap 8 7 - 16 mmol/L Glucose 319 (H) 65 - 100 mg/dL   (H) 8 - 23 MG/DL  
 Creatinine 3.84 (H) 0.6 - 1.0 MG/DL  
 GFR est AA 15 (L) >60 ml/min/1.73m2 GFR est non-AA 12 (L) >60 ml/min/1.73m2 Calcium 8.6 8.3 - 10.4 MG/DL  
CBC W/O DIFF Collection Time: 01/07/21  3:15 AM  
Result Value Ref Range WBC 4.7 4.3 - 11.1 K/uL  
 RBC 2.70 (L) 4.05 - 5.2 M/uL HGB 8.2 (L) 11.7 - 15.4 g/dL HCT 26.2 (L) 35.8 - 46.3 % MCV 97.0 79.6 - 97.8 FL  
 MCH 30.4 26.1 - 32.9 PG  
 MCHC 31.3 (L) 31.4 - 35.0 g/dL  
 RDW 14.1 11.9 - 14.6 % PLATELET 405 942 - 329 K/uL MPV 11.9 9.4 - 12.3 FL ABSOLUTE NRBC 0.00 0.0 - 0.2 K/uL FERRITIN Collection Time: 01/07/21  3:15 AM  
Result Value Ref Range Ferritin 2,360 (H) 8 - 388 NG/ML  
D DIMER Collection Time: 01/07/21  3:15 AM  
Result Value Ref Range D DIMER 0.81 (H) <0.56 ug/ml(FEU) GLUCOSE, POC Collection Time: 01/07/21  7:46 AM  
Result Value Ref Range Glucose (POC) 399 (H) 65 - 100 mg/dL GLUCOSE, POC Collection Time: 01/07/21 11:24 AM  
Result Value Ref Range Glucose (POC) 374 (H) 65 - 100 mg/dL GLUCOSE, POC Collection Time: 01/07/21  4:04 PM  
Result Value Ref Range Glucose (POC) 302 (H) 65 - 100 mg/dL Image: 
I have personally reviewed patients imaging showing XR CHEST SNGL V Final Result IMPRESSION: Stable atypical viral pneumonia pattern. US RETROPERITONEUM COMP Final Result IMPRESSION:  
  
1. Echogenic kidneys, consistent with medical renal disease. Negative for  
hydronephrosis. 2. Small cysts are noted at the upper pole of the left kidney. Complex lesion in  
the midportion of the left kidney noted on prior CT of December 22, 2020 is not  
well-seen on this study, secondary to overlying bowel gas. XR CHEST SNGL V Final Result XR CHEST PORT Final Result IMPRESSION:  
1.  Pulmonary vascular congestion with perihilar airspace disease in a pattern  
which appears to reflect pulmonary edema or less likely multifocal lung  
infiltrates. 2.  Enlarged cardiac silhouette. Hospital problems Principal Problem: 
  Pneumonia due to COVID-19 virus (1/4/2021) Active Problems: 
  HTN (hypertension) (11/10/2011) A-V fistula ([address] (9/6/2017) Overview: 6/7/2017 - Emiliano Mccollum MD - creation left brachiocephalic AV fistula 8/10/2017 Johnnie Pozo MD - elevation left b-c AVF 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 ([address] (11/15/2017) SARAH (obstructive sleep apnea) (5/24/2018) CKD (chronic kidney disease) stage 5, GFR less than 15 ml/min (HCC) (11/30/2018) Morbid obesity ([address] (6/29/2019) PAF (paroxysmal atrial fibrillation) ([address] (7/7/2019) Volume overload (7/8/2019) Chronic systolic heart failure ([address] (7/14/2019) Acute on chronic respiratory failure with hypoxia ([address] (1/4/2021) Assessment and Plan:  
[de-identified] yo F with PMH extensive PMH including afib on anticoagulation, HFrEF, HTN, CAD, DM, CKD who presented with hypoxia in the setting of Covid 19 pna [de-identified] yo F with PMH extensive PMH including afib on anticoagulation, HFrEF, HTN, CAD, DM, CKD who presented from wound care appointment with hypoxia. 
  
1. Acute on chronic hypoxic respiratory failure (bl 3LNC) 2/2 Covid 19 pna 
- O2 therapy to maintain O2 Sat>92% 
- Continue decadron - Symptomatic management - Incentive spirometry - CPAP bedtime - Pulm recs 
  
2. HFrEF 
- Continue lasix - Monitor I&Os 
  
3. ANTONIO on CKD Stage 4 
- Monitor renal function - Avoid nephrotoxic agents - Nephro recst 
  
4. HTN 
- coreg and norvasc  
  
5. BLE wounds - Wound Care recs appreciated 6. DM2 
- ISS 
- BS ACHS 
  
7. Parkinson's disease - Continue sinemet 
  
DVT Prophylaxis: SCD's for now I have reviewed, updated, and verified this note's content and spent 38 minutes of my 42 minutes visit performing counseling and coordination of care regarding medical management.   
 
Signed By: Sue Otero MD   
 January 7, 2021

## 2021-01-07 NOTE — PROGRESS NOTES
Bedside and Verbal report given to self and Dakotah Serrano RN by [facility], RN. Report included SBAR, Kardex, ED Summary, Procedure Summary, Intake and Output and Cardiac Rhythm a-fib.

## 2021-01-07 NOTE — ROUTINE PROCESS
Bedside and Verbal report given to Mariola Sandhu RN  by self and Leatha Washington RN. Report included SBAR, Kardex, ED summary, procedure summary, recent results and cardiac rhythm a-fib.

## 2021-01-07 NOTE — PROGRESS NOTES
Patient's pre-lunch BGL remains above 350 at 374. Dr. Rupal Guerrero notified via [facility] and no additional Humalog ordered, however additional Lantus just recently ordered to be administered at lunch.

## 2021-01-07 NOTE — PROGRESS NOTES
Patient's BGL prior to breakfast this AM was 399. Insulin given per orders (see MAR), then Dr. Maria Wharton notified via [address] [place] [address]. 0827-No additional insulin orders at this time per Dr. Maria Wharton.

## 2021-01-07 NOTE — DIABETES MGMT
Patient COVID +. Accessed chart related to glycemic control to see if patient would benefit from diabetes management services. Patient glucose yesterday ranged 283-331 yesterday with patient receiving Lantus 25 units, Humalog 51 units, and dexamethasone 6 mg. Blood glucose 399 this morning. Spoke with provider and order received to give Lantus 10 units now and increase Lantus to 40 units daily.

## 2021-01-07 NOTE — PROGRESS NOTES
[facility]ened[facility] COVID-19 Note: 
 
Critical Care Note: 1/7/2021 Stephie Schofield Admission Date: 1/4/2021     Length of Stay: 3 days Background: [de-identified] y.o. y/o female with acute hypoxemic respiratory failure secondary to COVID-19. Course complicated by highly elevated BNP at 66K, elevated troponin. Onset of COVID-19 symptoms: 12/26 Notable PMH: DM, HTN, CKD, SARAH, CHF, moderate MR 
 
24 Hour events:  
Still on 40 % airvo with sat 91% DID NOT USE CPAP LAST NIGHT Still DNR REVIEW OF SYSTEMS: 
Constitutional:  + fevere and chills, nonight sweats, weight loss, weight gain, persistent fatigue, or lethargy/hypersomnolence. CV: No chest pain, pressure, discomfort, palpitations, orthopnea, murmurs, or edema. GI: No dysphagia, heartburn reflux, nausea/vomiting, diarrhea, abdominal pain, or bleeding. Neuro: There is no history of AMS, persistent headache, decreased level of consciousness, seizures, or motor or sensory deficits. Lines: (insertion date) PIVs only Drips: current dose (range) 
none Visit Vitals /62 Pulse 80 Temp 98.4 °F (36.9 °C) Resp (!) 32 Ht 5' 9\" (1.753 m) Wt 255 lb 4.7 oz (115.8 kg) SpO2 91% BMI 37.70 kg/m² Pertinent Exam:           
Constitutional:  intubated and mechanically ventilated. EENMT:  Sclera clear, pupils equal, oral mucosa moist 
Respiratory:  crackles Cardiovascular:  RRR Gastrointestinal:  soft with no tenderness; positive bowel sounds present Musculoskeletal:  warm with no cyanosis, trace lower extremity edema Skin:  no jaundice or ecchymosis Neurologic:normal 
Psychiatric: sedated and paralyzed CXR Portable chest x-ray 
  
CLINICAL INDICATION: Covid 19 positive 
  
FINDINGS: Single AP view the chest compared to a similar exam dated 1/4/2021 
shows persistent patchy peripheral airspace opacities in the mid and lower lungs 
that is unchanged from the prior exam. Post CABG changes are noted. There is no pleural effusion. 
  
IMPRESSION: Stable atypical viral pneumonia pattern. Pertinent Labs:  
 
Recent Labs 01/07/21 
2455 01/05/21 
7514 WBC 4.7 7.5 HGB 8.2* 7.7* HCT 26.2* 24.5*  
 190 Recent Labs 01/07/21 
0315 01/06/21 
1103 01/05/21 
0653 01/04/21 
1726  140 145  --   
K 5.5* 4.3 4.6  --   
 109* 115*  --   
CO2 21 19* 22  --   
* 283* 210*  --   
* 113* 94*  --   
CREA 3.84* 4.00* 3.76*  --   
MG  --  2.3  --   --   
CA 8.6 8.8 8.6  --   
PHOS  --   --   --  6.4* Recent Labs 01/07/21 
1124 01/07/21 
0746 01/06/21 
1914 GLUCPOC 374* 399* 293* SARS-CoV-2 LAB Results 1/4 rapid test positive MICRO Date Source Result 1/4 blood NG  
1/4 blood NG  
 
COVID-19 Meds: 
Dexamethasone 6mg daily (1/4) Convalescent plasma transfusion (1/4) Remdesivir Not a candidate Anti-infectives (start date): ABG:  
No results for input(s): PH, PCO2, PO2, HCO3, PHI, PCO2I, PO2I, HCO3I in the last 72 hours. Impression: [de-identified] y.o. y/o female with acute hypoxemic respiratory failure secondary to COVID-19, CKD, h/o CHF, severe hyperglycemia on steroids Patient Active Problem List  
Diagnosis Code  
 HTN (hypertension) I10  
 AF (atrial fibrillation) ([facility]) I48.91  
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  
 Encounter for medication management Z79.899  SARAH (obstructive sleep apnea) G47.33  
 CKD (chronic kidney disease) stage 5, GFR less than 15 ml/min ([facility]) N18.5  Parkinson disease ([address] G20  
 Acute cystitis with hematuria N30.01  
 Morbid obesity ([facility] E66.01  
 Hypokalemia E87.6  
 C. difficile diarrhea A04.72  
 PAF (paroxysmal atrial fibrillation) (HCC) I48.0  Elevated troponin R77.8  Chest pain R07.9  CAD (coronary artery disease) I25.10  Volume overload E87.70  Demand ischemia (HCC) I24.8  Meningioma ([address] D32.9  Acquired hypothyroidism E03.9  Anemia of chronic kidney failure, stage 4 (severe) (HCC) N18.4, D63.1  Chronic systolic heart failure (HCC) I50.22  
 Decreased hemoglobin R71.0  Depression F32.9  History of UTI Z87.440  Postural imbalance R29.3  Hypertensive emergency I16.1  Pneumonia due to COVID-19 virus U07.1, J12.82  
 Acute on chronic respiratory failure with hypoxia ([facility]) J96.21  
 
1. COVID-19 pneumonia: continue steroids and close monitoring of O2 levels, wean airvo, will try to use CPAP mode at night and then she should be able to move out of ICU 2. DM with hyperglycemia:  Add lantus 10U 
3. CHF:  Continue diuresis, monitoring renal function. TTE ordered but not done. 4.  Ppx: heparin, PPI 5. Follow Cr and K closely Daughter Alina Thao can be reached at [phone] or [phone].    is in hospital. 
 
 
Leeann Good MD

## 2021-01-07 NOTE — PROGRESS NOTES
Problem: Falls - Risk of 
Goal: *Absence of Falls Description: Document Ariana Borrego Fall Risk and appropriate interventions in the flowsheet. Outcome: Progressing Towards Goal 
Note: Fall Risk Interventions: 
Mobility Interventions: Communicate number of staff needed for ambulation/transfer, Patient to call before getting OOB, Assess mobility with egress test 
Medication Interventions: Patient to call before getting OOB, Teach patient to arise slowly Elimination Interventions: Call light in reach, Patient to call for help with toileting needs Problem: Pressure Injury - Risk of 
Goal: *Prevention of pressure injury Description: Document Devin Scale and appropriate interventions in the flowsheet. Outcome: Progressing Towards Goal 
Note: Pressure Injury Interventions: 
Sensory Interventions: Assess changes in LOC, Float heels, Monitor skin under medical devices, Maintain/enhance activity level, Keep linens dry and wrinkle-free, Check visual cues for pain Activity Interventions: Increase time out of bed, PT/OT evaluation, Assess need for specialty bed Mobility Interventions: Assess need for specialty bed, Float heels, PT/OT evaluation Nutrition Interventions: Document food/fluid/supplement intake, Offer support with meals,snacks and hydration Friction and Shear Interventions: Foam dressings/transparent film/skin sealants, Lift sheet, Lift team/patient mobility team, Minimize layers

## 2021-01-07 NOTE — PROGRESS NOTES
[address] Admission Date: 1/4/2021 Renal Daily Progress Note: 1/7/2021 The patient's chart is reviewed and the patient is discussed with the staff. Follow up ANTONIO/CKD IV Subjective:  
Patient seen and examined on [facility] ICU remains on 40 % optiflo, reports fatigue with exertional SOB, cough, malaise. Labs and chart reviewed- non oliguric ROS:  
General: no fever/chills CV: no CP Lung: + SOB, + cough GI: no N/V/D Ext: + edema, right foot wound dressing intact Current Facility-Administered Medications Medication Dose Route Frequency  [START ON 1/8/2021] insulin glargine (LANTUS) injection 40 Units  40 Units SubCUTAneous DAILY  insulin glargine (LANTUS) injection 10 Units  10 Units SubCUTAneous ONCE  
 amLODIPine (NORVASC) tablet 10 mg  10 mg Oral DAILY  insulin lispro (HUMALOG) injection 0-10 Units  0-10 Units SubCUTAneous AC&HS  insulin lispro (HUMALOG) injection 10 Units  10 Units SubCUTAneous TIDAC  sevelamer carbonate (RENVELA) tab 800 mg  800 mg Oral TID WITH MEALS  
 acetaminophen (TYLENOL) tablet 650 mg  650 mg Oral Q6H PRN  
 carbidopa-levodopa (SINEMET)  mg per tablet 1 Tab  1 Tab Oral QID  carvediloL (COREG) tablet 25 mg  25 mg Oral BID WITH MEALS  ergocalciferol capsule 50,000 Units  50,000 Units Oral Q7D  
 ferrous sulfate tablet 324 mg  324 mg Oral DAILY  furosemide (LASIX) tablet 80 mg  80 mg Oral BID  levothyroxine (SYNTHROID) tablet 25 mcg  25 mcg Oral ACB  pantoprazole (PROTONIX) tablet 40 mg  40 mg Oral ACB&D  
 sodium chloride (NS) flush 5-40 mL  5-40 mL IntraVENous Q8H  
 sodium chloride (NS) flush 5-40 mL  5-40 mL IntraVENous PRN  
 dexamethasone (DECADRON) 4 mg/mL injection 6 mg  6 mg IntraVENous DAILY  0.9% sodium chloride infusion 250 mL  250 mL IntraVENous PRN Objective:  
 
Vitals:  
 01/07/21 0931 01/07/21 1000 01/07/21 1031 01/07/21 1100 BP: 120/63 139/64 136/62 (!) 114/58 Pulse: 81 94 91 89 Resp: (!) 32 (!) 31 27 (!) 35 Temp:      
SpO2: 92% 91% 93% 94% Weight:      
Height:      
 
Intake and Output:  
01/05 1901 - 01/07 0700 In: 1440 [P.O.:1440] Out: 6073 [IEVAV:1719] 01/07 0701 - 01/07 1900 In: 250 [P.O.:250] Out: 500 [Urine:500] Physical Exam:  
Constitutional:  the patient is well developed and in no acute distress, alert and oriented HEENT:  Sclera clear, pupils equal, oral mucosa moist 
Lungs: rhonchi bilaterally Cardiovascular:  Regular rate, S1, S2, no rub Abd/GI: soft and non-tender; with positive bowel sounds. Ext: warm without cyanosis. There is trace lower leg edema, right foot wound with dressing intact. Skin:  no jaundice or rashes Neuro: no gross neuro deficits Psychiatric: Calm. Access: LUE AVF +bruit, + thrill LAB Recent Labs 01/07/21 
5972 01/05/21 
8925 WBC 4.7 7.5 HGB 8.2* 7.7* HCT 26.2* 24.5*  
 190 Recent Labs 01/07/21 
0315 01/06/21 
1103 01/05/21 
0653 01/04/21 
1726  140 145  --   
K 5.5* 4.3 4.6  --   
 109* 115*  --   
CO2 21 19* 22  --   
* 283* 210*  --   
* 113* 94*  --   
CREA 3.84* 4.00* 3.76*  --   
MG  --  2.3  --   --   
PHOS  --   --   --  6.4* No results for input(s): PH, PCO2, PO2, HCO3 in the last 72 hours. Assessment:  (Medical Decision Making) Hospital Problems  Date Reviewed: 1/4/2021 Codes Class Noted POA * (Principal) Pneumonia due to COVID-19 virus ICD-10-CM: U07.1, J12.82 ICD-9-CM: 480.8  1/4/2021 Unknown Acute on chronic respiratory failure with hypoxia [facility]) ICD-10-CM: J96.21 
ICD-9-CM: 518.84, 799.02  1/4/2021 Unknown Chronic systolic heart failure (HCC) ICD-10-CM: I50.22 ICD-9-CM: 428.22  7/14/2019 Yes Volume overload ICD-10-CM: E87.70 ICD-9-CM: 276.69  7/8/2019 Yes PAF (paroxysmal atrial fibrillation) (HCC) ICD-10-CM: I48.0 ICD-9-CM: 427.31  7/7/2019 Yes Morbid obesity ([facility] (Chronic) ICD-10-CM: E66.01 
ICD-9-CM: 278.01  6/29/2019 Yes CKD (chronic kidney disease) stage 5, GFR less than 15 ml/min (HCC) (Chronic) ICD-10-CM: N18.5 ICD-9-CM: 585.5  11/30/2018 Yes SARAH (obstructive sleep apnea) (Chronic) ICD-10-CM: G47.33 
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 
ICD-9-CM: 250.40, 583.81  11/15/2017 Yes A-V fistula (HCC) (Chronic) ICD-10-CM: I77.0 ICD-9-CM: 447.0  9/6/2017 Yes Overview Signed 9/6/2017  2:35 PM by LESTER Fam  
  6/7/2017 - Edil Austin MD - creation left brachiocephalic AV fistula 8/10/2017 Prudencio De La Paz MD - elevation left b-c AVF 
  
  
   
 HTN (hypertension) (Chronic) ICD-10-CM: I10 
ICD-9-CM: 401.9  11/10/2011 Yes Plan:  (Medical Decision Making) 1. ANTONIO/CKD IV close to baseline mid to upper 3s, probable ATN in the setting of COVID 
-continue lasix 80 mg BID, evidence of volume up with edema and pulm edema on cxr, hold ACEi 
- renal US with CKD, no hydronephrosis, UA + protein, moderate LE, phos 6.4,  P/C ratio 3.5, no indication for acute RRT  
-she does have a patent LUE AVF if needed continue to trend renal function with strict I&O 
-renal function holding, non oliguric, no indication for acute RRT at this time 
  
2. COVID 19 + s/p convalescent plasma, on decadron 
  
3. Anemia hgb 8.2, transfuse hgb <7.0 
  
4. HTN continue norvasc, coreg titrate prn, hold ACEi 
  
5. DM type II- SSI per hosp Doretha Emmanuel, NP

## 2021-01-07 NOTE — PROGRESS NOTES
Bedside shift change report given to Piper RN (oncoming nurse) by Leatha Washington RN (offgoing nurse). Report included the following information SBAR, Kardex, ED Summary, Intake/Output, MAR, Recent Results and Cardiac Rhythm a-fib.

## 2021-01-08 NOTE — PROGRESS NOTES
[facility] COVID-19 Note: 
 
Critical Care Note: 1/8/2021 [address] Admission Date: 1/4/2021     Length of Stay: 4 days Background: [de-identified] y.o. y/o female with acute hypoxemic respiratory failure secondary to COVID-19. Course complicated by highly elevated BNP at 66K, elevated troponin. Onset of COVID-19 symptoms: 12/26 Notable PMH: DM, HTN, CKD, SARAH, CHF, moderate MR 
 
24 Hour events: On AirVo day, did BIPAP at night. Feels better. REVIEW OF SYSTEMS: 
Constitutional:  + fevere and chills, nonight sweats, weight loss, weight gain, persistent fatigue, or lethargy/hypersomnolence. CV: No chest pain, pressure, discomfort, palpitations, orthopnea, murmurs, or edema. GI: No dysphagia, heartburn reflux, nausea/vomiting, diarrhea, abdominal pain, or bleeding. Neuro: There is no history of AMS, persistent headache, decreased level of consciousness, seizures, or motor or sensory deficits. Lines: (insertion date) PIVs only Drips: current dose (range) 
none Visit Vitals BP (!) 144/74 Pulse 87 Temp 98.3 °F (36.8 °C) Resp (!) 37 Ht 5' 9\" (1.753 m) Wt 254 lb 12.8 oz (115.6 kg) SpO2 96% BMI 37.63 kg/m² Pertinent Exam:           
Constitutional:  intubated and mechanically ventilated. EENMT:  Sclera clear, pupils equal, oral mucosa moist 
Respiratory:  mild crackles Cardiovascular:  RRR Gastrointestinal:  soft with no tenderness; positive bowel sounds present Musculoskeletal:  warm with no cyanosis, trace lower extremity edema Skin:  no jaundice or ecchymosis Neurologic:normal 
Psychiatric: sedated and paralyzed CXR Portable chest x-ray 
  
CLINICAL INDICATION: Covid 19 positive 
  
FINDINGS: Single AP view the chest compared to a similar exam dated 1/4/2021 
shows persistent patchy peripheral airspace opacities in the mid and lower lungs 
that is unchanged from the prior exam. Post CABG changes are noted. There is no pleural effusion. 
  
IMPRESSION: Stable atypical viral pneumonia pattern. Pertinent Labs:  
 
Recent Labs 01/08/21 
3752 01/07/21 
0315 WBC 5.6 4.7 HGB 9.0* 8.2* HCT 28.9* 26.2*  
 171 Recent Labs 01/08/21 
0447 01/07/21 
0315 01/06/21 
1103  136 140  
K 4.4 5.5* 4.3  
 107 109* CO2 21 21 19* * 319* 283* * 116* 113* CREA 3.71* 3.84* 4.00* MG  --   --  2.3 CA 8.9 8.6 8.8 Recent Labs 01/08/21 
0800 01/07/21 1951 01/07/21 
1604 GLUCPOC 277* 241* 302* SARS-CoV-2 LAB Results 1/4 rapid test positive MICRO Date Source Result 1/4 blood NG  
1/4 blood NG  
 
COVID-19 Meds: 
Dexamethasone 6mg daily (1/4) Convalescent plasma transfusion (1/4) Remdesivir Not a candidate Anti-infectives (start date): ABG:  
No results for input(s): PH, PCO2, PO2, HCO3, PHI, PCO2I, PO2I, HCO3I in the last 72 hours. Impression: [de-identified] y.o. y/o female with acute hypoxemic respiratory failure secondary to COVID-19, CKD, h/o CHF, severe hyperglycemia on steroids Patient Active Problem List  
Diagnosis Code  
 HTN (hypertension) I10  
 AF (atrial fibrillation) ([facility]) I48.91  
 PAD (peripheral artery disease) ([facility]) I73.9  Diabetic neuropathy ([facility]) E11.40  Sepsis ([facility] A41.9  Bradycardia R00.1  A-V fistula (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  
 Encounter for medication management Z79.899  SARAH (obstructive sleep apnea) G47.33  
 CKD (chronic kidney disease) stage 5, GFR less than 15 ml/min ([facility]) N18.5  Parkinson disease ([facility] [facility] G20  
 Acute cystitis with hematuria N30.01  
 Morbid obesity ([facility] E66.01  
 Hypokalemia E87.6  
 C. difficile diarrhea A04.72  
 PAF (paroxysmal atrial fibrillation) (HCC) I48.0  Elevated troponin R77.8  Chest pain R07.9  CAD (coronary artery disease) I25.10  Volume overload E87.70  Demand ischemia (HCC) I24.8  Meningioma ([address] D32.9  Acquired hypothyroidism E03.9  Anemia of chronic kidney failure, stage 4 (severe) ([facility]) N18.4, D63.1  Chronic systolic heart failure ([facility]) I50.22  
 Decreased hemoglobin R71.0  Depression F32.9  History of UTI Z87.440  Postural imbalance R29.3  Hypertensive emergency I16.1  Pneumonia due to COVID-19 virus U07.1, J12.82  
 Acute on chronic respiratory failure with hypoxia ([facility]) J96.21  
 
1. COVID-19 pneumonia: continue steroids and close monitoring of O2 levels, wean airvo, can hold BIPAP if able to stay on AirVo 40-50% during day and night. Can move to floor today. 2.  DM with hyperglycemia:  Continue lantus 10U 
3. CHF:  Continue diuresis, monitoring renal function. TTE ordered but not done. 4.  Ppx: heparin, PPI 5. Follow Cr and K closely DNR, will see as needed over the weekend, call with questions. Daughter Tess Pair can be reached at [phone] or [phone].    is in hospital. 
 
Tasha Meade MD

## 2021-01-08 NOTE — PROGRESS NOTES
Progress Note Patient: Angel Garcia MRN: [id number]  SSN: [id number] YOB: 1940  Age: [de-identified] y.o. Sex: female Admit Date: 1/4/2021 LOS: 4 days Subjective:  
[de-identified] yo F with PMH extensive PMH including afib on anticoagulation, HFrEF, HTN, CAD, DM, CKD who presented with hypoxia in the setting of Covid 19 pna. Patient seen and examined at bedside. This morning feeling weak. Still having SOB. Denies chest pain, no abdominal pain, no nausea or vomiting. Objective:  
 
Vitals:  
 01/08/21 0908 01/08/21 0930 01/08/21 1000 01/08/21 1201 BP: (!) 147/77 (!) 144/86 (!) 144/74 115/83 Pulse: 85 79 87 84 Resp: 28 25 (!) 37 25 Temp:    98 °F (36.7 °C) SpO2: 93% 97% 96% 94% Weight:      
Height:      
  
 
Intake and Output: 
Current Shift: 01/08 0701 - 01/08 1900 In: -  
Out: 425 [Urine:425] Last three shifts: 01/06 1901 - 01/08 0700 In: 26 [P. O.:682] Out: 3025 [ZXYJR:0503] ROS 
10 ROS negative except from stated on subjective Physical Exam:  
General: Alert, oriented, NAD HEENT: NC/AT, EOM are intact Neck: supple, no JVD Cardiovascular: RRR, S1, S2, no murmurs Respiratory: Decrease breath sounds Abdomen: Soft, NT, ND Back: No CVA tenderness, no paraspinal tenderness Extremities: LE without pedal edema, no erythema Neuro: A&O, CN are intact, no focal deficits Skin: no rash or ulcers Psych: good mood and affect Lab/Data Review: 
I have personally reviewed patients laboratory data showing Recent Results (from the past 24 hour(s)) GLUCOSE, POC Collection Time: 01/07/21  4:04 PM  
Result Value Ref Range Glucose (POC) 302 (H) 65 - 100 mg/dL GLUCOSE, POC Collection Time: 01/07/21  7:51 PM  
Result Value Ref Range Glucose (POC) 241 (H) 65 - 100 mg/dL METABOLIC PANEL, BASIC Collection Time: 01/08/21  4:47 AM  
Result Value Ref Range Sodium 139 136 - 145 mmol/L Potassium 4.4 3.5 - 5.1 mmol/L  Chloride 107 98 - 107 mmol/L  
 CO2 21 21 - 32 mmol/L Anion gap 11 7 - 16 mmol/L Glucose 213 (H) 65 - 100 mg/dL  (H) 8 - 23 MG/DL Creatinine 3.71 (H) 0.6 - 1.0 MG/DL  
 GFR est AA 15 (L) >60 ml/min/1.73m2 GFR est non-AA 12 (L) >60 ml/min/1.73m2 Calcium 8.9 8.3 - 10.4 MG/DL  
CBC W/O DIFF Collection Time: 01/08/21  4:47 AM  
Result Value Ref Range WBC 5.6 4.3 - 11.1 K/uL  
 RBC 2.94 (L) 4.05 - 5.2 M/uL HGB 9.0 (L) 11.7 - 15.4 g/dL HCT 28.9 (L) 35.8 - 46.3 % MCV 98.3 (H) 79.6 - 97.8 FL  
 MCH 30.6 26.1 - 32.9 PG  
 MCHC 31.1 (L) 31.4 - 35.0 g/dL  
 RDW 13.6 11.9 - 14.6 % PLATELET 453 981 - 145 K/uL MPV 11.8 9.4 - 12.3 FL ABSOLUTE NRBC 0.00 0.0 - 0.2 K/uL GLUCOSE, POC Collection Time: 01/08/21  8:00 AM  
Result Value Ref Range Glucose (POC) 277 (H) 65 - 100 mg/dL GLUCOSE, POC Collection Time: 01/08/21 11:23 AM  
Result Value Ref Range Glucose (POC) 276 (H) 65 - 100 mg/dL Image: 
I have personally reviewed patients imaging showing XR CHEST SNGL V Final Result IMPRESSION: Stable atypical viral pneumonia pattern. US RETROPERITONEUM COMP Final Result IMPRESSION:  
  
1. Echogenic kidneys, consistent with medical renal disease. Negative for  
hydronephrosis. 2. Small cysts are noted at the upper pole of the left kidney. Complex lesion in  
the midportion of the left kidney noted on prior CT of December 22, 2020 is not  
well-seen on this study, secondary to overlying bowel gas. XR CHEST SNGL V Final Result XR CHEST PORT Final Result IMPRESSION:  
1.  Pulmonary vascular congestion with perihilar airspace disease in a pattern  
which appears to reflect pulmonary edema or less likely multifocal lung  
infiltrates. 2.  Enlarged cardiac silhouette. Hospital problems Principal Problem: 
  Pneumonia due to COVID-19 virus (1/4/2021) Active Problems: 
  HTN (hypertension) (11/10/2011) [facility] (9/6/2017) Overview: 6/7/2017 - Bianca Chery MD - creation left brachiocephalic AV fistula 8/10/2017 Deborah Karimi MD - elevation left b-c AVF 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 ([facility] (11/15/2017) SARAH (obstructive sleep apnea) (5/24/2018) CKD (chronic kidney disease) stage 5, GFR less than 15 ml/min (HCC) (11/30/2018) Morbid obesity ([address] (6/29/2019) PAF (paroxysmal atrial fibrillation) ([address] (7/7/2019) Volume overload (7/8/2019) Chronic systolic heart failure ([address] (7/14/2019) Acute on chronic respiratory failure with hypoxia ([address] (1/4/2021) Assessment and Plan:  
[de-identified] yo F with PMH extensive PMH including afib on anticoagulation, HFrEF, HTN, CAD, DM, CKD who presented with hypoxia in the setting of Covid 19 pna [de-identified] yo F with PMH extensive PMH including afib on anticoagulation, HFrEF, HTN, CAD, DM, CKD who presented from wound care appointment with hypoxia. 
  
1. Acute on chronic hypoxic respiratory failure (bl 3LNC) 2/2 Covid 19 pna 
- O2 therapy to maintain O2 Sat>92% - S/p convalescent plasma 
- Continue decadron - Symptomatic management - Incentive spirometry - CPAP bedtime - Pulm recs 
  
2. HFrEF 
- Continue lasix - Monitor I&Os 
  
3. ANTONIO on CKD Stage 4 
- Monitor renal function - Avoid nephrotoxic agents - Nephro recs 
  
4. HTN 
- Continue coreg and norvasc  
  
5. BLE wounds - Wound Care recs appreciated 6. DM2 
- ISS 
- BS ACHS 
  
7. Parkinson's disease - Continue sinemet 
  
DVT Prophylaxis: SCD's for now I have reviewed, updated, and verified this note's content and spent 38 minutes of my 42 minutes visit performing counseling and coordination of care regarding medical management. Signed By: Vivian Cuellar MD   
 January 8, 2021

## 2021-01-08 NOTE — PROGRESS NOTES
TRANSFER - OUT REPORT: 
 
Verbal report given to Scarlett Cotton RN on [facility]  being transferred to 8th floor (unit) for routine progression of care Report consisted of patients Situation, Background, Assessment and  
Recommendations(SBAR). Information from the following report(s) SBAR, Kardex, ED Summary, Intake/Output, MAR, Recent Results, Cardiac Rhythm a fib and Alarm Parameters  was reviewed with the receiving nurse. Opportunity for questions and clarification was provided. Patient transported with: 
 O2 @ 15 liters Registered Nurse Pt belongings

## 2021-01-08 NOTE — PROGRESS NOTES
Covid precautions in place  did not visit with patient Spoke with staff Reviewed notes for spiritual concerns Jew EFREN 
SPOUSE - MARY ELLEN 
DAUGHTER - SHWETA 
 
LIVES IN TRAVELERS [facility] 
 
PARKINSON'S 
DEPRESSION 
 
REVIEWED CURRENT MEWS SCORE OF 4 Prayer offered for patient's healing and protection STAFF IS PROVIDING VERY COMPASSIONATE CARE

## 2021-01-08 NOTE — PROGRESS NOTES
[address] Admission Date: 1/4/2021 Renal Daily Progress Note: 1/8/2021 The patient's chart is reviewed and the patient is discussed with the staff. Follow up ANTONIO/CKD IV Subjective:  
Patient seen and examined on [facility] ICU remains on 46 % optiflo, ongoing  fatigue with exertional SOB, cough, malaise. Labs and chart reviewed- non oliguric ROS:  
General: no fever/chills CV: no CP Lung: + SOB, + cough GI: no N/V/D Ext: + edema, right foot wound dressing intact Current Facility-Administered Medications Medication Dose Route Frequency  insulin glargine (LANTUS) injection 40 Units  40 Units SubCUTAneous DAILY  amLODIPine (NORVASC) tablet 10 mg  10 mg Oral DAILY  insulin lispro (HUMALOG) injection 0-10 Units  0-10 Units SubCUTAneous AC&HS  insulin lispro (HUMALOG) injection 10 Units  10 Units SubCUTAneous TIDAC  sevelamer carbonate (RENVELA) tab 800 mg  800 mg Oral TID WITH MEALS  
 acetaminophen (TYLENOL) tablet 650 mg  650 mg Oral Q6H PRN  
 carbidopa-levodopa (SINEMET)  mg per tablet 1 Tab  1 Tab Oral QID  carvediloL (COREG) tablet 25 mg  25 mg Oral BID WITH MEALS  ergocalciferol capsule 50,000 Units  50,000 Units Oral Q7D  
 ferrous sulfate tablet 324 mg  324 mg Oral DAILY  furosemide (LASIX) tablet 80 mg  80 mg Oral BID  levothyroxine (SYNTHROID) tablet 25 mcg  25 mcg Oral ACB  pantoprazole (PROTONIX) tablet 40 mg  40 mg Oral ACB&D  
 sodium chloride (NS) flush 5-40 mL  5-40 mL IntraVENous Q8H  
 sodium chloride (NS) flush 5-40 mL  5-40 mL IntraVENous PRN  
 dexamethasone (DECADRON) 4 mg/mL injection 6 mg  6 mg IntraVENous DAILY  0.9% sodium chloride infusion 250 mL  250 mL IntraVENous PRN Objective:  
 
Vitals:  
 01/08/21 0810 01/08/21 0908 01/08/21 0930 01/08/21 1000 BP:  (!) 147/77 (!) 144/86 (!) 144/74 Pulse:  85 79 87 Resp:  28 25 (!) 37 Temp:      
SpO2: 97% 93% 97% 96% Weight:      
Height: Intake and Output:  
01/06 1901 - 01/08 0700 In: 26 [P. O.:682] Out: 3025 [[facility]:0198] 01/08 0701 - 01/08 1900 In: -  
Out: 425 [Urine:425] Physical Exam:  
Constitutional:  the patient is well developed and in no acute distress, alert and oriented HEENT:  Sclera clear, pupils equal, oral mucosa moist 
Lungs: rhonchi bilaterally Cardiovascular:  Regular rate, S1, S2, no rub Abd/GI: soft and non-tender; with positive bowel sounds. Ext: warm without cyanosis. There is trace lower leg edema, right foot wound with dressing intact. Skin:  no jaundice or rashes Neuro: no gross neuro deficits Psychiatric: Calm. Access: LUE AVF +bruit, + thrill LAB Recent Labs 01/08/21 
4920 01/07/21 
0315 WBC 5.6 4.7 HGB 9.0* 8.2* HCT 28.9* 26.2*  
 171 Recent Labs 01/08/21 
0447 01/07/21 
0315 01/06/21 
1103  136 140  
K 4.4 5.5* 4.3  
 107 109* CO2 21 21 19* * 319* 283* * 116* 113* CREA 3.71* 3.84* 4.00* MG  --   --  2.3 No results for input(s): PH, PCO2, PO2, HCO3 in the last 72 hours. Assessment:  (Medical Decision Making) Hospital Problems  Date Reviewed: 1/4/2021 Codes Class Noted POA * (Principal) Pneumonia due to COVID-19 virus ICD-10-CM: U07.1, J12.82 ICD-9-CM: 480.8  1/4/2021 Unknown Acute on chronic respiratory failure with hypoxia [facility]) ICD-10-CM: J96.21 
ICD-9-CM: 518.84, 799.02  1/4/2021 Unknown Chronic systolic heart failure (HCC) ICD-10-CM: I50.22 ICD-9-CM: 428.22  7/14/2019 Yes Volume overload ICD-10-CM: E87.70 ICD-9-CM: 276.69  7/8/2019 Yes PAF (paroxysmal atrial fibrillation) (HCC) ICD-10-CM: I48.0 ICD-9-CM: 427.31  7/7/2019 Yes Morbid obesity ([address] (Chronic) ICD-10-CM: E66.01 
ICD-9-CM: 278.01  6/29/2019 Yes CKD (chronic kidney disease) stage 5, GFR less than 15 ml/min (HCC) (Chronic) ICD-10-CM: N18.5 ICD-9-CM: 585.5  11/30/2018 Yes SARAH (obstructive sleep apnea) (Chronic) ICD-10-CM: G47.33 
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 
ICD-9-CM: 250.40, 583.81  11/15/2017 Yes A-V fistula (HCC) (Chronic) ICD-10-CM: I77.0 ICD-9-CM: 447.0  9/6/2017 Yes Overview Signed 9/6/2017  2:35 PM by LESTER Murray  
  6/7/2017 - Minnie Abdul MD - creation left brachiocephalic AV fistula 8/10/2017 Estuardo Mendez MD - elevation left b-c AVF 
  
  
   
 HTN (hypertension) (Chronic) ICD-10-CM: I10 
ICD-9-CM: 401.9  11/10/2011 Yes Plan:  (Medical Decision Making) 1. ANTONIO/CKD IV close to baseline mid to upper 3s, probable ATN in the setting of COVID 
-continue lasix 80 mg BID, evidence of volume up with edema and pulm edema on cxr, hold ACEi 
- renal US with CKD, no hydronephrosis, UA + protein, moderate LE, phos 6.4,  P/C ratio 3.5, no indication for acute RRT  
-she does have a patent LUE AVF if needed continue to trend renal function with strict I&O 
-renal function holding, Potassium better, non oliguric, no indication for acute RRT at this time 
  
2. COVID 19 + s/p convalescent plasma, on decadron 
  
3. Anemia hgb 9.0, transfuse hgb <7.0 
  
4. HTN continue norvasc, coreg titrate prn, hold ACEi 
  
5. DM type II- SSI per hosp Kasandra Pfeiffer NP

## 2021-01-08 NOTE — DIABETES MGMT
Patient's blood glucose ranged 241-399 yesterday with patient receiving Lantus 40 units, Humalog 62 units, and dexamethasone 6 mg. Blood glucose 277 this morning and 276 at lunch. Given fasting is not a goal, recommend provider consider increase in basal insulin.

## 2021-01-08 NOTE — PROGRESS NOTES
TRANSFER - IN REPORT: 
 
Verbal report received from Pilar SOLIS on Melva Viera  being received from room 529 for routine progression of care   
 
Report consisted of patient’s Situation, Background, Assessment and  
Recommendations(SBAR).  
 
Information from the following report(s) SBAR, Kardex, Intake/Output and MAR was reviewed with the receiving nurse. 
 
Opportunity for questions and clarification was provided.   
 
Assessment completed upon patient’s arrival to unit and care assumed.

## 2021-01-08 NOTE — PROGRESS NOTES
Pt resting in bed with eyes closed. Awakens when spoken to. No s/sx of distress noted. Denies any pain at this time. Call light within reach. Will continue to monitor.

## 2021-01-08 NOTE — PROGRESS NOTES
Pt arrived to room 825 via strecher. Placed on Airvo once settled. 35L at 50%. No s/sx of distress noted. Denies any pain at this time. Dual skin assessment performed with Acacia Thorpe RN. Wound found on left foot with dressing and pressure relieving boots in place. Oriented to room and surroundings. Encouraged to call for assistance as needed. Call light explained and put in reach of pt. Will continue to monitor.

## 2021-01-08 NOTE — PROGRESS NOTES
Pt resting in bed with eyes closed. Airvo in place 35L at 50%. No s/sx of distress noted. Denies any pain. Call light within reach. Will continue to monitor.

## 2021-01-08 NOTE — PROGRESS NOTES
Bedside and Verbal shift change report given to Tomeka Barrett RN (oncoming nurse) by Amina Ramirez RN (offgoing nurse). Report included the following information SBAR, Kardex, ED Summary, Intake/Output, MAR, Recent Results, Cardiac Rhythm a fib and Alarm Parameters .

## 2021-01-09 NOTE — PROGRESS NOTES
Progress Note Patient: Chey Pearson MRN: [id number]  SSN: [id number] YOB: 1940  Age: [de-identified] y.o. Sex: female Admit Date: 1/4/2021 LOS: 5 days Subjective:  
[de-identified] yo F with PMH extensive PMH including afib on anticoagulation, HFrEF, HTN, CAD, DM, CKD who presented with hypoxia in the setting of Covid 19 pna. Patient seen and examined at bedside. This morning feeling weak. Still having SOB. Denies chest pain, no abdominal pain, no nausea or vomiting. Objective:  
 
Vitals:  
 01/09/21 1325 01/09/21 1402 01/09/21 1421 01/09/21 1423 BP: 102/63 (!) 89/51 (!) 87/37 98/64 Pulse: 84 90 64 Resp: 20 20 20 Temp:      
SpO2:      
Weight:      
Height:      
  
 
Intake and Output: 
Current Shift: 01/09 0701 - 01/09 1900 In: -  
Out: 3300 [Urine:400] Last three shifts: 01/07 1901 - 01/09 0700 In: 480 [P.O.:480] Out: 1725 [KFSYU:6083] ROS 
10 ROS negative except from stated on subjective Physical Exam:  
General: Alert, oriented, NAD HEENT: NC/AT, EOM are intact Neck: supple, no JVD Cardiovascular: RRR, S1, S2, no murmurs Respiratory: Decrease breath sounds Abdomen: Soft, NT, ND Back: No CVA tenderness, no paraspinal tenderness Extremities: LE without pedal edema, no erythema Neuro: A&O, CN are intact, no focal deficits Skin: no rash or ulcers Psych: good mood and affect Lab/Data Review: 
I have personally reviewed patients laboratory data showing Recent Results (from the past 24 hour(s)) GLUCOSE, POC Collection Time: 01/08/21  9:10 PM  
Result Value Ref Range Glucose (POC) 186 (H) 65 - 100 mg/dL METABOLIC PANEL, BASIC Collection Time: 01/09/21  4:22 AM  
Result Value Ref Range Sodium 139 136 - 145 mmol/L Potassium 4.4 3.5 - 5.1 mmol/L Chloride 107 98 - 107 mmol/L  
 CO2 19 (L) 21 - 32 mmol/L Anion gap 13 7 - 16 mmol/L Glucose 176 (H) 65 - 100 mg/dL  (H) 8 - 23 MG/DL  Creatinine 3.61 (H) 0.6 - 1.0 MG/DL  
 GFR est AA 16 (L) >60 ml/min/1.73m2 GFR est non-AA 13 (L) >60 ml/min/1.73m2 Calcium 9.1 8.3 - 10.4 MG/DL  
CBC W/O DIFF Collection Time: 01/09/21  4:22 AM  
Result Value Ref Range WBC 6.9 4.3 - 11.1 K/uL  
 RBC 2.79 (L) 4.05 - 5.2 M/uL HGB 8.5 (L) 11.7 - 15.4 g/dL HCT 26.8 (L) 35.8 - 46.3 % MCV 96.1 79.6 - 97.8 FL  
 MCH 30.5 26.1 - 32.9 PG  
 MCHC 31.7 31.4 - 35.0 g/dL  
 RDW 13.4 11.9 - 14.6 % PLATELET 282 767 - 297 K/uL MPV 12.1 9.4 - 12.3 FL ABSOLUTE NRBC 0.00 0.0 - 0.2 K/uL GLUCOSE, POC Collection Time: 01/09/21  5:39 AM  
Result Value Ref Range Glucose (POC) 188 (H) 65 - 100 mg/dL GLUCOSE, POC Collection Time: 01/09/21  4:43 PM  
Result Value Ref Range Glucose (POC) 133 (H) 65 - 100 mg/dL Image: 
I have personally reviewed patients imaging showing XR CHEST SNGL V Final Result IMPRESSION: Stable atypical viral pneumonia pattern. US RETROPERITONEUM COMP Final Result IMPRESSION:  
  
1. Echogenic kidneys, consistent with medical renal disease. Negative for  
hydronephrosis. 2. Small cysts are noted at the upper pole of the left kidney. Complex lesion in  
the midportion of the left kidney noted on prior CT of December 22, 2020 is not  
well-seen on this study, secondary to overlying bowel gas. XR CHEST SNGL V Final Result XR CHEST PORT Final Result IMPRESSION:  
1.  Pulmonary vascular congestion with perihilar airspace disease in a pattern  
which appears to reflect pulmonary edema or less likely multifocal lung  
infiltrates. 2.  Enlarged cardiac silhouette. Hospital problems Principal Problem: 
  Pneumonia due to COVID-19 virus (1/4/2021) Active Problems: 
  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  
[de-identified] yo F with PMH extensive PMH including afib on anticoagulation, HFrEF, HTN, CAD, DM, CKD who presented with hypoxia in the setting of Covid 19 pna [de-identified] yo F with PMH extensive PMH including afib on anticoagulation, HFrEF, HTN, CAD, DM, CKD who presented from wound care appointment with hypoxia. 
  
1. Acute on chronic hypoxic respiratory failure (bl 3LNC) 2/2 Covid 19 pna 
- O2 therapy to maintain O2 Sat>92% - S/p convalescent plasma 
- Continue decadron - Symptomatic management - Incentive spirometry - CPAP bedtime - Pulm recs 
  
2. HFrEF 
- Continue lasix - Monitor I&Os 
  
3. ANTONIO on CKD Stage 4 
- Monitor renal function - Avoid nephrotoxic agents - Nephro recs 
  
4. HTN 
- Continue coreg and norvasc  
  
5. BLE wounds - Wound Care recs appreciated 6. DM2 
- ISS 
- BS ACHS 
  
7. Parkinson's disease - Continue sinemet 
  
DVT Prophylaxis: SCD's for now I have reviewed, updated, and verified this note's content and spent 38 minutes of my 42 minutes visit performing counseling and coordination of care regarding medical management. Signed By: Sanjay Bains MD   
 January 9, 2021

## 2021-01-09 NOTE — PROGRESS NOTES
TRANSFER IN - DIALYSIS Received patient in dialysis unit from 825 (unit) for ordered procedure. Consent verified for renal replacement therapy. Patient alert and vital signs stable. /72 P74 Hemodialysis initiated using left AV fistula and 17 g needles. Machine settings per MD order. Heparin 0 unit bolus and 0 units/hr. Will monitor during treatment.

## 2021-01-09 NOTE — PROGRESS NOTES
Patient stable at this time. Patient very sleepy after dialysis. This RN spoke with patient about  passing. Patient visually upset up states \"He has been sick for about three weeks now and he is in a better place\". Patient denies any pain and appears comfortable at this time. Report to be given to oncoming RN 7p-7a

## 2021-01-09 NOTE — PROGRESS NOTES
staff shared that the patients  has  in 46 
working with nurse to decide best manner to tell patient 
the daughter Columba Hays asked us to share the news because of precautions in place

## 2021-01-09 NOTE — PROGRESS NOTES
Patient gone to dialysis via staff. Patient was on NRB for 30 minutes prior. Respiratory taking Airvo to dialysis for patient's run time.

## 2021-01-09 NOTE — PROGRESS NOTES
I went to the patient's floor and talked to the patient's nurse along with Georgie Sharif. Her nurse did talk to the patient and informed her that her  had  today. Georgie Sharif telephoned the patient's daughter to communicate that the patient had been informed about her . Marcelina Geller, [address] [facility], [facility]

## 2021-01-09 NOTE — PROGRESS NOTES
[address] Admission Date: 1/4/2021 Renal Daily Progress Note: 1/9/2021 The patient's chart is reviewed and the patient is discussed with the staff. Follow up ANTONIO/CKD IV Subjective:  
Patient seen and examined on [facility] ICU; no improvement; needs dialysis. Labs and chart reviewed- non oliguric ROS:  
General: no fever/chills CV: no CP Lung: + SOB, + cough GI: no N/V/D Ext: + edema, right foot wound dressing intact Current Facility-Administered Medications Medication Dose Route Frequency  insulin glargine (LANTUS) injection 40 Units  40 Units SubCUTAneous DAILY  amLODIPine (NORVASC) tablet 10 mg  10 mg Oral DAILY  insulin lispro (HUMALOG) injection 0-10 Units  0-10 Units SubCUTAneous AC&HS  insulin lispro (HUMALOG) injection 10 Units  10 Units SubCUTAneous TIDAC  sevelamer carbonate (RENVELA) tab 800 mg  800 mg Oral TID WITH MEALS  
 acetaminophen (TYLENOL) tablet 650 mg  650 mg Oral Q6H PRN  
 carbidopa-levodopa (SINEMET)  mg per tablet 1 Tab  1 Tab Oral QID  carvediloL (COREG) tablet 25 mg  25 mg Oral BID WITH MEALS  ergocalciferol capsule 50,000 Units  50,000 Units Oral Q7D  
 ferrous sulfate tablet 324 mg  324 mg Oral DAILY  furosemide (LASIX) tablet 80 mg  80 mg Oral BID  levothyroxine (SYNTHROID) tablet 25 mcg  25 mcg Oral ACB  pantoprazole (PROTONIX) tablet 40 mg  40 mg Oral ACB&D  
 sodium chloride (NS) flush 5-40 mL  5-40 mL IntraVENous Q8H  
 sodium chloride (NS) flush 5-40 mL  5-40 mL IntraVENous PRN  
 dexamethasone (DECADRON) 4 mg/mL injection 6 mg  6 mg IntraVENous DAILY  0.9% sodium chloride infusion 250 mL  250 mL IntraVENous PRN Objective:  
 
Vitals:  
 01/09/21 0827 01/09/21 1131 01/09/21 1142 01/09/21 1201 BP: (!) 145/71 (!) 155/72 (!) 167/85 123/66 Pulse:  74 92 67 Resp:  20 20 20 Temp:   98.5 °F (36.9 °C) SpO2:   (!) 86% Weight:      
Height:      
 
Intake and Output: 01/07 1901 - 01/09 0700 In: 480 [P.O.:480] Out: 1725 [QVUKN:7537] 01/09 0701 - 01/09 1900 In: -  
Out: 400 [Urine:400] Physical Exam:  
Constitutional:  the patient is well developed and in acute distress, alert and oriented HEENT:  Sclera clear, pupils equal, oral mucosa moist 
Lungs: rhonchi bilaterally Cardiovascular:  Regular rate, S1, S2, no rub Abd/GI: soft and non-tender; with positive bowel sounds. Ext: warm without cyanosis. There is trace lower leg edema, right foot wound with dressing intact. Skin:  no jaundice or rashes Neuro: no gross neuro deficits Psychiatric: Calm. Access: LUE AVF +bruit, + thrill LAB Recent Labs 01/09/21 
0422 01/08/21 
1781 01/07/21 
0315 WBC 6.9 5.6 4.7 HGB 8.5* 9.0* 8.2* HCT 26.8* 28.9* 26.2*  
 159 171 Recent Labs 01/09/21 
0422 01/08/21 
4333 01/07/21 
0315  139 136  
K 4.4 4.4 5.5*  
 107 107 CO2 19* 21 21 * 213* 319* * 126* 116* CREA 3.61* 3.71* 3.84* No results for input(s): PH, PCO2, PO2, HCO3 in the last 72 hours. Assessment:  (Medical Decision Making) Hospital Problems  Date Reviewed: 1/4/2021 Codes Class Noted POA * (Principal) Pneumonia due to COVID-19 virus ICD-10-CM: U07.1, J12.82 ICD-9-CM: 480.8  1/4/2021 Unknown Acute on chronic respiratory failure with hypoxia [facility]) ICD-10-CM: J96.21 
ICD-9-CM: 518.84, 799.02  1/4/2021 Unknown Chronic systolic heart failure (HCC) ICD-10-CM: I50.22 ICD-9-CM: 428.22  7/14/2019 Yes Volume overload ICD-10-CM: E87.70 ICD-9-CM: 276.69  7/8/2019 Yes PAF (paroxysmal atrial fibrillation) (HCC) ICD-10-CM: I48.0 ICD-9-CM: 427.31  7/7/2019 Yes Morbid obesity ([address] (Chronic) ICD-10-CM: E66.01 
ICD-9-CM: 278.01  6/29/2019 Yes CKD (chronic kidney disease) stage 5, GFR less than 15 ml/min (HCC) (Chronic) ICD-10-CM: N18.5 ICD-9-CM: 585.5  11/30/2018 Yes SARAH (obstructive sleep apnea) (Chronic) ICD-10-CM: G47.33 
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 
ICD-9-CM: 250.40, 583.81  11/15/2017 Yes A-V fistula (HCC) (Chronic) ICD-10-CM: I77.0 ICD-9-CM: 447.0  9/6/2017 Yes Overview Signed 9/6/2017  2:35 PM by LESTER Kaur  
  6/7/2017 - Layo Nuno MD - creation left brachiocephalic AV fistula 8/10/2017 Celia Bloch, MD - elevation left b-c AVF 
  
  
   
 HTN (hypertension) (Chronic) ICD-10-CM: I10 
ICD-9-CM: 401.9  11/10/2011 Yes Plan:  (Medical Decision Making) 1. ANTONIO/CKD IV  now more than likely progressed to ESRD Hypoxic Resp Failure I will initiate HD today and try to UF for compnents of Pulmonary Edema that may be present. 
  
2. COVID 19 + s/p convalescent plasma, on decadron 
  
3. Anemia hgb 9.0, transfuse hgb <7.0 
  
4. HTN (Hold to maximize UF) norvasc, coreg titrate prn, hold ACEi 
  
5. DM type II- SSI per hosp Valeriano Field MD

## 2021-01-09 NOTE — PROGRESS NOTES
TRANSFER OUT -DIALYSIS Hemodialysis treatment d/c early due to hypotension; BP 87/37 after patient rinsed back BP increased to 98/64. Patient alert and VS stable  BP 98/64  P 64   
 
 2.9 Kgs removed. Needles X2 removed from access and manual pressure held until hemostasis complete and pressure dressing applied. Meds given: None. Patient to room after dialysis.

## 2021-01-09 NOTE — PROGRESS NOTES
Patient resting in bed with no complaints at this time. Patient is alert and orientated with dyspnea at rest noted. Patient c/o buttocks hurting; patient turned to right side and buttocks with some skin breakdown noted. Cream and Allevyn applied. Will consult wound for further instructions. IV intact and patent with no s/s of infection noted. Respirations even and unlabored with heart rate regular. Patient unable to ambulate independently without assistance; bed rest at this time. Nephrologist informed this RN that patient will start HD today; patient informed and she verbalized understanding. Patient has a working fistula in left upper arm with good thrill and bruit. Bed in low locked position with call light within reach. Patient instructed to call if assistance is needed. Will continue to monitor. Patient's  has passed away in ICU this AM.  Patient to be informed by family (hopefully). Spiritual care working on this.

## 2021-01-10 NOTE — PROGRESS NOTES
[address] Admission Date: 1/4/2021 Renal Daily Progress Note: 1/10/2021 The patient's chart is reviewed and the patient is discussed with the staff. Follow up ANTONIO/CKD IV Subjective:  
Patient seen and examined on [facility] ICU; 
 
Labs and chart reviewed- non oliguric ROS:  
General: no fever/chills CV: no CP Lung: + SOB, + cough GI: no N/V/D Ext: + edema, right foot wound dressing intact Current Facility-Administered Medications Medication Dose Route Frequency  insulin glargine (LANTUS) injection 40 Units  40 Units SubCUTAneous DAILY  amLODIPine (NORVASC) tablet 10 mg  10 mg Oral DAILY  insulin lispro (HUMALOG) injection 0-10 Units  0-10 Units SubCUTAneous AC&HS  insulin lispro (HUMALOG) injection 10 Units  10 Units SubCUTAneous TIDAC  sevelamer carbonate (RENVELA) tab 800 mg  800 mg Oral TID WITH MEALS  
 acetaminophen (TYLENOL) tablet 650 mg  650 mg Oral Q6H PRN  
 carbidopa-levodopa (SINEMET)  mg per tablet 1 Tab  1 Tab Oral QID  carvediloL (COREG) tablet 25 mg  25 mg Oral BID WITH MEALS  ergocalciferol capsule 50,000 Units  50,000 Units Oral Q7D  
 ferrous sulfate tablet 324 mg  324 mg Oral DAILY  furosemide (LASIX) tablet 80 mg  80 mg Oral BID  levothyroxine (SYNTHROID) tablet 25 mcg  25 mcg Oral ACB  pantoprazole (PROTONIX) tablet 40 mg  40 mg Oral ACB&D  
 sodium chloride (NS) flush 5-40 mL  5-40 mL IntraVENous Q8H  
 sodium chloride (NS) flush 5-40 mL  5-40 mL IntraVENous PRN  
 dexamethasone (DECADRON) 4 mg/mL injection 6 mg  6 mg IntraVENous DAILY  0.9% sodium chloride infusion 250 mL  250 mL IntraVENous PRN Objective:  
 
Vitals:  
 01/10/21 0656 01/10/21 0403 01/10/21 3991 01/10/21 0969 BP: 123/67 (!) 161/80 (!) 145/89 Pulse: 66 85 90 Resp: 21 20 22 Temp: 97.6 °F (36.4 °C) 97.7 °F (36.5 °C) 97.8 °F (36.6 °C) SpO2: 96% 91% 91% 92% Weight:      
Height:      
 
Intake and Output: 01/08 1901 - 01/10 0700 In: -  
Out: 0671 [MVDLK:[id number]] No intake/output data recorded. Physical Exam: did not enter room Constitutional:  the patient is well developed and in acute distress, alert and oriented HEENT:  Sclera clear, pupils equal, oral mucosa moist 
Lungs: rhonchi bilaterally Cardiovascular:  Regular rate, S1, S2, no rub Abd/GI: soft and non-tender; with positive bowel sounds. Ext: warm without cyanosis. There is trace lower leg edema, right foot wound with dressing intact. Skin:  no jaundice or rashes Neuro: no gross neuro deficits Psychiatric: Calm. Access: LUE AVF +bruit, + thrill LAB Recent Labs  
  01/10/21 
0255 01/09/21 
0422 01/08/21 
0447 WBC 8.4 6.9 5.6 HGB 9.1* 8.5* 9.0*  
HCT 27.8* 26.8* 28.9*  
 160 159 Recent Labs  
  01/10/21 
0255 01/09/21 
0422 01/08/21 
0447  139 139  
K 4.0 4.4 4.4  107 107 CO2 27 19* 21  
GLU 99 176* 213* BUN 88* 134* 126* CREA 2.68* 3.61* 3.71* No results for input(s): PH, PCO2, PO2, HCO3 in the last 72 hours. Assessment:  (Medical Decision Making) Hospital Problems  Date Reviewed: 1/4/2021 Codes Class Noted POA * (Principal) Pneumonia due to COVID-19 virus ICD-10-CM: U07.1, J12.82 ICD-9-CM: 480.8  1/4/2021 Unknown Acute on chronic respiratory failure with hypoxia [facility]) ICD-10-CM: J96.21 
ICD-9-CM: 518.84, 799.02  1/4/2021 Unknown Chronic systolic heart failure (HCC) ICD-10-CM: I50.22 ICD-9-CM: 428.22  7/14/2019 Yes Volume overload ICD-10-CM: E87.70 ICD-9-CM: 276.69  7/8/2019 Yes PAF (paroxysmal atrial fibrillation) (HCC) ICD-10-CM: I48.0 ICD-9-CM: 427.31  7/7/2019 Yes Morbid obesity ([address] (Chronic) ICD-10-CM: E66.01 
ICD-9-CM: 278.01  6/29/2019 Yes CKD (chronic kidney disease) stage 5, GFR less than 15 ml/min (HCC) (Chronic) ICD-10-CM: N18.5 ICD-9-CM: 585.5  11/30/2018 Yes SARAH (obstructive sleep apnea) (Chronic) ICD-10-CM: G47.33 
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 
ICD-9-CM: 250.40, 583.81  11/15/2017 Yes A-V fistula (HCC) (Chronic) ICD-10-CM: I77.0 ICD-9-CM: 447.0  9/6/2017 Yes Overview Signed 9/6/2017  2:35 PM by LESTER Vee  
  6/7/2017 - Tristin Jones MD - creation left brachiocephalic AV fistula 8/10/2017 Chris Torres MD - elevation left b-c AVF 
  
  
   
 HTN (hypertension) (Chronic) ICD-10-CM: I10 
ICD-9-CM: 401.9  11/10/2011 Yes Plan:  (Medical Decision Making) 1. New ESRD Hypoxic Resp Failure First dialysis 1/10/2021 Next dialysis in am 
  
2. COVID 19 + s/p convalescent plasma, on decadron 
  
3. Anemia hgb 9.0, transfuse hgb <7.0 
  
4. HTN (Hold to maximize UF) norvasc, coreg  
  
5. DM type II- SSI per hosp Debora Epps MD

## 2021-01-10 NOTE — PROGRESS NOTES
Patient in bed sleeping at this time. Respirations regular and unlabored. Call light in reach. Will continue to monitor for needs.

## 2021-01-10 NOTE — PROGRESS NOTES
Bedside report received from night nurse Deepti Krueger. Assessment done as noted  Respiration even and unlabored 20/min; denies pain or nausea at present. Afebrile this morning. Non-productive coughing at interval. Remain on O2 via Airvo intact at 35 liters/40% with sats 90-91 at rest. Remains on Droplet plus isolation for positive COVID-19 screening. . Ordered PPE in place and in use. Encouraged to call with needs.

## 2021-01-10 NOTE — PROGRESS NOTES
Progress Note Patient: Evangelina Girard MRN: [id number]  SSN: [id number] YOB: 1940  Age: [de-identified] y.o. Sex: female Admit Date: 1/4/2021 LOS: 6 days Subjective:  
[de-identified] yo F with PMH extensive PMH including afib on anticoagulation, HFrEF, HTN, CAD, DM, CKD who presented with hypoxia in the setting of Covid 19 pna. Patient seen and examined at bedside. This morning feeling weak. Still having SOB. Denies chest pain, no abdominal pain, no nausea or vomiting. Objective:  
 
Vitals:  
 01/10/21 0101 01/10/21 0403 01/10/21 8805 01/10/21 3418 BP: 123/67 (!) 161/80 (!) 145/89 Pulse: 66 85 90 Resp: 21 20 22 Temp: 97.6 °F (36.4 °C) 97.7 °F (36.5 °C) 97.8 °F (36.6 °C) SpO2: 96% 91% 91% 92% Weight:      
Height:      
  
 
Intake and Output: 
Current Shift: No intake/output data recorded. Last three shifts: 01/08 1901 - 01/10 0700 In: -  
Out: 9254 [[facility]:9521] ROS 
10 ROS negative except from stated on subjective Physical Exam:  
General: Alert, oriented, NAD HEENT: NC/AT, EOM are intact Neck: supple, no JVD Cardiovascular: RRR, S1, S2, no murmurs Respiratory: Decrease breath sounds Abdomen: Soft, NT, ND Back: No CVA tenderness, no paraspinal tenderness Extremities: LE without pedal edema, no erythema Neuro: A&O, CN are intact, no focal deficits Skin: no rash or ulcers Psych: good mood and affect Lab/Data Review: 
I have personally reviewed patients laboratory data showing Recent Results (from the past 24 hour(s)) GLUCOSE, POC Collection Time: 01/09/21  8:45 PM  
Result Value Ref Range Glucose (POC) 131 (H) 65 - 100 mg/dL METABOLIC PANEL, BASIC Collection Time: 01/10/21  2:55 AM  
Result Value Ref Range Sodium 140 136 - 145 mmol/L Potassium 4.0 3.5 - 5.1 mmol/L Chloride 105 98 - 107 mmol/L  
 CO2 27 21 - 32 mmol/L Anion gap 8 7 - 16 mmol/L Glucose 99 65 - 100 mg/dL BUN 88 (H) 8 - 23 MG/DL  Creatinine 2.68 (H) 0.6 - 1.0 MG/DL  
 GFR est AA 22 (L) >60 ml/min/1.73m2 GFR est non-AA 18 (L) >60 ml/min/1.73m2 Calcium 9.2 8.3 - 10.4 MG/DL  
CBC W/O DIFF Collection Time: 01/10/21  2:55 AM  
Result Value Ref Range WBC 8.4 4.3 - 11.1 K/uL  
 RBC 2.95 (L) 4.05 - 5.2 M/uL HGB 9.1 (L) 11.7 - 15.4 g/dL HCT 27.8 (L) 35.8 - 46.3 % MCV 94.2 79.6 - 97.8 FL  
 MCH 30.8 26.1 - 32.9 PG  
 MCHC 32.7 31.4 - 35.0 g/dL  
 RDW 13.3 11.9 - 14.6 % PLATELET 964 703 - 786 K/uL MPV 12.2 9.4 - 12.3 FL ABSOLUTE NRBC 0.00 0.0 - 0.2 K/uL GLUCOSE, POC Collection Time: 01/10/21  6:31 AM  
Result Value Ref Range Glucose (POC) 117 (H) 65 - 100 mg/dL GLUCOSE, POC Collection Time: 01/10/21  6:44 AM  
Result Value Ref Range Glucose (POC) 109 (H) 65 - 100 mg/dL GLUCOSE, POC Collection Time: 01/10/21 12:02 PM  
Result Value Ref Range Glucose (POC) 168 (H) 65 - 100 mg/dL GLUCOSE, POC Collection Time: 01/10/21  3:29 PM  
Result Value Ref Range Glucose (POC) 132 (H) 65 - 100 mg/dL Image: 
I have personally reviewed patients imaging showing XR CHEST SNGL V Final Result IMPRESSION: Stable atypical viral pneumonia pattern. US RETROPERITONEUM COMP Final Result IMPRESSION:  
  
1. Echogenic kidneys, consistent with medical renal disease. Negative for  
hydronephrosis. 2. Small cysts are noted at the upper pole of the left kidney. Complex lesion in  
the midportion of the left kidney noted on prior CT of December 22, 2020 is not  
well-seen on this study, secondary to overlying bowel gas. XR CHEST SNGL V Final Result XR CHEST PORT Final Result IMPRESSION:  
1.  Pulmonary vascular congestion with perihilar airspace disease in a pattern  
which appears to reflect pulmonary edema or less likely multifocal lung  
infiltrates. 2.  Enlarged cardiac silhouette. Hospital problems Principal Problem: 
  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  
[de-identified] yo F with PMH extensive PMH including afib on anticoagulation, HFrEF, HTN, CAD, DM, CKD who presented with hypoxia in the setting of Covid 19 pna [de-identified] yo F with PMH extensive PMH including afib on anticoagulation, HFrEF, HTN, CAD, DM, CKD who presented from wound care appointment with hypoxia. 
  
1. Acute on chronic hypoxic respiratory failure (bl 3LNC) 2/2 Covid 19 pna 
- O2 therapy to maintain O2 Sat>92% - S/p convalescent plasma 
- Continue decadron - Symptomatic management - Incentive spirometry - CPAP bedtime - Pulm recs 
  
2. HFrEF 
- Continue lasix - Monitor I&Os 
  
3. ANTONIO on CKD Stage 4 now on HD 
- Monitor renal function - Avoid nephrotoxic agents 
- HD per nephro - Nephro recs 
  
4. HTN 
- Holding coreg and norvasc  
  
5. BLE wounds - Wound Care recs appreciated 6. DM2 
- ISS 
- BS ACHS 
  
7. Parkinson's disease - Continue sinemet 
  
DVT Prophylaxis: SCD's for now I have reviewed, updated, and verified this note's content and spent 38 minutes of my 42 minutes visit performing counseling and coordination of care regarding medical management. Signed By: Mariann Jones MD   
 January 10, 2021

## 2021-01-10 NOTE — PROGRESS NOTES
Patient awake and calm at this time. No distress noted during shift. Call light in reach. Waiting to report off to on coming nurse.

## 2021-01-11 NOTE — PROGRESS NOTES
Spoke with daughter Shady Blanco [phone] (cell call 1st) via phone and updated per MD progress note. HIPPA Code [id number] verified prior to giving phone update.

## 2021-01-11 NOTE — PROGRESS NOTES
Patient in bed sleeping at this time. Patient arouses and follows commands appropriately. Patient seems to be drowsy at this time.

## 2021-01-11 NOTE — PROGRESS NOTES
Bedside report received from night nurse Mj Hollins. Assessment done as noted  Respiration even and unlabored 20/min; denies pain or nausea at present. Remains afebrile this morning. Non-productive coughing at interval.  O2 intact with Airvo 30 liters/40% with sats 89-90% at rest. Remains on Droplet plus isolation for positive COVID-19 screening. Ordered PPE in place and in use. Encouraged to call with needs.

## 2021-01-11 NOTE — DIALYSIS
TRANSFER IN - DIALYSIS Received patient in dialysis unit from 5 (unit) for ordered procedure. Consent verified for renal replacement therapy. Patient alert and vital signs stable. /54 P67 Hemodialysis initiated using left AVF and 17 g needles. Machine settings per MD order. Heparin 0 unit bolus and 1000 units/hr. Will monitor during treatment.

## 2021-01-11 NOTE — PROGRESS NOTES
[facility] COVID-19 Note: 
 
Critical Care Note: 1/11/2021 [address] Admission Date: 1/4/2021     Length of Stay: 7 days Background: [de-identified] y.o. y/o female with acute hypoxemic respiratory failure secondary to COVID-19. Course complicated by highly elevated BNP at 66K, elevated troponin. Onset of COVID-19 symptoms: 12/26 Notable PMH: DM, HTN, CKD, SARAH, CHF, moderate MR 
 
24 Hour events:  
Doing better this AM, had HD today, did have drop in BP and wasn't able to have full UF, BP rebounded though. Now back in room on 10 LPM HFNC. O2 sat 92%. REVIEW OF SYSTEMS: 
Constitutional: negative fever and chills, nonight sweats, weight loss, weight gain, persistent fatigue, or lethargy/hypersomnolence. CV: No chest pain, pressure, discomfort, palpitations, orthopnea, murmurs, or edema. GI: No dysphagia, heartburn reflux, nausea/vomiting, diarrhea, abdominal pain, or bleeding. Neuro: There is no history of AMS, persistent headache, decreased level of consciousness, seizures, or motor or sensory deficits. Lines: (insertion date) PIVs only Drips: current dose (range) 
none Visit Vitals /70 Pulse 89 Temp 97.8 °F (36.6 °C) Resp 26 Ht 5' 9\" (1.753 m) Wt 254 lb 12.8 oz (115.6 kg) SpO2 98% BMI 37.63 kg/m² Pertinent Exam:           
Constitutional:  alert, on HFNC 
EENMT:  Sclera clear, pupils equal, oral mucosa moist 
Respiratory:  mild crackles Cardiovascular:  RRR Gastrointestinal:  soft with no tenderness; positive bowel sounds present Musculoskeletal:  warm with no cyanosis, trace lower extremity edema Skin:  no jaundice or ecchymosis, dressing to L foot Neurologic:normal 
Psychiatric: calm CXR None today Pertinent Labs:  
 
Recent Labs  
  01/11/21 
0346 01/10/21 
0255 01/09/21 
0422 WBC 9.9 8.4 6.9 HGB 9.5* 9.1* 8.5* HCT 29.4* 27.8* 26.8*  
 168 160 Recent Labs  
  01/11/21 
0346 01/10/21 
0255 01/09/21 0422  
 140 139  
K 4.2 4.0 4.4  105 107 CO2 25 27 19* GLU 84 99 176* * 88* 134* CREA 2.92* 2.68* 3.61* CA 8.8 9.2 9.1 Recent Labs  
  01/11/21 
1028 01/11/21 
0621 01/10/21 
2124 GLUCPOC 130* 108* 87 SARS-CoV-2 LAB Results 1/4 rapid test positive MICRO Date Source Result 1/4 blood NG  
1/4 blood NG  
 
COVID-19 Meds: 
Dexamethasone 6mg daily (1/4) Convalescent plasma transfusion (1/4) Remdesivir Not a candidate Anti-infectives (start date): ABG:  
No results for input(s): PH, PCO2, PO2, HCO3, PHI, PCO2I, PO2I, HCO3I in the last 72 hours. Impression: [de-identified] y.o. y/o female with acute hypoxemic respiratory failure secondary to COVID-19, CKD, h/o CHF, severe hyperglycemia on steroids Patient Active Problem List  
Diagnosis Code  
 HTN (hypertension) I10  
 AF (atrial fibrillation) ([facility]) I48.91  
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  
 Encounter for medication management Z79.899  SARAH (obstructive sleep apnea) G47.33  
 CKD (chronic kidney disease) stage 5, GFR less than 15 ml/min ([facility]) N18.5  Parkinson disease ([facility] G20  
 Acute cystitis with hematuria N30.01  
 Morbid obesity ([facility] E66.01  
 Hypokalemia E87.6  
 C. difficile diarrhea A04.72  
 PAF (paroxysmal atrial fibrillation) ([facility]) I48.0  Elevated troponin R77.8  Chest pain R07.9  CAD (coronary artery disease) I25.10  Volume overload E87.70  Demand ischemia ([facility]) I24.8  Meningioma ([facility] D32.9  Acquired hypothyroidism E03.9  Anemia of chronic kidney failure, stage 4 (severe) ([facility]) N18.4, D63.1  Chronic systolic heart failure (HCC) I50.22  
 Decreased hemoglobin R71.0  Depression F32.9  History of UTI Z87.440  Postural imbalance R29.3  Hypertensive emergency I16.1  Pneumonia due to COVID-19 virus U07.1, J12.82  
 Acute on chronic respiratory failure with hypoxia (HCC) J96.21  
 
1. COVID-19 pneumonia: continue steroids and close monitoring of O2 levels, wean O2 as tolerated, now on HFNC 2. DM with hyperglycemia:  Continue lantus 10U 
3. CHF:  Continue diuresis, monitoring renal function. TTE ordered but not done. 4.  Ppx: heparin, PPI 5. CKD>> HD per nephrology>>had drop in BP today during dialysis which decrease UF goal, but BP rebounded. Attempted to call daughter to update regarding pt status with both numbers below with no answer. No message left, as VM was not specific for family member. Daughter Zena Kohli can be reached at [phone] or [phone].  is in hospital. 
 
Daron Sigala NP Lungs:  Basilar crackles Heart:  RRR with no Murmur/Rubs/Gallops Additional Comments:  Down to 10 L, cxr tomorrow I have spoken with and examined the patient. I agree with the above assessment and plan as documented.  
 
Ephraim David MD

## 2021-01-11 NOTE — PROGRESS NOTES
[address] Admission Date: 1/4/2021 Renal Daily Progress Note: 1/11/2021 The patient's chart is reviewed and the patient is discussed with the staff. Follow up ANTONIO/CKD IV Subjective:  
Patient seen and examined HD-#2, dialyzing via LUE  Qb, UF 3600, tolerating dialysis, complaints of feeling cold, weakness. Remains on high brian O2 Labs and chart reviewed- non oliguric ROS:  
General: no fever/chills CV: no CP Lung: + SOB, + cough GI: no N/V/D Ext: + edema, right foot wound dressing intact Current Facility-Administered Medications Medication Dose Route Frequency  insulin glargine (LANTUS) injection 20 Units  20 Units SubCUTAneous DAILY  heparin (porcine) 1,000 unit/mL injection 5,000 Units  5,000 Units Hemodialysis DIALYSIS PRN  
 [Held by provider] amLODIPine (NORVASC) tablet 10 mg  10 mg Oral DAILY  insulin lispro (HUMALOG) injection 0-10 Units  0-10 Units SubCUTAneous AC&HS  insulin lispro (HUMALOG) injection 10 Units  10 Units SubCUTAneous TIDAC  sevelamer carbonate (RENVELA) tab 800 mg  800 mg Oral TID WITH MEALS  
 acetaminophen (TYLENOL) tablet 650 mg  650 mg Oral Q6H PRN  
 carbidopa-levodopa (SINEMET)  mg per tablet 1 Tab  1 Tab Oral QID  [Held by provider] carvediloL (COREG) tablet 25 mg  25 mg Oral BID WITH MEALS  ergocalciferol capsule 50,000 Units  50,000 Units Oral Q7D  
 ferrous sulfate tablet 324 mg  324 mg Oral DAILY  furosemide (LASIX) tablet 80 mg  80 mg Oral BID  levothyroxine (SYNTHROID) tablet 25 mcg  25 mcg Oral ACB  pantoprazole (PROTONIX) tablet 40 mg  40 mg Oral ACB&D  
 sodium chloride (NS) flush 5-40 mL  5-40 mL IntraVENous Q8H  
 sodium chloride (NS) flush 5-40 mL  5-40 mL IntraVENous PRN  
 dexamethasone (DECADRON) 4 mg/mL injection 6 mg  6 mg IntraVENous DAILY  0.9% sodium chloride infusion 250 mL  250 mL IntraVENous PRN Objective:  
 
Vitals: 01/11/21 5830 01/11/21 0874 01/11/21 7876 01/11/21 6225 BP: (!) 147/72   (!) 107/38 Pulse: 89   70 Resp: 26 Temp: 97.8 °F (36.6 °C) SpO2: (!) 82% 100% 98% Weight:      
Height:      
 
Intake and Output:  
No intake/output data recorded. No intake/output data recorded. Physical Exam: did not enter room Constitutional:  the patient is well developed and in acute distress, alert and oriented HEENT:  Sclera clear, pupils equal, oral mucosa moist 
Lungs: rhonchi bilaterally Cardiovascular:  Regular rate, S1, S2, no rub Abd/GI: soft and non-tender; with positive bowel sounds. Ext: warm without cyanosis. There is trace lower leg edema, right foot wound with dressing intact. Skin:  no jaundice or rashes Neuro: no gross neuro deficits Psychiatric: Calm. Access: LUE AVF cannulated LAB Recent Labs  
  01/11/21 
0346 01/10/21 
0255 01/09/21 
0422 WBC 9.9 8.4 6.9 HGB 9.5* 9.1* 8.5* HCT 29.4* 27.8* 26.8*  
 168 160 Recent Labs  
  01/11/21 
0346 01/10/21 
0255 01/09/21 
0422  140 139  
K 4.2 4.0 4.4  105 107 CO2 25 27 19* GLU 84 99 176* * 88* 134* CREA 2.92* 2.68* 3.61* No results for input(s): PH, PCO2, PO2, HCO3 in the last 72 hours. Assessment:  (Medical Decision Making) Hospital Problems  Date Reviewed: 1/4/2021 Codes Class Noted POA * (Principal) Pneumonia due to COVID-19 virus ICD-10-CM: U07.1, J12.82 ICD-9-CM: 480.8  1/4/2021 Unknown Acute on chronic respiratory failure with hypoxia [facility]) ICD-10-CM: J96.21 
ICD-9-CM: 518.84, 799.02  1/4/2021 Unknown Chronic systolic heart failure (HCC) ICD-10-CM: I50.22 ICD-9-CM: 428.22  7/14/2019 Yes Volume overload ICD-10-CM: E87.70 ICD-9-CM: 276.69  7/8/2019 Yes PAF (paroxysmal atrial fibrillation) (HCC) ICD-10-CM: I48.0 ICD-9-CM: 427.31  7/7/2019 Yes  Morbid obesity (HCC) (Chronic) ICD-10-CM: E66.01 
 ICD-9-CM: 278.01  6/29/2019 Yes CKD (chronic kidney disease) stage 5, GFR less than 15 ml/min (HCC) (Chronic) ICD-10-CM: N18.5 ICD-9-CM: 585.5  11/30/2018 Yes SARAH (obstructive sleep apnea) (Chronic) ICD-10-CM: G47.33 
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 
ICD-9-CM: 250.40, 583.81  11/15/2017 Yes A-V fistula (HCC) (Chronic) ICD-10-CM: I77.0 ICD-9-CM: 447.0  9/6/2017 Yes Overview Signed 9/6/2017  2:35 PM by LESTER eRne  
  6/7/2017 - Cheyenne De La Rosa MD - creation left brachiocephalic AV fistula 8/10/2017 Osmar Lowe MD - elevation left b-c AVF 
  
  
   
 HTN (hypertension) (Chronic) ICD-10-CM: I10 
ICD-9-CM: 401.9  11/10/2011 Yes Plan:  (Medical Decision Making) 1. ANTONIO/CKD IV- New ESRD Hypoxic Resp Failure First dialysis 1/10/2021 Seen on HD-#2, tolerating dialysis, access functioning well Dialysis again tomorrow 2. COVID 19 + s/p convalescent plasma, on decadron 
  
3. Anemia hgb 9.5 
  
4. HTN (Hold to maximize UF) norvasc, coreg  
  
5. DM type II- SSI per hosp DeGardnere Sessions, NP

## 2021-01-11 NOTE — PROGRESS NOTES
RT at bedside to switch pt to [facility] for transport for dialysis. To dialysis via bed with dialysis nurse, RT and transport.

## 2021-01-11 NOTE — PROGRESS NOTES
Appetite poor. Not eating well. Resting quietly at present. NAD noted. Remains on droplet plus isolation for positive COVID-19 screening. Ordered PPE in place and in use. Safety maintained through out the shift. To report off to oncoming nurse.

## 2021-01-11 NOTE — DIABETES MGMT
Patient's blood glucose ranged  yesterday with patient receiving Lantus 40 units, Humalog 32 units, and dexamethasone 6 mg. Blood glucose 84 in lab work  This morning and FSBS 108. Hgb 9. 5. Creatinine 2.92. GFR 16. Patient dialyzing for the second time today. Spoke with provider and order received to decrease Lantus to 20 units today. Will continue to follow along.

## 2021-01-11 NOTE — DIALYSIS
TRANSFER OUT -DIALYSIS Hemodialysis treatment completed. Had a drop in BP. Recovered, but UF goal was decreased. Patient alert and VS stable  /70  P 89   
 
 1.5 Kgs removed. Needles X2 removed from access and manual pressure held until hemostasis complete and pressure dressing applied. Meds given 0. 0 units of RBCs given during dialysis. Patient to 825 after dialysis.

## 2021-01-12 NOTE — PROGRESS NOTES
Comprehensive Nutrition Assessment 
 
Type and Reason for Visit: Initial, RD nutrition re-screen/LOS 
This assessment was completed remotely from within the hospital. 
 
Nutrition Recommendations/Plan:  
? Continue current diet 
? Add Nepro TID with meals 
? Provide Nepro with med passes to increase kcal and protein intake throughout the day  
 
Malnutrition Assessment: 
Malnutrition Status: Mild malnutrition 
Context: Acute illness 
Findings of clinical characteristics of malnutrition:  
Energy Intake:  Mild decrease in energy intake (specify)(poor po intake during admission) 
Weight Loss:  7.00 - Greater than 7.5% over 3 months(118.4kg 9/28 dr visit)    
Body Fat Loss:  Unable to assess,    
Muscle Mass Loss:  Unable to assess,   
Fluid Accumulation:  Unable to assess,   
 Strength:  Not performed  
Nutrition Assessment:  
Nutrition History: Unable to assess     
Nutrition Background: Pt presents with PNA due to COVID-19. Noted vomiting PTA. PMH notable for MD [place] II, HTN, CKD IV, SARAH, CHF, CAD, gout, and Parkinson's. Dialysis started 1/11. 
Daily Update: 
Pt discussed with RN who reports pt has minimal po intake and has taste changes. RN denies pt having N/V. Discussed addition of Nepro shakes with meals and providing leftover Nepro with meds to increase kcal and protein throughout the day. Pt was last seen by SFD RD on 12/25 due to poor appetite. Pt was drinking ONS during this time. 
 
Nutrition Related Findings:  
NFPE deferred to isolation precautions Wound Type: (foot/toe wound noted per wound care note) 
 
Current Nutrition Therapies: 
DIET DIABETIC CONSISTENT CARB Mechanical Soft 
 
Current Intake:  
Average Meal Intake: 1-25% Average Supplement Intake: None ordered   
 
Anthropometric Measures: 
Height: 5' 9\" (175.3 cm) 
Current Body Wt: 109.6 kg (241 lb 10 oz)(1/12), Weight source: Not specified 
BMI: 35.7, Obese class 2 (BMI 35.0-39.9) 
Admission Body Weight: 257 lb 15 oz(1/4- Not specified) 
 Ideal Body Wt: 145 lbs (66 kg), 166.6 % Estimated Daily Nutrient Needs: 
Energy (kcal/day): 4148-1901 (Kcal/kg(30-35), Weight Used: Ideal(65.9kg)) Protein (g/day): 79-86(1.2-1.3gm/kg) Weight Used: (Ideal(65.9kg)) Nutrition Diagnosis:  
· Inadequate oral intake, Mild malnutrition, In context of acute illness or injury related to inadequate protein-energy intake as evidenced by intake 0-25%(criteria provided above in malnutrition assessment) Nutrition Interventions:  
Food and/or Nutrient Delivery: Continue current diet, Start oral nutrition supplement Coordination of Nutrition Care: Continue to monitor while inpatient Plan of Care discussed with Kandace Moore RN Goals: Active Goal: Meet >75% estimated nutrition needs within 7 days Nutrition Monitoring and Evaluation:  
  
Food/Nutrient Intake Outcomes: Food and nutrient intake, Supplement intake Discharge Planning: Too soon to determine Electronically signed by Los Bustamante MS, RDN, LD 1/12/2021 at 3:49 PM 
Contact: [phone] Disaster Mode active

## 2021-01-12 NOTE — PROGRESS NOTES
Progress Note Patient: Annella Simmonds MRN: [id number]  SSN: [id number] YOB: 1940  Age: [de-identified] y.o. Sex: female Admit Date: 1/4/2021 LOS: 8 days Subjective:  
[de-identified] yo F with PMH extensive PMH including afib on anticoagulation, HFrEF, HTN, CAD, DM, CKD who presented with hypoxia in the setting of Covid 19 pna. Patient seen and examined at bedside. This morning feeling weak. Denies chest pain, no abdominal pain, no nausea or vomiting. Objective:  
 
Vitals:  
 01/12/21 1537 01/12/21 1602 01/12/21 1630 01/12/21 1716 BP: 97/61 (!) 99/58 125/71 129/70 Pulse: 90 90 92 94 Resp:    18 Temp:    97.5 °F (36.4 °C) SpO2:    98% Weight:      
Height:      
  
 
Intake and Output: 
Current Shift: 01/12 0701 - 01/12 1900 In: -  
Out: 900 Last three shifts: 01/10 1901 - 01/12 0700 In: 360 [P.O.:360] Out: 1500 ROS 
10 ROS negative except from stated on subjective Physical Exam:  
General: Alert, oriented, NAD HEENT: NC/AT, EOM are intact Neck: supple, no JVD Cardiovascular: RRR, S1, S2, no murmurs Respiratory: Decrease breath sounds Abdomen: Soft, NT, ND Back: No CVA tenderness, no paraspinal tenderness Extremities: LE without pedal edema, no erythema Neuro: A&O, CN are intact, no focal deficits Skin: no rash or ulcers Psych: good mood and affect Lab/Data Review: 
I have personally reviewed patients laboratory data showing Recent Results (from the past 24 hour(s)) GLUCOSE, POC Collection Time: 01/11/21  9:17 PM  
Result Value Ref Range Glucose (POC) 183 (H) 65 - 100 mg/dL METABOLIC PANEL, BASIC Collection Time: 01/12/21  4:41 AM  
Result Value Ref Range Sodium 138 136 - 145 mmol/L Potassium 4.2 3.5 - 5.1 mmol/L Chloride 102 98 - 107 mmol/L  
 CO2 26 21 - 32 mmol/L Anion gap 10 7 - 16 mmol/L Glucose 214 (H) 65 - 100 mg/dL BUN 87 (H) 8 - 23 MG/DL Creatinine 2.64 (H) 0.6 - 1.0 MG/DL  
 GFR est AA 22 (L) >60 ml/min/1.73m2 GFR est non-AA 18 (L) >60 ml/min/1.73m2 Calcium 9.0 8.3 - 10.4 MG/DL  
CBC W/O DIFF Collection Time: 01/12/21  4:41 AM  
Result Value Ref Range WBC 11.6 (H) 4.3 - 11.1 K/uL  
 RBC 3.28 (L) 4.05 - 5.2 M/uL  
 HGB 10.0 (L) 11.7 - 15.4 g/dL HCT 31.2 (L) 35.8 - 46.3 % MCV 95.1 79.6 - 97.8 FL  
 MCH 30.5 26.1 - 32.9 PG  
 MCHC 32.1 31.4 - 35.0 g/dL  
 RDW 13.5 11.9 - 14.6 % PLATELET 606 953 - 809 K/uL MPV 12.5 (H) 9.4 - 12.3 FL ABSOLUTE NRBC 0.00 0.0 - 0.2 K/uL GLUCOSE, POC Collection Time: 01/12/21  5:32 AM  
Result Value Ref Range Glucose (POC) 230 (H) 65 - 100 mg/dL GLUCOSE, POC Collection Time: 01/12/21 11:40 AM  
Result Value Ref Range Glucose (POC) 296 (H) 65 - 100 mg/dL GLUCOSE, POC Collection Time: 01/12/21  5:13 PM  
Result Value Ref Range Glucose (POC) 144 (H) 65 - 100 mg/dL Image: 
I have personally reviewed patients imaging showing XR CHEST PORT Final Result IMPRESSION: Bilateral Covid pneumonia with interval worsening of the left upper  
lobe. XR CHEST SNGL V Final Result IMPRESSION: Stable atypical viral pneumonia pattern. US RETROPERITONEUM COMP Final Result IMPRESSION:  
  
1. Echogenic kidneys, consistent with medical renal disease. Negative for  
hydronephrosis. 2. Small cysts are noted at the upper pole of the left kidney. Complex lesion in  
the midportion of the left kidney noted on prior CT of December 22, 2020 is not  
well-seen on this study, secondary to overlying bowel gas. XR CHEST SNGL V Final Result XR CHEST PORT Final Result IMPRESSION:  
1.  Pulmonary vascular congestion with perihilar airspace disease in a pattern  
which appears to reflect pulmonary edema or less likely multifocal lung  
infiltrates. 2.  Enlarged cardiac silhouette. Hospital problems Principal Problem: 
  Pneumonia due to COVID-19 virus (1/4/2021) Active Problems: 
  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  
[de-identified] yo F with PMH extensive PMH including afib on anticoagulation, HFrEF, HTN, CAD, DM, CKD who presented with hypoxia in the setting of Covid 19 pna [de-identified] yo F with PMH extensive PMH including afib on anticoagulation, HFrEF, HTN, CAD, DM, CKD who presented from wound care appointment with hypoxia. 
  
1. Acute on chronic hypoxic respiratory failure (bl 3LNC) 2/2 Covid 19 pna 
- O2 therapy to maintain O2 Sat>92% - S/p convalescent plasma 
- Continue decadron - Symptomatic management - Incentive spirometry - CPAP bedtime - Pulm recs 
  
2. HFrEF 
- Continue lasix - Monitor I&Os 
  
3. ANTONIO on CKD Stage 4, new ESRD on HD 
- Monitor renal function - Avoid nephrotoxic agents 
- HD per nephro - Nephro recs 
  
4. HTN 
- Holding coreg and norvasc  
  
5. BLE wounds - Wound Care recs appreciated 6. DM2 
- ISS 
- BS ACHS 
  
7. Parkinson's disease - Continue sinemet 
  
DVT Prophylaxis: SCD's for now I have reviewed, updated, and verified this note's content and spent 38 minutes of my 42 minutes visit performing counseling and coordination of care regarding medical management. Signed By: Michelle Brooks MD   
 January 12, 2021

## 2021-01-12 NOTE — PROGRESS NOTES
Progress Note Patient: Robyn Patricio MRN: [id number]  SSN: [id number] YOB: 1940  Age: [de-identified] y.o. Sex: female Admit Date: 1/4/2021 LOS: 8 days Subjective:  
[de-identified] yo F with PMH extensive PMH including afib on anticoagulation, HFrEF, HTN, CAD, DM, CKD who presented with hypoxia in the setting of Covid 19 pna. Patient seen and examined at bedside. This morning feeling weak. Still having SOB. Denies chest pain, no abdominal pain, no nausea or vomiting. Objective:  
 
Vitals:  
 01/12/21 1537 01/12/21 1602 01/12/21 1630 01/12/21 1716 BP: 97/61 (!) 99/58 125/71 129/70 Pulse: 90 90 92 94 Resp:    18 Temp:    97.5 °F (36.4 °C) SpO2:    98% Weight:      
Height:      
  
 
Intake and Output: 
Current Shift: 01/12 0701 - 01/12 1900 In: -  
Out: 900 Last three shifts: 01/10 1901 - 01/12 0700 In: 360 [P.O.:360] Out: 1500 ROS 
10 ROS negative except from stated on subjective Physical Exam:  
General: Alert, oriented, NAD HEENT: NC/AT, EOM are intact Neck: supple, no JVD Cardiovascular: RRR, S1, S2, no murmurs Respiratory: Decrease breath sounds Abdomen: Soft, NT, ND Back: No CVA tenderness, no paraspinal tenderness Extremities: LE without pedal edema, no erythema Neuro: A&O, CN are intact, no focal deficits Skin: no rash or ulcers Psych: good mood and affect Lab/Data Review: 
I have personally reviewed patients laboratory data showing Recent Results (from the past 24 hour(s)) GLUCOSE, POC Collection Time: 01/11/21  9:17 PM  
Result Value Ref Range Glucose (POC) 183 (H) 65 - 100 mg/dL METABOLIC PANEL, BASIC Collection Time: 01/12/21  4:41 AM  
Result Value Ref Range Sodium 138 136 - 145 mmol/L Potassium 4.2 3.5 - 5.1 mmol/L Chloride 102 98 - 107 mmol/L  
 CO2 26 21 - 32 mmol/L Anion gap 10 7 - 16 mmol/L Glucose 214 (H) 65 - 100 mg/dL BUN 87 (H) 8 - 23 MG/DL  Creatinine 2.64 (H) 0.6 - 1.0 MG/DL  
 GFR est AA 22 (L) >60 ml/min/1.73m2 GFR est non-AA 18 (L) >60 ml/min/1.73m2 Calcium 9.0 8.3 - 10.4 MG/DL  
CBC W/O DIFF Collection Time: 01/12/21  4:41 AM  
Result Value Ref Range WBC 11.6 (H) 4.3 - 11.1 K/uL  
 RBC 3.28 (L) 4.05 - 5.2 M/uL  
 HGB 10.0 (L) 11.7 - 15.4 g/dL HCT 31.2 (L) 35.8 - 46.3 % MCV 95.1 79.6 - 97.8 FL  
 MCH 30.5 26.1 - 32.9 PG  
 MCHC 32.1 31.4 - 35.0 g/dL  
 RDW 13.5 11.9 - 14.6 % PLATELET 536 924 - 347 K/uL MPV 12.5 (H) 9.4 - 12.3 FL ABSOLUTE NRBC 0.00 0.0 - 0.2 K/uL GLUCOSE, POC Collection Time: 01/12/21  5:32 AM  
Result Value Ref Range Glucose (POC) 230 (H) 65 - 100 mg/dL GLUCOSE, POC Collection Time: 01/12/21 11:40 AM  
Result Value Ref Range Glucose (POC) 296 (H) 65 - 100 mg/dL GLUCOSE, POC Collection Time: 01/12/21  5:13 PM  
Result Value Ref Range Glucose (POC) 144 (H) 65 - 100 mg/dL Image: 
I have personally reviewed patients imaging showing XR CHEST PORT Final Result IMPRESSION: Bilateral Covid pneumonia with interval worsening of the left upper  
lobe. XR CHEST SNGL V Final Result IMPRESSION: Stable atypical viral pneumonia pattern. US RETROPERITONEUM COMP Final Result IMPRESSION:  
  
1. Echogenic kidneys, consistent with medical renal disease. Negative for  
hydronephrosis. 2. Small cysts are noted at the upper pole of the left kidney. Complex lesion in  
the midportion of the left kidney noted on prior CT of December 22, 2020 is not  
well-seen on this study, secondary to overlying bowel gas. XR CHEST SNGL V Final Result XR CHEST PORT Final Result IMPRESSION:  
1.  Pulmonary vascular congestion with perihilar airspace disease in a pattern  
which appears to reflect pulmonary edema or less likely multifocal lung  
infiltrates. 2.  Enlarged cardiac silhouette. Hospital problems Principal Problem: 
  Pneumonia due to COVID-19 virus (1/4/2021) Active Problems: 
  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  
[de-identified] yo F with PMH extensive PMH including afib on anticoagulation, HFrEF, HTN, CAD, DM, CKD who presented with hypoxia in the setting of Covid 19 pna [de-identified] yo F with PMH extensive PMH including afib on anticoagulation, HFrEF, HTN, CAD, DM, CKD who presented from wound care appointment with hypoxia. 
  
1. Acute on chronic hypoxic respiratory failure (bl 3LNC) 2/2 Covid 19 pna 
- O2 therapy to maintain O2 Sat>92% - S/p convalescent plasma 
- Continue decadron - Symptomatic management - Incentive spirometry - CPAP bedtime - Pulm recs 
  
2. HFrEF 
- Continue lasix - Monitor I&Os 
  
3. ANTONIO on CKD Stage 4 now on HD 
- Monitor renal function - Avoid nephrotoxic agents 
- HD per nephro - Nephro recs 
  
4. HTN 
- Holding coreg and norvasc  
  
5. BLE wounds - Wound Care recs appreciated 6. DM2 
- ISS 
- BS ACHS 
  
7. Parkinson's disease - Continue sinemet 
  
DVT Prophylaxis: SCD's for now I have reviewed, updated, and verified this note's content and spent 38 minutes of my 42 minutes visit performing counseling and coordination of care regarding medical management. Signed By: Eric Khan MD   
 January 12, 2021

## 2021-01-12 NOTE — PROGRESS NOTES
Patient in bed sleeping at this time. Respirations regular and unlabored. No distress noted at this time. Call light in reach.

## 2021-01-12 NOTE — PROGRESS NOTES
MSN, CM:  Patient was admitted on 12/16/2020 for SOB, tachypnea, hypoxia, HTN, chest pain, BLE edema, elevated pBNP - CHF exacerbation. Patient had consults from Cardiology, medical oncology, and urology. Patient was discharged on 12/27/2020 and started on Norvasc, protonix, Augmentin, and Doxycycline. Patient was also serviced with [facility]. Patient was readmitted on 1/4/2021 for Acute Respiratory Distress, severe HTN, BNP 66,000, vomiting and COVID+.

## 2021-01-12 NOTE — PROGRESS NOTES
Ms. Kera Houston is off the floor at dialysis. Will try tomorrow as time allows.  
Emily Kyle, PT

## 2021-01-12 NOTE — PROGRESS NOTES
[facility] COVID-19 Note: 
 
Critical Care Note: 1/12/2021 [address] Admission Date: 1/4/2021     Length of Stay: 8 days Background: [de-identified] y.o. y/o female with acute hypoxemic respiratory failure secondary to COVID-19. Course complicated by highly elevated BNP at 66K, elevated troponin. Onset of COVID-19 symptoms: 12/26 Notable PMH: DM, HTN, CKD, SARAH, CHF, moderate MR 
 
24 Hour events:  
Moaning and asking to be turned when entered the room. Assisted pt with moving and changed brief. Noted sheer 1.5 inch circular sheer pressure sore on L buttocks. Placed protective ointment on site and notified tech of skin changes. Remains on 15 LPM HFNC. CXR worsened this AM. REVIEW OF SYSTEMS: 
Constitutional: negative fever and chills, nonight sweats, weight loss, weight gain, persistent fatigue, or lethargy/hypersomnolence. CV: No chest pain, pressure, discomfort, palpitations, orthopnea, murmurs, or edema. GI: No dysphagia, heartburn reflux, nausea/vomiting, diarrhea, abdominal pain, or bleeding. Neuro: There is no history of AMS, persistent headache, decreased level of consciousness, seizures, or motor or sensory deficits. Lines: (insertion date) PIVs only Drips: current dose (range) 
none Visit Vitals /71 (BP 1 Location: Left arm, BP Patient Position: At rest) Pulse 99 Temp 97.5 °F (36.4 °C) Resp 20 Ht 5' 9\" (1.753 m) Wt 241 lb 9.6 oz (109.6 kg) SpO2 91% BMI 35.68 kg/m² Pertinent Exam:           
Constitutional:  alert moaning slightly confused, on HFNC 
EENMT:  Sclera clear, pupils equal, oral mucosa moist 
Respiratory:  mild crackles Cardiovascular:  RRR Gastrointestinal:  soft with no tenderness; positive bowel sounds present Musculoskeletal:  warm with no cyanosis, trace lower extremity edema Skin:  no jaundice or ecchymosis, dressing to L foot Neurologic:normal 
Psychiatric: calm CXR 
01/12/2021 Pertinent Labs:  
 
Recent Labs  
  01/12/21 
0441 01/11/21 
0346 01/10/21 
0255 WBC 11.6* 9.9 8.4 HGB 10.0* 9.5* 9.1*  
HCT 31.2* 29.4* 27.8*  
 165 168 Recent Labs  
  01/12/21 
0441 01/11/21 
0346 01/10/21 
0255  139 140  
K 4.2 4.2 4.0  
 105 105 CO2 26 25 27 * 84 99 BUN 87* 105* 88* CREA 2.64* 2.92* 2.68* CA 9.0 8.8 9.2 Recent Labs  
  01/12/21 
0532 01/11/21 
2117 01/11/21 
1616 GLUCPOC 230* 183* 169* SARS-CoV-2 LAB Results 1/4 rapid test positive MICRO Date Source Result 1/4 blood NG  
1/4 blood NG  
 
COVID-19 Meds: 
Dexamethasone 6mg daily (1/4) Convalescent plasma transfusion (1/4) Remdesivir Not a candidate Anti-infectives (start date): ABG:  
No results for input(s): PH, PCO2, PO2, HCO3, PHI, PCO2I, PO2I, HCO3I in the last 72 hours. Impression: [de-identified] y.o. y/o female with acute hypoxemic respiratory failure secondary to COVID-19, CKD, h/o CHF, severe hyperglycemia on steroids Patient Active Problem List  
Diagnosis Code  
 HTN (hypertension) I10  
 AF (atrial fibrillation) ([facility] I48.91  
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  
 Encounter for medication management Z79.899  SARAH (obstructive sleep apnea) G47.33  
 CKD (chronic kidney disease) stage 5, GFR less than 15 ml/min ([facility]) N18.5  Parkinson disease ([facility] [address] G20  
 Acute cystitis with hematuria N30.01  
 Morbid obesity ([facility] [address] E66.01  
 Hypokalemia E87.6  
 C. difficile diarrhea A04.72  
 PAF (paroxysmal atrial fibrillation) (HCC) I48.0  Elevated troponin R77.8  Chest pain R07.9  CAD (coronary artery disease) I25.10  Volume overload E87.70  Demand ischemia (HCC) I24.8  Meningioma ([facility] [address] D32.9  Acquired hypothyroidism E03.9  Anemia of chronic kidney failure, stage 4 (severe) ([facility]) N18.4, D63.1  Chronic systolic heart failure ([facility]) I50.22  
 Decreased hemoglobin R71.0  Depression F32.9  History of UTI Z87.440  Postural imbalance R29.3  Hypertensive emergency I16.1  Pneumonia due to COVID-19 virus U07.1, J12.82  
 Acute on chronic respiratory failure with hypoxia ([facility]) J96.21  
 
1. COVID-19 pneumonia: continue steroids and close monitoring of O2 levels, wean O2 as tolerated, now on HFNC CXR today slightly worsened with NAYANA consolidation 2. DM with hyperglycemia:  Continue lantus 10U 
3. CHF:  Continue diuresis, monitoring renal function. TTE ordered but not done. 4.  Ppx: heparin, PPI 5. CKD>> HD per nephrology>>had drop in BP today during dialysis which decrease UF goal, but BP rebounded. Daughter Osmin Garcia can be reached at [phone] or [phone]. Mohan Salmeron, GAL Lungs:  Crackles bilaterally Heart:  RRR with no Murmur/Rubs/Gallops Additional Comments:  Down to HF NC 15L. States she wants to go home. Nephro says she is not tolerated dialysis well and not able to get much fluid off. Guarded long term prognosis if can't tolerate dialysis. O2 improving. Increased infiltrates may be fluid and would continue to try to get off as able. Remains on steroids and no fevers, but if fevers or worsens could place on broad spectrum antibiotics and culture. We will sign off. Call with questions. DNR I have spoken with and examined the patient. I agree with the above assessment and plan as documented.  
 
Kimberly Wood MD

## 2021-01-12 NOTE — PROGRESS NOTES
Patient in bed resting quietly. No distress noted at this time. Patient refusing morning medications. Patient still on 15 L at this time. Call light in reach. Waiting to report off to on coming nurse.

## 2021-01-12 NOTE — DIALYSIS
TRANSFER IN - DIALYSIS Received patient in dialysis unit from [facility] (unit) for ordered procedure. Consent verified for renal replacement therapy. Patient alert and vital signs stable. /68 P 90 Hemodialysis initiated using Left UAF and 17 g needles. Machine settings per MD order. Heparin 0 unit bolus and 1000 units/hr. Will monitor during treatment.

## 2021-01-12 NOTE — PROGRESS NOTES
[address] Admission Date: 1/4/2021 Renal Daily Progress Note: 1/12/2021 The patient's chart is reviewed and the patient is discussed with the staff. Follow up ANTONIO/CKD IV Subjective:  
Patient seen and examined HD-#3, dialyzing via LUE  Qb, UF 1600, drowsy but arousable, moaning complaints of flank pain better with reposition. + weakness, fatigue Labs and chart reviewed- non oliguric ROS: limited + SOB, no CP, N/V Current Facility-Administered Medications Medication Dose Route Frequency  [START ON 1/13/2021] insulin glargine (LANTUS) injection 25 Units  25 Units SubCUTAneous DAILY  tuberculin injection 5 Units  5 Units IntraDERMal ONCE  
 furosemide (LASIX) tablet 40 mg  40 mg Oral BID  LORazepam (ATIVAN) tablet 0.5 mg  0.5 mg Oral Q12H PRN  
 heparin (porcine) 1,000 unit/mL injection 5,000 Units  5,000 Units Hemodialysis DIALYSIS PRN  
 [Held by provider] amLODIPine (NORVASC) tablet 10 mg  10 mg Oral DAILY  insulin lispro (HUMALOG) injection 0-10 Units  0-10 Units SubCUTAneous AC&HS  sevelamer carbonate (RENVELA) tab 800 mg  800 mg Oral TID WITH MEALS  
 acetaminophen (TYLENOL) tablet 650 mg  650 mg Oral Q6H PRN  
 carbidopa-levodopa (SINEMET)  mg per tablet 1 Tab  1 Tab Oral QID  [Held by provider] carvediloL (COREG) tablet 25 mg  25 mg Oral BID WITH MEALS  ergocalciferol capsule 50,000 Units  50,000 Units Oral Q7D  
 ferrous sulfate tablet 324 mg  324 mg Oral DAILY  levothyroxine (SYNTHROID) tablet 25 mcg  25 mcg Oral ACB  pantoprazole (PROTONIX) tablet 40 mg  40 mg Oral ACB&D  
 sodium chloride (NS) flush 5-40 mL  5-40 mL IntraVENous Q8H  
 sodium chloride (NS) flush 5-40 mL  5-40 mL IntraVENous PRN  
 dexamethasone (DECADRON) 4 mg/mL injection 6 mg  6 mg IntraVENous DAILY  0.9% sodium chloride infusion 250 mL  250 mL IntraVENous PRN Objective:  
 
Vitals: 01/12/21 1151 01/12/21 1235 01/12/21 1257 01/12/21 1330 BP: (!) 112/58 (!) 144/68 126/65 100/66 Pulse: 92 90 91 95 Resp: 22 Temp: 97 °F (36.1 °C) SpO2: 91% Weight:      
Height:      
 
Intake and Output:  
01/10 1901 - 01/12 0700 In: 360 [P.O.:360] Out: 1500 No intake/output data recorded. Physical Exam: did not enter room Constitutional:  the patient is well developed and in acute distress, alert and oriented HEENT:  Sclera clear, pupils equal, oral mucosa moist 
Lungs: rhonchi bilaterally Cardiovascular:  Regular rate, S1, S2, no rub Abd/GI: soft and non-tender; with positive bowel sounds. Ext: warm without cyanosis. There is trace lower leg edema, right foot wound with dressing intact. Skin:  no jaundice or rashes Neuro: no gross neuro deficits Psychiatric: Calm. Access: LUE AVF cannulated LAB Recent Labs  
  01/12/21 
0441 01/11/21 
0346 01/10/21 
0255 WBC 11.6* 9.9 8.4 HGB 10.0* 9.5* 9.1*  
HCT 31.2* 29.4* 27.8*  
 165 168 Recent Labs  
  01/12/21 
0441 01/11/21 
0346 01/10/21 
0255  139 140  
K 4.2 4.2 4.0  
 105 105 CO2 26 25 27 * 84 99 BUN 87* 105* 88* CREA 2.64* 2.92* 2.68* No results for input(s): PH, PCO2, PO2, HCO3 in the last 72 hours. Assessment:  (Medical Decision Making) Hospital Problems  Date Reviewed: 1/4/2021 Codes Class Noted POA * (Principal) Pneumonia due to COVID-19 virus ICD-10-CM: U07.1, J12.82 ICD-9-CM: 480.8  1/4/2021 Unknown Acute on chronic respiratory failure with hypoxia [facility]) ICD-10-CM: J96.21 
ICD-9-CM: 518.84, 799.02  1/4/2021 Unknown Chronic systolic heart failure (HCC) ICD-10-CM: I50.22 ICD-9-CM: 428.22  7/14/2019 Yes Volume overload ICD-10-CM: E87.70 ICD-9-CM: 276.69  7/8/2019 Yes PAF (paroxysmal atrial fibrillation) (HCC) ICD-10-CM: I48.0 ICD-9-CM: 427.31  7/7/2019 Yes  Morbid obesity (HCC) (Chronic) ICD-10-CM: E66.01 
 ICD-9-CM: 278.01  6/29/2019 Yes CKD (chronic kidney disease) stage 5, GFR less than 15 ml/min (HCC) (Chronic) ICD-10-CM: N18.5 ICD-9-CM: 585.5  11/30/2018 Yes SARAH (obstructive sleep apnea) (Chronic) ICD-10-CM: G47.33 
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 
ICD-9-CM: 250.40, 583.81  11/15/2017 Yes A-V fistula (HCC) (Chronic) ICD-10-CM: I77.0 ICD-9-CM: 447.0  9/6/2017 Yes Overview Signed 9/6/2017  2:35 PM by LESTER Hurst  
  6/7/2017 - Tita Garcia MD - creation left brachiocephalic AV fistula 8/10/2017 Polina Serrano MD - elevation left b-c AVF 
  
  
   
 HTN (hypertension) (Chronic) ICD-10-CM: I10 
ICD-9-CM: 401.9  11/10/2011 Yes Plan:  (Medical Decision Making) 1. ANTONIO/CKD IV- New ESRD Hypoxic Resp Failure First dialysis 1/10/2021 Seen on HD-#3, tolerating dialysis, access functioning well 2. COVID 19 + s/p convalescent plasma, on decadron 
  
3. Anemia hgb 9.5 
  
4. HTN (Hold to maximize UF) norvasc, coreg  
  
5. DM type II- SSI per hosp Blayne Mccormick NP

## 2021-01-12 NOTE — DIABETES MGMT
Patient admitted with pneumonia due to COVID-19 virus. Blood glucose ranged  yesterday with patient receiving Lantus 20 units, Humalog 4 units, and Decadron 6mg. Blood glucose this morning was 230. Most recent FSBS 296. Reviewed patient current regimen: Lantus 20 units daily, Humalog SSI, and Decadron 6mg daily. Updated provider via [employer] regarding patient glycemic control. Patient would likely benefit from an increase in basal insulin to help improve glycemic control.

## 2021-01-12 NOTE — DIALYSIS
TRANSFER OUT -DIALYSIS Hemodialysis treatment completed. UF poorly tolerated, pt hypotensive throughout treatment. B/P improved after blood returned. 0.9 Kgs removed. Needles X2 removed from access and manual pressure held until hemostasis complete and pressure dressing applied. Patient to 825 after dialysis.

## 2021-01-13 PROBLEM — E44.1 MILD PROTEIN-CALORIE MALNUTRITION (HCC): Status: ACTIVE | Noted: 2021-01-01

## 2021-01-13 NOTE — PROGRESS NOTES
Hospitalist Note Admit Date:  2021  8:20 AM  
Name:  Char Elizondo Age:  [de-identified] y.o. 
:  1940 MRN:  [id number] PCP:  Keysha Barrientos MD 
Treatment Team: Attending Provider: Gracy Valverde MD; Consulting Provider: Michaela Montoya MD; Utilization Review: Hernán Olivas RN; Consulting Provider: Krista Guzmán MD; Utilization Review: Romel Silva RN; Care Manager: Nallely Patel RN; Primary Nurse: Hung Frye RN; Primary Nurse: Janette Salcedo, RN; Primary Nurse: Rivas Johnson; Physical Therapist: Magdalene Reagan, YAYO 
 
HPI/Subjective:  
Ms. Pricilla Kong is an [de-identified] y/o WF with a h/o AFib, HFpEF, HTN, CAD, DM, CKD who was admitted to the ICU service on 20 with acute hypoxemic respiratory faiulre 2/2 COVID-19. She was transferred to the floor and Hospitalist consulted to assume care. Nephrology following for HD.  
 
: Called by RN for persistent hypoxia on 15L NC, increased WOB and progressed lethargy. Pt seen at bedside. Sats were low 80s on NC, changed to Airvo 60L/100% with improvement to low-mid 90s. ABG/CXR are pending. Somnolent but awakens to voice. No other complaints Objective:  
 
Patient Vitals for the past 24 hrs: 
 Temp Pulse Resp BP SpO2  
21 0656 97.8 °F (36.6 °C) 66 19 136/74 97 % 21 0513 97.6 °F (36.4 °C) 99 18 102/68 92 % 21 2111     93 % 21 2101 98.3 °F (36.8 °C) 95 18 (!) 90/50 90 % 21     95 % 21 1716 97.5 °F (36.4 °C) 94 18 129/70 98 % 21 1630  92  125/71   
21 1602  90  (!) 99/58   
21 1537  90  97/61   
21 1458  88  (!) 112/57   
21 1430  85  101/60   
21 1405  92  98/60   
21 1330  95  100/66   
21 1257  91  126/65   
21 1235  90  (!) 144/68   
21 1151 97 °F (36.1 °C) 92 22 (!) 112/58 91 % Oxygen Therapy O2 Sat (%): 97 % (21 0656) Pulse via Oximetry: 99 beats per minute (01/12/21 2111) O2 Device: BIPAP (01/12/21 2111) O2 Flow Rate (L/min): 15 l/min (01/12/21 2005) O2 Temperature: 87.8 °F (31 °C) (01/09/21 2034) FIO2 (%): 85 % (01/12/21 2111) Estimated body mass index is 35.68 kg/m² as calculated from the following: 
  Height as of this encounter: 5' 9\" (1.753 m). Weight as of this encounter: 109.6 kg (241 lb 9.6 oz). Intake/Output Summary (Last 24 hours) at 1/13/2021 1035 Last data filed at 1/13/2021 8990 Gross per 24 hour Intake  Output 1050 ml Net -1050 ml *Note that automatically entered I/Os may not be accurate; dependent on patient compliance with collection and accurate  by techs. General:    Tired, lethargic, arousable to voice but falls back asleep. CV:   Tachycardia, irreg irreg, no m/r/g. Lungs:   BB rales, changed from 15L NC to Airvo 60L/100% with improvement in O2 sats to low-mid 90s. Tachypnea. Abdomen:   Soft, nondistended. Right sided ttp. No rashes or bruising. Extremities: Warm and dry. No cyanosis Skin:     No rashes. No jaundice. Normal coloration Neuro:  Lethargic. Follows simple commands but very tired. Data Reviewed: 
I have reviewed all labs, meds, and studies from the last 24 hours: 
Recent Results (from the past 24 hour(s)) GLUCOSE, POC Collection Time: 01/12/21 11:40 AM  
Result Value Ref Range Glucose (POC) 296 (H) 65 - 100 mg/dL GLUCOSE, POC Collection Time: 01/12/21  5:13 PM  
Result Value Ref Range Glucose (POC) 144 (H) 65 - 100 mg/dL GLUCOSE, POC Collection Time: 01/12/21  9:05 PM  
Result Value Ref Range Glucose (POC) 179 (H) 65 - 100 mg/dL POC G3 Collection Time: 01/12/21 11:01 PM  
Result Value Ref Range Device: BIPAP    
 FIO2 (POC) 85 % pH (POC) 7.52 (H) 7.35 - 7.45    
 pCO2 (POC) 34.4 (L) 35 - 45 MMHG  
 pO2 (POC) 199 (H) 75 - 100 MMHG  
 HCO3 (POC) 27.9 (H) 22 - 26 MMOL/L  
 sO2 (POC) 100 (H) 95 - 98 % Base excess (POC) 5 mmol/L Tidal volume 623 ml  
 PIP (POC) 13 Allens test (POC) YES Inspiratory Time 0.8 sec Site RIGHT RADIAL Specimen type (POC) ARTERIAL Performed by Ihsan   
 CO2, POC 29 MMOL/L Exhaled minute volume 18.10 L/min COLLECT TIME 2,259 METABOLIC PANEL, BASIC Collection Time: 01/13/21  3:09 AM  
Result Value Ref Range Sodium 139 136 - 145 mmol/L Potassium 4.5 3.5 - 5.1 mmol/L Chloride 103 98 - 107 mmol/L  
 CO2 27 21 - 32 mmol/L Anion gap 9 7 - 16 mmol/L Glucose 217 (H) 65 - 100 mg/dL BUN 65 (H) 8 - 23 MG/DL Creatinine 2.57 (H) 0.6 - 1.0 MG/DL  
 GFR est AA 23 (L) >60 ml/min/1.73m2 GFR est non-AA 19 (L) >60 ml/min/1.73m2 Calcium 9.0 8.3 - 10.4 MG/DL  
CBC W/O DIFF Collection Time: 01/13/21  3:09 AM  
Result Value Ref Range WBC 16.5 (H) 4.3 - 11.1 K/uL  
 RBC 3.38 (L) 4.05 - 5.2 M/uL  
 HGB 10.2 (L) 11.7 - 15.4 g/dL HCT 32.1 (L) 35.8 - 46.3 % MCV 95.0 79.6 - 97.8 FL  
 MCH 30.2 26.1 - 32.9 PG  
 MCHC 31.8 31.4 - 35.0 g/dL  
 RDW 13.9 11.9 - 14.6 % PLATELET 042 305 - 186 K/uL MPV 12.8 (H) 9.4 - 12.3 FL ABSOLUTE NRBC 0.00 0.0 - 0.2 K/uL GLUCOSE, POC Collection Time: 01/13/21  5:58 AM  
Result Value Ref Range Glucose (POC) 246 (H) 65 - 100 mg/dL Current Meds: 
Current Facility-Administered Medications Medication Dose Route Frequency  insulin glargine (LANTUS) injection 25 Units  25 Units SubCUTAneous DAILY  tuberculin injection 5 Units  5 Units IntraDERMal ONCE  
 ondansetron (ZOFRAN) injection 4 mg  4 mg IntraVENous Q6H PRN  
 furosemide (LASIX) tablet 40 mg  40 mg Oral BID  LORazepam (ATIVAN) tablet 0.5 mg  0.5 mg Oral Q12H PRN  
 heparin (porcine) 1,000 unit/mL injection 5,000 Units  5,000 Units Hemodialysis DIALYSIS PRN  
 [Held by provider] amLODIPine (NORVASC) tablet 10 mg  10 mg Oral DAILY  insulin lispro (HUMALOG) injection 0-10 Units  0-10 Units SubCUTAneous AC&HS  
  sevelamer carbonate (RENVELA) tab 800 mg  800 mg Oral TID WITH MEALS  
 acetaminophen (TYLENOL) tablet 650 mg  650 mg Oral Q6H PRN  
 carbidopa-levodopa (SINEMET)  mg per tablet 1 Tab  1 Tab Oral QID  [Held by provider] carvediloL (COREG) tablet 25 mg  25 mg Oral BID WITH MEALS  ergocalciferol capsule 50,000 Units  50,000 Units Oral Q7D  
 ferrous sulfate tablet 324 mg  324 mg Oral DAILY  levothyroxine (SYNTHROID) tablet 25 mcg  25 mcg Oral ACB  pantoprazole (PROTONIX) tablet 40 mg  40 mg Oral ACB&D  
 sodium chloride (NS) flush 5-40 mL  5-40 mL IntraVENous Q8H  
 sodium chloride (NS) flush 5-40 mL  5-40 mL IntraVENous PRN  
 0.9% sodium chloride infusion 250 mL  250 mL IntraVENous PRN Other Studies: No results found for this visit on 01/04/21. No results found. All Micro Results Procedure Component Value Units Date/Time CULTURE, BLOOD [[id number]] Collected: 01/04/21 6043 Order Status: Completed Specimen: Blood Updated: 01/09/21 6581 Special Requests: --     
  RIGHT Antecubital 
  
  Culture result: NO GROWTH 5 DAYS     
 CULTURE, BLOOD [[id number]] Collected: 01/04/21 0835 Order Status: Completed Specimen: Blood Updated: 01/09/21 4896 Special Requests: --     
  LEFT 
FOREARM Culture result: NO GROWTH 5 DAYS     
  
 
 
SARS-CoV-2 Lab Results \"Novel Coronavirus\" Test: No results found for: COV2NT \"Emergent Disease\" Test: No results found for: EDPR \"SARS-COV-2\" Test: No results found for: XGCOVT Rapid Test:  
Lab Results Component Value Date/Time COVR Detected (AA) 01/04/2021 08:35 AM  
 
  
 
 
Assessment and Plan:  
 
Hospital Problems as of 1/13/2021 Date Reviewed: 1/4/2021 Codes Class Noted - Resolved POA Mild protein-calorie malnutrition ([facility] [address] ICD-10-CM: E44.1 ICD-9-CM: 263.1  1/13/2021 - Present Yes * (Principal) Pneumonia due to COVID-19 virus ICD-10-CM: U07.1, J12.82 ICD-9-CM: 480.8  1/4/2021 - Present Unknown Acute on chronic respiratory failure with hypoxia [facility]) ICD-10-CM: B50.09 
ICD-9-CM: 518.84, 799.02  1/4/2021 - Present Unknown Chronic systolic heart failure (HCC) ICD-10-CM: I50.22 ICD-9-CM: 428.22  7/14/2019 - Present Yes Volume overload ICD-10-CM: E87.70 ICD-9-CM: 276.69  7/8/2019 - Present Yes PAF (paroxysmal atrial fibrillation) (HCC) ICD-10-CM: I48.0 ICD-9-CM: 427.31  7/7/2019 - Present Yes Morbid obesity ([facility] [address] (Chronic) ICD-10-CM: E66.01 
ICD-9-CM: 278.01  6/29/2019 - Present Yes  
   
 CKD (chronic kidney disease) stage 5, GFR less than 15 ml/min (HCC) (Chronic) ICD-10-CM: N18.5 ICD-9-CM: 585.5  11/30/2018 - Present Yes  
   
 SARAH (obstructive sleep apnea) (Chronic) ICD-10-CM: P90.03 
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 
ICD-9-CM: 250.40, 583.81  11/15/2017 - Present Yes A-V fistula (HCC) (Chronic) ICD-10-CM: I77.0 ICD-9-CM: 447.0  9/6/2017 - Present Yes Overview Signed 9/6/2017  2:35 PM by LESTER Leo  
  6/7/2017 - Ady Fox MD - creation left brachiocephalic AV fistula 8/10/2017 Shonda Norman MD - elevation left b-c AVF 
  
  
   
 HTN (hypertension) (Chronic) ICD-10-CM: I10 
ICD-9-CM: 401.9  11/10/2011 - Present Yes Plan: # Acute hypoxemic respiratory failure - Volume overloaded on admission with + COVID. HD per Nephrology. Con't Lasix. Increased O2 needs on 1/13, from 15L NC to 60L/100% Airvo. ABG/CXR pending. Next step would be a Bipap trial. She is DNR and I updated her daughter on the clinical change. Patient is high risk of further decline. # Acute on chronic dCHF exacerbation - Resolved. Bipap on admission, diuresed and dialyzed. # ESRD on HD 
 - Per Nephrology. # HTN 
 - Meds held # DM2 
 - Home meds # Parkinson  
 - Sinemet # B/l LE wounds DC planning/Dispo: I updated her daughter, Osmin Garcia, over the phone regarding increased O2 needs and change in status. Remains DNR. High risk of further decline. Diet:  DIET DIABETIC CONSISTENT CARB 
DIET NUTRITIONAL SUPPLEMENTS 
DVT ppx: SCDs Other listed chronic conditions stable, cont current management.  
 
Signed: 
Maria Del Rosario Arroyo MD

## 2021-01-13 NOTE — DIABETES MGMT
Patient's blood glucose ranged 144-296 yesterday with patient receiving Lantus 20 units, Humalog 16 units, and dexamethasone 16 mg. Blood glucose 246 this morning. Hgb 10. 2. Cr 2.57. GFR 19. Current regimen: Lantus 25 units daily, Humalog SSI, and dexamethasone 6 mg. Will continue to follow.

## 2021-01-13 NOTE — PROGRESS NOTES
Patient arouses to voice but is very sleepy at this time. Respirations regular and unlabored. No distress noted at this time. Call light in reach.

## 2021-01-13 NOTE — PROGRESS NOTES
SBAR received from [employer] pt in bed resting rr ar even and unlabored lung sounds are diminished. Pt denies pain at current time. abd is soft bowel sounds are active. Safety measure in place will continue to monitor.

## 2021-01-13 NOTE — PROGRESS NOTES
Patient placed on Airvo 60L/100% due to Spo2 being in the low to mid 80s, and sudden decrease in mental status. RN and physician at bedside. Multiple unsuccessful attempts at ABG. Will request RT assistance.

## 2021-01-13 NOTE — PROGRESS NOTES
[address] Admission Date: 1/4/2021 Renal Daily Progress Note: 1/13/2021 The patient's chart is reviewed and the patient is discussed with the staff. Follow up ANTONIO/CKD IV Subjective:  
Patient seen and examined on rounds, drowsy but arousable, more lethargic today, minimally interactive, O2 needs up, now on optiflo 60 L, 100 % O2 DW hospitalist 
 
Labs and chart reviewed- increased consolidation on cxr ROS: un able to obtain Current Facility-Administered Medications Medication Dose Route Frequency  insulin glargine (LANTUS) injection 25 Units  25 Units SubCUTAneous DAILY  tuberculin injection 5 Units  5 Units IntraDERMal ONCE  
 ondansetron (ZOFRAN) injection 4 mg  4 mg IntraVENous Q6H PRN  
 furosemide (LASIX) tablet 40 mg  40 mg Oral BID  LORazepam (ATIVAN) tablet 0.5 mg  0.5 mg Oral Q12H PRN  
 heparin (porcine) 1,000 unit/mL injection 5,000 Units  5,000 Units Hemodialysis DIALYSIS PRN  
 [Held by provider] amLODIPine (NORVASC) tablet 10 mg  10 mg Oral DAILY  insulin lispro (HUMALOG) injection 0-10 Units  0-10 Units SubCUTAneous AC&HS  sevelamer carbonate (RENVELA) tab 800 mg  800 mg Oral TID WITH MEALS  
 acetaminophen (TYLENOL) tablet 650 mg  650 mg Oral Q6H PRN  
 carbidopa-levodopa (SINEMET)  mg per tablet 1 Tab  1 Tab Oral QID  [Held by provider] carvediloL (COREG) tablet 25 mg  25 mg Oral BID WITH MEALS  ergocalciferol capsule 50,000 Units  50,000 Units Oral Q7D  
 ferrous sulfate tablet 324 mg  324 mg Oral DAILY  levothyroxine (SYNTHROID) tablet 25 mcg  25 mcg Oral ACB  pantoprazole (PROTONIX) tablet 40 mg  40 mg Oral ACB&D  
 sodium chloride (NS) flush 5-40 mL  5-40 mL IntraVENous Q8H  
 sodium chloride (NS) flush 5-40 mL  5-40 mL IntraVENous PRN  
 0.9% sodium chloride infusion 250 mL  250 mL IntraVENous PRN Objective:  
 
Vitals:  
 01/12/21 2111 01/13/21 0513 01/13/21 0656 01/13/21 1030 BP:  102/68 136/74 Pulse:  99 66 Resp:  18 19 Temp:  97.6 °F (36.4 °C) 97.8 °F (36.6 °C) SpO2: 93% 92% 97% 94% Weight:      
Height:      
 
Intake and Output:  
01/11 1901 - 01/13 0700 In: 240 [P.O.:240] Out: 1050 [Urine:150] No intake/output data recorded. Physical Exam: did not enter room Constitutional:  the patient is well developed and in acute distress, alert and oriented HEENT:  Sclera clear, pupils equal, oral mucosa moist 
Lungs: diminished bilaterally Cardiovascular:  Regular rate, S1, S2, no rub Abd/GI: soft and non-tender; with positive bowel sounds. Ext: warm without cyanosis. There is trace lower leg edema, right foot wound with dressing intact. Skin:  no jaundice or rashes Neuro: no gross neuro deficits Psychiatric: Calm. Access: LUE AVF +bruit, +thrill LAB Recent Labs  
  01/13/21 
0309 01/12/21 
0441 01/11/21 
0346 WBC 16.5* 11.6* 9.9 HGB 10.2* 10.0* 9.5* HCT 32.1* 31.2* 29.4*  
 174 165 Recent Labs  
  01/13/21 
0309 01/12/21 
0441 01/11/21 
0346  138 139  
K 4.5 4.2 4.2  102 105 CO2 27 26 25 * 214* 84 BUN 65* 87* 105* CREA 2.57* 2.64* 2.92* No results for input(s): PH, PCO2, PO2, HCO3 in the last 72 hours. Assessment:  (Medical Decision Making) Hospital Problems  Date Reviewed: 1/4/2021 Codes Class Noted POA Mild protein-calorie malnutrition ([facility] [address] ICD-10-CM: E44.1 ICD-9-CM: 263.1  1/13/2021 Yes * (Principal) Pneumonia due to COVID-19 virus ICD-10-CM: U07.1, J12.82 ICD-9-CM: 480.8  1/4/2021 Unknown Acute on chronic respiratory failure with hypoxia [facility]) ICD-10-CM: J96.21 
ICD-9-CM: 518.84, 799.02  1/4/2021 Unknown Chronic systolic heart failure (HCC) ICD-10-CM: I50.22 ICD-9-CM: 428.22  7/14/2019 Yes Volume overload ICD-10-CM: E87.70 ICD-9-CM: 276.69  7/8/2019 Yes PAF (paroxysmal atrial fibrillation) (HCC) ICD-10-CM: I48.0 ICD-9-CM: 427.31  7/7/2019 Yes Morbid obesity ([facility] [address] (Chronic) ICD-10-CM: E66.01 
ICD-9-CM: 278.01  6/29/2019 Yes CKD (chronic kidney disease) stage 5, GFR less than 15 ml/min (HCC) (Chronic) ICD-10-CM: N18.5 ICD-9-CM: 585.5  11/30/2018 Yes SARAH (obstructive sleep apnea) (Chronic) ICD-10-CM: G47.33 
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 
ICD-9-CM: 250.40, 583.81  11/15/2017 Yes A-V fistula (HCC) (Chronic) ICD-10-CM: I77.0 ICD-9-CM: 447.0  9/6/2017 Yes Overview Signed 9/6/2017  2:35 PM by LESTER Kaur  
  6/7/2017 - Layo Nuno MD - creation left brachiocephalic AV fistula 8/10/2017 Celia Bloch, MD - elevation left b-c AVF 
  
  
   
 HTN (hypertension) (Chronic) ICD-10-CM: I10 
ICD-9-CM: 401.9  11/10/2011 Yes Plan:  (Medical Decision Making) 1. ANTONIO/CKD IV- New ESRD Hypoxic Resp Failure First dialysis 1/10/2021 Next HD tomorrow for volume and clearance, poor UF 2/2 hypotension. Will increase lasix back to 80 mg BID 2. COVID 19 + s/p convalescent plasma, on decadron Worsening infiltrates/consolidation on cxr  
  
3. Anemia hgb 10.2  
  
4. HTN (Hold to maximize UF) norvasc, coreg  
  
5. DM type II- SSI per hosp Sophy Junior, GAL

## 2021-01-13 NOTE — DISCHARGE INSTRUCTIONS
DISCHARGE SUMMARY from Nurse    PATIENT INSTRUCTIONS:    After general anesthesia or intravenous sedation, for 24 hours or while taking prescription Narcotics:  · Limit your activities  · Do not drive and operate hazardous machinery  · Do not make important personal or business decisions  · Do  not drink alcoholic beverages  · If you have not urinated within 8 hours after discharge, please contact your surgeon on call. What to do at Home:  Recommended activity: Activity as tolerated. If you experience any of the following symptoms worsening cough or wheezing, shortness of breath or fatigue not relieved with rest, please follow up with MD.    *  Please give a list of your current medications to your Primary Care Provider. *  Please update this list whenever your medications are discontinued, doses are      changed, or new medications (including over-the-counter products) are added. *  Please carry medication information at all times in case of emergency situations. These are general instructions for a healthy lifestyle:    No smoking/ No tobacco products/ Avoid exposure to second hand smoke  Surgeon General's Warning:  Quitting smoking now greatly reduces serious risk to your health. Obesity, smoking, and sedentary lifestyle greatly increases your risk for illness    A healthy diet, regular physical exercise & weight monitoring are important for maintaining a healthy lifestyle    You may be retaining fluid if you have a history of heart failure or if you experience any of the following symptoms:  Weight gain of 3 pounds or more overnight or 5 pounds in a week, increased swelling in our hands or feet or shortness of breath while lying flat in bed. Please call your doctor as soon as you notice any of these symptoms; do not wait until your next office visit. The discharge information has been reviewed with the patient and caregiver.   The patient and caregiver verbalized understanding. Discharge medications reviewed with the patient and caregiver and appropriate educational materials and side effects teaching were provided. Advance Care Planning  People with COVID-19 may have no symptoms, mild symptoms, such as fever, cough, and shortness of breath or they may have more severe illness, developing severe and fatal pneumonia. As a result, Advance Care Planning with attention to naming a health care decision maker (someone you trust to make healthcare decisions for you if you could not speak for yourself) and sharing other health care preferences is important BEFORE a possible health crisis. Please contact your Primary Care Provider to discuss Advance Care Planning. Preventing the Spread of Coronavirus Disease 2019 in Homes and Residential Communities  For the most recent information go to [employer].    Prevention steps for People with confirmed or suspected COVID-19 (including persons under investigation) who do not need to be hospitalized  and   People with confirmed COVID-19 who were hospitalized and determined to be medically stable to go home    Your healthcare provider and public health staff will evaluate whether you can be cared for at home. If it is determined that you do not need to be hospitalized and can be isolated at home, you will be monitored by staff from your local or state health department. You should follow the prevention steps below until a healthcare provider or local or state health department says you can return to your normal activities. Stay home except to get medical care  People who are mildly ill with COVID-19 are able to isolate at home during their illness. You should restrict activities outside your home, except for getting medical care. Do not go to work, school, or public areas. Avoid using public transportation, ride-sharing, or taxis.   Separate yourself from other people and animals in your home  People: As much as possible, you should stay in a specific room and away from other people in your home. Also, you should use a separate bathroom, if available. Animals: You should restrict contact with pets and other animals while you are sick with COVID-19, just like you would around other people. Although there have not been reports of pets or other animals becoming sick with COVID-19, it is still recommended that people sick with COVID-19 limit contact with animals until more information is known about the virus. When possible, have another member of your household care for your animals while you are sick. If you are sick with COVID-19, avoid contact with your pet, including petting, snuggling, being kissed or licked, and sharing food. If you must care for your pet or be around animals while you are sick, wash your hands before and after you interact with pets and wear a facemask. Call ahead before visiting your doctor  If you have a medical appointment, call the healthcare provider and tell them that you have or may have COVID-19. This will help the healthcare providers office take steps to keep other people from getting infected or exposed. Wear a facemask  You should wear a facemask when you are around other people (e.g., sharing a room or vehicle) or pets and before you enter a healthcare providers office. If you are not able to wear a facemask (for example, because it causes trouble breathing), then people who live with you should not stay in the same room with you, or they should wear a facemask if they enter your room. Cover your coughs and sneezes  Cover your mouth and nose with a tissue when you cough or sneeze. Throw used tissues in a lined trash can. Immediately wash your hands with soap and water for at least 20 seconds or, if soap and water are not available, clean your hands with an alcohol-based hand  that contains at least 60% alcohol.   Clean your hands often  Wash your hands often with soap and water for at least 20 seconds, especially after blowing your nose, coughing, or sneezing; going to the bathroom; and before eating or preparing food. If soap and water are not readily available, use an alcohol-based hand  with at least 60% alcohol, covering all surfaces of your hands and rubbing them together until they feel dry. Soap and water are the best option if hands are visibly dirty. Avoid touching your eyes, nose, and mouth with unwashed hands. Avoid sharing personal household items  You should not share dishes, drinking glasses, cups, eating utensils, towels, or bedding with other people or pets in your home. After using these items, they should be washed thoroughly with soap and water. Clean all high-touch surfaces everyday  High touch surfaces include counters, tabletops, doorknobs, bathroom fixtures, toilets, phones, keyboards, tablets, and bedside tables. Also, clean any surfaces that may have blood, stool, or body fluids on them. Use a household cleaning spray or wipe, according to the label instructions. Labels contain instructions for safe and effective use of the cleaning product including precautions you should take when applying the product, such as wearing gloves and making sure you have good ventilation during use of the product. Monitor your symptoms  Seek prompt medical attention if your illness is worsening (e.g., difficulty breathing). Before seeking care, call your healthcare provider and tell them that you have, or are being evaluated for, COVID-19. Put on a facemask before you enter the facility. These steps will help the healthcare providers office to keep other people in the office or waiting room from getting infected or exposed. Ask your healthcare provider to call the local or state health department.  Persons who are placed under active monitoring or facilitated self-monitoring should follow instructions provided by their local health department or occupational health professionals, as appropriate. When working with your local health department check their available hours. If you have a medical emergency and need to call 911, notify the dispatch personnel that you have, or are being evaluated for COVID-19. If possible, put on a facemask before emergency medical services arrive. Discontinuing home isolation  Patients with confirmed COVID-19 should remain under home isolation precautions until the risk of secondary transmission to others is thought to be low. The decision to discontinue home isolation precautions should be made on a case-by-case basis, in consultation with healthcare providers and [facility] and local health departments. Patient Education        Karen Patel 27 After Treatment for COVID-19: Care Instructions  Overview     You are being sent home from the hospital after being treated for COVID-19. Being in the hospital can be hard, especially if you've been in the intensive care unit (ICU). Even though you're going home, you probably don't feel well yet. Healing from COVID-19 takes time. You may feel very tired for weeks or months afterward, especially if you were on a ventilator. It will take time to get back to your old level of activity. Some people may have long-lasting health problems. But most people can look forward to feeling a little better every day. If you were on a ventilator, your throat may be sore and your voice hoarse or raspy for a while. After leaving the hospital, some people have feelings of anxiety and depression. They may have nightmares. Or in their mind they may relive events that happened in the hospital (flashbacks). You can always reach out to your doctor if you're having trouble with these symptoms. Your doctor will tell you if you need to isolate yourself at home, and when you can end isolation. Follow-up care is a key part of your treatment and safety.  Be sure to make and go to all appointments, and call your doctor if you are having problems. It's also a good idea to know your test results and keep a list of the medicines you take. How can you care for yourself at home? · Get plenty of rest. It can help you feel better. · Be kind to yourself if it's taking longer than you expected to feel better. You've been through a stressful time. · Get up and walk around every hour or two while you're resting. Slowly increase your activity as you start to feel better. · Eat healthy foods. · Drink plenty of fluids. If you have kidney, heart, or liver disease and have to limit fluids, talk with your doctor before you increase the amount of fluids you drink. · Take acetaminophen (such as Tylenol) to reduce a fever. It may also help with muscle aches. Read and follow all instructions on the label. If you are in isolation after you get home  · Wear a cloth face cover when you are around other people. It can help stop the spread of the virus when you cough or sneeze. · Limit contact with people in your home. If possible, stay in a separate bedroom and use a separate bathroom. · If you have to leave home, avoid crowds and try to stay at least 6 feet away from other people. · Avoid contact with pets and other animals. · Cover your mouth and nose with a tissue when you cough or sneeze. Then throw it in the trash right away. · Wash your hands often, especially after you cough or sneeze. Use soap and water, and scrub for at least 20 seconds. If soap and water aren't available, use an alcohol-based hand . · Don't share personal household items. These include bedding, towels, cups and glasses, and eating utensils. · [address] in the warmest water allowed for the fabric type, and dry it completely. It's okay to wash other people's laundry with yours. · Clean and disinfect your home every day. Use household  and disinfectant wipes or sprays.  Take special care to clean things that you grab with your hands. These include doorknobs, remote controls, phones, and handles on your refrigerator and microwave. And don't forget countertops, tabletops, bathrooms, and computer keyboards. When should you call for help? Call 911 anytime you think you may need emergency care. For example, call if you have life-threatening symptoms, such as:    · You have severe trouble breathing. (You can't talk at all.)     · You have constant chest pain or pressure.     · You are severely dizzy or lightheaded.     · You are confused or can't think clearly.     · Your face and lips have a blue color.     · You passed out (lost consciousness) or are very hard to wake up. Call your doctor now or seek immediate medical care if:    · You have moderate trouble breathing. (You can't speak a full sentence.)     · You are coughing up blood (more than about 1 teaspoon).     · You have signs of low blood pressure. These include feeling lightheaded; being too weak to stand; and having cold, pale, clammy skin. Watch closely for changes in your health, and be sure to contact your doctor if:    · Your symptoms get worse.     · You are not getting better as expected.     · You have new or worse symptoms of anxiety, depression, nightmares, or flashbacks. Call before you go to the doctor's office. Follow their instructions. And wear a cloth face cover. Current as of: July 10, 2020               Content Version: 12.6  © 3074-6502 [employer]. Care instructions adapted under license by [employer] (which disclaims liability or warranty for this information). If you have questions about a medical condition or this instruction, always ask your healthcare professional. [place] [address] any warranty or liability for your use of this information.        ___________________________________________________________________________________________________________________________________

## 2021-01-14 NOTE — PROGRESS NOTES
Hospitalist Note Admit Date:  2021  8:20 AM  
Name:  Viridiana Gonsalez Age:  [de-identified] y.o. 
:  1940 MRN:  [id number] PCP:  Bert Graham MD 
Treatment Team: Attending Provider: Yoli Lang MD; Consulting Provider: Geno Connolly MD; Utilization Review: Max Noble RN; Consulting Provider: Kaleb Arthur MD; Utilization Review: Brian Mckay RN; Primary Nurse: Macarena Reis RN; Primary Nurse: Sade Bonds; Physical Therapist: Terance Gilford, PT; Care Manager: Leeanne Marquez 
 
HPI/Subjective:  
Ms. Benson Coles is an [de-identified] y/o WF with a h/o AFib, HFpEF, HTN, CAD, DM, CKD who was admitted to the ICU service on 20 with acute hypoxemic respiratory faiulre 2/2 COVID-19. She was transferred to the floor and Hospitalist consulted to assume care. Nephrology following for HD.  
 
: Much more awake today, conversing appropriately. Still on Airvo 60L/100%, bedside sats %. Still some SOB and c/o dry mouth but overall she appears better than yesterday. No other complaints Objective:  
 
Patient Vitals for the past 24 hrs: 
 Temp Pulse Resp BP SpO2  
21 0806     93 % 21 0649 98.4 °F (36.9 °C) 95 28 121/63 100 % 21 0427 97.9 °F (36.6 °C) 88 27 (!) 105/57 98 % 21 0316     94 % 21 0002 98.9 °F (37.2 °C) 96 20 111/69 99 % 21 1848 99.2 °F (37.3 °C) (!) 108 24 115/75 94 % 21 1549     95 % 21 1517 98.6 °F (37 °C) (!) 105 24 (!) 102/58 95 % 21 1154 99.3 °F (37.4 °C) 76 20 (!) 98/55 98 % Oxygen Therapy O2 Sat (%): 93 % (21) Pulse via Oximetry: 107 beats per minute (21) O2 Device: Heated; Hi flow nasal cannula (21) O2 Flow Rate (L/min): 60 l/min (21) O2 Temperature: 87.8 °F (31 °C) (21) FIO2 (%): 100 % (21) Estimated body mass index is 35.03 kg/m² as calculated from the following: Height as of this encounter: 5' 9\" (1.753 m). Weight as of this encounter: 107.6 kg (237 lb 3.2 oz). Intake/Output Summary (Last 24 hours) at 1/14/2021 1100 Last data filed at 1/14/2021 7288 Gross per 24 hour Intake 480 ml Output  Net 480 ml *Note that automatically entered I/Os may not be accurate; dependent on patient compliance with collection and accurate  by techs. General:    Well nourished. No overt distress. Awake and alert, oriented x3 but tired appearing. CV:   RRR. No m/r/g. No edema. No JVD Lungs:   BB rales. Airvo 60L/100%, bedside sats %. Mild tachypnea/conversational dyspnea but no distress otherwise. Abdomen:   Soft, nontender, nondistended. Extremities: Warm and dry. No cyanosis Skin:     No rashes. Normal coloration Neuro:  No gross focal deficits. Alert and oriented x3. Data Reviewed: 
I have reviewed all labs, meds, and studies from the last 24 hours: 
Recent Results (from the past 24 hour(s)) POC G3 Collection Time: 01/13/21 11:03 AM  
Result Value Ref Range Device: High Flow Nasal Cannula FIO2 (POC) 100 % pH (POC) 7.56 (HH) 7.35 - 7.45    
 pCO2 (POC) 24.3 (L) 35 - 45 MMHG  
 pO2 (POC) 66 (L) 75 - 100 MMHG  
 HCO3 (POC) 21.6 (L) 22 - 26 MMOL/L  
 sO2 (POC) 96 95 - 98 % Base excess (POC) 0 mmol/L Allens test (POC) YES Site RIGHT BRACHIAL Specimen type (POC) ARTERIAL Performed by Gonzales   
 CO2, POC 22 MMOL/L Flow rate (POC) 60.000 L/min Respiratory comment: NurseNotified COLLECT TIME 1,102 GLUCOSE, POC Collection Time: 01/13/21 11:17 AM  
Result Value Ref Range Glucose (POC) 305 (H) 65 - 100 mg/dL GLUCOSE, POC Collection Time: 01/13/21  4:21 PM  
Result Value Ref Range Glucose (POC) 262 (H) 65 - 100 mg/dL PLEASE READ & DOCUMENT PPD TEST IN 24 HRS Collection Time: 01/13/21  5:00 PM  
Result Value Ref Range PPD Negative Negative mm Induration 0 0 - 5 mm GLUCOSE, POC Collection Time: 01/13/21  8:42 PM  
Result Value Ref Range Glucose (POC) 226 (H) 65 - 100 mg/dL METABOLIC PANEL, BASIC Collection Time: 01/14/21  4:17 AM  
Result Value Ref Range Sodium 141 136 - 145 mmol/L Potassium 4.7 3.5 - 5.1 mmol/L Chloride 104 98 - 107 mmol/L  
 CO2 27 21 - 32 mmol/L Anion gap 10 7 - 16 mmol/L Glucose 148 (H) 65 - 100 mg/dL  (H) 8 - 23 MG/DL Creatinine 4.02 (H) 0.6 - 1.0 MG/DL  
 GFR est AA 14 (L) >60 ml/min/1.73m2 GFR est non-AA 11 (L) >60 ml/min/1.73m2 Calcium 9.6 8.3 - 10.4 MG/DL  
CBC W/O DIFF Collection Time: 01/14/21  4:17 AM  
Result Value Ref Range WBC 21.5 (H) 4.3 - 11.1 K/uL  
 RBC 3.31 (L) 4.05 - 5.2 M/uL  
 HGB 10.1 (L) 11.7 - 15.4 g/dL HCT 32.6 (L) 35.8 - 46.3 % MCV 98.5 (H) 79.6 - 97.8 FL  
 MCH 30.5 26.1 - 32.9 PG  
 MCHC 31.0 (L) 31.4 - 35.0 g/dL  
 RDW 14.0 11.9 - 14.6 % PLATELET 377 779 - 081 K/uL MPV 13.0 (H) 9.4 - 12.3 FL ABSOLUTE NRBC 0.00 0.0 - 0.2 K/uL GLUCOSE, POC Collection Time: 01/14/21  5:44 AM  
Result Value Ref Range Glucose (POC) 181 (H) 65 - 100 mg/dL Current Meds: 
Current Facility-Administered Medications Medication Dose Route Frequency  furosemide (LASIX) tablet 80 mg  80 mg Oral BID  morphine injection 1 mg  1 mg IntraVENous Q4H PRN  
 insulin glargine (LANTUS) injection 25 Units  25 Units SubCUTAneous DAILY  ondansetron (ZOFRAN) injection 4 mg  4 mg IntraVENous Q6H PRN  
 LORazepam (ATIVAN) tablet 0.5 mg  0.5 mg Oral Q12H PRN  
 heparin (porcine) 1,000 unit/mL injection 5,000 Units  5,000 Units Hemodialysis DIALYSIS PRN  
 [Held by provider] amLODIPine (NORVASC) tablet 10 mg  10 mg Oral DAILY  insulin lispro (HUMALOG) injection 0-10 Units  0-10 Units SubCUTAneous AC&HS  sevelamer carbonate (RENVELA) tab 800 mg  800 mg Oral TID WITH MEALS  
 acetaminophen (TYLENOL) tablet 650 mg  650 mg Oral Q6H PRN  
  carbidopa-levodopa (SINEMET)  mg per tablet 1 Tab  1 Tab Oral QID  [Held by provider] carvediloL (COREG) tablet 25 mg  25 mg Oral BID WITH MEALS  ergocalciferol capsule 50,000 Units  50,000 Units Oral Q7D  
 ferrous sulfate tablet 324 mg  324 mg Oral DAILY  levothyroxine (SYNTHROID) tablet 25 mcg  25 mcg Oral ACB  pantoprazole (PROTONIX) tablet 40 mg  40 mg Oral ACB&D  
 sodium chloride (NS) flush 5-40 mL  5-40 mL IntraVENous Q8H  
 sodium chloride (NS) flush 5-40 mL  5-40 mL IntraVENous PRN  
 0.9% sodium chloride infusion 250 mL  250 mL IntraVENous PRN Other Studies: No results found for this visit on 01/04/21. No results found. All Micro Results Procedure Component Value Units Date/Time CULTURE, BLOOD [[id number]] Collected: 01/04/21 0375 Order Status: Completed Specimen: Blood Updated: 01/09/21 9211 Special Requests: --     
  RIGHT Antecubital 
  
  Culture result: NO GROWTH 5 DAYS     
 CULTURE, BLOOD [[id number]] Collected: 01/04/21 0835 Order Status: Completed Specimen: Blood Updated: 01/09/21 6083 Special Requests: --     
  LEFT 
FOREARM Culture result: NO GROWTH 5 DAYS     
  
 
 
SARS-CoV-2 Lab Results \"Novel Coronavirus\" Test: No results found for: COV2NT \"Emergent Disease\" Test: No results found for: EDPR \"SARS-COV-2\" Test: No results found for: XGCOVT Rapid Test:  
Lab Results Component Value Date/Time COVR Detected (AA) 01/04/2021 08:35 AM  
 
  
 
 
Assessment and Plan:  
 
Hospital Problems as of 1/14/2021 Date Reviewed: 1/4/2021 Codes Class Noted - Resolved POA Mild protein-calorie malnutrition ([facility] [address] ICD-10-CM: E44.1 ICD-9-CM: 263.1  1/13/2021 - Present Yes * (Principal) Pneumonia due to COVID-19 virus ICD-10-CM: U07.1, J12.82 ICD-9-CM: 480.8  1/4/2021 - Present Unknown Acute on chronic respiratory failure with hypoxia [facility]) ICD-10-CM: H04.90 
ICD-9-CM: 518.84, 799.02  1/4/2021 - Present Unknown Chronic systolic heart failure (HCC) ICD-10-CM: I50.22 ICD-9-CM: 428.22  7/14/2019 - Present Yes Volume overload ICD-10-CM: E87.70 ICD-9-CM: 276.69  7/8/2019 - Present Yes PAF (paroxysmal atrial fibrillation) (HCC) ICD-10-CM: I48.0 ICD-9-CM: 427.31  7/7/2019 - Present Yes Morbid obesity ([facility] [address] (Chronic) ICD-10-CM: E66.01 
ICD-9-CM: 278.01  6/29/2019 - Present Yes  
   
 CKD (chronic kidney disease) stage 5, GFR less than 15 ml/min (HCC) (Chronic) ICD-10-CM: N18.5 ICD-9-CM: 585.5  11/30/2018 - Present Yes  
   
 SARAH (obstructive sleep apnea) (Chronic) ICD-10-CM: A84.88 
ICD-9-CM: 327.23  5/24/2018 - Present Yes 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 (Chronic) ICD-10-CM: E11.21 
ICD-9-CM: 250.40, 583.81  11/15/2017 - Present Yes A-V fistula (HCC) (Chronic) ICD-10-CM: I77.0 ICD-9-CM: 447.0  9/6/2017 - Present Yes Overview Signed 9/6/2017  2:35 PM by LESTER Ramos  
  6/7/2017 - Derick Liao MD - creation left brachiocephalic AV fistula 8/10/2017 Jarred Gibson MD - elevation left b-c AVF 
  
  
   
 HTN (hypertension) (Chronic) ICD-10-CM: I10 
ICD-9-CM: 401.9  11/10/2011 - Present Yes Plan: # Acute hypoxemic respiratory failure - Volume overloaded on admission with + COVID. HD per Nephrology. PO Lasix increased. - O2 needs increased to Airvo on 1/13. Today she has improved, may be able to wean FiO2 some today. She is DNR and still remains a high risk for decline. Will update family today. 
  
# Acute on chronic dCHF exacerbation - Resolved. Bipap on admission, diuresed and dialyzed. 
  
# ESRD on HD 
            - Per Nephrology. 
  
# HTN 
            - Meds held 
  
# DM2 
            - Home meds 
  
# Parkinson  
            - Sinemet 
  
# B/l LE wounds 
  
DC planning/Dispo: Pending. Diet:  DIET DIABETIC CONSISTENT CARB 
DIET NUTRITIONAL SUPPLEMENTS 
DVT ppx: SCDs Other listed chronic conditions stable, cont current management.  
 
Signed: 
Betty Lopez MD

## 2021-01-14 NOTE — PROGRESS NOTES
Patient has been observed during hourly rounding and has not experienced any major change in status overnight. She has continued on AirVO overnight and maintained oxygen sats of 97-98%. Patient has been turned and repositioned prn, and will continue to be monitored and assisted until day shift assumes her care.

## 2021-01-14 NOTE — WOUND CARE
Left 2nd and 3rd toes and dorsal foot x2 wounds dressing changed, xeroform and chaparro applied. Will monitor.

## 2021-01-14 NOTE — PROGRESS NOTES
Renal central office sent the referral to the [facility] department.  Renal will call back with a chair slot.

## 2021-01-14 NOTE — DIABETES MGMT
Patient admitted with pneumonia due to COVID-19 virus. Blood glucose ranged 217-305 yesterday with patient receiving Lantus 25 units, Humalog 24 units, and Decadron 6mg. Blood glucose this morning was 181. Reviewed patient current regimen: Lantus 25 units daily and Humalog SSI. Noted patient Decadron has been stopped. Updated provider via [email or web address] regarding discontinuation of Decadron and patient glycemic control. Patient may benefit from a decrease in basal insulin to reduce risk of hypoglycemia as steroid therapy has been discontinued.

## 2021-01-14 NOTE — PROGRESS NOTES
[facility] dyspnea at rest. Lung sounds are coarse bipap in place at current time. Pt responds to voice. Wound to LLE dressing is in place. Redness noted to bottom, cream applied. Safety measures in place will continue to monitor.

## 2021-01-14 NOTE — DIALYSIS
TRANSFER IN - DIALYSIS Received patient in dialysis unit from [facility] (unit) for ordered procedure. Consent verified for renal replacement therapy. Patient alert and vital signs stable. /63 P108 Hemodialysis initiated using Left AVF and 17 g needles. Machine settings per MD order. Heparin 0 unit bolus and 1000 units/hr. Will monitor during treatment.

## 2021-01-14 NOTE — DIALYSIS
TRANSFER OUT -DIALYSIS Hemodialysis treatment ended early due to hypotension. Jason NP aware. Checked sugar, and radha Rn aware of high sugar. Patient resting  /30  P 105   
 
 0 Kgs removed. Needles X2 removed from access and manual pressure held until hemostasis complete and pressure dressing applied. Meds given 0. 0 units of RBCs given during dialysis. Patient to 825 after dialysis.

## 2021-01-15 NOTE — PROGRESS NOTES
Patient has a chair time at [address] at 11:45. Referrals made to [facility], [address] [facility], and [id number]. Awaiting response.

## 2021-01-15 NOTE — PROGRESS NOTES
[address] Admission Date: 1/4/2021 Renal Daily Progress Note: 1/15/2021 The patient's chart is reviewed and the patient is discussed with the staff. Follow up ANTONIO/CKD IV Subjective:  
Patient seen and examined on rounds, drowsy but arousable, complaints of weakness/fatigue, + exertional SOB and cough remains on optiFlo 60L/100 % O2 Labs and chart reviewed- increased consolidation on cxr ROS: Denies CP, N/V, +SOB, weakness. Current Facility-Administered Medications Medication Dose Route Frequency  insulin lispro (HUMALOG) injection 0-15 Units  0-15 Units SubCUTAneous AC&HS  insulin lispro (HUMALOG) injection 10 Units  10 Units SubCUTAneous TIDAC  furosemide (LASIX) tablet 80 mg  80 mg Oral BID  morphine injection 1 mg  1 mg IntraVENous Q4H PRN  
 insulin glargine (LANTUS) injection 25 Units  25 Units SubCUTAneous DAILY  ondansetron (ZOFRAN) injection 4 mg  4 mg IntraVENous Q6H PRN  
 LORazepam (ATIVAN) tablet 0.5 mg  0.5 mg Oral Q12H PRN  
 heparin (porcine) 1,000 unit/mL injection 5,000 Units  5,000 Units Hemodialysis DIALYSIS PRN  
 sevelamer carbonate (RENVELA) tab 800 mg  800 mg Oral TID WITH MEALS  
 acetaminophen (TYLENOL) tablet 650 mg  650 mg Oral Q6H PRN  
 carbidopa-levodopa (SINEMET)  mg per tablet 1 Tab  1 Tab Oral QID  ergocalciferol capsule 50,000 Units  50,000 Units Oral Q7D  
 ferrous sulfate tablet 324 mg  324 mg Oral DAILY  levothyroxine (SYNTHROID) tablet 25 mcg  25 mcg Oral ACB  pantoprazole (PROTONIX) tablet 40 mg  40 mg Oral ACB&D  
 sodium chloride (NS) flush 5-40 mL  5-40 mL IntraVENous Q8H  
 sodium chloride (NS) flush 5-40 mL  5-40 mL IntraVENous PRN  
 0.9% sodium chloride infusion 250 mL  250 mL IntraVENous PRN Objective:  
 
Vitals:  
 01/14/21 2348 01/15/21 3721 01/15/21 3252 01/15/21 4339 BP: 104/68 120/73  110/72 Pulse: 92 97  100 Resp: 24 24 21 Temp: 98.5 °F (36.9 °C) 97.9 °F (36.6 °C)  98.2 °F (36.8 °C) SpO2: 96% 91% 90% 90% Weight:      
Height:      
 
Intake and Output:  
01/13 1901 - 01/15 0700 In: 960 [P.O.:960] Out: 0 No intake/output data recorded. Physical Exam: did not enter room Constitutional:  the patient is well developed and in acute distress, drowsy but arousable HEENT:  Sclera clear, pupils equal, oral mucosa moist 
Lungs: diminished bilaterally Cardiovascular:  Regular rate, S1, S2, no rub Abd/GI: soft and non-tender; with positive bowel sounds. Ext: warm without cyanosis. There is trace lower leg edema, right foot wound with dressing intact. Skin:  no jaundice or rashes Neuro: no gross neuro deficits Psychiatric: Calm. Access: LUE AVF +bruit, + thrill LAB Recent Labs  
  01/14/21 
0417 01/13/21 
0309 WBC 21.5* 16.5* HGB 10.1* 10.2* HCT 32.6* 32.1*  
 172 Recent Labs  
  01/14/21 
0417 01/13/21 
0309  139  
K 4.7 4.5  
 103 CO2 27 27 * 217* * 65* CREA 4.02* 2.57* No results for input(s): PH, PCO2, PO2, HCO3 in the last 72 hours. Assessment:  (Medical Decision Making) Hospital Problems  Date Reviewed: 1/4/2021 Codes Class Noted POA [facility] ICD-10-CM: E44.1 ICD-9-CM: 263.1  1/13/2021 Yes * (Principal) Pneumonia due to COVID-19 virus ICD-10-CM: U07.1, J12.82 ICD-9-CM: 480.8  1/4/2021 Unknown Acute on chronic respiratory failure with hypoxia [facility]) ICD-10-CM: J96.21 
ICD-9-CM: 518.84, 799.02  1/4/2021 Unknown Chronic systolic heart failure (HCC) ICD-10-CM: I50.22 ICD-9-CM: 428.22  7/14/2019 Yes Volume overload ICD-10-CM: E87.70 ICD-9-CM: 276.69  7/8/2019 Yes PAF (paroxysmal atrial fibrillation) (HCC) ICD-10-CM: I48.0 ICD-9-CM: 427.31  7/7/2019 Yes Morbid obesity ([address] (Chronic) ICD-10-CM: E66.01 
ICD-9-CM: 278.01  6/29/2019 Yes CKD (chronic kidney disease) stage 5, GFR less than 15 ml/min (HCC) (Chronic) ICD-10-CM: N18.5 ICD-9-CM: 585.5  11/30/2018 Yes SARAH (obstructive sleep apnea) (Chronic) ICD-10-CM: G47.33 
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 
ICD-9-CM: 250.40, 583.81  11/15/2017 Yes A-V fistula (HCC) (Chronic) ICD-10-CM: I77.0 ICD-9-CM: 447.0  9/6/2017 Yes Overview Signed 9/6/2017  2:35 PM by LESTER Allen  
  6/7/2017 - Pavel Abreu MD - creation left brachiocephalic AV fistula 8/10/2017 Balbina Ni MD - elevation left b-c AVF 
  
  
   
 HTN (hypertension) (Chronic) ICD-10-CM: I10 
ICD-9-CM: 401.9  11/10/2011 Yes Plan:  (Medical Decision Making) 1. ANTONIO/CKD IV- New ESRD Hypoxic Resp Failure First dialysis 1/10/2021 
-poorly tolerated dialysis 2/2 hypotension yesterday, will add midodrine with dialysis to attempt to optimize RRT. 2. COVID 19 + s/p convalescent plasma, on decadron Worsening infiltrates/consolidation on cxr  
  
3. Anemia hgb 10.2  
  
4. HTN (Hold to maximize UF) norvasc, coreg  
  
5. DM type II- SSI per hosp 6. Leukocytosis on empiric abx per hosp, work up pending Tara Beebe NP

## 2021-01-15 NOTE — PROGRESS NOTES
No acute events overnight. Patient resting quietly in bed. Respirations present, even and unlabored on Airvo 60L/100%. Bed low and locked, safety measures in place. No signs of distress, no needs expressed by the patient. Call light within reach, encouraged patient to call with any needs. Preparing to give report to oncoming RN.

## 2021-01-15 NOTE — PROGRESS NOTES
PT note: Chart reviewed for therapy evaluation. Spoke with RN who states pt currently not appropriate for therapy assessment or mobility. Will check back later today or tomorrow as schedule allows.   
 
NILDA HaqT

## 2021-01-15 NOTE — PROGRESS NOTES
SBAR received from [facility] pt in bed resting dyspnea at rest noted. Lung sounds are course airvo 60/100 in place. Pt has Dyspnea at rest noted. Had a c/o pain in her back she was repositioned and patient stated that she felt better. Dressing to LLE clean dry and intact. No new areas noted. Safety measures in place will continue to monitor.

## 2021-01-15 NOTE — PROGRESS NOTES
Hospitalist Note Admit Date:  2021  8:20 AM  
Name:  Sarthak Velazquez Age:  [de-identified] y.o. 
:  1940 MRN:  [id number] PCP:  Alonzo Li MD 
Treatment Team: Attending Provider: Cheo Oro MD; Consulting Provider: Meaghan Melendrez MD; Utilization Review: Jorgito Lopez RN; Consulting Provider: Rolanda Rizvi MD; Utilization Review: Leticia Phelan RN; Care Manager: Ninfa Giles; Physical Therapist: Cali Tabares DPT; Primary Nurse: Paul BAKER 
 
HPI/Subjective:  
Ms. Ryan Kennedy is an [de-identified] y/o WF with a h/o AFib, HFpEF, HTN, CAD, DM, CKD who was admitted to the ICU service on 20 with acute hypoxemic respiratory faiulre 2/2 COVID-19. She was transferred to the floor and Hospitalist consulted to assume care. Nephrology following for HD.  
 
1/15: Desaturated this AM after coming of nightly Bipap to Airvo, NRB placed over Airvo. Sats now mid to high 90s. Sugars uncontrolled yesterday, labs pending. No other complaints Objective:  
 
Patient Vitals for the past 24 hrs: 
 Temp Pulse Resp BP SpO2  
01/15/21 0926 98.2 °F (36.8 °C) 100 21 110/72 90 % 01/15/21 0823     90 % 01/15/21 0440 97.9 °F (36.6 °C) 97 24 120/73 91 % 21 2348 98.5 °F (36.9 °C) 92 24 104/68 96 % 21 2045     93 % 216 98.4 °F (36.9 °C) (!) 103 24 (!) 126/91 94 % 21 1806 98.4 °F (36.9 °C) (!) 105 28 122/73 91 % 21 1431  98  106/60   
21 1422  (!) 105  (!) 102/30   
21 1410  (!) 106  (!) 80/61   
21 1405  65  (!) 67/23   
21 1400  60  (!) 77/39   
21 1330  75  (!) 91/44 96 % 21 1311  (!) 108  115/63   
21 1127 98.7 °F (37.1 °C) (!) 108 26 112/68 97 % Oxygen Therapy O2 Sat (%): 90 % (01/15/21 09) Pulse via Oximetry: 91 beats per minute (01/15/21 0823) O2 Device: Heated; Hi flow nasal cannula(nonrebreather) (01/15/21 0788) O2 Flow Rate (L/min): 60 l/min (01/15/21 0823) O2 Temperature: 87.8 °F (31 °C) (01/15/21 0823) FIO2 (%): 100 % (01/15/21 0823) Estimated body mass index is 35.03 kg/m² as calculated from the following: 
  Height as of this encounter: 5' 9\" (1.753 m). Weight as of this encounter: 107.6 kg (237 lb 3.2 oz). Intake/Output Summary (Last 24 hours) at 1/15/2021 7156 Last data filed at 1/15/2021 0371 Gross per 24 hour Intake 480 ml Output 0 ml Net 480 ml *Note that automatically entered I/Os may not be accurate; dependent on patient compliance with collection and accurate  by techs. General:    Well nourished. No overt distress. Obese. CV:   RRR. No m/r/g. No edema. No JVD Lungs:   BB rales, tachypnea/conversational dyspnea. Airvo 60L/100%. Abdomen:   Soft, nontender, nondistended. Extremities: Warm and dry. No cyanosis Skin:     No rashes. Normal coloration Neuro:  No gross focal deficits. Alert Data Reviewed: 
I have reviewed all labs, meds, and studies from the last 24 hours: 
Recent Results (from the past 24 hour(s)) GLUCOSE, POC Collection Time: 01/14/21 11:55 AM  
Result Value Ref Range Glucose (POC) 456 (HH) 65 - 100 mg/dL GLUCOSE, POC Collection Time: 01/14/21  1:38 PM  
Result Value Ref Range Glucose (POC) 462 (HH) 65 - 100 mg/dL GLUCOSE, POC Collection Time: 01/14/21  3:48 PM  
Result Value Ref Range Glucose (POC) 513 (HH) 65 - 100 mg/dL PLEASE READ & DOCUMENT PPD TEST IN 48 HRS Collection Time: 01/14/21  5:00 PM  
Result Value Ref Range PPD Negative Negative  
 mm Induration 0 0 - 5 mm GLUCOSE, POC Collection Time: 01/14/21  6:16 PM  
Result Value Ref Range Glucose (POC) 385 (H) 65 - 100 mg/dL GLUCOSE, POC Collection Time: 01/14/21  8:59 PM  
Result Value Ref Range Glucose (POC) 329 (H) 65 - 100 mg/dL GLUCOSE, POC Collection Time: 01/15/21  5:50 AM  
Result Value Ref Range Glucose (POC) 177 (H) 65 - 100 mg/dL Current Meds: Current Facility-Administered Medications Medication Dose Route Frequency  insulin lispro (HUMALOG) injection 0-15 Units  0-15 Units SubCUTAneous AC&HS  insulin lispro (HUMALOG) injection 10 Units  10 Units SubCUTAneous TIDAC  furosemide (LASIX) tablet 80 mg  80 mg Oral BID  morphine injection 1 mg  1 mg IntraVENous Q4H PRN  
 insulin glargine (LANTUS) injection 25 Units  25 Units SubCUTAneous DAILY  ondansetron (ZOFRAN) injection 4 mg  4 mg IntraVENous Q6H PRN  
 LORazepam (ATIVAN) tablet 0.5 mg  0.5 mg Oral Q12H PRN  
 heparin (porcine) 1,000 unit/mL injection 5,000 Units  5,000 Units Hemodialysis DIALYSIS PRN  
 sevelamer carbonate (RENVELA) tab 800 mg  800 mg Oral TID WITH MEALS  
 acetaminophen (TYLENOL) tablet 650 mg  650 mg Oral Q6H PRN  
 carbidopa-levodopa (SINEMET)  mg per tablet 1 Tab  1 Tab Oral QID  ergocalciferol capsule 50,000 Units  50,000 Units Oral Q7D  
 ferrous sulfate tablet 324 mg  324 mg Oral DAILY  levothyroxine (SYNTHROID) tablet 25 mcg  25 mcg Oral ACB  pantoprazole (PROTONIX) tablet 40 mg  40 mg Oral ACB&D  
 sodium chloride (NS) flush 5-40 mL  5-40 mL IntraVENous Q8H  
 sodium chloride (NS) flush 5-40 mL  5-40 mL IntraVENous PRN  
 0.9% sodium chloride infusion 250 mL  250 mL IntraVENous PRN Other Studies: No results found for this visit on 01/04/21. No results found. All Micro Results Procedure Component Value Units Date/Time CULTURE, BLOOD [[id number]] Collected: 01/04/21 1474 Order Status: Completed Specimen: Blood Updated: 01/09/21 5918 Special Requests: --     
  RIGHT Antecubital 
  
  Culture result: NO GROWTH 5 DAYS     
 CULTURE, BLOOD [[id number]] Collected: 01/04/21 0835 Order Status: Completed Specimen: Blood Updated: 01/09/21 9382 Special Requests: --     
  LEFT 
FOREARM Culture result: NO GROWTH 5 DAYS     
  
 
 
SARS-CoV-2 Lab Results \"Novel Coronavirus\" Test: No results found for: COV2NT \"Emergent Disease\" Test: No results found for: EDPR \"SARS-COV-2\" Test: No results found for: XGCOVT Rapid Test:  
Lab Results Component Value Date/Time COVR Detected (AA) 01/04/2021 08:35 AM  
 
  
 
 
Assessment and Plan:  
 
Hospital Problems as of 1/15/2021 Date Reviewed: 1/4/2021 Codes Class Noted - Resolved POA Mild protein-calorie malnutrition ([email or web address] ICD-10-CM: E44.1 ICD-9-CM: 263.1  1/13/2021 - Present Yes * (Principal) Pneumonia due to COVID-19 virus ICD-10-CM: U07.1, J12.82 ICD-9-CM: 480.8  1/4/2021 - Present Unknown Acute on chronic respiratory failure with hypoxia [facility]) ICD-10-CM: G00.62 
ICD-9-CM: 518.84, 799.02  1/4/2021 - Present Unknown Chronic systolic heart failure (HCC) ICD-10-CM: I50.22 ICD-9-CM: 428.22  7/14/2019 - Present Yes Volume overload ICD-10-CM: E87.70 ICD-9-CM: 276.69  7/8/2019 - Present Yes PAF (paroxysmal atrial fibrillation) (HCC) ICD-10-CM: I48.0 ICD-9-CM: 427.31  7/7/2019 - Present Yes Morbid obesity ([email or web address] 75[facility] (Chronic) ICD-10-CM: E66.01 
ICD-9-CM: 278.01  6/29/2019 - Present Yes  
   
 CKD (chronic kidney disease) stage 5, GFR less than 15 ml/min (HCC) (Chronic) ICD-10-CM: N18.5 ICD-9-CM: 585.5  11/30/2018 - Present Yes  
   
 SARAH (obstructive sleep apnea) (Chronic) ICD-10-CM: V47.60 
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 
ICD-9-CM: 250.40, 583.81  11/15/2017 - Present Yes A-V fistula (HCC) (Chronic) ICD-10-CM: I77.0 ICD-9-CM: 447.0  9/6/2017 - Present Yes Overview Signed 9/6/2017  2:35 PM by LESTER Murray  
  6/7/2017 - Minnie Abdul MD - creation left brachiocephalic AV fistula 8/10/2017 Estuardo Mendez MD - elevation left b-c AVF 
  
  
   
 HTN (hypertension) (Chronic) ICD-10-CM: I10 
ICD-9-CM: 401.9  11/10/2011 - Present Yes Plan: # Acute hypoxemic respiratory failure             - Volume overloaded on admission with + COVID. HD per Nephrology. PO Lasix increased. - O2 needs increased to Airvo on 1/13.  
 - Repeat CXR today # Leukocytosis 
 - Off steroids. O2 needs stable over the past 48 hours. Repeat CXR, check UA, sputum culture, add on PCT. Consider empiric abx pending above. Does also have b/l LE wounds, consider those a source also if WBCs still elevated and no other clear cause. # DM2 
            - Sugars uncontrolled, added prandial, increase SSI sensitivity. Keep basal the same for now since decent AM sugars. # Acute on chronic dCHF exacerbation 
            - Resolved. Bipap on admission, diuresed and dialyzed. 
  
# ESRD on HD 
            - Per Nephrology. Poorly tolerated thus far due to hypotension and limited fluid removal/fatigue.  
  
# HTN 
            - Meds held 
  
# Parkinson  
            - Sinemet 
  
# B/l LE wounds DC planning/Dispo: Con't current plan of care. High risk of further decline. I updated daughter via phone this morning. Diet:  DIET DIABETIC CONSISTENT CARB 
DIET NUTRITIONAL SUPPLEMENTS 
DVT ppx: SCDs Other listed chronic conditions stable, cont current management.  
 
Signed: 
Mandy Alonso MD

## 2021-01-15 NOTE — DIABETES MGMT
Patient admitted with pneumonia due to COVID-19. Blood glucose ranged 148-513 yesterday with patient receiving Lantus 25 units and Humalog 78 units. Blood glucose this morning was 177. Most recent FSBS 289. Provider ordered prandial insulin. Reviewed patient current regimen: Lantus 25 units daily, Humalog 10 units with meals, and Humalog SSI.

## 2021-01-15 NOTE — PROGRESS NOTES
Report received from April RN. Patient resting quietly in bed. Respirations present, even and unlabored on Airvo 60L/100%. Bed low and locked, safety measures in place. No signs of distress, no needs expressed. Call light within reach, encouraged patient to call with any needs. Will continue to monitor.

## 2021-01-16 NOTE — PROGRESS NOTES
This RN was notified that the patient's blood sugar was 58. A D50 bolus was given. Blood sugar rechecked, 175.

## 2021-01-16 NOTE — PROGRESS NOTES
Patient c19+ with respiratory decline. This morning on NRB and BIPAP with sats in the 80's. HOLD PT today secondary to decreased respiratory status. Will check  Back 1/17/21 or 1/18/21 pending improved respiratory status. Silviano Benavidez, PT, DPT, OCS.

## 2021-01-16 NOTE — PROGRESS NOTES
[address] Admission Date: 1/4/2021 Renal Daily Progress Note: 1/16/2021 Follow up ANTONIO/CKD IV Subjective:  
 
 
 
 
ROS: Denies CP, N/V, +SOB, weakness. Current Facility-Administered Medications Medication Dose Route Frequency  dextrose 40% (GLUTOSE) oral gel 1 Tube  15 g Oral PRN  
 glucagon (GLUCAGEN) injection 1 mg  1 mg IntraMUSCular PRN  
 dextrose (D50W) injection syrg 12.5-25 g  25-50 mL IntraVENous PRN  
 furosemide (LASIX) injection 80 mg  80 mg IntraVENous ONCE  
 morphine injection 2 mg  2 mg IntraVENous Q4H PRN  
 insulin lispro (HUMALOG) injection 0-15 Units  0-15 Units SubCUTAneous AC&HS  [Held by provider] insulin lispro (HUMALOG) injection 10 Units  10 Units SubCUTAneous TIDAC  midodrine (PROAMATINE) tablet 5 mg  5 mg Oral Q TUE, THU & SAT  cefepime (MAXIPIME) 1 g in 0.9% sodium chloride (MBP/ADV) 50 mL MBP  1 g IntraVENous Q24H  
 furosemide (LASIX) tablet 80 mg  80 mg Oral BID  [Held by provider] insulin glargine (LANTUS) injection 25 Units  25 Units SubCUTAneous DAILY  ondansetron (ZOFRAN) injection 4 mg  4 mg IntraVENous Q6H PRN  
 heparin (porcine) 1,000 unit/mL injection 5,000 Units  5,000 Units Hemodialysis DIALYSIS PRN  
 sevelamer carbonate (RENVELA) tab 800 mg  800 mg Oral TID WITH MEALS  
 acetaminophen (TYLENOL) tablet 650 mg  650 mg Oral Q6H PRN  
 carbidopa-levodopa (SINEMET)  mg per tablet 1 Tab  1 Tab Oral QID  ergocalciferol capsule 50,000 Units  50,000 Units Oral Q7D  
 ferrous sulfate tablet 324 mg  324 mg Oral DAILY  levothyroxine (SYNTHROID) tablet 25 mcg  25 mcg Oral ACB  pantoprazole (PROTONIX) tablet 40 mg  40 mg Oral ACB&D  
 sodium chloride (NS) flush 5-40 mL  5-40 mL IntraVENous Q8H  
 sodium chloride (NS) flush 5-40 mL  5-40 mL IntraVENous PRN  
 0.9% sodium chloride infusion 250 mL  250 mL IntraVENous PRN Objective:  
 
Vitals: 01/16/21 0349 01/16/21 1905 01/16/21 0753 01/16/21 1058 BP:  121/63 (!) 104/59 (!) 100/53 Pulse:  (!) 103 99 99 Resp:  28 22 23 Temp:  98.7 °F (37.1 °C) 98.2 °F (36.8 °C) 97.9 °F (36.6 °C) SpO2: (!) 88% 92% 97% 99% Weight:      
Height:      
 
Intake and Output:  
01/14 1901 - 01/16 0700 In: 120 [P.O.:120] Out: 300 [Urine:300] No intake/output data recorded. Physical Exam: did not enter room Constitutional:  the patient is well developed and in acute distress, drowsy Lungs: diminished bilaterally Cardiovascular:  Regular rate, S1, S2, no rub Abd/GI: soft and non-tender; with positive bowel sounds. Ext: warm without cyanosis. There is trace lower leg edema, right foot wound with dressing intact. Skin:  no jaundice or rashes Neuro: no gross neuro deficits Psychiatric: Calm. Access: E AVF  + thrill LAB Recent Labs  
  01/16/21 
1034 01/15/21 
1342 01/14/21 
0417 WBC 18.6* 20.4* 21.5* HGB 10.6* 10.2* 10.1* HCT 33.3* 31.6* 32.6*  
* 145* 166 Recent Labs  
  01/15/21 
1342 01/14/21 
0417 * 141  
K 4.7 4.7 CL 99 104 CO2 25 27 * 148* * 101* CREA 4.39* 4.02* No results for input(s): PH, PCO2, PO2, HCO3 in the last 72 hours. Assessment:  (Medical Decision Making) Hospital Problems  Date Reviewed: 1/4/2021 Codes Class Noted POA Mild protein-calorie malnutrition ([facility] ICD-10-CM: E44.1 ICD-9-CM: 263.1  1/13/2021 Yes * (Principal) Pneumonia due to COVID-19 virus ICD-10-CM: U07.1, J12.82 ICD-9-CM: 480.8  1/4/2021 Unknown Acute on chronic respiratory failure with hypoxia [facility]) ICD-10-CM: J96.21 
ICD-9-CM: 518.84, 799.02  1/4/2021 Unknown Chronic systolic heart failure (HCC) ICD-10-CM: I50.22 ICD-9-CM: 428.22  7/14/2019 Yes Volume overload ICD-10-CM: E87.70 ICD-9-CM: 276.69  7/8/2019 Yes PAF (paroxysmal atrial fibrillation) (HCC) ICD-10-CM: I48.0 ICD-9-CM: 427.31  7/7/2019 Yes Morbid obesity ([facility] (Chronic) ICD-10-CM: E66.01 
ICD-9-CM: 278.01  6/29/2019 Yes CKD (chronic kidney disease) stage 5, GFR less than 15 ml/min (HCC) (Chronic) ICD-10-CM: N18.5 ICD-9-CM: 585.5  11/30/2018 Yes SARAH (obstructive sleep apnea) (Chronic) ICD-10-CM: G47.33 
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 
ICD-9-CM: 250.40, 583.81  11/15/2017 Yes A-V fistula (HCC) (Chronic) ICD-10-CM: I77.0 ICD-9-CM: 447.0  9/6/2017 Yes Overview Signed 9/6/2017  2:35 PM by LESTER Redman  
  6/7/2017 - Maki De La Paz MD - creation left brachiocephalic AV fistula 8/10/2017 Isabel Nugent MD - elevation left b-c AVF 
  
  
   
 HTN (hypertension) (Chronic) ICD-10-CM: I10 
ICD-9-CM: 401.9  11/10/2011 Yes Plan:  (Medical Decision Making) 1. ANTONIO/CKD IV- New ESRD Hypoxic Resp Failure First dialysis 1/10/2021 
-poorly tolerated dialysis 2/2 hypotension yesterday, on midodrine Hold dialysis until further assessment 2. COVID 19 + s/p convalescent plasma, on decadron Worsening condition  
  
3. Anemia ok 
  
4. HTN (Hold to maximize UF) norvasc, coreg  
  
5. DM type II- SSI per hosp 6. Leukocytosis:( steroid) on empiric abx per hosp, Ruth Garcia MD

## 2021-01-16 NOTE — PROGRESS NOTES
Report given to [facility]. Patient resting in bed. Bed low and locked, safety measures in place. Patient currently on BiPap, respirations present and labored, patient breathing with abdominal muscles. No needs expressed. Call light within reach.

## 2021-01-16 NOTE — PROGRESS NOTES
Spoke with Dr. Dayan Cooley this afternoon. Patient still on Bipap but using more accessory muscles and abdominal muscles to aid breathing. Having Bipap dyssynchrony and appears more uncomfortable compared to this morning. She is DNR. Jean-Paul spoke to her daughter and we will transition to comfort care given we anticipate further decline despite all current measures. Family to come visit today.  
 
Cris Quinn MD

## 2021-01-16 NOTE — PROGRESS NOTES
Received call from [address] about end of life care. Family will be coming in later and  will assess needs

## 2021-01-16 NOTE — PROGRESS NOTES
Patient desatting to 85%, on Airvo 60L/100%. Placed patient on nonrebreather mask. O2 saturation brought back up to 92%. MD notified of above via [employer] mobile. New orders received to place patient on continuous BiPap and to put transfer orders to transfer to [facility].

## 2021-01-16 NOTE — PROGRESS NOTES
Pt now is comfort care. Asessment done. No changes noted. Respiration even and unlabored 20/min at rest. No s/s of pain noted at present. Deigjter at bedside to see pt. Encouraged to call with needs.

## 2021-01-16 NOTE — PROGRESS NOTES
Received phone call that this patient's O2 saturation was 68%. Patient had taken her Airvo off. There was no water in her Airvo. Patient placed on nonrebreather for approximately 15 mins. Water replaced on the Airvo. O2 saturation brought back up to 98%. Patient taken off the nonrebreather, and satting 89/90% on 60L/100%.

## 2021-01-16 NOTE — PROGRESS NOTES
[address] Admission Date: 1/4/2021 Daily Progress Note: 1/16/2021 The patient's chart is reviewed and the patient is discussed with the staff. Subjective:  
 
Came back to see patient who is know to us. patient seen this hospitalization with Covid +19 infection and has been here since 1/4/21. patient received treatment while here but also with renal failure. Not not able to tolerate HD yesterday and also too unstable today. Was on BIPAP in the past in ICU. Now on floor and deteriorating again and on BIPAP at 85% and having vent dyschrony now. All she wants to do is GO HOME. When asked if she wants to continue she reports NO. I called Dr. Denia Christy who came and saw the patient and reports she looks worse and using abdominal muscles to breath more than earlier. Called daughter and told patient is doing worse and overall will not improve, she is aware and will come by. Current Facility-Administered Medications Medication Dose Route Frequency  morphine injection 2 mg  2 mg IntraVENous Q1H PRN  
 LORazepam (ATIVAN) injection 1 mg  1 mg IntraVENous Q1H PRN  
 glycopyrrolate (ROBINUL) injection 0.2 mg  0.2 mg IntraMUSCular TID PRN  
 ondansetron (ZOFRAN) injection 4 mg  4 mg IntraVENous Q6H PRN  
 acetaminophen (TYLENOL) tablet 650 mg  650 mg Oral Q6H PRN  
 sodium chloride (NS) flush 5-40 mL  5-40 mL IntraVENous PRN  
 0.9% sodium chloride infusion 250 mL  250 mL IntraVENous PRN Review of Systems Limited as per above Objective:  
 
Vitals:  
 01/16/21 0349 01/16/21 0412 01/16/21 0753 01/16/21 1058 BP:  121/63 (!) 104/59 (!) 100/53 Pulse:  (!) 103 99 99 Resp:  28 22 23 Temp:  98.7 °F (37.1 °C) 98.2 °F (36.8 °C) 97.9 °F (36.6 °C) SpO2: (!) 88% 92% 97% 99% Weight:      
Height:      
 
 
No intake or output data in the 24 hours ending 01/16/21 1459 Physical Exam: Constitution:  the patient is well developed and looks unwell on BIPAP at 85% and had to adjust it to finally 8/8 EENMT:  Sclera clear, pupils equal, oral mucosa moist 
Respiratory: coarse with decreased effort. Cardiovascular:  RRR without M,G,R 
Gastrointestinal: soft and non-tender; with positive bowel sounds. Musculoskeletal: warm without cyanosis. There is + lower extremity edema. Skin:  no jaundice or rashes, no visible wounds Neurologic: no gross neuro deficits Psychiatric:  lethargic but does wake up -- confused CXR: noted LAB Recent Labs  
  01/16/21 
1026 01/16/21 
0616 01/16/21 
0548 01/15/21 
2106 01/15/21 
1753 GLUCPOC 126* 175* 58* 249* 332* Recent Labs  
  01/16/21 
1034 01/15/21 
1342 01/14/21 
0417 WBC 18.6* 20.4* 21.5* HGB 10.6* 10.2* 10.1* HCT 33.3* 31.6* 32.6*  
* 145* 166 Recent Labs  
  01/16/21 
1034 01/15/21 
1342 01/14/21 
0417  135* 141  
K 4.9 4.7 4.7  99 104 CO2 25 25 27 * 274* 148* * 126* 101* CREA 4.78* 4.39* 4.02* CA 8.9 8.7 9.6 No results for input(s): PH, PCO2, PO2, HCO3, PHI, PCO2I, PO2I, HCO3I in the last 72 hours. No results for input(s): LCAD, LAC in the last 72 hours. Assessment:  (Medical Decision Making) Hospital Problems  Date Reviewed: 1/4/2021 Codes Class Noted POA Mild protein-calorie malnutrition ([facility] [address] ICD-10-CM: E44.1 ICD-9-CM: 263.1  1/13/2021 Yes * (Principal) Pneumonia due to COVID-19 virus ICD-10-CM: U07.1, J12.82 ICD-9-CM: 480.8  1/4/2021 Unknown Acute on chronic respiratory failure with hypoxia [facility]) ICD-10-CM: J96.21 
ICD-9-CM: 518.84, 799.02  1/4/2021 Unknown Chronic systolic heart failure (HCC) ICD-10-CM: I50.22 ICD-9-CM: 428.22  7/14/2019 Yes Volume overload ICD-10-CM: E87.70 ICD-9-CM: 276.69  7/8/2019 Yes PAF (paroxysmal atrial fibrillation) (HCC) ICD-10-CM: I48.0 ICD-9-CM: 427.31  7/7/2019 Yes Morbid obesity ([facility] [address] (Chronic) ICD-10-CM: E66.01 
ICD-9-CM: 278.01  6/29/2019 Yes CKD (chronic kidney disease) stage 5, GFR less than 15 ml/min (HCC) (Chronic) ICD-10-CM: N18.5 ICD-9-CM: 585.5  11/30/2018 Yes SARAH (obstructive sleep apnea) (Chronic) ICD-10-CM: G47.33 
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 
ICD-9-CM: 250.40, 583.81  11/15/2017 Yes A-V fistula (HCC) (Chronic) ICD-10-CM: I77.0 ICD-9-CM: 447.0  9/6/2017 Yes Overview Signed 9/6/2017  2:35 PM by LESTER Garza  
  6/7/2017 - Trinity Heath MD - creation left brachiocephalic AV fistula 8/10/2017 Dorina Chavez MD - elevation left b-c AVF 
  
  
   
 HTN (hypertension) (Chronic) ICD-10-CM: I10 
ICD-9-CM: 401.9  11/10/2011 Yes Patient with COVID 19 s/p treatment now with renal failure and not tolerating HD. worsening respiratory status and on BIPAP and increased needs but now with dyschrony and looking worse/dying. She is DNR Plan:  (Medical Decision Making) --patient is unfortunately having worsening respiratory status despite BIPAP and multiple adjustments. Not tolerating HD, marked debility and seems giving up at this time. Using abdominal muscles to breath as well. Spoke with PMD, nursing and family. Recommend purse comfort measures. Daughter, Johanna Coreas, to come in shortly. Staff aware. Patient will stay on 8th Floor Condition is critical 
Time spent with care talking to all parties, patient, etc. Was 38 minutes. More than 50% of the time documented was spent in face-to-face contact with the patient and in the care of the patient on the floor/unit where the patient is located.  
 
Nate Porter MD

## 2021-01-16 NOTE — PROGRESS NOTES
Hospitalist Note Admit Date:  2021  8:20 AM  
Name:  Stephie Schofield Age:  [de-identified] y.o. 
:  1940 MRN:  [id number] PCP:  Alicia Dubon MD 
Treatment Team: Attending Provider: Ranjith Barillas MD; Consulting Provider: Ryan Salter MD; Utilization Review: Ines Abrams, RN; Consulting Provider: Wil Ordaz MD; Utilization Review: Loyda Bishop RN; Care Manager: Rajan Oneil.; Staff Nurse: Beverly Menjivar, WILL; Primary Nurse: Maude Sands; Physical Therapist: Dang Garcia PT 
 
HPI/Subjective:  
Ms. Jaelyn Marshall is an [de-identified] y/o WF with a h/o AFib, HFpEF, HTN, CAD, DM, CKD who was admitted to the ICU service on 20 with acute hypoxemic respiratory faiulre 2/2 COVID-19. She was transferred to the floor and Hospitalist consulted to assume care. Nephrology following for HD.  
 
: Became very hypoxic early this AM and was placed back on Bipap,  at 85%. Currently tachypneic with RR in the 30s, appears fatigued. Bedside sats high 90s. Hypoglycemic this morning at 58 which improved to 175 with D50, though did not improve mentation much. No other complaints Objective:  
 
Patient Vitals for the past 24 hrs: 
 Temp Pulse Resp BP SpO2  
21 0753 98.2 °F (36.8 °C) 99 22 (!) 104/59 97 % 21 0412 98.7 °F (37.1 °C) (!) 103 28 121/63 92 % 21 0349     (!) 88 % 21 0034 98.5 °F (36.9 °C) (!) 102 24 126/78 92 % 01/15/21 2026 98.9 °F (37.2 °C) (!) 103 24 116/77 91 % 01/15/21 1649 97.7 °F (36.5 °C) 100 20 110/63 94 % 01/15/21 1246 99.1 °F (37.3 °C) 99 20 120/71 91 % 01/15/21 0926 98.2 °F (36.8 °C) 100 21 110/72 90 % Oxygen Therapy O2 Sat (%): 97 % (21 0753) Pulse via Oximetry: 100 beats per minute (21) O2 Device: BIPAP (21 043) O2 Flow Rate (L/min): 60 l/min (21) O2 Temperature: 87.8 °F (31 °C) (21) FIO2 (%): 100 % (21) Estimated body mass index is 35.03 kg/m² as calculated from the following: 
  Height as of this encounter: 5' 9\" (1.753 m). Weight as of this encounter: 107.6 kg (237 lb 3.2 oz). Intake/Output Summary (Last 24 hours) at 1/16/2021 0825 Last data filed at 1/15/2021 1251 Gross per 24 hour Intake  Output 300 ml Net -300 ml *Note that automatically entered I/Os may not be accurate; dependent on patient compliance with collection and accurate  by techs. General:    Obese, ill appearing. CV:   RRR. No m/r/g. No edema. No JVD Lungs:   Diminshed b/l, Bipap at 85% FiO2, bedside sats high 90s, tachypnea with RR in the 30s. Abdomen:   Soft, nontender, nondistended. Extremities: Warm and dry. LLE bandaged. Mild edema b/l. Skin:     No rashes. Normal coloration Neuro:  No gross focal deficits. Alert Data Reviewed: 
I have reviewed all labs, meds, and studies from the last 24 hours: 
Recent Results (from the past 24 hour(s)) GLUCOSE, POC Collection Time: 01/15/21 11:31 AM  
Result Value Ref Range Glucose (POC) 289 (H) 65 - 100 mg/dL METABOLIC PANEL, BASIC Collection Time: 01/15/21  1:42 PM  
Result Value Ref Range Sodium 135 (L) 136 - 145 mmol/L Potassium 4.7 3.5 - 5.1 mmol/L Chloride 99 98 - 107 mmol/L  
 CO2 25 21 - 32 mmol/L Anion gap 11 7 - 16 mmol/L Glucose 274 (H) 65 - 100 mg/dL  (H) 8 - 23 MG/DL Creatinine 4.39 (H) 0.6 - 1.0 MG/DL  
 GFR est AA 12 (L) >60 ml/min/1.73m2 GFR est non-AA 10 (L) >60 ml/min/1.73m2 Calcium 8.7 8.3 - 10.4 MG/DL  
CBC W/O DIFF Collection Time: 01/15/21  1:42 PM  
Result Value Ref Range WBC 20.4 (H) 4.3 - 11.1 K/uL  
 RBC 3.32 (L) 4.05 - 5.2 M/uL  
 HGB 10.2 (L) 11.7 - 15.4 g/dL HCT 31.6 (L) 35.8 - 46.3 % MCV 95.2 79.6 - 97.8 FL  
 MCH 30.7 26.1 - 32.9 PG  
 MCHC 32.3 31.4 - 35.0 g/dL  
 RDW 13.7 11.9 - 14.6 % PLATELET 108 (L) 772 - 450 K/uL  MPV 13.0 (H) 9.4 - 12.3 FL  
 ABSOLUTE NRBC 0.00 0.0 - 0.2 K/uL PROCALCITONIN Collection Time: 01/15/21  1:42 PM  
Result Value Ref Range Procalcitonin 5.63 ng/mL GLUCOSE, POC Collection Time: 01/15/21  5:53 PM  
Result Value Ref Range Glucose (POC) 332 (H) 65 - 100 mg/dL GLUCOSE, POC Collection Time: 01/15/21  9:06 PM  
Result Value Ref Range Glucose (POC) 249 (H) 65 - 100 mg/dL GLUCOSE, POC Collection Time: 01/16/21  5:48 AM  
Result Value Ref Range Glucose (POC) 58 (L) 65 - 100 mg/dL GLUCOSE, POC Collection Time: 01/16/21  6:16 AM  
Result Value Ref Range Glucose (POC) 175 (H) 65 - 100 mg/dL Current Meds: 
Current Facility-Administered Medications Medication Dose Route Frequency  dextrose 40% (GLUTOSE) oral gel 1 Tube  15 g Oral PRN  
 glucagon (GLUCAGEN) injection 1 mg  1 mg IntraMUSCular PRN  
 dextrose (D50W) injection syrg 12.5-25 g  25-50 mL IntraVENous PRN  
 furosemide (LASIX) injection 80 mg  80 mg IntraVENous ONCE  
 morphine injection 2 mg  2 mg IntraVENous Q4H PRN  
 insulin lispro (HUMALOG) injection 0-15 Units  0-15 Units SubCUTAneous AC&HS  [Held by provider] insulin lispro (HUMALOG) injection 10 Units  10 Units SubCUTAneous TIDAC  midodrine (PROAMATINE) tablet 5 mg  5 mg Oral Q TUE, THU & SAT  cefepime (MAXIPIME) 1 g in 0.9% sodium chloride (MBP/ADV) 50 mL MBP  1 g IntraVENous Q24H  
 furosemide (LASIX) tablet 80 mg  80 mg Oral BID  [Held by provider] insulin glargine (LANTUS) injection 25 Units  25 Units SubCUTAneous DAILY  ondansetron (ZOFRAN) injection 4 mg  4 mg IntraVENous Q6H PRN  
 heparin (porcine) 1,000 unit/mL injection 5,000 Units  5,000 Units Hemodialysis DIALYSIS PRN  
 sevelamer carbonate (RENVELA) tab 800 mg  800 mg Oral TID WITH MEALS  
 acetaminophen (TYLENOL) tablet 650 mg  650 mg Oral Q6H PRN  
 carbidopa-levodopa (SINEMET)  mg per tablet 1 Tab  1 Tab Oral QID  ergocalciferol capsule 50,000 Units  50,000 Units Oral Q7D  
  ferrous sulfate tablet 324 mg  324 mg Oral DAILY  levothyroxine (SYNTHROID) tablet 25 mcg  25 mcg Oral ACB  pantoprazole (PROTONIX) tablet 40 mg  40 mg Oral ACB&D  
 sodium chloride (NS) flush 5-40 mL  5-40 mL IntraVENous Q8H  
 sodium chloride (NS) flush 5-40 mL  5-40 mL IntraVENous PRN  
 0.9% sodium chloride infusion 250 mL  250 mL IntraVENous PRN Other Studies: No results found for this visit on 01/04/21. Xr Chest Sngl V Result Date: 1/15/2021 EXAM: XR CHEST SNGL V INDICATION: Hypoxia, COVID COMPARISON: Chest x-ray, 1/13/2021 FINDINGS: The cardiomediastinal silhouette is obscured. Atherosclerotic calcifications are present in the thoracic aorta. Status post median sternotomy and CABG. There are diffuse bibasilar prominent pulmonary infiltrates which have slightly progressed at the left lung base but are otherwise stable. No large pleural effusion. No pneumothorax. No acute osseous abnormality. IMPRESSION: Diffuse pulmonary infiltrates, slightly progressed at the left base but otherwise stable. All Micro Results Procedure Component Value Units Date/Time CULTURE, RESPIRATORY/SPUTUM/BRONCH Edwyna Kaylin STAIN [[id number]] Order Status: Sent Specimen: Sputum CULTURE, BLOOD [[id number]] Collected: 01/04/21 7918 Order Status: Completed Specimen: Blood Updated: 01/09/21 9514 Special Requests: --     
  RIGHT Antecubital 
  
  Culture result: NO GROWTH 5 DAYS     
 CULTURE, BLOOD [[id number]] Collected: 01/04/21 0835 Order Status: Completed Specimen: Blood Updated: 01/09/21 8456 Special Requests: --     
  LEFT 
FOREARM Culture result: NO GROWTH 5 DAYS     
  
 
 
SARS-CoV-2 Lab Results \"Novel Coronavirus\" Test: No results found for: COV2NT \"Emergent Disease\" Test: No results found for: EDPR \"SARS-COV-2\" Test: No results found for: XGCOVT Rapid Test:  
Lab Results Component Value Date/Time  COVR Detected (AA) 01/04/2021 08:35 AM  
 
  
 
 
 Assessment and Plan:  
 
Hospital Problems as of 1/16/2021 Date Reviewed: 1/4/2021 Codes Class Noted - Resolved POA Mild protein-calorie malnutrition ([facility] ICD-10-CM: E44.1 ICD-9-CM: 263.1  1/13/2021 - Present Yes * (Principal) Pneumonia due to COVID-19 virus ICD-10-CM: U07.1, J12.82 ICD-9-CM: 480.8  1/4/2021 - Present Unknown Acute on chronic respiratory failure with hypoxia [facility]) ICD-10-CM: V98.97 
ICD-9-CM: 518.84, 799.02  1/4/2021 - Present Unknown Chronic systolic heart failure (HCC) ICD-10-CM: I50.22 ICD-9-CM: 428.22  7/14/2019 - Present Yes Volume overload ICD-10-CM: E87.70 ICD-9-CM: 276.69  7/8/2019 - Present Yes PAF (paroxysmal atrial fibrillation) (HCC) ICD-10-CM: I48.0 ICD-9-CM: 427.31  7/7/2019 - Present Yes Morbid [facility] ([facility] (Chronic) ICD-10-CM: E66.01 
ICD-9-CM: 278.01  6/29/2019 - Present Yes  
   
 CKD (chronic kidney disease) stage 5, GFR less than 15 ml/min (HCC) (Chronic) ICD-10-CM: N18.5 ICD-9-CM: 585.5  11/30/2018 - Present Yes  
   
 SARAH (obstructive sleep apnea) (Chronic) ICD-10-CM: E46.36 
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 
ICD-9-CM: 250.40, 583.81  11/15/2017 - Present Yes A-V fistula (HCC) (Chronic) ICD-10-CM: I77.0 ICD-9-CM: 447.0  9/6/2017 - Present Yes Overview Signed 9/6/2017  2:35 PM by LESTER Ramos  
  6/7/2017 - Derick Liao MD - creation left brachiocephalic AV fistula 8/10/2017 Jarred Gibson MD - elevation left b-c AVF 
  
  
   
 HTN (hypertension) (Chronic) ICD-10-CM: I10 
ICD-9-CM: 401.9  11/10/2011 - Present Yes Plan: # Acute hypoxemic respiratory failure  
            - Volume overloaded on admission with + COVID.  - Has not tolerated HD well due to hypotension, unable to pull fluid each session. - Repeat CXR mildly progressed mabel at [facility], PCT elevated so cefepime added. Put on Bipap around 0500. Give morphine now and IV Lasix x1.  
  
# Leukocytosis 
            - Off steroids. CXR yesterday increased at [facility], cefepime added. Afebrile, CBC pending today. # Hypoglycemia 
 - Hold insulins, con't SSI.  
 
# DM2 
            - Hold basal and prandial insulins with AM hypoglycemia. 
  
# Acute on chronic dCHF exacerbation 
            - Unclear if resp decompensation is progression of COVID vs fluid. Lasix x1 now, morphine, Bipap. 
  
# ESRD on HD 
            - Per Nephrology. Poorly tolerated thus far due to hypotension and limited fluid removal/fatigue.  
  
# HTN 
            - Meds held 
  
# Parkinson  
            - Sinemet 
  
# B/l LE wounds DC planning/Dispo: High risk of decline and poor prognosis overall. I called daughter, Carmen Gomez, this morning and gave update. Patient remains DNR. Will follow up. Diet:  DIET DIABETIC CONSISTENT CARB 
DIET NUTRITIONAL SUPPLEMENTS 
DVT ppx: SCDs Other listed chronic conditions stable, cont current management.  
 
Signed: 
Bran Gibbons MD

## 2021-01-17 NOTE — PROGRESS NOTES
Attempted three times to contact Pt's daughter, Cecilia Murcia ([phone]). Phone rings with no option to leave message on phone. Nursing house supervisor aware.

## 2021-01-17 NOTE — PROGRESS NOTES
SBAR received from TORIA, RN. Patient stable, resting in bed, in no apparent distress. Receiving supplemental oxygen via Bipap. Continuous pulse oximeter reads 96%. Bed locked and low. Call light within reach. Droplet plus precautions maintained.

## 2021-01-17 NOTE — DISCHARGE SUMMARY
Hospitalist Death Summary Name:  Dayne Juan Age:  [de-identified] y.o. 
:   1940 MRN:   [id number] PCP:   Aury Garcia MD 
Admit date:  2021  8:20 AM 
Treatment Team: Attending Provider: Filemon Bowling MD; Consulting Provider: Toni Rosado MD; Utilization Review: Yun Cadet RN; Consulting Provider: Micki Simpson MD; Utilization Review: Juno Rehman RN; Care Manager: Marley Boogie.; Consulting Provider: Lynne Boswell MD; Primary Nurse: Jacquelin Tafoya; Charge Nurse: Lolly Kussmaul Problem List for this Hospitalization: 
Hospital Problems as of 2021 Date Reviewed: 2021 Codes Class Noted - Resolved POA Mild protein-calorie malnutrition ([facility] ICD-10-CM: E44.1 ICD-9-CM: 263.1  2021 - Present Yes * (Principal) Pneumonia due to COVID-19 virus ICD-10-CM: U07.1, J12.82 ICD-9-CM: 480.8  2021 - Present Unknown Acute on chronic respiratory failure with hypoxia [facility]) ICD-10-CM: D44.51 
ICD-9-CM: 518.84, 799.02  2021 - Present Unknown Chronic systolic heart failure (HCC) ICD-10-CM: I50.22 ICD-9-CM: 428.22  2019 - Present Yes Volume overload ICD-10-CM: E87.70 ICD-9-CM: 276.69  2019 - Present Yes PAF (paroxysmal atrial fibrillation) (HCC) ICD-10-CM: I48.0 ICD-9-CM: 427.31  2019 - Present Yes Morbid obesity ([facility] (Chronic) ICD-10-CM: E66.01 
ICD-9-CM: 278.01  2019 - Present Yes  
   
 CKD (chronic kidney disease) stage 5, GFR less than 15 ml/min (HCC) (Chronic) ICD-10-CM: N18.5 ICD-9-CM: 585.5  2018 - Present Yes  
   
 SARAH (obstructive sleep apnea) (Chronic) ICD-10-CM: P28.83 
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 
ICD-9-CM: 250.40, 583.81  11/15/2017 - Present Yes A-V fistula (HCC) (Chronic) ICD-10-CM: I77.0 ICD-9-CM: 447.0  2017 - Present Yes Overview Signed 9/6/2017  2:35 PM by LESTER Carr  
  6/7/2017 - Kalyan Solomon MD - creation left brachiocephalic AV fistula 8/10/2017 Esau Funk MD - elevation left b-c AVF 
  
  
   
 HTN (hypertension) (Chronic) ICD-10-CM: I10 
ICD-9-CM: 401.9  11/10/2011 - Present Yes Admission HPI from 1/4/2021: \"Patient is a [de-identified] y.o.  female presents with shortness of breath.  
  
She has a history of afib on eliquis, DM, HTN, CKD, SARAH. She was admitted here 12/16-27 with acute on chronic hypoxic respiratory failure secondary to decompensated heart failure. TTE at that time however showed EF 55% with mild hypertrophy with mild aortic regurg and mild to moderate MR. She presented then from wound care with severe hypertension and dyspnea with B LE edema and elevated BNP to 10,000. She was diuresed, Covid was negative at that time and she was discharged.  
  
Today she present with very similar symptoms with severe hypertension, BNP to 66,000, And increased dyspnea x 24 hours on 3L home O2 since last discharge. However, this time her Covid was +. Her  is reportedly in our ICU with covid at present.  
  
She was placed on bipap and we were asked to admit her.  
  
She states she did not feel well at the time of her last discharge. States she was only home a few days before coming back. Started having issues with vomiting and worsening shortness of breath. She struggles remembering details and cannot tell me if she had any fevers or not.  
  
Labs show BNP as above, creatinine at or near her baseline, and hgb near recent levels of 9.2. CXR showed pulmonary edema like pattern. \" Hospital Course: Pt admitted By Dr. Petros Porter of pulmonology to ICU on BiPAP for treatment of COVID 19 pneumonia. Started on corticosteroids and convalescent plasma. Nephrology was consulted and followed. Pt continued to decline and require additional O2. Pt remained DNR but did not improve on BiPAP. Pt was moved to medical floor and made comfort care per family's wises on . Patient  at [id number] on 2021. Time of Death:  
272 Cause of Death:  
Acute respiratory failure secondary to COVID 19 pneumonia Significant Diagnostic Imaging/Tests:  
Xr Chest Sngl V Result Date: 2021 History: Shortness of breath. COVID-19 Exam: portable chest Comparison: 2021 Findings: Persistent multilobar infiltrates seen bilaterally. No change in the appearance of the mediastinal contour or osseous structures. Impressions: Stable portable chest  
 
Us Retroperitoneum Comp Result Date: 2021 Renal ultrasound INDICATION: Acute on chronic kidney disease COMPARISON: none TECHNIQUE: Multiple grayscale sonographic images of the kidneys. FINDINGS: Kidneys are echogenic, consistent with medical renal disease. Right kidney measures 12.1 cm and the left measures 11.6 cm. No hydronephrosis. There are 2 cysts in the upper pole of the left kidney, measuring 2.8 cm and 2.4 cm respectively. The complex mass noted in the midportion of the left kidney on the prior CT study is not well-seen on this ultrasound study. Incidental note is made of a liver cyst.  
 
IMPRESSION: 1. Echogenic kidneys, consistent with medical renal disease. Negative for hydronephrosis. 2. Small cysts are noted at the upper pole of the left kidney. Complex lesion in the midportion of the left kidney noted on prior CT of 2020 is not well-seen on this study, secondary to overlying bowel gas. Xr Chest [facility] Result Date: 2021 EXAM: CHEST X-RAY, 1 VIEW INDICATION: Shortness of breath COMPARISON: Chest x-ray 12/25/2020 TECHNIQUE: Single AP view of the chest was obtained. FINDINGS: Pulmonary vascular congestion with interval development of perihilar airspace disease. No pneumothorax or pleural effusion. The cardiac silhouette is enlarged. The osseous structures are unremarkable. IMPRESSION: 1.  Pulmonary vascular congestion with perihilar airspace disease in a pattern which appears to reflect pulmonary edema or less likely multifocal lung infiltrates. 2.  Enlarged cardiac silhouette. No results found for this visit on 01/04/21. All Micro Results Procedure Component Value Units Date/Time CULTURE, RESPIRATORY/SPUTUM/BRONCH Carisa Shyam STAIN [[id number]] Order Status: Canceled Specimen: Sputum CULTURE, BLOOD [[id number]] Collected: 01/04/21 7401 Order Status: Completed Specimen: Blood Updated: 01/09/21 8702 Special Requests: --     
  RIGHT Antecubital 
  
  Culture result: NO GROWTH 5 DAYS     
 CULTURE, BLOOD [[id number]] Collected: 01/04/21 0835 Order Status: Completed Specimen: Blood Updated: 01/09/21 8827 Special Requests: --     
  LEFT 
FOREARM Culture result: NO GROWTH 5 DAYS Labs: Results: BMP Recent Labs  
  01/16/21 
1034 01/15/21 
1342  135* K 4.9 4.7  99 CO2 25 25 AGAP 11 11 * 126* CREA 4.78* 4.39* CA 8.9 8.7 * 274* CBC w/Diff Recent Labs  
  01/16/21 
1034 01/15/21 
1342 WBC 18.6* 20.4*  
RBC 3.47* 3.32* HGB 10.6* 10.2* HCT 33.3* 31.6*  
* 145* LFTs No results for input(s): ALT, TBIL, AP, TP, ALB, GLOB, AGRAT in the last 72 hours. No lab exists for component: SGOT, GPT Cardiac Markers Lab Results Component Value Date/Time   (H) 12/21/2020 10:13 AM  
 CK 91 09/12/2014 05:13 PM  
 Troponin-I, Qt. 0.85 (HH) 07/08/2019 11:39 AM  
 Troponin-I, Qt. 1.25 () 07/07/2019 08:04 PM  
 Troponin-I, Qt. 1.32 (HH) 07/07/2019 03:02 PM  
 BNP 2,115 (H) 07/09/2019 09:45 AM  
 BNP 3,464 (H) 07/07/2019 04:55 AM  
  
Coagulation No results for input(s): PTP, INR, APTT, INREXT in the last 72 hours. Last A1c Lab Results Component Value Date/Time Hemoglobin A1c 7.0 (H) 09/28/2020 01:18 PM  
 Hemoglobin A1c 9.5 (H) 03/10/2020 03:57 PM  
 Hemoglobin A1c 8.1 (H) 05/16/2016 06:08 AM  
  
Lipid Panel Lab Results Component Value Date/Time Cholesterol, total 247 (H) 09/28/2020 01:18 PM  
 Cholesterol (POC) 238 12/11/2013 08:22 AM  
 HDL Cholesterol 43 09/28/2020 01:18 PM  
 LDL, calculated 149 (H) 09/28/2020 01:18 PM  
 LDL, calculated 151 (H) 10/08/2019 11:21 AM  
 VLDL, calculated 55 (H) 09/28/2020 01:18 PM  
 VLDL, calculated 29 10/08/2019 11:21 AM  
 Triglyceride 300 (H) 09/28/2020 01:18 PM  
 Triglycerides (POC) 105 12/11/2013 08:22 AM  
 CHOL/HDL Ratio 4.6 05/16/2016 06:06 AM  
  
Thyroid Studies Lab Results Component Value Date/Time TSH 0.106 (L) 01/04/2021 08:31 AM  
    
Urinalysis Lab Results Component Value Date/Time Color YELLOW 01/04/2021 05:46 PM  
 Appearance CLOUDY 01/04/2021 05:46 PM  
 Specific gravity 1.016 01/04/2021 05:46 PM  
 pH (UA) 5.5 01/04/2021 05:46 PM  
 Protein 300 (A) 01/04/2021 05:46 PM  
 Glucose >1,000 01/04/2021 05:46 PM  
 Ketone Negative 01/04/2021 05:46 PM  
 Bilirubin Negative 01/04/2021 05:46 PM  
 Blood MODERATE (A) 01/04/2021 05:46 PM  
 Urobilinogen 0.2 01/04/2021 05:46 PM  
 Nitrites Negative 01/04/2021 05:46 PM  
 Leukocyte Esterase MODERATE (A) 01/04/2021 05:46 PM  
  
 
All Labs from Last 24 Hrs: 
Recent Results (from the past 24 hour(s)) GLUCOSE, POC Collection Time: 01/16/21 10:26 AM  
Result Value Ref Range Glucose (POC) 126 (H) 65 - 100 mg/dL METABOLIC PANEL, BASIC Collection Time: 01/16/21 10:34 AM  
Result Value Ref Range Sodium 136 136 - 145 mmol/L Potassium 4.9 3.5 - 5.1 mmol/L  Chloride 100 98 - 107 mmol/L  
 CO2 25 21 - 32 mmol/L Anion gap 11 7 - 16 mmol/L Glucose 116 (H) 65 - 100 mg/dL  (H) 8 - 23 MG/DL Creatinine 4.78 (H) 0.6 - 1.0 MG/DL  
 GFR est AA 11 (L) >60 ml/min/1.73m2 GFR est non-AA 9 (L) >60 ml/min/1.73m2 Calcium 8.9 8.3 - 10.4 MG/DL  
CBC W/O DIFF Collection Time: 21 10:34 AM  
Result Value Ref Range WBC 18.6 (H) 4.3 - 11.1 K/uL  
 RBC 3.47 (L) 4.05 - 5.2 M/uL  
 HGB 10.6 (L) 11.7 - 15.4 g/dL HCT 33.3 (L) 35.8 - 46.3 % MCV 96.0 79.6 - 97.8 FL  
 MCH 30.5 26.1 - 32.9 PG  
 MCHC 31.8 31.4 - 35.0 g/dL  
 RDW 13.7 11.9 - 14.6 % PLATELET 281 (L) 644 - 450 K/uL MPV 12.7 (H) 9.4 - 12.3 FL ABSOLUTE NRBC 0.00 0.0 - 0.2 K/uL GLUCOSE, POC Collection Time: 21  3:30 PM  
Result Value Ref Range Glucose (POC) 168 (H) 65 - 100 mg/dL GLUCOSE, POC Collection Time: 21  8:49 PM  
Result Value Ref Range Glucose (POC) 205 (H) 65 - 100 mg/dL Discharge Exam: 
Eyes: Pupils fixed/nonreactive Lungs: No breath sounds. Heart:  No heart sounds Neurologic: unresponsive Disposition:  May his memory be eternal. 
 
Signed: 
Corinne Blind, MD

## 2021-01-17 NOTE — PROGRESS NOTES
Remains on comfort care per order. Resting quietly at present. NAD noted. Remains on droplet plus isolation for positive COVID-19 screening. Ordered PPE in place and in use. Safety maintained through out the shift. To report off to oncoming nurse.

## 2021-01-17 NOTE — PROGRESS NOTES
Report received from night nurse. Per night nurse, body prepped and bagged for mortuary. Awaiting transport to mortuary.

## 2021-01-17 NOTE — PROGRESS NOTES
This nurse entered pt room to assess pt status and found patient with no respirations. No radial or carotid pulse was felt. Pt determined to have  at 0453. Charge Nurse, MD Tiffani Paiz and nursing house supervisor made aware.

## 2025-03-05 NOTE — PROGRESS NOTES
[facility] Subjective: A on CKD . Chest Pain better . Nausea + Review of Systems -  
General ROS: negative for - fever, chills Respiratory ROS: no SOB, cough, WARREN Cardiovascular ROS: no CP, palpitations Gastrointestinal ROS: no  abdominal pain, diarrhea Genito-Urinary ROS: no difficulty voiding, dysuria Neurological ROS: no seizures, focal weekness Objective: 
 
Vitals:  
 07/09/19 0522 07/09/19 8401 07/09/19 0810 07/09/19 1135 BP:  150/69  162/74 Pulse:  (!) 50 63 69 Resp:  18  18 Temp:  97.8 °F (36.6 °C)  97.9 °F (36.6 °C) SpO2:  96%  98% Weight: 115.6 kg (254 lb 12.8 oz) Height:      
 
 
PE 
Gen: in no acute distress CV:reg rate Chest:clear Abd: soft Ext/Access: no edema Coty Martins LAB Recent Labs  
  07/09/19 
0945 07/08/19 
0400 07/07/19 
0455 WBC 13.1* 13.1* 13.5* HGB 9.2* 9.0* 9.6* HCT 28.1* 27.6* 29.4*  
 244 230 Recent Labs  
  07/09/19 
0945 07/08/19 
1139 07/08/19 
0400 07/07/19 
2004 07/07/19 
1502  07/07/19 
0455   --  143  --   --   --  143  
K 3.5  --  3.2*  --   --   --  3.8   --  108*  --   --   --  109* CO2 22  --  19*  --   --   --  19* *  --  272*  --   --   --  276* BUN 90*  --  91*  --   --   --  87* CREA 3.03*  --  3.02*  --   --   --  3.15* CA 8.3  --  8.4  --   --   --  8.5  
TROIQ  --  0.85*  --  1.25* 1.32*   < >  --   
 < > = values in this interval not displayed. Radiology A/P:  
Patient Active Problem List  
Diagnosis Code  
 HTN (hypertension) I10  
 AF (atrial fibrillation) (HCC) I48.91  
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  
 SARAH (obstructive sleep apnea) G47.33  
 CKD (chronic kidney disease) stage 5, GFR less than 15 ml/min ([facility]) N18.5  Parkinson disease ([facility] G20  
 Acute on chronic renal failure ([facility]) N17.9, N18.9  Acute cystitis with hematuria N30.01  
  Morbid obesity (HCC) E66.01  
 Hypokalemia E87.6  
 C. difficile diarrhea A04.72  
 PAF (paroxysmal atrial fibrillation) (HCC) I48.0  Elevated troponin R74.8  Chest pain R07.9  CAD (coronary artery disease) I25.10  Volume overload E87.70  Demand ischemia (HCC) I24.8 A on CKD -Renal function stable  Following. Saranya Delcid CP 
 
 
HTN    
 
C Diff Justin Guerrero MD 
 [Follow-up Visit ___] : a follow-up visit  for [unfilled]

## 2025-03-26 NOTE — PROGRESS NOTES
MD Villasenor notified of hypotension SBP 60s x2, US PIV infiltrated, unable to obtain labs.  Additional RN to attempt new US guided PIV insertion.     Report given to oncoming nurse.

## (undated) DEVICE — DRAPE SHT 3 QTR PROXIMA 53X77 --

## (undated) DEVICE — GEL US 20GM NONIRRITATING OVERWRAPPED FILE PCH TRNSMIT

## (undated) DEVICE — SUTURE PERMAHAND SZ 2-0 L18IN NONABSORBABLE BLK L26MM SH C012D

## (undated) DEVICE — WIPE INSTR HIGH ABSORBENT FAST WICKING LINT FREE COUNT 20

## (undated) DEVICE — Device

## (undated) DEVICE — VASCUCLAMP RADIOPAQUE VASCULAR CLAMP SMALL STRAIGHT: Brand: VASCUCLAMP

## (undated) DEVICE — ABSORBENT, WATERPROOF, BACTERIA PROOF FILM DRESSING: Brand: OPSITE POST OP 10X35CM CTN 20

## (undated) DEVICE — AMD ANTIMICROBIAL GAUZE SPONGES,12 PLY USP TYPE VII, 0.2% POLYHEXAMETHYLENE BIGUANIDE HCI (PHMB): Brand: CURITY

## (undated) DEVICE — INTENDED FOR TISSUE SEPARATION, AND OTHER PROCEDURES THAT REQUIRE A SHARP SURGICAL BLADE TO PUNCTURE OR CUT.: Brand: BARD-PARKER ® STAINLESS STEEL BLADES

## (undated) DEVICE — INTENDED TO BE USED TO OCCLUDE, RETRACT AND IDENTIFY ARTERIES, VEINS, TENDONS AND NERVES IN SURGICAL PROCEDURES: Brand: STERION®  VESSEL LOOP

## (undated) DEVICE — DERMABOND SKIN ADH 0.7ML -- DERMABOND ADVANCED 12/BX

## (undated) DEVICE — DISH PTRI STRL --

## (undated) DEVICE — KENDALL SCD EXPRESS SLEEVES, KNEE LENGTH, MEDIUM: Brand: KENDALL SCD

## (undated) DEVICE — SUTURE PERMAHAND SZ 3-0 L18IN NONABSORBABLE BLK SILK BRAID A184H

## (undated) DEVICE — (D)PREP SKN CHLRAPRP APPL 26ML -- CONVERT TO ITEM 371833

## (undated) DEVICE — BUTTON SWITCH PENCIL BLADE ELECTRODE, HOLSTER: Brand: EDGE

## (undated) DEVICE — SUTURE VCRL SZ 3-0 L27IN ABSRB UD L26MM SH 1/2 CIR J416H

## (undated) DEVICE — SUTURE PROL SZ 6-0 L24IN NONABSORBABLE BLU BV-1 L9.3MM 3/8 M8805

## (undated) DEVICE — APPLIER CLP SM BLK TI AUTO HNDL DISP W/ 20 CLP PREM SURGICLP

## (undated) DEVICE — SUTURE PERMAHAND SZ 2-0 L12X18IN NONABSORBABLE BLK SILK A185H

## (undated) DEVICE — SUTURE MCRYL SZ 4-0 L27IN ABSRB UD L19MM PS-2 1/2 CIR PRIM Y426H

## (undated) DEVICE — AGENT HEMSTAT W4XL4IN OXIDIZED REGENERATED CELOS ABSRB SFT

## (undated) DEVICE — UNIVERSAL DRAPES: Brand: MEDLINE INDUSTRIES, INC.

## (undated) DEVICE — SUT ETHLN 4-0 18IN PS2 BLK --

## (undated) DEVICE — DRAPE TWL SURG 16X26IN BLU ORB04] ALLCARE INC]

## (undated) DEVICE — CARDINAL HEALTH FLEXIBLE LIGHT HANDLE COVER: Brand: CARDINAL HEALTH

## (undated) DEVICE — X-RAY SPONGES,12 PLY: Brand: DERMACEA

## (undated) DEVICE — REM POLYHESIVE ADULT PATIENT RETURN ELECTRODE: Brand: VALLEYLAB

## (undated) DEVICE — LIGHT GLOVE: Brand: DEVON

## (undated) DEVICE — 2000CC GUARDIAN II: Brand: GUARDIAN

## (undated) DEVICE — AMD ANTIMICROBIAL BANDAGE ROLL,6 PLY: Brand: KERLIX